# Patient Record
Sex: MALE | Race: WHITE | NOT HISPANIC OR LATINO | Employment: OTHER | URBAN - METROPOLITAN AREA
[De-identification: names, ages, dates, MRNs, and addresses within clinical notes are randomized per-mention and may not be internally consistent; named-entity substitution may affect disease eponyms.]

---

## 2018-01-03 PROBLEM — I63.312 THROMBOTIC STROKE INVOLVING LEFT MIDDLE CEREBRAL ARTERY: Status: ACTIVE | Noted: 2018-01-03

## 2019-05-02 ENCOUNTER — HOSPITAL ENCOUNTER (OUTPATIENT)
Dept: TELEMEDICINE | Facility: HOSPITAL | Age: 69
Discharge: HOME | End: 2019-05-02

## 2019-05-02 PROCEDURE — G0425 PR INPT TELEHEALTH CONSULT 30M: ICD-10-PCS | Mod: GT,G0,, | Performed by: PSYCHIATRY & NEUROLOGY

## 2019-05-02 PROCEDURE — G0425 INPT/ED TELECONSULT30: HCPCS | Mod: GT,G0,, | Performed by: PSYCHIATRY & NEUROLOGY

## 2019-05-02 NOTE — CONSULTS
[facility]  Vascular Neurology  Comprehensive Stroke Center  Tele-Consultation Note      Consults    Consulting Provider: WILLIE MURPHY  Current Providers  No providers found    Patient Location: [facility] - [facility] Emergency Department  Spoke hospital nurse at bedside with patient assisting consultant.     Patient information was obtained from patient and ED nurse.         Assessment/Plan:   68 y/o with HTN, DM, TIA, presents with acute onset R sided weakness that was noted by the patient upon awakening at 4 am today.  NIHSS 4, CTH without acute abnormality.  Suspect small acute L subcortical infarct. Out of the window for treatment with iv alteplase.  Recommend CTA head and neck now to determine potential eligibility for endovascular intervention. Otherwise, he will be admitted to the hospital for stroke work up with MRI brain, TTE.  PT/OT and neurology consults.  Already on DAPT. Statin.          STROKE DOCUMENTATION     Acute Stroke Times:   Acute Stroke Times   Last Known Normal Date: 05/01/19  Last Known Normal Time: 2200  Symptom Onset Date: (unable to determine, woke up with deficits)  Symptom Onset Time: (unknown, woke up stroke)  Stroke Team Called Date: 05/02/19  Stroke Team Called Time: 0714  Stroke Team Arrival Date: 05/02/19  Stroke Team Arrival Time: 0716  CT Interpretation Time: 0716  Decision to Treat Time for Alteplase: (No iv alteplase)  Decision to Treat Time for IR: (Unclear, awaiting CTA results)    NIH Scale:  Interval: baseline  1a. Level of Consciousness: 0-->Alert, keenly responsive  1b. LOC Questions: 0-->Answers both questions correctly  1c. LOC Commands: 0-->Performs both tasks correctly  2. Best Gaze: 0-->Normal  3. Visual: 0-->No visual loss  4. Facial Palsy: 1-->Minor paralysis (flattened nasolabial fold, asymmetry on smiling)  5a. Motor Arm, Left: 0-->No drift, limb holds 90 (or 45) degrees for  full 10 secs  5b. Motor Arm, Right: 1-->Drift, limb holds 90 (or 45) degrees, but drifts down before full 10 secs, does not hit bed or other support  6a. Motor Leg, Left: 0-->No drift, leg holds 30 degree position for full 5 secs  6b. Motor Leg, Right: 1-->Drift, leg falls by the end of the 5-sec period but does not hit bed  7. Limb Ataxia: 0-->Absent  8. Sensory: 1--  9. Best Language: 0-->No aphasia, normal  10. Dysarthria: 0-->Normal  11. Extinction and Inattention (formerly Neglect): 0-->No abnormality  Total (NIH Stroke Scale): 4     Modified Valencia Score: 0  Deny Coma Scale:    ABCD2 Score:    [id number] Score:   HAS -BLED Score:   ICH Score:   Hunt & Abbott Classification:       Diagnoses: R hemiparesis.      There were no vitals taken for this visit.  Alteplase Eligible?: No  Alteplase Recommendation: Alteplase not recommended due to Outside of treatment window   Possible Interventional Revascularization Candidate? awaiting CTA brain and neck    Disposition Recommendation: transfer to [facility] [place] if CTA shows LVO.    Subjective:     History of Present Illness: 68 y/o with HTN, DM, TIA, presents with acute onset R sided weakness that was noted by the patient upon awakening at 4 am today..  No other associated neurological complains.      No notes on file      Woke up with symptoms?: yes    Recent bleeding noted: no  Does the patient take any Blood Thinners? no  Medications: Antiplatelets:  Aspirin and Plavix      Past Medical History: hypertension, diabetes and tia    Past Surgical History: no major surgeries within the last 2 weeks    Family History: no relevant history    Social History: smoker (active)    Allergies: Allergies have not been reviewed No known drug allergies    Review of Systems   Constitutional: Negative for chills, diaphoresis and fever.   HENT: Negative for hearing loss, tinnitus and trouble swallowing.    Eyes: Negative for visual disturbance.   Respiratory: Negative for shortness  of breath.    Cardiovascular: Negative for chest pain.   Endocrine: Negative for cold intolerance.   Genitourinary: Negative for hematuria.   Musculoskeletal: Negative for neck pain and neck stiffness.   Allergic/Immunologic: Negative for immunocompromised state.   Neurological: Positive for facial asymmetry, weakness, light-headedness and numbness. Negative for dizziness, speech difficulty and headaches.   Psychiatric/Behavioral: Negative for agitation, behavioral problems and confusion.     Objective:   Vitals: There were no vitals taken for this visit. BP: 126/90, Respiratory Rate: 16 and Heart Rate: 88    CT READ: Yes  No hemmorhage. No mass effect. No early infarct signs.     Physical Exam   Constitutional: He is oriented to person, place, and time. He appears well-developed and well-nourished.   HENT:   Head: Normocephalic and atraumatic.   Eyes: Pupils are equal, round, and reactive to light. EOM are normal.   Neck: Normal range of motion. Neck supple.   Cardiovascular: Normal rate and regular rhythm.   Pulmonary/Chest: No respiratory distress.   Abdominal: He exhibits no mass.   Genitourinary:   Genitourinary Comments: No performed   Musculoskeletal: He exhibits no edema or deformity.   Neurological: He is alert and oriented to person, place, and time. A cranial nerve deficit and sensory deficit is present.   Skin: No rash noted. No erythema.   Psychiatric: He has a normal mood and affect. His behavior is normal.   Nursing note and vitals reviewed.            Recommended the emergency room physician to have a brief discussion with the patient and/or family if available regarding the risks and benefits of treatment, and to briefly document the occurrence of that discussion in his clinical encounter note.     The attending portion of this evaluation, treatment, and documentation was performed per Ronan Mcknight MD via audiovisual.    Billing code:  (non-intervention mild to moderate stroke, TIA, some  mimics)    · This patient has a critical neurological condition/illness, with some potential for high morbidity and mortality.  · There is a moderate probability for acute neurological change leading to clinical and possibly life-threatening deterioration requiring highest level of physician preparedness for urgent intervention.  · Care was coordinated with other physicians involved in the patient's care.  · Radiologic studies and laboratory data were reviewed and interpreted, and plan of care was re-assessed based on the results.  · Diagnosis, treatment options and prognosis may have been discussed with the patient and/or family members or caregiver.      In your opinion, this was a: Tier 2 Van Negative    Consult End Time: 7:30 AM    Ronan Mcknight MD  Comprehensive Stroke Center  Vascular Neurology   [facility]

## 2019-05-02 NOTE — SUBJECTIVE & OBJECTIVE
Woke up with symptoms?: yes    Recent bleeding noted: no  Does the patient take any Blood Thinners? no  Medications: Antiplatelets:  aspirin      Past Medical History: hypertension, diabetes and tia    Past Surgical History: no major surgeries within the last 2 weeks    Family History: no relevant history    Social History: smoker (active)    Allergies: Allergies have not been reviewed No known drug allergies    Review of Systems   Constitutional: Negative for chills, diaphoresis and fever.   HENT: Negative for hearing loss, tinnitus and trouble swallowing.    Eyes: Negative for visual disturbance.   Respiratory: Negative for shortness of breath.    Cardiovascular: Negative for chest pain.   Endocrine: Negative for cold intolerance.   Genitourinary: Negative for hematuria.   Musculoskeletal: Negative for neck pain and neck stiffness.   Allergic/Immunologic: Negative for immunocompromised state.   Neurological: Positive for facial asymmetry, weakness, light-headedness and numbness. Negative for dizziness, speech difficulty and headaches.   Psychiatric/Behavioral: Negative for agitation, behavioral problems and confusion.     Objective:   Vitals: There were no vitals taken for this visit. BP: 126/90, Respiratory Rate: 16 and Heart Rate: 88    CT READ: Yes  No hemmorhage. No mass effect. No early infarct signs.     Physical Exam   Constitutional: He is oriented to person, place, and time. He appears well-developed and well-nourished.   HENT:   Head: Normocephalic and atraumatic.   Eyes: Pupils are equal, round, and reactive to light. EOM are normal.   Neck: Normal range of motion. Neck supple.   Cardiovascular: Normal rate and regular rhythm.   Pulmonary/Chest: No respiratory distress.   Abdominal: He exhibits no mass.   Genitourinary:   Genitourinary Comments: No performed   Musculoskeletal: He exhibits no edema or deformity.   Neurological: He is alert and oriented to person, place, and time. A cranial nerve  deficit and sensory deficit is present.   Skin: No rash noted. No erythema.   Psychiatric: He has a normal mood and affect. His behavior is normal.   Nursing note and vitals reviewed.

## 2020-05-15 ENCOUNTER — LAB VISIT (OUTPATIENT)
Dept: LAB | Facility: HOSPITAL | Age: 70
End: 2020-05-15
Payer: MEDICARE

## 2020-05-15 ENCOUNTER — OFFICE VISIT (OUTPATIENT)
Dept: FAMILY MEDICINE | Facility: CLINIC | Age: 70
End: 2020-05-15
Payer: MEDICARE

## 2020-05-15 VITALS
DIASTOLIC BLOOD PRESSURE: 68 MMHG | HEART RATE: 77 BPM | OXYGEN SATURATION: 98 % | HEIGHT: 67 IN | WEIGHT: 142.88 LBS | BODY MASS INDEX: 22.43 KG/M2 | SYSTOLIC BLOOD PRESSURE: 110 MMHG | TEMPERATURE: 99 F

## 2020-05-15 DIAGNOSIS — Z87.891 HISTORY OF SMOKING 25-50 PACK YEARS: ICD-10-CM

## 2020-05-15 DIAGNOSIS — I69.359 HEMIPARESIS AFFECTING DOMINANT SIDE AS LATE EFFECT OF CEREBROVASCULAR ACCIDENT (CVA): ICD-10-CM

## 2020-05-15 DIAGNOSIS — G47.00 INSOMNIA, UNSPECIFIED TYPE: ICD-10-CM

## 2020-05-15 DIAGNOSIS — I69.30 HISTORY OF CVA WITH RESIDUAL DEFICIT: ICD-10-CM

## 2020-05-15 DIAGNOSIS — Z12.5 SCREENING FOR PROSTATE CANCER: ICD-10-CM

## 2020-05-15 DIAGNOSIS — F17.200 SMOKER: ICD-10-CM

## 2020-05-15 DIAGNOSIS — E11.9 CONTROLLED TYPE 2 DIABETES MELLITUS WITHOUT COMPLICATION, WITHOUT LONG-TERM CURRENT USE OF INSULIN: Primary | ICD-10-CM

## 2020-05-15 DIAGNOSIS — E11.9 CONTROLLED TYPE 2 DIABETES MELLITUS WITHOUT COMPLICATION, WITHOUT LONG-TERM CURRENT USE OF INSULIN: ICD-10-CM

## 2020-05-15 DIAGNOSIS — Z12.11 COLON CANCER SCREENING: ICD-10-CM

## 2020-05-15 LAB
ALBUMIN SERPL BCP-MCNC: 3.7 G/DL (ref 3.5–5.2)
ALP SERPL-CCNC: 124 U/L (ref 55–135)
ALT SERPL W/O P-5'-P-CCNC: 10 U/L (ref 10–44)
ANION GAP SERPL CALC-SCNC: 9 MMOL/L (ref 8–16)
AST SERPL-CCNC: 19 U/L (ref 10–40)
BASOPHILS # BLD AUTO: 0.1 K/UL (ref 0–0.2)
BASOPHILS NFR BLD: 1.3 % (ref 0–1.9)
BILIRUB SERPL-MCNC: 0.2 MG/DL (ref 0.1–1)
BUN SERPL-MCNC: 18 MG/DL (ref 8–23)
CALCIUM SERPL-MCNC: 9.2 MG/DL (ref 8.7–10.5)
CHLORIDE SERPL-SCNC: 107 MMOL/L (ref 95–110)
CHOLEST SERPL-MCNC: 206 MG/DL (ref 120–199)
CHOLEST/HDLC SERPL: 6.2 {RATIO} (ref 2–5)
CO2 SERPL-SCNC: 25 MMOL/L (ref 23–29)
COMPLEXED PSA SERPL-MCNC: 0.79 NG/ML (ref 0–4)
CREAT SERPL-MCNC: 1.2 MG/DL (ref 0.5–1.4)
DIFFERENTIAL METHOD: ABNORMAL
EOSINOPHIL # BLD AUTO: 0.5 K/UL (ref 0–0.5)
EOSINOPHIL NFR BLD: 6.7 % (ref 0–8)
ERYTHROCYTE [DISTWIDTH] IN BLOOD BY AUTOMATED COUNT: 13.2 % (ref 11.5–14.5)
EST. GFR  (AFRICAN AMERICAN): >60 ML/MIN/1.73 M^2
EST. GFR  (NON AFRICAN AMERICAN): >60 ML/MIN/1.73 M^2
ESTIMATED AVG GLUCOSE: 126 MG/DL (ref 68–131)
GLUCOSE SERPL-MCNC: 74 MG/DL (ref 70–110)
HBA1C MFR BLD HPLC: 6 % (ref 4–5.6)
HCT VFR BLD AUTO: 39.4 % (ref 40–54)
HDLC SERPL-MCNC: 33 MG/DL (ref 40–75)
HDLC SERPL: 16 % (ref 20–50)
HGB BLD-MCNC: 12.7 G/DL (ref 14–18)
IMM GRANULOCYTES # BLD AUTO: 0.02 K/UL (ref 0–0.04)
IMM GRANULOCYTES NFR BLD AUTO: 0.3 % (ref 0–0.5)
LDLC SERPL CALC-MCNC: 119.8 MG/DL (ref 63–159)
LYMPHOCYTES # BLD AUTO: 2.7 K/UL (ref 1–4.8)
LYMPHOCYTES NFR BLD: 35.4 % (ref 18–48)
MCH RBC QN AUTO: 32.4 PG (ref 27–31)
MCHC RBC AUTO-ENTMCNC: 32.2 G/DL (ref 32–36)
MCV RBC AUTO: 101 FL (ref 82–98)
MONOCYTES # BLD AUTO: 0.7 K/UL (ref 0.3–1)
MONOCYTES NFR BLD: 9.3 % (ref 4–15)
NEUTROPHILS # BLD AUTO: 3.6 K/UL (ref 1.8–7.7)
NEUTROPHILS NFR BLD: 47 % (ref 38–73)
NONHDLC SERPL-MCNC: 173 MG/DL
NRBC BLD-RTO: 0 /100 WBC
PLATELET # BLD AUTO: 292 K/UL (ref 150–350)
PMV BLD AUTO: 10.9 FL (ref 9.2–12.9)
POTASSIUM SERPL-SCNC: 4.4 MMOL/L (ref 3.5–5.1)
PROT SERPL-MCNC: 6.8 G/DL (ref 6–8.4)
RBC # BLD AUTO: 3.92 M/UL (ref 4.6–6.2)
SODIUM SERPL-SCNC: 141 MMOL/L (ref 136–145)
TRIGL SERPL-MCNC: 266 MG/DL (ref 30–150)
WBC # BLD AUTO: 7.56 K/UL (ref 3.9–12.7)

## 2020-05-15 PROCEDURE — 99204 OFFICE O/P NEW MOD 45 MIN: CPT | Mod: S$GLB,,, | Performed by: FAMILY MEDICINE

## 2020-05-15 PROCEDURE — 84153 ASSAY OF PSA TOTAL: CPT

## 2020-05-15 PROCEDURE — 36415 COLL VENOUS BLD VENIPUNCTURE: CPT | Mod: PO

## 2020-05-15 PROCEDURE — 99204 PR OFFICE/OUTPT VISIT, NEW, LEVL IV, 45-59 MIN: ICD-10-PCS | Mod: S$GLB,,, | Performed by: FAMILY MEDICINE

## 2020-05-15 PROCEDURE — 80061 LIPID PANEL: CPT

## 2020-05-15 PROCEDURE — 80053 COMPREHEN METABOLIC PANEL: CPT

## 2020-05-15 PROCEDURE — 83036 HEMOGLOBIN GLYCOSYLATED A1C: CPT

## 2020-05-15 PROCEDURE — 85025 COMPLETE CBC W/AUTO DIFF WBC: CPT

## 2020-05-15 PROCEDURE — 99213 OFFICE O/P EST LOW 20 MIN: CPT | Mod: PBBFAC,PO | Performed by: FAMILY MEDICINE

## 2020-05-15 PROCEDURE — 99999 PR PBB SHADOW E&M-EST. PATIENT-LVL III: ICD-10-PCS | Mod: PBBFAC,,, | Performed by: FAMILY MEDICINE

## 2020-05-15 PROCEDURE — 99999 PR PBB SHADOW E&M-EST. PATIENT-LVL III: CPT | Mod: PBBFAC,,, | Performed by: FAMILY MEDICINE

## 2020-05-15 RX ORDER — CLOPIDOGREL BISULFATE 75 MG/1
75 TABLET ORAL DAILY
Qty: 90 TABLET | Refills: 1 | Status: SHIPPED | OUTPATIENT
Start: 2020-05-15 | End: 2020-11-18 | Stop reason: SDUPTHER

## 2020-05-15 RX ORDER — TRAZODONE HYDROCHLORIDE 50 MG/1
50 TABLET ORAL NIGHTLY
Qty: 30 TABLET | Refills: 11 | Status: SHIPPED | OUTPATIENT
Start: 2020-05-15 | End: 2020-10-06

## 2020-05-15 RX ORDER — GABAPENTIN 300 MG/1
300 CAPSULE ORAL 3 TIMES DAILY
Qty: 270 CAPSULE | Refills: 1 | Status: SHIPPED | OUTPATIENT
Start: 2020-05-15 | End: 2020-06-22

## 2020-05-15 RX ORDER — LISINOPRIL 10 MG/1
TABLET ORAL
Qty: 90 TABLET | Refills: 1 | Status: SHIPPED | OUTPATIENT
Start: 2020-05-15 | End: 2020-06-22

## 2020-05-15 RX ORDER — LISINOPRIL 10 MG/1
TABLET ORAL
COMMUNITY
Start: 2019-04-25 | End: 2020-05-15 | Stop reason: SDUPTHER

## 2020-05-15 RX ORDER — TIZANIDINE 4 MG/1
4 TABLET ORAL EVERY 8 HOURS
COMMUNITY
End: 2020-05-15 | Stop reason: SDUPTHER

## 2020-05-15 RX ORDER — ZOLPIDEM TARTRATE 10 MG/1
5 TABLET ORAL NIGHTLY PRN
COMMUNITY
End: 2020-06-01 | Stop reason: SDUPTHER

## 2020-05-15 RX ORDER — GABAPENTIN 300 MG/1
1 CAPSULE ORAL 3 TIMES DAILY
COMMUNITY
Start: 2020-04-21 | End: 2020-05-15 | Stop reason: SDUPTHER

## 2020-05-15 RX ORDER — METFORMIN HYDROCHLORIDE 500 MG/1
1 TABLET ORAL 2 TIMES DAILY
COMMUNITY
Start: 2020-02-24 | End: 2020-05-15 | Stop reason: SDUPTHER

## 2020-05-15 RX ORDER — METFORMIN HYDROCHLORIDE 500 MG/1
500 TABLET ORAL 2 TIMES DAILY
Qty: 180 TABLET | Refills: 1 | Status: SHIPPED | OUTPATIENT
Start: 2020-05-15 | End: 2020-05-28 | Stop reason: SDUPTHER

## 2020-05-15 RX ORDER — VARENICLINE TARTRATE 0.5 (11)-1
KIT ORAL
Qty: 1 PACKAGE | Refills: 0 | Status: SHIPPED | OUTPATIENT
Start: 2020-05-15 | End: 2020-05-28 | Stop reason: SDUPTHER

## 2020-05-15 RX ORDER — FLUOXETINE 10 MG/1
10 CAPSULE ORAL DAILY
Qty: 90 CAPSULE | Refills: 1 | Status: SHIPPED | OUTPATIENT
Start: 2020-05-15 | End: 2020-07-24 | Stop reason: SDUPTHER

## 2020-05-15 RX ORDER — CLOPIDOGREL BISULFATE 75 MG/1
1 TABLET ORAL DAILY
COMMUNITY
Start: 2020-04-09 | End: 2020-05-15 | Stop reason: SDUPTHER

## 2020-05-15 RX ORDER — FLUOXETINE 10 MG/1
1 CAPSULE ORAL DAILY
COMMUNITY
Start: 2020-01-17 | End: 2020-05-15 | Stop reason: SDUPTHER

## 2020-05-15 RX ORDER — TIZANIDINE 4 MG/1
4 TABLET ORAL EVERY 8 HOURS
Qty: 90 TABLET | Refills: 1 | Status: SHIPPED | OUTPATIENT
Start: 2020-05-15 | End: 2020-05-29 | Stop reason: SDUPTHER

## 2020-05-15 NOTE — PROGRESS NOTES
Chief Complaint:    Chief Complaint   Patient presents with    Establish Care       History of Present Illness:    Patient is here to [facility],  Is a father of my LPN Jayshree    Patient has hypertension, diabetes type to for number of years and he has 50 pack-year history of smoking still continues to smoke but does want to quit.    He has had at least 2 CVA the 1 was minor from which he completely recovered the 2nd 1 affected his right leg and to a lesser degrees right arm.  This was thought to be due to uncontrolled hypertension and diabetes.  His last A1c was 6.2 does appear that he has chronic kidney disease last creatinine was 1.4.    It has been number of years since he had his colonoscopy      ROS:  Review of Systems   Constitutional: Negative for activity change, chills, fatigue, fever and unexpected weight change.   HENT: Negative for congestion, ear discharge, ear pain, hearing loss, postnasal drip and rhinorrhea.    Eyes: Negative for pain and visual disturbance.   Respiratory: Negative for cough, chest tightness and shortness of breath.    Cardiovascular: Negative for chest pain and palpitations.   Gastrointestinal: Negative for abdominal pain, diarrhea and vomiting.   Endocrine: Negative for heat intolerance.   Genitourinary: Negative for dysuria, flank pain, frequency and hematuria.   Musculoskeletal: Negative for back pain, gait problem and neck pain.   Skin: Negative for color change and rash.   Neurological: Negative for dizziness, tremors, seizures, numbness and headaches.   Psychiatric/Behavioral: Negative for agitation, hallucinations, self-injury, sleep disturbance and suicidal ideas. The patient is not nervous/anxious.        Past Medical History:   Diagnosis Date    Anxiety     Depression     Diabetes mellitus, type 2     Hypertension     Stroke        Social History:  Social History     Socioeconomic History    Marital status:      Spouse name: Not on file    Number of  "children: Not on file    Years of education: Not on file    Highest education level: Not on file   Occupational History    Not on file   Social Needs    Financial resource strain: Not on file    Food insecurity:     Worry: Not on file     Inability: Not on file    Transportation needs:     Medical: Not on file     Non-medical: Not on file   Tobacco Use    Smoking status: Current Every Day Smoker     Packs/day: 1.00     Types: Cigarettes    Smokeless tobacco: Current User     Types: Snuff   Substance and Sexual Activity    Alcohol use: Not Currently    Drug use: Never    Sexual activity: Not Currently   Lifestyle    Physical activity:     Days per week: Not on file     Minutes per session: Not on file    Stress: To some extent   Relationships    Social connections:     Talks on phone: Not on file     Gets together: Not on file     Attends [employer] service: Not on file     Active member of club or organization: Not on file     Attends meetings of clubs or organizations: Not on file     Relationship status: Not on file   Other Topics Concern    Not on file   Social History Narrative    Not on file       Family History:   family history includes COPD in his sister; Cancer in his father; Heart disease in his mother; Stroke in his mother.    Health Maintenance   Topic Date Due    Hepatitis C Screening  1950    Lipid Panel  1950    TETANUS VACCINE  01/25/1970    High Dose Statin  01/25/1973    Colonoscopy  01/25/2002    Pneumococcal Vaccine (65+ Low/Medium Risk) (1 of 2 - PCV13) 01/25/2017    Abdominal Aortic Aneurysm Screening  01/25/2017    Aspirin/Antiplatelet Therapy  05/15/2021       Physical Exam:    Vital Signs  Temp: 98.7 °F (37.1 °C)  Temp src: Temporal  Pulse: 77  SpO2: 98 %  BP: 110/68  BP Location: Left arm  Patient Position: Sitting  Pain Score: 0-No pain  Height and Weight  Height: 5' 6.5" (168.9 cm)  Weight: 64.8 kg (142 lb 13.7 oz)  BSA (Calculated - sq m): 1.74 sq " meters  BMI (Calculated): 22.7  Weight in (lb) to have BMI = 25: 156.9]    Body mass index is 22.71 kg/m².    Physical Exam   Constitutional: He is oriented to person, place, and time. He appears cachectic.   HENT:   Right Ear: External ear normal.   Left Ear: External ear normal.   Mouth/Throat: Oropharynx is clear and moist.   Eyes: Pupils are equal, round, and reactive to light. Conjunctivae are normal.   Neck: Normal range of motion. Neck supple.   Cardiovascular: Normal rate, regular rhythm and normal heart sounds.   No murmur heard.  Pulmonary/Chest: Effort normal and breath sounds normal. No respiratory distress. He has no wheezes. He has no rales. He exhibits no tenderness.   Abdominal: Soft. He exhibits no distension and no mass. There is no tenderness. There is no guarding.   Musculoskeletal: He exhibits no edema or tenderness.   Lymphadenopathy:     He has no cervical adenopathy.   Neurological: He is alert and oriented to person, place, and time. He has normal reflexes. Gait abnormal.   There is decreased strength the right leg and foot specially foot dorsiflexion   Skin: Skin is warm and dry.   Psychiatric: He has a normal mood and affect. His behavior is normal. Judgment and thought content normal.         Diabetes Management Status    Statin: Not taking  ACE/ARB: Taking    Screening or Prevention Patient's value Goal Complete/Controlled?   HgA1C Testing and Control   No results found for: HGBA1C   Annually/Less than 8% No   Lipid profile Most Recent Lipid Panel Health Maintenance Topic Completion: Not Found Annually No   LDL control No results found for: LDLCALC Annually/Less than 100 mg/dl  No   Nephropathy screening No results found for: LABMICR  No results found for: PROTEINUA Annually No   Blood pressure BP Readings from Last 1 Encounters:   05/15/20 110/68    Less than 140/90 Yes   Dilated retinal exam Most Recent Eye Exam Date: Not Found Annually Yes   Foot exam   Most Recent Foot Exam Date: Not  Found Annually Yes       Assessment:      ICD-10-CM ICD-9-CM   1. Controlled type 2 diabetes mellitus without complication, without long-term current use of insulin E11.9 250.00   2. History of smoking 25-50 pack years Z87.891 V15.82   3. Smoker F17.200 305.1   4. Insomnia, unspecified type G47.00 780.52   5. Colon cancer screening Z12.11 V76.51   6. Screening for prostate cancer Z12.5 V76.44   7. History of CVA with residual deficit I69.30 438.9   8. Hemiparesis affecting dominant side as late effect of cerebrovascular accident (CVA) I69.359 438.21         Plan:  Patient's gait is unsteady due to his CVA recommend that he use a cane or a walker to ambulate since his falling risk is high.  Also could benefit from physical therapy  For right now do not drive  Start on Chantix starter pack side effects discussed with the patient  He will do a fit kit for colon cancer screening  Other medical problems stable check labs today  Recommend trazodone for insomnia since Ambien is no longer working for  Follow-up every 3 months or sooner  Orders Placed This Encounter   Procedures    CBC auto differential    Comprehensive metabolic panel    Hemoglobin A1C    Lipid Panel    Microalbumin/creatinine urine ratio    Fecal Immunochemical Test (iFOBT)    PSA, Screening       Current Outpatient Medications   Medication Sig Dispense Refill    clopidogreL (PLAVIX) 75 mg tablet Take 1 tablet (75 mg total) by mouth once daily. 90 tablet 1    FLUoxetine (PROZAC) 10 MG capsule Take 1 capsule (10 mg total) by mouth once daily. 90 capsule 1    gabapentin (NEURONTIN) 300 MG capsule Take 1 capsule (300 mg total) by mouth 3 (three) times daily. 270 capsule 1    lisinopriL 10 MG tablet take 1 tablet po q am for blood pressure (may take another at night if BP over 130/80) 90 tablet 1    metFORMIN (GLUCOPHAGE) 500 MG tablet Take 1 tablet (500 mg total) by mouth 2 (two) times a day. 180 tablet 1    tiZANidine (ZANAFLEX) 4 MG tablet Take  1 tablet (4 mg total) by mouth every 8 (eight) hours. 90 tablet 1    zolpidem (AMBIEN) 10 mg Tab Take 5 mg by mouth nightly as needed.      traZODone (DESYREL) 50 MG tablet Take 1 tablet (50 mg total) by mouth every evening. 30 tablet 11    varenicline (CHANTIX STARTING MONTH BOX) 0.5 mg (11)- 1 mg (42) tablet Take one 0.5mg tab by mouth once daily X3 days,then increase to one 0.5mg tab twice daily X4 days,then increase to one 1mg tab twice daily 1 Package 0     No current facility-administered medications for this visit.        Medications Discontinued During This Encounter   Medication Reason    clopidogreL (PLAVIX) 75 mg tablet Reorder    FLUoxetine (PROZAC) 10 MG capsule Reorder    gabapentin (NEURONTIN) 300 MG capsule Reorder    lisinopriL 10 MG tablet Reorder    metFORMIN (GLUCOPHAGE) 500 MG tablet Reorder    tiZANidine (ZANAFLEX) 4 MG tablet Reorder       Follow up in about 3 months (around 8/15/2020).      Damien Barron MD

## 2020-05-18 ENCOUNTER — TELEPHONE (OUTPATIENT)
Dept: FAMILY MEDICINE | Facility: CLINIC | Age: 70
End: 2020-05-18

## 2020-05-18 NOTE — TELEPHONE ENCOUNTER
Patient is unable to take Trazodone. Made him walk the floors, Tried for 2 nights. Please review and advise.

## 2020-05-22 ENCOUNTER — TELEPHONE (OUTPATIENT)
Dept: FAMILY MEDICINE | Facility: CLINIC | Age: 70
End: 2020-05-22

## 2020-05-22 ENCOUNTER — LAB VISIT (OUTPATIENT)
Dept: LAB | Facility: HOSPITAL | Age: 70
End: 2020-05-22
Attending: FAMILY MEDICINE
Payer: MEDICARE

## 2020-05-22 DIAGNOSIS — D64.9 ANEMIA, UNSPECIFIED TYPE: Primary | ICD-10-CM

## 2020-05-22 DIAGNOSIS — D64.9 ANEMIA, UNSPECIFIED TYPE: ICD-10-CM

## 2020-05-22 PROCEDURE — 82607 VITAMIN B-12: CPT | Mod: DBM

## 2020-05-22 PROCEDURE — 84443 ASSAY THYROID STIM HORMONE: CPT

## 2020-05-22 PROCEDURE — 36415 COLL VENOUS BLD VENIPUNCTURE: CPT | Mod: PO

## 2020-05-23 LAB
TSH SERPL DL<=0.005 MIU/L-ACNC: 2.53 UIU/ML (ref 0.4–4)
VIT B12 SERPL-MCNC: 341 PG/ML (ref 210–950)

## 2020-05-25 DIAGNOSIS — E53.8 B12 DEFICIENCY: Primary | ICD-10-CM

## 2020-05-25 RX ORDER — CYANOCOBALAMIN 1000 UG/ML
1000 INJECTION, SOLUTION INTRAMUSCULAR; SUBCUTANEOUS WEEKLY
Qty: 4 ML | Refills: 0 | Status: SHIPPED | OUTPATIENT
Start: 2020-05-25 | End: 2020-06-16

## 2020-05-28 ENCOUNTER — PATIENT MESSAGE (OUTPATIENT)
Dept: FAMILY MEDICINE | Facility: CLINIC | Age: 70
End: 2020-05-28

## 2020-05-28 RX ORDER — VARENICLINE TARTRATE 0.5 (11)-1
KIT ORAL
Qty: 1 PACKAGE | Refills: 0 | Status: SHIPPED | OUTPATIENT
Start: 2020-05-28 | End: 2020-10-06

## 2020-05-28 RX ORDER — METFORMIN HYDROCHLORIDE 500 MG/1
500 TABLET ORAL 2 TIMES DAILY
Qty: 180 TABLET | Refills: 1 | Status: SHIPPED | OUTPATIENT
Start: 2020-05-28 | End: 2021-02-04 | Stop reason: SDUPTHER

## 2020-05-29 RX ORDER — TIZANIDINE 4 MG/1
4 TABLET ORAL EVERY 8 HOURS
Qty: 90 TABLET | Refills: 0 | Status: SHIPPED | OUTPATIENT
Start: 2020-05-29 | End: 2020-06-22

## 2020-06-01 RX ORDER — ZOLPIDEM TARTRATE 10 MG/1
10 TABLET ORAL NIGHTLY PRN
Qty: 30 TABLET | Refills: 2 | Status: SHIPPED | OUTPATIENT
Start: 2020-06-01 | End: 2020-08-31 | Stop reason: SDUPTHER

## 2020-06-08 ENCOUNTER — LAB VISIT (OUTPATIENT)
Dept: LAB | Facility: HOSPITAL | Age: 70
End: 2020-06-08
Attending: FAMILY MEDICINE
Payer: MEDICARE

## 2020-06-08 DIAGNOSIS — Z12.11 COLON CANCER SCREENING: ICD-10-CM

## 2020-06-08 PROCEDURE — 82274 ASSAY TEST FOR BLOOD FECAL: CPT

## 2020-06-15 ENCOUNTER — PATIENT OUTREACH (OUTPATIENT)
Dept: ADMINISTRATIVE | Facility: HOSPITAL | Age: 70
End: 2020-06-15

## 2020-06-15 NOTE — PROGRESS NOTES
Contacted patient about collection date for fit kit Spoke with patients daughter she states the patient collected the sample 06/08/2020

## 2020-06-16 LAB — HEMOCCULT STL QL IA: NEGATIVE

## 2020-06-23 ENCOUNTER — OFFICE VISIT (OUTPATIENT)
Dept: FAMILY MEDICINE | Facility: CLINIC | Age: 70
End: 2020-06-23
Payer: MEDICARE

## 2020-06-23 VITALS
SYSTOLIC BLOOD PRESSURE: 138 MMHG | RESPIRATION RATE: 18 BRPM | OXYGEN SATURATION: 97 % | HEART RATE: 86 BPM | BODY MASS INDEX: 23.43 KG/M2 | TEMPERATURE: 98 F | WEIGHT: 147.38 LBS | DIASTOLIC BLOOD PRESSURE: 66 MMHG

## 2020-06-23 DIAGNOSIS — E11.42 DM TYPE 2 WITH DIABETIC PERIPHERAL NEUROPATHY: ICD-10-CM

## 2020-06-23 DIAGNOSIS — M54.41 CHRONIC BILATERAL LOW BACK PAIN WITH RIGHT-SIDED SCIATICA: Primary | ICD-10-CM

## 2020-06-23 DIAGNOSIS — G89.29 CHRONIC BILATERAL LOW BACK PAIN WITH RIGHT-SIDED SCIATICA: Primary | ICD-10-CM

## 2020-06-23 PROCEDURE — 99214 OFFICE O/P EST MOD 30 MIN: CPT | Mod: S$GLB,,, | Performed by: FAMILY MEDICINE

## 2020-06-23 PROCEDURE — 1159F MED LIST DOCD IN RCRD: CPT | Mod: S$GLB,,, | Performed by: FAMILY MEDICINE

## 2020-06-23 PROCEDURE — 3044F HG A1C LEVEL LT 7.0%: CPT | Mod: CPTII,S$GLB,, | Performed by: FAMILY MEDICINE

## 2020-06-23 PROCEDURE — 1101F PT FALLS ASSESS-DOCD LE1/YR: CPT | Mod: CPTII,S$GLB,, | Performed by: FAMILY MEDICINE

## 2020-06-23 PROCEDURE — 3044F PR MOST RECENT HEMOGLOBIN A1C LEVEL <7.0%: ICD-10-PCS | Mod: CPTII,S$GLB,, | Performed by: FAMILY MEDICINE

## 2020-06-23 PROCEDURE — 3008F PR BODY MASS INDEX (BMI) DOCUMENTED: ICD-10-PCS | Mod: CPTII,S$GLB,, | Performed by: FAMILY MEDICINE

## 2020-06-23 PROCEDURE — 1159F PR MEDICATION LIST DOCUMENTED IN MEDICAL RECORD: ICD-10-PCS | Mod: S$GLB,,, | Performed by: FAMILY MEDICINE

## 2020-06-23 PROCEDURE — 99999 PR PBB SHADOW E&M-EST. PATIENT-LVL IV: CPT | Mod: PBBFAC,,, | Performed by: FAMILY MEDICINE

## 2020-06-23 PROCEDURE — 3008F BODY MASS INDEX DOCD: CPT | Mod: CPTII,S$GLB,, | Performed by: FAMILY MEDICINE

## 2020-06-23 PROCEDURE — 1125F PR PAIN SEVERITY QUANTIFIED, PAIN PRESENT: ICD-10-PCS | Mod: S$GLB,,, | Performed by: FAMILY MEDICINE

## 2020-06-23 PROCEDURE — 99999 PR PBB SHADOW E&M-EST. PATIENT-LVL IV: ICD-10-PCS | Mod: PBBFAC,,, | Performed by: FAMILY MEDICINE

## 2020-06-23 PROCEDURE — 1101F PR PT FALLS ASSESS DOC 0-1 FALLS W/OUT INJ PAST YR: ICD-10-PCS | Mod: CPTII,S$GLB,, | Performed by: FAMILY MEDICINE

## 2020-06-23 PROCEDURE — 1125F AMNT PAIN NOTED PAIN PRSNT: CPT | Mod: S$GLB,,, | Performed by: FAMILY MEDICINE

## 2020-06-23 PROCEDURE — 99214 PR OFFICE/OUTPT VISIT, EST, LEVL IV, 30-39 MIN: ICD-10-PCS | Mod: S$GLB,,, | Performed by: FAMILY MEDICINE

## 2020-06-23 RX ORDER — HYDROCODONE BITARTRATE AND ACETAMINOPHEN 7.5; 325 MG/1; MG/1
1 TABLET ORAL EVERY 12 HOURS PRN
Qty: 10 TABLET | Refills: 0 | Status: SHIPPED | OUTPATIENT
Start: 2020-06-23 | End: 2020-06-28

## 2020-06-23 RX ORDER — GABAPENTIN 300 MG/1
300 CAPSULE ORAL 3 TIMES DAILY
Qty: 90 CAPSULE | Refills: 2 | Status: SHIPPED | OUTPATIENT
Start: 2020-06-23 | End: 2020-06-27

## 2020-06-23 NOTE — PROGRESS NOTES
Subjective:       Patient ID: Samy Alejandre Jr. is a 70 y.o. male.    Chief Complaint: Back Pain      History of Present Illness:   Samy Alejandre Jr. 70 y.o. male presents today with   Acute on chronic low back pain : constant ache with pain to the right buttock and radiation to posterior right leg.  Pain is severe. Worse with activity.  Xray and MRI in the past few months and will get record. History of laminectomy.  Needs referral for pain management and considering seeing neurosurgery. No new sxs of cauda equina.  He is on tizanidine and Gabapentin which also helps with peripheral neuropathy due to DM II.    Past Medical History:   Diagnosis Date    Anxiety     Depression     Diabetes mellitus, type 2     Hypertension     Stroke      Family History   Problem Relation Age of Onset    Heart disease Mother     Stroke Mother     Cancer Father     COPD Sister      Social History     Socioeconomic History    Marital status:      Spouse name: Not on file    Number of children: Not on file    Years of education: Not on file    Highest education level: Not on file   Occupational History    Not on file   Social Needs    Financial resource strain: Not on file    Food insecurity     Worry: Not on file     Inability: Not on file    Transportation needs     Medical: Not on file     Non-medical: Not on file   Tobacco Use    Smoking status: Current Every Day Smoker     Packs/day: 1.00     Types: Cigarettes    Smokeless tobacco: Current User     Types: Snuff   Substance and Sexual Activity    Alcohol use: Not Currently    Drug use: Never    Sexual activity: Not Currently   Lifestyle    Physical activity     Days per week: Not on file     Minutes per session: Not on file    Stress: To some extent   Relationships    Social connections     Talks on phone: Not on file     Gets together: Not on file     Attends Holiness service: Not on file     Active member of club or organization: Not on file      Attends meetings of clubs or organizations: Not on file     Relationship status: Not on file   Other Topics Concern    Not on file   Social History Narrative    Not on file     Outpatient Encounter Medications as of 6/23/2020   Medication Sig Dispense Refill    clopidogreL (PLAVIX) 75 mg tablet Take 1 tablet (75 mg total) by mouth once daily. 90 tablet 1    FLUoxetine (PROZAC) 10 MG capsule Take 1 capsule (10 mg total) by mouth once daily. 90 capsule 1    gabapentin (NEURONTIN) 300 MG capsule Take 1 capsule (300 mg total) by mouth 3 (three) times daily. 90 capsule 2    lisinopriL 10 MG tablet PLEASE SEE ATTACHED FOR DETAILED DIRECTIONS 90 tablet 0    metFORMIN (GLUCOPHAGE) 500 MG tablet Take 1 tablet (500 mg total) by mouth 2 (two) times a day. 180 tablet 1    tiZANidine (ZANAFLEX) 4 MG tablet TAKE 1 TABLET BY MOUTH EVERY 8 (EIGHT) HOURS. 90 tablet 0    traZODone (DESYREL) 50 MG tablet Take 1 tablet (50 mg total) by mouth every evening. 30 tablet 11    zolpidem (AMBIEN) 10 mg Tab Take 1 tablet (10 mg total) by mouth nightly as needed. 30 tablet 2    [DISCONTINUED] gabapentin (NEURONTIN) 300 MG capsule TAKE ONE CAPSULE BY MOUTH THREE TIMES DAILY 90 capsule 0    HYDROcodone-acetaminophen (NORCO) 7.5-325 mg per tablet Take 1 tablet by mouth every 12 (twelve) hours as needed for Pain. 10 tablet 0    varenicline (CHANTIX STARTING MONTH BOX) 0.5 mg (11)- 1 mg (42) tablet Take one 0.5mg tab by mouth once daily X3 days,then increase to one 0.5mg tab twice daily X4 days,then increase to one 1mg tab twice daily (Patient not taking: Reported on 6/23/2020) 1 Package 0     No facility-administered encounter medications on file as of 6/23/2020.        Review of Systems   Constitutional: Negative for appetite change and fever.   HENT: Negative for congestion, facial swelling and voice change.    Eyes: Negative for discharge and itching.   Respiratory: Negative for cough, chest tightness and wheezing.     Cardiovascular: Negative.  Negative for chest pain and leg swelling.   Gastrointestinal: Negative for abdominal pain, nausea and vomiting.   Endocrine: Negative for cold intolerance and heat intolerance.   Genitourinary: Negative for dysuria and flank pain.   Musculoskeletal: Positive for back pain and gait problem. Negative for myalgias and neck stiffness.   Skin: Negative for pallor and rash.   Neurological: Negative for facial asymmetry and weakness.   Psychiatric/Behavioral: Negative for agitation and confusion.       Objective:      /66 (BP Location: Right arm, Patient Position: Sitting, BP Method: Large (Manual))   Pulse 86   Temp 98.2 °F (36.8 °C) (Oral)   Resp 18   Wt 66.8 kg (147 lb 6 oz)   SpO2 97%   BMI 23.43 kg/m²   Physical Exam  Vitals signs and nursing note reviewed.   Constitutional:       General: He is not in acute distress.     Appearance: Normal appearance. He is well-developed.   HENT:      Head: Normocephalic and atraumatic.      Right Ear: External ear normal.      Left Ear: External ear normal.   Eyes:      Conjunctiva/sclera: Conjunctivae normal.   Neck:      Musculoskeletal: Neck supple.   Cardiovascular:      Rate and Rhythm: Normal rate and regular rhythm.   Pulmonary:      Effort: Pulmonary effort is normal. No respiratory distress.   Abdominal:      General: Bowel sounds are normal.      Palpations: Abdomen is soft.   Genitourinary:     Comments: deferred  Musculoskeletal:      Lumbar back: He exhibits decreased range of motion, tenderness and spasm.        Back:    Skin:     General: Skin is warm and dry.   Neurological:      Mental Status: He is alert and oriented to person, place, and time.   Psychiatric:         Behavior: Behavior normal.         Assessment:       1. Chronic bilateral low back pain with right-sided sciatica    2. DM type 2 with diabetic peripheral neuropathy        Plan:   Chronic bilateral low back pain with right-sided sciatica; exacerbation of  chronic symptom: cont diclofenac.  -     HYDROcodone-acetaminophen (NORCO) 7.5-325 mg per tablet; Take 1 tablet by mouth every 12 (twelve) hours as needed for Pain.  Dispense: 10 tablet; Refill: 0  -     Ambulatory referral/consult to Pain Clinic; Future; Expected date: 06/30/2020  -     gabapentin (NEURONTIN) 300 MG capsule; Take 1 capsule (300 mg total) by mouth 3 (three) times daily.  Dispense: 90 capsule; Refill: 2    DM type 2 with diabetic peripheral neuropathy:  Chronic stable  -     gabapentin (NEURONTIN) 300 MG capsule; Take 1 capsule (300 mg total) by mouth 3 (three) times daily.  Dispense: 90 capsule; Refill: 2

## 2020-06-23 NOTE — PATIENT INSTRUCTIONS
Back Pain (Acute or Chronic)    Back pain is one of the most common problems. The good news is that most people feel better in 1 to 2 weeks, and most of the rest in 1 to 2 months. Most people can remain active.  People experience and describe pain differently; not everyone is the same.  · The pain can be sharp, stabbing, shooting, aching, cramping or burning.  · Movement, standing, bending, lifting, sitting, or walking may worsen pain.  · It can be localized to one spot or area, or it can be more generalized.  · It can spread or radiate upwards, to the front, or go down your arms or legs (sciatica).  · It can cause muscle spasm.  Most of the time, mechanical problems with the muscles or spine cause the pain. Mechanical problems are usually caused by an injury to the muscles or ligaments. While illness can cause back pain, it is usually not caused by a serious illness. Mechanical problems include:   · Physical activity such as sports, exercise, work, or normal activity  · Overexertion, lifting, pushing, pulling incorrectly or too aggressively  · Sudden twisting, bending, or stretching from an accident, or accidental movement  · Poor posture  · Stretching or moving wrong, without noticing pain at the time  · Poor coordination, lack of regular exercise (check with your doctor about this)  · Spinal disc disease or arthritis  · Stress  Pain can also be related to pregnancy, or illness like appendicitis, bladder or kidney infections, pelvic infections, and many other things.  Acute back pain usually gets better in 1 to 2 weeks. Back pain related to disk disease, arthritis in the spinal joints or spinal stenosis (narrowing of the spinal canal) can become chronic and last for months or years.  Unless you had a physical injury (for example, a car accident or fall) X-rays are usually not needed for the initial evaluation of back pain. If pain continues and does not respond to medical treatment, X-rays and other tests may be  needed.  Home care  Try these home care recommendations:  · When in bed, try to find a position of comfort. A firm mattress is best. Try lying flat on your back with pillows under your knees. You can also try lying on your side with your knees bent up towards your chest and a pillow between your knees.  · At first, do not try to stretch out the sore spots. If there is a strain, it is not like the good soreness you get after exercising without an injury. In this case, stretching may make it worse.  · Avoid prolong sitting, long car rides, or travel. This puts more stress on the lower back than standing or walking.  · During the first 24 to 72 hours after an acute injury or flare up of chronic back pain, apply an ice pack to the painful area for 20 minutes and then remove it for 20 minutes. Do this over a period of 60 to 90 minutes or several times a day. This will reduce swelling and pain. Wrap the ice pack in a thin towel or plastic to protect your skin.  · You can start with ice, then switch to heat. Heat (hot shower, hot bath, or heating pad) reduces pain and works well for muscle spasms. Heat can be applied to the painful area for 20 minutes then remove it for 20 minutes. Do this over a period of 60 to 90 minutes or several times a day. Do not sleep on a heating pad. It can lead to skin burns or tissue damage.  · You can alternate ice and heat therapy. Talk with your doctor about the best treatment for your back pain.  · Therapeutic massage can help relax the back muscles without stretching them.  · Be aware of safe lifting methods and do not lift anything without stretching first.  Medicines  Talk to your doctor before using medicine, especially if you have other medical problems or are taking other medicines.  · You may use over-the-counter medicine as directed on the bottle to control pain, unless another pain medicine was prescribed. If you have chronic conditions like diabetes, liver or kidney disease,  stomach ulcers, or gastrointestinal bleeding, or are taking blood thinners, talk to your doctor before taking any medicine.  · Be careful if you are given a prescription medicines, narcotics, or medicine for muscle spasms. They can cause drowsiness, affect your coordination, reflexes, and judgement. Do not drive or operate heavy machinery.  Follow-up care  Follow up with your healthcare provider, or as advised.   A radiologist will review any X-rays that were taken. Your provide will notify you of any new findings that may affect your care.  Call 911  Call emergency services if any of the following occur:  · Trouble breathing  · Confusion  · Very drowsy or trouble awakening  · Fainting or loss of consciousness  · Rapid or very slow heart rate  · Loss of bowel or bladder control  When to seek medical advice  Call your healthcare provider right away if any of these occur:   · Pain becomes worse or spreads to your legs  · Weakness or numbness in one or both legs  · Numbness in the groin or genital area  Date Last Reviewed: 7/1/2016  © 5201-5484 [employer]. [address] [place] [address], [place] [address]. All rights reserved. This information is not intended as a substitute for professional medical care. Always follow your healthcare professional's instructions.

## 2020-06-25 RX ORDER — LISINOPRIL 10 MG/1
TABLET ORAL
Qty: 90 TABLET | Refills: 0 | Status: SHIPPED | OUTPATIENT
Start: 2020-06-25 | End: 2021-01-04

## 2020-06-29 ENCOUNTER — TELEPHONE (OUTPATIENT)
Dept: FAMILY MEDICINE | Facility: CLINIC | Age: 70
End: 2020-06-29

## 2020-06-29 RX ORDER — INSULIN PUMP SYRINGE, 3 ML
1 EACH MISCELLANEOUS DAILY
Qty: 1 EACH | Refills: 4 | Status: SHIPPED | OUTPATIENT
Start: 2020-06-29

## 2020-06-29 RX ORDER — DEXTROSE 4 G
1 TABLET,CHEWABLE ORAL ONCE
Qty: 1 EACH | Refills: 0 | Status: SHIPPED | OUTPATIENT
Start: 2020-06-29 | End: 2022-12-20

## 2020-06-29 RX ORDER — BLOOD-GLUCOSE METER
1 EACH MISCELLANEOUS DAILY
Qty: 1 EACH | Refills: 4 | Status: SHIPPED | OUTPATIENT
Start: 2020-06-29

## 2020-07-23 ENCOUNTER — HOSPITAL ENCOUNTER (OUTPATIENT)
Dept: RADIOLOGY | Facility: HOSPITAL | Age: 70
Discharge: HOME | End: 2020-07-23
Attending: FAMILY MEDICINE
Payer: MEDICARE

## 2020-07-23 ENCOUNTER — OFFICE VISIT (OUTPATIENT)
Dept: FAMILY MEDICINE | Facility: CLINIC | Age: 70
End: 2020-07-23
Payer: MEDICARE

## 2020-07-23 VITALS — DIASTOLIC BLOOD PRESSURE: 80 MMHG | SYSTOLIC BLOOD PRESSURE: 132 MMHG | OXYGEN SATURATION: 96 % | HEART RATE: 82 BPM

## 2020-07-23 DIAGNOSIS — M48.07 SPINAL STENOSIS, LUMBOSACRAL REGION: ICD-10-CM

## 2020-07-23 DIAGNOSIS — M54.41 CHRONIC BILATERAL LOW BACK PAIN WITH RIGHT-SIDED SCIATICA: ICD-10-CM

## 2020-07-23 DIAGNOSIS — M54.41 CHRONIC RIGHT-SIDED LOW BACK PAIN WITH RIGHT-SIDED SCIATICA: Primary | ICD-10-CM

## 2020-07-23 DIAGNOSIS — G89.29 CHRONIC BILATERAL LOW BACK PAIN WITH RIGHT-SIDED SCIATICA: ICD-10-CM

## 2020-07-23 DIAGNOSIS — G89.29 CHRONIC RIGHT-SIDED LOW BACK PAIN WITH RIGHT-SIDED SCIATICA: Primary | ICD-10-CM

## 2020-07-23 PROCEDURE — 1159F MED LIST DOCD IN RCRD: CPT | Mod: S$GLB,,, | Performed by: FAMILY MEDICINE

## 2020-07-23 PROCEDURE — 1159F PR MEDICATION LIST DOCUMENTED IN MEDICAL RECORD: ICD-10-PCS | Mod: S$GLB,,, | Performed by: FAMILY MEDICINE

## 2020-07-23 PROCEDURE — 99999 PR PBB SHADOW E&M-EST. PATIENT-LVL III: ICD-10-PCS | Mod: PBBFAC,,, | Performed by: FAMILY MEDICINE

## 2020-07-23 PROCEDURE — 72100 X-RAY EXAM L-S SPINE 2/3 VWS: CPT | Mod: TC,FY,PO

## 2020-07-23 PROCEDURE — 1101F PR PT FALLS ASSESS DOC 0-1 FALLS W/OUT INJ PAST YR: ICD-10-PCS | Mod: CPTII,S$GLB,, | Performed by: FAMILY MEDICINE

## 2020-07-23 PROCEDURE — 72100 XR LUMBAR SPINE AP AND LATERAL: ICD-10-PCS | Mod: 26,,, | Performed by: RADIOLOGY

## 2020-07-23 PROCEDURE — 1101F PT FALLS ASSESS-DOCD LE1/YR: CPT | Mod: CPTII,S$GLB,, | Performed by: FAMILY MEDICINE

## 2020-07-23 PROCEDURE — 1125F PR PAIN SEVERITY QUANTIFIED, PAIN PRESENT: ICD-10-PCS | Mod: S$GLB,,, | Performed by: FAMILY MEDICINE

## 2020-07-23 PROCEDURE — 1125F AMNT PAIN NOTED PAIN PRSNT: CPT | Mod: S$GLB,,, | Performed by: FAMILY MEDICINE

## 2020-07-23 PROCEDURE — 99213 OFFICE O/P EST LOW 20 MIN: CPT | Mod: S$GLB,,, | Performed by: FAMILY MEDICINE

## 2020-07-23 PROCEDURE — 99999 PR PBB SHADOW E&M-EST. PATIENT-LVL III: CPT | Mod: PBBFAC,,, | Performed by: FAMILY MEDICINE

## 2020-07-23 PROCEDURE — 99213 PR OFFICE/OUTPT VISIT, EST, LEVL III, 20-29 MIN: ICD-10-PCS | Mod: S$GLB,,, | Performed by: FAMILY MEDICINE

## 2020-07-23 PROCEDURE — 72100 X-RAY EXAM L-S SPINE 2/3 VWS: CPT | Mod: 26,,, | Performed by: RADIOLOGY

## 2020-07-23 RX ORDER — PREDNISONE 50 MG/1
50 TABLET ORAL DAILY
Qty: 5 TABLET | Refills: 0 | Status: SHIPPED | OUTPATIENT
Start: 2020-07-23 | End: 2020-07-28

## 2020-07-23 RX ORDER — HYDROCODONE BITARTRATE AND ACETAMINOPHEN 7.5; 325 MG/1; MG/1
1 TABLET ORAL EVERY 6 HOURS PRN
Qty: 30 TABLET | Refills: 0 | Status: SHIPPED | OUTPATIENT
Start: 2020-07-23 | End: 2021-02-25

## 2020-07-23 NOTE — PROGRESS NOTES
Chief Complaint:    Chief Complaint   Patient presents with    Back Pain       History of Present Illness:    Patient says he has some chronic issues with back pain he was on narcotic until he finally weaned himself off this was back   Couple days back he was lifting on his wife and that made his back pain worse.  He has some radiation to the right leg.  He has been taking excessively large number of BC tablets and Tylenol the keep the pain away.  He is interested in seeing pain management.  History of back surgery in the past.    Patient has hypertension, diabetes type to for number of years and he has 50 pack-year history of smoking still continues to smoke but does want to quit.    He has had at least 2 CVA the 1 was minor from which he completely recovered the 2nd 1 affected his right leg and to a lesser degrees right arm.  This was thought to be due to uncontrolled hypertension and diabetes.  His last A1c was 6.2 does appear that he has chronic kidney disease last creatinine was 1.4.    It has been number of years since he had his colonoscopy      ROS:  Review of Systems   Constitutional: Negative for activity change, chills, fatigue, fever and unexpected weight change.   HENT: Negative for congestion, ear discharge, ear pain, hearing loss, postnasal drip and rhinorrhea.    Eyes: Negative for pain and visual disturbance.   Respiratory: Negative for cough, chest tightness and shortness of breath.    Cardiovascular: Negative for chest pain and palpitations.   Gastrointestinal: Negative for abdominal pain, diarrhea and vomiting.   Endocrine: Negative for heat intolerance.   Genitourinary: Negative for dysuria, flank pain, frequency and hematuria.   Musculoskeletal: Negative for back pain, gait problem and neck pain.   Skin: Negative for color change and rash.   Neurological: Negative for dizziness, tremors, seizures, numbness and headaches.   Psychiatric/Behavioral: Negative for agitation, hallucinations,  self-injury, sleep disturbance and suicidal ideas. The patient is not nervous/anxious.        Past Medical History:   Diagnosis Date    Anxiety     Depression     Diabetes mellitus, type 2     Hypertension     Stroke        Social History:  Social History     Socioeconomic History    Marital status:      Spouse name: Not on file    Number of children: Not on file    Years of education: Not on file    Highest education level: Not on file   Occupational History    Not on file   Social Needs    Financial resource strain: Not on file    Food insecurity     Worry: Not on file     Inability: Not on file    Transportation needs     Medical: Not on file     Non-medical: Not on file   Tobacco Use    Smoking status: Current Every Day Smoker     Packs/day: 1.00     Types: Cigarettes    Smokeless tobacco: Current User     Types: Snuff   Substance and Sexual Activity    Alcohol use: Not Currently    Drug use: Never    Sexual activity: Not Currently   Lifestyle    Physical activity     Days per week: Not on file     Minutes per session: Not on file    Stress: To some extent   Relationships    Social connections     Talks on phone: Not on file     Gets together: Not on file     Attends Yarsanism service: Not on file     Active member of club or organization: Not on file     Attends meetings of clubs or organizations: Not on file     Relationship status: Not on file   Other Topics Concern    Not on file   Social History Narrative    Not on file       Family History:   family history includes COPD in his sister; Cancer in his father; Heart disease in his mother; Stroke in his mother.    Health Maintenance   Topic Date Due    Hepatitis C Screening  1950    Foot Exam  01/25/1962    Eye Exam  01/25/1962    TETANUS VACCINE  01/25/1970    High Dose Statin  01/25/1973    Pneumococcal Vaccine (65+ Low/Medium Risk) (1 of 2 - PCV13) 01/25/2017    Abdominal Aortic Aneurysm Screening  01/25/2017     Hemoglobin A1c  11/15/2020    Lipid Panel  05/15/2021    Aspirin/Antiplatelet Therapy  07/23/2021       Physical Exam:    Vital Signs  Pulse: 82  SpO2: 96 %  BP: 132/80  Pain Score: 10-Worst pain ever]    There is no height or weight on file to calculate BMI.    Physical Exam  Constitutional:       Appearance: He is cachectic.   HENT:      Right Ear: External ear normal.      Left Ear: External ear normal.   Eyes:      Conjunctiva/sclera: Conjunctivae normal.      Pupils: Pupils are equal, round, and reactive to light.   Neck:      Musculoskeletal: Normal range of motion and neck supple.   Cardiovascular:      Rate and Rhythm: Normal rate and regular rhythm.      Heart sounds: Normal heart sounds. No murmur.   Pulmonary:      Effort: Pulmonary effort is normal. No respiratory distress.      Breath sounds: Normal breath sounds. No wheezing or rales.   Chest:      Chest wall: No tenderness.   Abdominal:      General: There is no distension.      Palpations: Abdomen is soft. There is no mass.      Tenderness: There is no abdominal tenderness. There is no guarding.   Musculoskeletal:      Lumbar back: He exhibits tenderness.      Comments: Straight leg raising test is slightly positive on the right   Lymphadenopathy:      Cervical: No cervical adenopathy.   Skin:     General: Skin is warm and dry.   Neurological:      Mental Status: He is alert and oriented to person, place, and time.      Gait: Gait abnormal.      Deep Tendon Reflexes: Reflexes are normal and symmetric.      Comments: There is decreased strength the right leg and foot specially foot dorsiflexion   Psychiatric:         Behavior: Behavior normal.         Thought Content: Thought content normal.         Judgment: Judgment normal.           Diabetes Management Status    Statin: Not taking  ACE/ARB: Taking    Screening or Prevention Patient's value Goal Complete/Controlled?   HgA1C Testing and Control   Lab Results   Component Value Date    HGBA1C 6.0 (H)  05/15/2020      Annually/Less than 8% No   Lipid profile : 05/15/2020 Annually No   LDL control Lab Results   Component Value Date    LDLCALC 119.8 05/15/2020    Annually/Less than 100 mg/dl  No   Nephropathy screening Lab Results   Component Value Date    LABMICR 7.0 05/15/2020     No results found for: PROTEINUA Annually No   Blood pressure BP Readings from Last 1 Encounters:   07/23/20 132/80    Less than 140/90 Yes   Dilated retinal exam Most Recent Eye Exam Date: Not Found Annually Yes   Foot exam   Most Recent Foot Exam Date: Not Found Annually Yes       Assessment:      ICD-10-CM ICD-9-CM   1. Chronic right-sided low back pain with right-sided sciatica  M54.41 724.2    G89.29 724.3     338.29         Plan:  Acute on chronic back pain, given prescription for prednisone monitor blood sugar carefully  Given hydrocodone Number 30  He will be referred to pain management  Orders Placed This Encounter   Procedures    Ambulatory referral/consult to Pain Clinic       Current Outpatient Medications   Medication Sig Dispense Refill    blood glucose control, low (TRUE METRIX LEVEL 1) Soln 1 drop by Misc.(Non-Drug; Combo Route) route once daily. 1 each 4    blood glucose control, normal (TRUE METRIX LEVEL 2) Soln 1 drop by Misc.(Non-Drug; Combo Route) route once daily. 1 each 4    blood sugar diagnostic (TRUE METRIX GLUCOSE TEST STRIP) Strp 1 strip by Misc.(Non-Drug; Combo Route) route once daily. 30 strip 4    clopidogreL (PLAVIX) 75 mg tablet Take 1 tablet (75 mg total) by mouth once daily. 90 tablet 1    FLUoxetine (PROZAC) 10 MG capsule Take 1 capsule (10 mg total) by mouth once daily. 90 capsule 1    gabapentin (NEURONTIN) 300 MG capsule TAKE 1 CAPSULE BY MOUTH THREE TIMES A DAY 90 capsule 1    lancets 32 gauge Misc 1 lancet by Misc.(Non-Drug; Combo Route) route once daily. 30 each 4    lisinopriL 10 MG tablet take one tablet by mouth daily 90 tablet 0    metFORMIN (GLUCOPHAGE) 500 MG tablet Take 1 tablet  (500 mg total) by mouth 2 (two) times a day. 180 tablet 1    tiZANidine (ZANAFLEX) 4 MG tablet TAKE 1 TABLET BY MOUTH EVERY 8 (EIGHT) HOURS. 90 tablet 0    varenicline (CHANTIX STARTING MONTH BOX) 0.5 mg (11)- 1 mg (42) tablet Take one 0.5mg tab by mouth once daily X3 days,then increase to one 0.5mg tab twice daily X4 days,then increase to one 1mg tab twice daily 1 Package 0    zolpidem (AMBIEN) 10 mg Tab Take 1 tablet (10 mg total) by mouth nightly as needed. 30 tablet 2    blood-glucose meter (TRUE METRIX GLUCOSE METER) Misc 1 Device by Misc.(Non-Drug; Combo Route) route once. for 1 dose 1 each 0    HYDROcodone-acetaminophen (NORCO) 7.5-325 mg per tablet Take 1 tablet by mouth every 6 (six) hours as needed for Pain. 30 tablet 0    predniSONE (DELTASONE) 50 MG Tab Take 1 tablet (50 mg total) by mouth once daily. for 5 days 5 tablet 0    traZODone (DESYREL) 50 MG tablet Take 1 tablet (50 mg total) by mouth every evening. (Patient not taking: Reported on 7/23/2020) 30 tablet 11     No current facility-administered medications for this visit.        There are no discontinued medications.    No follow-ups on file.      Damien Barron MD

## 2020-07-27 RX ORDER — FLUOXETINE 10 MG/1
20 CAPSULE ORAL DAILY
Qty: 180 CAPSULE | Refills: 1 | Status: SHIPPED | OUTPATIENT
Start: 2020-07-27 | End: 2021-07-05

## 2020-08-11 RX ORDER — TIZANIDINE 4 MG/1
TABLET ORAL
Qty: 90 TABLET | Refills: 1 | Status: SHIPPED | OUTPATIENT
Start: 2020-08-11 | End: 2020-08-12

## 2020-08-31 RX ORDER — ZOLPIDEM TARTRATE 10 MG/1
10 TABLET ORAL NIGHTLY PRN
Qty: 30 TABLET | Refills: 2 | Status: SHIPPED | OUTPATIENT
Start: 2020-08-31 | End: 2020-12-03 | Stop reason: SDUPTHER

## 2020-09-08 ENCOUNTER — PATIENT OUTREACH (OUTPATIENT)
Dept: ADMINISTRATIVE | Facility: HOSPITAL | Age: 70
End: 2020-09-08

## 2020-09-24 RX ORDER — CEPHALEXIN 500 MG/1
500 CAPSULE ORAL 3 TIMES DAILY
Qty: 30 CAPSULE | Refills: 0 | Status: SHIPPED | OUTPATIENT
Start: 2020-09-24 | End: 2020-10-04

## 2020-09-24 NOTE — TELEPHONE ENCOUNTER
Keflex 500 mg t.i.d. for 10 days  He has to stop Plavix , aspirin, anti-inflammatory and fish oil for at least 10 days before the procedure.      Need to assess peripheral circulation

## 2020-09-24 NOTE — TELEPHONE ENCOUNTER
Patient requesting antibiotic for infected ingrown toenail. Will schedule a visit to have it removed.

## 2020-10-06 ENCOUNTER — OFFICE VISIT (OUTPATIENT)
Dept: FAMILY MEDICINE | Facility: CLINIC | Age: 70
End: 2020-10-06
Payer: MEDICARE

## 2020-10-06 VITALS
SYSTOLIC BLOOD PRESSURE: 120 MMHG | OXYGEN SATURATION: 98 % | WEIGHT: 151.81 LBS | DIASTOLIC BLOOD PRESSURE: 78 MMHG | HEART RATE: 70 BPM | BODY MASS INDEX: 23.83 KG/M2 | HEIGHT: 67 IN | TEMPERATURE: 98 F

## 2020-10-06 DIAGNOSIS — R09.89 DECREASED PEDAL PULSES: ICD-10-CM

## 2020-10-06 DIAGNOSIS — L03.031 PARONYCHIA OF GREAT TOE OF RIGHT FOOT: Primary | ICD-10-CM

## 2020-10-06 PROCEDURE — 1126F PR PAIN SEVERITY QUANTIFIED, NO PAIN PRESENT: ICD-10-PCS | Mod: S$GLB,,, | Performed by: FAMILY MEDICINE

## 2020-10-06 PROCEDURE — 99213 OFFICE O/P EST LOW 20 MIN: CPT | Mod: S$GLB,,, | Performed by: FAMILY MEDICINE

## 2020-10-06 PROCEDURE — 1159F PR MEDICATION LIST DOCUMENTED IN MEDICAL RECORD: ICD-10-PCS | Mod: S$GLB,,, | Performed by: FAMILY MEDICINE

## 2020-10-06 PROCEDURE — 1101F PR PT FALLS ASSESS DOC 0-1 FALLS W/OUT INJ PAST YR: ICD-10-PCS | Mod: CPTII,S$GLB,, | Performed by: FAMILY MEDICINE

## 2020-10-06 PROCEDURE — 3008F BODY MASS INDEX DOCD: CPT | Mod: CPTII,S$GLB,, | Performed by: FAMILY MEDICINE

## 2020-10-06 PROCEDURE — 1101F PT FALLS ASSESS-DOCD LE1/YR: CPT | Mod: CPTII,S$GLB,, | Performed by: FAMILY MEDICINE

## 2020-10-06 PROCEDURE — 99999 PR PBB SHADOW E&M-EST. PATIENT-LVL IV: ICD-10-PCS | Mod: PBBFAC,,, | Performed by: FAMILY MEDICINE

## 2020-10-06 PROCEDURE — 1159F MED LIST DOCD IN RCRD: CPT | Mod: S$GLB,,, | Performed by: FAMILY MEDICINE

## 2020-10-06 PROCEDURE — 3008F PR BODY MASS INDEX (BMI) DOCUMENTED: ICD-10-PCS | Mod: CPTII,S$GLB,, | Performed by: FAMILY MEDICINE

## 2020-10-06 PROCEDURE — 99213 PR OFFICE/OUTPT VISIT, EST, LEVL III, 20-29 MIN: ICD-10-PCS | Mod: S$GLB,,, | Performed by: FAMILY MEDICINE

## 2020-10-06 PROCEDURE — 1126F AMNT PAIN NOTED NONE PRSNT: CPT | Mod: S$GLB,,, | Performed by: FAMILY MEDICINE

## 2020-10-06 PROCEDURE — 99999 PR PBB SHADOW E&M-EST. PATIENT-LVL IV: CPT | Mod: PBBFAC,,, | Performed by: FAMILY MEDICINE

## 2020-10-06 NOTE — PROGRESS NOTES
Chief Complaint:    Chief Complaint   Patient presents with    Infected toe       History of Present Illness:    10/06/2020:-  He has took Keflex for the cellulitis of toe is seems that it was not working 1st now he has been prescribed doxycycline and clindamycin which he has taken today.    07/23/2020:-  Patient says he has some chronic issues with back pain he was on narcotic until he finally weaned himself off this was back   Couple days back he was lifting on his wife and that made his back pain worse.  He has some radiation to the right leg.  He has been taking excessively large number of BC tablets and Tylenol the keep the pain away.  He is interested in seeing pain management.  History of back surgery in the past.    Patient has hypertension, diabetes type to for number of years and he has 50 pack-year history of smoking still continues to smoke but does want to quit.    He has had at least 2 CVA the 1 was minor from which he completely recovered the 2nd 1 affected his right leg and to a lesser degrees right arm.  This was thought to be due to uncontrolled hypertension and diabetes.  His last A1c was 6.2 does appear that he has chronic kidney disease last creatinine was 1.4.    It has been number of years since he had his colonoscopy      ROS:  Review of Systems   Constitutional: Negative for activity change, chills, fatigue, fever and unexpected weight change.   HENT: Negative for congestion, ear discharge, ear pain, hearing loss, postnasal drip and rhinorrhea.    Eyes: Negative for pain and visual disturbance.   Respiratory: Negative for cough, chest tightness and shortness of breath.    Cardiovascular: Negative for chest pain and palpitations.   Gastrointestinal: Negative for abdominal pain, diarrhea and vomiting.   Endocrine: Negative for heat intolerance.   Genitourinary: Negative for dysuria, flank pain, frequency and hematuria.   Musculoskeletal: Negative for back pain, gait problem and neck pain.    Skin: Negative for color change and rash.   Neurological: Negative for dizziness, tremors, seizures, numbness and headaches.   Psychiatric/Behavioral: Negative for agitation, hallucinations, self-injury, sleep disturbance and suicidal ideas. The patient is not nervous/anxious.        Past Medical History:   Diagnosis Date    Anxiety     Depression     Diabetes mellitus, type 2     Hypertension     Stroke        Social History:  Social History     Socioeconomic History    Marital status:      Spouse name: Not on file    Number of children: Not on file    Years of education: Not on file    Highest education level: Not on file   Occupational History    Not on file   Social Needs    Financial resource strain: Not on file    Food insecurity     Worry: Not on file     Inability: Not on file    Transportation needs     Medical: Not on file     Non-medical: Not on file   Tobacco Use    Smoking status: Current Every Day Smoker     Packs/day: 1.00     Types: Cigarettes    Smokeless tobacco: Current User     Types: Snuff   Substance and Sexual Activity    Alcohol use: Not Currently    Drug use: Never    Sexual activity: Not Currently   Lifestyle    Physical activity     Days per week: Not on file     Minutes per session: Not on file    Stress: To some extent   Relationships    Social connections     Talks on phone: Not on file     Gets together: Not on file     Attends Yazidism service: Not on file     Active member of club or organization: Not on file     Attends meetings of clubs or organizations: Not on file     Relationship status: Not on file   Other Topics Concern    Not on file   Social History Narrative    Not on file       Family History:   family history includes COPD in his sister; Cancer in his father; Heart disease in his mother; Stroke in his mother.    Health Maintenance   Topic Date Due    Hepatitis C Screening  1950    Foot Exam  01/25/1962    Eye Exam  01/25/1962     "TETANUS VACCINE  01/25/1970    High Dose Statin  01/25/1973    Pneumococcal Vaccine (65+ Low/Medium Risk) (1 of 2 - PCV13) 01/25/2017    Abdominal Aortic Aneurysm Screening  01/25/2017    Hemoglobin A1c  11/15/2020    Lipid Panel  05/15/2021    Aspirin/Antiplatelet Therapy  10/06/2021       Physical Exam:    Vital Signs  Temp: 97.9 °F (36.6 °C)  Temp src: Temporal  Pulse: 70  SpO2: 98 %  BP: 120/78  BP Location: Left arm  Patient Position: Sitting  Pain Score: 0-No pain  Height and Weight  Height: 5' 6.5" (168.9 cm)  Weight: 68.9 kg (151 lb 12.6 oz)  BSA (Calculated - sq m): 1.8 sq meters  BMI (Calculated): 24.1  Weight in (lb) to have BMI = 25: 156.9]    Body mass index is 24.13 kg/m².    Physical Exam  Constitutional:       Appearance: He is cachectic.   HENT:      Right Ear: External ear normal.      Left Ear: External ear normal.   Eyes:      Conjunctiva/sclera: Conjunctivae normal.      Pupils: Pupils are equal, round, and reactive to light.   Neck:      Musculoskeletal: Normal range of motion and neck supple.   Cardiovascular:      Rate and Rhythm: Normal rate and regular rhythm.      Pulses:           Dorsalis pedis pulses are 0 on the right side.        Posterior tibial pulses are 0 on the right side.      Heart sounds: Normal heart sounds. No murmur.   Pulmonary:      Effort: Pulmonary effort is normal. No respiratory distress.      Breath sounds: Normal breath sounds. No wheezing or rales.   Chest:      Chest wall: No tenderness.   Abdominal:      General: There is no distension.      Palpations: Abdomen is soft. There is no mass.      Tenderness: There is no abdominal tenderness. There is no guarding.   Musculoskeletal:      Lumbar back: He exhibits tenderness.        Feet:       Comments: Straight leg raising test is slightly positive on the right   Lymphadenopathy:      Cervical: No cervical adenopathy.   Skin:     General: Skin is warm and dry.   Neurological:      Mental Status: He is alert and " oriented to person, place, and time.      Gait: Gait abnormal.      Deep Tendon Reflexes: Reflexes are normal and symmetric.      Comments: There is decreased strength the right leg and foot specially foot dorsiflexion   Psychiatric:         Behavior: Behavior normal.         Thought Content: Thought content normal.         Judgment: Judgment normal.           Diabetes Management Status    Statin: Not taking  ACE/ARB: Taking    Screening or Prevention Patient's value Goal Complete/Controlled?   HgA1C Testing and Control   Lab Results   Component Value Date    HGBA1C 6.0 (H) 05/15/2020      Annually/Less than 8% No   Lipid profile : 05/15/2020 Annually No   LDL control Lab Results   Component Value Date    LDLCALC 119.8 05/15/2020    Annually/Less than 100 mg/dl  No   Nephropathy screening Lab Results   Component Value Date    LABMICR 7.0 05/15/2020     No results found for: PROTEINUA Annually No   Blood pressure BP Readings from Last 1 Encounters:   10/06/20 120/78    Less than 140/90 Yes   Dilated retinal exam Most Recent Eye Exam Date: Not Found Annually Yes   Foot exam   Most Recent Foot Exam Date: Not Found Annually Yes       Assessment:      ICD-10-CM ICD-9-CM   1. Paronychia of great toe of right foot  L03.031 681.11   2. Decreased pedal pulses  R09.89 785.9         Plan:    Infection seems to be improving  Will get a arterial Doppler to check for the circulatory status and then schedule a complete toenail excision.  Orders Placed This Encounter   Procedures    US Lower Extremity Arteries Bilateral       Current Outpatient Medications   Medication Sig Dispense Refill    blood glucose control, low (TRUE METRIX LEVEL 1) Soln 1 drop by Misc.(Non-Drug; Combo Route) route once daily. 1 each 4    blood glucose control, normal (TRUE METRIX LEVEL 2) Soln 1 drop by Misc.(Non-Drug; Combo Route) route once daily. 1 each 4    blood sugar diagnostic (TRUE METRIX GLUCOSE TEST STRIP) Strp 1 strip by Misc.(Non-Drug; Combo  Route) route once daily. 30 strip 4    clopidogreL (PLAVIX) 75 mg tablet Take 1 tablet (75 mg total) by mouth once daily. 90 tablet 1    FLUoxetine (PROZAC) 10 MG capsule Take 2 capsules (20 mg total) by mouth once daily. 180 capsule 1    gabapentin (NEURONTIN) 300 MG capsule TAKE 1 CAPSULE BY MOUTH THREE TIMES A DAY (Patient taking differently: Take 300 mg by mouth 3 (three) times daily as needed. ) 90 capsule 0    HYDROcodone-acetaminophen (NORCO) 7.5-325 mg per tablet Take 1 tablet by mouth every 6 (six) hours as needed for Pain. (Patient taking differently: Take 1 tablet by mouth 2 (two) times a day. ) 30 tablet 0    lancets 32 gauge Misc 1 lancet by Misc.(Non-Drug; Combo Route) route once daily. 30 each 4    lisinopriL 10 MG tablet take one tablet by mouth daily 90 tablet 0    metFORMIN (GLUCOPHAGE) 500 MG tablet Take 1 tablet (500 mg total) by mouth 2 (two) times a day. 180 tablet 1    tiZANidine (ZANAFLEX) 4 MG tablet TAKE 1 TABLET BY MOUTH EVERY 8 (EIGHT) HOURS. (Patient taking differently: TAKE 1 TABLET BY MOUTH EVERY 8 (EIGHT) HOURS PRN) 270 tablet 1    zolpidem (AMBIEN) 10 mg Tab Take 1 tablet (10 mg total) by mouth nightly as needed. 30 tablet 2    blood-glucose meter (TRUE METRIX GLUCOSE METER) Misc 1 Device by Misc.(Non-Drug; Combo Route) route once. for 1 dose 1 each 0     No current facility-administered medications for this visit.        Medications Discontinued During This Encounter   Medication Reason    traZODone (DESYREL) 50 MG tablet Patient no longer taking    varenicline (CHANTIX STARTING MONTH BOX) 0.5 mg (11)- 1 mg (42) tablet Patient no longer taking       No follow-ups on file.      Damien Barron MD

## 2020-10-12 DIAGNOSIS — I69.30 HISTORY OF CVA WITH RESIDUAL DEFICIT: Primary | ICD-10-CM

## 2020-10-12 DIAGNOSIS — R29.6 FALLS FREQUENTLY: ICD-10-CM

## 2020-10-12 RX ORDER — CALCIUM CARBONATE 160(400)MG
TABLET,CHEWABLE ORAL
Refills: 0 | COMMUNITY
Start: 2020-10-12 | End: 2020-10-14 | Stop reason: SDUPTHER

## 2020-10-13 PROCEDURE — G0180 PR HOME HEALTH MD CERTIFICATION: ICD-10-PCS | Mod: ,,, | Performed by: FAMILY MEDICINE

## 2020-10-13 PROCEDURE — G0180 MD CERTIFICATION HHA PATIENT: HCPCS | Mod: ,,, | Performed by: FAMILY MEDICINE

## 2020-10-14 ENCOUNTER — HOSPITAL ENCOUNTER (OUTPATIENT)
Dept: RADIOLOGY | Facility: HOSPITAL | Age: 70
Discharge: HOME | End: 2020-10-14
Attending: FAMILY MEDICINE
Payer: MEDICARE

## 2020-10-14 DIAGNOSIS — R09.89 DECREASED PEDAL PULSES: ICD-10-CM

## 2020-10-14 DIAGNOSIS — I69.30 HISTORY OF CVA WITH RESIDUAL DEFICIT: ICD-10-CM

## 2020-10-14 DIAGNOSIS — R29.6 FALLS FREQUENTLY: ICD-10-CM

## 2020-10-14 PROCEDURE — 93925 LOWER EXTREMITY STUDY: CPT | Mod: TC

## 2020-10-14 RX ORDER — CALCIUM CARBONATE 160(400)MG
TABLET,CHEWABLE ORAL
Qty: 1 EACH | Refills: 0 | Status: SHIPPED | OUTPATIENT
Start: 2020-10-14

## 2020-10-16 ENCOUNTER — TELEPHONE (OUTPATIENT)
Dept: FAMILY MEDICINE | Facility: CLINIC | Age: 70
End: 2020-10-16

## 2020-10-16 DIAGNOSIS — I73.9 PAD (PERIPHERAL ARTERY DISEASE): Primary | ICD-10-CM

## 2020-10-16 NOTE — TELEPHONE ENCOUNTER
----- Message from Damien Barron MD sent at 10/14/2020 10:35 PM CDT -----  Some blockages in R leg, see vascular.

## 2020-10-19 ENCOUNTER — CLINICAL SUPPORT (OUTPATIENT)
Dept: FAMILY MEDICINE | Facility: CLINIC | Age: 70
End: 2020-10-19
Payer: MEDICARE

## 2020-10-19 DIAGNOSIS — Z23 NEED FOR IMMUNIZATION AGAINST INFLUENZA: Primary | ICD-10-CM

## 2020-10-19 PROCEDURE — G0008 FLU VACCINE - QUADRIVALENT - ADJUVANTED: ICD-10-PCS | Mod: S$GLB,,, | Performed by: FAMILY MEDICINE

## 2020-10-19 PROCEDURE — 99999 PR PBB SHADOW E&M-EST. PATIENT-LVL II: CPT | Mod: PBBFAC,,,

## 2020-10-19 PROCEDURE — 90694 VACC AIIV4 NO PRSRV 0.5ML IM: CPT | Mod: S$GLB,,, | Performed by: FAMILY MEDICINE

## 2020-10-19 PROCEDURE — G0008 ADMIN INFLUENZA VIRUS VAC: HCPCS | Mod: S$GLB,,, | Performed by: FAMILY MEDICINE

## 2020-10-19 PROCEDURE — 90694 FLU VACCINE - QUADRIVALENT - ADJUVANTED: ICD-10-PCS | Mod: S$GLB,,, | Performed by: FAMILY MEDICINE

## 2020-10-19 PROCEDURE — 99999 PR PBB SHADOW E&M-EST. PATIENT-LVL II: ICD-10-PCS | Mod: PBBFAC,,,

## 2020-10-21 ENCOUNTER — EXTERNAL HOME HEALTH (OUTPATIENT)
Dept: HOME HEALTH SERVICES | Facility: HOSPITAL | Age: 70
End: 2020-10-21
Payer: MEDICARE

## 2020-10-21 DIAGNOSIS — Z76.89 ESTABLISHING CARE WITH NEW DOCTOR, ENCOUNTER FOR: Primary | ICD-10-CM

## 2020-10-27 DIAGNOSIS — G89.29 CHRONIC BILATERAL LOW BACK PAIN WITH RIGHT-SIDED SCIATICA: ICD-10-CM

## 2020-10-27 DIAGNOSIS — M54.41 CHRONIC BILATERAL LOW BACK PAIN WITH RIGHT-SIDED SCIATICA: ICD-10-CM

## 2020-10-27 DIAGNOSIS — E11.42 DM TYPE 2 WITH DIABETIC PERIPHERAL NEUROPATHY: ICD-10-CM

## 2020-10-27 RX ORDER — GABAPENTIN 300 MG/1
CAPSULE ORAL
Qty: 90 CAPSULE | Refills: 0 | OUTPATIENT
Start: 2020-10-27

## 2020-10-30 DIAGNOSIS — M54.41 CHRONIC BILATERAL LOW BACK PAIN WITH RIGHT-SIDED SCIATICA: ICD-10-CM

## 2020-10-30 DIAGNOSIS — G89.29 CHRONIC BILATERAL LOW BACK PAIN WITH RIGHT-SIDED SCIATICA: ICD-10-CM

## 2020-10-30 DIAGNOSIS — E11.42 DM TYPE 2 WITH DIABETIC PERIPHERAL NEUROPATHY: ICD-10-CM

## 2020-10-30 RX ORDER — LISINOPRIL 10 MG/1
TABLET ORAL
Qty: 90 TABLET | Refills: 0 | OUTPATIENT
Start: 2020-10-30

## 2020-10-30 RX ORDER — GABAPENTIN 300 MG/1
CAPSULE ORAL
Qty: 90 CAPSULE | Refills: 0 | OUTPATIENT
Start: 2020-10-30

## 2020-11-16 ENCOUNTER — DOCUMENT SCAN (OUTPATIENT)
Dept: HOME HEALTH SERVICES | Facility: HOSPITAL | Age: 70
End: 2020-11-16
Payer: MEDICARE

## 2020-11-18 RX ORDER — CLOPIDOGREL BISULFATE 75 MG/1
75 TABLET ORAL DAILY
Qty: 90 TABLET | Refills: 1 | Status: SHIPPED | OUTPATIENT
Start: 2020-11-18 | End: 2021-05-17

## 2020-11-19 ENCOUNTER — DOCUMENT SCAN (OUTPATIENT)
Dept: HOME HEALTH SERVICES | Facility: HOSPITAL | Age: 70
End: 2020-11-19
Payer: MEDICARE

## 2020-11-19 ENCOUNTER — LAB VISIT (OUTPATIENT)
Dept: LAB | Facility: HOSPITAL | Age: 70
End: 2020-11-19
Attending: FAMILY MEDICINE
Payer: MEDICARE

## 2020-11-19 DIAGNOSIS — E11.22 TYPE 2 DIABETES MELLITUS WITH END-STAGE RENAL DISEASE: ICD-10-CM

## 2020-11-19 DIAGNOSIS — N18.6 TYPE 2 DIABETES MELLITUS WITH END-STAGE RENAL DISEASE: ICD-10-CM

## 2020-11-19 DIAGNOSIS — I12.9 PARENCHYMAL RENAL HYPERTENSION: Primary | ICD-10-CM

## 2020-11-19 LAB
ALBUMIN SERPL BCP-MCNC: 3.8 G/DL (ref 3.5–5.2)
ALP SERPL-CCNC: 129 U/L (ref 55–135)
ALT SERPL W/O P-5'-P-CCNC: 14 U/L (ref 10–44)
ANION GAP SERPL CALC-SCNC: 15 MMOL/L (ref 8–16)
AST SERPL-CCNC: 22 U/L (ref 10–40)
BASOPHILS # BLD AUTO: 0.11 K/UL (ref 0–0.2)
BASOPHILS NFR BLD: 1.1 % (ref 0–1.9)
BILIRUB SERPL-MCNC: 0.1 MG/DL (ref 0.1–1)
BUN SERPL-MCNC: 18 MG/DL (ref 8–23)
CALCIUM SERPL-MCNC: 9.2 MG/DL (ref 8.7–10.5)
CHLORIDE SERPL-SCNC: 111 MMOL/L (ref 95–110)
CHOLEST SERPL-MCNC: 266 MG/DL (ref 120–199)
CHOLEST/HDLC SERPL: 8.9 {RATIO} (ref 2–5)
CO2 SERPL-SCNC: 17 MMOL/L (ref 23–29)
CREAT SERPL-MCNC: 1.5 MG/DL (ref 0.5–1.4)
DIFFERENTIAL METHOD: ABNORMAL
EOSINOPHIL # BLD AUTO: 0.6 K/UL (ref 0–0.5)
EOSINOPHIL NFR BLD: 6.3 % (ref 0–8)
ERYTHROCYTE [DISTWIDTH] IN BLOOD BY AUTOMATED COUNT: 14.6 % (ref 11.5–14.5)
EST. GFR  (AFRICAN AMERICAN): 54.5 ML/MIN/1.73 M^2
EST. GFR  (NON AFRICAN AMERICAN): 47.2 ML/MIN/1.73 M^2
GLUCOSE SERPL-MCNC: 86 MG/DL (ref 70–110)
HCT VFR BLD AUTO: 45.7 % (ref 40–54)
HDLC SERPL-MCNC: 30 MG/DL (ref 40–75)
HDLC SERPL: 11.3 % (ref 20–50)
HGB BLD-MCNC: 14.4 G/DL (ref 14–18)
IMM GRANULOCYTES # BLD AUTO: 0.02 K/UL (ref 0–0.04)
IMM GRANULOCYTES NFR BLD AUTO: 0.2 % (ref 0–0.5)
LDLC SERPL CALC-MCNC: 172 MG/DL (ref 63–159)
LYMPHOCYTES # BLD AUTO: 3.2 K/UL (ref 1–4.8)
LYMPHOCYTES NFR BLD: 32 % (ref 18–48)
MCH RBC QN AUTO: 32.1 PG (ref 27–31)
MCHC RBC AUTO-ENTMCNC: 31.5 G/DL (ref 32–36)
MCV RBC AUTO: 102 FL (ref 82–98)
MONOCYTES # BLD AUTO: 0.7 K/UL (ref 0.3–1)
MONOCYTES NFR BLD: 6.8 % (ref 4–15)
NEUTROPHILS # BLD AUTO: 5.3 K/UL (ref 1.8–7.7)
NEUTROPHILS NFR BLD: 53.6 % (ref 38–73)
NONHDLC SERPL-MCNC: 236 MG/DL
NRBC BLD-RTO: 0 /100 WBC
PLATELET # BLD AUTO: 305 K/UL (ref 150–350)
PMV BLD AUTO: 10.3 FL (ref 9.2–12.9)
POTASSIUM SERPL-SCNC: 5.1 MMOL/L (ref 3.5–5.1)
PROT SERPL-MCNC: 6.7 G/DL (ref 6–8.4)
RBC # BLD AUTO: 4.48 M/UL (ref 4.6–6.2)
SODIUM SERPL-SCNC: 143 MMOL/L (ref 136–145)
TRIGL SERPL-MCNC: 320 MG/DL (ref 30–150)
WBC # BLD AUTO: 9.83 K/UL (ref 3.9–12.7)

## 2020-11-19 PROCEDURE — 85025 COMPLETE CBC W/AUTO DIFF WBC: CPT

## 2020-11-19 PROCEDURE — 82607 VITAMIN B-12: CPT

## 2020-11-19 PROCEDURE — 80061 LIPID PANEL: CPT

## 2020-11-19 PROCEDURE — 83036 HEMOGLOBIN GLYCOSYLATED A1C: CPT

## 2020-11-19 PROCEDURE — 80053 COMPREHEN METABOLIC PANEL: CPT

## 2020-11-20 LAB
ESTIMATED AVG GLUCOSE: 114 MG/DL (ref 68–131)
HBA1C MFR BLD HPLC: 5.6 % (ref 4–5.6)
VIT B12 SERPL-MCNC: 648 PG/ML (ref 210–950)

## 2020-11-23 ENCOUNTER — TELEPHONE (OUTPATIENT)
Dept: FAMILY MEDICINE | Facility: CLINIC | Age: 70
End: 2020-11-23

## 2020-11-23 NOTE — TELEPHONE ENCOUNTER
Spoke with patients daughter and she will look at patients medication to see if he is taken a statin then notify our office.

## 2020-11-23 NOTE — TELEPHONE ENCOUNTER
----- Message from Rosario Sharp sent at 11/23/2020  2:39 PM CST -----  Regarding: clinical recommendation for statin medication  Type:  Needs Medical Advice    Who Called: [facility]  [employer] diabetes association   Symptoms (please be specific): n/a   How long has patient had these symptoms:  n/a  Pharmacy name and phone #:  n/a  Would the patient rather a call back or a response via [facility]? Call back  Best Call Back Number: [phone]  Additional Information:   Pt needs to be started on Stain medication. Please call back.Thanks

## 2020-11-25 ENCOUNTER — OFFICE VISIT (OUTPATIENT)
Dept: FAMILY MEDICINE | Facility: CLINIC | Age: 70
End: 2020-11-25
Payer: MEDICARE

## 2020-11-25 DIAGNOSIS — N17.9 ACUTE RENAL FAILURE, UNSPECIFIED ACUTE RENAL FAILURE TYPE: ICD-10-CM

## 2020-11-25 DIAGNOSIS — E11.9 CONTROLLED TYPE 2 DIABETES MELLITUS WITHOUT COMPLICATION, WITHOUT LONG-TERM CURRENT USE OF INSULIN: Primary | ICD-10-CM

## 2020-11-25 DIAGNOSIS — E78.2 MIXED HYPERLIPIDEMIA: ICD-10-CM

## 2020-11-25 DIAGNOSIS — Z01.00 DIABETIC EYE EXAM: ICD-10-CM

## 2020-11-25 DIAGNOSIS — E11.9 DIABETIC EYE EXAM: ICD-10-CM

## 2020-11-25 PROCEDURE — 99214 OFFICE O/P EST MOD 30 MIN: CPT | Mod: 95,,, | Performed by: FAMILY MEDICINE

## 2020-11-25 PROCEDURE — 1159F MED LIST DOCD IN RCRD: CPT | Mod: 95,,, | Performed by: FAMILY MEDICINE

## 2020-11-25 PROCEDURE — 99214 PR OFFICE/OUTPT VISIT, EST, LEVL IV, 30-39 MIN: ICD-10-PCS | Mod: 95,,, | Performed by: FAMILY MEDICINE

## 2020-11-25 PROCEDURE — 3044F HG A1C LEVEL LT 7.0%: CPT | Mod: CPTII,95,, | Performed by: FAMILY MEDICINE

## 2020-11-25 PROCEDURE — 3044F PR MOST RECENT HEMOGLOBIN A1C LEVEL <7.0%: ICD-10-PCS | Mod: CPTII,95,, | Performed by: FAMILY MEDICINE

## 2020-11-25 PROCEDURE — 1159F PR MEDICATION LIST DOCUMENTED IN MEDICAL RECORD: ICD-10-PCS | Mod: 95,,, | Performed by: FAMILY MEDICINE

## 2020-11-25 RX ORDER — ROSUVASTATIN CALCIUM 20 MG/1
20 TABLET, COATED ORAL DAILY
Qty: 90 TABLET | Refills: 3 | Status: SHIPPED | OUTPATIENT
Start: 2020-11-25 | End: 2021-10-08 | Stop reason: SDUPTHER

## 2020-11-25 NOTE — PROGRESS NOTES
Chief Complaint:  No chief complaint on file.      History of Present Illness:  This is a virtual visit for follow-up of chronic medical problems A1c is come down  Lipids are highs never been on statins  There is some renal failure going on he has had is been taking BC Powder at least 2 of those a day.      ROS:  Review of Systems   Constitutional: Negative for fatigue.   Cardiovascular: Negative for chest pain.   Endocrine: Negative for polydipsia, polyphagia and polyuria.   Skin: Negative for pallor.   Neurological: Negative for dizziness, tremors, seizures, speech difficulty, weakness and headaches.   Psychiatric/Behavioral: Negative for confusion. The patient is not nervous/anxious.        Past Medical History:   Diagnosis Date    Anxiety     Depression     Diabetes mellitus, type 2     Hypertension     Stroke        Social History:  Social History     Socioeconomic History    Marital status:      Spouse name: Not on file    Number of children: Not on file    Years of education: Not on file    Highest education level: Not on file   Occupational History    Not on file   Social Needs    Financial resource strain: Not on file    Food insecurity     Worry: Not on file     Inability: Not on file    Transportation needs     Medical: Not on file     Non-medical: Not on file   Tobacco Use    Smoking status: Current Every Day Smoker     Packs/day: 1.00     Types: Cigarettes    Smokeless tobacco: Current User     Types: Snuff   Substance and Sexual Activity    Alcohol use: Not Currently    Drug use: Never    Sexual activity: Not Currently   Lifestyle    Physical activity     Days per week: Not on file     Minutes per session: Not on file    Stress: To some extent   Relationships    Social connections     Talks on phone: Not on file     Gets together: Not on file     Attends Sabianism service: Not on file     Active member of club or organization: Not on file     Attends meetings of clubs or  organizations: Not on file     Relationship status: Not on file   Other Topics Concern    Not on file   Social History Narrative    Not on file       Family History:   family history includes COPD in his sister; Cancer in his father; Heart disease in his mother; Stroke in his mother.    Health Maintenance   Topic Date Due    Hepatitis C Screening  1950    Foot Exam  01/25/1962    Eye Exam  01/25/1962    TETANUS VACCINE  01/25/1970    Pneumococcal Vaccine (65+ Low/Medium Risk) (1 of 2 - PCV13) 01/25/2017    Abdominal Aortic Aneurysm Screening  01/25/2017    Hemoglobin A1c  05/19/2021    Aspirin/Antiplatelet Therapy  11/18/2021    Lipid Panel  11/19/2021       Physical Exam:     ]    There is no height or weight on file to calculate BMI.    Physical Exam      Diabetes Management Status    Statin: Taking  ACE/ARB: Taking    Screening or Prevention Patient's value Goal Complete/Controlled?   HgA1C Testing and Control   Lab Results   Component Value Date    HGBA1C 5.6 11/19/2020      Annually/Less than 8% Yes   Lipid profile : 11/19/2020 Annually Yes   LDL control Lab Results   Component Value Date    LDLCALC 172.0 (H) 11/19/2020    Annually/Less than 100 mg/dl  No   Nephropathy screening Lab Results   Component Value Date    LABMICR 7.0 05/15/2020     No results found for: PROTEINUA Annually Yes   Blood pressure BP Readings from Last 1 Encounters:   10/06/20 120/78    Less than 140/90 Yes   Dilated retinal exam Most Recent Eye Exam Date: Not Found Annually No   Foot exam   Most Recent Foot Exam Date: Not Found Annually No       Assessment:      ICD-10-CM ICD-9-CM   1. Controlled type 2 diabetes mellitus without complication, without long-term current use of insulin  E11.9 250.00   2. Mixed hyperlipidemia  E78.2 272.2   3. Acute renal failure, unspecified acute renal failure type  N17.9 584.9         Plan:    Stop BC Powder and recheck a BMP  Start on Crestor  Diabetes control is stable    The patient  location is: Home   The chief complaint leading to consultation is: as below  Visit type: Virtual visit with synchronous audio and video  Total time spent with patient: 20+ mins  Each patient to whom he or she provides medical services by telemedicine is:  (1) informed of the relationship between the physician and patient and the respective role of any other health care provider with respect to management of the patient; and (2) notified that he or she may decline to receive medical services by telemedicine and may withdraw from such care at any time.    Notes: see below      Orders Placed This Encounter   Procedures    Basic Metabolic Panel    Hemoglobin A1C    Comprehensive Metabolic Panel    CBC Auto Differential    Microalbumin/Creatinine Ratio, Urine    Lipid Panel       Current Outpatient Medications   Medication Sig Dispense Refill    blood glucose control, low (TRUE METRIX LEVEL 1) Soln 1 drop by Misc.(Non-Drug; Combo Route) route once daily. 1 each 4    blood glucose control, normal (TRUE METRIX LEVEL 2) Soln 1 drop by Misc.(Non-Drug; Combo Route) route once daily. 1 each 4    blood sugar diagnostic (TRUE METRIX GLUCOSE TEST STRIP) Strp 1 strip by Misc.(Non-Drug; Combo Route) route once daily. 30 strip 4    blood-glucose meter (TRUE METRIX GLUCOSE METER) Misc 1 Device by Misc.(Non-Drug; Combo Route) route once. for 1 dose 1 each 0    clopidogreL (PLAVIX) 75 mg tablet Take 1 tablet (75 mg total) by mouth once daily. 90 tablet 1    FLUoxetine (PROZAC) 10 MG capsule Take 2 capsules (20 mg total) by mouth once daily. 180 capsule 1    gabapentin (NEURONTIN) 300 MG capsule TAKE 1 CAPSULE BY MOUTH THREE TIMES A DAY (Patient taking differently: Take 300 mg by mouth 3 (three) times daily as needed. ) 90 capsule 0    HYDROcodone-acetaminophen (NORCO) 7.5-325 mg per tablet Take 1 tablet by mouth every 6 (six) hours as needed for Pain. (Patient taking differently: Take 1 tablet by mouth 2 (two) times a  day. ) 30 tablet 0    lancets 32 gauge Misc 1 lancet by Misc.(Non-Drug; Combo Route) route once daily. 30 each 4    lisinopriL 10 MG tablet take one tablet by mouth daily 90 tablet 0    metFORMIN (GLUCOPHAGE) 500 MG tablet Take 1 tablet (500 mg total) by mouth 2 (two) times a day. 180 tablet 1    rosuvastatin (CRESTOR) 20 MG tablet Take 1 tablet (20 mg total) by mouth once daily. 90 tablet 3    tiZANidine (ZANAFLEX) 4 MG tablet TAKE 1 TABLET BY MOUTH EVERY 8 (EIGHT) HOURS. (Patient taking differently: TAKE 1 TABLET BY MOUTH EVERY 8 (EIGHT) HOURS PRN) 270 tablet 1    walker (ULTRA-LIGHT ROLLATOR) Misc Use daily for ambulation 1 each 0    zolpidem (AMBIEN) 10 mg Tab Take 1 tablet (10 mg total) by mouth nightly as needed. 30 tablet 2     No current facility-administered medications for this visit.        There are no discontinued medications.    No follow-ups on file.      Damien Barron MD

## 2020-11-27 ENCOUNTER — HOSPITAL ENCOUNTER (EMERGENCY)
Facility: HOSPITAL | Age: 70
Discharge: HOME | End: 2020-11-27
Attending: EMERGENCY MEDICINE
Payer: MEDICARE

## 2020-11-27 ENCOUNTER — TELEPHONE (OUTPATIENT)
Dept: INTERNAL MEDICINE | Facility: CLINIC | Age: 70
End: 2020-11-27

## 2020-11-27 VITALS
WEIGHT: 149.25 LBS | HEART RATE: 75 BPM | BODY MASS INDEX: 23.43 KG/M2 | SYSTOLIC BLOOD PRESSURE: 124 MMHG | DIASTOLIC BLOOD PRESSURE: 75 MMHG | OXYGEN SATURATION: 97 % | TEMPERATURE: 98 F | RESPIRATION RATE: 20 BRPM | HEIGHT: 67 IN
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
## 2020-11-27 DIAGNOSIS — M47.816 SPONDYLOSIS OF LUMBAR REGION WITHOUT MYELOPATHY OR RADICULOPATHY: ICD-10-CM
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
## 2020-11-27 DIAGNOSIS — M25.551 RIGHT HIP PAIN: ICD-10-CM

## 2020-11-27 LAB
ANION GAP SERPL CALC-SCNC: 9 MMOL/L (ref 8–16)
BUN SERPL-MCNC: 18 MG/DL (ref 8–23)
CALCIUM SERPL-MCNC: 9.2 MG/DL (ref 8.7–10.5)
CHLORIDE SERPL-SCNC: 107 MMOL/L (ref 95–110)
CO2 SERPL-SCNC: 23 MMOL/L (ref 23–29)
CREAT SERPL-MCNC: 1.3 MG/DL (ref 0.5–1.4)
EST. GFR  (AFRICAN AMERICAN): >60 ML/MIN/1.73 M^2
EST. GFR  (NON AFRICAN AMERICAN): 56 ML/MIN/1.73 M^2
GLUCOSE SERPL-MCNC: 82 MG/DL (ref 70–110)
HCV AB SERPL QL IA: NEGATIVE
POTASSIUM SERPL-SCNC: 4.5 MMOL/L (ref 3.5–5.1)
SODIUM SERPL-SCNC: 139 MMOL/L (ref 136–145)

## 2020-11-27 PROCEDURE — 86803 HEPATITIS C AB TEST: CPT

## 2020-11-27 PROCEDURE — 80048 BASIC METABOLIC PNL TOTAL CA: CPT

## 2020-11-27 PROCEDURE — 99284 EMERGENCY DEPT VISIT MOD MDM: CPT | Mod: 25

## 2020-11-27 NOTE — ED NOTES
In/out catheter performed using sterile technique. Pt tolerated well and specimen unable to be collected due to patient demands to remove the catheter at this time. Primary Nurse notified.

## 2020-11-27 NOTE — ED NOTES
In/out catheter attempted under sterile technique. Unable to obtain urine sample as I was not able to advance cath. Pt tensing up. Encouraged to relax but patient wanted to cancel attempt.

## 2020-11-27 NOTE — ED PROVIDER NOTES
Encounter Date: 11/27/2020    SCRIBE #1 NOTE: I, Brittny Hall, am scribing for, and in the presence of,  Ida Allen MD. I have scribed the following portions of the note - Other sections scribed: ED course and disposition.       History     Chief Complaint   Patient presents with    Fall     Ground level fall and hit head yesterday. Family states that patient has been off balance. Pt reports back pain and right leg pain; leg pain is chronic.     HPI   11/27/2020, 6:44 AM  History is provided by: Patient and Daughter      The history is provided by the patient and daughter. Samy Alejandre Jr. is a 70 y.o. male presenting to the ED for fall that happened yesterday.  Patient normally walks with a walker at baseline, daughter states has balance issues.  Patient reports losing balance and falling hitting his head yesterday, denies LOC, N/V, dizziness.  Associated symptoms are right hip pain and headache.  Denies any trouble baring weight. Reports being on plavix.  Denies neck or back pain, no numbness or weakness in arms or legs.  No other symptoms reported.     PCP: Damien Barron MD  Specialist:        Review of patient's allergies indicates:   Allergen Reactions    Chantix [varenicline] Other (See Comments)     Felt bad     Past Medical History:   Diagnosis Date    Anxiety     Depression     Diabetes mellitus, type 2     Hypertension     Stroke      Past Surgical History:   Procedure Laterality Date    BACK SURGERY  2015    KNEE SURGERY  2012    LITHOTRIPSY  2000     Family History   Problem Relation Age of Onset    Heart disease Mother     Stroke Mother     Cancer Father     COPD Sister      Social History     Tobacco Use    Smoking status: Current Every Day Smoker     Packs/day: 1.00     Types: Cigarettes    Smokeless tobacco: Current User     Types: Snuff   Substance Use Topics    Alcohol use: Not Currently    Drug use: Never     Review of Systems   Constitutional: Negative for activity  change and appetite change.   Respiratory: Negative for shortness of breath.    Cardiovascular: Negative for chest pain.   Gastrointestinal: Negative for abdominal pain.   Genitourinary: Negative for difficulty urinating.   Musculoskeletal: Negative for back pain and neck pain.   Skin: Negative for wound.   Neurological: Positive for headaches. Negative for dizziness, syncope, weakness and light-headedness.   Psychiatric/Behavioral: Negative for confusion.       Physical Exam     Initial Vitals [11/27/20 0535]   BP Pulse Resp Temp SpO2   106/74 75 18 98.5 °F (36.9 °C) 97 %      MAP       --         Physical Exam  Nursing Notes and Vital Signs Reviewed.  Constitutional:  Well developed, well nourished.  Awake & alert.  He is in no apparent distress  Head:  Atraumatic.  Normocephalic.    Eyes:  PERRL.  EOMI.  Conjunctivae are not pale. No scleral icterus.  ENT:  Mucous membranes are moist and intact.  Oropharynx is clear and symmetric.    Neck:  Supple.  No JVD.  No lymphadenopathy.  Cardiovascular:  Regular rate.  Regular rhythm.  No murmurs, rubs, or gallops.  Distal pulses are 2+ and symmetric.  Pulmonary/Chest:  No evidence of respiratory distress.  Clear to auscultation bilaterally.  No wheezing, rales or rhonchi.  Abdominal:  Soft and non-distended.  There is no tenderness.  No rebound, guarding, or rigidity.  No organomegaly.  Good bowel sounds.    Genitourinary: No CVA tenderness.  Musculoskeletal:  No edema.   No cyanosis.  No clubbing.  Moves all four extremities.   No calf tenderness. Pelvis stable to compression bilaterally.   Skin:  Skin is warm and dry.    No lacerations or abrasions.   Neurological:  Alert, awake, and appropriate.  Normal speech.  No acute focal neurological deficits are appreciated.  Psychiatric:  Good eye contact.  Appropriate in content/context. Normal affect.    ED Course   Procedures  Labs Reviewed   BASIC METABOLIC PANEL - Abnormal; Notable for the following components:        Result Value    eGFR if non  56 (*)     All other components within normal limits   HEPATITIS C ANTIBODY          Imaging Results          X-Ray Hip 2 View Right (Final result)  Result time 11/27/20 08:02:11    Final result by Shay Holliday MD (11/27/20 08:02:11)                 Impression:      No acute fracture or dislocation.      Electronically signed by: Shay Holliday MD  Date:    11/27/2020  Time:    08:02             Narrative:    EXAMINATION:  XR HIP 2 VIEW RIGHT    CLINICAL HISTORY:  XR HIP 2 VIEW RIGHTPain in right hip    COMPARISON:  None    FINDINGS:  Three views of the right hip were obtained.    No evidence of acute fracture or dislocation.  Bony mineralization is normal.  Soft tissues are unremarkable.   Moderate to severe degenerative changes within the lower lumbar spine and bilateral hip joints superior acetabulum.  Post laminectomies at L4 and L5 suspected within the lumbar spine.    Moderate constipation.                               CT Head Without Contrast (Final result)  Result time 11/27/20 07:43:05    Final result by Shay Holliday MD (11/27/20 07:43:05)                 Impression:      Chronic microvascular ischemic changes. If there is additional clinical concern for acute infarct, MRI with diffusion weighted imaging recommended.    No acute intracranial hemorrhage      Electronically signed by: Shay Holliday MD  Date:    11/27/2020  Time:    07:43             Narrative:    EXAMINATION:  CT HEAD WITHOUT CONTRAST    CLINICAL HISTORY:  Headache, post traumatic;    TECHNIQUE:  Low dose axial CT images obtained throughout the head without intravenous contrast. Sagittal and coronal reconstructions were performed.  All CT scans at this facility use dose modulation, iterative reconstruction and/or weight based dosing when appropriate to reduce radiation dose to as low as reasonably achievable.    COMPARISON:  None.    FINDINGS:  Intracranial compartment: Remote bilateral  lacunar infarcts involving the caudate, bilateral basal ganglia and left thalamus..    The brain parenchyma demonstrates areas of decreased attenuation with moderate periventricular white matter consistent with chronic microvascular ischemic changes..  No parenchymal mass, hemorrhage, edema or major vascular distribution infarct.  Vascular calcifications are noted.    Mild prominence of the sulci and ventricles are consistent with age-related involutional changes.    No extra-axial blood or fluid collections.    Skull/extracranial contents (limited evaluation): No fracture. Mastoid air cells and paranasal sinuses are essentially clear.  Postoperative changes are seen within the orbital lens bilaterally.                                8:44 AM: Reassessed pt at this time.  Pt states his condition has improved at this time. Discussed with pt all pertinent ED information and results. Discussed pt dx and plan of tx. Gave pt all f/u and return to the ED instructions. All questions and concerns were addressed at this time. Pt expresses understanding of information and instructions, and is comfortable with plan to discharge. Pt is stable for discharge.    I discussed with patient and family that evaluation in the ED does not suggest any emergent or life threatening medical conditions requiring immediate intervention beyond what was provided in the ED, and I believe patient is safe for discharge.  Regardless, an unremarkable evaluation in the ED does not preclude the development or presence of a serious of life threatening condition. As such, patient was instructed to return immediately for any worsening or change in current symptoms.       Medical Decision Making:   Clinical Tests:   Lab Tests: Ordered and Reviewed  Radiological Study: Ordered and Reviewed    Additional MDM:   Smoking Cessation: The patient is a smoker. The patient was counseled on smoking cessation for: 3 minutes. The patient was counseled on tobacco  related  health complications.                           Clinical Impression:       ICD-10-CM ICD-9-CM   1. Fall at home, initial encounter  W19.XXXA E888.9    Y92.009 E849.0   2. Right hip pain  M25.551 719.45   3. Spondylosis of lumbar region without myelopathy or radiculopathy  M47.816 721.3                      Disposition:   Disposition: Discharged  Condition: Stable     ED Disposition Condition    Discharge Stable        ED Prescriptions     None        Follow-up Information     Follow up With Specialties Details Why Contact Info    Damien Barron MD Family Medicine Schedule an appointment as soon as possible for a visit in 2 days Return to the Emergency Room, If symptoms worsen [id number] [address] [address]  [phone]                                         Ida Allen MD  11/27/20 4108

## 2020-11-30 ENCOUNTER — TELEPHONE (OUTPATIENT)
Dept: FAMILY MEDICINE | Facility: CLINIC | Age: 70
End: 2020-11-30

## 2020-11-30 RX ORDER — CLINDAMYCIN HYDROCHLORIDE 300 MG/1
300 CAPSULE ORAL 3 TIMES DAILY
Qty: 30 CAPSULE | Refills: 0 | Status: SHIPPED | OUTPATIENT
Start: 2020-11-30 | End: 2021-02-25 | Stop reason: ALTCHOICE

## 2020-11-30 NOTE — TELEPHONE ENCOUNTER
Patient is having a toothache and the side of his face is swollen. Can you please call in an antibiotic until he can get in to see the dentist?

## 2020-12-01 NOTE — TELEPHONE ENCOUNTER
Clindamycin is being sent to the pharmacy, but please started only if absolutely sure that it is a dental abscess or other indication requiring antibiotics.  If in doubt please be seen  Also encouraged dental checkup soon

## 2020-12-03 RX ORDER — ZOLPIDEM TARTRATE 10 MG/1
10 TABLET ORAL NIGHTLY PRN
Qty: 30 TABLET | Refills: 5 | Status: SHIPPED | OUTPATIENT
Start: 2020-12-03 | End: 2021-06-07 | Stop reason: SDUPTHER

## 2020-12-12 PROCEDURE — G0179 PR HOME HEALTH MD RECERTIFICATION: ICD-10-PCS | Mod: ,,, | Performed by: FAMILY MEDICINE

## 2020-12-12 PROCEDURE — G0179 MD RECERTIFICATION HHA PT: HCPCS | Mod: ,,, | Performed by: FAMILY MEDICINE

## 2020-12-17 ENCOUNTER — TELEPHONE (OUTPATIENT)
Dept: ADMINISTRATIVE | Facility: HOSPITAL | Age: 70
End: 2020-12-17

## 2020-12-17 NOTE — TELEPHONE ENCOUNTER
Contacted patient to schedule overdue eye exam. Patient daughter (Ingrid Chahal) scheduled patient for 01/22/2020 @2:00pm.

## 2020-12-21 ENCOUNTER — EXTERNAL HOME HEALTH (OUTPATIENT)
Dept: HOME HEALTH SERVICES | Facility: HOSPITAL | Age: 70
End: 2020-12-21
Payer: MEDICARE

## 2020-12-21 ENCOUNTER — HOSPITAL ENCOUNTER (OUTPATIENT)
Facility: HOSPITAL | Age: 70
Discharge: HOME | End: 2020-12-21
Attending: SURGERY | Admitting: SURGERY
Payer: MEDICARE

## 2020-12-21 VITALS
HEIGHT: 66 IN | TEMPERATURE: 98 F | HEART RATE: 72 BPM | SYSTOLIC BLOOD PRESSURE: 107 MMHG | OXYGEN SATURATION: 99 % | RESPIRATION RATE: 16 BRPM | BODY MASS INDEX: 23.95 KG/M2 | WEIGHT: 149 LBS | DIASTOLIC BLOOD PRESSURE: 61 MMHG

## 2020-12-21 DIAGNOSIS — I70.25 ATHEROSCLEROSIS OF NATIVE ARTERIES OF THE EXTREMITIES WITH ULCERATION: ICD-10-CM

## 2020-12-21 LAB
ANION GAP SERPL CALC-SCNC: 10 MMOL/L (ref 8–16)
BASOPHILS # BLD AUTO: 0.14 K/UL (ref 0–0.2)
BASOPHILS NFR BLD: 1.7 % (ref 0–1.9)
BUN SERPL-MCNC: 24 MG/DL (ref 8–23)
CALCIUM SERPL-MCNC: 9.1 MG/DL (ref 8.7–10.5)
CHLORIDE SERPL-SCNC: 104 MMOL/L (ref 95–110)
CO2 SERPL-SCNC: 22 MMOL/L (ref 23–29)
CREAT SERPL-MCNC: 1.5 MG/DL (ref 0.5–1.4)
DIFFERENTIAL METHOD: ABNORMAL
EOSINOPHIL # BLD AUTO: 0.8 K/UL (ref 0–0.5)
EOSINOPHIL NFR BLD: 9 % (ref 0–8)
ERYTHROCYTE [DISTWIDTH] IN BLOOD BY AUTOMATED COUNT: 15.1 % (ref 11.5–14.5)
EST. GFR  (AFRICAN AMERICAN): 55 ML/MIN/1.73 M^2
EST. GFR  (NON AFRICAN AMERICAN): 47 ML/MIN/1.73 M^2
GLUCOSE SERPL-MCNC: 91 MG/DL (ref 70–110)
HCT VFR BLD AUTO: 43.2 % (ref 40–54)
HGB BLD-MCNC: 14.2 G/DL (ref 14–18)
IMM GRANULOCYTES # BLD AUTO: 0.03 K/UL (ref 0–0.04)
IMM GRANULOCYTES NFR BLD AUTO: 0.4 % (ref 0–0.5)
LYMPHOCYTES # BLD AUTO: 3.2 K/UL (ref 1–4.8)
LYMPHOCYTES NFR BLD: 38.8 % (ref 18–48)
MCH RBC QN AUTO: 31.7 PG (ref 27–31)
MCHC RBC AUTO-ENTMCNC: 32.9 G/DL (ref 32–36)
MCV RBC AUTO: 96 FL (ref 82–98)
MONOCYTES # BLD AUTO: 0.6 K/UL (ref 0.3–1)
MONOCYTES NFR BLD: 7.1 % (ref 4–15)
NEUTROPHILS # BLD AUTO: 3.6 K/UL (ref 1.8–7.7)
NEUTROPHILS NFR BLD: 43 % (ref 38–73)
NRBC BLD-RTO: 0 /100 WBC
PLATELET # BLD AUTO: 291 K/UL (ref 150–350)
PMV BLD AUTO: 9.9 FL (ref 9.2–12.9)
POTASSIUM SERPL-SCNC: 4.8 MMOL/L (ref 3.5–5.1)
RBC # BLD AUTO: 4.48 M/UL (ref 4.6–6.2)
SARS-COV-2 RDRP RESP QL NAA+PROBE: NEGATIVE
SODIUM SERPL-SCNC: 136 MMOL/L (ref 136–145)
WBC # BLD AUTO: 8.36 K/UL (ref 3.9–12.7)

## 2020-12-21 PROCEDURE — C1760 CLOSURE DEV, VASC: HCPCS | Performed by: SURGERY

## 2020-12-21 PROCEDURE — 75630 X-RAY AORTA LEG ARTERIES: CPT | Performed by: SURGERY

## 2020-12-21 PROCEDURE — 25500020 PHARM REV CODE 255: Performed by: SURGERY

## 2020-12-21 PROCEDURE — 36200 PLACE CATHETER IN AORTA: CPT | Performed by: SURGERY

## 2020-12-21 PROCEDURE — 99152 MOD SED SAME PHYS/QHP 5/>YRS: CPT | Performed by: SURGERY

## 2020-12-21 PROCEDURE — 25000003 PHARM REV CODE 250: Performed by: SURGERY

## 2020-12-21 PROCEDURE — C1769 GUIDE WIRE: HCPCS | Performed by: SURGERY

## 2020-12-21 PROCEDURE — 80048 BASIC METABOLIC PNL TOTAL CA: CPT

## 2020-12-21 PROCEDURE — 63600175 PHARM REV CODE 636 W HCPCS: Performed by: SURGERY

## 2020-12-21 PROCEDURE — 85025 COMPLETE CBC W/AUTO DIFF WBC: CPT

## 2020-12-21 PROCEDURE — U0002 COVID-19 LAB TEST NON-CDC: HCPCS

## 2020-12-21 PROCEDURE — C1894 INTRO/SHEATH, NON-LASER: HCPCS | Performed by: SURGERY

## 2020-12-21 PROCEDURE — C1887 CATHETER, GUIDING: HCPCS | Performed by: SURGERY

## 2020-12-21 RX ORDER — LIDOCAINE HYDROCHLORIDE 20 MG/ML
INJECTION, SOLUTION EPIDURAL; INFILTRATION; INTRACAUDAL; PERINEURAL
Status: DISCONTINUED | OUTPATIENT
Start: 2020-12-21 | End: 2020-12-21 | Stop reason: HOSPADM

## 2020-12-21 RX ORDER — PHENYLEPHRINE HYDROCHLORIDE 10 MG/ML
INJECTION INTRAVENOUS
Status: DISCONTINUED | OUTPATIENT
Start: 2020-12-21 | End: 2020-12-21 | Stop reason: HOSPADM

## 2020-12-21 RX ORDER — ONDANSETRON 2 MG/ML
4 INJECTION INTRAMUSCULAR; INTRAVENOUS EVERY 12 HOURS PRN
Status: DISCONTINUED | OUTPATIENT
Start: 2020-12-21 | End: 2020-12-21 | Stop reason: HOSPADM

## 2020-12-21 RX ORDER — HYDROMORPHONE HYDROCHLORIDE 1 MG/ML
0.5 INJECTION, SOLUTION INTRAMUSCULAR; INTRAVENOUS; SUBCUTANEOUS
Status: DISCONTINUED | OUTPATIENT
Start: 2020-12-21 | End: 2020-12-21 | Stop reason: HOSPADM

## 2020-12-21 RX ORDER — HYDROCODONE BITARTRATE AND ACETAMINOPHEN 5; 325 MG/1; MG/1
1 TABLET ORAL EVERY 4 HOURS PRN
Status: DISCONTINUED | OUTPATIENT
Start: 2020-12-21 | End: 2020-12-21 | Stop reason: HOSPADM

## 2020-12-21 RX ORDER — ACETAMINOPHEN 325 MG/1
650 TABLET ORAL EVERY 4 HOURS PRN
Status: DISCONTINUED | OUTPATIENT
Start: 2020-12-21 | End: 2020-12-21 | Stop reason: HOSPADM

## 2020-12-21 RX ORDER — MIDAZOLAM HYDROCHLORIDE 1 MG/ML
INJECTION, SOLUTION INTRAMUSCULAR; INTRAVENOUS
Status: DISCONTINUED | OUTPATIENT
Start: 2020-12-21 | End: 2020-12-21 | Stop reason: HOSPADM

## 2020-12-21 RX ORDER — FENTANYL CITRATE 50 UG/ML
INJECTION, SOLUTION INTRAMUSCULAR; INTRAVENOUS
Status: DISCONTINUED | OUTPATIENT
Start: 2020-12-21 | End: 2020-12-21 | Stop reason: HOSPADM

## 2020-12-21 RX ORDER — HYDROMORPHONE HYDROCHLORIDE 2 MG/ML
INJECTION, SOLUTION INTRAMUSCULAR; INTRAVENOUS; SUBCUTANEOUS
Status: DISCONTINUED | OUTPATIENT
Start: 2020-12-21 | End: 2020-12-21 | Stop reason: HOSPADM

## 2020-12-21 NOTE — NURSING
VSS. Ambulating in hallway without problems. DC instructions reviewed with pt and able to teach back. Out to car in wheelchair assisted by staff.

## 2020-12-21 NOTE — Clinical Note
Angiography of the infrarenal and abdominal aorta performed with the catheter   power injection 60 mL contrast at 6 mL/s.

## 2020-12-21 NOTE — Clinical Note
Angiography performed of the distal suprarenal . via power injection. Injection was performed with 30 mL contrast at 15 mL/s. Power injected PSI: 600. .

## 2020-12-21 NOTE — DISCHARGE INSTRUCTIONS
"Post-op   1. DIET: It is advisable for you to follow a diet that limits the intake of salt, sugar, saturated fats and cholesterol.     2. FOR THE NEXT 24 HOURS:   · For the next 8 hours, you should be watched by a responsible adult. This person should make sure your condition is not getting worse.   · Don't drink any alcohol for the next 24 hours.  · Don't drive, operate dangerous machinery, or make important business or personal decisions during the next 24 hours.  · Your healthcare provider may tell you not to take any medicine by mouth for pain or sleep in the next 4 hours. These medicines may react with the medicines you were given in the hospital. This could cause a much stronger response than usual.       3. ACTIVITY:                                Day of discharge:             NO vigorous activity, lifting or straining                                                   Day after discharge:         Avoid heavy lifting (up to 10 lbs)                                                                        The day after discharge you may shower, but avoid tub baths for 5 days                                                                         Wash site gently with soap and water        NO powder or lotion to your procedure site.                 Before you shower, remove dressing, apply bandaid if desired                                                                                                                                                                            2nd day after discharge:  Resume normal activities                                                                         Exercise program as instructed      4. WOUND CARE: It is not unusual to have a small amount of bruising appear in the groin area. It is also common to have a tender "knot" develop beneath the skin at the puncture site in the groin. This is scar tissue only and is not a cause for concern or alarm. This tender knot may take " "several weeks to fully resolve. The bruise will usually spread over several days. If the lump gets bigger, call you doctor immediately.    5. DISCOMFORT: For general discomfort at the puncture site, you may take 1 or 2 Acetaminophen (Tylenol) tablets every 4 hours as needed. (Do not take more than 4000 mg a day)                 6. CALL YOUR HEALTHCARE PROVIDER IF YOU START TO HAVE THE FOLLOWING SYMPTOMS:        1. Problems with the affected leg: Pain, discomfort, loss of warmth, numbness or tingling                                                                                                                            2. Problems at the groin site: Bleeding, pain that is sudden/sharp/persistent,                   swelling at site or a change in "lump" size, increased redness or drainage at                     puncture site                                                               3. High fever (101 degrees or higher)       4.  Drowsiness that doesn't get better       5. Weakness or dizziness that doesn't get better            6. Repeated vomiting        7. GO TO  THE EMERGENCY ROOM OR CALL 911 IF YOU HAVE: numbness or severe pain or if your foot or leg becomes cold or discolored or uncontrolled bleeding from site (apply direct pressure above site).  "

## 2020-12-21 NOTE — Clinical Note
The left DP pulse is 2+. The right DP pulse is detected w/ doppler.  The left PT pulse is 2+. The right PT pulse is detected w/ doppler. The radial pulses are +2 bilaterally.

## 2020-12-21 NOTE — DISCHARGE SUMMARY
[facility]  Discharge Note  Short Stay    Procedure(s) (LRB):  ANGIOGRAM, LOWER EXTREMITY/Rt leg poss pta/stent (N/A)    OUTCOME: Patient tolerated treatment/procedure well without complication and is now ready for discharge.    DISPOSITION: Home or Self Care    FINAL DIAGNOSIS:  <principal problem not specified>    FOLLOWUP: In clinic    DISCHARGE INSTRUCTIONS:  No discharge procedures on file.

## 2020-12-21 NOTE — Clinical Note
The right DP pulse is detected w/ doppler.  The left PT pulse is 2+. The right PT pulse is detected w/ doppler. The radial pulses are +2 bilaterally.

## 2020-12-21 NOTE — OP NOTE
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  Once anesthesia was administered bilateral groins were then prepped and draped in a sterile fashion.  With ultrasound guidance we successfully cannulate the left common femoral artery.  We then advanced a pigtail catheter at the L1 vertebral level and performed aortogram with bilateral runoff.  The left groin was then closed with a Mynx device.    Description of the Findings of the Procedure:  Aortogram with bilateral runoff from left transfemoral approach using ultrasound guidance:  Successful cannulation of left common femoral artery.  Widely patent aorta as well as bilateral renal arteries and widely patent left iliofemoral tibial system.  On the right there is a flush occlusion and slightly occluded distal aorta involving only the right iliac system with reconstitution of the distal external iliac artery with patency of the femoral popliteal segment and posterior tibial artery into the foot.    Significant Surgical Tasks Conducted by the Assistant(s), if Applicable:  None    Complications: No    Estimated Blood Loss (EBL): * No values recorded between 12/21/2020  7:12 AM and 12/21/2020  7:35 AM *           Implants: * No implants in log *    Specimens:   Specimen (12h ago,  onward)    None                  Condition: Good    Disposition: PACU - hemodynamically stable.    Attestation: I performed the procedure.     Recommendations:  Unfortunately, we do not believe he would be a good percutaneous candidate because of the severity of the iliac disease.  Most likely his best option will be aortobifemoral bypassing or left-to-right femoral-femoral bypass into increased blood supply to the right foot in the future.

## 2020-12-28 LAB — POCT GLUCOSE: 92 MG/DL (ref 70–110)

## 2020-12-29 NOTE — H&P
[facility]  History & Physical  Vascular Surgery    SUBJECTIVE:     Chief Complaint/Reason for Admission: right leg pain    History of Present Illness:  Patient is a 70 y.o. male presents with right leg pain to Dr. Clark dimas.  US reveals right leg PAD and therefore a right leg angiogram is scheduled for today.     No medications prior to admission.       Review of patient's allergies indicates:   Allergen Reactions    Chantix [varenicline] Other (See Comments)     Felt bad       Past Medical History:   Diagnosis Date    Anxiety     Depression     Diabetes mellitus, type 2     Hypertension     Stroke      Past Surgical History:   Procedure Laterality Date    ANGIOGRAPHY OF LOWER EXTREMITY N/A 12/21/2020    Procedure: ANGIOGRAM, LOWER EXTREMITY/Rt leg poss pta/stent;  Surgeon: Todd Michel MD;  Location: [facility] CATH LAB;  Service: Peripheral Vascular;  Laterality: N/A;  rescheduled 12/8    BACK SURGERY  2015    KNEE SURGERY  2012    LITHOTRIPSY  2000     Family History   Problem Relation Age of Onset    Heart disease Mother     Stroke Mother     Cancer Father     COPD Sister      Social History     Tobacco Use    Smoking status: Current Every Day Smoker     Packs/day: 1.00     Types: Cigarettes    Smokeless tobacco: Current User     Types: Snuff   Substance Use Topics    Alcohol use: Not Currently    Drug use: Never        Review of Systems:  Review of systems not obtained due to patient factors ok.    OBJECTIVE:     Vital Signs (Most Recent):  Temp: 98.2 °F (36.8 °C) (12/21/20 0609)  Pulse: 72 (12/21/20 1115)  Resp: 16 (12/21/20 1115)  BP: 107/61 (12/21/20 1115)  SpO2: 99 % (12/21/20 1115)    Physical Exam:  No exam performed today, al;ready in clinic [facility].    Laboratory:  as above    Diagnostic Results:  US: Reviewed    ASSESSMENT/PLAN:     Right leg angiogram today as outpatient procedure    above

## 2021-01-21 ENCOUNTER — PATIENT OUTREACH (OUTPATIENT)
Dept: ADMINISTRATIVE | Facility: OTHER | Age: 71
End: 2021-01-21

## 2021-01-21 ENCOUNTER — TELEPHONE (OUTPATIENT)
Dept: FAMILY MEDICINE | Facility: CLINIC | Age: 71
End: 2021-01-21

## 2021-01-21 DIAGNOSIS — E11.9 CONTROLLED TYPE 2 DIABETES MELLITUS WITHOUT COMPLICATION, WITHOUT LONG-TERM CURRENT USE OF INSULIN: Primary | ICD-10-CM

## 2021-01-22 ENCOUNTER — OFFICE VISIT (OUTPATIENT)
Dept: OPHTHALMOLOGY | Facility: CLINIC | Age: 71
End: 2021-01-22
Payer: COMMERCIAL

## 2021-01-22 DIAGNOSIS — E11.9 DIABETIC EYE EXAM: ICD-10-CM

## 2021-01-22 DIAGNOSIS — Z01.00 DIABETIC EYE EXAM: ICD-10-CM

## 2021-01-22 DIAGNOSIS — H52.7 REFRACTIVE ERRORS: ICD-10-CM

## 2021-01-22 DIAGNOSIS — E11.9 DIABETES MELLITUS TYPE 2 WITHOUT RETINOPATHY: Primary | ICD-10-CM

## 2021-01-22 DIAGNOSIS — Z96.1 PSEUDOPHAKIA OF BOTH EYES: ICD-10-CM

## 2021-01-22 PROCEDURE — 92004 COMPRE OPH EXAM NEW PT 1/>: CPT | Mod: S$GLB,,, | Performed by: OPTOMETRIST

## 2021-01-22 PROCEDURE — 92015 DETERMINE REFRACTIVE STATE: CPT | Mod: S$GLB,,, | Performed by: OPTOMETRIST

## 2021-01-22 PROCEDURE — 2023F DILAT RTA XM W/O RTNOPTHY: CPT | Mod: S$GLB,,, | Performed by: OPTOMETRIST

## 2021-01-22 PROCEDURE — 99999 PR PBB SHADOW E&M-EST. PATIENT-LVL I: ICD-10-PCS | Mod: PBBFAC,,, | Performed by: OPTOMETRIST

## 2021-01-22 PROCEDURE — 99999 PR PBB SHADOW E&M-EST. PATIENT-LVL I: CPT | Mod: PBBFAC,,, | Performed by: OPTOMETRIST

## 2021-01-22 PROCEDURE — 99499 UNLISTED E&M SERVICE: CPT | Mod: HCNC,S$GLB,, | Performed by: OPTOMETRIST

## 2021-01-22 PROCEDURE — 2023F PR DILATED RETINAL EXAM W/O EVID OF RETINOPATHY: ICD-10-PCS | Mod: S$GLB,,, | Performed by: OPTOMETRIST

## 2021-01-22 PROCEDURE — 92015 PR REFRACTION: ICD-10-PCS | Mod: S$GLB,,, | Performed by: OPTOMETRIST

## 2021-01-22 PROCEDURE — 99499 RISK ADDL DX/OHS AUDIT: ICD-10-PCS | Mod: HCNC,S$GLB,, | Performed by: OPTOMETRIST

## 2021-01-22 PROCEDURE — 92004 PR EYE EXAM, NEW PATIENT,COMPREHESV: ICD-10-PCS | Mod: S$GLB,,, | Performed by: OPTOMETRIST

## 2021-01-22 RX ORDER — ACETAMINOPHEN AND CODEINE PHOSPHATE 300; 30 MG/1; MG/1
1 TABLET ORAL 3 TIMES DAILY
COMMUNITY
End: 2021-09-30

## 2021-01-28 ENCOUNTER — OFFICE VISIT (OUTPATIENT)
Dept: FAMILY MEDICINE | Facility: CLINIC | Age: 71
End: 2021-01-28
Payer: MEDICARE

## 2021-01-28 DIAGNOSIS — B02.9 HERPES ZOSTER WITHOUT COMPLICATION: Primary | ICD-10-CM

## 2021-01-28 PROCEDURE — 99213 PR OFFICE/OUTPT VISIT, EST, LEVL III, 20-29 MIN: ICD-10-PCS | Mod: 95,,, | Performed by: FAMILY MEDICINE

## 2021-01-28 PROCEDURE — 1159F MED LIST DOCD IN RCRD: CPT | Mod: 95,,, | Performed by: FAMILY MEDICINE

## 2021-01-28 PROCEDURE — 99213 OFFICE O/P EST LOW 20 MIN: CPT | Mod: 95,,, | Performed by: FAMILY MEDICINE

## 2021-01-28 PROCEDURE — 1159F PR MEDICATION LIST DOCUMENTED IN MEDICAL RECORD: ICD-10-PCS | Mod: 95,,, | Performed by: FAMILY MEDICINE

## 2021-01-28 RX ORDER — ACYCLOVIR 800 MG/1
800 TABLET ORAL
Qty: 25 TABLET | Refills: 0 | Status: SHIPPED | OUTPATIENT
Start: 2021-01-28 | End: 2021-09-30

## 2021-01-28 RX ORDER — PREDNISONE 50 MG/1
50 TABLET ORAL DAILY
Qty: 5 TABLET | Refills: 0 | Status: SHIPPED | OUTPATIENT
Start: 2021-01-28 | End: 2021-02-02

## 2021-02-04 RX ORDER — METFORMIN HYDROCHLORIDE 500 MG/1
500 TABLET ORAL 2 TIMES DAILY
Qty: 180 TABLET | Refills: 1 | Status: SHIPPED | OUTPATIENT
Start: 2021-02-04 | End: 2021-08-05

## 2021-02-22 ENCOUNTER — TELEPHONE (OUTPATIENT)
Dept: FAMILY MEDICINE | Facility: CLINIC | Age: 71
End: 2021-02-22

## 2021-02-22 DIAGNOSIS — E11.9 CONTROLLED TYPE 2 DIABETES MELLITUS WITHOUT COMPLICATION, WITHOUT LONG-TERM CURRENT USE OF INSULIN: Primary | ICD-10-CM

## 2021-02-22 DIAGNOSIS — D64.9 ANEMIA, UNSPECIFIED TYPE: ICD-10-CM

## 2021-02-22 DIAGNOSIS — E78.2 MIXED HYPERLIPIDEMIA: ICD-10-CM

## 2021-02-23 ENCOUNTER — LAB VISIT (OUTPATIENT)
Dept: LAB | Facility: HOSPITAL | Age: 71
End: 2021-02-23
Attending: FAMILY MEDICINE
Payer: MEDICARE

## 2021-02-23 DIAGNOSIS — E11.9 CONTROLLED TYPE 2 DIABETES MELLITUS WITHOUT COMPLICATION, WITHOUT LONG-TERM CURRENT USE OF INSULIN: ICD-10-CM

## 2021-02-23 DIAGNOSIS — D64.9 ANEMIA, UNSPECIFIED TYPE: ICD-10-CM

## 2021-02-23 DIAGNOSIS — E78.2 MIXED HYPERLIPIDEMIA: ICD-10-CM

## 2021-02-23 LAB
BASOPHILS # BLD AUTO: 0.07 K/UL (ref 0–0.2)
BASOPHILS NFR BLD: 0.8 % (ref 0–1.9)
DIFFERENTIAL METHOD: ABNORMAL
EOSINOPHIL # BLD AUTO: 1.1 K/UL (ref 0–0.5)
EOSINOPHIL NFR BLD: 12.4 % (ref 0–8)
ERYTHROCYTE [DISTWIDTH] IN BLOOD BY AUTOMATED COUNT: 15.2 % (ref 11.5–14.5)
HCT VFR BLD AUTO: 37.8 % (ref 40–54)
HGB BLD-MCNC: 12.3 G/DL (ref 14–18)
IMM GRANULOCYTES # BLD AUTO: 0.05 K/UL (ref 0–0.04)
IMM GRANULOCYTES NFR BLD AUTO: 0.6 % (ref 0–0.5)
LYMPHOCYTES # BLD AUTO: 3.7 K/UL (ref 1–4.8)
LYMPHOCYTES NFR BLD: 41.7 % (ref 18–48)
MCH RBC QN AUTO: 33.4 PG (ref 27–31)
MCHC RBC AUTO-ENTMCNC: 32.5 G/DL (ref 32–36)
MCV RBC AUTO: 103 FL (ref 82–98)
MONOCYTES # BLD AUTO: 0.6 K/UL (ref 0.3–1)
MONOCYTES NFR BLD: 6.7 % (ref 4–15)
NEUTROPHILS # BLD AUTO: 3.3 K/UL (ref 1.8–7.7)
NEUTROPHILS NFR BLD: 37.8 % (ref 38–73)
NRBC BLD-RTO: 0 /100 WBC
PLATELET # BLD AUTO: 250 K/UL (ref 150–350)
PMV BLD AUTO: 10.9 FL (ref 9.2–12.9)
RBC # BLD AUTO: 3.68 M/UL (ref 4.6–6.2)
WBC # BLD AUTO: 8.77 K/UL (ref 3.9–12.7)

## 2021-02-23 PROCEDURE — 80061 LIPID PANEL: CPT

## 2021-02-23 PROCEDURE — 36415 COLL VENOUS BLD VENIPUNCTURE: CPT | Mod: PO

## 2021-02-23 PROCEDURE — 80053 COMPREHEN METABOLIC PANEL: CPT

## 2021-02-23 PROCEDURE — 83036 HEMOGLOBIN GLYCOSYLATED A1C: CPT

## 2021-02-23 PROCEDURE — 85025 COMPLETE CBC W/AUTO DIFF WBC: CPT

## 2021-02-24 LAB
ALBUMIN SERPL BCP-MCNC: 3.7 G/DL (ref 3.5–5.2)
ALP SERPL-CCNC: 111 U/L (ref 55–135)
ALT SERPL W/O P-5'-P-CCNC: 27 U/L (ref 10–44)
ANION GAP SERPL CALC-SCNC: 10 MMOL/L (ref 8–16)
AST SERPL-CCNC: 36 U/L (ref 10–40)
BILIRUB SERPL-MCNC: 0.2 MG/DL (ref 0.1–1)
BUN SERPL-MCNC: 35 MG/DL (ref 8–23)
CALCIUM SERPL-MCNC: 9.1 MG/DL (ref 8.7–10.5)
CHLORIDE SERPL-SCNC: 108 MMOL/L (ref 95–110)
CHOLEST SERPL-MCNC: 153 MG/DL (ref 120–199)
CHOLEST/HDLC SERPL: 5.1 {RATIO} (ref 2–5)
CO2 SERPL-SCNC: 21 MMOL/L (ref 23–29)
CREAT SERPL-MCNC: 1.5 MG/DL (ref 0.5–1.4)
EST. GFR  (AFRICAN AMERICAN): 54.1 ML/MIN/1.73 M^2
EST. GFR  (NON AFRICAN AMERICAN): 46.8 ML/MIN/1.73 M^2
ESTIMATED AVG GLUCOSE: 120 MG/DL (ref 68–131)
GLUCOSE SERPL-MCNC: 92 MG/DL (ref 70–110)
HBA1C MFR BLD: 5.8 % (ref 4–5.6)
HDLC SERPL-MCNC: 30 MG/DL (ref 40–75)
HDLC SERPL: 19.6 % (ref 20–50)
LDLC SERPL CALC-MCNC: 69.8 MG/DL (ref 63–159)
NONHDLC SERPL-MCNC: 123 MG/DL
POTASSIUM SERPL-SCNC: 4.4 MMOL/L (ref 3.5–5.1)
PROT SERPL-MCNC: 6.7 G/DL (ref 6–8.4)
SODIUM SERPL-SCNC: 139 MMOL/L (ref 136–145)
TRIGL SERPL-MCNC: 266 MG/DL (ref 30–150)

## 2021-02-25 ENCOUNTER — OFFICE VISIT (OUTPATIENT)
Dept: FAMILY MEDICINE | Facility: CLINIC | Age: 71
End: 2021-02-25
Payer: MEDICARE

## 2021-02-25 VITALS
SYSTOLIC BLOOD PRESSURE: 92 MMHG | DIASTOLIC BLOOD PRESSURE: 50 MMHG | HEIGHT: 66 IN | WEIGHT: 146.25 LBS | HEART RATE: 76 BPM | BODY MASS INDEX: 23.5 KG/M2 | OXYGEN SATURATION: 97 % | TEMPERATURE: 98 F

## 2021-02-25 DIAGNOSIS — B02.29 POSTHERPETIC NEURALGIA: Primary | ICD-10-CM

## 2021-02-25 DIAGNOSIS — Z13.6 SCREENING FOR AAA (ABDOMINAL AORTIC ANEURYSM): ICD-10-CM

## 2021-02-25 DIAGNOSIS — N18.31 STAGE 3A CHRONIC KIDNEY DISEASE: ICD-10-CM

## 2021-02-25 DIAGNOSIS — E11.22 CONTROLLED TYPE 2 DIABETES MELLITUS WITH STAGE 3 CHRONIC KIDNEY DISEASE, WITHOUT LONG-TERM CURRENT USE OF INSULIN: ICD-10-CM

## 2021-02-25 DIAGNOSIS — N18.30 CONTROLLED TYPE 2 DIABETES MELLITUS WITH STAGE 3 CHRONIC KIDNEY DISEASE, WITHOUT LONG-TERM CURRENT USE OF INSULIN: ICD-10-CM

## 2021-02-25 DIAGNOSIS — I69.30 HISTORY OF CVA WITH RESIDUAL DEFICIT: ICD-10-CM

## 2021-02-25 DIAGNOSIS — F17.200 SMOKING: ICD-10-CM

## 2021-02-25 DIAGNOSIS — D64.9 ANEMIA, UNSPECIFIED TYPE: ICD-10-CM

## 2021-02-25 PROCEDURE — 99499 UNLISTED E&M SERVICE: CPT | Mod: S$GLB,,, | Performed by: FAMILY MEDICINE

## 2021-02-25 PROCEDURE — 1159F PR MEDICATION LIST DOCUMENTED IN MEDICAL RECORD: ICD-10-PCS | Mod: S$GLB,,, | Performed by: FAMILY MEDICINE

## 2021-02-25 PROCEDURE — 99999 PR PBB SHADOW E&M-EST. PATIENT-LVL V: CPT | Mod: PBBFAC,,, | Performed by: FAMILY MEDICINE

## 2021-02-25 PROCEDURE — 99499 RISK ADDL DX/OHS AUDIT: ICD-10-PCS | Mod: S$GLB,,, | Performed by: FAMILY MEDICINE

## 2021-02-25 PROCEDURE — 3044F PR MOST RECENT HEMOGLOBIN A1C LEVEL <7.0%: ICD-10-PCS | Mod: CPTII,S$GLB,, | Performed by: FAMILY MEDICINE

## 2021-02-25 PROCEDURE — 3078F PR MOST RECENT DIASTOLIC BLOOD PRESSURE < 80 MM HG: ICD-10-PCS | Mod: CPTII,S$GLB,, | Performed by: FAMILY MEDICINE

## 2021-02-25 PROCEDURE — 1159F MED LIST DOCD IN RCRD: CPT | Mod: S$GLB,,, | Performed by: FAMILY MEDICINE

## 2021-02-25 PROCEDURE — 3288F PR FALLS RISK ASSESSMENT DOCUMENTED: ICD-10-PCS | Mod: CPTII,S$GLB,, | Performed by: FAMILY MEDICINE

## 2021-02-25 PROCEDURE — 99214 PR OFFICE/OUTPT VISIT, EST, LEVL IV, 30-39 MIN: ICD-10-PCS | Mod: S$GLB,,, | Performed by: FAMILY MEDICINE

## 2021-02-25 PROCEDURE — 3074F SYST BP LT 130 MM HG: CPT | Mod: CPTII,S$GLB,, | Performed by: FAMILY MEDICINE

## 2021-02-25 PROCEDURE — 1126F AMNT PAIN NOTED NONE PRSNT: CPT | Mod: S$GLB,,, | Performed by: FAMILY MEDICINE

## 2021-02-25 PROCEDURE — 1100F PR PT FALLS ASSESS DOC 2+ FALLS/FALL W/INJURY/YR: ICD-10-PCS | Mod: CPTII,S$GLB,, | Performed by: FAMILY MEDICINE

## 2021-02-25 PROCEDURE — 99214 OFFICE O/P EST MOD 30 MIN: CPT | Mod: S$GLB,,, | Performed by: FAMILY MEDICINE

## 2021-02-25 PROCEDURE — 3074F PR MOST RECENT SYSTOLIC BLOOD PRESSURE < 130 MM HG: ICD-10-PCS | Mod: CPTII,S$GLB,, | Performed by: FAMILY MEDICINE

## 2021-02-25 PROCEDURE — 3008F BODY MASS INDEX DOCD: CPT | Mod: CPTII,S$GLB,, | Performed by: FAMILY MEDICINE

## 2021-02-25 PROCEDURE — 1100F PTFALLS ASSESS-DOCD GE2>/YR: CPT | Mod: CPTII,S$GLB,, | Performed by: FAMILY MEDICINE

## 2021-02-25 PROCEDURE — 3008F PR BODY MASS INDEX (BMI) DOCUMENTED: ICD-10-PCS | Mod: CPTII,S$GLB,, | Performed by: FAMILY MEDICINE

## 2021-02-25 PROCEDURE — 1126F PR PAIN SEVERITY QUANTIFIED, NO PAIN PRESENT: ICD-10-PCS | Mod: S$GLB,,, | Performed by: FAMILY MEDICINE

## 2021-02-25 PROCEDURE — 3078F DIAST BP <80 MM HG: CPT | Mod: CPTII,S$GLB,, | Performed by: FAMILY MEDICINE

## 2021-02-25 PROCEDURE — 3044F HG A1C LEVEL LT 7.0%: CPT | Mod: CPTII,S$GLB,, | Performed by: FAMILY MEDICINE

## 2021-02-25 PROCEDURE — 99999 PR PBB SHADOW E&M-EST. PATIENT-LVL V: ICD-10-PCS | Mod: PBBFAC,,, | Performed by: FAMILY MEDICINE

## 2021-02-25 PROCEDURE — 3288F FALL RISK ASSESSMENT DOCD: CPT | Mod: CPTII,S$GLB,, | Performed by: FAMILY MEDICINE

## 2021-02-25 RX ORDER — DULOXETIN HYDROCHLORIDE 30 MG/1
30 CAPSULE, DELAYED RELEASE ORAL DAILY
Qty: 30 CAPSULE | Refills: 11 | Status: SHIPPED | OUTPATIENT
Start: 2021-02-25 | End: 2021-10-08 | Stop reason: SDUPTHER

## 2021-02-25 RX ORDER — CAPSAICIN 0 G/G
1 CREAM TOPICAL 3 TIMES DAILY
Qty: 1 TUBE | Refills: 1 | Status: SHIPPED | OUTPATIENT
Start: 2021-02-25 | End: 2021-09-30

## 2021-04-02 ENCOUNTER — IMMUNIZATION (OUTPATIENT)
Dept: INTERNAL MEDICINE | Facility: CLINIC | Age: 71
End: 2021-04-02
Payer: MEDICARE

## 2021-04-02 DIAGNOSIS — Z23 NEED FOR VACCINATION: Primary | ICD-10-CM

## 2021-04-02 PROCEDURE — 91300 COVID-19, MRNA, LNP-S, PF, 30 MCG/0.3 ML DOSE VACCINE: CPT | Mod: HCNC,PBBFAC | Performed by: FAMILY MEDICINE

## 2021-04-08 ENCOUNTER — TELEPHONE (OUTPATIENT)
Dept: FAMILY MEDICINE | Facility: CLINIC | Age: 71
End: 2021-04-08

## 2021-04-08 ENCOUNTER — OFFICE VISIT (OUTPATIENT)
Dept: OPHTHALMOLOGY | Facility: CLINIC | Age: 71
End: 2021-04-08
Payer: MEDICARE

## 2021-04-08 ENCOUNTER — TELEPHONE (OUTPATIENT)
Dept: OPTOMETRY | Facility: CLINIC | Age: 71
End: 2021-04-08

## 2021-04-08 DIAGNOSIS — H11.32 SUBCONJUNCTIVAL HEMORRHAGE, LEFT: Primary | ICD-10-CM

## 2021-04-08 DIAGNOSIS — S05.92XA LEFT EYE INJURY, INITIAL ENCOUNTER: Primary | ICD-10-CM

## 2021-04-08 PROCEDURE — 1126F AMNT PAIN NOTED NONE PRSNT: CPT | Mod: S$GLB,,, | Performed by: OPTOMETRIST

## 2021-04-08 PROCEDURE — 1126F PR PAIN SEVERITY QUANTIFIED, NO PAIN PRESENT: ICD-10-PCS | Mod: S$GLB,,, | Performed by: OPTOMETRIST

## 2021-04-08 PROCEDURE — 99999 PR PBB SHADOW E&M-EST. PATIENT-LVL III: ICD-10-PCS | Mod: PBBFAC,,, | Performed by: OPTOMETRIST

## 2021-04-08 PROCEDURE — 99999 PR PBB SHADOW E&M-EST. PATIENT-LVL III: CPT | Mod: PBBFAC,,, | Performed by: OPTOMETRIST

## 2021-04-08 PROCEDURE — 92012 PR EYE EXAM, EST PATIENT,INTERMED: ICD-10-PCS | Mod: S$GLB,,, | Performed by: OPTOMETRIST

## 2021-04-08 PROCEDURE — 92012 INTRM OPH EXAM EST PATIENT: CPT | Mod: S$GLB,,, | Performed by: OPTOMETRIST

## 2021-04-08 RX ORDER — HYDROCODONE BITARTRATE AND ACETAMINOPHEN 5; 325 MG/1; MG/1
1 TABLET ORAL 3 TIMES DAILY
COMMUNITY
End: 2023-10-02

## 2021-04-15 ENCOUNTER — TELEPHONE (OUTPATIENT)
Dept: RADIOLOGY | Facility: HOSPITAL | Age: 71
End: 2021-04-15

## 2021-04-16 ENCOUNTER — HOSPITAL ENCOUNTER (OUTPATIENT)
Dept: RADIOLOGY | Facility: HOSPITAL | Age: 71
Discharge: HOME | End: 2021-04-16
Attending: FAMILY MEDICINE
Payer: MEDICARE

## 2021-04-16 DIAGNOSIS — Z13.6 SCREENING FOR AAA (ABDOMINAL AORTIC ANEURYSM): ICD-10-CM

## 2021-04-16 PROCEDURE — 76775 US EXAM ABDO BACK WALL LIM: CPT | Mod: 26,,, | Performed by: RADIOLOGY

## 2021-04-16 PROCEDURE — 76775 US ABDOMINAL AORTA: ICD-10-PCS | Mod: 26,,, | Performed by: RADIOLOGY

## 2021-04-16 PROCEDURE — 76775 US EXAM ABDO BACK WALL LIM: CPT | Mod: TC

## 2021-04-19 RX ORDER — LISINOPRIL 10 MG/1
TABLET ORAL
Qty: 90 TABLET | Refills: 0 | OUTPATIENT
Start: 2021-04-19

## 2021-04-23 ENCOUNTER — IMMUNIZATION (OUTPATIENT)
Dept: INTERNAL MEDICINE | Facility: CLINIC | Age: 71
End: 2021-04-23
Payer: MEDICARE

## 2021-04-23 DIAGNOSIS — Z23 NEED FOR VACCINATION: Primary | ICD-10-CM

## 2021-04-23 PROCEDURE — 91300 COVID-19, MRNA, LNP-S, PF, 30 MCG/0.3 ML DOSE VACCINE: CPT | Mod: HCNC,PBBFAC | Performed by: FAMILY MEDICINE

## 2021-04-23 PROCEDURE — 0002A COVID-19, MRNA, LNP-S, PF, 30 MCG/0.3 ML DOSE VACCINE: CPT | Mod: HCNC,PBBFAC | Performed by: FAMILY MEDICINE

## 2021-05-27 ENCOUNTER — LAB VISIT (OUTPATIENT)
Dept: LAB | Facility: HOSPITAL | Age: 71
End: 2021-05-27
Attending: FAMILY MEDICINE
Payer: MEDICARE

## 2021-05-27 DIAGNOSIS — N18.30 CONTROLLED TYPE 2 DIABETES MELLITUS WITH STAGE 3 CHRONIC KIDNEY DISEASE, WITHOUT LONG-TERM CURRENT USE OF INSULIN: ICD-10-CM

## 2021-05-27 DIAGNOSIS — E11.22 CONTROLLED TYPE 2 DIABETES MELLITUS WITH STAGE 3 CHRONIC KIDNEY DISEASE, WITHOUT LONG-TERM CURRENT USE OF INSULIN: ICD-10-CM

## 2021-05-27 LAB
BASOPHILS # BLD AUTO: 0.08 K/UL (ref 0–0.2)
BASOPHILS NFR BLD: 1 % (ref 0–1.9)
DIFFERENTIAL METHOD: ABNORMAL
EOSINOPHIL # BLD AUTO: 0.9 K/UL (ref 0–0.5)
EOSINOPHIL NFR BLD: 10.8 % (ref 0–8)
ERYTHROCYTE [DISTWIDTH] IN BLOOD BY AUTOMATED COUNT: 13.4 % (ref 11.5–14.5)
HCT VFR BLD AUTO: 37 % (ref 40–54)
HGB BLD-MCNC: 12.2 G/DL (ref 14–18)
IMM GRANULOCYTES # BLD AUTO: 0.02 K/UL (ref 0–0.04)
IMM GRANULOCYTES NFR BLD AUTO: 0.3 % (ref 0–0.5)
LYMPHOCYTES # BLD AUTO: 2.6 K/UL (ref 1–4.8)
LYMPHOCYTES NFR BLD: 32.7 % (ref 18–48)
MCH RBC QN AUTO: 32.5 PG (ref 27–31)
MCHC RBC AUTO-ENTMCNC: 33 G/DL (ref 32–36)
MCV RBC AUTO: 99 FL (ref 82–98)
MONOCYTES # BLD AUTO: 0.7 K/UL (ref 0.3–1)
MONOCYTES NFR BLD: 8.2 % (ref 4–15)
NEUTROPHILS # BLD AUTO: 3.7 K/UL (ref 1.8–7.7)
NEUTROPHILS NFR BLD: 47 % (ref 38–73)
NRBC BLD-RTO: 0 /100 WBC
PLATELET # BLD AUTO: 261 K/UL (ref 150–450)
PMV BLD AUTO: 10.6 FL (ref 9.2–12.9)
RBC # BLD AUTO: 3.75 M/UL (ref 4.6–6.2)
WBC # BLD AUTO: 7.93 K/UL (ref 3.9–12.7)

## 2021-05-27 PROCEDURE — 85025 COMPLETE CBC W/AUTO DIFF WBC: CPT | Performed by: FAMILY MEDICINE

## 2021-05-27 PROCEDURE — 83036 HEMOGLOBIN GLYCOSYLATED A1C: CPT | Performed by: FAMILY MEDICINE

## 2021-05-27 PROCEDURE — 80053 COMPREHEN METABOLIC PANEL: CPT | Performed by: FAMILY MEDICINE

## 2021-05-27 PROCEDURE — 36415 COLL VENOUS BLD VENIPUNCTURE: CPT | Mod: PO | Performed by: FAMILY MEDICINE

## 2021-05-27 PROCEDURE — 80061 LIPID PANEL: CPT | Performed by: FAMILY MEDICINE

## 2021-05-28 LAB
ALBUMIN SERPL BCP-MCNC: 3.6 G/DL (ref 3.5–5.2)
ALP SERPL-CCNC: 103 U/L (ref 55–135)
ALT SERPL W/O P-5'-P-CCNC: 12 U/L (ref 10–44)
ANION GAP SERPL CALC-SCNC: 16 MMOL/L (ref 8–16)
AST SERPL-CCNC: 19 U/L (ref 10–40)
BILIRUB SERPL-MCNC: 0.2 MG/DL (ref 0.1–1)
BUN SERPL-MCNC: 18 MG/DL (ref 8–23)
CALCIUM SERPL-MCNC: 9.8 MG/DL (ref 8.7–10.5)
CHLORIDE SERPL-SCNC: 107 MMOL/L (ref 95–110)
CHOLEST SERPL-MCNC: 132 MG/DL (ref 120–199)
CHOLEST/HDLC SERPL: 3.7 {RATIO} (ref 2–5)
CO2 SERPL-SCNC: 18 MMOL/L (ref 23–29)
CREAT SERPL-MCNC: 1.5 MG/DL (ref 0.5–1.4)
EST. GFR  (AFRICAN AMERICAN): 54.1 ML/MIN/1.73 M^2
EST. GFR  (NON AFRICAN AMERICAN): 46.8 ML/MIN/1.73 M^2
ESTIMATED AVG GLUCOSE: 117 MG/DL (ref 68–131)
GLUCOSE SERPL-MCNC: 99 MG/DL (ref 70–110)
HBA1C MFR BLD: 5.7 % (ref 4–5.6)
HDLC SERPL-MCNC: 36 MG/DL (ref 40–75)
HDLC SERPL: 27.3 % (ref 20–50)
LDLC SERPL CALC-MCNC: 64.4 MG/DL (ref 63–159)
NONHDLC SERPL-MCNC: 96 MG/DL
POTASSIUM SERPL-SCNC: 4.3 MMOL/L (ref 3.5–5.1)
PROT SERPL-MCNC: 6.8 G/DL (ref 6–8.4)
SODIUM SERPL-SCNC: 141 MMOL/L (ref 136–145)
TRIGL SERPL-MCNC: 158 MG/DL (ref 30–150)

## 2021-06-03 ENCOUNTER — OFFICE VISIT (OUTPATIENT)
Dept: FAMILY MEDICINE | Facility: CLINIC | Age: 71
End: 2021-06-03
Payer: MEDICARE

## 2021-06-03 VITALS
WEIGHT: 146.06 LBS | DIASTOLIC BLOOD PRESSURE: 74 MMHG | TEMPERATURE: 98 F | HEART RATE: 70 BPM | OXYGEN SATURATION: 98 % | BODY MASS INDEX: 23.47 KG/M2 | SYSTOLIC BLOOD PRESSURE: 122 MMHG | HEIGHT: 66 IN

## 2021-06-03 DIAGNOSIS — E78.2 MIXED HYPERLIPIDEMIA: ICD-10-CM

## 2021-06-03 DIAGNOSIS — I69.30 HISTORY OF CVA WITH RESIDUAL DEFICIT: ICD-10-CM

## 2021-06-03 DIAGNOSIS — F17.200 SMOKING: ICD-10-CM

## 2021-06-03 DIAGNOSIS — I73.9 PAD (PERIPHERAL ARTERY DISEASE): ICD-10-CM

## 2021-06-03 DIAGNOSIS — F17.210 NICOTINE DEPENDENCE, CIGARETTES, UNCOMPLICATED: ICD-10-CM

## 2021-06-03 DIAGNOSIS — E11.22 CONTROLLED TYPE 2 DIABETES MELLITUS WITH STAGE 3 CHRONIC KIDNEY DISEASE, WITHOUT LONG-TERM CURRENT USE OF INSULIN: Primary | ICD-10-CM

## 2021-06-03 DIAGNOSIS — N18.30 CONTROLLED TYPE 2 DIABETES MELLITUS WITH STAGE 3 CHRONIC KIDNEY DISEASE, WITHOUT LONG-TERM CURRENT USE OF INSULIN: Primary | ICD-10-CM

## 2021-06-03 DIAGNOSIS — Z12.2 ENCOUNTER FOR SCREENING FOR LUNG CANCER: ICD-10-CM

## 2021-06-03 DIAGNOSIS — G47.00 INSOMNIA, UNSPECIFIED TYPE: ICD-10-CM

## 2021-06-03 PROCEDURE — 99999 PR PBB SHADOW E&M-EST. PATIENT-LVL V: CPT | Mod: PBBFAC,,, | Performed by: FAMILY MEDICINE

## 2021-06-03 PROCEDURE — 3008F PR BODY MASS INDEX (BMI) DOCUMENTED: ICD-10-PCS | Mod: CPTII,S$GLB,, | Performed by: FAMILY MEDICINE

## 2021-06-03 PROCEDURE — 99499 UNLISTED E&M SERVICE: CPT | Mod: HCNC,S$GLB,, | Performed by: FAMILY MEDICINE

## 2021-06-03 PROCEDURE — 3288F FALL RISK ASSESSMENT DOCD: CPT | Mod: CPTII,S$GLB,, | Performed by: FAMILY MEDICINE

## 2021-06-03 PROCEDURE — 1101F PR PT FALLS ASSESS DOC 0-1 FALLS W/OUT INJ PAST YR: ICD-10-PCS | Mod: CPTII,S$GLB,, | Performed by: FAMILY MEDICINE

## 2021-06-03 PROCEDURE — G0009 PNEUMOCOCCAL CONJUGATE VACCINE 13-VALENT LESS THAN 5YO & GREATER THAN: ICD-10-PCS | Mod: S$GLB,,, | Performed by: FAMILY MEDICINE

## 2021-06-03 PROCEDURE — 3288F PR FALLS RISK ASSESSMENT DOCUMENTED: ICD-10-PCS | Mod: CPTII,S$GLB,, | Performed by: FAMILY MEDICINE

## 2021-06-03 PROCEDURE — G0009 ADMIN PNEUMOCOCCAL VACCINE: HCPCS | Mod: S$GLB,,, | Performed by: FAMILY MEDICINE

## 2021-06-03 PROCEDURE — 99999 PR PBB SHADOW E&M-EST. PATIENT-LVL V: ICD-10-PCS | Mod: PBBFAC,,, | Performed by: FAMILY MEDICINE

## 2021-06-03 PROCEDURE — 1159F PR MEDICATION LIST DOCUMENTED IN MEDICAL RECORD: ICD-10-PCS | Mod: S$GLB,,, | Performed by: FAMILY MEDICINE

## 2021-06-03 PROCEDURE — 3044F PR MOST RECENT HEMOGLOBIN A1C LEVEL <7.0%: ICD-10-PCS | Mod: CPTII,S$GLB,, | Performed by: FAMILY MEDICINE

## 2021-06-03 PROCEDURE — 90670 PCV13 VACCINE IM: CPT | Mod: S$GLB,,, | Performed by: FAMILY MEDICINE

## 2021-06-03 PROCEDURE — 1126F AMNT PAIN NOTED NONE PRSNT: CPT | Mod: S$GLB,,, | Performed by: FAMILY MEDICINE

## 2021-06-03 PROCEDURE — 90670 PNEUMOCOCCAL CONJUGATE VACCINE 13-VALENT LESS THAN 5YO & GREATER THAN: ICD-10-PCS | Mod: S$GLB,,, | Performed by: FAMILY MEDICINE

## 2021-06-03 PROCEDURE — 1159F MED LIST DOCD IN RCRD: CPT | Mod: S$GLB,,, | Performed by: FAMILY MEDICINE

## 2021-06-03 PROCEDURE — 1126F PR PAIN SEVERITY QUANTIFIED, NO PAIN PRESENT: ICD-10-PCS | Mod: S$GLB,,, | Performed by: FAMILY MEDICINE

## 2021-06-03 PROCEDURE — 99214 OFFICE O/P EST MOD 30 MIN: CPT | Mod: 25,S$GLB,, | Performed by: FAMILY MEDICINE

## 2021-06-03 PROCEDURE — 1101F PT FALLS ASSESS-DOCD LE1/YR: CPT | Mod: CPTII,S$GLB,, | Performed by: FAMILY MEDICINE

## 2021-06-03 PROCEDURE — 99499 RISK ADDL DX/OHS AUDIT: ICD-10-PCS | Mod: HCNC,S$GLB,, | Performed by: FAMILY MEDICINE

## 2021-06-03 PROCEDURE — 3044F HG A1C LEVEL LT 7.0%: CPT | Mod: CPTII,S$GLB,, | Performed by: FAMILY MEDICINE

## 2021-06-03 PROCEDURE — 99214 PR OFFICE/OUTPT VISIT, EST, LEVL IV, 30-39 MIN: ICD-10-PCS | Mod: 25,S$GLB,, | Performed by: FAMILY MEDICINE

## 2021-06-03 PROCEDURE — 3008F BODY MASS INDEX DOCD: CPT | Mod: CPTII,S$GLB,, | Performed by: FAMILY MEDICINE

## 2021-06-07 ENCOUNTER — TELEPHONE (OUTPATIENT)
Dept: FAMILY MEDICINE | Facility: CLINIC | Age: 71
End: 2021-06-07

## 2021-06-07 RX ORDER — ZOLPIDEM TARTRATE 10 MG/1
10 TABLET ORAL NIGHTLY PRN
Qty: 30 TABLET | Refills: 5 | Status: SHIPPED | OUTPATIENT
Start: 2021-06-07 | End: 2021-10-08 | Stop reason: SDUPTHER

## 2021-07-21 ENCOUNTER — OFFICE VISIT (OUTPATIENT)
Dept: OPHTHALMOLOGY | Facility: CLINIC | Age: 71
End: 2021-07-21
Payer: MEDICARE

## 2021-07-21 ENCOUNTER — TELEPHONE (OUTPATIENT)
Dept: OPHTHALMOLOGY | Facility: CLINIC | Age: 71
End: 2021-07-21

## 2021-07-21 DIAGNOSIS — H43.811 POSTERIOR VITREOUS DETACHMENT OF RIGHT EYE: Primary | ICD-10-CM

## 2021-07-21 DIAGNOSIS — Z96.1 PSEUDOPHAKIA OF BOTH EYES: ICD-10-CM

## 2021-07-21 PROCEDURE — 2023F PR DILATED RETINAL EXAM W/O EVID OF RETINOPATHY: ICD-10-PCS | Mod: CPTII,S$GLB,, | Performed by: OPTOMETRIST

## 2021-07-21 PROCEDURE — 92014 COMPRE OPH EXAM EST PT 1/>: CPT | Mod: S$GLB,,, | Performed by: OPTOMETRIST

## 2021-07-21 PROCEDURE — 92134 CPTRZ OPH DX IMG PST SGM RTA: CPT | Mod: S$GLB,,, | Performed by: OPTOMETRIST

## 2021-07-21 PROCEDURE — 99999 PR PBB SHADOW E&M-EST. PATIENT-LVL III: ICD-10-PCS | Mod: PBBFAC,,, | Performed by: OPTOMETRIST

## 2021-07-21 PROCEDURE — 2023F DILAT RTA XM W/O RTNOPTHY: CPT | Mod: CPTII,S$GLB,, | Performed by: OPTOMETRIST

## 2021-07-21 PROCEDURE — 92134 OCT, RETINA - OU - BOTH EYES: ICD-10-PCS | Mod: S$GLB,,, | Performed by: OPTOMETRIST

## 2021-07-21 PROCEDURE — 3044F PR MOST RECENT HEMOGLOBIN A1C LEVEL <7.0%: ICD-10-PCS | Mod: CPTII,S$GLB,, | Performed by: OPTOMETRIST

## 2021-07-21 PROCEDURE — 99999 PR PBB SHADOW E&M-EST. PATIENT-LVL III: CPT | Mod: PBBFAC,,, | Performed by: OPTOMETRIST

## 2021-07-21 PROCEDURE — 3044F HG A1C LEVEL LT 7.0%: CPT | Mod: CPTII,S$GLB,, | Performed by: OPTOMETRIST

## 2021-07-21 PROCEDURE — 99499 RISK ADDL DX/OHS AUDIT: ICD-10-PCS | Mod: HCNC,S$GLB,, | Performed by: OPTOMETRIST

## 2021-07-21 PROCEDURE — 92014 PR EYE EXAM, EST PATIENT,COMPREHESV: ICD-10-PCS | Mod: S$GLB,,, | Performed by: OPTOMETRIST

## 2021-07-21 PROCEDURE — 99499 UNLISTED E&M SERVICE: CPT | Mod: HCNC,S$GLB,, | Performed by: OPTOMETRIST

## 2021-08-01 RX ORDER — TRIAMCINOLONE ACETONIDE 1 MG/G
OINTMENT TOPICAL 2 TIMES DAILY
Qty: 15 G | Refills: 0 | Status: SHIPPED | OUTPATIENT
Start: 2021-08-01 | End: 2021-09-30

## 2021-09-03 RX ORDER — FLUOXETINE 10 MG/1
20 CAPSULE ORAL DAILY
Qty: 180 CAPSULE | Refills: 1 | Status: SHIPPED | OUTPATIENT
Start: 2021-09-03 | End: 2021-10-08 | Stop reason: SDUPTHER

## 2021-09-30 ENCOUNTER — OFFICE VISIT (OUTPATIENT)
Dept: FAMILY MEDICINE | Facility: CLINIC | Age: 71
End: 2021-09-30
Payer: MEDICARE

## 2021-09-30 VITALS
SYSTOLIC BLOOD PRESSURE: 126 MMHG | DIASTOLIC BLOOD PRESSURE: 78 MMHG | WEIGHT: 142.63 LBS | HEIGHT: 66 IN | OXYGEN SATURATION: 98 % | BODY MASS INDEX: 22.92 KG/M2 | TEMPERATURE: 98 F | HEART RATE: 88 BPM

## 2021-09-30 DIAGNOSIS — Z91.09 ALLERGY TO MITES: Primary | ICD-10-CM

## 2021-09-30 PROCEDURE — 3061F PR NEG MICROALBUMINURIA RESULT DOCUMENTED/REVIEW: ICD-10-PCS | Mod: HCNC,CPTII,S$GLB, | Performed by: NURSE PRACTITIONER

## 2021-09-30 PROCEDURE — 3066F NEPHROPATHY DOC TX: CPT | Mod: HCNC,CPTII,S$GLB, | Performed by: NURSE PRACTITIONER

## 2021-09-30 PROCEDURE — 1101F PR PT FALLS ASSESS DOC 0-1 FALLS W/OUT INJ PAST YR: ICD-10-PCS | Mod: HCNC,CPTII,S$GLB, | Performed by: NURSE PRACTITIONER

## 2021-09-30 PROCEDURE — 96372 THER/PROPH/DIAG INJ SC/IM: CPT | Mod: HCNC,S$GLB,, | Performed by: NURSE PRACTITIONER

## 2021-09-30 PROCEDURE — 3074F SYST BP LT 130 MM HG: CPT | Mod: HCNC,CPTII,S$GLB, | Performed by: NURSE PRACTITIONER

## 2021-09-30 PROCEDURE — 1125F PR PAIN SEVERITY QUANTIFIED, PAIN PRESENT: ICD-10-PCS | Mod: HCNC,CPTII,S$GLB, | Performed by: NURSE PRACTITIONER

## 2021-09-30 PROCEDURE — 1159F PR MEDICATION LIST DOCUMENTED IN MEDICAL RECORD: ICD-10-PCS | Mod: HCNC,CPTII,S$GLB, | Performed by: NURSE PRACTITIONER

## 2021-09-30 PROCEDURE — 99213 OFFICE O/P EST LOW 20 MIN: CPT | Mod: 25,HCNC,S$GLB, | Performed by: NURSE PRACTITIONER

## 2021-09-30 PROCEDURE — 99213 PR OFFICE/OUTPT VISIT, EST, LEVL III, 20-29 MIN: ICD-10-PCS | Mod: 25,HCNC,S$GLB, | Performed by: NURSE PRACTITIONER

## 2021-09-30 PROCEDURE — 4010F PR ACE/ARB THEARPY RXD/TAKEN: ICD-10-PCS | Mod: HCNC,CPTII,S$GLB, | Performed by: NURSE PRACTITIONER

## 2021-09-30 PROCEDURE — 3044F HG A1C LEVEL LT 7.0%: CPT | Mod: HCNC,CPTII,S$GLB, | Performed by: NURSE PRACTITIONER

## 2021-09-30 PROCEDURE — 3008F BODY MASS INDEX DOCD: CPT | Mod: HCNC,CPTII,S$GLB, | Performed by: NURSE PRACTITIONER

## 2021-09-30 PROCEDURE — 1159F MED LIST DOCD IN RCRD: CPT | Mod: HCNC,CPTII,S$GLB, | Performed by: NURSE PRACTITIONER

## 2021-09-30 PROCEDURE — 1125F AMNT PAIN NOTED PAIN PRSNT: CPT | Mod: HCNC,CPTII,S$GLB, | Performed by: NURSE PRACTITIONER

## 2021-09-30 PROCEDURE — 99999 PR PBB SHADOW E&M-EST. PATIENT-LVL IV: CPT | Mod: PBBFAC,HCNC,, | Performed by: NURSE PRACTITIONER

## 2021-09-30 PROCEDURE — 3066F PR DOCUMENTATION OF TREATMENT FOR NEPHROPATHY: ICD-10-PCS | Mod: HCNC,CPTII,S$GLB, | Performed by: NURSE PRACTITIONER

## 2021-09-30 PROCEDURE — 3061F NEG MICROALBUMINURIA REV: CPT | Mod: HCNC,CPTII,S$GLB, | Performed by: NURSE PRACTITIONER

## 2021-09-30 PROCEDURE — 3078F PR MOST RECENT DIASTOLIC BLOOD PRESSURE < 80 MM HG: ICD-10-PCS | Mod: HCNC,CPTII,S$GLB, | Performed by: NURSE PRACTITIONER

## 2021-09-30 PROCEDURE — 3044F PR MOST RECENT HEMOGLOBIN A1C LEVEL <7.0%: ICD-10-PCS | Mod: HCNC,CPTII,S$GLB, | Performed by: NURSE PRACTITIONER

## 2021-09-30 PROCEDURE — 3078F DIAST BP <80 MM HG: CPT | Mod: HCNC,CPTII,S$GLB, | Performed by: NURSE PRACTITIONER

## 2021-09-30 PROCEDURE — 3288F FALL RISK ASSESSMENT DOCD: CPT | Mod: HCNC,CPTII,S$GLB, | Performed by: NURSE PRACTITIONER

## 2021-09-30 PROCEDURE — 99999 PR PBB SHADOW E&M-EST. PATIENT-LVL IV: ICD-10-PCS | Mod: PBBFAC,HCNC,, | Performed by: NURSE PRACTITIONER

## 2021-09-30 PROCEDURE — 3074F PR MOST RECENT SYSTOLIC BLOOD PRESSURE < 130 MM HG: ICD-10-PCS | Mod: HCNC,CPTII,S$GLB, | Performed by: NURSE PRACTITIONER

## 2021-09-30 PROCEDURE — 1101F PT FALLS ASSESS-DOCD LE1/YR: CPT | Mod: HCNC,CPTII,S$GLB, | Performed by: NURSE PRACTITIONER

## 2021-09-30 PROCEDURE — 3288F PR FALLS RISK ASSESSMENT DOCUMENTED: ICD-10-PCS | Mod: HCNC,CPTII,S$GLB, | Performed by: NURSE PRACTITIONER

## 2021-09-30 PROCEDURE — 3008F PR BODY MASS INDEX (BMI) DOCUMENTED: ICD-10-PCS | Mod: HCNC,CPTII,S$GLB, | Performed by: NURSE PRACTITIONER

## 2021-09-30 PROCEDURE — 96372 PR INJECTION,THERAP/PROPH/DIAG2ST, IM OR SUBCUT: ICD-10-PCS | Mod: HCNC,S$GLB,, | Performed by: NURSE PRACTITIONER

## 2021-09-30 PROCEDURE — 4010F ACE/ARB THERAPY RXD/TAKEN: CPT | Mod: HCNC,CPTII,S$GLB, | Performed by: NURSE PRACTITIONER

## 2021-09-30 RX ORDER — PERMETHRIN 50 MG/G
CREAM TOPICAL
Qty: 60 G | Refills: 0 | Status: SHIPPED | OUTPATIENT
Start: 2021-09-30 | End: 2021-10-29

## 2021-09-30 RX ORDER — METHYLPREDNISOLONE ACETATE 80 MG/ML
80 INJECTION, SUSPENSION INTRA-ARTICULAR; INTRALESIONAL; INTRAMUSCULAR; SOFT TISSUE
Status: COMPLETED | OUTPATIENT
Start: 2021-09-30 | End: 2021-09-30

## 2021-09-30 RX ORDER — HYDROXYZINE HYDROCHLORIDE 10 MG/1
10 TABLET, FILM COATED ORAL 3 TIMES DAILY PRN
Qty: 30 TABLET | Refills: 0 | Status: SHIPPED | OUTPATIENT
Start: 2021-09-30 | End: 2021-10-29 | Stop reason: SDUPTHER

## 2021-09-30 RX ADMIN — METHYLPREDNISOLONE ACETATE 80 MG: 80 INJECTION, SUSPENSION INTRA-ARTICULAR; INTRALESIONAL; INTRAMUSCULAR; SOFT TISSUE at 01:09

## 2021-10-08 DIAGNOSIS — M54.41 CHRONIC BILATERAL LOW BACK PAIN WITH RIGHT-SIDED SCIATICA: ICD-10-CM

## 2021-10-08 DIAGNOSIS — E11.42 DM TYPE 2 WITH DIABETIC PERIPHERAL NEUROPATHY: ICD-10-CM

## 2021-10-08 DIAGNOSIS — G89.29 CHRONIC BILATERAL LOW BACK PAIN WITH RIGHT-SIDED SCIATICA: ICD-10-CM

## 2021-10-08 RX ORDER — ZOLPIDEM TARTRATE 10 MG/1
10 TABLET ORAL NIGHTLY PRN
Qty: 30 TABLET | Refills: 5 | Status: SHIPPED | OUTPATIENT
Start: 2021-10-08 | End: 2022-01-05 | Stop reason: SDUPTHER

## 2021-10-08 RX ORDER — TAMSULOSIN HYDROCHLORIDE 0.4 MG/1
1 CAPSULE ORAL DAILY
Qty: 90 CAPSULE | Refills: 1 | Status: SHIPPED | OUTPATIENT
Start: 2021-10-08 | End: 2022-01-05 | Stop reason: SDUPTHER

## 2021-10-08 RX ORDER — TIZANIDINE 4 MG/1
TABLET ORAL
Qty: 270 TABLET | Refills: 1 | Status: SHIPPED | OUTPATIENT
Start: 2021-10-08 | End: 2022-01-05 | Stop reason: SDUPTHER

## 2021-10-08 RX ORDER — CLOPIDOGREL BISULFATE 75 MG/1
75 TABLET ORAL DAILY
Qty: 90 TABLET | Refills: 1 | Status: SHIPPED | OUTPATIENT
Start: 2021-10-08 | End: 2022-01-05 | Stop reason: SDUPTHER

## 2021-10-08 RX ORDER — METFORMIN HYDROCHLORIDE 500 MG/1
500 TABLET ORAL 2 TIMES DAILY
Qty: 180 TABLET | Refills: 1 | Status: SHIPPED | OUTPATIENT
Start: 2021-10-08 | End: 2022-01-05 | Stop reason: SDUPTHER

## 2021-10-08 RX ORDER — GABAPENTIN 300 MG/1
300 CAPSULE ORAL 3 TIMES DAILY PRN
Qty: 90 CAPSULE | Refills: 5 | Status: SHIPPED | OUTPATIENT
Start: 2021-10-08 | End: 2022-01-05 | Stop reason: SDUPTHER

## 2021-10-08 RX ORDER — ROSUVASTATIN CALCIUM 20 MG/1
20 TABLET, COATED ORAL DAILY
Qty: 90 TABLET | Refills: 3 | Status: SHIPPED | OUTPATIENT
Start: 2021-10-08 | End: 2022-01-05 | Stop reason: SDUPTHER

## 2021-10-08 RX ORDER — FLUOXETINE 10 MG/1
20 CAPSULE ORAL DAILY
Qty: 180 CAPSULE | Refills: 1 | Status: SHIPPED | OUTPATIENT
Start: 2021-10-08 | End: 2022-01-05 | Stop reason: SDUPTHER

## 2021-10-08 RX ORDER — DULOXETIN HYDROCHLORIDE 30 MG/1
30 CAPSULE, DELAYED RELEASE ORAL DAILY
Qty: 90 CAPSULE | Refills: 1 | Status: SHIPPED | OUTPATIENT
Start: 2021-10-08 | End: 2022-01-05 | Stop reason: SDUPTHER

## 2021-10-29 ENCOUNTER — OFFICE VISIT (OUTPATIENT)
Dept: FAMILY MEDICINE | Facility: CLINIC | Age: 71
End: 2021-10-29
Payer: MEDICARE

## 2021-10-29 VITALS
TEMPERATURE: 98 F | WEIGHT: 145.94 LBS | BODY MASS INDEX: 23.46 KG/M2 | SYSTOLIC BLOOD PRESSURE: 144 MMHG | HEART RATE: 53 BPM | DIASTOLIC BLOOD PRESSURE: 92 MMHG | HEIGHT: 66 IN | OXYGEN SATURATION: 98 %

## 2021-10-29 DIAGNOSIS — L30.9 DERMATITIS: Primary | ICD-10-CM

## 2021-10-29 PROCEDURE — 4010F PR ACE/ARB THEARPY RXD/TAKEN: ICD-10-PCS | Mod: HCNC,CPTII,S$GLB, | Performed by: FAMILY MEDICINE

## 2021-10-29 PROCEDURE — 1101F PT FALLS ASSESS-DOCD LE1/YR: CPT | Mod: HCNC,CPTII,S$GLB, | Performed by: FAMILY MEDICINE

## 2021-10-29 PROCEDURE — 3288F PR FALLS RISK ASSESSMENT DOCUMENTED: ICD-10-PCS | Mod: HCNC,CPTII,S$GLB, | Performed by: FAMILY MEDICINE

## 2021-10-29 PROCEDURE — 1126F AMNT PAIN NOTED NONE PRSNT: CPT | Mod: HCNC,CPTII,S$GLB, | Performed by: FAMILY MEDICINE

## 2021-10-29 PROCEDURE — 4010F ACE/ARB THERAPY RXD/TAKEN: CPT | Mod: HCNC,CPTII,S$GLB, | Performed by: FAMILY MEDICINE

## 2021-10-29 PROCEDURE — 3008F PR BODY MASS INDEX (BMI) DOCUMENTED: ICD-10-PCS | Mod: HCNC,CPTII,S$GLB, | Performed by: FAMILY MEDICINE

## 2021-10-29 PROCEDURE — 1159F MED LIST DOCD IN RCRD: CPT | Mod: HCNC,CPTII,S$GLB, | Performed by: FAMILY MEDICINE

## 2021-10-29 PROCEDURE — 1126F PR PAIN SEVERITY QUANTIFIED, NO PAIN PRESENT: ICD-10-PCS | Mod: HCNC,CPTII,S$GLB, | Performed by: FAMILY MEDICINE

## 2021-10-29 PROCEDURE — 99213 PR OFFICE/OUTPT VISIT, EST, LEVL III, 20-29 MIN: ICD-10-PCS | Mod: HCNC,S$GLB,, | Performed by: FAMILY MEDICINE

## 2021-10-29 PROCEDURE — 3077F PR MOST RECENT SYSTOLIC BLOOD PRESSURE >= 140 MM HG: ICD-10-PCS | Mod: HCNC,CPTII,S$GLB, | Performed by: FAMILY MEDICINE

## 2021-10-29 PROCEDURE — 3066F PR DOCUMENTATION OF TREATMENT FOR NEPHROPATHY: ICD-10-PCS | Mod: HCNC,CPTII,S$GLB, | Performed by: FAMILY MEDICINE

## 2021-10-29 PROCEDURE — 1101F PR PT FALLS ASSESS DOC 0-1 FALLS W/OUT INJ PAST YR: ICD-10-PCS | Mod: HCNC,CPTII,S$GLB, | Performed by: FAMILY MEDICINE

## 2021-10-29 PROCEDURE — 3061F PR NEG MICROALBUMINURIA RESULT DOCUMENTED/REVIEW: ICD-10-PCS | Mod: HCNC,CPTII,S$GLB, | Performed by: FAMILY MEDICINE

## 2021-10-29 PROCEDURE — 3288F FALL RISK ASSESSMENT DOCD: CPT | Mod: HCNC,CPTII,S$GLB, | Performed by: FAMILY MEDICINE

## 2021-10-29 PROCEDURE — 3080F DIAST BP >= 90 MM HG: CPT | Mod: HCNC,CPTII,S$GLB, | Performed by: FAMILY MEDICINE

## 2021-10-29 PROCEDURE — 3077F SYST BP >= 140 MM HG: CPT | Mod: HCNC,CPTII,S$GLB, | Performed by: FAMILY MEDICINE

## 2021-10-29 PROCEDURE — 3008F BODY MASS INDEX DOCD: CPT | Mod: HCNC,CPTII,S$GLB, | Performed by: FAMILY MEDICINE

## 2021-10-29 PROCEDURE — 3080F PR MOST RECENT DIASTOLIC BLOOD PRESSURE >= 90 MM HG: ICD-10-PCS | Mod: HCNC,CPTII,S$GLB, | Performed by: FAMILY MEDICINE

## 2021-10-29 PROCEDURE — 99999 PR PBB SHADOW E&M-EST. PATIENT-LVL IV: ICD-10-PCS | Mod: PBBFAC,HCNC,, | Performed by: FAMILY MEDICINE

## 2021-10-29 PROCEDURE — 3044F HG A1C LEVEL LT 7.0%: CPT | Mod: HCNC,CPTII,S$GLB, | Performed by: FAMILY MEDICINE

## 2021-10-29 PROCEDURE — 99213 OFFICE O/P EST LOW 20 MIN: CPT | Mod: HCNC,S$GLB,, | Performed by: FAMILY MEDICINE

## 2021-10-29 PROCEDURE — 3061F NEG MICROALBUMINURIA REV: CPT | Mod: HCNC,CPTII,S$GLB, | Performed by: FAMILY MEDICINE

## 2021-10-29 PROCEDURE — 3044F PR MOST RECENT HEMOGLOBIN A1C LEVEL <7.0%: ICD-10-PCS | Mod: HCNC,CPTII,S$GLB, | Performed by: FAMILY MEDICINE

## 2021-10-29 PROCEDURE — 99999 PR PBB SHADOW E&M-EST. PATIENT-LVL IV: CPT | Mod: PBBFAC,HCNC,, | Performed by: FAMILY MEDICINE

## 2021-10-29 PROCEDURE — 3066F NEPHROPATHY DOC TX: CPT | Mod: HCNC,CPTII,S$GLB, | Performed by: FAMILY MEDICINE

## 2021-10-29 PROCEDURE — 1159F PR MEDICATION LIST DOCUMENTED IN MEDICAL RECORD: ICD-10-PCS | Mod: HCNC,CPTII,S$GLB, | Performed by: FAMILY MEDICINE

## 2021-10-29 RX ORDER — PREDNISONE 50 MG/1
50 TABLET ORAL DAILY
Qty: 7 TABLET | Refills: 0 | Status: SHIPPED | OUTPATIENT
Start: 2021-10-29 | End: 2021-11-05

## 2021-10-29 RX ORDER — TRIAMCINOLONE ACETONIDE 1 MG/G
CREAM TOPICAL 2 TIMES DAILY
Qty: 80 G | Refills: 1 | Status: SHIPPED | OUTPATIENT
Start: 2021-10-29 | End: 2022-02-09

## 2021-10-29 RX ORDER — HYDROXYZINE HYDROCHLORIDE 10 MG/1
10 TABLET, FILM COATED ORAL 3 TIMES DAILY PRN
Qty: 30 TABLET | Refills: 0 | Status: SHIPPED | OUTPATIENT
Start: 2021-10-29 | End: 2022-02-09

## 2021-11-26 ENCOUNTER — TELEPHONE (OUTPATIENT)
Dept: INTERNAL MEDICINE | Facility: CLINIC | Age: 71
End: 2021-11-26
Payer: MEDICARE

## 2021-12-01 ENCOUNTER — TELEPHONE (OUTPATIENT)
Dept: FAMILY MEDICINE | Facility: CLINIC | Age: 71
End: 2021-12-01
Payer: MEDICARE

## 2021-12-01 DIAGNOSIS — E11.22 CONTROLLED TYPE 2 DIABETES MELLITUS WITH STAGE 3 CHRONIC KIDNEY DISEASE, WITHOUT LONG-TERM CURRENT USE OF INSULIN: ICD-10-CM

## 2021-12-01 DIAGNOSIS — I69.30 HISTORY OF CVA WITH RESIDUAL DEFICIT: ICD-10-CM

## 2021-12-01 DIAGNOSIS — E78.2 MIXED HYPERLIPIDEMIA: Primary | ICD-10-CM

## 2021-12-01 DIAGNOSIS — N18.30 CONTROLLED TYPE 2 DIABETES MELLITUS WITH STAGE 3 CHRONIC KIDNEY DISEASE, WITHOUT LONG-TERM CURRENT USE OF INSULIN: ICD-10-CM

## 2021-12-02 ENCOUNTER — PATIENT MESSAGE (OUTPATIENT)
Dept: ADMINISTRATIVE | Facility: HOSPITAL | Age: 71
End: 2021-12-02
Payer: MEDICARE

## 2021-12-15 ENCOUNTER — TELEPHONE (OUTPATIENT)
Dept: FAMILY MEDICINE | Facility: CLINIC | Age: 71
End: 2021-12-15
Payer: MEDICARE

## 2022-01-03 ENCOUNTER — LAB VISIT (OUTPATIENT)
Dept: LAB | Facility: HOSPITAL | Age: 72
End: 2022-01-03
Attending: FAMILY MEDICINE
Payer: MEDICARE

## 2022-01-03 DIAGNOSIS — E11.22 CONTROLLED TYPE 2 DIABETES MELLITUS WITH STAGE 3 CHRONIC KIDNEY DISEASE, WITHOUT LONG-TERM CURRENT USE OF INSULIN: ICD-10-CM

## 2022-01-03 DIAGNOSIS — E78.2 MIXED HYPERLIPIDEMIA: ICD-10-CM

## 2022-01-03 DIAGNOSIS — I69.30 HISTORY OF CVA WITH RESIDUAL DEFICIT: ICD-10-CM

## 2022-01-03 DIAGNOSIS — N18.30 CONTROLLED TYPE 2 DIABETES MELLITUS WITH STAGE 3 CHRONIC KIDNEY DISEASE, WITHOUT LONG-TERM CURRENT USE OF INSULIN: ICD-10-CM

## 2022-01-03 PROCEDURE — 36415 COLL VENOUS BLD VENIPUNCTURE: CPT | Mod: HCNC,PO | Performed by: FAMILY MEDICINE

## 2022-01-03 PROCEDURE — 84153 ASSAY OF PSA TOTAL: CPT | Mod: HCNC | Performed by: FAMILY MEDICINE

## 2022-01-03 PROCEDURE — 83036 HEMOGLOBIN GLYCOSYLATED A1C: CPT | Mod: HCNC | Performed by: FAMILY MEDICINE

## 2022-01-03 PROCEDURE — 85025 COMPLETE CBC W/AUTO DIFF WBC: CPT | Mod: HCNC | Performed by: FAMILY MEDICINE

## 2022-01-03 PROCEDURE — 80053 COMPREHEN METABOLIC PANEL: CPT | Mod: HCNC | Performed by: FAMILY MEDICINE

## 2022-01-03 PROCEDURE — 80061 LIPID PANEL: CPT | Mod: HCNC | Performed by: FAMILY MEDICINE

## 2022-01-04 LAB
ALBUMIN SERPL BCP-MCNC: 3.2 G/DL (ref 3.5–5.2)
ALP SERPL-CCNC: 109 U/L (ref 55–135)
ALT SERPL W/O P-5'-P-CCNC: 18 U/L (ref 10–44)
ANION GAP SERPL CALC-SCNC: 8 MMOL/L (ref 8–16)
AST SERPL-CCNC: 32 U/L (ref 10–40)
BASOPHILS # BLD AUTO: 0.12 K/UL (ref 0–0.2)
BASOPHILS NFR BLD: 1.4 % (ref 0–1.9)
BILIRUB SERPL-MCNC: 0.2 MG/DL (ref 0.1–1)
BUN SERPL-MCNC: 14 MG/DL (ref 8–23)
CALCIUM SERPL-MCNC: 9.3 MG/DL (ref 8.7–10.5)
CHLORIDE SERPL-SCNC: 107 MMOL/L (ref 95–110)
CHOLEST SERPL-MCNC: 141 MG/DL (ref 120–199)
CHOLEST/HDLC SERPL: 4.5 {RATIO} (ref 2–5)
CO2 SERPL-SCNC: 25 MMOL/L (ref 23–29)
COMPLEXED PSA SERPL-MCNC: 1 NG/ML (ref 0–4)
CREAT SERPL-MCNC: 1.2 MG/DL (ref 0.5–1.4)
DIFFERENTIAL METHOD: ABNORMAL
EOSINOPHIL # BLD AUTO: 0.8 K/UL (ref 0–0.5)
EOSINOPHIL NFR BLD: 9.2 % (ref 0–8)
ERYTHROCYTE [DISTWIDTH] IN BLOOD BY AUTOMATED COUNT: 14.5 % (ref 11.5–14.5)
EST. GFR  (AFRICAN AMERICAN): >60 ML/MIN/1.73 M^2
EST. GFR  (NON AFRICAN AMERICAN): >60 ML/MIN/1.73 M^2
ESTIMATED AVG GLUCOSE: 123 MG/DL (ref 68–131)
GLUCOSE SERPL-MCNC: 100 MG/DL (ref 70–110)
HBA1C MFR BLD: 5.9 % (ref 4–5.6)
HCT VFR BLD AUTO: 39.2 % (ref 40–54)
HDLC SERPL-MCNC: 31 MG/DL (ref 40–75)
HDLC SERPL: 22 % (ref 20–50)
HGB BLD-MCNC: 12.7 G/DL (ref 14–18)
IMM GRANULOCYTES # BLD AUTO: 0.01 K/UL (ref 0–0.04)
IMM GRANULOCYTES NFR BLD AUTO: 0.1 % (ref 0–0.5)
LDLC SERPL CALC-MCNC: 77.8 MG/DL (ref 63–159)
LYMPHOCYTES # BLD AUTO: 2.5 K/UL (ref 1–4.8)
LYMPHOCYTES NFR BLD: 29 % (ref 18–48)
MCH RBC QN AUTO: 33.2 PG (ref 27–31)
MCHC RBC AUTO-ENTMCNC: 32.4 G/DL (ref 32–36)
MCV RBC AUTO: 103 FL (ref 82–98)
MONOCYTES # BLD AUTO: 0.7 K/UL (ref 0.3–1)
MONOCYTES NFR BLD: 7.7 % (ref 4–15)
NEUTROPHILS # BLD AUTO: 4.5 K/UL (ref 1.8–7.7)
NEUTROPHILS NFR BLD: 52.6 % (ref 38–73)
NONHDLC SERPL-MCNC: 110 MG/DL
NRBC BLD-RTO: 0 /100 WBC
PLATELET # BLD AUTO: 260 K/UL (ref 150–450)
PMV BLD AUTO: 11 FL (ref 9.2–12.9)
POTASSIUM SERPL-SCNC: 5 MMOL/L (ref 3.5–5.1)
PROT SERPL-MCNC: 6.2 G/DL (ref 6–8.4)
RBC # BLD AUTO: 3.82 M/UL (ref 4.6–6.2)
SODIUM SERPL-SCNC: 140 MMOL/L (ref 136–145)
TRIGL SERPL-MCNC: 161 MG/DL (ref 30–150)
WBC # BLD AUTO: 8.56 K/UL (ref 3.9–12.7)

## 2022-01-05 ENCOUNTER — OFFICE VISIT (OUTPATIENT)
Dept: FAMILY MEDICINE | Facility: CLINIC | Age: 72
End: 2022-01-05
Payer: MEDICARE

## 2022-01-05 DIAGNOSIS — N40.0 BENIGN PROSTATIC HYPERPLASIA WITHOUT LOWER URINARY TRACT SYMPTOMS: ICD-10-CM

## 2022-01-05 DIAGNOSIS — G89.29 CHRONIC BILATERAL LOW BACK PAIN WITH RIGHT-SIDED SCIATICA: ICD-10-CM

## 2022-01-05 DIAGNOSIS — E11.42 DM TYPE 2 WITH DIABETIC PERIPHERAL NEUROPATHY: ICD-10-CM

## 2022-01-05 DIAGNOSIS — Z12.11 COLON CANCER SCREENING: ICD-10-CM

## 2022-01-05 DIAGNOSIS — M54.41 CHRONIC BILATERAL LOW BACK PAIN WITH RIGHT-SIDED SCIATICA: ICD-10-CM

## 2022-01-05 DIAGNOSIS — E78.2 MIXED HYPERLIPIDEMIA: ICD-10-CM

## 2022-01-05 DIAGNOSIS — I69.30 HISTORY OF CVA WITH RESIDUAL DEFICIT: ICD-10-CM

## 2022-01-05 DIAGNOSIS — N18.30 CONTROLLED TYPE 2 DIABETES MELLITUS WITH STAGE 3 CHRONIC KIDNEY DISEASE, WITHOUT LONG-TERM CURRENT USE OF INSULIN: Primary | ICD-10-CM

## 2022-01-05 DIAGNOSIS — E11.22 CONTROLLED TYPE 2 DIABETES MELLITUS WITH STAGE 3 CHRONIC KIDNEY DISEASE, WITHOUT LONG-TERM CURRENT USE OF INSULIN: Primary | ICD-10-CM

## 2022-01-05 PROCEDURE — 99499 RISK ADDL DX/OHS AUDIT: ICD-10-PCS | Mod: 95,,, | Performed by: FAMILY MEDICINE

## 2022-01-05 PROCEDURE — 99214 OFFICE O/P EST MOD 30 MIN: CPT | Mod: HCNC,95,, | Performed by: FAMILY MEDICINE

## 2022-01-05 PROCEDURE — 99214 PR OFFICE/OUTPT VISIT, EST, LEVL IV, 30-39 MIN: ICD-10-PCS | Mod: HCNC,95,, | Performed by: FAMILY MEDICINE

## 2022-01-05 PROCEDURE — 3072F PR LOW RISK FOR RETINOPATHY: ICD-10-PCS | Mod: HCNC,CPTII,95, | Performed by: FAMILY MEDICINE

## 2022-01-05 PROCEDURE — 3072F LOW RISK FOR RETINOPATHY: CPT | Mod: HCNC,CPTII,95, | Performed by: FAMILY MEDICINE

## 2022-01-05 PROCEDURE — 99499 UNLISTED E&M SERVICE: CPT | Mod: 95,,, | Performed by: FAMILY MEDICINE

## 2022-01-05 PROCEDURE — 3044F HG A1C LEVEL LT 7.0%: CPT | Mod: HCNC,CPTII,95, | Performed by: FAMILY MEDICINE

## 2022-01-05 PROCEDURE — 3044F PR MOST RECENT HEMOGLOBIN A1C LEVEL <7.0%: ICD-10-PCS | Mod: HCNC,CPTII,95, | Performed by: FAMILY MEDICINE

## 2022-01-05 RX ORDER — DULOXETIN HYDROCHLORIDE 30 MG/1
30 CAPSULE, DELAYED RELEASE ORAL DAILY
Qty: 90 CAPSULE | Refills: 1 | Status: SHIPPED | OUTPATIENT
Start: 2022-01-05 | End: 2022-07-24

## 2022-01-05 RX ORDER — METFORMIN HYDROCHLORIDE 500 MG/1
500 TABLET ORAL 2 TIMES DAILY
Qty: 180 TABLET | Refills: 1 | Status: SHIPPED | OUTPATIENT
Start: 2022-01-05 | End: 2022-07-06

## 2022-01-05 RX ORDER — FLUOXETINE 10 MG/1
20 CAPSULE ORAL DAILY
Qty: 180 CAPSULE | Refills: 1 | Status: SHIPPED | OUTPATIENT
Start: 2022-01-05 | End: 2022-03-13

## 2022-01-05 RX ORDER — GABAPENTIN 300 MG/1
300 CAPSULE ORAL 3 TIMES DAILY PRN
Qty: 90 CAPSULE | Refills: 5 | Status: SHIPPED | OUTPATIENT
Start: 2022-01-05 | End: 2022-05-23

## 2022-01-05 RX ORDER — CLOPIDOGREL BISULFATE 75 MG/1
75 TABLET ORAL DAILY
Qty: 90 TABLET | Refills: 1 | Status: SHIPPED | OUTPATIENT
Start: 2022-01-05 | End: 2022-10-13

## 2022-01-05 RX ORDER — TIZANIDINE 4 MG/1
TABLET ORAL
Qty: 270 TABLET | Refills: 1 | Status: SHIPPED | OUTPATIENT
Start: 2022-01-05 | End: 2022-07-06

## 2022-01-05 RX ORDER — TAMSULOSIN HYDROCHLORIDE 0.4 MG/1
1 CAPSULE ORAL DAILY
Qty: 90 CAPSULE | Refills: 1 | Status: SHIPPED | OUTPATIENT
Start: 2022-01-05 | End: 2023-01-03

## 2022-01-05 RX ORDER — ZOLPIDEM TARTRATE 10 MG/1
10 TABLET ORAL NIGHTLY PRN
Qty: 30 TABLET | Refills: 5 | Status: ON HOLD | OUTPATIENT
Start: 2022-01-05 | End: 2022-03-25 | Stop reason: HOSPADM

## 2022-01-05 RX ORDER — ROSUVASTATIN CALCIUM 20 MG/1
20 TABLET, COATED ORAL DAILY
Qty: 90 TABLET | Refills: 1 | Status: SHIPPED | OUTPATIENT
Start: 2022-01-05 | End: 2022-09-19

## 2022-01-05 NOTE — PROGRESS NOTES
Chief Complaint:    No chief complaint on file.      History of Present Illness:  01/05/2022:-  This is a virtual visit for follow-up of chronic medical problems  Home blood pressure readings have been slightly high  Chronic back pain stable  Kidney function has improved this time still takes BC Powder  Taking Ambien for insomnia  On chronic pain management and also takes marijuana prescription    06/03/2021:-  Doing okay he is planning to have a CLEMENCIA of his back wants to hold his Plavix legs requested by Dr. home med  Diabetes control is stable  Blood pressure normal stools continues to smoke at side effects to Chantix and has not tried the patch did go to the smoking cessation counseling  Suffers with peripheral arterial disease vascular surgeon is proposing a fem-fem graft  Kidney function is slightly worse in he does eat a few BC powders daily.      10/06/2020:-  He has took Keflex for the cellulitis of toe is seems that it was not working 1st now he has been prescribed doxycycline and clindamycin which he has taken today.    07/23/2020:-  Patient says he has some chronic issues with back pain he was on narcotic until he finally weaned himself off this was back   Couple days back he was lifting on his wife and that made his back pain worse.  He has some radiation to the right leg.  He has been taking excessively large number of BC tablets and Tylenol the keep the pain away.  He is interested in seeing pain management.  History of back surgery in the past.    Patient has hypertension, diabetes type to for number of years and he has 50 pack-year history of smoking still continues to smoke but does want to quit.    He has had at least 2 CVA the 1 was minor from which he completely recovered the 2nd 1 affected his right leg and to a lesser degrees right arm.  This was thought to be due to uncontrolled hypertension and diabetes.  His last A1c was 6.2 does appear that he has chronic kidney disease last creatinine was  1.4.    It has been number of years since he had his colonoscopy      ROS:  Review of Systems   Constitutional: Negative for activity change, chills, fatigue, fever and unexpected weight change.   HENT: Negative for congestion, ear discharge, ear pain, hearing loss, postnasal drip, rhinorrhea and trouble swallowing.    Eyes: Negative for pain, discharge and visual disturbance.   Respiratory: Negative for cough, chest tightness, shortness of breath and wheezing.    Cardiovascular: Negative for chest pain and palpitations.   Gastrointestinal: Negative for abdominal pain, blood in stool, constipation, diarrhea and vomiting.   Endocrine: Negative for heat intolerance, polydipsia and polyuria.   Genitourinary: Negative for difficulty urinating, dysuria, flank pain, frequency, hematuria and urgency.   Musculoskeletal: Negative for arthralgias, back pain, gait problem, joint swelling and neck pain.   Skin: Negative for color change and rash.   Neurological: Negative for dizziness, tremors, seizures, weakness, numbness and headaches.   Psychiatric/Behavioral: Negative for agitation, confusion, dysphoric mood, hallucinations, self-injury, sleep disturbance and suicidal ideas. The patient is not nervous/anxious.        Past Medical History:   Diagnosis Date    Anxiety     Depression     Diabetes mellitus, type 2     Hypertension     Stroke        Social History:  Social History     Socioeconomic History    Marital status:    Tobacco Use    Smoking status: Current Every Day Smoker     Packs/day: 1.00     Types: Cigarettes    Smokeless tobacco: Current User     Types: Snuff   Substance and Sexual Activity    Alcohol use: Not Currently    Drug use: Never    Sexual activity: Not Currently       Family History:   family history includes COPD in his sister; Cancer in his father; Heart disease in his mother; Stroke in his mother.    Health Maintenance   Topic Date Due    Foot Exam  Never done    TETANUS VACCINE   Never done    Hemoglobin A1c  07/03/2022    Eye Exam  07/21/2022    High Dose Statin  10/29/2022    Lipid Panel  01/03/2023    Hepatitis C Screening  Completed    Abdominal Aortic Aneurysm Screening  Completed       Physical Exam:     ]    There is no height or weight on file to calculate BMI.    Physical Exam      Diabetes Management Status    Statin: Not taking  ACE/ARB: Taking    Screening or Prevention Patient's value Goal Complete/Controlled?   HgA1C Testing and Control   Lab Results   Component Value Date    HGBA1C 5.9 (H) 01/03/2022      Annually/Less than 8% No   Lipid profile : 01/03/2022 Annually No   LDL control Lab Results   Component Value Date    LDLCALC 77.8 01/03/2022    Annually/Less than 100 mg/dl  No   Nephropathy screening Lab Results   Component Value Date    LABMICR 6.0 05/27/2021     No results found for: PROTEINUA Annually No   Blood pressure BP Readings from Last 1 Encounters:   10/29/21 (!) 144/92    Less than 140/90 Yes   Dilated retinal exam : 07/21/2021 Annually Yes   Foot exam   Most Recent Foot Exam Date: Not Found Annually Yes       Assessment:      ICD-10-CM ICD-9-CM   1. Controlled type 2 diabetes mellitus with stage 3 chronic kidney disease, without long-term current use of insulin  E11.22 250.40    N18.30 585.3   2. Chronic bilateral low back pain with right-sided sciatica  M54.41 724.2    G89.29 724.3     338.29   3. DM type 2 with diabetic peripheral neuropathy  E11.42 250.60     357.2   4. Mixed hyperlipidemia  E78.2 272.2   5. History of CVA with residual deficit  I69.30 438.9   6. Colon cancer screening  Z12.11 V76.51   7. Benign prostatic hyperplasia without lower urinary tract symptoms  N40.0 600.00         Plan:      Continue current meds  Smoking cessation strongly advised  Please absolutely stop using BC powders  Discuss age-appropriate immunization  BPH stable  Will send for Cologuard testing  Follow-up 3 months  Orders Placed This Encounter   Procedures     Cologuard Screening (Multitarget Stool DNA)    Hemoglobin A1C    Comprehensive Metabolic Panel    CBC Auto Differential    Microalbumin/Creatinine Ratio, Urine       Current Outpatient Medications   Medication Sig Dispense Refill    blood glucose control, low (TRUE METRIX LEVEL 1) Soln 1 drop by Misc.(Non-Drug; Combo Route) route once daily. 1 each 4    blood glucose control, normal (TRUE METRIX LEVEL 2) Soln 1 drop by Misc.(Non-Drug; Combo Route) route once daily. 1 each 4    blood sugar diagnostic (TRUE METRIX GLUCOSE TEST STRIP) Strp 1 strip by Misc.(Non-Drug; Combo Route) route once daily. 30 strip 4    blood-glucose meter (TRUE METRIX GLUCOSE METER) Misc 1 Device by Misc.(Non-Drug; Combo Route) route once. for 1 dose 1 each 0    clopidogreL (PLAVIX) 75 mg tablet Take 1 tablet (75 mg total) by mouth once daily. 90 tablet 1    DULoxetine (CYMBALTA) 30 MG capsule Take 1 capsule (30 mg total) by mouth once daily. 90 capsule 1    FLUoxetine 10 MG capsule Take 2 capsules (20 mg total) by mouth once daily. 180 capsule 1    gabapentin (NEURONTIN) 300 MG capsule Take 1 capsule (300 mg total) by mouth 3 (three) times daily as needed. 90 capsule 5    HYDROcodone-acetaminophen (NORCO) 5-325 mg per tablet Take 1 tablet by mouth 3 (three) times daily.      hydrOXYzine HCL (ATARAX) 10 MG Tab Take 1 tablet (10 mg total) by mouth 3 (three) times daily as needed (itching). 30 tablet 0    lancets 32 gauge Misc 1 lancet by Misc.(Non-Drug; Combo Route) route once daily. 30 each 4    metFORMIN (GLUCOPHAGE) 500 MG tablet Take 1 tablet (500 mg total) by mouth 2 (two) times daily. 180 tablet 1    rosuvastatin (CRESTOR) 20 MG tablet Take 1 tablet (20 mg total) by mouth once daily. 90 tablet 1    tamsulosin (FLOMAX) 0.4 mg Cap Take 1 capsule (0.4 mg total) by mouth once daily. 90 capsule 1    tiZANidine (ZANAFLEX) 4 MG tablet Take 1 tablet TID Prn 270 tablet 1    triamcinolone acetonide 0.1% (KENALOG) 0.1 % cream  Apply topically 2 (two) times daily. for 10 days 80 g 1    walker (ULTRA-LIGHT ROLLATOR) Misc Use daily for ambulation 1 each 0    zolpidem (AMBIEN) 10 mg Tab Take 1 tablet (10 mg total) by mouth nightly as needed (insomnia). 30 tablet 5     No current facility-administered medications for this visit.       Medications Discontinued During This Encounter   Medication Reason    clopidogreL (PLAVIX) 75 mg tablet Reorder    DULoxetine (CYMBALTA) 30 MG capsule Reorder    FLUoxetine 10 MG capsule Reorder    gabapentin (NEURONTIN) 300 MG capsule Reorder    metFORMIN (GLUCOPHAGE) 500 MG tablet Reorder    rosuvastatin (CRESTOR) 20 MG tablet Reorder    tamsulosin (FLOMAX) 0.4 mg Cap Reorder    tiZANidine (ZANAFLEX) 4 MG tablet Reorder    zolpidem (AMBIEN) 10 mg Tab Reorder       Follow up in about 3 months (around 4/5/2022).      Damien Barron MD

## 2022-02-09 ENCOUNTER — HOSPITAL ENCOUNTER (OUTPATIENT)
Dept: RADIOLOGY | Facility: HOSPITAL | Age: 72
Discharge: HOME | End: 2022-02-09
Attending: FAMILY MEDICINE
Payer: MEDICARE

## 2022-02-09 ENCOUNTER — OFFICE VISIT (OUTPATIENT)
Dept: FAMILY MEDICINE | Facility: CLINIC | Age: 72
End: 2022-02-09
Payer: MEDICARE

## 2022-02-09 VITALS
BODY MASS INDEX: 21.72 KG/M2 | SYSTOLIC BLOOD PRESSURE: 104 MMHG | WEIGHT: 135.13 LBS | HEART RATE: 108 BPM | OXYGEN SATURATION: 96 % | HEIGHT: 66 IN | DIASTOLIC BLOOD PRESSURE: 62 MMHG | TEMPERATURE: 98 F

## 2022-02-09 DIAGNOSIS — R13.19 ESOPHAGEAL DYSPHAGIA: ICD-10-CM

## 2022-02-09 DIAGNOSIS — M54.50 ACUTE BILATERAL LOW BACK PAIN WITHOUT SCIATICA: Primary | ICD-10-CM

## 2022-02-09 DIAGNOSIS — M54.50 ACUTE BILATERAL LOW BACK PAIN WITHOUT SCIATICA: ICD-10-CM

## 2022-02-09 PROCEDURE — 4010F PR ACE/ARB THEARPY RXD/TAKEN: ICD-10-PCS | Mod: HCNC,CPTII,S$GLB, | Performed by: FAMILY MEDICINE

## 2022-02-09 PROCEDURE — 3072F PR LOW RISK FOR RETINOPATHY: ICD-10-PCS | Mod: HCNC,CPTII,S$GLB, | Performed by: FAMILY MEDICINE

## 2022-02-09 PROCEDURE — 72100 XR LUMBAR SPINE AP AND LATERAL: ICD-10-PCS | Mod: 26,HCNC,, | Performed by: RADIOLOGY

## 2022-02-09 PROCEDURE — 1159F PR MEDICATION LIST DOCUMENTED IN MEDICAL RECORD: ICD-10-PCS | Mod: HCNC,CPTII,S$GLB, | Performed by: FAMILY MEDICINE

## 2022-02-09 PROCEDURE — 3044F PR MOST RECENT HEMOGLOBIN A1C LEVEL <7.0%: ICD-10-PCS | Mod: HCNC,CPTII,S$GLB, | Performed by: FAMILY MEDICINE

## 2022-02-09 PROCEDURE — 3008F PR BODY MASS INDEX (BMI) DOCUMENTED: ICD-10-PCS | Mod: HCNC,CPTII,S$GLB, | Performed by: FAMILY MEDICINE

## 2022-02-09 PROCEDURE — 3072F LOW RISK FOR RETINOPATHY: CPT | Mod: HCNC,CPTII,S$GLB, | Performed by: FAMILY MEDICINE

## 2022-02-09 PROCEDURE — 72100 X-RAY EXAM L-S SPINE 2/3 VWS: CPT | Mod: TC,HCNC,FY,PO

## 2022-02-09 PROCEDURE — 3288F FALL RISK ASSESSMENT DOCD: CPT | Mod: HCNC,CPTII,S$GLB, | Performed by: FAMILY MEDICINE

## 2022-02-09 PROCEDURE — 72100 X-RAY EXAM L-S SPINE 2/3 VWS: CPT | Mod: 26,HCNC,, | Performed by: RADIOLOGY

## 2022-02-09 PROCEDURE — 1125F AMNT PAIN NOTED PAIN PRSNT: CPT | Mod: HCNC,CPTII,S$GLB, | Performed by: FAMILY MEDICINE

## 2022-02-09 PROCEDURE — 1101F PT FALLS ASSESS-DOCD LE1/YR: CPT | Mod: HCNC,CPTII,S$GLB, | Performed by: FAMILY MEDICINE

## 2022-02-09 PROCEDURE — 3078F DIAST BP <80 MM HG: CPT | Mod: HCNC,CPTII,S$GLB, | Performed by: FAMILY MEDICINE

## 2022-02-09 PROCEDURE — 99214 OFFICE O/P EST MOD 30 MIN: CPT | Mod: HCNC,S$GLB,, | Performed by: FAMILY MEDICINE

## 2022-02-09 PROCEDURE — 3008F BODY MASS INDEX DOCD: CPT | Mod: HCNC,CPTII,S$GLB, | Performed by: FAMILY MEDICINE

## 2022-02-09 PROCEDURE — 1159F MED LIST DOCD IN RCRD: CPT | Mod: HCNC,CPTII,S$GLB, | Performed by: FAMILY MEDICINE

## 2022-02-09 PROCEDURE — 3288F PR FALLS RISK ASSESSMENT DOCUMENTED: ICD-10-PCS | Mod: HCNC,CPTII,S$GLB, | Performed by: FAMILY MEDICINE

## 2022-02-09 PROCEDURE — 3078F PR MOST RECENT DIASTOLIC BLOOD PRESSURE < 80 MM HG: ICD-10-PCS | Mod: HCNC,CPTII,S$GLB, | Performed by: FAMILY MEDICINE

## 2022-02-09 PROCEDURE — 99214 PR OFFICE/OUTPT VISIT, EST, LEVL IV, 30-39 MIN: ICD-10-PCS | Mod: HCNC,S$GLB,, | Performed by: FAMILY MEDICINE

## 2022-02-09 PROCEDURE — 99999 PR PBB SHADOW E&M-EST. PATIENT-LVL IV: ICD-10-PCS | Mod: PBBFAC,HCNC,, | Performed by: FAMILY MEDICINE

## 2022-02-09 PROCEDURE — 4010F ACE/ARB THERAPY RXD/TAKEN: CPT | Mod: HCNC,CPTII,S$GLB, | Performed by: FAMILY MEDICINE

## 2022-02-09 PROCEDURE — 1101F PR PT FALLS ASSESS DOC 0-1 FALLS W/OUT INJ PAST YR: ICD-10-PCS | Mod: HCNC,CPTII,S$GLB, | Performed by: FAMILY MEDICINE

## 2022-02-09 PROCEDURE — 3074F PR MOST RECENT SYSTOLIC BLOOD PRESSURE < 130 MM HG: ICD-10-PCS | Mod: HCNC,CPTII,S$GLB, | Performed by: FAMILY MEDICINE

## 2022-02-09 PROCEDURE — 3074F SYST BP LT 130 MM HG: CPT | Mod: HCNC,CPTII,S$GLB, | Performed by: FAMILY MEDICINE

## 2022-02-09 PROCEDURE — 3044F HG A1C LEVEL LT 7.0%: CPT | Mod: HCNC,CPTII,S$GLB, | Performed by: FAMILY MEDICINE

## 2022-02-09 PROCEDURE — 1125F PR PAIN SEVERITY QUANTIFIED, PAIN PRESENT: ICD-10-PCS | Mod: HCNC,CPTII,S$GLB, | Performed by: FAMILY MEDICINE

## 2022-02-09 PROCEDURE — 99999 PR PBB SHADOW E&M-EST. PATIENT-LVL IV: CPT | Mod: PBBFAC,HCNC,, | Performed by: FAMILY MEDICINE

## 2022-02-09 RX ORDER — LISINOPRIL 10 MG/1
10 TABLET ORAL DAILY
COMMUNITY
Start: 2022-01-21 | End: 2022-03-13

## 2022-02-09 NOTE — PROGRESS NOTES
Chief Complaint:    Chief Complaint   Patient presents with    Back Pain       History of Present Illness:  02/09/2022:  Patient with a hx of DM, hyperlipidemia pesents today for back pain.    Reports lower non-radiating back pain. Mild relief with movement, medications. Was horse playing with his grandson.  Reports food gets stuck in esophagus. Has had his esophagus stretched before.     01/05/2022:-  This is a virtual visit for follow-up of chronic medical problems  Home blood pressure readings have been slightly high  Chronic back pain stable  Kidney function has improved this time still takes BC Powder  Taking Ambien for insomnia  On chronic pain management and also takes marijuana prescription    06/03/2021:-  Doing okay he is planning to have a CLEMENCIA of his back wants to hold his Plavix legs requested by Dr. home med  Diabetes control is stable  Blood pressure normal stools continues to smoke at side effects to Chantix and has not tried the patch did go to the smoking cessation counseling  Suffers with peripheral arterial disease vascular surgeon is proposing a fem-fem graft  Kidney function is slightly worse in he does eat a few BC powders daily.      10/06/2020:-  He has took Keflex for the cellulitis of toe is seems that it was not working 1st now he has been prescribed doxycycline and clindamycin which he has taken today.    07/23/2020:-  Patient says he has some chronic issues with back pain he was on narcotic until he finally weaned himself off this was back   Couple days back he was lifting on his wife and that made his back pain worse.  He has some radiation to the right leg.  He has been taking excessively large number of BC tablets and Tylenol the keep the pain away.  He is interested in seeing pain management.  History of back surgery in the past.    Patient has hypertension, diabetes type to for number of years and he has 50 pack-year history of smoking still continues to smoke but does want to  quit.    He has had at least 2 CVA the 1 was minor from which he completely recovered the 2nd 1 affected his right leg and to a lesser degrees right arm.  This was thought to be due to uncontrolled hypertension and diabetes.  His last A1c was 6.2 does appear that he has chronic kidney disease last creatinine was 1.4.    It has been number of years since he had his colonoscopy      ROS:  Review of Systems   Constitutional: Negative for activity change, chills, fatigue, fever and unexpected weight change.   HENT: Positive for trouble swallowing. Negative for congestion, ear discharge, ear pain, hearing loss, postnasal drip and rhinorrhea.    Eyes: Negative for pain, discharge and visual disturbance.   Respiratory: Negative for cough, chest tightness, shortness of breath and wheezing.    Cardiovascular: Negative for chest pain and palpitations.   Gastrointestinal: Negative for abdominal pain, blood in stool, constipation, diarrhea and vomiting.   Endocrine: Negative for heat intolerance, polydipsia and polyuria.   Genitourinary: Negative for difficulty urinating, dysuria, flank pain, frequency, hematuria and urgency.   Musculoskeletal: Positive for back pain. Negative for arthralgias, gait problem, joint swelling and neck pain.   Skin: Negative for color change and rash.   Neurological: Negative for dizziness, tremors, seizures, weakness, numbness and headaches.   Psychiatric/Behavioral: Negative for agitation, confusion, dysphoric mood, hallucinations, self-injury, sleep disturbance and suicidal ideas. The patient is not nervous/anxious.        Past Medical History:   Diagnosis Date    Anxiety     Depression     Diabetes mellitus, type 2     Hypertension     Stroke        Social History:  Social History     Socioeconomic History    Marital status:    Tobacco Use    Smoking status: Current Every Day Smoker     Packs/day: 1.00     Types: Cigarettes    Smokeless tobacco: Current User     Types: Snuff  "  Substance and Sexual Activity    Alcohol use: Not Currently    Drug use: Never    Sexual activity: Not Currently       Family History:   family history includes COPD in his sister; Cancer in his father; Heart disease in his mother; Stroke in his mother.    Health Maintenance   Topic Date Due    Foot Exam  Never done    TETANUS VACCINE  Never done    Hemoglobin A1c  07/03/2022    Eye Exam  07/21/2022    Lipid Panel  01/03/2023    High Dose Statin  02/09/2023    Hepatitis C Screening  Completed    Abdominal Aortic Aneurysm Screening  Completed       Physical Exam:    Vital Signs  Temp: 97.5 °F (36.4 °C)  Temp src: Temporal  Pulse: 108  SpO2: 96 %  BP: 104/62  Pain Score:   7  Height and Weight  Height: 5' 6" (167.6 cm)  Weight: 61.3 kg (135 lb 2.3 oz)  BSA (Calculated - sq m): 1.69 sq meters  BMI (Calculated): 21.8  Weight in (lb) to have BMI = 25: 154.6]    Body mass index is 21.81 kg/m².    Physical Exam  Vitals and nursing note reviewed.   Constitutional:       Appearance: Normal appearance.   HENT:      Head: Normocephalic and atraumatic.      Right Ear: Tympanic membrane normal.      Left Ear: Tympanic membrane normal.   Eyes:      Extraocular Movements: Extraocular movements intact.      Pupils: Pupils are equal, round, and reactive to light.   Cardiovascular:      Rate and Rhythm: Normal rate and regular rhythm.   Pulmonary:      Effort: Pulmonary effort is normal.      Breath sounds: Normal breath sounds.   Abdominal:      General: There is no distension.      Palpations: Abdomen is soft.      Tenderness: There is no abdominal tenderness.   Musculoskeletal:         General: Normal range of motion.      Cervical back: Normal range of motion.      Lumbar back: Tenderness present.   Skin:     General: Skin is warm and dry.   Neurological:      General: No focal deficit present.      Mental Status: He is alert and oriented to person, place, and time.   Psychiatric:         Mood and Affect: Mood " normal.         Behavior: Behavior normal.       Straight leg raising test is positive on the right but that is a chronic finding.    Diabetes Management Status    Statin: Not taking  ACE/ARB: Taking    Screening or Prevention Patient's value Goal Complete/Controlled?   HgA1C Testing and Control   Lab Results   Component Value Date    HGBA1C 5.9 (H) 01/03/2022      Annually/Less than 8% No   Lipid profile : 01/03/2022 Annually No   LDL control Lab Results   Component Value Date    LDLCALC 77.8 01/03/2022    Annually/Less than 100 mg/dl  No   Nephropathy screening Lab Results   Component Value Date    LABMICR 6.0 05/27/2021     No results found for: PROTEINUA Annually No   Blood pressure BP Readings from Last 1 Encounters:   02/09/22 104/62    Less than 140/90 Yes   Dilated retinal exam : 07/21/2021 Annually Yes   Foot exam   Most Recent Foot Exam Date: Not Found Annually Yes       Assessment:      ICD-10-CM ICD-9-CM   1. Acute bilateral low back pain without sciatica  M54.50 724.2     338.19   2. Esophageal dysphagia  R13.19 787.29         Plan:    Complete lumbar spine x-ray. Will order MRI if pain doesn't improve.   Schedule upper GI endoscopy.  Follow-up 3 months  Orders Placed This Encounter   Procedures    X-Ray Lumbar Spine AP And Lateral       Current Outpatient Medications   Medication Sig Dispense Refill    blood glucose control, low (TRUE METRIX LEVEL 1) Soln 1 drop by Misc.(Non-Drug; Combo Route) route once daily. 1 each 4    blood glucose control, normal (TRUE METRIX LEVEL 2) Soln 1 drop by Misc.(Non-Drug; Combo Route) route once daily. 1 each 4    blood sugar diagnostic (TRUE METRIX GLUCOSE TEST STRIP) Strp 1 strip by Misc.(Non-Drug; Combo Route) route once daily. 30 strip 4    clopidogreL (PLAVIX) 75 mg tablet Take 1 tablet (75 mg total) by mouth once daily. 90 tablet 1    DULoxetine (CYMBALTA) 30 MG capsule Take 1 capsule (30 mg total) by mouth once daily. 90 capsule 1    FLUoxetine 10 MG  capsule Take 2 capsules (20 mg total) by mouth once daily. 180 capsule 1    gabapentin (NEURONTIN) 300 MG capsule Take 1 capsule (300 mg total) by mouth 3 (three) times daily as needed. 90 capsule 5    HYDROcodone-acetaminophen (NORCO) 5-325 mg per tablet Take 1 tablet by mouth 3 (three) times daily.      lancets 32 gauge Misc 1 lancet by Misc.(Non-Drug; Combo Route) route once daily. 30 each 4    lisinopriL 10 MG tablet Take 10 mg by mouth once daily.      metFORMIN (GLUCOPHAGE) 500 MG tablet Take 1 tablet (500 mg total) by mouth 2 (two) times daily. 180 tablet 1    NON FORMULARY MEDICATION THC+ 300 NATURAL SL TINCTURE - [place]      rosuvastatin (CRESTOR) 20 MG tablet Take 1 tablet (20 mg total) by mouth once daily. 90 tablet 1    tamsulosin (FLOMAX) 0.4 mg Cap Take 1 capsule (0.4 mg total) by mouth once daily. 90 capsule 1    tiZANidine (ZANAFLEX) 4 MG tablet Take 1 tablet TID Prn 270 tablet 1    walker (ULTRA-LIGHT ROLLATOR) Misc Use daily for ambulation 1 each 0    zolpidem (AMBIEN) 10 mg Tab Take 1 tablet (10 mg total) by mouth nightly as needed (insomnia). 30 tablet 5    blood-glucose meter (TRUE METRIX GLUCOSE METER) Misc 1 Device by Misc.(Non-Drug; Combo Route) route once. for 1 dose 1 each 0     No current facility-administered medications for this visit.       Medications Discontinued During This Encounter   Medication Reason    hydrOXYzine HCL (ATARAX) 10 MG Tab     triamcinolone acetonide 0.1% (KENALOG) 0.1 % cream        No follow-ups on file.      Damien Barron MD

## 2022-02-22 ENCOUNTER — TELEPHONE (OUTPATIENT)
Dept: FAMILY MEDICINE | Facility: CLINIC | Age: 72
End: 2022-02-22
Payer: MEDICARE

## 2022-02-22 ENCOUNTER — LAB VISIT (OUTPATIENT)
Dept: LAB | Facility: HOSPITAL | Age: 72
End: 2022-02-22
Attending: FAMILY MEDICINE
Payer: MEDICARE

## 2022-02-22 DIAGNOSIS — E78.2 MIXED HYPERLIPIDEMIA: ICD-10-CM

## 2022-02-22 DIAGNOSIS — E11.22 CONTROLLED TYPE 2 DIABETES MELLITUS WITH STAGE 3 CHRONIC KIDNEY DISEASE, WITHOUT LONG-TERM CURRENT USE OF INSULIN: ICD-10-CM

## 2022-02-22 DIAGNOSIS — N18.30 CONTROLLED TYPE 2 DIABETES MELLITUS WITH STAGE 3 CHRONIC KIDNEY DISEASE, WITHOUT LONG-TERM CURRENT USE OF INSULIN: ICD-10-CM

## 2022-02-22 DIAGNOSIS — F17.200 SMOKING: ICD-10-CM

## 2022-02-22 DIAGNOSIS — R11.0 NAUSEA: Primary | ICD-10-CM

## 2022-02-22 DIAGNOSIS — I73.9 PAD (PERIPHERAL ARTERY DISEASE): ICD-10-CM

## 2022-02-22 DIAGNOSIS — I69.30 HISTORY OF CVA WITH RESIDUAL DEFICIT: ICD-10-CM

## 2022-02-22 LAB
ALBUMIN SERPL BCP-MCNC: 3 G/DL (ref 3.5–5.2)
ALP SERPL-CCNC: 136 U/L (ref 55–135)
ALT SERPL W/O P-5'-P-CCNC: 29 U/L (ref 10–44)
ANION GAP SERPL CALC-SCNC: 12 MMOL/L (ref 8–16)
AST SERPL-CCNC: 36 U/L (ref 10–40)
BASOPHILS # BLD AUTO: 0.03 K/UL (ref 0–0.2)
BASOPHILS NFR BLD: 0.2 % (ref 0–1.9)
BILIRUB SERPL-MCNC: 0.6 MG/DL (ref 0.1–1)
BUN SERPL-MCNC: 17 MG/DL (ref 8–23)
CALCIUM SERPL-MCNC: 9.8 MG/DL (ref 8.7–10.5)
CHLORIDE SERPL-SCNC: 97 MMOL/L (ref 95–110)
CO2 SERPL-SCNC: 25 MMOL/L (ref 23–29)
CREAT SERPL-MCNC: 1.3 MG/DL (ref 0.5–1.4)
DIFFERENTIAL METHOD: ABNORMAL
EOSINOPHIL # BLD AUTO: 0.1 K/UL (ref 0–0.5)
EOSINOPHIL NFR BLD: 0.8 % (ref 0–8)
ERYTHROCYTE [DISTWIDTH] IN BLOOD BY AUTOMATED COUNT: 14.3 % (ref 11.5–14.5)
EST. GFR  (AFRICAN AMERICAN): >60 ML/MIN/1.73 M^2
EST. GFR  (NON AFRICAN AMERICAN): 55.3 ML/MIN/1.73 M^2
GLUCOSE SERPL-MCNC: 145 MG/DL (ref 70–110)
HCT VFR BLD AUTO: 38.1 % (ref 40–54)
HGB BLD-MCNC: 12.6 G/DL (ref 14–18)
IMM GRANULOCYTES # BLD AUTO: 0.04 K/UL (ref 0–0.04)
IMM GRANULOCYTES NFR BLD AUTO: 0.3 % (ref 0–0.5)
LYMPHOCYTES # BLD AUTO: 1.7 K/UL (ref 1–4.8)
LYMPHOCYTES NFR BLD: 11.6 % (ref 18–48)
MCH RBC QN AUTO: 34 PG (ref 27–31)
MCHC RBC AUTO-ENTMCNC: 33.1 G/DL (ref 32–36)
MCV RBC AUTO: 103 FL (ref 82–98)
MONOCYTES # BLD AUTO: 1.5 K/UL (ref 0.3–1)
MONOCYTES NFR BLD: 10.1 % (ref 4–15)
NEUTROPHILS # BLD AUTO: 11.3 K/UL (ref 1.8–7.7)
NEUTROPHILS NFR BLD: 77 % (ref 38–73)
NRBC BLD-RTO: 0 /100 WBC
PLATELET # BLD AUTO: 183 K/UL (ref 150–450)
PMV BLD AUTO: 11 FL (ref 9.2–12.9)
POTASSIUM SERPL-SCNC: 4 MMOL/L (ref 3.5–5.1)
PROT SERPL-MCNC: 6.7 G/DL (ref 6–8.4)
RBC # BLD AUTO: 3.71 M/UL (ref 4.6–6.2)
SODIUM SERPL-SCNC: 134 MMOL/L (ref 136–145)
WBC # BLD AUTO: 14.61 K/UL (ref 3.9–12.7)

## 2022-02-22 PROCEDURE — 85025 COMPLETE CBC W/AUTO DIFF WBC: CPT | Mod: HCNC | Performed by: FAMILY MEDICINE

## 2022-02-22 PROCEDURE — 36415 COLL VENOUS BLD VENIPUNCTURE: CPT | Mod: HCNC,PO | Performed by: FAMILY MEDICINE

## 2022-02-22 PROCEDURE — 80053 COMPREHEN METABOLIC PANEL: CPT | Mod: HCNC | Performed by: FAMILY MEDICINE

## 2022-02-22 NOTE — TELEPHONE ENCOUNTER
C/o nausea for 4 days now.  No vomiting . Drinking liquids but not eating very well   Please advise

## 2022-02-23 ENCOUNTER — HOSPITAL ENCOUNTER (OUTPATIENT)
Dept: RADIOLOGY | Facility: HOSPITAL | Age: 72
Discharge: HOME | End: 2022-02-23
Attending: FAMILY MEDICINE
Payer: MEDICARE

## 2022-02-23 ENCOUNTER — OFFICE VISIT (OUTPATIENT)
Dept: FAMILY MEDICINE | Facility: CLINIC | Age: 72
End: 2022-02-23
Payer: MEDICARE

## 2022-02-23 VITALS
TEMPERATURE: 98 F | SYSTOLIC BLOOD PRESSURE: 104 MMHG | BODY MASS INDEX: 21.64 KG/M2 | WEIGHT: 134.69 LBS | OXYGEN SATURATION: 98 % | DIASTOLIC BLOOD PRESSURE: 70 MMHG | HEIGHT: 66 IN | HEART RATE: 87 BPM

## 2022-02-23 DIAGNOSIS — R05.9 COUGH: ICD-10-CM

## 2022-02-23 DIAGNOSIS — R11.0 NAUSEA: Primary | ICD-10-CM

## 2022-02-23 DIAGNOSIS — R09.89 RUNNY NOSE: ICD-10-CM

## 2022-02-23 DIAGNOSIS — R10.11 RUQ PAIN: ICD-10-CM

## 2022-02-23 LAB
CTP QC/QA: YES
CTP QC/QA: YES
FLUAV AG NPH QL: NEGATIVE
FLUBV AG NPH QL: NEGATIVE
SARS-COV-2 RDRP RESP QL NAA+PROBE: NEGATIVE

## 2022-02-23 PROCEDURE — 1125F PR PAIN SEVERITY QUANTIFIED, PAIN PRESENT: ICD-10-PCS | Mod: HCNC,CPTII,S$GLB, | Performed by: FAMILY MEDICINE

## 2022-02-23 PROCEDURE — 87804 INFLUENZA ASSAY W/OPTIC: CPT | Mod: QW,HCNC,S$GLB, | Performed by: FAMILY MEDICINE

## 2022-02-23 PROCEDURE — 71046 X-RAY EXAM CHEST 2 VIEWS: CPT | Mod: 26,HCNC,, | Performed by: RADIOLOGY

## 2022-02-23 PROCEDURE — 3072F LOW RISK FOR RETINOPATHY: CPT | Mod: HCNC,CPTII,S$GLB, | Performed by: FAMILY MEDICINE

## 2022-02-23 PROCEDURE — 3008F PR BODY MASS INDEX (BMI) DOCUMENTED: ICD-10-PCS | Mod: HCNC,CPTII,S$GLB, | Performed by: FAMILY MEDICINE

## 2022-02-23 PROCEDURE — U0002: ICD-10-PCS | Mod: QW,HCNC,S$GLB, | Performed by: FAMILY MEDICINE

## 2022-02-23 PROCEDURE — 3078F DIAST BP <80 MM HG: CPT | Mod: HCNC,CPTII,S$GLB, | Performed by: FAMILY MEDICINE

## 2022-02-23 PROCEDURE — 3074F SYST BP LT 130 MM HG: CPT | Mod: HCNC,CPTII,S$GLB, | Performed by: FAMILY MEDICINE

## 2022-02-23 PROCEDURE — 1159F PR MEDICATION LIST DOCUMENTED IN MEDICAL RECORD: ICD-10-PCS | Mod: HCNC,CPTII,S$GLB, | Performed by: FAMILY MEDICINE

## 2022-02-23 PROCEDURE — 3072F PR LOW RISK FOR RETINOPATHY: ICD-10-PCS | Mod: HCNC,CPTII,S$GLB, | Performed by: FAMILY MEDICINE

## 2022-02-23 PROCEDURE — 71046 X-RAY EXAM CHEST 2 VIEWS: CPT | Mod: TC,HCNC,FY,PO

## 2022-02-23 PROCEDURE — 3044F PR MOST RECENT HEMOGLOBIN A1C LEVEL <7.0%: ICD-10-PCS | Mod: HCNC,CPTII,S$GLB, | Performed by: FAMILY MEDICINE

## 2022-02-23 PROCEDURE — 1101F PT FALLS ASSESS-DOCD LE1/YR: CPT | Mod: HCNC,CPTII,S$GLB, | Performed by: FAMILY MEDICINE

## 2022-02-23 PROCEDURE — 99214 OFFICE O/P EST MOD 30 MIN: CPT | Mod: HCNC,S$GLB,, | Performed by: FAMILY MEDICINE

## 2022-02-23 PROCEDURE — 3074F PR MOST RECENT SYSTOLIC BLOOD PRESSURE < 130 MM HG: ICD-10-PCS | Mod: HCNC,CPTII,S$GLB, | Performed by: FAMILY MEDICINE

## 2022-02-23 PROCEDURE — 1159F MED LIST DOCD IN RCRD: CPT | Mod: HCNC,CPTII,S$GLB, | Performed by: FAMILY MEDICINE

## 2022-02-23 PROCEDURE — 3008F BODY MASS INDEX DOCD: CPT | Mod: HCNC,CPTII,S$GLB, | Performed by: FAMILY MEDICINE

## 2022-02-23 PROCEDURE — 1101F PR PT FALLS ASSESS DOC 0-1 FALLS W/OUT INJ PAST YR: ICD-10-PCS | Mod: HCNC,CPTII,S$GLB, | Performed by: FAMILY MEDICINE

## 2022-02-23 PROCEDURE — 99999 PR PBB SHADOW E&M-EST. PATIENT-LVL V: CPT | Mod: PBBFAC,HCNC,, | Performed by: FAMILY MEDICINE

## 2022-02-23 PROCEDURE — 87804 POCT INFLUENZA A/B: ICD-10-PCS | Mod: QW,HCNC,S$GLB, | Performed by: FAMILY MEDICINE

## 2022-02-23 PROCEDURE — 3288F PR FALLS RISK ASSESSMENT DOCUMENTED: ICD-10-PCS | Mod: HCNC,CPTII,S$GLB, | Performed by: FAMILY MEDICINE

## 2022-02-23 PROCEDURE — U0002 COVID-19 LAB TEST NON-CDC: HCPCS | Mod: QW,HCNC,S$GLB, | Performed by: FAMILY MEDICINE

## 2022-02-23 PROCEDURE — 3078F PR MOST RECENT DIASTOLIC BLOOD PRESSURE < 80 MM HG: ICD-10-PCS | Mod: HCNC,CPTII,S$GLB, | Performed by: FAMILY MEDICINE

## 2022-02-23 PROCEDURE — 4010F PR ACE/ARB THEARPY RXD/TAKEN: ICD-10-PCS | Mod: HCNC,CPTII,S$GLB, | Performed by: FAMILY MEDICINE

## 2022-02-23 PROCEDURE — 4010F ACE/ARB THERAPY RXD/TAKEN: CPT | Mod: HCNC,CPTII,S$GLB, | Performed by: FAMILY MEDICINE

## 2022-02-23 PROCEDURE — 71046 XR CHEST PA AND LATERAL: ICD-10-PCS | Mod: 26,HCNC,, | Performed by: RADIOLOGY

## 2022-02-23 PROCEDURE — 99999 PR PBB SHADOW E&M-EST. PATIENT-LVL V: ICD-10-PCS | Mod: PBBFAC,HCNC,, | Performed by: FAMILY MEDICINE

## 2022-02-23 PROCEDURE — 1125F AMNT PAIN NOTED PAIN PRSNT: CPT | Mod: HCNC,CPTII,S$GLB, | Performed by: FAMILY MEDICINE

## 2022-02-23 PROCEDURE — 99214 PR OFFICE/OUTPT VISIT, EST, LEVL IV, 30-39 MIN: ICD-10-PCS | Mod: HCNC,S$GLB,, | Performed by: FAMILY MEDICINE

## 2022-02-23 PROCEDURE — 3044F HG A1C LEVEL LT 7.0%: CPT | Mod: HCNC,CPTII,S$GLB, | Performed by: FAMILY MEDICINE

## 2022-02-23 PROCEDURE — 3288F FALL RISK ASSESSMENT DOCD: CPT | Mod: HCNC,CPTII,S$GLB, | Performed by: FAMILY MEDICINE

## 2022-02-23 RX ORDER — ONDANSETRON HYDROCHLORIDE 8 MG/1
8 TABLET, FILM COATED ORAL EVERY 8 HOURS PRN
Qty: 30 TABLET | Refills: 1 | Status: SHIPPED | OUTPATIENT
Start: 2022-02-23 | End: 2023-10-02

## 2022-02-23 RX ORDER — AMOXICILLIN AND CLAVULANATE POTASSIUM 875; 125 MG/1; MG/1
1 TABLET, FILM COATED ORAL EVERY 12 HOURS
Qty: 14 TABLET | Refills: 0 | Status: SHIPPED | OUTPATIENT
Start: 2022-02-23 | End: 2022-03-02

## 2022-02-23 NOTE — PROGRESS NOTES
Chief Complaint:    Chief Complaint   Patient presents with    Cough    Diarrhea    Nausea    Sinus Problem    Nasal Congestion    Fatigue    Altered Mental Status       History of Present Illness:  02/23/2022:-  Patient with a hx of DM, hyperlipidemia presents today for a runny nose and intermittent lower abdominal pain x 4 days.    Reports 3-4 episodes of diarrhea, nausea, sinus pain, congestion, fatigue, and AMS. Lost 10 pounds in 4 days. Denies sore throat,  constipation, blood in stool, dysuria. Stopped taking BCs 4-5 days ago. Took one this morning. Reports no modifying factors for abdominal pain. His diarrhea has stopped because he isn't eating anything.           02/09/2022:  Patient with a hx of DM, hyperlipidemia pesents today for back pain.    Reports lower non-radiating back pain. Mild relief with movement, medications. Was horse playing with his grandson.  Reports food gets stuck in esophagus. Has had his esophagus stretched before.     01/05/2022:-  This is a virtual visit for follow-up of chronic medical problems  Home blood pressure readings have been slightly high  Chronic back pain stable  Kidney function has improved this time still takes BC Powder  Taking Ambien for insomnia  On chronic pain management and also takes marijuana prescription    06/03/2021:-  Doing okay he is planning to have a CLEMENCIA of his back wants to hold his Plavix legs requested by Dr. home med  Diabetes control is stable  Blood pressure normal stools continues to smoke at side effects to Chantix and has not tried the patch did go to the smoking cessation counseling  Suffers with peripheral arterial disease vascular surgeon is proposing a fem-fem graft  Kidney function is slightly worse in he does eat a few BC powders daily.      10/06/2020:-  He has took Keflex for the cellulitis of toe is seems that it was not working 1st now he has been prescribed doxycycline and clindamycin which he has taken  today.    07/23/2020:-  Patient says he has some chronic issues with back pain he was on narcotic until he finally weaned himself off this was back   Couple days back he was lifting on his wife and that made his back pain worse.  He has some radiation to the right leg.  He has been taking excessively large number of BC tablets and Tylenol the keep the pain away.  He is interested in seeing pain management.  History of back surgery in the past.    Patient has hypertension, diabetes type to for number of years and he has 50 pack-year history of smoking still continues to smoke but does want to quit.    He has had at least 2 CVA the 1 was minor from which he completely recovered the 2nd 1 affected his right leg and to a lesser degrees right arm.  This was thought to be due to uncontrolled hypertension and diabetes.  His last A1c was 6.2 does appear that he has chronic kidney disease last creatinine was 1.4.    It has been number of years since he had his colonoscopy      ROS:  Review of Systems   Constitutional: Positive for appetite change, fatigue and unexpected weight change. Negative for activity change, chills and fever.   HENT: Positive for congestion, sinus pain and voice change. Negative for ear discharge, ear pain, hearing loss, postnasal drip and rhinorrhea.    Eyes: Negative for pain, discharge and visual disturbance.   Respiratory: Negative for chest tightness, shortness of breath and wheezing.    Cardiovascular: Negative for chest pain and palpitations.   Gastrointestinal: Positive for diarrhea. Negative for abdominal pain, blood in stool, constipation and vomiting.   Endocrine: Negative for heat intolerance, polydipsia and polyuria.   Genitourinary: Negative for difficulty urinating, dysuria, flank pain, frequency, hematuria and urgency.   Musculoskeletal: Negative for arthralgias, gait problem, joint swelling and neck pain.   Skin: Negative for color change and rash.   Neurological: Negative for  "dizziness, tremors, seizures, weakness, numbness and headaches.   Psychiatric/Behavioral: Negative for agitation, confusion, hallucinations, self-injury, sleep disturbance and suicidal ideas. The patient is not nervous/anxious.    All other systems reviewed and are negative.      Past Medical History:   Diagnosis Date    Anxiety     Depression     Diabetes mellitus, type 2     Hypertension     Stroke        Social History:  Social History     Socioeconomic History    Marital status:    Tobacco Use    Smoking status: Current Every Day Smoker     Packs/day: 1.00     Types: Cigarettes    Smokeless tobacco: Current User     Types: Snuff   Substance and Sexual Activity    Alcohol use: Not Currently    Drug use: Never    Sexual activity: Not Currently       Family History:   family history includes COPD in his sister; Cancer in his father; Heart disease in his mother; Stroke in his mother.    Health Maintenance   Topic Date Due    Foot Exam  Never done    TETANUS VACCINE  Never done    Hemoglobin A1c  07/03/2022    Eye Exam  07/21/2022    Lipid Panel  01/03/2023    High Dose Statin  02/09/2023    Hepatitis C Screening  Completed    Abdominal Aortic Aneurysm Screening  Completed       Physical Exam:    Vital Signs  Temp: 98.2 °F (36.8 °C)  Temp src: Temporal  Pulse: 87  SpO2: 98 %  BP: 104/70  Pain Score:   6  Height and Weight  Height: 5' 6" (167.6 cm)  Weight: 61.1 kg (134 lb 11.2 oz)  BSA (Calculated - sq m): 1.69 sq meters  BMI (Calculated): 21.8  Weight in (lb) to have BMI = 25: 154.6]    Body mass index is 21.74 kg/m².    Physical Exam  Vitals and nursing note reviewed.   Constitutional:       Appearance: Normal appearance.   HENT:      Head: Normocephalic and atraumatic.      Jaw: No tenderness.      Right Ear: Tympanic membrane normal.      Left Ear: Tympanic membrane normal.      Nose:      Right Sinus: No maxillary sinus tenderness or frontal sinus tenderness.      Left Sinus: No " maxillary sinus tenderness or frontal sinus tenderness.   Eyes:      Extraocular Movements: Extraocular movements intact.      Pupils: Pupils are equal, round, and reactive to light.   Cardiovascular:      Rate and Rhythm: Normal rate and regular rhythm.   Pulmonary:      Effort: Pulmonary effort is normal.      Breath sounds: Normal breath sounds.   Abdominal:      General: There is no distension.      Palpations: Abdomen is soft.      Tenderness: There is abdominal tenderness in the right upper quadrant.   Musculoskeletal:         General: Normal range of motion.      Cervical back: Normal range of motion. No tenderness.      Thoracic back: No tenderness.      Lumbar back: No tenderness.   Skin:     General: Skin is warm and dry.   Neurological:      General: No focal deficit present.      Mental Status: He is alert and oriented to person, place, and time.   Psychiatric:         Mood and Affect: Mood normal.         Behavior: Behavior normal.       Straight leg raising test is positive on the right but that is a chronic finding.    Diabetes Management Status    Statin: Not taking  ACE/ARB: Taking    Screening or Prevention Patient's value Goal Complete/Controlled?   HgA1C Testing and Control   Lab Results   Component Value Date    HGBA1C 5.9 (H) 01/03/2022      Annually/Less than 8% No   Lipid profile : 01/03/2022 Annually No   LDL control Lab Results   Component Value Date    LDLCALC 77.8 01/03/2022    Annually/Less than 100 mg/dl  No   Nephropathy screening Lab Results   Component Value Date    LABMICR 6.0 05/27/2021     No results found for: PROTEINUA Annually No   Blood pressure BP Readings from Last 1 Encounters:   02/23/22 104/70    Less than 140/90 Yes   Dilated retinal exam : 07/21/2021 Annually Yes   Foot exam   Most Recent Foot Exam Date: Not Found Annually Yes       Assessment:      ICD-10-CM ICD-9-CM   1. Nausea  R11.0 787.02   2. Runny nose  R09.89 784.99   3. RUQ pain  R10.11 789.01   4. Cough  R05.9  786.2         Plan:  Recommend Augmentin   Order US abdomen to check gallbladder.   Order CXR to check for pneumonia  Go to ED if symptoms worsen     Orders Placed This Encounter   Procedures    US Abdomen Complete    X-Ray Chest PA And Lateral       Current Outpatient Medications   Medication Sig Dispense Refill    blood glucose control, low (TRUE METRIX LEVEL 1) Soln 1 drop by Misc.(Non-Drug; Combo Route) route once daily. 1 each 4    blood glucose control, normal (TRUE METRIX LEVEL 2) Soln 1 drop by Misc.(Non-Drug; Combo Route) route once daily. 1 each 4    blood sugar diagnostic (TRUE METRIX GLUCOSE TEST STRIP) Strp 1 strip by Misc.(Non-Drug; Combo Route) route once daily. 30 strip 4    clopidogreL (PLAVIX) 75 mg tablet Take 1 tablet (75 mg total) by mouth once daily. 90 tablet 1    DULoxetine (CYMBALTA) 30 MG capsule Take 1 capsule (30 mg total) by mouth once daily. 90 capsule 1    FLUoxetine 10 MG capsule Take 2 capsules (20 mg total) by mouth once daily. 180 capsule 1    gabapentin (NEURONTIN) 300 MG capsule Take 1 capsule (300 mg total) by mouth 3 (three) times daily as needed. 90 capsule 5    HYDROcodone-acetaminophen (NORCO) 5-325 mg per tablet Take 1 tablet by mouth 3 (three) times daily.      lancets 32 gauge Misc 1 lancet by Misc.(Non-Drug; Combo Route) route once daily. 30 each 4    lisinopriL 10 MG tablet Take 10 mg by mouth once daily.      metFORMIN (GLUCOPHAGE) 500 MG tablet Take 1 tablet (500 mg total) by mouth 2 (two) times daily. 180 tablet 1    NON FORMULARY MEDICATION THC+ 300 NATURAL SL TINCTURE - ILERA Eaches      rosuvastatin (CRESTOR) 20 MG tablet Take 1 tablet (20 mg total) by mouth once daily. 90 tablet 1    tamsulosin (FLOMAX) 0.4 mg Cap Take 1 capsule (0.4 mg total) by mouth once daily. 90 capsule 1    tiZANidine (ZANAFLEX) 4 MG tablet Take 1 tablet TID Prn 270 tablet 1    walker (ULTRA-LIGHT ROLLATOR) Misc Use daily for ambulation 1 each 0    zolpidem (AMBIEN) 10 mg  Tab Take 1 tablet (10 mg total) by mouth nightly as needed (insomnia). 30 tablet 5    amoxicillin-clavulanate 875-125mg (AUGMENTIN) 875-125 mg per tablet Take 1 tablet by mouth every 12 (twelve) hours. for 7 days 14 tablet 0    blood-glucose meter (TRUE METRIX GLUCOSE METER) Misc 1 Device by Misc.(Non-Drug; Combo Route) route once. for 1 dose 1 each 0     No current facility-administered medications for this visit.       There are no discontinued medications.    No follow-ups on file.      Damien Barron MD  Scribe Attestation:   I, Kat Nava, am scribing for, and in the presence of, Dr.Arif Barron I performed the above scribed service and the documentation accurately describes the services I performed. I attest to the accuracy of the note.    I, Dr. Damien Barron, reviewed documentation as scribed above. I performed the services described in this documentation.  I agree that the record reflects my personal performance and is accurate and complete. Damien Barron MD.  10/04/2021

## 2022-02-24 ENCOUNTER — APPOINTMENT (OUTPATIENT)
Dept: RADIOLOGY | Facility: HOSPITAL | Age: 72
End: 2022-02-24
Attending: FAMILY MEDICINE
Payer: MEDICARE

## 2022-02-24 DIAGNOSIS — R11.0 NAUSEA: ICD-10-CM

## 2022-02-24 DIAGNOSIS — R10.11 RUQ PAIN: ICD-10-CM

## 2022-02-24 PROCEDURE — 76700 US ABDOMEN COMPLETE: ICD-10-PCS | Mod: 26,HCNC,, | Performed by: RADIOLOGY

## 2022-02-24 PROCEDURE — 76700 US EXAM ABDOM COMPLETE: CPT | Mod: 26,HCNC,, | Performed by: RADIOLOGY

## 2022-02-24 PROCEDURE — 76700 US EXAM ABDOM COMPLETE: CPT | Mod: TC,HCNC,PO

## 2022-03-07 NOTE — TELEPHONE ENCOUNTER
No new care gaps identified.  Powered by [employer] by [employer]. Reference number: [id number].   3/07/2022 12:27:20 PM CST

## 2022-03-08 NOTE — TELEPHONE ENCOUNTER
No new care gaps identified.  Powered by [employer] by [employer]. Reference number: [id number].   3/08/2022 12:11:21 AM CST

## 2022-03-09 ENCOUNTER — OFFICE VISIT (OUTPATIENT)
Dept: FAMILY MEDICINE | Facility: CLINIC | Age: 72
End: 2022-03-09
Payer: MEDICARE

## 2022-03-09 ENCOUNTER — TELEPHONE (OUTPATIENT)
Dept: FAMILY MEDICINE | Facility: CLINIC | Age: 72
End: 2022-03-09
Payer: MEDICARE

## 2022-03-09 VITALS
SYSTOLIC BLOOD PRESSURE: 106 MMHG | OXYGEN SATURATION: 99 % | HEART RATE: 104 BPM | WEIGHT: 131.19 LBS | HEIGHT: 66 IN | DIASTOLIC BLOOD PRESSURE: 74 MMHG | TEMPERATURE: 98 F | BODY MASS INDEX: 21.08 KG/M2

## 2022-03-09 DIAGNOSIS — M25.561 POSTERIOR RIGHT KNEE PAIN: Primary | ICD-10-CM

## 2022-03-09 PROCEDURE — 3288F FALL RISK ASSESSMENT DOCD: CPT | Mod: HCNC,CPTII,S$GLB, | Performed by: FAMILY MEDICINE

## 2022-03-09 PROCEDURE — 1159F MED LIST DOCD IN RCRD: CPT | Mod: HCNC,CPTII,S$GLB, | Performed by: FAMILY MEDICINE

## 2022-03-09 PROCEDURE — 4010F ACE/ARB THERAPY RXD/TAKEN: CPT | Mod: HCNC,CPTII,S$GLB, | Performed by: FAMILY MEDICINE

## 2022-03-09 PROCEDURE — 3008F BODY MASS INDEX DOCD: CPT | Mod: HCNC,CPTII,S$GLB, | Performed by: FAMILY MEDICINE

## 2022-03-09 PROCEDURE — 1125F AMNT PAIN NOTED PAIN PRSNT: CPT | Mod: HCNC,CPTII,S$GLB, | Performed by: FAMILY MEDICINE

## 2022-03-09 PROCEDURE — 99999 PR PBB SHADOW E&M-EST. PATIENT-LVL IV: ICD-10-PCS | Mod: PBBFAC,HCNC,, | Performed by: FAMILY MEDICINE

## 2022-03-09 PROCEDURE — 3044F PR MOST RECENT HEMOGLOBIN A1C LEVEL <7.0%: ICD-10-PCS | Mod: HCNC,CPTII,S$GLB, | Performed by: FAMILY MEDICINE

## 2022-03-09 PROCEDURE — 99999 PR PBB SHADOW E&M-EST. PATIENT-LVL IV: CPT | Mod: PBBFAC,HCNC,, | Performed by: FAMILY MEDICINE

## 2022-03-09 PROCEDURE — 3074F PR MOST RECENT SYSTOLIC BLOOD PRESSURE < 130 MM HG: ICD-10-PCS | Mod: HCNC,CPTII,S$GLB, | Performed by: FAMILY MEDICINE

## 2022-03-09 PROCEDURE — 1125F PR PAIN SEVERITY QUANTIFIED, PAIN PRESENT: ICD-10-PCS | Mod: HCNC,CPTII,S$GLB, | Performed by: FAMILY MEDICINE

## 2022-03-09 PROCEDURE — 3008F PR BODY MASS INDEX (BMI) DOCUMENTED: ICD-10-PCS | Mod: HCNC,CPTII,S$GLB, | Performed by: FAMILY MEDICINE

## 2022-03-09 PROCEDURE — 3072F LOW RISK FOR RETINOPATHY: CPT | Mod: HCNC,CPTII,S$GLB, | Performed by: FAMILY MEDICINE

## 2022-03-09 PROCEDURE — 1159F PR MEDICATION LIST DOCUMENTED IN MEDICAL RECORD: ICD-10-PCS | Mod: HCNC,CPTII,S$GLB, | Performed by: FAMILY MEDICINE

## 2022-03-09 PROCEDURE — 3078F DIAST BP <80 MM HG: CPT | Mod: HCNC,CPTII,S$GLB, | Performed by: FAMILY MEDICINE

## 2022-03-09 PROCEDURE — 1101F PR PT FALLS ASSESS DOC 0-1 FALLS W/OUT INJ PAST YR: ICD-10-PCS | Mod: HCNC,CPTII,S$GLB, | Performed by: FAMILY MEDICINE

## 2022-03-09 PROCEDURE — 99213 PR OFFICE/OUTPT VISIT, EST, LEVL III, 20-29 MIN: ICD-10-PCS | Mod: HCNC,S$GLB,, | Performed by: FAMILY MEDICINE

## 2022-03-09 PROCEDURE — 4010F PR ACE/ARB THEARPY RXD/TAKEN: ICD-10-PCS | Mod: HCNC,CPTII,S$GLB, | Performed by: FAMILY MEDICINE

## 2022-03-09 PROCEDURE — 3044F HG A1C LEVEL LT 7.0%: CPT | Mod: HCNC,CPTII,S$GLB, | Performed by: FAMILY MEDICINE

## 2022-03-09 PROCEDURE — 3074F SYST BP LT 130 MM HG: CPT | Mod: HCNC,CPTII,S$GLB, | Performed by: FAMILY MEDICINE

## 2022-03-09 PROCEDURE — 3078F PR MOST RECENT DIASTOLIC BLOOD PRESSURE < 80 MM HG: ICD-10-PCS | Mod: HCNC,CPTII,S$GLB, | Performed by: FAMILY MEDICINE

## 2022-03-09 PROCEDURE — 3288F PR FALLS RISK ASSESSMENT DOCUMENTED: ICD-10-PCS | Mod: HCNC,CPTII,S$GLB, | Performed by: FAMILY MEDICINE

## 2022-03-09 PROCEDURE — 1101F PT FALLS ASSESS-DOCD LE1/YR: CPT | Mod: HCNC,CPTII,S$GLB, | Performed by: FAMILY MEDICINE

## 2022-03-09 PROCEDURE — 3072F PR LOW RISK FOR RETINOPATHY: ICD-10-PCS | Mod: HCNC,CPTII,S$GLB, | Performed by: FAMILY MEDICINE

## 2022-03-09 PROCEDURE — 99213 OFFICE O/P EST LOW 20 MIN: CPT | Mod: HCNC,S$GLB,, | Performed by: FAMILY MEDICINE

## 2022-03-09 NOTE — TELEPHONE ENCOUNTER
Patient has started with pain in calf and knee with positive homans foot.    No redness, swelling, he is on plavix currently.     What do you recommend?

## 2022-03-09 NOTE — PROGRESS NOTES
Chief Complaint:    Chief Complaint   Patient presents with    Knee Pain    Leg Pain       History of Present Illness:  03/09/2022:-  Patient with a hx of DM, hyperlipidemia presents today for right leg pain x 3 days.    Has trouble walking, his calf is sore. No known injury or trauma. Rates pain as 10/10. Left with right-sided deficits from his stroke. On Plavix.     02/23/2022:-  Patient with a hx of DM, hyperlipidemia presents today for a runny nose and intermittent lower abdominal pain x 4 days.    Reports 3-4 episodes of diarrhea, nausea, sinus pain, congestion, fatigue, and AMS. Lost 10 pounds in 4 days. Denies sore throat,  constipation, blood in stool, dysuria. Stopped taking BCs 4-5 days ago. Took one this morning. Reports no modifying factors for abdominal pain. His diarrhea has stopped because he isn't eating anything.           02/09/2022:  Patient with a hx of DM, hyperlipidemia pesents today for back pain.    Reports lower non-radiating back pain. Mild relief with movement, medications. Was horse playing with his grandson.  Reports food gets stuck in esophagus. Has had his esophagus stretched before.     01/05/2022:-  This is a virtual visit for follow-up of chronic medical problems  Home blood pressure readings have been slightly high  Chronic back pain stable  Kidney function has improved this time still takes BC Powder  Taking Ambien for insomnia  On chronic pain management and also takes marijuana prescription    06/03/2021:-  Doing okay he is planning to have a CLEMENCIA of his back wants to hold his Plavix legs requested by Dr. home med  Diabetes control is stable  Blood pressure normal stools continues to smoke at side effects to Chantix and has not tried the patch did go to the smoking cessation counseling  Suffers with peripheral arterial disease vascular surgeon is proposing a fem-fem graft  Kidney function is slightly worse in he does eat a few BC powders daily.      10/06/2020:-  He has took  Keflex for the cellulitis of toe is seems that it was not working 1st now he has been prescribed doxycycline and clindamycin which he has taken today.    07/23/2020:-  Patient says he has some chronic issues with back pain he was on narcotic until he finally weaned himself off this was back   Couple days back he was lifting on his wife and that made his back pain worse.  He has some radiation to the right leg.  He has been taking excessively large number of BC tablets and Tylenol the keep the pain away.  He is interested in seeing pain management.  History of back surgery in the past.    Patient has hypertension, diabetes type to for number of years and he has 50 pack-year history of smoking still continues to smoke but does want to quit.    He has had at least 2 CVA the 1 was minor from which he completely recovered the 2nd 1 affected his right leg and to a lesser degrees right arm.  This was thought to be due to uncontrolled hypertension and diabetes.  His last A1c was 6.2 does appear that he has chronic kidney disease last creatinine was 1.4.    It has been number of years since he had his colonoscopy      ROS:  Review of Systems   Constitutional: Negative for activity change, chills and fever.   HENT: Negative for ear discharge, ear pain, hearing loss, postnasal drip and rhinorrhea.    Eyes: Negative for pain, discharge and visual disturbance.   Respiratory: Negative for chest tightness, shortness of breath and wheezing.    Cardiovascular: Negative for chest pain and palpitations.   Gastrointestinal: Negative for abdominal pain, blood in stool, constipation and vomiting.   Endocrine: Negative for heat intolerance, polydipsia and polyuria.   Genitourinary: Negative for difficulty urinating, dysuria, flank pain, frequency, hematuria and urgency.   Musculoskeletal: Positive for arthralgias, gait problem and myalgias. Negative for joint swelling and neck pain.   Skin: Negative for color change and rash.  "  Neurological: Negative for dizziness, tremors, seizures, weakness, numbness and headaches.   Psychiatric/Behavioral: Negative for agitation, confusion, hallucinations, self-injury, sleep disturbance and suicidal ideas. The patient is not nervous/anxious.    All other systems reviewed and are negative.      Past Medical History:   Diagnosis Date    Anxiety     Depression     Diabetes mellitus, type 2     Hypertension     Stroke        Social History:  Social History     Socioeconomic History    Marital status:    Tobacco Use    Smoking status: Current Every Day Smoker     Packs/day: 1.00     Types: Cigarettes    Smokeless tobacco: Current User     Types: Snuff   Substance and Sexual Activity    Alcohol use: Not Currently    Drug use: Never    Sexual activity: Not Currently       Family History:   family history includes COPD in his sister; Cancer in his father; Heart disease in his mother; Stroke in his mother.    Health Maintenance   Topic Date Due    Foot Exam  Never done    TETANUS VACCINE  Never done    Hemoglobin A1c  07/03/2022    Eye Exam  07/21/2022    Lipid Panel  01/03/2023    High Dose Statin  03/09/2023    Hepatitis C Screening  Completed    Abdominal Aortic Aneurysm Screening  Completed       Physical Exam:    Vital Signs  Temp: 97.7 °F (36.5 °C)  Temp src: Temporal  Pulse: 104  SpO2: 99 %  BP: 106/74  Pain Score: 10-Worst pain ever  Height and Weight  Height: 5' 6" (167.6 cm)  Weight: 59.5 kg (131 lb 2.8 oz)  BSA (Calculated - sq m): 1.66 sq meters  BMI (Calculated): 21.2  Weight in (lb) to have BMI = 25: 154.6]    Body mass index is 21.17 kg/m².    Physical Exam  Vitals and nursing note reviewed.   Constitutional:       Appearance: Normal appearance.   HENT:      Head: Normocephalic and atraumatic.      Jaw: No tenderness.      Right Ear: Tympanic membrane normal.      Left Ear: Tympanic membrane normal.      Nose:      Right Sinus: No maxillary sinus tenderness or frontal " sinus tenderness.      Left Sinus: No maxillary sinus tenderness or frontal sinus tenderness.   Eyes:      Extraocular Movements: Extraocular movements intact.      Pupils: Pupils are equal, round, and reactive to light.   Cardiovascular:      Rate and Rhythm: Normal rate and regular rhythm.      Pulses:           Dorsalis pedis pulses are 1+ on the left side.        Posterior tibial pulses are 1+ on the left side.   Pulmonary:      Effort: Pulmonary effort is normal.      Breath sounds: Normal breath sounds.   Abdominal:      General: There is no distension.      Palpations: Abdomen is soft.      Tenderness: There is no abdominal tenderness.   Musculoskeletal:         General: Normal range of motion.      Cervical back: Normal range of motion. No tenderness.      Thoracic back: No tenderness.      Lumbar back: No tenderness.      Right knee: No swelling (mild) or bony tenderness. Tenderness present.      Right lower leg: Tenderness present.        Legs:       Comments: Strength is 3/5 R leg, which is his baseline.   Skin:     General: Skin is warm and dry.   Neurological:      General: No focal deficit present.      Mental Status: He is alert and oriented to person, place, and time.      Gait: Gait abnormal.   Psychiatric:         Mood and Affect: Mood normal.         Behavior: Behavior normal.       Straight leg raising test is positive on the right but that is a chronic finding.    Diabetes Management Status    Statin: Not taking  ACE/ARB: Taking    Screening or Prevention Patient's value Goal Complete/Controlled?   HgA1C Testing and Control   Lab Results   Component Value Date    HGBA1C 5.9 (H) 01/03/2022      Annually/Less than 8% No   Lipid profile : 01/03/2022 Annually No   LDL control Lab Results   Component Value Date    LDLCALC 77.8 01/03/2022    Annually/Less than 100 mg/dl  No   Nephropathy screening Lab Results   Component Value Date    LABMICR 6.0 05/27/2021     Lab Results   Component Value Date     PROTEINUA 1+ (A) 02/23/2022    Annually No   Blood pressure BP Readings from Last 1 Encounters:   03/09/22 106/74    Less than 140/90 Yes   Dilated retinal exam : 07/21/2021 Annually Yes   Foot exam   Most Recent Foot Exam Date: Not Found Annually Yes       Assessment:      ICD-10-CM ICD-9-CM   1. Posterior right knee pain  M25.561 719.46         Plan:  Order US right lower extremity for posterior right knee pain, rule out popliteals cyst, DVT   Refer to physical therapy     Orders Placed This Encounter   Procedures    US Lower Extremity Veins Right       Current Outpatient Medications   Medication Sig Dispense Refill    blood glucose control, low (TRUE METRIX LEVEL 1) Soln 1 drop by Misc.(Non-Drug; Combo Route) route once daily. 1 each 4    blood glucose control, normal (TRUE METRIX LEVEL 2) Soln 1 drop by Misc.(Non-Drug; Combo Route) route once daily. 1 each 4    blood sugar diagnostic (TRUE METRIX GLUCOSE TEST STRIP) Strp 1 strip by Misc.(Non-Drug; Combo Route) route once daily. 30 strip 4    clopidogreL (PLAVIX) 75 mg tablet Take 1 tablet (75 mg total) by mouth once daily. 90 tablet 1    DULoxetine (CYMBALTA) 30 MG capsule Take 1 capsule (30 mg total) by mouth once daily. 90 capsule 1    FLUoxetine 10 MG capsule Take 2 capsules (20 mg total) by mouth once daily. 180 capsule 1    gabapentin (NEURONTIN) 300 MG capsule Take 1 capsule (300 mg total) by mouth 3 (three) times daily as needed. 90 capsule 5    HYDROcodone-acetaminophen (NORCO) 5-325 mg per tablet Take 1 tablet by mouth 3 (three) times daily.      lancets 32 gauge Misc 1 lancet by Misc.(Non-Drug; Combo Route) route once daily. 30 each 4    lisinopriL 10 MG tablet Take 10 mg by mouth once daily.      metFORMIN (GLUCOPHAGE) 500 MG tablet Take 1 tablet (500 mg total) by mouth 2 (two) times daily. 180 tablet 1    NON FORMULARY MEDICATION THC+ 300 NATURAL SL TINCTURE - ILERA Eaches      ondansetron (ZOFRAN) 8 MG tablet Take 1 tablet (8 mg total)  by mouth every 8 (eight) hours as needed for Nausea. 30 tablet 1    rosuvastatin (CRESTOR) 20 MG tablet Take 1 tablet (20 mg total) by mouth once daily. 90 tablet 1    tamsulosin (FLOMAX) 0.4 mg Cap Take 1 capsule (0.4 mg total) by mouth once daily. 90 capsule 1    tiZANidine (ZANAFLEX) 4 MG tablet Take 1 tablet TID Prn 270 tablet 1    walker (ULTRA-LIGHT ROLLATOR) Misc Use daily for ambulation 1 each 0    zolpidem (AMBIEN) 10 mg Tab Take 1 tablet (10 mg total) by mouth nightly as needed (insomnia). 30 tablet 5    blood-glucose meter (TRUE METRIX GLUCOSE METER) Misc 1 Device by Misc.(Non-Drug; Combo Route) route once. for 1 dose 1 each 0     No current facility-administered medications for this visit.       There are no discontinued medications.    No follow-ups on file.      Damien Barron MD  Scribe Attestation:   I, Kat Nava, am scribing for, and in the presence of, Dr.Arif Barron I performed the above scribed service and the documentation accurately describes the services I performed. I attest to the accuracy of the note.    I, Dr. Damien Barron, reviewed documentation as scribed above. I performed the services described in this documentation.  I agree that the record reflects my personal performance and is accurate and complete. Damien Barron MD.  10/04/2021

## 2022-03-10 ENCOUNTER — HOSPITAL ENCOUNTER (EMERGENCY)
Facility: HOSPITAL | Age: 72
Discharge: HOME | End: 2022-03-10
Attending: FAMILY MEDICINE
Payer: MEDICARE

## 2022-03-10 ENCOUNTER — HOSPITAL ENCOUNTER (OUTPATIENT)
Dept: RADIOLOGY | Facility: HOSPITAL | Age: 72
Discharge: HOME | End: 2022-03-10
Attending: FAMILY MEDICINE
Payer: MEDICARE

## 2022-03-10 VITALS
RESPIRATION RATE: 17 BRPM | WEIGHT: 129.63 LBS | BODY MASS INDEX: 20.92 KG/M2 | OXYGEN SATURATION: 97 % | TEMPERATURE: 98 F | HEART RATE: 98 BPM | DIASTOLIC BLOOD PRESSURE: 64 MMHG | SYSTOLIC BLOOD PRESSURE: 112 MMHG

## 2022-03-10 DIAGNOSIS — M25.561 POSTERIOR RIGHT KNEE PAIN: ICD-10-CM

## 2022-03-10 DIAGNOSIS — I82.411 ACUTE DEEP VEIN THROMBOSIS (DVT) OF FEMORAL VEIN OF RIGHT LOWER EXTREMITY: Primary | ICD-10-CM

## 2022-03-10 PROCEDURE — 25000003 PHARM REV CODE 250: Mod: HCNC | Performed by: FAMILY MEDICINE

## 2022-03-10 PROCEDURE — 63600175 PHARM REV CODE 636 W HCPCS: Mod: HCNC | Performed by: FAMILY MEDICINE

## 2022-03-10 PROCEDURE — 96372 THER/PROPH/DIAG INJ SC/IM: CPT | Performed by: FAMILY MEDICINE

## 2022-03-10 PROCEDURE — 93971 EXTREMITY STUDY: CPT | Mod: DBM,TC,HCNC,RT

## 2022-03-10 PROCEDURE — 99284 EMERGENCY DEPT VISIT MOD MDM: CPT | Mod: 25,HCNC

## 2022-03-10 RX ORDER — MORPHINE SULFATE 4 MG/ML
4 INJECTION, SOLUTION INTRAMUSCULAR; INTRAVENOUS
Status: DISCONTINUED | OUTPATIENT
Start: 2022-03-10 | End: 2022-03-10

## 2022-03-10 RX ORDER — APIXABAN 5 MG (74)
KIT ORAL
Qty: 74 TABLET | Refills: 0 | Status: SHIPPED | OUTPATIENT
Start: 2022-03-10 | End: 2022-03-11 | Stop reason: SDUPTHER

## 2022-03-10 RX ORDER — MORPHINE SULFATE 4 MG/ML
2 INJECTION, SOLUTION INTRAMUSCULAR; INTRAVENOUS
Status: COMPLETED | OUTPATIENT
Start: 2022-03-10 | End: 2022-03-10

## 2022-03-10 RX ADMIN — APIXABAN 10 MG: 2.5 TABLET, FILM COATED ORAL at 09:03

## 2022-03-10 RX ADMIN — MORPHINE SULFATE 2 MG: 4 INJECTION INTRAVENOUS at 09:03

## 2022-03-10 NOTE — FIRST PROVIDER EVALUATION
Medical screening exam completed.  I have conducted a focused provider triage encounter, findings are as follows:    Brief history of present illness:  Diagnosed with DVT worsening pain in leg    Vitals:    03/10/22 1707   BP: 112/74   BP Location: Right arm   Patient Position: Sitting   Pulse: 92   Resp: 18   Temp: 98.3 °F (36.8 °C)   TempSrc: Oral   SpO2: 97%   Weight: 58.8 kg (129 lb 10.1 oz)       Pertinent physical exam:  VSS, NAD, no shortness of breath     Brief workup plan:  Bed for exam    Preliminary workup initiated; this workup will be continued and followed by the physician or advanced practice provider that is assigned to the patient when roomed.

## 2022-03-11 DIAGNOSIS — I82.491 ACUTE DEEP VEIN THROMBOSIS (DVT) OF OTHER SPECIFIED VEIN OF RIGHT LOWER EXTREMITY: Primary | ICD-10-CM

## 2022-03-11 RX ORDER — APIXABAN 5 MG (74)
KIT ORAL
Qty: 74 TABLET | Refills: 0 | Status: SHIPPED | OUTPATIENT
Start: 2022-03-11 | End: 2022-04-22 | Stop reason: ALTCHOICE

## 2022-03-11 NOTE — ED PROVIDER NOTES
SCRIBE #1 NOTE: I, Billy Joshi, am scribing for, and in the presence of, Fay Vaca MD. I have scribed the entire note.       History     Chief Complaint   Patient presents with    Leg Pain     US shows a DVT in R leg. Started with pain on Sat     Review of patient's allergies indicates:   Allergen Reactions    Chantix [varenicline] Other (See Comments)     Felt bad         History of Present Illness     HPI    3/10/2022, 8:44 PM  History obtained from the patient      History of Present Illness: Samy Alejandre Jr. is a 72 y.o. male patient with a PMHx of DM2 and HTN who presents to the Emergency Department for evaluation of R leg pain which onset gradually 5 days pta. Pt states he was dx with DVT today by US Symptoms are constant and moderate in severity. Pt notes he is taking Plavix. No mitigating or exacerbating factors reported. No associated sxs reported. Patient denies any CP, weakness, numbness, fever, n/v, and all other sxs at this time. No prior Tx reported. No further complaints or concerns at this time.       Arrival mode: Personal vehicle    PCP: Damien Barron MD        Past Medical History:  Past Medical History:   Diagnosis Date    Anxiety     Depression     Diabetes mellitus, type 2     Hypertension     Stroke        Past Surgical History:  Past Surgical History:   Procedure Laterality Date    ANGIOGRAPHY OF LOWER EXTREMITY N/A 12/21/2020    Procedure: ANGIOGRAM, LOWER EXTREMITY/Rt leg poss pta/stent;  Surgeon: Todd Michel MD;  Location: [facility] CATH LAB;  Service: Peripheral Vascular;  Laterality: N/A;  rescheduled 12/8    BACK SURGERY  2015    CATARACT EXTRACTION      KNEE SURGERY  2012    LITHOTRIPSY  2000         Family History:  Family History   Problem Relation Age of Onset    Heart disease Mother     Stroke Mother     Cancer Father     COPD Sister        Social History:  Social History     Tobacco Use    Smoking status: Current Every Day Smoker     Packs/day: 1.00      Types: Cigarettes    Smokeless tobacco: Current User     Types: Snuff   Substance and Sexual Activity    Alcohol use: Not Currently    Drug use: Never    Sexual activity: Not Currently        Review of Systems     Review of Systems   Constitutional: Negative for fever.   HENT: Negative for sore throat.    Respiratory: Negative for shortness of breath.    Cardiovascular: Positive for leg swelling (R leg). Negative for chest pain.   Gastrointestinal: Negative for diarrhea, nausea and vomiting.   Genitourinary: Negative for dysuria.   Musculoskeletal: Negative for back pain.        (+) R posterior calf pain   Skin: Negative for rash.   Neurological: Negative for dizziness, weakness and numbness.   Hematological: Does not bruise/bleed easily.   All other systems reviewed and are negative.     Physical Exam     Initial Vitals [03/10/22 1707]   BP Pulse Resp Temp SpO2   112/74 92 18 98.3 °F (36.8 °C) 97 %      MAP       --          Physical Exam  Nursing Notes and Vital Signs Reviewed.  Constitutional: Patient is in no acute distress. Well-developed and well-nourished.  Head: Atraumatic. Normocephalic.  Eyes: PERRL. EOM intact. Conjunctivae are not pale. No scleral icterus.  ENT: Mucous membranes are moist. Oropharynx is clear and symmetric.    Neck: Supple. Full ROM. No lymphadenopathy.  Cardiovascular: Regular rate. Regular rhythm. No murmurs, rubs, or gallops. Distal pulses are 2+ and symmetric.  Pulmonary/Chest: No respiratory distress. Clear to auscultation bilaterally. No wheezing or rales.  Abdominal: Soft and non-distended.  There is no tenderness.  No rebound, guarding, or rigidity. Good bowel sounds.  Genitourinary: No CVA tenderness  Musculoskeletal: Moves all extremities. No obvious deformities. No edema. There is R posterior calf tenderness with 1+ edema.  Skin: Warm and dry.  Neurological:  Alert, awake, and appropriate.  Normal speech.  No acute focal neurological deficits are appreciated.  Psychiatric:  Normal affect. Good eye contact. Appropriate in content.     ED Course   Procedures  ED Vital Signs:  Vitals:    03/10/22 1707 03/10/22 1933 03/10/22 2114 03/10/22 2120   BP: 112/74 111/63  112/64   Pulse: 92 98     Resp: 18 18 17    Temp: 98.3 °F (36.8 °C)      TempSrc: Oral      SpO2: 97%      Weight: 58.8 kg (129 lb 10.1 oz)             The Emergency Provider reviewed the vital signs and test results, which are outlined above.     ED Discussion       9:03 PM: Discussed pt's case with Dr. Mckeon (Radiology) who recommends medical management at this time and to put the pt on Eliquis    9:04 PM: Reassessed pt at this time. Discussed with pt all pertinent ED information and results. Discussed pt dx and plan of tx. Gave pt all f/u and return to the ED instructions. All questions and concerns were addressed at this time. Pt expresses understanding of information and instructions, and is comfortable with plan to discharge. Pt is stable for discharge.    I discussed with patient and/or family/caretaker that evaluation in the ED does not suggest any emergent or life threatening medical conditions requiring immediate intervention beyond what was provided in the ED, and I believe patient is safe for discharge.  Regardless, an unremarkable evaluation in the ED does not preclude the development or presence of a serious of life threatening condition. As such, patient was instructed to return immediately for any worsening or change in current symptoms.                ED Medication(s):  Medications   morphine injection 2 mg (2 mg Intramuscular Given 3/10/22 2114)       Discharge Medication List as of 3/10/2022  9:03 PM      START taking these medications    Details   apixaban (ELIQUIS DVT-PE TREAT 30D START) 5 mg (74 tabs) DsPk For the first 7 days take two 5 mg tablets twice daily.  After 7 days take one 5 mg tablet twice daily., Print              Follow-up Information     Schedule an appointment as soon as possible for a  visit  with Damien Barron MD.    Specialty: Family Medicine  Why: As needed  Contact information:  [id number] [address] [address]  [phone]                             Scribe Attestation:   Scribe #1: I performed the above scribed service and the documentation accurately describes the services I performed. I attest to the accuracy of the note.     Attending:   Physician Attestation Statement for Scribe #1: I, Fay Vaca MD, personally performed the services described in this documentation, as scribed by Billy Joshi, in my presence, and it is both accurate and complete.           Clinical Impression       ICD-10-CM ICD-9-CM   1. Acute deep vein thrombosis (DVT) of femoral vein of right lower extremity  I82.411 453.41       Disposition:   Disposition: Discharged  Condition: Stable         Fay Vaca MD  03/11/22 1665

## 2022-03-11 NOTE — TELEPHONE ENCOUNTER
Patient was discharged from ED yesterday afternoon for DVT.   They did a written prescription for Eliquis 5 mg. First 7 days take 2 two 5mg tablets twice a day, then one 5mg tablet twice a daily.    Once this is completed is he going to need to continue Eliquis or is this a one time thing?    Also needs it electronically sent in please.

## 2022-03-12 NOTE — TELEPHONE ENCOUNTER

## 2022-03-12 NOTE — TELEPHONE ENCOUNTER
This Rx Request does not qualify for assessment with the [facility]   Please review protocol details and the Care Due Message for extra clinical information    Reasons Rx Request may be deferred:  Labs/Vitals overdue  Labs/Vitals abnormal  Patient has been seen in the ED/Hospital since the last PCP visit  Medication is non-delegated  Provider is a non-participating provider  Pt due for OV with PCP  Indication is outside of protocol for ORC    Note composed:11:44 PM 03/11/2022

## 2022-03-13 RX ORDER — FLUOXETINE 10 MG/1
CAPSULE ORAL
Qty: 180 CAPSULE | Refills: 1 | Status: SHIPPED | OUTPATIENT
Start: 2022-03-13 | End: 2022-05-10 | Stop reason: SDUPTHER

## 2022-03-13 RX ORDER — LISINOPRIL 10 MG/1
TABLET ORAL
Qty: 30 TABLET | Refills: 0 | Status: ON HOLD | OUTPATIENT
Start: 2022-03-13 | End: 2022-03-23

## 2022-03-20 ENCOUNTER — HOSPITAL ENCOUNTER (INPATIENT)
Facility: HOSPITAL | Age: 72
LOS: 3 days | Discharge: HOME HEALTH | DRG: 194 | End: 2022-03-25
Attending: EMERGENCY MEDICINE | Admitting: INTERNAL MEDICINE
Payer: MEDICARE

## 2022-03-20 DIAGNOSIS — I69.359 HEMIPARESIS AFFECTING DOMINANT SIDE AS LATE EFFECT OF CEREBROVASCULAR ACCIDENT (CVA): ICD-10-CM

## 2022-03-20 DIAGNOSIS — J98.4 CAVITARY LESION OF LUNG: Primary | ICD-10-CM

## 2022-03-20 DIAGNOSIS — E11.22 CONTROLLED TYPE 2 DIABETES MELLITUS WITH STAGE 3 CHRONIC KIDNEY DISEASE, WITHOUT LONG-TERM CURRENT USE OF INSULIN: ICD-10-CM

## 2022-03-20 DIAGNOSIS — E86.0 DEHYDRATION: ICD-10-CM

## 2022-03-20 DIAGNOSIS — I69.30 HISTORY OF CVA WITH RESIDUAL DEFICIT: ICD-10-CM

## 2022-03-20 DIAGNOSIS — J98.4 PULMONARY CAVITARY LESION: ICD-10-CM

## 2022-03-20 DIAGNOSIS — R07.9 CHEST PAIN: ICD-10-CM

## 2022-03-20 DIAGNOSIS — N18.30 CONTROLLED TYPE 2 DIABETES MELLITUS WITH STAGE 3 CHRONIC KIDNEY DISEASE, WITHOUT LONG-TERM CURRENT USE OF INSULIN: ICD-10-CM

## 2022-03-20 DIAGNOSIS — R53.1 WEAKNESS: ICD-10-CM

## 2022-03-20 DIAGNOSIS — I82.4Y1 ACUTE DEEP VEIN THROMBOSIS (DVT) OF PROXIMAL VEIN OF RIGHT LOWER EXTREMITY: ICD-10-CM

## 2022-03-20 PROBLEM — R11.2 NAUSEA AND VOMITING: Status: ACTIVE | Noted: 2022-03-20

## 2022-03-20 PROBLEM — R63.4 WEIGHT LOSS: Status: ACTIVE | Noted: 2022-03-20
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
## 2022-03-20 PROBLEM — N17.9 AKI (ACUTE KIDNEY INJURY): Status: ACTIVE | Noted: 2022-03-20

## 2022-03-20 LAB
ALBUMIN SERPL BCP-MCNC: 2.5 G/DL (ref 3.5–5.2)
ALP SERPL-CCNC: 145 U/L (ref 55–135)
ALT SERPL W/O P-5'-P-CCNC: 5 U/L (ref 10–44)
ANION GAP SERPL CALC-SCNC: 12 MMOL/L (ref 8–16)
AST SERPL-CCNC: 12 U/L (ref 10–40)
BASOPHILS # BLD AUTO: 0.06 K/UL (ref 0–0.2)
BASOPHILS NFR BLD: 0.4 % (ref 0–1.9)
BILIRUB SERPL-MCNC: 0.3 MG/DL (ref 0.1–1)
BILIRUB UR QL STRIP: NEGATIVE
BNP SERPL-MCNC: 23 PG/ML (ref 0–99)
BUN SERPL-MCNC: 25 MG/DL (ref 8–23)
CALCIUM SERPL-MCNC: 9.1 MG/DL (ref 8.7–10.5)
CHLORIDE SERPL-SCNC: 104 MMOL/L (ref 95–110)
CLARITY UR: CLEAR
CO2 SERPL-SCNC: 19 MMOL/L (ref 23–29)
COLOR UR: YELLOW
CREAT SERPL-MCNC: 1.7 MG/DL (ref 0.5–1.4)
CTP QC/QA: YES
DIFFERENTIAL METHOD: ABNORMAL
EOSINOPHIL # BLD AUTO: 0.1 K/UL (ref 0–0.5)
EOSINOPHIL NFR BLD: 0.6 % (ref 0–8)
ERYTHROCYTE [DISTWIDTH] IN BLOOD BY AUTOMATED COUNT: 14.4 % (ref 11.5–14.5)
EST. GFR  (AFRICAN AMERICAN): 46 ML/MIN/1.73 M^2
EST. GFR  (NON AFRICAN AMERICAN): 40 ML/MIN/1.73 M^2
GLUCOSE SERPL-MCNC: 105 MG/DL (ref 70–110)
GLUCOSE UR QL STRIP: NEGATIVE
HCT VFR BLD AUTO: 32.5 % (ref 40–54)
HGB BLD-MCNC: 11.1 G/DL (ref 14–18)
HGB UR QL STRIP: NEGATIVE
IMM GRANULOCYTES # BLD AUTO: 0.1 K/UL (ref 0–0.04)
IMM GRANULOCYTES NFR BLD AUTO: 0.7 % (ref 0–0.5)
KETONES UR QL STRIP: ABNORMAL
LACTATE SERPL-SCNC: 0.9 MMOL/L (ref 0.5–2.2)
LEUKOCYTE ESTERASE UR QL STRIP: NEGATIVE
LYMPHOCYTES # BLD AUTO: 1.5 K/UL (ref 1–4.8)
LYMPHOCYTES NFR BLD: 10.4 % (ref 18–48)
MAGNESIUM SERPL-MCNC: 1.5 MG/DL (ref 1.6–2.6)
MCH RBC QN AUTO: 33.8 PG (ref 27–31)
MCHC RBC AUTO-ENTMCNC: 34.2 G/DL (ref 32–36)
MCV RBC AUTO: 99 FL (ref 82–98)
MONOCYTES # BLD AUTO: 1.7 K/UL (ref 0.3–1)
MONOCYTES NFR BLD: 12.1 % (ref 4–15)
NEUTROPHILS # BLD AUTO: 10.8 K/UL (ref 1.8–7.7)
NEUTROPHILS NFR BLD: 75.8 % (ref 38–73)
NITRITE UR QL STRIP: NEGATIVE
NRBC BLD-RTO: 0 /100 WBC
PH UR STRIP: 6 [PH] (ref 5–8)
PHOSPHATE SERPL-MCNC: 3.1 MG/DL (ref 2.7–4.5)
PLATELET # BLD AUTO: 312 K/UL (ref 150–450)
PMV BLD AUTO: 10.2 FL (ref 9.2–12.9)
POTASSIUM SERPL-SCNC: 4.4 MMOL/L (ref 3.5–5.1)
PROCALCITONIN SERPL IA-MCNC: 0.24 NG/ML
PROT SERPL-MCNC: 6.3 G/DL (ref 6–8.4)
PROT UR QL STRIP: NEGATIVE
RBC # BLD AUTO: 3.28 M/UL (ref 4.6–6.2)
SARS-COV-2 RDRP RESP QL NAA+PROBE: NEGATIVE
SODIUM SERPL-SCNC: 135 MMOL/L (ref 136–145)
SP GR UR STRIP: 1.02 (ref 1–1.03)
TROPONIN I SERPL DL<=0.01 NG/ML-MCNC: 0.01 NG/ML (ref 0–0.03)
TSH SERPL DL<=0.005 MIU/L-ACNC: 2.72 UIU/ML (ref 0.4–4)
URN SPEC COLLECT METH UR: ABNORMAL
UROBILINOGEN UR STRIP-ACNC: NEGATIVE EU/DL
WBC # BLD AUTO: 14.27 K/UL (ref 3.9–12.7)

## 2022-03-20 PROCEDURE — 25000003 PHARM REV CODE 250: Performed by: NURSE PRACTITIONER

## 2022-03-20 PROCEDURE — 83605 ASSAY OF LACTIC ACID: CPT | Performed by: NURSE PRACTITIONER

## 2022-03-20 PROCEDURE — 93005 ELECTROCARDIOGRAM TRACING: CPT

## 2022-03-20 PROCEDURE — 85025 COMPLETE CBC W/AUTO DIFF WBC: CPT | Performed by: EMERGENCY MEDICINE

## 2022-03-20 PROCEDURE — 83735 ASSAY OF MAGNESIUM: CPT | Performed by: EMERGENCY MEDICINE

## 2022-03-20 PROCEDURE — 81003 URINALYSIS AUTO W/O SCOPE: CPT | Performed by: EMERGENCY MEDICINE

## 2022-03-20 PROCEDURE — 84145 PROCALCITONIN (PCT): CPT | Performed by: NURSE PRACTITIONER

## 2022-03-20 PROCEDURE — 87040 BLOOD CULTURE FOR BACTERIA: CPT | Mod: 59 | Performed by: NURSE PRACTITIONER

## 2022-03-20 PROCEDURE — 94761 N-INVAS EAR/PLS OXIMETRY MLT: CPT

## 2022-03-20 PROCEDURE — G0378 HOSPITAL OBSERVATION PER HR: HCPCS

## 2022-03-20 PROCEDURE — U0002 COVID-19 LAB TEST NON-CDC: HCPCS | Performed by: EMERGENCY MEDICINE

## 2022-03-20 PROCEDURE — 93010 ELECTROCARDIOGRAM REPORT: CPT | Mod: ,,, | Performed by: INTERNAL MEDICINE

## 2022-03-20 PROCEDURE — 84484 ASSAY OF TROPONIN QUANT: CPT | Performed by: EMERGENCY MEDICINE

## 2022-03-20 PROCEDURE — 84100 ASSAY OF PHOSPHORUS: CPT | Performed by: EMERGENCY MEDICINE

## 2022-03-20 PROCEDURE — 83880 ASSAY OF NATRIURETIC PEPTIDE: CPT | Performed by: EMERGENCY MEDICINE

## 2022-03-20 PROCEDURE — 96361 HYDRATE IV INFUSION ADD-ON: CPT

## 2022-03-20 PROCEDURE — 96360 HYDRATION IV INFUSION INIT: CPT

## 2022-03-20 PROCEDURE — 25000003 PHARM REV CODE 250: Performed by: EMERGENCY MEDICINE

## 2022-03-20 PROCEDURE — 93010 EKG 12-LEAD: ICD-10-PCS | Mod: ,,, | Performed by: INTERNAL MEDICINE

## 2022-03-20 PROCEDURE — 80053 COMPREHEN METABOLIC PANEL: CPT | Performed by: EMERGENCY MEDICINE

## 2022-03-20 PROCEDURE — 99291 CRITICAL CARE FIRST HOUR: CPT | Mod: 25

## 2022-03-20 PROCEDURE — 84443 ASSAY THYROID STIM HORMONE: CPT | Performed by: EMERGENCY MEDICINE

## 2022-03-20 PROCEDURE — 36415 COLL VENOUS BLD VENIPUNCTURE: CPT | Performed by: NURSE PRACTITIONER

## 2022-03-20 RX ORDER — INSULIN ASPART 100 [IU]/ML
0-5 INJECTION, SOLUTION INTRAVENOUS; SUBCUTANEOUS
Status: DISCONTINUED | OUTPATIENT
Start: 2022-03-20 | End: 2022-03-25 | Stop reason: HOSPADM

## 2022-03-20 RX ORDER — IBUPROFEN 200 MG
16 TABLET ORAL
Status: DISCONTINUED | OUTPATIENT
Start: 2022-03-20 | End: 2022-03-25 | Stop reason: HOSPADM

## 2022-03-20 RX ORDER — FLUOXETINE HYDROCHLORIDE 20 MG/1
20 CAPSULE ORAL DAILY
Status: DISCONTINUED | OUTPATIENT
Start: 2022-03-21 | End: 2022-03-25 | Stop reason: HOSPADM

## 2022-03-20 RX ORDER — HEPARIN SODIUM,PORCINE/D5W 25000/250
0-40 INTRAVENOUS SOLUTION INTRAVENOUS CONTINUOUS
Status: DISCONTINUED | OUTPATIENT
Start: 2022-03-20 | End: 2022-03-24 | Stop reason: ALTCHOICE

## 2022-03-20 RX ORDER — NALOXONE HCL 0.4 MG/ML
0.02 VIAL (ML) INJECTION
Status: DISCONTINUED | OUTPATIENT
Start: 2022-03-20 | End: 2022-03-25 | Stop reason: HOSPADM

## 2022-03-20 RX ORDER — SIMETHICONE 80 MG
1 TABLET,CHEWABLE ORAL 4 TIMES DAILY PRN
Status: DISCONTINUED | OUTPATIENT
Start: 2022-03-20 | End: 2022-03-25 | Stop reason: HOSPADM

## 2022-03-20 RX ORDER — SODIUM CHLORIDE 9 MG/ML
INJECTION, SOLUTION INTRAVENOUS CONTINUOUS
Status: DISCONTINUED | OUTPATIENT
Start: 2022-03-20 | End: 2022-03-20

## 2022-03-20 RX ORDER — TAMSULOSIN HYDROCHLORIDE 0.4 MG/1
1 CAPSULE ORAL DAILY
Status: DISCONTINUED | OUTPATIENT
Start: 2022-03-21 | End: 2022-03-25 | Stop reason: HOSPADM

## 2022-03-20 RX ORDER — SODIUM CHLORIDE 0.9 % (FLUSH) 0.9 %
10 SYRINGE (ML) INJECTION EVERY 8 HOURS PRN
Status: DISCONTINUED | OUTPATIENT
Start: 2022-03-20 | End: 2022-03-25 | Stop reason: HOSPADM

## 2022-03-20 RX ORDER — ONDANSETRON 2 MG/ML
4 INJECTION INTRAMUSCULAR; INTRAVENOUS EVERY 8 HOURS PRN
Status: DISCONTINUED | OUTPATIENT
Start: 2022-03-20 | End: 2022-03-25 | Stop reason: HOSPADM

## 2022-03-20 RX ORDER — ATORVASTATIN CALCIUM 40 MG/1
40 TABLET, FILM COATED ORAL NIGHTLY
Refills: 1 | Status: DISCONTINUED | OUTPATIENT
Start: 2022-03-20 | End: 2022-03-25 | Stop reason: HOSPADM

## 2022-03-20 RX ORDER — TALC
6 POWDER (GRAM) TOPICAL NIGHTLY PRN
Status: DISCONTINUED | OUTPATIENT
Start: 2022-03-20 | End: 2022-03-25 | Stop reason: HOSPADM

## 2022-03-20 RX ORDER — AMOXICILLIN 250 MG
1 CAPSULE ORAL 2 TIMES DAILY
Status: DISCONTINUED | OUTPATIENT
Start: 2022-03-20 | End: 2022-03-22

## 2022-03-20 RX ORDER — SODIUM CHLORIDE 9 MG/ML
INJECTION, SOLUTION INTRAVENOUS CONTINUOUS
Status: DISCONTINUED | OUTPATIENT
Start: 2022-03-20 | End: 2022-03-21

## 2022-03-20 RX ORDER — SODIUM CHLORIDE 9 MG/ML
INJECTION, SOLUTION INTRAVENOUS ONCE
Status: DISCONTINUED | OUTPATIENT
Start: 2022-03-20 | End: 2022-03-20

## 2022-03-20 RX ORDER — GLUCAGON 1 MG
1 KIT INJECTION
Status: DISCONTINUED | OUTPATIENT
Start: 2022-03-20 | End: 2022-03-25 | Stop reason: HOSPADM

## 2022-03-20 RX ORDER — CLOPIDOGREL BISULFATE 75 MG/1
75 TABLET ORAL DAILY
Status: DISCONTINUED | OUTPATIENT
Start: 2022-03-21 | End: 2022-03-20

## 2022-03-20 RX ORDER — ACETAMINOPHEN 325 MG/1
650 TABLET ORAL EVERY 4 HOURS PRN
Status: DISCONTINUED | OUTPATIENT
Start: 2022-03-20 | End: 2022-03-25 | Stop reason: HOSPADM

## 2022-03-20 RX ORDER — DULOXETIN HYDROCHLORIDE 30 MG/1
30 CAPSULE, DELAYED RELEASE ORAL DAILY
Status: DISCONTINUED | OUTPATIENT
Start: 2022-03-21 | End: 2022-03-20

## 2022-03-20 RX ORDER — MAG HYDROX/ALUMINUM HYD/SIMETH 200-200-20
30 SUSPENSION, ORAL (FINAL DOSE FORM) ORAL 4 TIMES DAILY PRN
Status: DISCONTINUED | OUTPATIENT
Start: 2022-03-20 | End: 2022-03-25 | Stop reason: HOSPADM

## 2022-03-20 RX ORDER — IBUPROFEN 200 MG
24 TABLET ORAL
Status: DISCONTINUED | OUTPATIENT
Start: 2022-03-20 | End: 2022-03-25 | Stop reason: HOSPADM

## 2022-03-20 RX ADMIN — SODIUM CHLORIDE 500 ML: 0.9 INJECTION, SOLUTION INTRAVENOUS at 12:03

## 2022-03-20 RX ADMIN — SODIUM CHLORIDE 1000 ML: 0.9 INJECTION, SOLUTION INTRAVENOUS at 05:03

## 2022-03-20 RX ADMIN — ATORVASTATIN CALCIUM 40 MG: 40 TABLET, FILM COATED ORAL at 11:03

## 2022-03-20 RX ADMIN — DOCUSATE SODIUM AND SENNOSIDES 1 TABLET: 8.6; 5 TABLET, FILM COATED ORAL at 11:03

## 2022-03-20 RX ADMIN — Medication 6 MG: at 11:03

## 2022-03-20 RX ADMIN — SODIUM CHLORIDE 500 ML: 0.9 INJECTION, SOLUTION INTRAVENOUS at 02:03

## 2022-03-20 NOTE — PHARMACY MED REC
"Admission Medication History     The home medication history was taken by Dominick Valdes.    You may go to "Admission" then "Reconcile Home Medications" tabs to review and/or act upon these items.      The home medication list has been updated by the Pharmacy department.    Please read ALL comments highlighted in yellow.    Please address this information as you see fit.     Feel free to contact us if you have any questions or require assistance.      Medications listed below were obtained from: Patient/family: [facility]  (Not in a hospital admission)      Dominick Valdes  [id number]      Current Outpatient Medications on File Prior to Encounter   Medication Sig Dispense Refill Last Dose    apixaban (ELIQUIS DVT-PE TREAT 30D START) 5 mg (74 tabs) DsPk For the first 7 days take two 5 mg tablets twice daily.  After 7 days take one 5 mg tablet twice daily. 74 tablet 0 3/19/2022 at Unknown time    clopidogreL (PLAVIX) 75 mg tablet Take 1 tablet (75 mg total) by mouth once daily. 90 tablet 1 3/19/2022 at Unknown time    DULoxetine (CYMBALTA) 30 MG capsule Take 1 capsule (30 mg total) by mouth once daily. 90 capsule 1 3/19/2022 at Unknown time    FLUoxetine 10 MG capsule TAKE 2 CAPSULES BY MOUTH ONCE DAILY (Patient taking differently: 20 mg.) 180 capsule 1 3/19/2022 at Unknown time    gabapentin (NEURONTIN) 300 MG capsule Take 1 capsule (300 mg total) by mouth 3 (three) times daily as needed. 90 capsule 5 3/19/2022 at Unknown time    HYDROcodone-acetaminophen (NORCO) 5-325 mg per tablet Take 1 tablet by mouth 3 (three) times daily.   3/19/2022 at Unknown time    metFORMIN (GLUCOPHAGE) 500 MG tablet Take 1 tablet (500 mg total) by mouth 2 (two) times daily. 180 tablet 1 3/19/2022 at Unknown time    ondansetron (ZOFRAN) 8 MG tablet Take 1 tablet (8 mg total) by mouth every 8 (eight) hours as needed for Nausea. 30 tablet 1 3/19/2022 at Unknown time    rosuvastatin (CRESTOR) 20 MG tablet Take 1 tablet (20 mg " total) by mouth once daily. 90 tablet 1 3/19/2022 at Unknown time    tamsulosin (FLOMAX) 0.4 mg Cap Take 1 capsule (0.4 mg total) by mouth once daily. 90 capsule 1 3/19/2022 at Unknown time    tiZANidine (ZANAFLEX) 4 MG tablet Take 1 tablet TID Prn 270 tablet 1 Past Week at Unknown time    zolpidem (AMBIEN) 10 mg Tab Take 1 tablet (10 mg total) by mouth nightly as needed (insomnia). 30 tablet 5 3/19/2022 at Unknown time    blood glucose control, low (TRUE METRIX LEVEL 1) Soln 1 drop by Misc.(Non-Drug; Combo Route) route once daily. 1 each 4     blood glucose control, normal (TRUE METRIX LEVEL 2) Soln 1 drop by Misc.(Non-Drug; Combo Route) route once daily. 1 each 4     blood sugar diagnostic (TRUE METRIX GLUCOSE TEST STRIP) Strp 1 strip by Misc.(Non-Drug; Combo Route) route once daily. 30 strip 4     blood-glucose meter (TRUE METRIX GLUCOSE METER) Misc 1 Device by Misc.(Non-Drug; Combo Route) route once. for 1 dose 1 each 0     lancets 32 gauge Misc 1 lancet by Misc.(Non-Drug; Combo Route) route once daily. 30 each 4     lisinopriL 10 MG tablet Take 1 tablet by mouth once daily 30 tablet 0     NON FORMULARY MEDICATION THC+ 300 NATURAL SL TINCTURE - [place]       walker (ULTRA-LIGHT ROLLATOR) Misc Use daily for ambulation 1 each 0                              .

## 2022-03-20 NOTE — ED PROVIDER NOTES
SCRIBE #1 NOTE: I, Roxanna Hinkle, am scribing for, and in the presence of, Court Lopez MD. I have scribed the entire note.       History     Chief Complaint   Patient presents with    Fatigue     Generalized weakness.  Recent DVT to right leg     Review of patient's allergies indicates:   Allergen Reactions    Chantix [varenicline] Other (See Comments)     Felt bad         History of Present Illness     HPI    3/20/2022, 12:26 PM  History obtained from the patient and daughter.      History of Present Illness: Samy Alejandre Jr. is a 72 y.o. male patient with a PMHx of diabetes mellitus, HTN, stroke, who presents to the Emergency Department for evaluation of weakness (generalized) which onset gradually a few days PTA. Daughter states pt fell twice today around 5 AM and 10 AM. Daughter denies pt hitting his head. Symptoms are constant and moderate in severity. No mitigating or exacerbating factors reported. Associated sxs include confusion, slurred speech, decreased appetite, R leg pain, N/V/D. Patient denies any fever, dysuria, rhinorrhea, congestion, dizziness, rash, and all other sxs at this time. Pt is currently taking Eliquis. No further complaints or concerns at this time.       Arrival mode: Ambulance Service    PCP: Damien Barron MD        Past Medical History:  Past Medical History:   Diagnosis Date    Anxiety     Depression     Diabetes mellitus, type 2     Hypertension     Stroke        Past Surgical History:  Past Surgical History:   Procedure Laterality Date    ANGIOGRAPHY OF LOWER EXTREMITY N/A 12/21/2020    Procedure: ANGIOGRAM, LOWER EXTREMITY/Rt leg poss pta/stent;  Surgeon: Todd Michel MD;  Location: [facility] CATH LAB;  Service: Peripheral Vascular;  Laterality: N/A;  rescheduled 12/8    BACK SURGERY  2015    CATARACT EXTRACTION      KNEE SURGERY  2012    LITHOTRIPSY  2000         Family History:  Family History   Problem Relation Age of Onset    Heart disease Mother     Stroke Mother      Cancer Father     COPD Sister        Social History:  Social History     Tobacco Use    Smoking status: Current Every Day Smoker     Packs/day: 1.00     Types: Cigarettes    Smokeless tobacco: Current User     Types: Snuff   Substance and Sexual Activity    Alcohol use: Not Currently    Drug use: Never    Sexual activity: Not Currently        Review of Systems     Review of Systems   Constitutional: Positive for appetite change (decreased). Negative for fever.   HENT: Negative for congestion, rhinorrhea and sore throat.    Respiratory: Negative for shortness of breath.    Cardiovascular: Negative for chest pain.   Gastrointestinal: Positive for diarrhea, nausea and vomiting.   Genitourinary: Negative for dysuria.   Musculoskeletal: Negative for back pain.        (+) R leg pain   Skin: Negative for rash.   Neurological: Positive for speech difficulty (slurred) and weakness (generalized). Negative for dizziness.   Hematological: Does not bruise/bleed easily.   Psychiatric/Behavioral: Positive for confusion.   All other systems reviewed and are negative.       Physical Exam     Initial Vitals [03/20/22 1143]   BP Pulse Resp Temp SpO2   90/60 99 18 98 °F (36.7 °C) 98 %      MAP       --          Physical Exam  Nursing Notes and Vital Signs Reviewed.  Constitutional: Patient is in no acute distress. Generalized weakness. Decreased appetite.  Head: Atraumatic. Normocephalic.  Eyes: PERRL. EOM intact. Conjunctivae are not pale. No scleral icterus.  ENT: Mucous membranes are moist. Oropharynx is clear and symmetric.    Neck: Supple. Full ROM. No lymphadenopathy.  Cardiovascular: Regular rate. Regular rhythm. No murmurs, rubs, or gallops. Distal pulses are 2+ and symmetric.  Pulmonary/Chest: No respiratory distress. Clear to auscultation bilaterally. No wheezing or rales.  Abdominal: Soft and non-distended.  There is no tenderness.  No rebound, guarding, or rigidity. Good bowel sounds.  Genitourinary: No CVA  tenderness  Musculoskeletal: Moves all extremities. No obvious deformities. No edema. No calf tenderness.  Skin: Warm and dry.  Neurological:  Alert, awake, and appropriate.  Normal speech.  No acute focal neurological deficits are appreciated.  Psychiatric: Normal affect. Good eye contact. Appropriate in content.     ED Course   Critical Care    Date/Time: 3/20/2022 3:33 PM  Performed by: Court Lopez MD  Authorized by: Court Lopez MD   Direct patient critical care time: 8 minutes  Additional history critical care time: 9 minutes  Ordering / reviewing critical care time: 8 minutes  Documentation critical care time: 9 minutes  Consulting other physicians critical care time: 11 minutes  Total critical care time (exclusive of procedural time) : 45 minutes  Critical care time was exclusive of separately billable procedures and treating other patients and teaching time.  Critical care was necessary to treat or prevent imminent or life-threatening deterioration of the following conditions: dehydration; MARI; acute DVT.  Critical care was time spent personally by me on the following activities: blood draw for specimens, development of treatment plan with patient or surrogate, discussions with consultants, interpretation of cardiac output measurements, evaluation of patient's response to treatment, examination of patient, obtaining history from patient or surrogate, ordering and performing treatments and interventions, ordering and review of laboratory studies, ordering and review of radiographic studies, pulse oximetry, re-evaluation of patient's condition and review of old charts.        ED Vital Signs:  Vitals:    03/20/22 1143 03/20/22 1231 03/20/22 1232 03/20/22 1237   BP: 90/60 105/73     Pulse: 99  99 97   Resp: 18      Temp: 98 °F (36.7 °C)      TempSrc: Oral      SpO2: 98%      Weight:        03/20/22 1240 03/20/22 1309 03/20/22 1327 03/20/22 1408   BP:   (!) 95/59 (!) 84/53   Pulse:  91     Resp:  (!) 21      Temp:       TempSrc:       SpO2:  97%     Weight: 59.9 kg (132 lb)       03/20/22 1432 03/20/22 1507   BP: (!) 94/58    Pulse: 88    Resp: 19    Temp:  98.1 °F (36.7 °C)   TempSrc:  Oral   SpO2: 98%    Weight:         Abnormal Lab Results:  Labs Reviewed   CBC W/ AUTO DIFFERENTIAL - Abnormal; Notable for the following components:       Result Value    WBC 14.27 (*)     RBC 3.28 (*)     Hemoglobin 11.1 (*)     Hematocrit 32.5 (*)     MCV 99 (*)     MCH 33.8 (*)     Immature Granulocytes 0.7 (*)     Gran # (ANC) 10.8 (*)     Immature Grans (Abs) 0.10 (*)     Mono # 1.7 (*)     Gran % 75.8 (*)     Lymph % 10.4 (*)     All other components within normal limits   COMPREHENSIVE METABOLIC PANEL - Abnormal; Notable for the following components:    Sodium 135 (*)     CO2 19 (*)     BUN 25 (*)     Creatinine 1.7 (*)     Albumin 2.5 (*)     Alkaline Phosphatase 145 (*)     ALT 5 (*)     eGFR if  46 (*)     eGFR if non  40 (*)     All other components within normal limits   MAGNESIUM - Abnormal; Notable for the following components:    Magnesium 1.5 (*)     All other components within normal limits   CULTURE, BLOOD   CULTURE, BLOOD   B-TYPE NATRIURETIC PEPTIDE   TSH   PHOSPHORUS   TROPONIN I   URINALYSIS, REFLEX TO URINE CULTURE   LACTIC ACID, PLASMA   SARS-COV-2 RDRP GENE    Narrative:     This test utilizes isothermal nucleic acid amplification   technology to detect the SARS-CoV-2 RdRp nucleic acid segment.   The analytical sensitivity (limit of detection) is 125 genome   equivalents/mL.   A POSITIVE result implies infection with the SARS-CoV-2 virus;   the patient is presumed to be contagious.     A NEGATIVE result means that SARS-CoV-2 nucleic acids are not   present above the limit of detection. A NEGATIVE result should be   treated as presumptive. It does not rule out the possibility of   COVID-19 and should not be the sole basis for treatment decisions.   If COVID-19 is strongly  suspected based on clinical and exposure   history, re-testing using an alternate molecular assay should be   considered.   This test is only for use under the Food and Drug   Administration s Emergency Use Authorization (EUA).   Commercial kits are provided by [employer].   Performance characteristics of the EUA have been independently   verified by [facility] Department of   Pathology and Laboratory Medicine.   _________________________________________________________________   The authorized Fact Sheet for Healthcare Providers and the authorized Fact   Sheet for Patients of the ID NOW COVID-19 are available on the FDA   website:     https://www.fda.gov/media/397102/download  https://www.fda.gov/media/126662/download               All Lab Results:  Results for orders placed or performed during the hospital encounter of 03/20/22   CBC auto differential   Result Value Ref Range    WBC 14.27 (H) 3.90 - 12.70 K/uL    RBC 3.28 (L) 4.60 - 6.20 M/uL    Hemoglobin 11.1 (L) 14.0 - 18.0 g/dL    Hematocrit 32.5 (L) 40.0 - 54.0 %    MCV 99 (H) 82 - 98 fL    MCH 33.8 (H) 27.0 - 31.0 pg    MCHC 34.2 32.0 - 36.0 g/dL    RDW 14.4 11.5 - 14.5 %    Platelets 312 150 - 450 K/uL    MPV 10.2 9.2 - 12.9 fL    Immature Granulocytes 0.7 (H) 0.0 - 0.5 %    Gran # (ANC) 10.8 (H) 1.8 - 7.7 K/uL    Immature Grans (Abs) 0.10 (H) 0.00 - 0.04 K/uL    Lymph # 1.5 1.0 - 4.8 K/uL    Mono # 1.7 (H) 0.3 - 1.0 K/uL    Eos # 0.1 0.0 - 0.5 K/uL    Baso # 0.06 0.00 - 0.20 K/uL    nRBC 0 0 /100 WBC    Gran % 75.8 (H) 38.0 - 73.0 %    Lymph % 10.4 (L) 18.0 - 48.0 %    Mono % 12.1 4.0 - 15.0 %    Eosinophil % 0.6 0.0 - 8.0 %    Basophil % 0.4 0.0 - 1.9 %    Differential Method Automated    Comprehensive metabolic panel   Result Value Ref Range    Sodium 135 (L) 136 - 145 mmol/L    Potassium 4.4 3.5 - 5.1 mmol/L    Chloride 104 95 - 110 mmol/L    CO2 19 (L) 23 - 29 mmol/L    Glucose 105 70 - 110 mg/dL    BUN 25 (H) 8 - 23 mg/dL     Creatinine 1.7 (H) 0.5 - 1.4 mg/dL    Calcium 9.1 8.7 - 10.5 mg/dL    Total Protein 6.3 6.0 - 8.4 g/dL    Albumin 2.5 (L) 3.5 - 5.2 g/dL    Total Bilirubin 0.3 0.1 - 1.0 mg/dL    Alkaline Phosphatase 145 (H) 55 - 135 U/L    AST 12 10 - 40 U/L    ALT 5 (L) 10 - 44 U/L    Anion Gap 12 8 - 16 mmol/L    eGFR if African American 46 (A) >60 mL/min/1.73 m^2    eGFR if non African American 40 (A) >60 mL/min/1.73 m^2   Brain natriuretic peptide   Result Value Ref Range    BNP 23 0 - 99 pg/mL   TSH   Result Value Ref Range    TSH 2.724 0.400 - 4.000 uIU/mL   Phosphorus   Result Value Ref Range    Phosphorus 3.1 2.7 - 4.5 mg/dL   Magnesium   Result Value Ref Range    Magnesium 1.5 (L) 1.6 - 2.6 mg/dL   Troponin I   Result Value Ref Range    Troponin I 0.012 0.000 - 0.026 ng/mL   POCT COVID-19 Rapid Screening   Result Value Ref Range    POC Rapid COVID Negative Negative     Acceptable Yes        Imaging Results:  Imaging Results          CT Chest Abdomen Pelvis Without Contrast (XPD) (Final result)  Result time 03/20/22 14:43:56    Final result by Rafa Bonds MD (03/20/22 14:43:56)                 Impression:      5.4 cm cavitary consolidative opacity in the superior segment left lower lobe is most suspicious for a cavitary infectious process such as cavitary pneumonia or tuberculosis.  Cavitating malignancy or pulmonary infarct are additional considerations.    Small area bronchiectasis and scarring/fibrosis within the left upper lobe.    Punctate pulmonary nodules within the right upper lobe.  For a solid nodule <6 mm, Fleischner Society 2017 guidelines recommend no routine follow up for a low risk patient, or follow-up with non-contrast chest CT at 12 months in a high risk patient.    Bilateral nonobstructing nephrolithiasis.    Small hiatal hernia.    All CT scans at this facility use dose modulation, iterative reconstruction, and/or weight based dosing when appropriate to reduce radiation dose to as low  as reasonable achievable.      Electronically signed by: Rafa Vamshi  Date:    03/20/2022  Time:    14:43             Narrative:    EXAMINATION:  CT CHEST ABDOMEN PELVIS WITHOUT CONTRAST(XPD)    CLINICAL HISTORY:  Weight loss, unintended;    TECHNIQUE:  Low dose axial images, sagittal and coronal reformations were obtained from the thoracic inlet to the pubic symphysis without the use of intravenous or oral contrast.    COMPARISON:  Abdominal ultrasound 02/24/2022; chest radiograph 02/23/2022    FINDINGS:  Chest:    Base of the neck: Within normal limits.    Heart and great vessels: Heart is normal size.  There is coronary atherosclerotic disease.  Heavy mitral annular calcifications.  Pulmonary arteries distribute normally.  Left-sided aortic arch.  No aneurysm.  Mild-to-moderate aortic atherosclerotic calcification.    Adenopathy: No axillary, mediastinal or hilar lymphadenopathy.    Esophagus: Small hiatal hernia, otherwise within normal limits.    Airways: Patent.    Lungs: Small areas bronchiectasis and adjacent scarring or fibrosis noted of the left upper lobe.  Large opacity within the superior left lower lobe measures 5.4 x 3.0 cm with internal areas cavitation.  There are a few punctate pulmonary nodules noted within the right upper lobe.  There is a tiny left lower lobe calcified granuloma.  Minor dependent bibasilar atelectatic changes.  No pleural effusion.  No pneumothorax.    Abdomen:    Liver: No focal hepatic abnormality within the limitations of noncontrast technique.    Gallbladder: Nondistended.  No calcified gallstones.    Biliary system: Non-dilated.    Spleen: Within normal limits.    Pancreas: Within normal limits.    Adrenals: Within normal limits.    Kidneys: Normal in size and position. Bilateral nonobstructing nephrolithiasis measures up to 0.4 cm on the right and 0.8 cm on the left.  No hydronephrosis.  Ureters are non-dilated.    Bowel: Small hiatal hernia, the stomach is otherwise  within normal limits.  Small bowel loops appear within normal limits.  There are scattered colonic diverticula throughout the descending and sigmoid colon.  No evidence of bowel obstruction or acute inflammatory change.  The appendix is visualized and appears within normal limits.    Peritoneum: No ascites.  No organized intra-abdominal fluid collection.  No intraperitoneal free air.    Abdominal Adenopathy: None.    Vasculature: Heavy aortic atherosclerotic calcification.  The abdominal aorta is nonaneurysmal.    Pelvis:    Urinary bladder: No significant wall thickening.    Prostate: Mild central prostate enlargement within prior shin on the posteroinferior urinary bladder wall.    Pelvis adenopathy: None.    Bones: There are degenerative changes.  No acute or concerning osseous abnormalities.  Lumbar dextroscoliotic curvature.    Miscellaneous: None.                                 The EKG was ordered, reviewed, and independently interpreted by the ED provider.  Interpretation time: 12:25 PM  Rate: 98 BPM  Rhythm: normal sinus rhythm with sinus arrhythmia.  Interpretation: No acute ST or T wave abnormalities. No STEMI.             The Emergency Provider reviewed the vital signs and test results, which are outlined above.     ED Discussion       3:30 PM: Discussed case with Crissy Escobar NP ([facility]). Dr. Yanes agrees with current care and management of pt and accepts admission.   Admitting Service: [facility]  Admitting Physician: Dr. Yanes  Admit to: Obs, med/surg    3:30 PM: Re-evaluated pt. I have discussed test results, shared treatment plan, and the need for admission with patient and family at bedside. Pt and family express understanding at this time and agree with all information. All questions answered. Pt and family have no further questions or concerns at this time. Pt is ready for admit.       Medical Decision Making:   Clinical Tests:   Lab Tests: Ordered and  Reviewed  Radiological Study: Ordered and Reviewed  Medical Tests: Ordered and Reviewed           ED Medication(s):  Medications   sodium chloride 0.9% bolus 500 mL (0 mLs Intravenous Stopped 3/20/22 1403)   sodium chloride 0.9% bolus 500 mL (0 mLs Intravenous Stopped 3/20/22 1507)       New Prescriptions    No medications on file               Scribe Attestation:   Scribe #1: I performed the above scribed service and the documentation accurately describes the services I performed. I attest to the accuracy of the note.     Attending:   Physician Attestation Statement for Scribe #1: I, Court Lopez MD, personally performed the services described in this documentation, as scribed by Roxanna Hinkle, in my presence, and it is both accurate and complete.           Clinical Impression       ICD-10-CM ICD-9-CM   1. Cavitary lesion of lung  J98.4 518.89   2. Weakness  R53.1 780.79   3. Acute deep vein thrombosis (DVT) of proximal vein of right lower extremity  I82.4Y1 453.41   4. Dehydration  E86.0 276.51       Disposition:   Disposition: Placed in Observation  Condition: Fair       Court Lopez MD  03/20/22 1610

## 2022-03-21 PROBLEM — I82.401 ACUTE DEEP VEIN THROMBOSIS (DVT) OF RIGHT LOWER EXTREMITY: Status: ACTIVE | Noted: 2022-03-21

## 2022-03-21 LAB
ALBUMIN SERPL BCP-MCNC: 2.1 G/DL (ref 3.5–5.2)
ALP SERPL-CCNC: 116 U/L (ref 55–135)
ALT SERPL W/O P-5'-P-CCNC: 8 U/L (ref 10–44)
ANION GAP SERPL CALC-SCNC: 10 MMOL/L (ref 8–16)
APTT BLDCRRT: 34.2 SEC (ref 21–32)
APTT BLDCRRT: 49 SEC (ref 21–32)
APTT BLDCRRT: 57.2 SEC (ref 21–32)
APTT BLDCRRT: 65.2 SEC (ref 21–32)
AST SERPL-CCNC: 20 U/L (ref 10–40)
BASOPHILS # BLD AUTO: 0.04 K/UL (ref 0–0.2)
BASOPHILS NFR BLD: 0.3 % (ref 0–1.9)
BASOPHILS NFR BLD: 0.4 % (ref 0–1.9)
BASOPHILS NFR BLD: 0.4 % (ref 0–1.9)
BILIRUB SERPL-MCNC: 0.3 MG/DL (ref 0.1–1)
BUN SERPL-MCNC: 20 MG/DL (ref 8–23)
CALCIUM SERPL-MCNC: 8.3 MG/DL (ref 8.7–10.5)
CHLORIDE SERPL-SCNC: 107 MMOL/L (ref 95–110)
CK SERPL-CCNC: 42 U/L (ref 20–200)
CO2 SERPL-SCNC: 17 MMOL/L (ref 23–29)
CREAT SERPL-MCNC: 1.1 MG/DL (ref 0.5–1.4)
CRP SERPL-MCNC: 178.2 MG/L (ref 0–8.2)
DIFFERENTIAL METHOD: ABNORMAL
EOSINOPHIL # BLD AUTO: 0.1 K/UL (ref 0–0.5)
EOSINOPHIL # BLD AUTO: 0.2 K/UL (ref 0–0.5)
EOSINOPHIL # BLD AUTO: 0.2 K/UL (ref 0–0.5)
EOSINOPHIL NFR BLD: 0.9 % (ref 0–8)
EOSINOPHIL NFR BLD: 2.2 % (ref 0–8)
EOSINOPHIL NFR BLD: 2.2 % (ref 0–8)
ERYTHROCYTE [DISTWIDTH] IN BLOOD BY AUTOMATED COUNT: 14.4 % (ref 11.5–14.5)
ERYTHROCYTE [DISTWIDTH] IN BLOOD BY AUTOMATED COUNT: 14.4 % (ref 11.5–14.5)
ERYTHROCYTE [DISTWIDTH] IN BLOOD BY AUTOMATED COUNT: 14.6 % (ref 11.5–14.5)
ERYTHROCYTE [SEDIMENTATION RATE] IN BLOOD BY WESTERGREN METHOD: 70 MM/HR (ref 0–10)
EST. GFR  (AFRICAN AMERICAN): >60 ML/MIN/1.73 M^2
EST. GFR  (NON AFRICAN AMERICAN): >60 ML/MIN/1.73 M^2
GLUCOSE SERPL-MCNC: 116 MG/DL (ref 70–110)
HCT VFR BLD AUTO: 27.1 % (ref 40–54)
HCT VFR BLD AUTO: 27.1 % (ref 40–54)
HCT VFR BLD AUTO: 30.1 % (ref 40–54)
HGB BLD-MCNC: 10 G/DL (ref 14–18)
HGB BLD-MCNC: 9.3 G/DL (ref 14–18)
HGB BLD-MCNC: 9.3 G/DL (ref 14–18)
IMM GRANULOCYTES # BLD AUTO: 0.04 K/UL (ref 0–0.04)
IMM GRANULOCYTES # BLD AUTO: 0.04 K/UL (ref 0–0.04)
IMM GRANULOCYTES # BLD AUTO: 0.05 K/UL (ref 0–0.04)
IMM GRANULOCYTES NFR BLD AUTO: 0.4 % (ref 0–0.5)
INR PPP: 1 (ref 0.8–1.2)
LYMPHOCYTES # BLD AUTO: 1.4 K/UL (ref 1–4.8)
LYMPHOCYTES # BLD AUTO: 1.6 K/UL (ref 1–4.8)
LYMPHOCYTES # BLD AUTO: 1.6 K/UL (ref 1–4.8)
LYMPHOCYTES NFR BLD: 11.3 % (ref 18–48)
LYMPHOCYTES NFR BLD: 16 % (ref 18–48)
LYMPHOCYTES NFR BLD: 16 % (ref 18–48)
MCH RBC QN AUTO: 32.8 PG (ref 27–31)
MCH RBC QN AUTO: 33.5 PG (ref 27–31)
MCH RBC QN AUTO: 33.5 PG (ref 27–31)
MCHC RBC AUTO-ENTMCNC: 33.2 G/DL (ref 32–36)
MCHC RBC AUTO-ENTMCNC: 34.3 G/DL (ref 32–36)
MCHC RBC AUTO-ENTMCNC: 34.3 G/DL (ref 32–36)
MCV RBC AUTO: 98 FL (ref 82–98)
MCV RBC AUTO: 98 FL (ref 82–98)
MCV RBC AUTO: 99 FL (ref 82–98)
MONOCYTES # BLD AUTO: 1.3 K/UL (ref 0.3–1)
MONOCYTES # BLD AUTO: 1.3 K/UL (ref 0.3–1)
MONOCYTES # BLD AUTO: 1.4 K/UL (ref 0.3–1)
MONOCYTES NFR BLD: 11.7 % (ref 4–15)
MONOCYTES NFR BLD: 12.8 % (ref 4–15)
MONOCYTES NFR BLD: 12.8 % (ref 4–15)
NEUTROPHILS # BLD AUTO: 7 K/UL (ref 1.8–7.7)
NEUTROPHILS # BLD AUTO: 7 K/UL (ref 1.8–7.7)
NEUTROPHILS # BLD AUTO: 9.1 K/UL (ref 1.8–7.7)
NEUTROPHILS NFR BLD: 68.2 % (ref 38–73)
NEUTROPHILS NFR BLD: 68.2 % (ref 38–73)
NEUTROPHILS NFR BLD: 75.4 % (ref 38–73)
NRBC BLD-RTO: 0 /100 WBC
PLATELET # BLD AUTO: 266 K/UL (ref 150–450)
PLATELET # BLD AUTO: 266 K/UL (ref 150–450)
PLATELET # BLD AUTO: 287 K/UL (ref 150–450)
PMV BLD AUTO: 10 FL (ref 9.2–12.9)
PMV BLD AUTO: 9.7 FL (ref 9.2–12.9)
PMV BLD AUTO: 9.7 FL (ref 9.2–12.9)
POCT GLUCOSE: 112 MG/DL (ref 70–110)
POCT GLUCOSE: 127 MG/DL (ref 70–110)
POCT GLUCOSE: 93 MG/DL (ref 70–110)
POTASSIUM SERPL-SCNC: 4.1 MMOL/L (ref 3.5–5.1)
PROT SERPL-MCNC: 5.2 G/DL (ref 6–8.4)
PROTHROMBIN TIME: 11.1 SEC (ref 9–12.5)
RBC # BLD AUTO: 2.78 M/UL (ref 4.6–6.2)
RBC # BLD AUTO: 2.78 M/UL (ref 4.6–6.2)
RBC # BLD AUTO: 3.05 M/UL (ref 4.6–6.2)
SODIUM SERPL-SCNC: 134 MMOL/L (ref 136–145)
WBC # BLD AUTO: 10.18 K/UL (ref 3.9–12.7)
WBC # BLD AUTO: 10.18 K/UL (ref 3.9–12.7)
WBC # BLD AUTO: 12.06 K/UL (ref 3.9–12.7)

## 2022-03-21 PROCEDURE — 97530 THERAPEUTIC ACTIVITIES: CPT

## 2022-03-21 PROCEDURE — 99215 PR OFFICE/OUTPT VISIT, EST, LEVL V, 40-54 MIN: ICD-10-PCS | Mod: ,,, | Performed by: INTERNAL MEDICINE

## 2022-03-21 PROCEDURE — 82550 ASSAY OF CK (CPK): CPT | Performed by: INTERNAL MEDICINE

## 2022-03-21 PROCEDURE — 85610 PROTHROMBIN TIME: CPT | Performed by: INTERNAL MEDICINE

## 2022-03-21 PROCEDURE — 85730 THROMBOPLASTIN TIME PARTIAL: CPT | Performed by: INTERNAL MEDICINE

## 2022-03-21 PROCEDURE — 86480 TB TEST CELL IMMUN MEASURE: CPT | Performed by: INTERNAL MEDICINE

## 2022-03-21 PROCEDURE — 87205 SMEAR GRAM STAIN: CPT | Performed by: INTERNAL MEDICINE

## 2022-03-21 PROCEDURE — 96366 THER/PROPH/DIAG IV INF ADDON: CPT

## 2022-03-21 PROCEDURE — 92610 EVALUATE SWALLOWING FUNCTION: CPT

## 2022-03-21 PROCEDURE — 96367 TX/PROPH/DG ADDL SEQ IV INF: CPT

## 2022-03-21 PROCEDURE — 25000003 PHARM REV CODE 250: Performed by: INTERNAL MEDICINE

## 2022-03-21 PROCEDURE — 25000242 PHARM REV CODE 250 ALT 637 W/ HCPCS: Performed by: INTERNAL MEDICINE

## 2022-03-21 PROCEDURE — 85025 COMPLETE CBC W/AUTO DIFF WBC: CPT | Performed by: INTERNAL MEDICINE

## 2022-03-21 PROCEDURE — 87070 CULTURE OTHR SPECIMN AEROBIC: CPT | Performed by: INTERNAL MEDICINE

## 2022-03-21 PROCEDURE — 97166 OT EVAL MOD COMPLEX 45 MIN: CPT

## 2022-03-21 PROCEDURE — 85651 RBC SED RATE NONAUTOMATED: CPT | Performed by: INTERNAL MEDICINE

## 2022-03-21 PROCEDURE — 80053 COMPREHEN METABOLIC PANEL: CPT | Performed by: NURSE PRACTITIONER

## 2022-03-21 PROCEDURE — 96365 THER/PROPH/DIAG IV INF INIT: CPT

## 2022-03-21 PROCEDURE — 96361 HYDRATE IV INFUSION ADD-ON: CPT

## 2022-03-21 PROCEDURE — 86140 C-REACTIVE PROTEIN: CPT | Performed by: INTERNAL MEDICINE

## 2022-03-21 PROCEDURE — 25000003 PHARM REV CODE 250: Performed by: NURSE PRACTITIONER

## 2022-03-21 PROCEDURE — 99215 OFFICE O/P EST HI 40 MIN: CPT | Mod: ,,, | Performed by: INTERNAL MEDICINE

## 2022-03-21 PROCEDURE — 36415 COLL VENOUS BLD VENIPUNCTURE: CPT | Performed by: INTERNAL MEDICINE

## 2022-03-21 PROCEDURE — 94761 N-INVAS EAR/PLS OXIMETRY MLT: CPT

## 2022-03-21 PROCEDURE — 36415 COLL VENOUS BLD VENIPUNCTURE: CPT | Performed by: NURSE PRACTITIONER

## 2022-03-21 PROCEDURE — 97162 PT EVAL MOD COMPLEX 30 MIN: CPT

## 2022-03-21 PROCEDURE — 85025 COMPLETE CBC W/AUTO DIFF WBC: CPT | Mod: 91 | Performed by: NURSE PRACTITIONER

## 2022-03-21 PROCEDURE — 63600175 PHARM REV CODE 636 W HCPCS: Performed by: INTERNAL MEDICINE

## 2022-03-21 PROCEDURE — G0378 HOSPITAL OBSERVATION PER HR: HCPCS

## 2022-03-21 PROCEDURE — 94640 AIRWAY INHALATION TREATMENT: CPT

## 2022-03-21 PROCEDURE — 85730 THROMBOPLASTIN TIME PARTIAL: CPT | Mod: 91 | Performed by: INTERNAL MEDICINE

## 2022-03-21 RX ORDER — SODIUM CHLORIDE FOR INHALATION 3 %
4 VIAL, NEBULIZER (ML) INHALATION ONCE
Status: COMPLETED | OUTPATIENT
Start: 2022-03-21 | End: 2022-03-21

## 2022-03-21 RX ORDER — SODIUM CHLORIDE FOR INHALATION 7 %
7 VIAL, NEBULIZER (ML) INHALATION ONCE
Status: DISCONTINUED | OUTPATIENT
Start: 2022-03-21 | End: 2022-03-21

## 2022-03-21 RX ADMIN — ACETAMINOPHEN 650 MG: 325 TABLET ORAL at 11:03

## 2022-03-21 RX ADMIN — CEFTRIAXONE 1 G: 1 INJECTION, SOLUTION INTRAVENOUS at 11:03

## 2022-03-21 RX ADMIN — SODIUM CHLORIDE: 0.9 INJECTION, SOLUTION INTRAVENOUS at 03:03

## 2022-03-21 RX ADMIN — FLUOXETINE HYDROCHLORIDE 20 MG: 20 CAPSULE ORAL at 08:03

## 2022-03-21 RX ADMIN — CEFTRIAXONE 1 G: 1 INJECTION, SOLUTION INTRAVENOUS at 04:03

## 2022-03-21 RX ADMIN — HEPARIN SODIUM 18 UNITS/KG/HR: 10000 INJECTION, SOLUTION INTRAVENOUS at 09:03

## 2022-03-21 RX ADMIN — ACETAMINOPHEN 650 MG: 325 TABLET ORAL at 01:03

## 2022-03-21 RX ADMIN — DOCUSATE SODIUM AND SENNOSIDES 1 TABLET: 8.6; 5 TABLET, FILM COATED ORAL at 08:03

## 2022-03-21 RX ADMIN — AZITHROMYCIN MONOHYDRATE 500 MG: 500 INJECTION, POWDER, LYOPHILIZED, FOR SOLUTION INTRAVENOUS at 11:03

## 2022-03-21 RX ADMIN — SODIUM CHLORIDE 30 MG/ML INHALATION SOLUTION 4 ML: 30 SOLUTION INHALANT at 11:03

## 2022-03-21 RX ADMIN — HEPARIN SODIUM 18 UNITS/KG/HR: 10000 INJECTION, SOLUTION INTRAVENOUS at 03:03

## 2022-03-21 RX ADMIN — AZITHROMYCIN MONOHYDRATE 500 MG: 500 INJECTION, POWDER, LYOPHILIZED, FOR SOLUTION INTRAVENOUS at 03:03

## 2022-03-21 RX ADMIN — ATORVASTATIN CALCIUM 40 MG: 40 TABLET, FILM COATED ORAL at 08:03

## 2022-03-21 RX ADMIN — SODIUM CHLORIDE 30 MG/ML INHALATION SOLUTION 4 ML: 30 SOLUTION INHALANT at 09:03

## 2022-03-21 RX ADMIN — TAMSULOSIN HYDROCHLORIDE 0.4 MG: 0.4 CAPSULE ORAL at 08:03

## 2022-03-21 NOTE — H&P
[facility] Medicine  History & Physical    Patient Name: Samy Alejandre Jr.  MRN: [id number]  Patient Class: OP- Observation  Admission Date: 3/20/2022  Attending Physician: Doris Yanes MD   Primary Care Provider: Damien Barron MD         Patient information was obtained from patient, relative(s) and ER records.     Subjective:     Principal Problem:MARI (acute kidney injury)    Chief Complaint:   Chief Complaint   Patient presents with    Fatigue     Generalized weakness.  Recent DVT to right leg        HPI: 73 yo male with past medical history of HTN, CVA, T2DM, recent DVT, anxiety and depression presented to ED for evaluation of generalized weakness/deconditioning. History collected from daughter, Micelle, at bedside. Per family report patient with progressively worsening weakness over the past month associated with episodes of intermittent n/v, diarrhea, decreased po intake, altered mental status and weight loss. He was diagnosed with extensive acute right lower extremity deep venous thrombus on 3/9/22, apixaban initiated. Patient s/p 2 mechanical falls prior to ED arrival, no head trauma or LOC. Patient/family denies fever/chills, abdominal pain and chest pain.     ED evaluation: SBP 90s on arrival. Labs remarkable for wbc 14, Cr 1.7, magnesium 1.5. CT chest shows 5.4 cm cavitary consolidative opacity in the superior segment left lower lobe suspicious for a cavitary infectious process such as cavitary pneumonia or tuberculosis. Cavitating malignancy or pulmonary infarct are additional considerations. COVID 19 negative. CT head shows no acute findings. He received 2 liters IVF. Patient placed in observation for further evaluation and treatment.         Past Medical History:   Diagnosis Date    Anxiety     Depression     Diabetes mellitus, type 2     Hypertension     Stroke        Past Surgical History:   Procedure Laterality Date    ANGIOGRAPHY OF LOWER EXTREMITY N/A  12/21/2020    Procedure: ANGIOGRAM, LOWER EXTREMITY/Rt leg poss pta/stent;  Surgeon: Todd Michel MD;  Location: [facility];  Service: Peripheral Vascular;  Laterality: N/A;  rescheduled 12/8    BACK SURGERY  2015    CATARACT EXTRACTION      KNEE SURGERY  2012    LITHOTRIPSY  2000       Review of patient's allergies indicates:   Allergen Reactions    Chantix [varenicline] Other (See Comments)     Felt bad       No current facility-administered medications on file prior to encounter.     Current Outpatient Medications on File Prior to Encounter   Medication Sig    apixaban (ELIQUIS DVT-PE TREAT 30D START) 5 mg (74 tabs) DsPk For the first 7 days take two 5 mg tablets twice daily.  After 7 days take one 5 mg tablet twice daily.    clopidogreL (PLAVIX) 75 mg tablet Take 1 tablet (75 mg total) by mouth once daily.    DULoxetine (CYMBALTA) 30 MG capsule Take 1 capsule (30 mg total) by mouth once daily.    FLUoxetine 10 MG capsule TAKE 2 CAPSULES BY MOUTH ONCE DAILY (Patient taking differently: 20 mg.)    gabapentin (NEURONTIN) 300 MG capsule Take 1 capsule (300 mg total) by mouth 3 (three) times daily as needed.    HYDROcodone-acetaminophen (NORCO) 5-325 mg per tablet Take 1 tablet by mouth 3 (three) times daily.    metFORMIN (GLUCOPHAGE) 500 MG tablet Take 1 tablet (500 mg total) by mouth 2 (two) times daily.    ondansetron (ZOFRAN) 8 MG tablet Take 1 tablet (8 mg total) by mouth every 8 (eight) hours as needed for Nausea.    rosuvastatin (CRESTOR) 20 MG tablet Take 1 tablet (20 mg total) by mouth once daily.    tamsulosin (FLOMAX) 0.4 mg Cap Take 1 capsule (0.4 mg total) by mouth once daily.    tiZANidine (ZANAFLEX) 4 MG tablet Take 1 tablet TID Prn    zolpidem (AMBIEN) 10 mg Tab Take 1 tablet (10 mg total) by mouth nightly as needed (insomnia).    blood glucose control, low (TRUE METRIX LEVEL 1) Soln 1 drop by Misc.(Non-Drug; Combo Route) route once daily.    blood glucose control, normal  (TRUE METRIX LEVEL 2) Soln 1 drop by Misc.(Non-Drug; Combo Route) route once daily.    blood sugar diagnostic (TRUE METRIX GLUCOSE TEST STRIP) Strp 1 strip by Misc.(Non-Drug; Combo Route) route once daily.    blood-glucose meter (TRUE METRIX GLUCOSE METER) Misc 1 Device by Misc.(Non-Drug; Combo Route) route once. for 1 dose    lancets 32 gauge Misc 1 lancet by Misc.(Non-Drug; Combo Route) route once daily.    lisinopriL 10 MG tablet Take 1 tablet by mouth once daily    NON FORMULARY MEDICATION THC+ 300 NATURAL SL TINCTURE - ILERA [place]    walker (ULTRA-LIGHT ROLLATOR) Misc Use daily for ambulation     Family History       Problem Relation (Age of Onset)    COPD Sister    Cancer Father    Heart disease Mother    Stroke Mother          Tobacco Use    Smoking status: Current Every Day Smoker     Packs/day: 1.00     Types: Cigarettes    Smokeless tobacco: Current User     Types: Snuff   Substance and Sexual Activity    Alcohol use: Not Currently    Drug use: Never    Sexual activity: Not Currently     Review of Systems   Constitutional:  Positive for activity change, appetite change, fatigue and unexpected weight change. Negative for chills and fever.   Eyes:  Negative for photophobia.   Respiratory:  Negative for cough, chest tightness, shortness of breath and wheezing.    Cardiovascular:  Negative for chest pain, palpitations and leg swelling.   Gastrointestinal:  Positive for diarrhea, nausea and vomiting. Negative for abdominal pain.   Genitourinary:  Negative for dysuria, flank pain and hematuria.   Musculoskeletal:  Negative for back pain, myalgias and neck pain.   Skin:  Negative for rash and wound.   Neurological:  Positive for weakness. Negative for dizziness, syncope and headaches.   Psychiatric/Behavioral:  Negative for agitation.    Objective:     Vital Signs (Most Recent):  Temp: 96.3 °F (35.7 °C) (03/20/22 1835)  Pulse: 99 (03/20/22 1835)  Resp: 18 (03/20/22 1835)  BP: 117/66 (03/20/22  1835)  SpO2: 98 % (03/20/22 1835) Vital Signs (24h Range):  Temp:  [96.3 °F (35.7 °C)-98.1 °F (36.7 °C)] 96.3 °F (35.7 °C)  Pulse:  [81-99] 99  Resp:  [17-21] 18  SpO2:  [97 %-100 %] 98 %  BP: ()/(53-73) 117/66     Weight: 59.9 kg (132 lb)  Body mass index is 21.31 kg/m².    Physical Exam  Constitutional:       General: He is not in acute distress.     Appearance: He is well-developed. He is ill-appearing.   HENT:      Head: Normocephalic and atraumatic.   Eyes:      Conjunctiva/sclera: Conjunctivae normal.      Pupils: Pupils are equal, round, and reactive to light.   Neck:      Vascular: No JVD.   Cardiovascular:      Rate and Rhythm: Normal rate and regular rhythm.      Heart sounds: Normal heart sounds.   Pulmonary:      Effort: Pulmonary effort is normal. No respiratory distress.      Breath sounds: Normal breath sounds. No wheezing.   Abdominal:      General: Bowel sounds are normal. There is no distension.      Palpations: Abdomen is soft.      Tenderness: There is no abdominal tenderness. There is no guarding.   Musculoskeletal:         General: No tenderness. Normal range of motion.      Cervical back: Normal range of motion and neck supple.   Skin:     General: Skin is warm and dry.      Capillary Refill: Capillary refill takes less than 2 seconds.      Findings: No erythema.   Neurological:      Mental Status: He is alert and oriented to person, place, and time.   Psychiatric:         Behavior: Behavior normal.         CRANIAL NERVES     CN III, IV, VI   Pupils are equal, round, and reactive to light.     Significant Labs: All pertinent labs within the past 24 hours have been reviewed.  CBC:   Recent Labs   Lab 03/20/22  1240   WBC 14.27*   HGB 11.1*   HCT 32.5*        CMP:   Recent Labs   Lab 03/20/22  1240   *   K 4.4      CO2 19*      BUN 25*   CREATININE 1.7*   CALCIUM 9.1   PROT 6.3   ALBUMIN 2.5*   BILITOT 0.3   ALKPHOS 145*   AST 12   ALT 5*   ANIONGAP 12   EGFRNONAA  40*     Troponin:   Recent Labs   Lab 03/20/22  1240   TROPONINI 0.012     Urine Studies:   Recent Labs   Lab 03/20/22  1818   COLORU Yellow   APPEARANCEUA Clear   PHUR 6.0   SPECGRAV 1.020   PROTEINUA Negative   GLUCUA Negative   KETONESU Trace*   BILIRUBINUA Negative   OCCULTUA Negative   NITRITE Negative   UROBILINOGEN Negative   LEUKOCYTESUR Negative       Significant Imaging: I have reviewed all pertinent imaging results/findings within the past 24 hours.    Assessment/Plan:     * MARI (acute kidney injury)  2/2 volume depletion, dehydration   Continue fluid resuscitation  Monitor intake and output   Repeat am renal function panel       Fall  Consult PT       Nausea and vomiting  Patient denies abdominal pain   No evidence of SBO on CT abdomen  Continue supportive management (prn antiemetics)       Pulmonary cavitary lesion  -CT Chest shows 5.4 cm cavitary consolidative opacity in the superior segment left lower lobe is most suspicious for a cavitary infectious process such as cavitary pneumonia or tuberculosis.  Cavitating malignancy or pulmonary infarct are additional considerations.  -patient afebrile, lactic and procal wnl, defer antibiotics for now   -Consult pulmonology       Controlled type 2 diabetes mellitus with stage 3 chronic kidney disease, without long-term current use of insulin  SSI, accucheck AC&HS, Diabetic diet       History of CVA with residual deficit  No new neuro deficits   CT head negative  Continue plavix, statin       VTE Risk Mitigation (From admission, onward)         Ordered     apixaban tablet 5 mg  2 times daily         03/20/22 1922     IP VTE HIGH RISK PATIENT  Once         03/20/22 1645     Place sequential compression device  Until discontinued         03/20/22 1645                   Crissy Escobar NP  Department of Hospital Medicine   [facility] - Telemetry ([facility])

## 2022-03-21 NOTE — PT/OT/SLP EVAL
"Occupational Therapy   Evaluation    Name: Samy Alejandre Jr.  MRN: [id number]  Admitting Diagnosis:  Pulmonary cavitary lesion  Recent Surgery: * No surgery found *      Recommendations:     Discharge Recommendations: home health OT  Discharge Equipment Recommendations:  bedside commode  Barriers to discharge:  None    Assessment:     Samy Alejandre Jr. is a 72 y.o. male with a medical diagnosis of Pulmonary cavitary lesion.  He presents with the following performance deficits affecting function: weakness, impaired endurance, impaired self care skills, impaired functional mobilty, impaired balance, impaired cognition, decreased safety awareness, pain, impaired cardiopulmonary response to activity.      Rehab Prognosis: Good; patient would benefit from acute skilled OT services to address these deficits and reach maximum level of function.       Plan:     Patient to be seen 2 x/week to address the above listed problems via self-care/home management, therapeutic activities, therapeutic exercises  · Plan of Care Expires: 04/04/22  · Plan of Care Reviewed with: patient, spouse    Subjective     Chief Complaint: Reported "I live with my daughter."  Patient/Family Comments/goals: none reported    Occupational Profile:  Living Environment: Patient resides in a one story home with 3 steps to enter with his daughter.  Previous level of function: Patient ambulated with RW mod I. Used shower chair and completed ADLs mod I.  Roles and Routines: n/a  Equipment Used at Home:  walker, rolling, shower chair, cane, straight  Assistance upon Discharge: daughter and ex-wife    Pain/Comfort:  · Pain Rating 1: 5/10  · Location - Side 1: Left  · Location - Orientation 1: generalized  · Location 1: arm  · Pain Addressed 1:  (activity pacing)    Objective:     Communicated with: NursePushpa, prior to session.  Patient found supine with telemetry, peripheral IV (chito sys, 1:1 sitter) upon OT entry to room.    General Precautions: " Standard, airborne, contact, droplet, fall   Orthopedic Precautions:N/A   Braces: N/A  Respiratory Status: Room air    Bed Mobility:    · Patient completed Supine to Sit with stand by assistance    Functional Mobility/Transfers:  · Patient completed Sit <> Stand Transfer with contact guard assistance  with  rolling walker   · Patient completed Bed <> Chair Transfer using Step Transfer technique with contact guard assistance with rolling walker  · Functional Mobility: Patient completed functional mobility x20ft with RW and CGA to increase dynamic standing balance and activity tolerance needed for ADL completion.    Activities of Daily Living:  · Grooming: setup .  · Upper Body Dressing: minimum assistance .  · Lower Body Dressing: moderate assistance .    Cognitive/Visual Perceptual:  Cognitive/Psychosocial Skills:     -       Oriented to: Person, Place and Situation   -       Follows Commands/attention:Easily distracted and Follows one-step commands    Physical Exam:  Balance:    -       sitting: fair +, static standing: fair +, dynamic standing: fair  Dominant hand:    -       right  Upper Extremity Range of Motion:     -       Right Upper Extremity: WFL  -       Left Upper Extremity: WFL  Upper Extremity Strength:    -       Right Upper Extremity: Deficits: grossly 4-/5  -       Left Upper Extremity: Deficits: grossly 4-/5   Strength:    -       Right Upper Extremity: Deficits: fair  -       Left Upper Extremity: Deficits: fair    AMPAC 6 Click ADL:  AMPAC Total Score: 18    Treatment & Education:  Patient educated on role of OT in acute setting and benefits of participation. Educated on safe mobility techniques to use to increase independence and decrease fall risk. Educated on B UE AROM therex to be completed in bedside chair to increase activity tolerance and dynamic standing balance. Patient educated to call for A to transfer back to bed.  Education:    Patient left up in chair with all lines intact, call  button in reach, chair alarm on and ex-wife present    GOALS:   Multidisciplinary Problems     Occupational Therapy Goals        Problem: Occupational Therapy Goal    Goal Priority Disciplines Outcome Interventions   Occupational Therapy Goal     OT, PT/OT     Description: Goals to be met by: 4/4/22     Patient will increase functional independence with ADLs by performing:    UE Dressing with Set-up Assistance.  Toileting from toilet with Stand-by Assistance for hygiene and clothing management.   Toilet transfer to toilet with Stand-by Assistance.  Increased functional strength in B UE grossly by 1/2 MM grade to increase independence with ADLs.                     History:     Past Medical History:   Diagnosis Date    Anxiety     Depression     Diabetes mellitus, type 2     Hypertension     Stroke        Past Surgical History:   Procedure Laterality Date    ANGIOGRAPHY OF LOWER EXTREMITY N/A 12/21/2020    Procedure: ANGIOGRAM, LOWER EXTREMITY/Rt leg poss pta/stent;  Surgeon: Todd Michel MD;  Location: [facility] CATH LAB;  Service: Peripheral Vascular;  Laterality: N/A;  rescheduled 12/8    BACK SURGERY  2015    CATARACT EXTRACTION      KNEE SURGERY  2012    LITHOTRIPSY  2000       Time Tracking:     OT Date of Treatment: 03/21/22  OT Start Time: 0945  OT Stop Time: 1010  OT Total Time (min): 25 min    Billable Minutes:Evaluation 15  Therapeutic Activity 10    3/21/2022

## 2022-03-21 NOTE — HOSPITAL COURSE
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 Recently diagnosed with acute extensive DVT of Rt LE on 3/9/22 during an ED visit and started on Apixaban therapy. Rt leg swelling has improved since. CT head on admit - no hemorrhage or infarction . CT C/A/P showed large opacity within the superior left lower lobe measures 5.4 x 3.0 cm with internal areas of cavitation.     3/21- Afebrile. Pt is curled up in the bed, appears chronically ill , thin built , decreased motivation , and report total loss of appetite. Sitter at bedside report he has not eaten anything today. Near fall when he tried to get up from bed last night. Denies cough.  Pt started on antibiotic targeting CAP. Induced sputum for AFB smear and culture ordered. Pulmonology is consulted for further recs. Apixaban and Plavix held and pt started on Heparin infusion . WBC normalized. Hgb 9.3, Creatinine improved to 1.1>1.7, , ESR 70.     3/22 He is aao x 3  with intermittent confusion . OAC and antiplatelet on hold  for a possible bronchoscopy.  The H/H sligly trending down  with no sign of overt GIB . Pulmonology consult noted .      3/23 Quantiferon gold is negative . He is aao x 3 in nad .  H/H stable . Plan for bronchoscopy tomorrow . Will d/c isolation .     3/24 He is aao x 4 in nad . He report feeling better . The Quantiferon  gold test is negative. Airborne and droplet isolation discontinued.  Plan for Bronchoscopy today . PT/OT rec Home with  . Plan to  d/c heparin drip and reume DOAC after bronchoscopy .Most likely will be d/c home tomorrow with HH    3/25- S/P bronchoscopy , bronchial washing and broncho alveolar lavage . No active bleeding and no  endobronchial lesion noted on bronchoscopy . AFB stain with no acid fast bacilli seen, KOH prep with no yeast or fungal element seen. Respiratory culture grew normal trell. Blood cultures so far negative. AFB and fungal culture is in progress. Case discussed with Pulmonology again and suggested pt is cleared for discharged from pulmonary standpoint with oral Levaquin therapy x 10 days to complete total 2 weeks antibiotic treatment and repeat imaging study to evaluate progression or resolution of left lower lobe cavitary lesion and follow up with pulmonology and PCP as outpatient to follow up on final microbiology and pathology report from bronchial washing and lavage. Pt is examined and deemed stable and suitable for discharge with home health .

## 2022-03-21 NOTE — SIGNIFICANT EVENT
Notified by bedside nursing staff that pt fell in his room in the process of attempting to ambulate to bathroom - pt's IV was dislodged in the process. Pt's wife at bedside at the time - pt assisted up and back to bed by nursing staff. Seen and examined at bedside - pt reports no LOC or head impact and denies any injuries or pain. Moves all extremities and no visible injuries seen on pt. Instructed to call and wait for assistance in future - tele-sitter placed in room and bed alarm activated. Pt resting quietly in bed, NADN, vital signs stable.

## 2022-03-21 NOTE — ASSESSMENT & PLAN NOTE
2/2 volume depletion, dehydration   Continue fluid resuscitation  Monitor intake and output   Repeat am renal function panel

## 2022-03-21 NOTE — PROGRESS NOTES
[facility] Medicine  Progress Note    Patient Name: Samy Alejandre Jr.  MRN: [id number]  Patient Class: OP- Observation   Admission Date: 3/20/2022  Length of Stay: 0 days  Attending Physician: Doris Yanes MD  Primary Care Provider: Damien Barron MD        Subjective:     Principal Problem:Pulmonary cavitary lesion        HPI:  71 yo male with past medical history of HTN, CVA, T2DM, recent DVT, anxiety and depression presented to ED for evaluation of generalized weakness/deconditioning. History collected from daughter, Micelle, at bedside. Per family report patient with progressively worsening weakness over the past month associated with episodes of intermittent n/v, diarrhea, decreased po intake, altered mental status and weight loss. He was diagnosed with extensive acute right lower extremity deep venous thrombus on 3/9/22, apixaban initiated. Patient s/p 2 mechanical falls prior to ED arrival, no head trauma or LOC. Patient/family denies fever/chills, abdominal pain and chest pain.     ED evaluation: SBP 90s on arrival. Labs remarkable for wbc 14, Cr 1.7, magnesium 1.5. CT chest shows 5.4 cm cavitary consolidative opacity in the superior segment left lower lobe suspicious for a cavitary infectious process such as cavitary pneumonia or tuberculosis. Cavitating malignancy or pulmonary infarct are additional considerations. COVID 19 negative. CT head shows no acute findings. He received 2 liters IVF. Patient placed in observation for further evaluation and treatment.         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 Recently diagnosed with acute extensive DVT of Rt  LE on 3/9/22 during an ED visit and started on Apixaban therapy. Rt leg swelling has improved since. CT head on admit - no hemorrhage or infarction . CT C/A/P showed large opacity within the superior left lower lobe measures 5.4 x 3.0 cm with internal areas of cavitation.     3/21- Afebrile. Pt is curled up in the bed, appears chronically ill , thin built , decreased motivation , and report total loss of appetite. Sitter at bedside report he has not eaten anything today. Near fall when he tried to get up from bed last night. Denies cough.  Pt started on antibiotic targeting CAP. Induced sputum for AFB smear and culture ordered. Pulmonology is consulted for further recs. Apixaban and Plavix held and pt started on Heparin infusion . WBC normalized. Hgb 9.3, Creatinine improved to 1.1>1.7, , ESR 70.       Interval History:   Pt started on antibiotic targeting CAP. Induced sputum for AFB smear and culture ordered. Pulmonology is consulted for further recs. Apixaban and Plavix held and pt started on Heparin infusion . WBC normalized. Hgb 9.3, Creatinine improved to 1.1>1.7, , ESR 70.       Review of Systems   Constitutional:  Positive for activity change, appetite change, fatigue and unexpected weight change. Negative for chills and fever.   Eyes:  Negative for photophobia.   Respiratory:  Negative for cough, chest tightness, shortness of breath and wheezing.    Cardiovascular:  Negative for chest pain, palpitations and leg swelling.   Gastrointestinal:  Positive for diarrhea, nausea and vomiting. Negative for abdominal pain.   Genitourinary:  Negative for dysuria, flank pain and hematuria.   Musculoskeletal:  Negative for back pain, myalgias and neck pain.        Rt leg pain   Skin:  Negative for rash and wound.   Neurological:  Positive for weakness. Negative for dizziness, syncope and headaches.   Psychiatric/Behavioral:  Negative for agitation.    Objective:     Vital Signs (Most Recent):  Temp: 98.6 °F  (37 °C) (03/21/22 1531)  Pulse: 82 (03/21/22 1531)  Resp: 20 (03/21/22 1531)  BP: (!) 101/58 (03/21/22 1531)  SpO2: 98 % (03/21/22 1531)   Vital Signs (24h Range):  Temp:  [96.3 °F (35.7 °C)-99 °F (37.2 °C)] 98.6 °F (37 °C)  Pulse:  [76-99] 82  Resp:  [17-20] 20  SpO2:  [95 %-100 %] 98 %  BP: ()/(53-70) 101/58     Weight: 64.6 kg (142 lb 6.7 oz)  Body mass index is 22.99 kg/m².    Intake/Output Summary (Last 24 hours) at 3/21/2022 1744  Last data filed at 3/21/2022 1400  Gross per 24 hour   Intake 400 ml   Output 250 ml   Net 150 ml      Physical Exam  Constitutional:       General: He is not in acute distress.     Appearance: He is well-developed. He is ill-appearing.      Comments: Thin built, chronically ill appearing    HENT:      Head: Normocephalic and atraumatic.   Eyes:      Conjunctiva/sclera: Conjunctivae normal.      Pupils: Pupils are equal, round, and reactive to light.   Neck:      Vascular: No JVD.   Cardiovascular:      Rate and Rhythm: Normal rate and regular rhythm.      Heart sounds: Normal heart sounds.   Pulmonary:      Effort: Pulmonary effort is normal. No respiratory distress.      Breath sounds: Normal breath sounds. No wheezing.   Abdominal:      General: Bowel sounds are normal. There is no distension.      Palpations: Abdomen is soft.      Tenderness: There is no abdominal tenderness. There is no guarding.   Musculoskeletal:         General: No tenderness. Normal range of motion.      Cervical back: Normal range of motion and neck supple.   Skin:     General: Skin is warm and dry.      Capillary Refill: Capillary refill takes less than 2 seconds.      Findings: No erythema.   Neurological:      Mental Status: He is alert and oriented to person, place, and time.   Psychiatric:         Behavior: Behavior normal.       Significant Labs: All pertinent labs within the past 24 hours have been reviewed.  Blood Culture:   Recent Labs   Lab 03/20/22  1732 03/20/22  1909   LABBLOO No Growth  to date No Growth to date     BMP:   Recent Labs   Lab 03/20/22  1240 03/21/22  0507    116*   * 134*   K 4.4 4.1    107   CO2 19* 17*   BUN 25* 20   CREATININE 1.7* 1.1   CALCIUM 9.1 8.3*   MG 1.5*  --      CBC:   Recent Labs   Lab 03/20/22  1240 03/21/22  0005 03/21/22  0508   WBC 14.27* 12.06 10.18  10.18   HGB 11.1* 10.0* 9.3*  9.3*   HCT 32.5* 30.1* 27.1*  27.1*    287 266  266     CMP:   Recent Labs   Lab 03/20/22  1240 03/21/22  0507   * 134*   K 4.4 4.1    107   CO2 19* 17*    116*   BUN 25* 20   CREATININE 1.7* 1.1   CALCIUM 9.1 8.3*   PROT 6.3 5.2*   ALBUMIN 2.5* 2.1*   BILITOT 0.3 0.3   ALKPHOS 145* 116   AST 12 20   ALT 5* 8*   ANIONGAP 12 10   EGFRNONAA 40* >60     Cardiac Markers:   Recent Labs   Lab 03/20/22  1240   BNP 23       Significant Imaging:       Assessment/Plan:      * Pulmonary cavitary lesion  -CT Chest shows 5.4 cm cavitary consolidative opacity in the superior segment left lower lobe is most suspicious for a cavitary infectious process such as cavitary pneumonia or tuberculosis.  Cavitating malignancy or pulmonary infarct are additional considerations.  -patient afebrile, lactic and procal wnl, defer antibiotics for now   -Consult pulmonology   3/21-  -Pt started on antibiotic treatment targeting CAP  -Induced sputum for AFB smear and culture   -Await further recs from Pulmonology     Acute deep vein thrombosis (DVT) of right lower extremity  - Apixaban held and pt started on Heparin infusion until evaluated by Pulmonology       MARI (acute kidney injury)  3/20-  -2/2 volume depletion, dehydration   Continue fluid resuscitation  Monitor intake and output   Repeat am renal function panel   3/21-  Resolved         History of CVA with residual deficit  No new neuro deficits   CT head negative  Continue plavix, statin   3/21-  Plavix held until pt is evaluated by Pulmonology       Controlled type 2 diabetes mellitus with stage 3 chronic kidney  disease, without long-term current use of insulin  SSI, accucheck AC&HS, Diabetic diet   Most recent HgA1c 5.9     Dehydration  - Poor oral intake of food and liquid   -Additionally family given h/o N/N  -Volume resuscitation as indicated       Nausea and vomiting  Patient denies abdominal pain   No evidence of SBO on CT abdomen  Continue supportive management (prn antiemetics)       Fall  Consult PT /OT      Weight loss  - Family reports weight loss in the last 6 mos to 1 year but could not tell how much   -Reports total loss of appetite   -Work up for further evaluation of abnormal CT chest   -Supportive treatment         VTE Risk Mitigation (From admission, onward)         Ordered     heparin 25,000 units in dextrose 5% (100 units/ml) IV bolus from bag - ADDITIONAL PRN BOLUS - 60 units/kg  As needed (PRN)        Question:  Heparin Infusion Adjustment (DO NOT MODIFY ANSWER)  Answer:  \\[email or web address]\epic\Images\Pharmacy\HeparinInfusions\heparin HIGH INTENSITY nomogram for [facility] SF305C.pdf    03/20/22 2158     heparin 25,000 units in dextrose 5% (100 units/ml) IV bolus from bag - ADDITIONAL PRN BOLUS - 30 units/kg  As needed (PRN)        Question:  Heparin Infusion Adjustment (DO NOT MODIFY ANSWER)  Answer:  \\[email or web address]\epic\Images\Pharmacy\HeparinInfusions\heparin HIGH INTENSITY nomogram for [facility] [id number].pdf    03/20/22 2158     heparin 25,000 units in dextrose 5% 250 mL (100 units/mL) infusion HIGH INTENSITY nomogram - [facility]  Continuous        Question Answer Comment   Heparin Infusion Adjustment (DO NOT MODIFY ANSWER) \\[email or web address]\epic\Images\Pharmacy\HeparinInfusions\heparin HIGH INTENSITY nomogram for [facility] [id number].pdf    Begin at (in units/kg/hr) 18        03/20/22 2158     IP VTE HIGH RISK PATIENT  Once         03/20/22 1645     Place sequential compression device  Until discontinued         03/20/22 1645                Discharge Planning   VALERIE:      Code Status: Full Code   Is the patient medically ready for  discharge?:     Reason for patient still in hospital (select all that apply): Patient trending condition  Discharge Plan A: Home with family                  Doris Yanes MD  Department of Hospital Medicine   '[place] - Telemetry ([facility])

## 2022-03-21 NOTE — ASSESSMENT & PLAN NOTE
-CT Chest shows 5.4 cm cavitary consolidative opacity in the superior segment left lower lobe is most suspicious for a cavitary infectious process such as cavitary pneumonia or tuberculosis.  Cavitating malignancy or pulmonary infarct are additional considerations.  -patient afebrile, lactic and procal wnl, defer antibiotics for now   -Consult pulmonology

## 2022-03-21 NOTE — PT/OT/SLP EVAL
Speech Language Pathology Evaluation  Bedside Swallow    Patient Name:  Smay Alejandre Jr.   MRN:  [id number]  Admitting Diagnosis: Pulmonary cavitary lesion    Recommendations:                 General Recommendations:  Follow-up not indicated  Diet recommendations:  Regular, Thin   Aspiration Precautions: Standard aspiration precautions   General Precautions: Standard, aspiration  Communication strategies:  none    History:     Past Medical History:   Diagnosis Date    Anxiety     Depression     Diabetes mellitus, type 2     Hypertension     Stroke        Past Surgical History:   Procedure Laterality Date    ANGIOGRAPHY OF LOWER EXTREMITY N/A 12/21/2020    Procedure: ANGIOGRAM, LOWER EXTREMITY/Rt leg poss pta/stent;  Surgeon: Todd Michel MD;  Location: [facility] CATH LAB;  Service: Peripheral Vascular;  Laterality: N/A;  rescheduled 12/8    BACK SURGERY  2015    CATARACT EXTRACTION      KNEE SURGERY  2012    LITHOTRIPSY  2000       Social History: Patient lives at home and receives assist with ADL's by daughter.    Chest X-Rays: Pulmonary cavitary lesion identified in LLL with suspicion for TB, malignancy, or PNA.    Prior diet: Pt consumes regular consistency diet. Poor PO intake/appetite and weight loss reported.      Subjective     Pt seen bedside for ST swallowing evaluation.  Pt pleasant and cooperative.  Wife present at bedside.  Pt sitting upright in chair, requested tp get back into bed.  1:1 staff present d/t fall risk.  Patient goals: To go home.     Pain/Comfort:  · Pain Rating 1: 0/10  · Pain Rating Post-Intervention 1: 0/10  · Pain Rating 2: 0/10  · Pain Rating Post-Intervention 2: 0/10    Respiratory Status: Room air    Objective:     Oral Musculature Evaluation  · Oral Musculature: WFL  · Dentition: present and adequate  · Secretion Management: adequate  · Mucosal Quality: good  · Mandibular Strength and Mobility: WFL  · Oral Labial Strength and Mobility: WFL  · Lingual Strength and Mobility:  WFL  · Velar Elevation: WFL  · Buccal Strength and Mobility: WFL  · Volitional Cough: present-productive  · Volitional Swallow: present  · Voice Prior to PO Intake: WFL    Bedside Swallow Eval:   Consistencies Assessed:  · SLP assessed pt with thin liquids, pureed and soft solid consistencies.     Oral Phase:   · WFL    Pharyngeal Phase:   · no overt clinical signs/symptoms of aspiration    Compensatory Strategies  · None    Treatment: Pt recommended for regular consistency diet/thin liquids following basic swallowing precautions.  Dietition consult warranted d/t pt poor appetite/weight loss prior to admit.  No further skilled ST intervention indicated at this time.  MD to reconsult if acute changes observed in pt's swallowing function.    Assessment:     Samy Alejandre Jr. is a 72 y.o. male with an SLP diagnosis of potential dysphagia d/t reported poor PO intake/weight loss & generalized weakness.  He presents with functional OM skills to continue consumption of regular consistency diet/thin liquids at this time. No overt s/s of dysphagia present during bedside CSE.  MD to reconsult if needed.    Goals:   Multidisciplinary Problems     SLP Goals     Not on file                Plan:     · Patient to be seen:      · Plan of Care expires:     · Plan of Care reviewed with:  patient, spouse   · SLP Follow-Up:  No       Discharge recommendations:  home   Barriers to Discharge:  None    Time Tracking:     SLP Treatment Date:   03/21/22  Speech Start Time:  1030  Speech Stop Time:  1100     Speech Total Time (min):  30 min    Billable Minutes: Eval Swallow and Oral Function 30 minutes    03/21/2022

## 2022-03-21 NOTE — NURSING
Report received from Hilda in ER. Patient oriented to room and call light. Monitor box applied. Vitals taken. No acute distress noted.

## 2022-03-21 NOTE — SUBJECTIVE & OBJECTIVE
Interval History:   Pt started on antibiotic targeting CAP. Induced sputum for AFB smear and culture ordered. Pulmonology is consulted for further recs. Apixaban and Plavix held and pt started on Heparin infusion . WBC normalized. Hgb 9.3, Creatinine improved to 1.1>1.7, , ESR 70.       Review of Systems   Constitutional:  Positive for activity change, appetite change, fatigue and unexpected weight change. Negative for chills and fever.   Eyes:  Negative for photophobia.   Respiratory:  Negative for cough, chest tightness, shortness of breath and wheezing.    Cardiovascular:  Negative for chest pain, palpitations and leg swelling.   Gastrointestinal:  Positive for diarrhea, nausea and vomiting. Negative for abdominal pain.   Genitourinary:  Negative for dysuria, flank pain and hematuria.   Musculoskeletal:  Negative for back pain, myalgias and neck pain.        Rt leg pain   Skin:  Negative for rash and wound.   Neurological:  Positive for weakness. Negative for dizziness, syncope and headaches.   Psychiatric/Behavioral:  Negative for agitation.    Objective:     Vital Signs (Most Recent):  Temp: 98.6 °F (37 °C) (03/21/22 1531)  Pulse: 82 (03/21/22 1531)  Resp: 20 (03/21/22 1531)  BP: (!) 101/58 (03/21/22 1531)  SpO2: 98 % (03/21/22 1531)   Vital Signs (24h Range):  Temp:  [96.3 °F (35.7 °C)-99 °F (37.2 °C)] 98.6 °F (37 °C)  Pulse:  [76-99] 82  Resp:  [17-20] 20  SpO2:  [95 %-100 %] 98 %  BP: ()/(53-70) 101/58     Weight: 64.6 kg (142 lb 6.7 oz)  Body mass index is 22.99 kg/m².    Intake/Output Summary (Last 24 hours) at 3/21/2022 1744  Last data filed at 3/21/2022 1400  Gross per 24 hour   Intake 400 ml   Output 250 ml   Net 150 ml      Physical Exam  Constitutional:       General: He is not in acute distress.     Appearance: He is well-developed. He is ill-appearing.      Comments: Thin built, chronically ill appearing    HENT:      Head: Normocephalic and atraumatic.   Eyes:      Conjunctiva/sclera:  Conjunctivae normal.      Pupils: Pupils are equal, round, and reactive to light.   Neck:      Vascular: No JVD.   Cardiovascular:      Rate and Rhythm: Normal rate and regular rhythm.      Heart sounds: Normal heart sounds.   Pulmonary:      Effort: Pulmonary effort is normal. No respiratory distress.      Breath sounds: Normal breath sounds. No wheezing.   Abdominal:      General: Bowel sounds are normal. There is no distension.      Palpations: Abdomen is soft.      Tenderness: There is no abdominal tenderness. There is no guarding.   Musculoskeletal:         General: No tenderness. Normal range of motion.      Cervical back: Normal range of motion and neck supple.   Skin:     General: Skin is warm and dry.      Capillary Refill: Capillary refill takes less than 2 seconds.      Findings: No erythema.   Neurological:      Mental Status: He is alert and oriented to person, place, and time.   Psychiatric:         Behavior: Behavior normal.       Significant Labs: All pertinent labs within the past 24 hours have been reviewed.  Blood Culture:   Recent Labs   Lab 03/20/22  1732 03/20/22  1909   LABBLOO No Growth to date No Growth to date     BMP:   Recent Labs   Lab 03/20/22  1240 03/21/22  0507    116*   * 134*   K 4.4 4.1    107   CO2 19* 17*   BUN 25* 20   CREATININE 1.7* 1.1   CALCIUM 9.1 8.3*   MG 1.5*  --      CBC:   Recent Labs   Lab 03/20/22  1240 03/21/22  0005 03/21/22  0508   WBC 14.27* 12.06 10.18  10.18   HGB 11.1* 10.0* 9.3*  9.3*   HCT 32.5* 30.1* 27.1*  27.1*    287 266  266     CMP:   Recent Labs   Lab 03/20/22  1240 03/21/22  0507   * 134*   K 4.4 4.1    107   CO2 19* 17*    116*   BUN 25* 20   CREATININE 1.7* 1.1   CALCIUM 9.1 8.3*   PROT 6.3 5.2*   ALBUMIN 2.5* 2.1*   BILITOT 0.3 0.3   ALKPHOS 145* 116   AST 12 20   ALT 5* 8*   ANIONGAP 12 10   EGFRNONAA 40* >60     Cardiac Markers:   Recent Labs   Lab 03/20/22  1240   BNP 23       Significant  Imaging:

## 2022-03-21 NOTE — ASSESSMENT & PLAN NOTE
Patient denies abdominal pain   No evidence of SBO on CT abdomen  Continue supportive management (prn antiemetics)

## 2022-03-21 NOTE — PT/OT/SLP EVAL
Physical Therapy Evaluation    Patient Name:  Samy Alejandre Jr.   MRN:  [id number]    Recommendations:     Discharge Recommendations:  home health PT   Discharge Equipment Recommendations: bedside commode   Barriers to discharge: None    Assessment:     Samy Alejandre Jr. is a 72 y.o. male admitted with a medical diagnosis of Pulmonary cavitary lesion.  He presents with the following impairments/functional limitations:  weakness, impaired endurance, impaired functional mobilty, gait instability, impaired balance, decreased safety awareness, pain, decreased lower extremity function, decreased upper extremity function, decreased coordination.    Rehab Prognosis: Good; patient would benefit from acute skilled PT services to address these deficits and reach maximum level of function.    Recent Surgery: * No surgery found *     Plan:     During this hospitalization, patient to be seen 3 x/week to address the identified rehab impairments via gait training, therapeutic exercises, therapeutic activities and progress toward the following goals:    · Plan of Care Expires:  04/04/22    Subjective     Chief Complaint:   Patient/Family Comments/goals:   Pain/Comfort:  · Pain Rating 1: 5/10  · Location - Side 1: Left  · Location - Orientation 1: generalized  · Location 1: arm  · Pain Addressed 1: Reposition, Distraction    Patients cultural, spiritual, Temple conflicts given the current situation:      Living Environment:  PT AND WIFE LIVE WITH DAUGHTER 1 STORY HOUSE 3 STEPS TO ENTER NO RAIL, PT HAS 24 HOUR CARE AT HOME, AMB WITH RW HOUSEHOLD DISTANCES, DOES NOT DRIVE, REPORTS RECENT FALLS AT HOME, YONATAN WITH ADL'S  Prior to admission, patients level of function was.  Equipment used at home: walker, rolling, shower chair, cane, straight.  DME owned (not currently used): none.  Upon discharge, patient will have assistance from FAMILY.    Objective:     Communicated with NURSE BIANCHI prior to session.  Patient found supine  with telemetry, peripheral IV (SUZANNE SYS, 1:1 SITTER)  upon PT entry to room.    General Precautions: Standard, airborne, contact, droplet, fall   Orthopedic Precautions:N/A   Braces: N/A  Respiratory Status: Room air    Exams:  · Cognitive Exam:  Patient is oriented to Person, Place, Time, Situation and PT ABLE TO FOLLOW COMMANDS, SOFT SPOKEN, DELAYED  · Postural Exam:  Patient presented with the following abnormalities:    · -       Rounded shoulders  · -       Forward head  · Sensation:    · -       Intact  · RLE ROM: WFL except R FOOT DROP  · RLE Strength: GROSSLY 3/5, R FOOT DROP  · LLE ROM: WFL  · LLE Strength: GROSSLY 3+/5    Functional Mobility:  · Bed Mobility:     · Rolling Left:  stand by assistance  · Scooting: stand by assistance  · Supine to Sit: stand by assistance  · Transfers:     · Sit to Stand:  contact guard assistance with rolling walker  · Bed to Chair: contact guard assistance with  rolling walker  using  Step Transfer  · Gait: PT AMB 20' IN ROOM WITH RW AND CGA, SLOW PACE, CUES FOR UPRIGHT POSTURE AND RW SAFETY, PT WITH R FOOT DROP BUT NO GROSS LOB, SHUFFLING GAIT  · Balance: POOR+    Therapeutic Activities and Exercises:   PT AND WIFE EDUCATED IN ROLE OF P.T. AND POC, REVIEW RW USE AND SAFETY DURING TF'S AND GAIT, PT ENCOURAGED TO INCREASE TIME OOB IN CHAIR, PT EDUCATED IN BLE THEREX TO PERFORM WHILE SEATED IN CHAIR: HIP FLEX/EXT, LAQ, AP'S    AM-PAC 6 CLICK MOBILITY  Total Score:18     Patient left up in chair with all lines intact, call button in reach, chair alarm on, NURSE notified and WIFE present.    GOALS:   Multidisciplinary Problems     Physical Therapy Goals        Problem: Physical Therapy Goal    Goal Priority Disciplines Outcome Goal Variances Interventions   Physical Therapy Goal     PT, PT/OT      Description: LTG'S TO BE MET IN 14 DAYS (4-4-22)  1. PT WILL BE YONATAN WITH BED MOBILITY  2. PT WILL REQUIRE SPV FOR TF'S  3. PT WILL ' WITH RW AND SPV                   History:      Past Medical History:   Diagnosis Date    Anxiety     Depression     Diabetes mellitus, type 2     Hypertension     Stroke        Past Surgical History:   Procedure Laterality Date    ANGIOGRAPHY OF LOWER EXTREMITY N/A 12/21/2020    Procedure: ANGIOGRAM, LOWER EXTREMITY/Rt leg poss pta/stent;  Surgeon: Todd Michel MD;  Location: [facility] CATH LAB;  Service: Peripheral Vascular;  Laterality: N/A;  rescheduled 12/8    BACK SURGERY  2015    CATARACT EXTRACTION      KNEE SURGERY  2012    LITHOTRIPSY  2000       Time Tracking:     PT Received On: 03/21/22  PT Start Time: 0955     PT Stop Time: 1018  PT Total Time (min): 23 min     Billable Minutes: Evaluation 15 and Therapeutic Activity 8    03/21/2022

## 2022-03-21 NOTE — HPI
71 yo male with past medical history of HTN, CVA, T2DM, recent DVT, anxiety and depression presented to ED for evaluation of generalized weakness/deconditioning. History collected from daughter, Micelle, at bedside. Per family report patient with progressively worsening weakness over the past month associated with episodes of intermittent n/v, diarrhea, decreased po intake, altered mental status and weight loss. He was diagnosed with extensive acute right lower extremity deep venous thrombus on 3/9/22, apixaban initiated. Patient s/p 2 mechanical falls prior to ED arrival, no head trauma or LOC. Patient/family denies fever/chills, abdominal pain and chest pain.     ED evaluation: SBP 90s on arrival. Labs remarkable for wbc 14, Cr 1.7, magnesium 1.5. CT chest shows 5.4 cm cavitary consolidative opacity in the superior segment left lower lobe suspicious for a cavitary infectious process such as cavitary pneumonia or tuberculosis. Cavitating malignancy or pulmonary infarct are additional considerations. COVID 19 negative. CT head shows no acute findings. He received 2 liters IVF. Patient placed in observation for further evaluation and treatment.

## 2022-03-21 NOTE — ASSESSMENT & PLAN NOTE
- Family reports weight loss in the last 6 mos to 1 year but could not tell how much   -Reports total loss of appetite   -Work up for further evaluation of abnormal CT chest   -Supportive treatment

## 2022-03-21 NOTE — ASSESSMENT & PLAN NOTE
- Poor oral intake of food and liquid   -Additionally family given h/o N/N  -Volume resuscitation as indicated

## 2022-03-21 NOTE — ASSESSMENT & PLAN NOTE
-CT Chest shows 5.4 cm cavitary consolidative opacity in the superior segment left lower lobe is most suspicious for a cavitary infectious process such as cavitary pneumonia or tuberculosis.  Cavitating malignancy or pulmonary infarct are additional considerations.  -patient afebrile, lactic and procal wnl, defer antibiotics for now   -Consult pulmonology   3/21-  -Pt started on antibiotic treatment targeting CAP  -Induced sputum for AFB smear and culture   -Await further recs from Pulmonology

## 2022-03-21 NOTE — PLAN OF CARE
[facility] - Telemetry (Hospital)  Initial Discharge Assessment       Primary Care Provider: Damien Barron MD    Admission Diagnosis: Dehydration [E86.0]  Weakness [R53.1]  Cavitary lesion of lung [J98.4]  Chest pain [R07.9]  Acute deep vein thrombosis (DVT) of proximal vein of right lower extremity [I82.4Y1]    Admission Date: 3/20/2022  Expected Discharge Date:     Discharge Barriers Identified: None    Payor: [employer] MANAGED MEDICARE / Plan: [employer] MEDICARE HMO / Product Type: Capitation /     Extended Emergency Contact Information  Primary Emergency Contact: farooq brown  Mobile Phone: [phone]  Relation: Daughter    Discharge Plan A: Home with family         [facility] [address] [place] [address] [facility] [address]  [address] [facility] [address]  [facility] [address]  Phone: [phone] Fax: [phone]    [id number] - [place] - [address] AT [facility]  [address]  [facility] [address]  Phone: [phone] Fax: [phone]      Initial Assessment (most recent)     Adult Discharge Assessment - 03/21/22 1028        Discharge Assessment    Assessment Type Discharge Planning Assessment     Confirmed/corrected address, phone number and insurance Yes     Confirmed Demographics Correct on Facesheet     Source of Information family     Communicated VALERIE with patient/caregiver Date not available/Unable to determine     Reason For Admission pulmonary cavitary lesion     Lives With other (see comments)   ex-wife at bedside. Patient sleeping.    Facility Arrived From: home     Do you expect to return to your current living situation? Yes     Do you have help at home or someone to help you manage your care at home? Yes     Who are your caregiver(s) and their phone number(s)? daughter     Prior to hospitilization cognitive status: Alert/Oriented     Current cognitive status: Alert/Oriented     Walking or Climbing Stairs Difficulty ambulation  difficulty, requires equipment     Mobility Management walker     Dressing/Bathing Difficulty none     Equipment Currently Used at Home walker, rolling     Readmission within 30 days? No     Patient currently being followed by outpatient case management? No     Do you currently have service(s) that help you manage your care at home? No     Do you take prescription medications? Yes     Do you have prescription coverage? Yes     Do you have any problems affording any of your prescribed medications? No     Is the patient taking medications as prescribed? yes     Who is going to help you get home at discharge? daugher     How do you get to doctors appointments? family or friend will provide     Are you on dialysis? No     Discharge Plan A Home with family     DME Needed Upon Discharge  none     Discharge Plan discussed with: Spouse/sig other     Discharge Barriers Identified None                 Anticipated DC dispo: Home  PLOF: independent, lives alone. Patient's daughter assists with driving patient to f/u appts and errands.  PCP: Dr. Barron  Comments:  Patient sleeping at time of assessment. CM verified address and facesheet info with ex-wife at bedside. Patient lives alone, but has good family support. CM following for needs.    CM provided a transitional care folder, information on advanced directives, information on pharmacy bedside delivery, and discharge planning begins on admission with contact information for any needs/questions.

## 2022-03-21 NOTE — ASSESSMENT & PLAN NOTE
3/20-  -2/2 volume depletion, dehydration   Continue fluid resuscitation  Monitor intake and output   Repeat am renal function panel   3/21-  Resolved

## 2022-03-21 NOTE — SUBJECTIVE & OBJECTIVE
Past Medical History:   Diagnosis Date    Anxiety     Depression     Diabetes mellitus, type 2     Hypertension     Stroke        Past Surgical History:   Procedure Laterality Date    ANGIOGRAPHY OF LOWER EXTREMITY N/A 12/21/2020    Procedure: ANGIOGRAM, LOWER EXTREMITY/Rt leg poss pta/stent;  Surgeon: Todd Michel MD;  Location: [facility] CATH LAB;  Service: Peripheral Vascular;  Laterality: N/A;  rescheduled 12/8    BACK SURGERY  2015    CATARACT EXTRACTION      KNEE SURGERY  2012    LITHOTRIPSY  2000       Review of patient's allergies indicates:   Allergen Reactions    Chantix [varenicline] Other (See Comments)     Felt bad       No current facility-administered medications on file prior to encounter.     Current Outpatient Medications on File Prior to Encounter   Medication Sig    apixaban (ELIQUIS DVT-PE TREAT 30D START) 5 mg (74 tabs) DsPk For the first 7 days take two 5 mg tablets twice daily.  After 7 days take one 5 mg tablet twice daily.    clopidogreL (PLAVIX) 75 mg tablet Take 1 tablet (75 mg total) by mouth once daily.    DULoxetine (CYMBALTA) 30 MG capsule Take 1 capsule (30 mg total) by mouth once daily.    FLUoxetine 10 MG capsule TAKE 2 CAPSULES BY MOUTH ONCE DAILY (Patient taking differently: 20 mg.)    gabapentin (NEURONTIN) 300 MG capsule Take 1 capsule (300 mg total) by mouth 3 (three) times daily as needed.    HYDROcodone-acetaminophen (NORCO) 5-325 mg per tablet Take 1 tablet by mouth 3 (three) times daily.    metFORMIN (GLUCOPHAGE) 500 MG tablet Take 1 tablet (500 mg total) by mouth 2 (two) times daily.    ondansetron (ZOFRAN) 8 MG tablet Take 1 tablet (8 mg total) by mouth every 8 (eight) hours as needed for Nausea.    rosuvastatin (CRESTOR) 20 MG tablet Take 1 tablet (20 mg total) by mouth once daily.    tamsulosin (FLOMAX) 0.4 mg Cap Take 1 capsule (0.4 mg total) by mouth once daily.    tiZANidine (ZANAFLEX) 4 MG tablet Take 1 tablet TID Prn    zolpidem (AMBIEN) 10 mg Tab Take 1 tablet (10  mg total) by mouth nightly as needed (insomnia).    blood glucose control, low (TRUE METRIX LEVEL 1) Soln 1 drop by Misc.(Non-Drug; Combo Route) route once daily.    blood glucose control, normal (TRUE METRIX LEVEL 2) Soln 1 drop by Misc.(Non-Drug; Combo Route) route once daily.    blood sugar diagnostic (TRUE METRIX GLUCOSE TEST STRIP) Strp 1 strip by Misc.(Non-Drug; Combo Route) route once daily.    blood-glucose meter (TRUE METRIX GLUCOSE METER) Misc 1 Device by Misc.(Non-Drug; Combo Route) route once. for 1 dose    lancets 32 gauge Misc 1 lancet by Misc.(Non-Drug; Combo Route) route once daily.    lisinopriL 10 MG tablet Take 1 tablet by mouth once daily    NON FORMULARY MEDICATION THC+ 300 NATURAL SL TINCTURE - ILERA [place]    walker (ULTRA-LIGHT ROLLATOR) Misc Use daily for ambulation     Family History       Problem Relation (Age of Onset)    COPD Sister    Cancer Father    Heart disease Mother    Stroke Mother          Tobacco Use    Smoking status: Current Every Day Smoker     Packs/day: 1.00     Types: Cigarettes    Smokeless tobacco: Current User     Types: Snuff   Substance and Sexual Activity    Alcohol use: Not Currently    Drug use: Never    Sexual activity: Not Currently     Review of Systems   Constitutional:  Positive for activity change, appetite change, fatigue and unexpected weight change. Negative for chills and fever.   Eyes:  Negative for photophobia.   Respiratory:  Negative for cough, chest tightness, shortness of breath and wheezing.    Cardiovascular:  Negative for chest pain, palpitations and leg swelling.   Gastrointestinal:  Positive for diarrhea, nausea and vomiting. Negative for abdominal pain.   Genitourinary:  Negative for dysuria, flank pain and hematuria.   Musculoskeletal:  Negative for back pain, myalgias and neck pain.   Skin:  Negative for rash and wound.   Neurological:  Positive for weakness. Negative for dizziness, syncope and headaches.   Psychiatric/Behavioral:  Negative  for agitation.    Objective:     Vital Signs (Most Recent):  Temp: 96.3 °F (35.7 °C) (03/20/22 1835)  Pulse: 99 (03/20/22 1835)  Resp: 18 (03/20/22 1835)  BP: 117/66 (03/20/22 1835)  SpO2: 98 % (03/20/22 1835) Vital Signs (24h Range):  Temp:  [96.3 °F (35.7 °C)-98.1 °F (36.7 °C)] 96.3 °F (35.7 °C)  Pulse:  [81-99] 99  Resp:  [17-21] 18  SpO2:  [97 %-100 %] 98 %  BP: ()/(53-73) 117/66     Weight: 59.9 kg (132 lb)  Body mass index is 21.31 kg/m².    Physical Exam  Constitutional:       General: He is not in acute distress.     Appearance: He is well-developed. He is ill-appearing.   HENT:      Head: Normocephalic and atraumatic.   Eyes:      Conjunctiva/sclera: Conjunctivae normal.      Pupils: Pupils are equal, round, and reactive to light.   Neck:      Vascular: No JVD.   Cardiovascular:      Rate and Rhythm: Normal rate and regular rhythm.      Heart sounds: Normal heart sounds.   Pulmonary:      Effort: Pulmonary effort is normal. No respiratory distress.      Breath sounds: Normal breath sounds. No wheezing.   Abdominal:      General: Bowel sounds are normal. There is no distension.      Palpations: Abdomen is soft.      Tenderness: There is no abdominal tenderness. There is no guarding.   Musculoskeletal:         General: No tenderness. Normal range of motion.      Cervical back: Normal range of motion and neck supple.   Skin:     General: Skin is warm and dry.      Capillary Refill: Capillary refill takes less than 2 seconds.      Findings: No erythema.   Neurological:      Mental Status: He is alert and oriented to person, place, and time.   Psychiatric:         Behavior: Behavior normal.         CRANIAL NERVES     CN III, IV, VI   Pupils are equal, round, and reactive to light.     Significant Labs: All pertinent labs within the past 24 hours have been reviewed.  CBC:   Recent Labs   Lab 03/20/22  1240   WBC 14.27*   HGB 11.1*   HCT 32.5*        CMP:   Recent Labs   Lab 03/20/22  1240   *    K 4.4      CO2 19*      BUN 25*   CREATININE 1.7*   CALCIUM 9.1   PROT 6.3   ALBUMIN 2.5*   BILITOT 0.3   ALKPHOS 145*   AST 12   ALT 5*   ANIONGAP 12   EGFRNONAA 40*     Troponin:   Recent Labs   Lab 03/20/22  1240   TROPONINI 0.012     Urine Studies:   Recent Labs   Lab 03/20/22  1818   COLORU Yellow   APPEARANCEUA Clear   PHUR 6.0   SPECGRAV 1.020   PROTEINUA Negative   GLUCUA Negative   KETONESU Trace*   BILIRUBINUA Negative   OCCULTUA Negative   NITRITE Negative   UROBILINOGEN Negative   LEUKOCYTESUR Negative       Significant Imaging: I have reviewed all pertinent imaging results/findings within the past 24 hours.

## 2022-03-21 NOTE — PLAN OF CARE
OT josue completed. Sup>sit with SBA, sit>stand CGA, functional mobility x20ft with RW and CGA, step>pivot to bedside chair with RW and CGA. Recommending HHOT at d/c.

## 2022-03-21 NOTE — PROGRESS NOTES
Pt culture cup left at bedside and pt instructed on how to use the sterile cup for sputum. Pt verbalized understanding.

## 2022-03-22 LAB
ALBUMIN SERPL BCP-MCNC: 2 G/DL (ref 3.5–5.2)
ALP SERPL-CCNC: 117 U/L (ref 55–135)
ALT SERPL W/O P-5'-P-CCNC: 14 U/L (ref 10–44)
ANION GAP SERPL CALC-SCNC: 12 MMOL/L (ref 8–16)
APTT BLDCRRT: 38.6 SEC (ref 21–32)
APTT BLDCRRT: 52.1 SEC (ref 21–32)
APTT BLDCRRT: 83.2 SEC (ref 21–32)
AST SERPL-CCNC: 35 U/L (ref 10–40)
BASOPHILS # BLD AUTO: 0.04 K/UL (ref 0–0.2)
BASOPHILS NFR BLD: 0.5 % (ref 0–1.9)
BILIRUB SERPL-MCNC: 0.2 MG/DL (ref 0.1–1)
BUN SERPL-MCNC: 12 MG/DL (ref 8–23)
CALCIUM SERPL-MCNC: 8.3 MG/DL (ref 8.7–10.5)
CHLORIDE SERPL-SCNC: 106 MMOL/L (ref 95–110)
CO2 SERPL-SCNC: 17 MMOL/L (ref 23–29)
CREAT SERPL-MCNC: 0.8 MG/DL (ref 0.5–1.4)
DIFFERENTIAL METHOD: ABNORMAL
EOSINOPHIL # BLD AUTO: 0.2 K/UL (ref 0–0.5)
EOSINOPHIL NFR BLD: 2.4 % (ref 0–8)
ERYTHROCYTE [DISTWIDTH] IN BLOOD BY AUTOMATED COUNT: 14.1 % (ref 11.5–14.5)
EST. GFR  (AFRICAN AMERICAN): >60 ML/MIN/1.73 M^2
EST. GFR  (NON AFRICAN AMERICAN): >60 ML/MIN/1.73 M^2
GLUCOSE SERPL-MCNC: 87 MG/DL (ref 70–110)
HCT VFR BLD AUTO: 25.1 % (ref 40–54)
HGB BLD-MCNC: 8.6 G/DL (ref 14–18)
IMM GRANULOCYTES # BLD AUTO: 0.03 K/UL (ref 0–0.04)
IMM GRANULOCYTES NFR BLD AUTO: 0.4 % (ref 0–0.5)
LYMPHOCYTES # BLD AUTO: 1.7 K/UL (ref 1–4.8)
LYMPHOCYTES NFR BLD: 21.4 % (ref 18–48)
MAGNESIUM SERPL-MCNC: 1.1 MG/DL (ref 1.6–2.6)
MCH RBC QN AUTO: 33.3 PG (ref 27–31)
MCHC RBC AUTO-ENTMCNC: 34.3 G/DL (ref 32–36)
MCV RBC AUTO: 97 FL (ref 82–98)
MONOCYTES # BLD AUTO: 0.9 K/UL (ref 0.3–1)
MONOCYTES NFR BLD: 10.9 % (ref 4–15)
NEUTROPHILS # BLD AUTO: 5.1 K/UL (ref 1.8–7.7)
NEUTROPHILS NFR BLD: 64.4 % (ref 38–73)
NRBC BLD-RTO: 0 /100 WBC
PHOSPHATE SERPL-MCNC: 2.3 MG/DL (ref 2.7–4.5)
PLATELET # BLD AUTO: 274 K/UL (ref 150–450)
PMV BLD AUTO: 10 FL (ref 9.2–12.9)
POCT GLUCOSE: 117 MG/DL (ref 70–110)
POCT GLUCOSE: 89 MG/DL (ref 70–110)
POTASSIUM SERPL-SCNC: 3.3 MMOL/L (ref 3.5–5.1)
PROT SERPL-MCNC: 5 G/DL (ref 6–8.4)
RBC # BLD AUTO: 2.58 M/UL (ref 4.6–6.2)
SODIUM SERPL-SCNC: 135 MMOL/L (ref 136–145)
WBC # BLD AUTO: 7.89 K/UL (ref 3.9–12.7)

## 2022-03-22 PROCEDURE — 84100 ASSAY OF PHOSPHORUS: CPT | Performed by: INTERNAL MEDICINE

## 2022-03-22 PROCEDURE — 63600175 PHARM REV CODE 636 W HCPCS: Performed by: INTERNAL MEDICINE

## 2022-03-22 PROCEDURE — 63600175 PHARM REV CODE 636 W HCPCS: Performed by: NURSE PRACTITIONER

## 2022-03-22 PROCEDURE — 36415 COLL VENOUS BLD VENIPUNCTURE: CPT | Performed by: INTERNAL MEDICINE

## 2022-03-22 PROCEDURE — 83735 ASSAY OF MAGNESIUM: CPT | Performed by: INTERNAL MEDICINE

## 2022-03-22 PROCEDURE — 96375 TX/PRO/DX INJ NEW DRUG ADDON: CPT

## 2022-03-22 PROCEDURE — 21400001 HC TELEMETRY ROOM

## 2022-03-22 PROCEDURE — 85730 THROMBOPLASTIN TIME PARTIAL: CPT | Mod: 91 | Performed by: INTERNAL MEDICINE

## 2022-03-22 PROCEDURE — 94761 N-INVAS EAR/PLS OXIMETRY MLT: CPT

## 2022-03-22 PROCEDURE — 27000207 HC ISOLATION

## 2022-03-22 PROCEDURE — 25000003 PHARM REV CODE 250: Performed by: NURSE PRACTITIONER

## 2022-03-22 PROCEDURE — 80053 COMPREHEN METABOLIC PANEL: CPT | Performed by: NURSE PRACTITIONER

## 2022-03-22 PROCEDURE — 85025 COMPLETE CBC W/AUTO DIFF WBC: CPT | Performed by: INTERNAL MEDICINE

## 2022-03-22 PROCEDURE — 96366 THER/PROPH/DIAG IV INF ADDON: CPT

## 2022-03-22 PROCEDURE — 25000003 PHARM REV CODE 250: Performed by: INTERNAL MEDICINE

## 2022-03-22 PROCEDURE — 85730 THROMBOPLASTIN TIME PARTIAL: CPT | Performed by: INTERNAL MEDICINE

## 2022-03-22 RX ORDER — GABAPENTIN 300 MG/1
300 CAPSULE ORAL ONCE
Status: COMPLETED | OUTPATIENT
Start: 2022-03-22 | End: 2022-03-22

## 2022-03-22 RX ORDER — MAGNESIUM SULFATE HEPTAHYDRATE 40 MG/ML
2 INJECTION, SOLUTION INTRAVENOUS
Status: COMPLETED | OUTPATIENT
Start: 2022-03-22 | End: 2022-03-22

## 2022-03-22 RX ORDER — POTASSIUM CHLORIDE 20 MEQ/1
40 TABLET, EXTENDED RELEASE ORAL ONCE
Status: COMPLETED | OUTPATIENT
Start: 2022-03-22 | End: 2022-03-22

## 2022-03-22 RX ORDER — SODIUM,POTASSIUM PHOSPHATES 280-250MG
1 POWDER IN PACKET (EA) ORAL
Status: DISCONTINUED | OUTPATIENT
Start: 2022-03-22 | End: 2022-03-25 | Stop reason: HOSPADM

## 2022-03-22 RX ORDER — HYDROCODONE BITARTRATE AND ACETAMINOPHEN 5; 325 MG/1; MG/1
1 TABLET ORAL 3 TIMES DAILY PRN
Status: DISCONTINUED | OUTPATIENT
Start: 2022-03-22 | End: 2022-03-25 | Stop reason: HOSPADM

## 2022-03-22 RX ORDER — GABAPENTIN 300 MG/1
300 CAPSULE ORAL 2 TIMES DAILY
Status: DISCONTINUED | OUTPATIENT
Start: 2022-03-23 | End: 2022-03-25 | Stop reason: HOSPADM

## 2022-03-22 RX ORDER — MAGNESIUM SULFATE HEPTAHYDRATE 40 MG/ML
4 INJECTION, SOLUTION INTRAVENOUS ONCE
Status: DISCONTINUED | OUTPATIENT
Start: 2022-03-22 | End: 2022-03-22 | Stop reason: CLARIF

## 2022-03-22 RX ORDER — DEXTROSE MONOHYDRATE AND SODIUM CHLORIDE 5; .9 G/100ML; G/100ML
INJECTION, SOLUTION INTRAVENOUS CONTINUOUS
Status: DISCONTINUED | OUTPATIENT
Start: 2022-03-22 | End: 2022-03-23

## 2022-03-22 RX ADMIN — DOCUSATE SODIUM AND SENNOSIDES 1 TABLET: 8.6; 5 TABLET, FILM COATED ORAL at 09:03

## 2022-03-22 RX ADMIN — Medication 1 PACKET: at 03:03

## 2022-03-22 RX ADMIN — MAGNESIUM SULFATE HEPTAHYDRATE 2 G: 2 INJECTION, SOLUTION INTRAVENOUS at 09:03

## 2022-03-22 RX ADMIN — POTASSIUM CHLORIDE 40 MEQ: 1500 TABLET, EXTENDED RELEASE ORAL at 03:03

## 2022-03-22 RX ADMIN — HEPARIN SODIUM 15 UNITS/KG/HR: 10000 INJECTION, SOLUTION INTRAVENOUS at 06:03

## 2022-03-22 RX ADMIN — MAGNESIUM SULFATE HEPTAHYDRATE 2 G: 2 INJECTION, SOLUTION INTRAVENOUS at 03:03

## 2022-03-22 RX ADMIN — GABAPENTIN 300 MG: 300 CAPSULE ORAL at 04:03

## 2022-03-22 RX ADMIN — ONDANSETRON 4 MG: 2 INJECTION INTRAMUSCULAR; INTRAVENOUS at 12:03

## 2022-03-22 RX ADMIN — TAMSULOSIN HYDROCHLORIDE 0.4 MG: 0.4 CAPSULE ORAL at 09:03

## 2022-03-22 RX ADMIN — HYDROCODONE BITARTRATE AND ACETAMINOPHEN 1 TABLET: 5; 325 TABLET ORAL at 09:03

## 2022-03-22 RX ADMIN — DEXTROSE AND SODIUM CHLORIDE: 5; .9 INJECTION, SOLUTION INTRAVENOUS at 03:03

## 2022-03-22 RX ADMIN — Medication 1 PACKET: at 09:03

## 2022-03-22 RX ADMIN — ATORVASTATIN CALCIUM 40 MG: 40 TABLET, FILM COATED ORAL at 09:03

## 2022-03-22 RX ADMIN — FLUOXETINE HYDROCHLORIDE 20 MG: 20 CAPSULE ORAL at 09:03

## 2022-03-22 RX ADMIN — ACETAMINOPHEN 650 MG: 325 TABLET ORAL at 04:03

## 2022-03-22 NOTE — PLAN OF CARE
Plan of care reviewed with patient/Family.  All questions answered til this point. No new questions as of now. Patient Heparin stopped @636.Will continue care per unit policy and patient needs.  .

## 2022-03-22 NOTE — SUBJECTIVE & OBJECTIVE
Interval History:     Review of Systems   Constitutional:  Positive for activity change, appetite change, fatigue and unexpected weight change. Negative for chills and fever.   Eyes:  Negative for photophobia.   Respiratory:  Negative for cough, chest tightness, shortness of breath and wheezing.    Cardiovascular:  Negative for chest pain, palpitations and leg swelling.   Gastrointestinal:  Positive for diarrhea, nausea and vomiting. Negative for abdominal pain.   Genitourinary:  Negative for dysuria, flank pain and hematuria.   Musculoskeletal:  Negative for back pain, myalgias and neck pain.        Rt leg pain   Skin:  Negative for rash and wound.   Neurological:  Positive for weakness. Negative for dizziness, syncope and headaches.   Psychiatric/Behavioral:  Negative for agitation.    Objective:     Vital Signs (Most Recent):  Temp: 98.2 °F (36.8 °C) (03/22/22 0745)  Pulse: 70 (03/22/22 1300)  Resp: 18 (03/22/22 0745)  BP: (!) 93/57 (03/22/22 0745)  SpO2: 98 % (03/22/22 0745)   Vital Signs (24h Range):  Temp:  [97.9 °F (36.6 °C)-98.6 °F (37 °C)] 98.2 °F (36.8 °C)  Pulse:  [70-96] 70  Resp:  [18-20] 18  SpO2:  [97 %-99 %] 98 %  BP: ()/(57-71) 93/57     Weight: 63.1 kg (139 lb 1.8 oz)  Body mass index is 22.45 kg/m².    Intake/Output Summary (Last 24 hours) at 3/22/2022 1502  Last data filed at 3/21/2022 1933  Gross per 24 hour   Intake 1452.4 ml   Output --   Net 1452.4 ml      Physical Exam  Constitutional:       General: He is not in acute distress.     Appearance: He is well-developed. He is ill-appearing.      Comments: Thin built, chronically ill appearing    HENT:      Head: Normocephalic and atraumatic.   Eyes:      Conjunctiva/sclera: Conjunctivae normal.      Pupils: Pupils are equal, round, and reactive to light.   Neck:      Vascular: No JVD.   Cardiovascular:      Rate and Rhythm: Normal rate and regular rhythm.      Heart sounds: Normal heart sounds.   Pulmonary:      Effort: Pulmonary effort is  normal. No respiratory distress.      Breath sounds: Normal breath sounds. No wheezing.   Abdominal:      General: Bowel sounds are normal. There is no distension.      Palpations: Abdomen is soft.      Tenderness: There is no abdominal tenderness. There is no guarding.   Musculoskeletal:         General: No tenderness. Normal range of motion.      Cervical back: Normal range of motion and neck supple.   Skin:     General: Skin is warm and dry.      Capillary Refill: Capillary refill takes less than 2 seconds.      Findings: No erythema.   Neurological:      Mental Status: He is alert and oriented to person, place, and time.   Psychiatric:         Behavior: Behavior normal.       Significant Labs: All pertinent labs within the past 24 hours have been reviewed.      Significant Imaging: I have reviewed all pertinent imaging results/findings within the past 24 hours.

## 2022-03-22 NOTE — ASSESSMENT & PLAN NOTE
-CT Chest shows 5.4 cm cavitary consolidative opacity in the superior segment left lower lobe is most suspicious for a cavitary infectious process such as cavitary pneumonia or tuberculosis.  Cavitating malignancy or pulmonary infarct are additional considerations.  -patient afebrile, lactic and procal wnl, defer antibiotics for now   -Consult pulmonology   3/21-  -Pt started on antibiotic treatment targeting CAP  -Induced sputum for AFB smear and culture   - Pulmonology plan for bronc on Thursday

## 2022-03-22 NOTE — ASSESSMENT & PLAN NOTE
No new neuro deficits   CT head negative  Continue plavix, statin   3/21-  Plavix held until pt is evaluated by Pulmonology

## 2022-03-22 NOTE — PROGRESS NOTES
[facility] Medicine  Progress Note    Patient Name: Samy Alejandre Jr.  MRN: [id number]  Patient Class: IP- Inpatient   Admission Date: 3/20/2022  Length of Stay: 0 days  Attending Physician: Binh Boykin, *  Primary Care Provider: Damien Barron MD        Subjective:     Principal Problem:Pulmonary cavitary lesion        HPI:  73 yo male with past medical history of HTN, CVA, T2DM, recent DVT, anxiety and depression presented to ED for evaluation of generalized weakness/deconditioning. History collected from daughter, Micelle, at bedside. Per family report patient with progressively worsening weakness over the past month associated with episodes of intermittent n/v, diarrhea, decreased po intake, altered mental status and weight loss. He was diagnosed with extensive acute right lower extremity deep venous thrombus on 3/9/22, apixaban initiated. Patient s/p 2 mechanical falls prior to ED arrival, no head trauma or LOC. Patient/family denies fever/chills, abdominal pain and chest pain.     ED evaluation: SBP 90s on arrival. Labs remarkable for wbc 14, Cr 1.7, magnesium 1.5. CT chest shows 5.4 cm cavitary consolidative opacity in the superior segment left lower lobe suspicious for a cavitary infectious process such as cavitary pneumonia or tuberculosis. Cavitating malignancy or pulmonary infarct are additional considerations. COVID 19 negative. CT head shows no acute findings. He received 2 liters IVF. Patient placed in observation for further evaluation and treatment.         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 Recently diagnosed with acute extensive DVT of  Rt LE on 3/9/22 during an ED visit and started on Apixaban therapy. Rt leg swelling has improved since. CT head on admit - no hemorrhage or infarction . CT C/A/P showed large opacity within the superior left lower lobe measures 5.4 x 3.0 cm with internal areas of cavitation.     3/21- Afebrile. Pt is curled up in the bed, appears chronically ill , thin built , decreased motivation , and report total loss of appetite. Sitter at bedside report he has not eaten anything today. Near fall when he tried to get up from bed last night. Denies cough.  Pt started on antibiotic targeting CAP. Induced sputum for AFB smear and culture ordered. Pulmonology is consulted for further recs. Apixaban and Plavix held and pt started on Heparin infusion . WBC normalized. Hgb 9.3, Creatinine improved to 1.1>1.7, , ESR 70.     3/22 He is aao x 3  with intermittent confusion . OAC and antiplatelet on hold  for a possible bronchoscopy.  The H/H sligly trending down  with no sign of overt GIB . Pulmonology consult noted .        Interval History:     Review of Systems   Constitutional:  Positive for activity change, appetite change, fatigue and unexpected weight change. Negative for chills and fever.   Eyes:  Negative for photophobia.   Respiratory:  Negative for cough, chest tightness, shortness of breath and wheezing.    Cardiovascular:  Negative for chest pain, palpitations and leg swelling.   Gastrointestinal:  Positive for diarrhea, nausea and vomiting. Negative for abdominal pain.   Genitourinary:  Negative for dysuria, flank pain and hematuria.   Musculoskeletal:  Negative for back pain, myalgias and neck pain.        Rt leg pain   Skin:  Negative for rash and wound.   Neurological:  Positive for weakness. Negative for dizziness, syncope and headaches.   Psychiatric/Behavioral:  Negative for agitation.    Objective:     Vital Signs (Most Recent):  Temp: 98.2 °F (36.8 °C) (03/22/22 0745)  Pulse: 70 (03/22/22 1300)  Resp: 18  (03/22/22 0745)  BP: (!) 93/57 (03/22/22 0745)  SpO2: 98 % (03/22/22 0745)   Vital Signs (24h Range):  Temp:  [97.9 °F (36.6 °C)-98.6 °F (37 °C)] 98.2 °F (36.8 °C)  Pulse:  [70-96] 70  Resp:  [18-20] 18  SpO2:  [97 %-99 %] 98 %  BP: ()/(57-71) 93/57     Weight: 63.1 kg (139 lb 1.8 oz)  Body mass index is 22.45 kg/m².    Intake/Output Summary (Last 24 hours) at 3/22/2022 1502  Last data filed at 3/21/2022 1933  Gross per 24 hour   Intake 1452.4 ml   Output --   Net 1452.4 ml      Physical Exam  Constitutional:       General: He is not in acute distress.     Appearance: He is well-developed. He is ill-appearing.      Comments: Thin built, chronically ill appearing    HENT:      Head: Normocephalic and atraumatic.   Eyes:      Conjunctiva/sclera: Conjunctivae normal.      Pupils: Pupils are equal, round, and reactive to light.   Neck:      Vascular: No JVD.   Cardiovascular:      Rate and Rhythm: Normal rate and regular rhythm.      Heart sounds: Normal heart sounds.   Pulmonary:      Effort: Pulmonary effort is normal. No respiratory distress.      Breath sounds: Normal breath sounds. No wheezing.   Abdominal:      General: Bowel sounds are normal. There is no distension.      Palpations: Abdomen is soft.      Tenderness: There is no abdominal tenderness. There is no guarding.   Musculoskeletal:         General: No tenderness. Normal range of motion.      Cervical back: Normal range of motion and neck supple.   Skin:     General: Skin is warm and dry.      Capillary Refill: Capillary refill takes less than 2 seconds.      Findings: No erythema.   Neurological:      Mental Status: He is alert and oriented to person, place, and time.   Psychiatric:         Behavior: Behavior normal.       Significant Labs: All pertinent labs within the past 24 hours have been reviewed.      Significant Imaging: I have reviewed all pertinent imaging results/findings within the past 24 hours.        Assessment/Plan:      * Pulmonary  cavitary lesion  -CT Chest shows 5.4 cm cavitary consolidative opacity in the superior segment left lower lobe is most suspicious for a cavitary infectious process such as cavitary pneumonia or tuberculosis.  Cavitating malignancy or pulmonary infarct are additional considerations.  -patient afebrile, lactic and procal wnl, defer antibiotics for now   -Consult pulmonology   3/21-  -Pt started on antibiotic treatment targeting CAP  -Induced sputum for AFB smear and culture   - Pulmonology plan for bronc on Thursday     Acute deep vein thrombosis (DVT) of right lower extremity  - Apixaban held and pt started on Heparin infusion until evaluated by Pulmonology       Fall  Consult PT /OT      Weight loss  - Family reports weight loss in the last 6 mos to 1 year but could not tell how much   -Reports total loss of appetite   -Work up for further evaluation of abnormal CT chest   -Supportive treatment       Nausea and vomiting  Patient denies abdominal pain   No evidence of SBO on CT abdomen  Continue supportive management (prn antiemetics)       Dehydration  - Poor oral intake of food and liquid   -Additionally family given h/o N/N  -Volume resuscitation as indicated     Start d5 ns x 1 day       MARI (acute kidney injury)  3/20-  -2/2 volume depletion, dehydration   Continue fluid resuscitation  Monitor intake and output   Repeat am renal function panel   3/21-  Resolved         Controlled type 2 diabetes mellitus with stage 3 chronic kidney disease, without long-term current use of insulin  SSI, accucheck AC&HS, Diabetic diet   Most recent HgA1c 5.9     Poor appetite      History of CVA with residual deficit  No new neuro deficits   CT head negative  Continue plavix, statin   3/21-  Plavix held until pt is evaluated by Pulmonology         VTE Risk Mitigation (From admission, onward)         Ordered     heparin 25,000 units in dextrose 5% (100 units/ml) IV bolus from bag - ADDITIONAL PRN BOLUS - 60 units/kg  As needed  (PRN)        Question:  Heparin Infusion Adjustment (DO NOT MODIFY ANSWER)  Answer:  [email or web address]\epic\Images\Pharmacy\HeparinInfusions\heparin HIGH INTENSITY nomogram for [facility] [id number]    03/20/22 2158     heparin 25,000 units in dextrose 5% (100 units/ml) IV bolus from bag - ADDITIONAL PRN BOLUS - 30 units/kg  As needed (PRN)        Question:  Heparin Infusion Adjustment (DO NOT MODIFY ANSWER)  Answer:  [email or web address]\epic\Images\Pharmacy\HeparinInfusions\heparin HIGH INTENSITY nomogram for [facility] [id number]    03/20/22 2158     heparin 25,000 units in dextrose 5% 250 mL (100 units/mL) infusion HIGH INTENSITY nomogram - [facility]  Continuous        Question Answer Comment   Heparin Infusion Adjustment (DO NOT MODIFY ANSWER) [email or web address]\epic\Images\Pharmacy\HeparinInfusions\heparin HIGH INTENSITY nomogram for [facility] [id number]    Begin at (in units/kg/hr) 18        03/20/22 2158     IP VTE HIGH RISK PATIENT  Once         03/20/22 1645     Place sequential compression device  Until discontinued         03/20/22 1645                Discharge Planning   VALERIE:      Code Status: Full Code   Is the patient medically ready for discharge?:     Reason for patient still in hospital (select all that apply): Treatment  Discharge Plan A: Home with family                  Binh Boykin MD  Department of Hospital Medicine   [facility] - Telemetry ([facility])

## 2022-03-22 NOTE — PLAN OF CARE
Ongoing (interventions implemented as appropriate)  Pt is alert and oriented with occ. confusion.   VSS  Pt able to make needs known.  Pt remained afebrile throughout this shift.   Pt remained free of falls this shift.   Pt denies pain this shift.  Plan of care reviewed. Patient verbalizes understanding.   Pt moving/turing independent. Frequent weight shifting encouraged.  Patient normal sinus rhythm on monitor.   Bed low, side rails up x 2, wheels locked, call light in reach.   Hourly rounding completed.   Will continue to observe.

## 2022-03-22 NOTE — ASSESSMENT & PLAN NOTE
- Poor oral intake of food and liquid   -Additionally family given h/o N/N  -Volume resuscitation as indicated     Start d5 ns x 1 day

## 2022-03-22 NOTE — PROGRESS NOTES
Principal Problem:Pulmonary cavitary lesion           HPI:  73 yo male with past medical history of HTN, CVA, T2DM, recent DVT, anxiety and depression presented to ED for evaluation of generalized weakness/deconditioning. History collected from daughter, Micelle, at bedside. Per family report patient with progressively worsening weakness over the past month associated with episodes of intermittent n/v, diarrhea, decreased po intake, altered mental status and weight loss. He was diagnosed with extensive acute right lower extremity deep venous thrombus on 3/9/22, apixaban initiated. Patient s/p 2 mechanical falls prior to ED arrival, no head trauma or LOC. Patient/family denies fever/chills, abdominal pain and chest pain.      ED evaluation: SBP 90s on arrival. Labs remarkable for wbc 14, Cr 1.7, magnesium 1.5. CT chest shows 5.4 cm cavitary consolidative opacity in the superior segment left lower lobe suspicious for a cavitary infectious process such as cavitary pneumonia or tuberculosis. Cavitating malignancy or pulmonary infarct are additional considerations. COVID 19 negative. CT head shows no acute findings. He received 2 liters IVF. Patient placed in observation for further evaluation and treatment.          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 Recently diagnosed with acute extensive DVT of Rt LE on 3/9/22 during an ED visit and started on Apixaban therapy. Rt leg swelling has improved since. CT head on admit - no hemorrhage or infarction . CT C/A/P showed large opacity within the superior left lower lobe measures 5.4 x 3.0 cm with internal areas of cavitation.      3/21- Afebrile. Pt is  curled up in the bed, appears chronically ill , thin built , decreased motivation , and report total loss of appetite. Sitter at bedside report he has not eaten anything today. Near fall when he tried to get up from bed last night. Denies cough.  Pt started on antibiotic targeting CAP. Induced sputum for AFB smear and culture ordered. Pulmonology is consulted for further recs. Apixaban and Plavix held and pt started on Heparin infusion . WBC normalized. Hgb 9.3, Creatinine improved to 1.1>1.7, , ESR 70.       Interval History:   Pt started on antibiotic targeting CAP. Induced sputum for AFB smear and culture ordered. Pulmonology is consulted for further recs. Apixaban and Plavix held and pt started on Heparin infusion . WBC normalized. Hgb 9.3, Creatinine improved to 1.1>1.7, , ESR 70.      3/21/2022  Claimed that he was feeling weak and his personal nurse reported that he was getting more SOB. Denies fever or chills or hemoptysis or chest pain or abdominal pain or nausea or vomiting or diarrhea. Started oral antibiotics a few days ago. Chest CT scan 3/20/2022 showed pulmonary consolidation with cavitation left lower lobe superior segment. On eliquis for LE DVT 3/9/2022 and plavix. Eliquis and plavix held 3/20/2022.       PMHx:      Past Medical History:   Diagnosis Date    Anxiety     Depression     Diabetes mellitus, type 2     Hypertension     Stroke         PMSx:      Past Surgical History:   Procedure Laterality Date    ANGIOGRAPHY OF LOWER EXTREMITY N/A 12/21/2020    Procedure: ANGIOGRAM, LOWER EXTREMITY/Rt leg poss pta/stent;  Surgeon: Todd Michel MD;  Location: [facility] CATH LAB;  Service: Peripheral Vascular;  Laterality: N/A;  rescheduled 12/8    BACK SURGERY  2015    CATARACT EXTRACTION      KNEE SURGERY  2012    LITHOTRIPSY  2000        Social Hx:      Social History     Socioeconomic History    Marital status:    Tobacco Use    Smoking status: Current Every Day Smoker      Packs/day: 1.00     Types: Cigarettes    Smokeless tobacco: Current User     Types: Snuff   Substance and Sexual Activity    Alcohol use: Not Currently    Drug use: Never    Sexual activity: Not Currently        Family Hx:      Family History   Problem Relation Age of Onset    Heart disease Mother     Stroke Mother     Cancer Father     COPD Sister         Allergies:      Review of patient's allergies indicates:   Allergen Reactions    Chantix [varenicline] Other (See Comments)     Felt bad       Medications:      Current Facility-Administered Medications   Medication    0.9%  NaCl infusion    acetaminophen tablet 650 mg    aluminum-magnesium hydroxide-simethicone 200-200-20 mg/5 mL suspension 30 mL    atorvastatin tablet 40 mg    azithromycin 500 mg in dextrose 5 % 250 mL IVPB (ready to mix system)    cefTRIAXone (ROCEPHIN) 1 g/50 mL D5W IVPB    dextrose 10% bolus 125 mL    dextrose 10% bolus 250 mL    FLUoxetine capsule 20 mg    glucagon (human recombinant) injection 1 mg    glucose chewable tablet 16 g    glucose chewable tablet 24 g    heparin 25,000 units in dextrose 5% (100 units/ml) IV bolus from bag - ADDITIONAL PRN BOLUS - 30 units/kg    heparin 25,000 units in dextrose 5% (100 units/ml) IV bolus from bag - ADDITIONAL PRN BOLUS - 60 units/kg    heparin 25,000 units in dextrose 5% 250 mL (100 units/mL) infusion HIGH INTENSITY nomogram - [facility]    insulin aspart U-100 pen 0-5 Units    melatonin tablet 6 mg    naloxone 0.4 mg/mL injection 0.02 mg    ondansetron injection 4 mg    senna-docusate 8.6-50 mg per tablet 1 tablet    simethicone chewable tablet 80 mg    sodium chloride 0.9% flush 10 mL    tamsulosin 24 hr capsule 0.4 mg       Vital Signs (Most Recent)  Temp: 98.6 °F (37 °C) (03/21/22 1531)  Pulse: 82 (03/21/22 1531)  Resp: 20 (03/21/22 1531)  BP: (!) 101/58 (03/21/22 1531)  SpO2: 98 % (03/21/22 1531)    Vital Signs Range (Last 24H):  Temp:  [97.5 °F (36.4 °C)-99 °F  (37.2 °C)]   Pulse:  [76-96]   Resp:  [18-20]   BP: ()/(53-70)   SpO2:  [95 %-100 %]     I & O (Last 24H):    Intake/Output Summary (Last 24 hours) at 3/21/2022 2017  Last data filed at 3/21/2022 1933  Gross per 24 hour   Intake 1852.4 ml   Output --   Net 1852.4 ml     Ventilator Data (Last 24H):    ROS: As per the admitting HPI. Other systems are negative.     Physical Exam:  Awake, alert, coherent, not in apparent acute respiratory distress, Head/NECK:  normocephalic, atraumatic; neck supple; no neck tenderness; no jugular vein distension  EENT: anicteric sclerae; pupils symmetrical; no ear discharge; no nasal discharge or polyp; throat is clear. No oral lesions.   LUNGS: no wheezing or rhonci or coarse crackles or rales..   HEART: S1, S2 distinct; normal rate; regular rhythm   ABDOMEN: Soft, positive bowel sounds, nontender, nondistended   :  no flank tenderness; urinary bladder nondistended and nontender.  NEUROLOGICAL: no facial asymmetry; no apparent motor or sensory deficit.  sedated  SKIN: No rash.  Musculoskeletal: No deformities.  Dermatological: No rashes.  Psychosomatic: Normal mood and affect.     Laboratory (Last 24H):  CBC:  Recent Labs   Lab 03/21/22  0508   WBC 10.18  10.18   HGB 9.3*  9.3*   HCT 27.1*  27.1*     266     CMP:  Recent Labs   Lab 03/21/22  0507   CALCIUM 8.3*   ALBUMIN 2.1*   PROT 5.2*   *   K 4.1   CO2 17*      BUN 20   CREATININE 1.1   ALKPHOS 116   ALT 8*   AST 20   BILITOT 0.3       Labs within the past 24 hours have been reviewed.    Imaging:   No results found for this visit on 03/20/22.  No results found for this visit on 03/20/22.  @CTA@  Imaging Results          CT Head Without Contrast (Final result)  Result time 03/20/22 17:35:50    Final result by Javid Chaudhry MD (03/20/22 17:35:50)                 Impression:      Atrophy and chronic white matter changes.    No evidence for hemorrhage mass effect or midline shift.    All CT scans   are  performed using dose optimization techniques including the following: automated exposure control; adjustment of the mA and/or kV; use of iterative reconstruction technique.  Dose modulation was employed for ALARA by means of: Automated exposure control; adjustment of the mA and/or kV according to patient size (this includes techniques or standardized protocols for targeted exams where dose is matched to indication/reason for exam; i.e. extremities or head); and/or use of iterative reconstructive technique.      Electronically signed by: Isidoro Hua  Date:    03/20/2022  Time:    17:35             Narrative:    EXAMINATION:  CT HEAD WITHOUT CONTRAST    CLINICAL HISTORY:  AMS, weakness;    TECHNIQUE:  Low dose axial CT images obtained throughout the head without intravenous contrast. Sagittal and coronal reconstructions were performed.    COMPARISON:  None.    FINDINGS:  Atrophy and chronic white matter changes    Ventricles and sulci are normal in size for age without evidence of hydrocephalus. No extra-axial blood or fluid collections.    No parenchymal mass, hemorrhage, edema or major vascular distribution infarct.    Skull/extracranial contents (limited evaluation): No fracture. Mastoid air cells and paranasal sinuses are essentially clear.                               CT Chest Abdomen Pelvis Without Contrast (XPD) (Final result)  Result time 03/20/22 14:43:56    Final result by Rafa Bonds MD (03/20/22 14:43:56)                 Impression:      5.4 cm cavitary consolidative opacity in the superior segment left lower lobe is most suspicious for a cavitary infectious process such as cavitary pneumonia or tuberculosis.  Cavitating malignancy or pulmonary infarct are additional considerations.    Small area bronchiectasis and scarring/fibrosis within the left upper lobe.    Punctate pulmonary nodules within the right upper lobe.  For a solid nodule <6 mm, Fleischner Society 2017 guidelines recommend no  routine follow up for a low risk patient, or follow-up with non-contrast chest CT at 12 months in a high risk patient.    Bilateral nonobstructing nephrolithiasis.    Small hiatal hernia.    All CT scans at this facility use dose modulation, iterative reconstruction, and/or weight based dosing when appropriate to reduce radiation dose to as low as reasonable achievable.      Electronically signed by: Rafa Bonds  Date:    03/20/2022  Time:    14:43             Narrative:    EXAMINATION:  CT CHEST ABDOMEN PELVIS WITHOUT CONTRAST(XPD)    CLINICAL HISTORY:  Weight loss, unintended;    TECHNIQUE:  Low dose axial images, sagittal and coronal reformations were obtained from the thoracic inlet to the pubic symphysis without the use of intravenous or oral contrast.    COMPARISON:  Abdominal ultrasound 02/24/2022; chest radiograph 02/23/2022    FINDINGS:  Chest:    Base of the neck: Within normal limits.    Heart and great vessels: Heart is normal size.  There is coronary atherosclerotic disease.  Heavy mitral annular calcifications.  Pulmonary arteries distribute normally.  Left-sided aortic arch.  No aneurysm.  Mild-to-moderate aortic atherosclerotic calcification.    Adenopathy: No axillary, mediastinal or hilar lymphadenopathy.    Esophagus: Small hiatal hernia, otherwise within normal limits.    Airways: Patent.    Lungs: Small areas bronchiectasis and adjacent scarring or fibrosis noted of the left upper lobe.  Large opacity within the superior left lower lobe measures 5.4 x 3.0 cm with internal areas cavitation.  There are a few punctate pulmonary nodules noted within the right upper lobe.  There is a tiny left lower lobe calcified granuloma.  Minor dependent bibasilar atelectatic changes.  No pleural effusion.  No pneumothorax.    Abdomen:    Liver: No focal hepatic abnormality within the limitations of noncontrast technique.    Gallbladder: Nondistended.  No calcified gallstones.    Biliary system:  Non-dilated.    Spleen: Within normal limits.    Pancreas: Within normal limits.    Adrenals: Within normal limits.    Kidneys: Normal in size and position. Bilateral nonobstructing nephrolithiasis measures up to 0.4 cm on the right and 0.8 cm on the left.  No hydronephrosis.  Ureters are non-dilated.    Bowel: Small hiatal hernia, the stomach is otherwise within normal limits.  Small bowel loops appear within normal limits.  There are scattered colonic diverticula throughout the descending and sigmoid colon.  No evidence of bowel obstruction or acute inflammatory change.  The appendix is visualized and appears within normal limits.    Peritoneum: No ascites.  No organized intra-abdominal fluid collection.  No intraperitoneal free air.    Abdominal Adenopathy: None.    Vasculature: Heavy aortic atherosclerotic calcification.  The abdominal aorta is nonaneurysmal.    Pelvis:    Urinary bladder: No significant wall thickening.    Prostate: Mild central prostate enlargement within prior shin on the posteroinferior urinary bladder wall.    Pelvis adenopathy: None.    Bones: There are degenerative changes.  No acute or concerning osseous abnormalities.  Lumbar dextroscoliotic curvature.    Miscellaneous: None.                                      ASSESSMENT/PLAN:     1. Pneumonia with cavitation      - left lower lobe superior basal segment      - procalcitonin was normal and leukocytosis 14k improved after IV rocephin and IV azithromycin was started yesterday. Will continue same antibiotics and continue to watch.       - sputum culture      - sputum AFB x 3 .      - underlying fungal infection or atypical mycobacterial infection or cancer could not be completely excluded.      - hold eliquis and plavix- started yesterday.      - plan to do bronchoscopy with possible biopsy or brushing, on Thursday or Friday.      - continue heparin drip. Will hold heparin drip 6 hours before the bronchoscopy.    2. DVT right lower  extremity      - currently on heparin drip per protocol.    3.  Hx of MARI, CVA with residual deficit, Diabetes mellitus 2.         Defer to hospitalist for further management.      Counseling/Consultation:I have discussed the care of this patient in detail with the nursing staff.      Spent  40  minutes in evaluating patient , reviewing clinical history and progress in the hospital, medications, vital signs, images, labs and talking to patient, and formulating treatment plan and discussing treatment plan with patient.     Thank you for this consult and the pleasure of evaluating and caring for this patient      Edward Williamson MD  [facility]

## 2022-03-23 LAB
ALBUMIN SERPL BCP-MCNC: 2.2 G/DL (ref 3.5–5.2)
ALP SERPL-CCNC: 117 U/L (ref 55–135)
ALT SERPL W/O P-5'-P-CCNC: 17 U/L (ref 10–44)
ANION GAP SERPL CALC-SCNC: 12 MMOL/L (ref 8–16)
APTT BLDCRRT: 56.5 SEC (ref 21–32)
APTT BLDCRRT: 59.4 SEC (ref 21–32)
APTT BLDCRRT: 62.2 SEC (ref 21–32)
APTT BLDCRRT: 68 SEC (ref 21–32)
AST SERPL-CCNC: 25 U/L (ref 10–40)
BASOPHILS # BLD AUTO: 0.05 K/UL (ref 0–0.2)
BASOPHILS NFR BLD: 0.7 % (ref 0–1.9)
BILIRUB SERPL-MCNC: 0.2 MG/DL (ref 0.1–1)
BUN SERPL-MCNC: 8 MG/DL (ref 8–23)
CALCIUM SERPL-MCNC: 8.8 MG/DL (ref 8.7–10.5)
CHLORIDE SERPL-SCNC: 107 MMOL/L (ref 95–110)
CO2 SERPL-SCNC: 18 MMOL/L (ref 23–29)
CREAT SERPL-MCNC: 0.8 MG/DL (ref 0.5–1.4)
DIFFERENTIAL METHOD: ABNORMAL
EOSINOPHIL # BLD AUTO: 0.2 K/UL (ref 0–0.5)
EOSINOPHIL NFR BLD: 3.2 % (ref 0–8)
ERYTHROCYTE [DISTWIDTH] IN BLOOD BY AUTOMATED COUNT: 13.9 % (ref 11.5–14.5)
EST. GFR  (AFRICAN AMERICAN): >60 ML/MIN/1.73 M^2
EST. GFR  (NON AFRICAN AMERICAN): >60 ML/MIN/1.73 M^2
GAMMA INTERFERON BACKGROUND BLD IA-ACNC: 0.01 IU/ML
GLUCOSE SERPL-MCNC: 108 MG/DL (ref 70–110)
HCT VFR BLD AUTO: 26.1 % (ref 40–54)
HGB BLD-MCNC: 9.1 G/DL (ref 14–18)
IMM GRANULOCYTES # BLD AUTO: 0.02 K/UL (ref 0–0.04)
IMM GRANULOCYTES NFR BLD AUTO: 0.3 % (ref 0–0.5)
LYMPHOCYTES # BLD AUTO: 2.1 K/UL (ref 1–4.8)
LYMPHOCYTES NFR BLD: 29.4 % (ref 18–48)
M TB IFN-G CD4+ BCKGRND COR BLD-ACNC: -0 IU/ML
MCH RBC QN AUTO: 33.6 PG (ref 27–31)
MCHC RBC AUTO-ENTMCNC: 34.9 G/DL (ref 32–36)
MCV RBC AUTO: 96 FL (ref 82–98)
MITOGEN IGNF BCKGRD COR BLD-ACNC: 3.8 IU/ML
MONOCYTES # BLD AUTO: 0.8 K/UL (ref 0.3–1)
MONOCYTES NFR BLD: 10.6 % (ref 4–15)
NEUTROPHILS # BLD AUTO: 4 K/UL (ref 1.8–7.7)
NEUTROPHILS NFR BLD: 55.8 % (ref 38–73)
NRBC BLD-RTO: 0 /100 WBC
PLATELET # BLD AUTO: 303 K/UL (ref 150–450)
PMV BLD AUTO: 9.9 FL (ref 9.2–12.9)
POCT GLUCOSE: 120 MG/DL (ref 70–110)
POCT GLUCOSE: 133 MG/DL (ref 70–110)
POCT GLUCOSE: 138 MG/DL (ref 70–110)
POCT GLUCOSE: 138 MG/DL (ref 70–110)
POCT GLUCOSE: 93 MG/DL (ref 70–110)
POTASSIUM SERPL-SCNC: 3.7 MMOL/L (ref 3.5–5.1)
PROT SERPL-MCNC: 5.5 G/DL (ref 6–8.4)
RBC # BLD AUTO: 2.71 M/UL (ref 4.6–6.2)
SODIUM SERPL-SCNC: 137 MMOL/L (ref 136–145)
TB GOLD PLUS: NEGATIVE
TB2 - NIL: -0 IU/ML
WBC # BLD AUTO: 7.2 K/UL (ref 3.9–12.7)

## 2022-03-23 PROCEDURE — 85730 THROMBOPLASTIN TIME PARTIAL: CPT | Mod: 91 | Performed by: INTERNAL MEDICINE

## 2022-03-23 PROCEDURE — 97530 THERAPEUTIC ACTIVITIES: CPT

## 2022-03-23 PROCEDURE — 97116 GAIT TRAINING THERAPY: CPT

## 2022-03-23 PROCEDURE — 63600175 PHARM REV CODE 636 W HCPCS: Performed by: INTERNAL MEDICINE

## 2022-03-23 PROCEDURE — 36415 COLL VENOUS BLD VENIPUNCTURE: CPT | Performed by: INTERNAL MEDICINE

## 2022-03-23 PROCEDURE — 97535 SELF CARE MNGMENT TRAINING: CPT

## 2022-03-23 PROCEDURE — 99233 PR SUBSEQUENT HOSPITAL CARE,LEVL III: ICD-10-PCS | Mod: ,,, | Performed by: INTERNAL MEDICINE

## 2022-03-23 PROCEDURE — 25000003 PHARM REV CODE 250: Performed by: NURSE PRACTITIONER

## 2022-03-23 PROCEDURE — 80053 COMPREHEN METABOLIC PANEL: CPT | Performed by: NURSE PRACTITIONER

## 2022-03-23 PROCEDURE — 85730 THROMBOPLASTIN TIME PARTIAL: CPT | Mod: 91 | Performed by: NURSE PRACTITIONER

## 2022-03-23 PROCEDURE — 85025 COMPLETE CBC W/AUTO DIFF WBC: CPT | Performed by: INTERNAL MEDICINE

## 2022-03-23 PROCEDURE — 85730 THROMBOPLASTIN TIME PARTIAL: CPT | Performed by: INTERNAL MEDICINE

## 2022-03-23 PROCEDURE — 36415 COLL VENOUS BLD VENIPUNCTURE: CPT | Performed by: NURSE PRACTITIONER

## 2022-03-23 PROCEDURE — 21400001 HC TELEMETRY ROOM

## 2022-03-23 PROCEDURE — 94761 N-INVAS EAR/PLS OXIMETRY MLT: CPT

## 2022-03-23 PROCEDURE — 25000003 PHARM REV CODE 250: Performed by: INTERNAL MEDICINE

## 2022-03-23 PROCEDURE — 99233 SBSQ HOSP IP/OBS HIGH 50: CPT | Mod: ,,, | Performed by: INTERNAL MEDICINE

## 2022-03-23 RX ORDER — DEXTROSE, SODIUM CHLORIDE, SODIUM LACTATE, POTASSIUM CHLORIDE, AND CALCIUM CHLORIDE 5; .6; .31; .03; .02 G/100ML; G/100ML; G/100ML; G/100ML; G/100ML
INJECTION, SOLUTION INTRAVENOUS CONTINUOUS
Status: DISCONTINUED | OUTPATIENT
Start: 2022-03-23 | End: 2022-03-24

## 2022-03-23 RX ADMIN — Medication 1 PACKET: at 09:03

## 2022-03-23 RX ADMIN — CEFTRIAXONE 1 G: 1 INJECTION, SOLUTION INTRAVENOUS at 10:03

## 2022-03-23 RX ADMIN — TAMSULOSIN HYDROCHLORIDE 0.4 MG: 0.4 CAPSULE ORAL at 09:03

## 2022-03-23 RX ADMIN — GABAPENTIN 300 MG: 300 CAPSULE ORAL at 09:03

## 2022-03-23 RX ADMIN — Medication 1 PACKET: at 11:03

## 2022-03-23 RX ADMIN — CEFTRIAXONE 1 G: 1 INJECTION, SOLUTION INTRAVENOUS at 12:03

## 2022-03-23 RX ADMIN — HYDROCODONE BITARTRATE AND ACETAMINOPHEN 1 TABLET: 5; 325 TABLET ORAL at 10:03

## 2022-03-23 RX ADMIN — HEPARIN SODIUM 17 UNITS/KG/HR: 10000 INJECTION, SOLUTION INTRAVENOUS at 07:03

## 2022-03-23 RX ADMIN — FLUOXETINE HYDROCHLORIDE 20 MG: 20 CAPSULE ORAL at 09:03

## 2022-03-23 RX ADMIN — Medication 1 PACKET: at 06:03

## 2022-03-23 RX ADMIN — DEXTROSE, SODIUM CHLORIDE, SODIUM LACTATE, POTASSIUM CHLORIDE, AND CALCIUM CHLORIDE: 5; .6; .31; .03; .02 INJECTION, SOLUTION INTRAVENOUS at 06:03

## 2022-03-23 RX ADMIN — AZITHROMYCIN MONOHYDRATE 500 MG: 500 INJECTION, POWDER, LYOPHILIZED, FOR SOLUTION INTRAVENOUS at 12:03

## 2022-03-23 RX ADMIN — Medication 1 PACKET: at 05:03

## 2022-03-23 RX ADMIN — ATORVASTATIN CALCIUM 40 MG: 40 TABLET, FILM COATED ORAL at 09:03

## 2022-03-23 RX ADMIN — HYDROCODONE BITARTRATE AND ACETAMINOPHEN 1 TABLET: 5; 325 TABLET ORAL at 09:03

## 2022-03-23 NOTE — PT/OT/SLP PROGRESS
Occupational Therapy   Treatment    Name: Samy Alejandre Jr.  MRN: [id number]  Admitting Diagnosis:  Pulmonary cavitary lesion       Recommendations:     Discharge Recommendations: home health OT  Discharge Equipment Recommendations:  bedside commode  Barriers to discharge:  None    Assessment:     Samy Alejandre Jr. is a 72 y.o. male with a medical diagnosis of Pulmonary cavitary lesion.  He presents with the following performance deficits affecting function are weakness, impaired endurance, impaired self care skills, impaired balance, decreased safety awareness, impaired cardiopulmonary response to activity.     Rehab Prognosis:  Good; patient would benefit from acute skilled OT services to address these deficits and reach maximum level of function.       Plan:     Patient to be seen 2 x/week to address the above listed problems via self-care/home management, therapeutic activities, therapeutic exercises  · Plan of Care Expires: 04/04/22  · Plan of Care Reviewed with: patient, family    Subjective     Pain/Comfort:  · Pain Rating 1: 0/10    Objective:     Communicated with: NurseJayshree, prior to session.  Patient found supine with telemetry, peripheral IV (chito sys, 1:1 sitter) upon OT entry to room.    General Precautions: Standard, airborne, contact, droplet, fall   Orthopedic Precautions:N/A   Braces: N/A  Respiratory Status: Room air    Bed Mobility:    · Patient completed Supine to Sit with supervision and with side rail     Functional Mobility/Transfers:  · Patient completed Sit <> Stand Transfer with stand by assistance  with  rolling walker   · Patient completed Bed <> Chair Transfer using Step Transfer technique with stand by assistance with rolling walker  · Functional Mobility: Patient completed x20ft functional mobility with RW and SBA to increase dynamic standing balance and activity tolerance needed for ADL completion.    Activities of Daily Living:  · Feeding:  setup with breakfast at bedside  table in chair. A with container management  · Grooming: setup completed simple grooming while seated in bedside chair    [facility] 6 Click ADL: 20    Treatment & Education:  Patient grossly oriented and easily agreeable to intervention. Decreased SOB with exertion and with improvements in all mobility noted this date. Patient requires v/c for appropriate RW use.     Patient left up in chair with all lines intact, call button in reach, chair alarm on and 1:1 sitter and ex-wife presentEducation:      GOALS:   Multidisciplinary Problems     Occupational Therapy Goals        Problem: Occupational Therapy Goal    Goal Priority Disciplines Outcome Interventions   Occupational Therapy Goal     OT, PT/OT Ongoing, Progressing    Description: Goals to be met by: 4/4/22     Patient will increase functional independence with ADLs by performing:    UE Dressing with Set-up Assistance.  Toileting from toilet with Stand-by Assistance for hygiene and clothing management.   Toilet transfer to toilet with Stand-by Assistance.  Increased functional strength in B UE grossly by 1/2 MM grade to increase independence with ADLs.                     Time Tracking:     OT Date of Treatment: 03/23/22  OT Start Time: 0815  OT Stop Time: 0840  OT Total Time (min): 25 min    Billable Minutes:Self Care/Home Management 10  Therapeutic Activity 15    3/23/2022

## 2022-03-23 NOTE — PROGRESS NOTES
HPI:  73 yo male with past medical history of HTN, CVA, T2DM, recent DVT, anxiety and depression presented to ED for evaluation of generalized weakness/deconditioning. History collected from daughter, Micelle, at bedside. Per family report patient with progressively worsening weakness over the past month associated with episodes of intermittent n/v, diarrhea, decreased po intake, altered mental status and weight loss. He was diagnosed with extensive acute right lower extremity deep venous thrombus on 3/9/22, apixaban initiated. Patient s/p 2 mechanical falls prior to ED arrival, no head trauma or LOC. Patient/family denies fever/chills, abdominal pain and chest pain.      ED evaluation: SBP 90s on arrival. Labs remarkable for wbc 14, Cr 1.7, magnesium 1.5. CT chest shows 5.4 cm cavitary consolidative opacity in the superior segment left lower lobe suspicious for a cavitary infectious process such as cavitary pneumonia or tuberculosis. Cavitating malignancy or pulmonary infarct are additional considerations. COVID 19 negative. CT head shows no acute findings. He received 2 liters IVF. Patient placed in observation for further evaluation and treatment.          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 Recently diagnosed with acute extensive DVT of Rt LE on 3/9/22 during an ED visit and started on Apixaban therapy. Rt leg swelling has improved since. CT head on admit - no hemorrhage or infarction . CT C/A/P showed large opacity within the superior left lower lobe measures 5.4 x 3.0 cm with internal areas of cavitation.      3/21- Afebrile. Pt is curled up in the bed, appears chronically ill , thin  built , decreased motivation , and report total loss of appetite. Sitter at bedside report he has not eaten anything today. Near fall when he tried to get up from bed last night. Denies cough.  Pt started on antibiotic targeting CAP. Induced sputum for AFB smear and culture ordered. Pulmonology is consulted for further recs. Apixaban and Plavix held and pt started on Heparin infusion . WBC normalized. Hgb 9.3, Creatinine improved to 1.1>1.7, , ESR 70.       Interval History:   Pt started on antibiotic targeting CAP. Induced sputum for AFB smear and culture ordered. Pulmonology is consulted for further recs. Apixaban and Plavix held and pt started on Heparin infusion . WBC normalized. Hgb 9.3, Creatinine improved to 1.1>1.7, , ESR 70.      3/21/2022  Claimed that he was feeling weak and his personal nurse reported that he was getting more SOB. Denies fever or chills or hemoptysis or chest pain or abdominal pain or nausea or vomiting or diarrhea. Started oral antibiotics a few days ago. Chest CT scan 3/20/2022 showed pulmonary consolidation with cavitation left lower lobe superior segment. On eliquis for LE DVT 3/9/2022 and plavix. Eliquis and plavix held 3/20/2022.    3/23/2022  Feeling well. No fever or chills or increased cough or purulent sputum or hemoptysis or chest pain or nausea or vomiting or diarrhea. He is awake, alert and disoriented to month. Hb appeared stable on 9.1. On heparin drip for DVT. Scheduled for bronchoscopy tomorrow around 1 pm.        Continuous Infusions:   heparin (porcine) in D5W 17 Units/kg/hr (03/23/22 1707)     Review of patient's allergies indicates:   Allergen Reactions    Chantix [varenicline] Other (See Comments)     Felt bad       OBJECTIVE:     Vital Signs (Most Recent)  Temp: 98.8 °F (37.1 °C) (03/23/22 1526)  Pulse: 77 (03/23/22 1715)  Resp: 18 (03/23/22 1526)  BP: 104/67 (03/23/22 1526)  SpO2: 100 % (03/23/22 1526)    Vital Signs Range (Last 24H):  Temp:  [98  °F (36.7 °C)-99.4 °F (37.4 °C)]   Pulse:  [58-81]   Resp:  [14-18]   BP: ()/(55-70)   SpO2:  [94 %-100 %]     I & O (Last 24H):    Intake/Output Summary (Last 24 hours) at 3/23/2022 1719  Last data filed at 3/23/2022 1707  Gross per 24 hour   Intake 2140.78 ml   Output 450 ml   Net 1690.78 ml        Physical Exam:    Awake, alert, coherent, not in acute respiratory distress  Head/NECK:  normocephalic, atraumatic; neck supple; no neck tenderness; no jugular vein distension  EENT: anicteric sclerae; pupils symmetrical; no ear discharge; no nasal discharge or polyp   LUNGS: no wheezing or rhonci or coarse crackles or rales..   HEART: S1, S2 distinct; normal rate; regular rhythm   ABDOMEN: Soft, positive bowel sounds, nontender, nondistended   :  no flank tenderness; urinary bladder nondistended and nontender.  NEUROLOGICAL: no facial asymmetry; no apparent motor or sensory deficit.  sedated  SKIN: No rash.  Musculoskeletal: No deformities.    Laboratory (Last 24H):  CBC:  Recent Labs   Lab 03/23/22  0452   WBC 7.20   HGB 9.1*   HCT 26.1*        CMP:  Recent Labs   Lab 03/23/22  0452   CALCIUM 8.8   ALBUMIN 2.2*   PROT 5.5*      K 3.7   CO2 18*      BUN 8   CREATININE 0.8   ALKPHOS 117   ALT 17   AST 25   BILITOT 0.2           Imaging:  Imaging Results          CT Head Without Contrast (Final result)  Result time 03/20/22 17:35:50    Final result by Javid Chaudhry MD (03/20/22 17:35:50)                 Impression:      Atrophy and chronic white matter changes.    No evidence for hemorrhage mass effect or midline shift.    All CT scans   are performed using dose optimization techniques including the following: automated exposure control; adjustment of the mA and/or kV; use of iterative reconstruction technique.  Dose modulation was employed for ALARA by means of: Automated exposure control; adjustment of the mA and/or kV according to patient size (this includes techniques or standardized  protocols for targeted exams where dose is matched to indication/reason for exam; i.e. extremities or head); and/or use of iterative reconstructive technique.      Electronically signed by: Isidoro Hua  Date:    03/20/2022  Time:    17:35             Narrative:    EXAMINATION:  CT HEAD WITHOUT CONTRAST    CLINICAL HISTORY:  AMS, weakness;    TECHNIQUE:  Low dose axial CT images obtained throughout the head without intravenous contrast. Sagittal and coronal reconstructions were performed.    COMPARISON:  None.    FINDINGS:  Atrophy and chronic white matter changes    Ventricles and sulci are normal in size for age without evidence of hydrocephalus. No extra-axial blood or fluid collections.    No parenchymal mass, hemorrhage, edema or major vascular distribution infarct.    Skull/extracranial contents (limited evaluation): No fracture. Mastoid air cells and paranasal sinuses are essentially clear.                               CT Chest Abdomen Pelvis Without Contrast (XPD) (Final result)  Result time 03/20/22 14:43:56    Final result by Rafa Bonds MD (03/20/22 14:43:56)                 Impression:      5.4 cm cavitary consolidative opacity in the superior segment left lower lobe is most suspicious for a cavitary infectious process such as cavitary pneumonia or tuberculosis.  Cavitating malignancy or pulmonary infarct are additional considerations.    Small area bronchiectasis and scarring/fibrosis within the left upper lobe.    Punctate pulmonary nodules within the right upper lobe.  For a solid nodule <6 mm, Fleischner Society 2017 guidelines recommend no routine follow up for a low risk patient, or follow-up with non-contrast chest CT at 12 months in a high risk patient.    Bilateral nonobstructing nephrolithiasis.    Small hiatal hernia.    All CT scans at this facility use dose modulation, iterative reconstruction, and/or weight based dosing when appropriate to reduce radiation dose to as low as  reasonable achievable.      Electronically signed by: Rafa Bonds  Date:    03/20/2022  Time:    14:43             Narrative:    EXAMINATION:  CT CHEST ABDOMEN PELVIS WITHOUT CONTRAST(XPD)    CLINICAL HISTORY:  Weight loss, unintended;    TECHNIQUE:  Low dose axial images, sagittal and coronal reformations were obtained from the thoracic inlet to the pubic symphysis without the use of intravenous or oral contrast.    COMPARISON:  Abdominal ultrasound 02/24/2022; chest radiograph 02/23/2022    FINDINGS:  Chest:    Base of the neck: Within normal limits.    Heart and great vessels: Heart is normal size.  There is coronary atherosclerotic disease.  Heavy mitral annular calcifications.  Pulmonary arteries distribute normally.  Left-sided aortic arch.  No aneurysm.  Mild-to-moderate aortic atherosclerotic calcification.    Adenopathy: No axillary, mediastinal or hilar lymphadenopathy.    Esophagus: Small hiatal hernia, otherwise within normal limits.    Airways: Patent.    Lungs: Small areas bronchiectasis and adjacent scarring or fibrosis noted of the left upper lobe.  Large opacity within the superior left lower lobe measures 5.4 x 3.0 cm with internal areas cavitation.  There are a few punctate pulmonary nodules noted within the right upper lobe.  There is a tiny left lower lobe calcified granuloma.  Minor dependent bibasilar atelectatic changes.  No pleural effusion.  No pneumothorax.    Abdomen:    Liver: No focal hepatic abnormality within the limitations of noncontrast technique.    Gallbladder: Nondistended.  No calcified gallstones.    Biliary system: Non-dilated.    Spleen: Within normal limits.    Pancreas: Within normal limits.    Adrenals: Within normal limits.    Kidneys: Normal in size and position. Bilateral nonobstructing nephrolithiasis measures up to 0.4 cm on the right and 0.8 cm on the left.  No hydronephrosis.  Ureters are non-dilated.    Bowel: Small hiatal hernia, the stomach is otherwise  within normal limits.  Small bowel loops appear within normal limits.  There are scattered colonic diverticula throughout the descending and sigmoid colon.  No evidence of bowel obstruction or acute inflammatory change.  The appendix is visualized and appears within normal limits.    Peritoneum: No ascites.  No organized intra-abdominal fluid collection.  No intraperitoneal free air.    Abdominal Adenopathy: None.    Vasculature: Heavy aortic atherosclerotic calcification.  The abdominal aorta is nonaneurysmal.    Pelvis:    Urinary bladder: No significant wall thickening.    Prostate: Mild central prostate enlargement within prior shin on the posteroinferior urinary bladder wall.    Pelvis adenopathy: None.    Bones: There are degenerative changes.  No acute or concerning osseous abnormalities.  Lumbar dextroscoliotic curvature.    Miscellaneous: None.                                   ASSESSMENT/PLAN:     1. Pneumonia with cavitation      - left lower lobe superior basal segment      - procalcitonin was normal and leukocytosis improved  To 7.2 after IV rocephin and IV azithromycin was started 3/21/2022. Will continue same antibiotics and continue to watch.       - sputum culture unemarkable. Barely coughed any sputum.       - sputum AFB x 3 .      - underlying fungal infection or atypical mycobacterial infection or cancer could not be completely excluded.      - hold eliquis and plavix 3/20/2022      - Scheduled bronchoscopy tomorrow around 1 pm.       - npo post midnight      - D5LR at 60 ml/ hour. Patient was not eating earlier today. Poor appetite.      - Hold heparin drip at 7 am tomorrow.           -   2. DVT right lower extremity      - currently on heparin drip per protocol.     3.  Hx of MARI, CVA with residual deficit, Diabetes mellitus 2.         Defer to hospitalist for further management.       Preventive Measures:   Nutrition: Goal: Tolerate oral diet and meet caloric needs  Stress Ulcer: PPI  DVT:  LMWH/SCDs  Head of Bed/Reposition: Elevate HOB and turn Q1-2 hours    Physical Therapy: consult once stable.     Sedation Interruption: Q AM   Vent Day:  OG Day:  Central Line Day:  Ortez Day:  IVAB Day:  Code Status:    Counseling/Consultation: I have discussed the care of this patient in detail with nursing staff and       Spent  35 minutes pulmonary consult time  in evaluating patient , reviewing clinical history and progress in the hospital, medications, vital signs, images, labs and talking to patient, and formulating treatment plan and discussing treatment plan with patient.       Edward Williamson MD  Pulmonologist/ Intensivist  [facility]

## 2022-03-23 NOTE — NURSING
aPTT drawn at 08:05 is within therapeutic range. No change to heparin gtt rate. Current rate= 17 units/kg/hr

## 2022-03-23 NOTE — PLAN OF CARE
Patient tolerated treatment well overall. Sup>sit SPV, sit>stand with SBA, functional mobility x20ft with RW and SBA, step>pivot to bedside chair with RW and SBA. Recommending HHOT at d/c.

## 2022-03-23 NOTE — PROGRESS NOTES
[facility] Medicine  Progress Note    Patient Name: Samy Alejandre Jr.  MRN: [id number]  Patient Class: IP- Inpatient   Admission Date: 3/20/2022  Length of Stay: 1 days  Attending Physician: iBnh Boykin, *  Primary Care Provider: Damien Barron MD        Subjective:     Principal Problem:Pulmonary cavitary lesion        HPI:  71 yo male with past medical history of HTN, CVA, T2DM, recent DVT, anxiety and depression presented to ED for evaluation of generalized weakness/deconditioning. History collected from daughter, Micelle, at bedside. Per family report patient with progressively worsening weakness over the past month associated with episodes of intermittent n/v, diarrhea, decreased po intake, altered mental status and weight loss. He was diagnosed with extensive acute right lower extremity deep venous thrombus on 3/9/22, apixaban initiated. Patient s/p 2 mechanical falls prior to ED arrival, no head trauma or LOC. Patient/family denies fever/chills, abdominal pain and chest pain.     ED evaluation: SBP 90s on arrival. Labs remarkable for wbc 14, Cr 1.7, magnesium 1.5. CT chest shows 5.4 cm cavitary consolidative opacity in the superior segment left lower lobe suspicious for a cavitary infectious process such as cavitary pneumonia or tuberculosis. Cavitating malignancy or pulmonary infarct are additional considerations. COVID 19 negative. CT head shows no acute findings. He received 2 liters IVF. Patient placed in observation for further evaluation and treatment.         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 Recently diagnosed with acute extensive DVT of  Rt LE on 3/9/22 during an ED visit and started on Apixaban therapy. Rt leg swelling has improved since. CT head on admit - no hemorrhage or infarction . CT C/A/P showed large opacity within the superior left lower lobe measures 5.4 x 3.0 cm with internal areas of cavitation.     3/21- Afebrile. Pt is curled up in the bed, appears chronically ill , thin built , decreased motivation , and report total loss of appetite. Sitter at bedside report he has not eaten anything today. Near fall when he tried to get up from bed last night. Denies cough.  Pt started on antibiotic targeting CAP. Induced sputum for AFB smear and culture ordered. Pulmonology is consulted for further recs. Apixaban and Plavix held and pt started on Heparin infusion . WBC normalized. Hgb 9.3, Creatinine improved to 1.1>1.7, , ESR 70.     3/22 He is aao x 3  with intermittent confusion . OAC and antiplatelet on hold  for a possible bronchoscopy.  The H/H sligly trending down  with no sign of overt GIB . Pulmonology consult noted .      3/23 Quantiferon gold is negative . He is aao x 3 in nad .  H/H stable  . Plan for bronchoscopy tomorrow . Will d/c isolation .       Interval History:     Review of Systems   Constitutional:  Positive for activity change, appetite change, fatigue and unexpected weight change. Negative for chills and fever.   Eyes:  Negative for photophobia.   Respiratory:  Negative for cough, chest tightness, shortness of breath and wheezing.    Cardiovascular:  Negative for chest pain, palpitations and leg swelling.   Gastrointestinal:  Negative for abdominal pain, diarrhea, nausea and vomiting.   Genitourinary:  Negative for dysuria, flank pain and hematuria.   Musculoskeletal:  Negative for back pain, myalgias and neck pain.        Rt leg pain   Skin:  Negative for rash and wound.   Neurological:  Positive for weakness. Negative for dizziness, syncope and headaches.   Psychiatric/Behavioral:  Negative for agitation.     Objective:     Vital Signs (Most Recent):  Temp: 98.1 °F (36.7 °C) (03/23/22 1146)  Pulse: 66 (03/23/22 1308)  Resp: 14 (03/23/22 1146)  BP: 115/70 (03/23/22 1146)  SpO2: 99 % (03/23/22 1146) Vital Signs (24h Range):  Temp:  [98 °F (36.7 °C)-99.4 °F (37.4 °C)] 98.1 °F (36.7 °C)  Pulse:  [62-81] 66  Resp:  [14-18] 14  SpO2:  [94 %-100 %] 99 %  BP: ()/(55-70) 115/70     Weight: 61.3 kg (135 lb 2.3 oz)  Body mass index is 21.81 kg/m².    Intake/Output Summary (Last 24 hours) at 3/23/2022 1431  Last data filed at 3/23/2022 1200  Gross per 24 hour   Intake 897.84 ml   Output 150 ml   Net 747.84 ml      Physical Exam  Constitutional:       General: He is not in acute distress.     Appearance: He is well-developed. He is ill-appearing.      Comments: Thin built, chronically ill appearing    HENT:      Head: Normocephalic and atraumatic.   Eyes:      Conjunctiva/sclera: Conjunctivae normal.      Pupils: Pupils are equal, round, and reactive to light.   Neck:      Vascular: No JVD.   Cardiovascular:      Rate and Rhythm: Normal rate and regular rhythm.      Heart sounds: Normal heart sounds.   Pulmonary:      Effort: Pulmonary effort is normal. No respiratory distress.      Breath sounds: Normal breath sounds. No wheezing.   Abdominal:      General: Bowel sounds are normal. There is no distension.      Palpations: Abdomen is soft.      Tenderness: There is no abdominal tenderness. There is no guarding.   Musculoskeletal:         General: No tenderness. Normal range of motion.      Cervical back: Normal range of motion and neck supple.   Skin:     General: Skin is warm and dry.      Capillary Refill: Capillary refill takes less than 2 seconds.      Findings: No erythema.   Neurological:      Mental Status: He is alert and oriented to person, place, and time.   Psychiatric:         Behavior: Behavior normal.       Significant Labs: All pertinent labs within the past 24 hours have been reviewed.      Significant  Imaging: I have reviewed all pertinent imaging results/findings within the past 24 hours.        Assessment/Plan:      * Pulmonary cavitary lesion  -CT Chest shows 5.4 cm cavitary consolidative opacity in the superior segment left lower lobe is most suspicious for a cavitary infectious process such as cavitary pneumonia or tuberculosis.  Cavitating malignancy or pulmonary infarct are additional considerations.  -patient afebrile, lactic and procal wnl, defer antibiotics for now   -Consult pulmonology   3/21-  -Pt started on antibiotic treatment targeting CAP  -Induced sputum for AFB smear and culture   - Pulmonology plan for bronc on Thursday     Acute deep vein thrombosis (DVT) of right lower extremity  - Apixaban held and pt started on Heparin infusion until evaluated by Pulmonology       Fall  Consult PT /OT      Weight loss  - Family reports weight loss in the last 6 mos to 1 year but could not tell how much   -Reports total loss of appetite   -Work up for further evaluation of abnormal CT chest   -Supportive treatment       Nausea and vomiting  Patient denies abdominal pain   No evidence of SBO on CT abdomen  Continue supportive management (prn antiemetics)       Dehydration  - Poor oral intake of food and liquid   -Additionally family given h/o N/N  -Volume resuscitation as indicated     Start d5 ns x 1 day       MARI (acute kidney injury)  3/20-  -2/2 volume depletion, dehydration   Continue fluid resuscitation  Monitor intake and output   Repeat am renal function panel   3/21-  Resolved         Controlled type 2 diabetes mellitus with stage 3 chronic kidney disease, without long-term current use of insulin  SSI, accucheck AC&HS, Diabetic diet   Most recent HgA1c 5.9     Poor appetite      History of CVA with residual deficit  No new neuro deficits   CT head negative  Continue plavix, statin   3/21-  Plavix held until pt is evaluated by Pulmonology         VTE Risk Mitigation (From admission, onward)          Ordered     heparin 25,000 units in dextrose 5% (100 units/ml) IV bolus from bag - ADDITIONAL PRN BOLUS - 60 units/kg  As needed (PRN)        Question:  Heparin Infusion Adjustment (DO NOT MODIFY ANSWER)  Answer:  [email or web address]\epic\Images\Pharmacy\HeparinInfusions\heparin HIGH INTENSITY nomogram for [facility] [id number]    03/20/22 2158     heparin 25,000 units in dextrose 5% (100 units/ml) IV bolus from bag - ADDITIONAL PRN BOLUS - 30 units/kg  As needed (PRN)        Question:  Heparin Infusion Adjustment (DO NOT MODIFY ANSWER)  Answer:  [email or web address]\epic\Images\Pharmacy\HeparinInfusions\heparin HIGH INTENSITY nomogram for [facility] [id number]    03/20/22 2158     heparin 25,000 units in dextrose 5% 250 mL (100 units/mL) infusion HIGH INTENSITY nomogram - [facility]  Continuous        Question Answer Comment   Heparin Infusion Adjustment (DO NOT MODIFY ANSWER) \\[email or web address]\Images\Pharmacy\HeparinInfusions\heparin HIGH INTENSITY nomogram for [facility] [id number]    Begin at (in units/kg/hr) 18        03/20/22 2158     IP VTE HIGH RISK PATIENT  Once         03/20/22 1645     Place sequential compression device  Until discontinued         03/20/22 1645                Discharge Planning   VALERIE:      Code Status: Full Code   Is the patient medically ready for discharge?:     Reason for patient still in hospital (select all that apply): Treatment  Discharge Plan A: Home Health                  Binh Boykin MD  Department of Hospital Medicine   [facility] - Telemetry ([facility])

## 2022-03-23 NOTE — PLAN OF CARE
Pt AAOx4 .POC reviewed with pt. Pt verbalized understanding   Pt remains free of injuries and falls; fall precaution in place   SR 1AVB on tele monitor. HR 60's   IV intact; infusing D5/NS and  heparin at 17units  IV abx  No C/O of pain  2L O2   Blood glucose monitoring   Bed low, side rails up x2, non slip socks in use, call light in reach   Reminded to call for assistance  Hourly rounding complete will continue to monitor

## 2022-03-23 NOTE — SUBJECTIVE & OBJECTIVE
Interval History:     Review of Systems   Constitutional:  Positive for activity change, appetite change, fatigue and unexpected weight change. Negative for chills and fever.   Eyes:  Negative for photophobia.   Respiratory:  Negative for cough, chest tightness, shortness of breath and wheezing.    Cardiovascular:  Negative for chest pain, palpitations and leg swelling.   Gastrointestinal:  Negative for abdominal pain, diarrhea, nausea and vomiting.   Genitourinary:  Negative for dysuria, flank pain and hematuria.   Musculoskeletal:  Negative for back pain, myalgias and neck pain.        Rt leg pain   Skin:  Negative for rash and wound.   Neurological:  Positive for weakness. Negative for dizziness, syncope and headaches.   Psychiatric/Behavioral:  Negative for agitation.    Objective:     Vital Signs (Most Recent):  Temp: 98.1 °F (36.7 °C) (03/23/22 1146)  Pulse: 66 (03/23/22 1308)  Resp: 14 (03/23/22 1146)  BP: 115/70 (03/23/22 1146)  SpO2: 99 % (03/23/22 1146) Vital Signs (24h Range):  Temp:  [98 °F (36.7 °C)-99.4 °F (37.4 °C)] 98.1 °F (36.7 °C)  Pulse:  [62-81] 66  Resp:  [14-18] 14  SpO2:  [94 %-100 %] 99 %  BP: ()/(55-70) 115/70     Weight: 61.3 kg (135 lb 2.3 oz)  Body mass index is 21.81 kg/m².    Intake/Output Summary (Last 24 hours) at 3/23/2022 1431  Last data filed at 3/23/2022 1200  Gross per 24 hour   Intake 897.84 ml   Output 150 ml   Net 747.84 ml      Physical Exam  Constitutional:       General: He is not in acute distress.     Appearance: He is well-developed. He is ill-appearing.      Comments: Thin built, chronically ill appearing    HENT:      Head: Normocephalic and atraumatic.   Eyes:      Conjunctiva/sclera: Conjunctivae normal.      Pupils: Pupils are equal, round, and reactive to light.   Neck:      Vascular: No JVD.   Cardiovascular:      Rate and Rhythm: Normal rate and regular rhythm.      Heart sounds: Normal heart sounds.   Pulmonary:      Effort: Pulmonary effort is normal. No  respiratory distress.      Breath sounds: Normal breath sounds. No wheezing.   Abdominal:      General: Bowel sounds are normal. There is no distension.      Palpations: Abdomen is soft.      Tenderness: There is no abdominal tenderness. There is no guarding.   Musculoskeletal:         General: No tenderness. Normal range of motion.      Cervical back: Normal range of motion and neck supple.   Skin:     General: Skin is warm and dry.      Capillary Refill: Capillary refill takes less than 2 seconds.      Findings: No erythema.   Neurological:      Mental Status: He is alert and oriented to person, place, and time.   Psychiatric:         Behavior: Behavior normal.       Significant Labs: All pertinent labs within the past 24 hours have been reviewed.      Significant Imaging: I have reviewed all pertinent imaging results/findings within the past 24 hours.

## 2022-03-23 NOTE — ASSESSMENT & PLAN NOTE
- Poor oral intake of food and liquid   -Additionally family given h/o N/N  -Volume resuscitation as indicated     Start d5 ns x 1 day      Chief Complaints and History of Present Illnesses   Patient presents with     Eye Exam For Headaches     Chief Complaint(s) and History of Present Illness(es)     Eye Exam For Headaches     Laterality: both eyes    Associated symptoms: headaches, dizziness and imbalance.  Negative for droopy eyelid, tingling and abnormal speech              Comments     Pt here for headaches and eye pressure. Pt reports symptoms started yesterday. He woke up with the headache and eye pressure that lasted until 530pm. Pt says he feels like this intermittently. Pt has blurred near vision with PAL. Pt says he felt like he was straining to see. Pt says he has also been noticing left leg pain in thy and calf that is intermittent as well.  Pt took ibuprofen yesterday towards the end of his day that seemed to help. Pt has the headache and dizziness today but no eye pressure. He says he did not sleep well.  Pt using:  Latanoprost 1x nightly each eye   Dorzolamide BID each eye     SOLITARIO LANDEROS 9:09 AM September 14, 2021

## 2022-03-23 NOTE — PT/OT/SLP PROGRESS
Physical Therapy  Treatment    Samy Alejandre Jr.   MRN: [id number]   Admitting Diagnosis: Pulmonary cavitary lesion    PT Received On: 03/23/22  PT Start Time: 0800     PT Stop Time: 0823    PT Total Time (min): 23 min       Billable Minutes:  Gait Training 8 and Therapeutic Activity 15    Treatment Type: Treatment  PT/PTA: PT     PTA Visit Number: 0       General Precautions: Standard, airborne, contact, droplet, fall  Orthopedic Precautions: N/A   Braces: N/A  Respiratory Status: Room air    Subjective:  Communicated with NURSE FINNEGAN prior to session.  Pain/Comfort  Pain Rating 1: 0/10    Objective:   Patient found with: telemetry, peripheral IV (SUZANNE SYS, 1:1 SITTER)    Functional Mobility:  Therapeutic Activities and Exercises:  PT FOUND SUPINE IN BED UPON ARRIVAL, AGREEABLE TO TX., SUP TO SIT WITH CGA, SEATED SCOOT TO EOB WITH CGA, REVIEW RW USE AND SAFETY DURING TF'S AND GAIT, SIT>STAND WITH CGA, PT AMB 25' IN ROOM WITH RW AND CGA, SLOW PACE, CUES FOR UPRIGHT POSTURE, F DYNAMIC BALANCE, PT TF TO CHAIR WITH CGA, PT PERFORMED BLE THEREX X 15 REPS AROM WITH REST, PT ENCOURAGED TO PERFORM THEREX THROUGHOUT THE DAY, PT ENCOURAGED TO INCREASE TIME OOB IN CHAIR    AM-PAC 6 CLICK MOBILITY  How much help from another person does this patient currently need?   1 = Unable, Total/Dependent Assistance  2 = A lot, Maximum/Moderate Assistance  3 = A little, Minimum/Contact Guard/Supervision  4 = None, Modified [place]/Independent    Turning over in bed (including adjusting bedclothes, sheets and blankets)?: 3  Sitting down on and standing up from a chair with arms (e.g., wheelchair, bedside commode, etc.): 3  Moving from lying on back to sitting on the side of the bed?: 3  Moving to and from a bed to a chair (including a wheelchair)?: 3  Need to walk in hospital room?: 3  Climbing 3-5 steps with a railing?: 1  Basic Mobility Total Score: 16    AM-PAC Raw Score CMS G-Code Modifier Level of Impairment Assistance   6 CN  100% Total / Unable   7 - 9 CM 80 - 100% Maximal Assist   10 - 14 CL 60 - 80% Moderate Assist   15 - 19 CK 40 - 60% Moderate Assist   20 - 22 CJ 20 - 40% Minimal Assist   23 CI 1-20% SBA / CGA   24 CH 0% Independent/ Mod I     Patient left up in chair with all lines intact, call button in reach, chair alarm on, NURSE notified and SITTER, FAMILY present.    Assessment:  Samy Alejandre Jr. is a 72 y.o. male with a medical diagnosis of Pulmonary cavitary lesion and presents with IMPAIRED FUNCTIONAL MOBILITY. PT WILL BENEFIT FROM CONT. SKILLED P.T. TO ADDRESS IMPAIRMENTS    Rehab identified problem list/impairments: Rehab identified problem list/impairments: weakness, impaired endurance, impaired functional mobilty, gait instability, decreased safety awareness, decreased lower extremity function, decreased coordination    Rehab potential is good.    Activity tolerance: Good    Discharge recommendations: Discharge Facility/Level of Care Needs: home health PT     Barriers to discharge:      Equipment recommendations: Equipment Needed After Discharge: bedside commode     GOALS:   Multidisciplinary Problems     Physical Therapy Goals        Problem: Physical Therapy Goal    Goal Priority Disciplines Outcome Goal Variances Interventions   Physical Therapy Goal     PT, PT/OT Ongoing, Progressing     Description: LTG'S TO BE MET IN 14 DAYS (4-4-22)  1. PT WILL BE YONATAN WITH BED MOBILITY  2. PT WILL REQUIRE SPV FOR TF'S  3. PT WILL ' WITH RW AND SPV                   PLAN:    Patient to be seen 3 x/week  to address the above listed problems via gait training, therapeutic activities, therapeutic exercises  Plan of Care expires: 04/04/22  Plan of Care reviewed with: patient, family    03/23/2022

## 2022-03-23 NOTE — PLAN OF CARE
[facility] - Telemetry (Hospital)  Discharge Reassessment    Primary Care Provider: Damien Barron MD    Expected Discharge Date:     Reassessment (most recent)     Discharge Reassessment - 03/23/22 1311        Discharge Reassessment    Assessment Type Discharge Planning Reassessment     Did the patient's condition or plan change since previous assessment? No     Communicated VALERIE with patient/caregiver Date not available/Unable to determine     Discharge Plan A Home Health     DME Needed Upon Discharge  none     Discharge Barriers Identified None     Why the patient remains in the hospital Requires continued medical care               Anticipated DC dispo: home with home health  Progress towards plan: IV abx, Pulmonary following, anticipate Bronch Thurs/Friday.

## 2022-03-23 NOTE — PLAN OF CARE
Pt resting and in no distress. Family member and 1:1 sitter at bedside. Avasys in room as pt fell recently. PRN pain meds given TID as needed. Heparin gtt continued at 17 units/kg/hr and aPTT is therapeutic. Next lab draw in AM. Isolation discontinued. Will continue to monitor.

## 2022-03-24 ENCOUNTER — ANESTHESIA (OUTPATIENT)
Dept: ENDOSCOPY | Facility: HOSPITAL | Age: 72
DRG: 194 | End: 2022-03-24
Payer: MEDICARE

## 2022-03-24 ENCOUNTER — ANESTHESIA EVENT (OUTPATIENT)
Dept: ENDOSCOPY | Facility: HOSPITAL | Age: 72
DRG: 194 | End: 2022-03-24
Payer: MEDICARE

## 2022-03-24 PROBLEM — D64.9 ANEMIA: Status: ACTIVE | Noted: 2022-03-24

## 2022-03-24 LAB
APTT BLDCRRT: 52.7 SEC (ref 21–32)
BACTERIA SPEC AEROBE CULT: NORMAL
BASOPHILS # BLD AUTO: 0.05 K/UL (ref 0–0.2)
BASOPHILS NFR BLD: 0.8 % (ref 0–1.9)
DIFFERENTIAL METHOD: ABNORMAL
EOSINOPHIL # BLD AUTO: 0.3 K/UL (ref 0–0.5)
EOSINOPHIL NFR BLD: 4.1 % (ref 0–8)
ERYTHROCYTE [DISTWIDTH] IN BLOOD BY AUTOMATED COUNT: 14.2 % (ref 11.5–14.5)
GRAM STN SPEC: NORMAL
HCT VFR BLD AUTO: 24.7 % (ref 40–54)
HGB BLD-MCNC: 8.4 G/DL (ref 14–18)
IMM GRANULOCYTES # BLD AUTO: 0.02 K/UL (ref 0–0.04)
IMM GRANULOCYTES NFR BLD AUTO: 0.3 % (ref 0–0.5)
KOH PREP SPEC: NORMAL
LYMPHOCYTES # BLD AUTO: 2 K/UL (ref 1–4.8)
LYMPHOCYTES NFR BLD: 30.5 % (ref 18–48)
MCH RBC QN AUTO: 32.8 PG (ref 27–31)
MCHC RBC AUTO-ENTMCNC: 34 G/DL (ref 32–36)
MCV RBC AUTO: 97 FL (ref 82–98)
MONOCYTES # BLD AUTO: 0.7 K/UL (ref 0.3–1)
MONOCYTES NFR BLD: 10.6 % (ref 4–15)
NEUTROPHILS # BLD AUTO: 3.6 K/UL (ref 1.8–7.7)
NEUTROPHILS NFR BLD: 53.7 % (ref 38–73)
NRBC BLD-RTO: 0 /100 WBC
PLATELET # BLD AUTO: 300 K/UL (ref 150–450)
PMV BLD AUTO: 10.4 FL (ref 9.2–12.9)
POCT GLUCOSE: 109 MG/DL (ref 70–110)
POCT GLUCOSE: 127 MG/DL (ref 70–110)
POCT GLUCOSE: 170 MG/DL (ref 70–110)
POCT GLUCOSE: 195 MG/DL (ref 70–110)
RBC # BLD AUTO: 2.56 M/UL (ref 4.6–6.2)
WBC # BLD AUTO: 6.6 K/UL (ref 3.9–12.7)

## 2022-03-24 PROCEDURE — 99233 PR SUBSEQUENT HOSPITAL CARE,LEVL III: ICD-10-PCS | Mod: 25,,, | Performed by: INTERNAL MEDICINE

## 2022-03-24 PROCEDURE — 88112 CYTOPATH CELL ENHANCE TECH: CPT | Mod: 59 | Performed by: PATHOLOGY

## 2022-03-24 PROCEDURE — 87070 CULTURE OTHR SPECIMN AEROBIC: CPT | Performed by: INTERNAL MEDICINE

## 2022-03-24 PROCEDURE — 87149 DNA/RNA DIRECT PROBE: CPT | Performed by: INTERNAL MEDICINE

## 2022-03-24 PROCEDURE — 31624 DX BRONCHOSCOPE/LAVAGE: CPT | Mod: LT | Performed by: INTERNAL MEDICINE

## 2022-03-24 PROCEDURE — 85730 THROMBOPLASTIN TIME PARTIAL: CPT | Performed by: INTERNAL MEDICINE

## 2022-03-24 PROCEDURE — 25000003 PHARM REV CODE 250: Performed by: NURSE PRACTITIONER

## 2022-03-24 PROCEDURE — 97116 GAIT TRAINING THERAPY: CPT

## 2022-03-24 PROCEDURE — 63600175 PHARM REV CODE 636 W HCPCS: Performed by: NURSE ANESTHETIST, CERTIFIED REGISTERED

## 2022-03-24 PROCEDURE — 87116 MYCOBACTERIA CULTURE: CPT | Performed by: INTERNAL MEDICINE

## 2022-03-24 PROCEDURE — 88305 TISSUE EXAM BY PATHOLOGIST: CPT | Mod: 26,,, | Performed by: PATHOLOGY

## 2022-03-24 PROCEDURE — 94761 N-INVAS EAR/PLS OXIMETRY MLT: CPT

## 2022-03-24 PROCEDURE — 85025 COMPLETE CBC W/AUTO DIFF WBC: CPT | Performed by: INTERNAL MEDICINE

## 2022-03-24 PROCEDURE — 25000003 PHARM REV CODE 250: Performed by: NURSE ANESTHETIST, CERTIFIED REGISTERED

## 2022-03-24 PROCEDURE — 37000008 HC ANESTHESIA 1ST 15 MINUTES: Performed by: INTERNAL MEDICINE

## 2022-03-24 PROCEDURE — 97530 THERAPEUTIC ACTIVITIES: CPT

## 2022-03-24 PROCEDURE — 88112 PR  CYTOPATH, CELL ENHANCE TECH: ICD-10-PCS | Mod: 26,,, | Performed by: PATHOLOGY

## 2022-03-24 PROCEDURE — 99233 SBSQ HOSP IP/OBS HIGH 50: CPT | Mod: 25,,, | Performed by: INTERNAL MEDICINE

## 2022-03-24 PROCEDURE — 88112 CYTOPATH CELL ENHANCE TECH: CPT | Mod: 26,,, | Performed by: PATHOLOGY

## 2022-03-24 PROCEDURE — 37000009 HC ANESTHESIA EA ADD 15 MINS: Performed by: INTERNAL MEDICINE

## 2022-03-24 PROCEDURE — 21400001 HC TELEMETRY ROOM

## 2022-03-24 PROCEDURE — 87102 FUNGUS ISOLATION CULTURE: CPT | Performed by: INTERNAL MEDICINE

## 2022-03-24 PROCEDURE — 97110 THERAPEUTIC EXERCISES: CPT

## 2022-03-24 PROCEDURE — 36415 COLL VENOUS BLD VENIPUNCTURE: CPT | Performed by: INTERNAL MEDICINE

## 2022-03-24 PROCEDURE — 25000003 PHARM REV CODE 250: Performed by: INTERNAL MEDICINE

## 2022-03-24 PROCEDURE — 87205 SMEAR GRAM STAIN: CPT | Performed by: INTERNAL MEDICINE

## 2022-03-24 PROCEDURE — 88305 TISSUE EXAM BY PATHOLOGIST: ICD-10-PCS | Mod: 26,,, | Performed by: PATHOLOGY

## 2022-03-24 PROCEDURE — 87206 SMEAR FLUORESCENT/ACID STAI: CPT | Performed by: INTERNAL MEDICINE

## 2022-03-24 PROCEDURE — 87015 SPECIMEN INFECT AGNT CONCNTJ: CPT | Performed by: INTERNAL MEDICINE

## 2022-03-24 PROCEDURE — 31622 DX BRONCHOSCOPE/WASH: CPT | Mod: ,,, | Performed by: INTERNAL MEDICINE

## 2022-03-24 PROCEDURE — 87210 SMEAR WET MOUNT SALINE/INK: CPT | Performed by: INTERNAL MEDICINE

## 2022-03-24 PROCEDURE — 87107 FUNGI IDENTIFICATION MOLD: CPT | Performed by: INTERNAL MEDICINE

## 2022-03-24 PROCEDURE — 88305 TISSUE EXAM BY PATHOLOGIST: CPT | Performed by: PATHOLOGY

## 2022-03-24 PROCEDURE — 31622 PR BRONCHOSCOPY,DIAGNOSTIC: ICD-10-PCS | Mod: ,,, | Performed by: INTERNAL MEDICINE

## 2022-03-24 PROCEDURE — 63600175 PHARM REV CODE 636 W HCPCS: Performed by: INTERNAL MEDICINE

## 2022-03-24 PROCEDURE — 87118 MYCOBACTERIC IDENTIFICATION: CPT | Mod: 59 | Performed by: INTERNAL MEDICINE

## 2022-03-24 RX ORDER — SUCCINYLCHOLINE CHLORIDE 20 MG/ML
INJECTION INTRAMUSCULAR; INTRAVENOUS
Status: DISCONTINUED | OUTPATIENT
Start: 2022-03-24 | End: 2022-03-24

## 2022-03-24 RX ORDER — HEPARIN SODIUM,PORCINE/D5W 25000/250
0-40 INTRAVENOUS SOLUTION INTRAVENOUS CONTINUOUS
Status: DISCONTINUED | OUTPATIENT
Start: 2022-03-24 | End: 2022-03-24

## 2022-03-24 RX ORDER — ROCURONIUM BROMIDE 10 MG/ML
INJECTION, SOLUTION INTRAVENOUS
Status: DISCONTINUED | OUTPATIENT
Start: 2022-03-24 | End: 2022-03-24

## 2022-03-24 RX ORDER — PROPOFOL 10 MG/ML
VIAL (ML) INTRAVENOUS
Status: DISCONTINUED | OUTPATIENT
Start: 2022-03-24 | End: 2022-03-24

## 2022-03-24 RX ORDER — LIDOCAINE HYDROCHLORIDE 10 MG/ML
INJECTION, SOLUTION EPIDURAL; INFILTRATION; INTRACAUDAL; PERINEURAL
Status: DISCONTINUED | OUTPATIENT
Start: 2022-03-24 | End: 2022-03-24

## 2022-03-24 RX ORDER — ONDANSETRON 2 MG/ML
INJECTION INTRAMUSCULAR; INTRAVENOUS
Status: DISCONTINUED | OUTPATIENT
Start: 2022-03-24 | End: 2022-03-24

## 2022-03-24 RX ADMIN — SUCCINYLCHOLINE CHLORIDE 140 MG: 20 INJECTION, SOLUTION INTRAMUSCULAR; INTRAVENOUS at 01:03

## 2022-03-24 RX ADMIN — ATORVASTATIN CALCIUM 40 MG: 40 TABLET, FILM COATED ORAL at 08:03

## 2022-03-24 RX ADMIN — ACETAMINOPHEN 650 MG: 325 TABLET ORAL at 08:03

## 2022-03-24 RX ADMIN — ONDANSETRON 4 MG: 2 INJECTION, SOLUTION INTRAMUSCULAR; INTRAVENOUS at 01:03

## 2022-03-24 RX ADMIN — ACETAMINOPHEN 650 MG: 325 TABLET ORAL at 03:03

## 2022-03-24 RX ADMIN — ROCURONIUM BROMIDE 5 MG: 10 INJECTION, SOLUTION INTRAVENOUS at 01:03

## 2022-03-24 RX ADMIN — Medication 1 PACKET: at 05:03

## 2022-03-24 RX ADMIN — LIDOCAINE HYDROCHLORIDE 50 MG: 10 INJECTION, SOLUTION EPIDURAL; INFILTRATION; INTRACAUDAL; PERINEURAL at 01:03

## 2022-03-24 RX ADMIN — GABAPENTIN 300 MG: 300 CAPSULE ORAL at 08:03

## 2022-03-24 RX ADMIN — HYDROCODONE BITARTRATE AND ACETAMINOPHEN 1 TABLET: 5; 325 TABLET ORAL at 08:03

## 2022-03-24 RX ADMIN — Medication 1 PACKET: at 08:03

## 2022-03-24 RX ADMIN — SODIUM CHLORIDE, POTASSIUM CHLORIDE, SODIUM LACTATE AND CALCIUM CHLORIDE: 600; 310; 30; 20 INJECTION, SOLUTION INTRAVENOUS at 12:03

## 2022-03-24 RX ADMIN — PROPOFOL 130 MG: 10 INJECTION, EMULSION INTRAVENOUS at 01:03

## 2022-03-24 RX ADMIN — CEFTRIAXONE 1 G: 1 INJECTION, SOLUTION INTRAVENOUS at 10:03

## 2022-03-24 RX ADMIN — TAMSULOSIN HYDROCHLORIDE 0.4 MG: 0.4 CAPSULE ORAL at 08:03

## 2022-03-24 RX ADMIN — HYDROCODONE BITARTRATE AND ACETAMINOPHEN 1 TABLET: 5; 325 TABLET ORAL at 11:03

## 2022-03-24 RX ADMIN — FLUOXETINE HYDROCHLORIDE 20 MG: 20 CAPSULE ORAL at 08:03

## 2022-03-24 RX ADMIN — APIXABAN 5 MG: 2.5 TABLET, FILM COATED ORAL at 05:03

## 2022-03-24 NOTE — PROCEDURES
Bronchoscopy Procedure Note    Location: [facility]    Date of Operation : 3/24/2022    Pre-op Diagnosis   :  Cavitating mass, left lower lobe, superior basal segment    Post-op Diagnosis:  same    Surgeon:  Edward Williamson MD    Operation: Flexible fiberoptic bronchoscopy, with bronchial washing and bronchoalveolar lavage, left lower lobe superior basal segment.    Findings:   Trachea, fany, mainstem bronchi - normal.                     RUL, RML, RLL bronchi appeared normal                      LUCILA, LLL bronchi appeared normal.                      No endobronchial lesion noted both sides.    Specimen:  Bronchial washing, left lower lobe                       Broncho-alveolar lavage, left lower lobe, superior basal segment    Estimated Blood Loss:      none    Complications: none    Indications and History:    The patient is a 72 y.o., male  Who came in with cavitary mass left lower lobe and DVT right lower extremity.     Description of Procedure:    Patient was endotracheally intubated and sedated by CRNA.. With the assistance of Endoscopy nurse and Endoscopy tech, using sterile gloves, gowns, mask, eye protection and using aseptic technique, the flexible bronchoscope was inserted through the swivel adaptor connected to the endotracheal tube.  Trachea appeared  normal.  Fany at midline and sharp.      The right mainstem bronchus and segmental airways appeared normal.      The left mainstem bronchus and segmental airways appeared normal.  Bronchial washing was performed at  left lower lobe segmental airways  and inferior lingula. Bronchoalveolar lavage and bronchial washing were performed at superior basal segment left lower lobe. No active bleeding noted and no endobronchial lesion noted.           .  Patient tolerated procedure well. No adverse events or immediate complications noted.   Post operative chest x-ray showed no pneumothorax.              Edward Williamson  MD  Pulmonologist Intensivist

## 2022-03-24 NOTE — PLAN OF CARE
Pt resting in bed in no distress. Family at bedside. Pt had bronchoscopy with washing today and is stable post procedure. Will continue to monitor.

## 2022-03-24 NOTE — ASSESSMENT & PLAN NOTE
- Apixaban held and pt started on Heparin infusion until evaluated by Pulmonology   -Resume cynthia today

## 2022-03-24 NOTE — TRANSFER OF CARE
"Anesthesia Transfer of Care Note    Patient: Samy Alejandre Jr.    Procedure(s) Performed: Procedure(s) (LRB):  Bronchoscopy (Left)    Patient location: PACU    Anesthesia Type: general    Transport from OR: Transported from OR on room air with adequate spontaneous ventilation    Post pain: adequate analgesia    Post assessment: no apparent anesthetic complications and tolerated procedure well    Post vital signs: stable    Level of consciousness: awake    Nausea/Vomiting: no nausea/vomiting    Complications: none    Transfer of care protocol was followed      Last vitals:   Visit Vitals  /73 (BP Location: Right arm, Patient Position: Sitting)   Pulse 69   Temp 36.9 °C (98.5 °F) (Oral)   Resp 18   Ht 5' 6" (1.676 m)   Wt 61.7 kg (136 lb 0.4 oz)   SpO2 100%   BMI 21.95 kg/m²     "

## 2022-03-24 NOTE — SUBJECTIVE & OBJECTIVE
Interval History:     Review of Systems   Constitutional:  Positive for activity change, appetite change, fatigue and unexpected weight change. Negative for chills and fever.   Eyes:  Negative for photophobia.   Respiratory:  Negative for cough, chest tightness, shortness of breath and wheezing.    Cardiovascular:  Negative for chest pain, palpitations and leg swelling.   Gastrointestinal:  Negative for abdominal pain, diarrhea, nausea and vomiting.   Genitourinary:  Negative for dysuria, flank pain and hematuria.   Musculoskeletal:  Negative for back pain, myalgias and neck pain.        Rt leg pain   Skin:  Negative for rash and wound.   Neurological:  Positive for weakness. Negative for dizziness, syncope and headaches.   Psychiatric/Behavioral:  Negative for agitation.    Objective:     Vital Signs (Most Recent):  Temp: 98.5 °F (36.9 °C) (03/24/22 1116)  Pulse: 69 (03/24/22 1243)  Resp: 18 (03/24/22 1243)  BP: 115/73 (03/24/22 1243)  SpO2: 100 % (03/24/22 1243) Vital Signs (24h Range):  Temp:  [97.9 °F (36.6 °C)-99.1 °F (37.3 °C)] 98.5 °F (36.9 °C)  Pulse:  [58-86] 69  Resp:  [16-20] 18  SpO2:  [95 %-100 %] 100 %  BP: (101-118)/(55-73) 115/73     Weight: 61.7 kg (136 lb 0.4 oz)  Body mass index is 21.95 kg/m².    Intake/Output Summary (Last 24 hours) at 3/24/2022 1345  Last data filed at 3/24/2022 1337  Gross per 24 hour   Intake 1352.94 ml   Output 550 ml   Net 802.94 ml      Physical Exam  Constitutional:       General: He is not in acute distress.     Appearance: He is well-developed. He is ill-appearing.      Comments: Thin built, chronically ill appearing    HENT:      Head: Normocephalic and atraumatic.   Eyes:      Conjunctiva/sclera: Conjunctivae normal.      Pupils: Pupils are equal, round, and reactive to light.   Neck:      Vascular: No JVD.   Cardiovascular:      Rate and Rhythm: Normal rate and regular rhythm.      Heart sounds: Normal heart sounds.   Pulmonary:      Effort: Pulmonary effort is  normal. No respiratory distress.      Breath sounds: Normal breath sounds. No wheezing.   Abdominal:      General: Bowel sounds are normal. There is no distension.      Palpations: Abdomen is soft.      Tenderness: There is no abdominal tenderness. There is no guarding.   Musculoskeletal:         General: No tenderness. Normal range of motion.      Cervical back: Normal range of motion and neck supple.   Skin:     General: Skin is warm and dry.      Capillary Refill: Capillary refill takes less than 2 seconds.      Findings: No erythema.   Neurological:      Mental Status: He is alert and oriented to person, place, and time.   Psychiatric:         Behavior: Behavior normal.       Significant Labs: All pertinent labs within the past 24 hours have been reviewed.      Significant Imaging: I have reviewed all pertinent imaging results/findings within the past 24 hours.

## 2022-03-24 NOTE — PT/OT/SLP PROGRESS
Occupational Therapy  Treatment    Samy Alejandre Jr.   MRN: [id number]   Admitting Diagnosis: Pulmonary cavitary lesion    OT Date of Treatment: 03/24/22   OT Start Time: 1141  OT Stop Time: 1205  OT Total Time (min): 24 min    Billable Minutes:  Therapeutic Activity 15 MINUTES and Therapeutic Exercise  10 MINUTES    OT/AJIT: OT          General Precautions: Standard, fall  Orthopedic Precautions:    Braces: N/A  Respiratory Status: Room air         Subjective:  Communicated with NURSE AND Epic CHART REVIEW prior to session.    Pain/Comfort  Pain Rating 1: 0/10  Location - Side 1: Left    Objective:  Patient found with: telemetry, peripheral IV     Functional Mobility:  Bed Mobility:  SBA WITH ROLLING R<L  SBA WITH FORWARD SCOOTING    Transfers:   SBA WITH SIT<>STAND TRANSFERS  Functional Ambulation: PT AMBULATED 20 FEET WITH ROLLING WALKER    Balance:   Static Sit: FAIR+: Able to take MINIMAL challenges from all directions  Dynamic Sit: FAIR+: Maintains balance through MINIMAL excursions of active trunk motion  Static Stand: FAIR: Maintains without assist but unable to take challenges  Dynamic stand: FAIR: Needs CONTACT GUARD during gait    Therapeutic Activities and Exercises:  PT EDUCATED ON HEP. PT VERBALIZED UNDERSTANDING AND RETURNED DEMONSTRATION . PT PERFORMED 1 SET X 15 REPS B UE ROM EXERCISE IN AVAILABLE TO PLANES AND RANGES .AM-PAC 6 CLICK ADL   How much help from another person does this patient currently need?   1 = Unable, Total/Dependent Assistance  2 = A lot, Maximum/Moderate Assistance  3 = A little, Minimum/Contact Guard/Supervision  4 = None, Modified [place]/Independent    Putting on and taking off regular lower body clothing? : 3  Bathing (including washing, rinsing, drying)?: 3  Toileting, which includes using toilet, bedpan, or urinal? : 3  Putting on and taking off regular upper body clothing?: 3  Taking care of personal grooming such as brushing teeth?: 4  Eating meals?: 4  Daily  "Activity Total Score: 20     AM-PAC Raw Score CMS "G-Code Modifier Level of Impairment Assistance   6 % Total / Unable   7 - 8 CM 80 - 100% Maximal Assist   9-13 CL 60 - 80% Moderate Assist   14 - 19 CK 40 - 60% Moderate Assist   20 - 22 CJ 20 - 40% Minimal Assist   23 CI 1-20% SBA / CGA   24 CH 0% Independent/ Mod I       Patient left up in chair with all lines intact, call button in reach, chair alarm on, NURSE  notified and SUZANNE MONITOR; 1:1 PCT AND SPOUSE present    ASSESSMENT:  Samy Alejandre Jr. is a 72 y.o. male with a medical diagnosis of Pulmonary cavitary lesion and presents with DEBILITY AND GENERALIZED WEAKNESS.    Rehab identified problem list/impairments: Rehab identified problem list/impairments: weakness, impaired functional mobilty, decreased safety awareness, impaired endurance, gait instability, impaired balance, impaired self care skills    Rehab potential is good.    Activity tolerance: Good    Discharge recommendations: Discharge Facility/Level of Care Needs: home health OT     Barriers to discharge: Barriers to Discharge: None    Equipment recommendations: bedside commode     GOALS:   Multidisciplinary Problems     Occupational Therapy Goals        Problem: Occupational Therapy Goal    Goal Priority Disciplines Outcome Interventions   Occupational Therapy Goal     OT, PT/OT Ongoing, Progressing    Description: Goals to be met by: 4/4/22     Patient will increase functional independence with ADLs by performing:    UE Dressing with Set-up Assistance.  Toileting from toilet with Stand-by Assistance for hygiene and clothing management.   Toilet transfer to toilet with Stand-by Assistance.  Increased functional strength in B UE grossly by 1/2 MM grade to increase independence with ADLs.                     Plan:  Patient to be seen 2 x/week to address the above listed problems via self-care/home management, therapeutic activities, therapeutic exercises  Plan of Care expires: 04/07/22  Plan " of Care reviewed with: patient         03/24/2022

## 2022-03-24 NOTE — PT/OT/SLP PROGRESS
Physical Therapy  Treatment    Samy Alejandre Jr.   MRN: [id number]   Admitting Diagnosis: Pulmonary cavitary lesion    PT Received On: 03/24/22  PT Start Time: 1010     PT Stop Time: 1033    PT Total Time (min): 23 min       Billable Minutes:  Gait Training 10 and Therapeutic Activity 13    Treatment Type: Treatment  PT/PTA: PT     PTA Visit Number: 0       General Precautions: Standard, fall  Orthopedic Precautions: N/A   Braces: N/A  Respiratory Status: Room air    Subjective:  Communicated with NURSE GONZÁLES prior to session.  Pain/Comfort  Pain Rating 1: 0/10    Objective:   Patient found with: peripheral IV, telemetry (SUZANNE SYS, 1:1 SITTER)    Functional Mobility:  Therapeutic Activities and Exercises:  PT FOUND SUPINE IN BED UPON ARRIVAL, AGREEABLE TO TX., SUP TO SIT WITH CGA, SEATED SCOOT TO EOB WITH CGA, REVIEW RW USE AND SAFETY DURING TF'S AND GAIT, SIT>STAND WITH CGA, PT AMB 40' X 2 TRIALS WITH RW AND CGA, SLOW PACE, CUES FOR UPRIGHT POSTURE, F DYNAMIC BALANCE, MINIMAL BUCKLE AT R KNEE BUT NO LOB, PT TF TO CHAIR WITH CGA, PT PERFORMED BLE THEREX X 15 REPS AROM WITH REST, PT ENCOURAGED TO PERFORM THEREX THROUGHOUT THE DAY, PT ENCOURAGED TO INCREASE TIME OOB IN CHAIR    AM-PAC 6 CLICK MOBILITY  How much help from another person does this patient currently need?   1 = Unable, Total/Dependent Assistance  2 = A lot, Maximum/Moderate Assistance  3 = A little, Minimum/Contact Guard/Supervision  4 = None, Modified [place]/Independent    Turning over in bed (including adjusting bedclothes, sheets and blankets)?: 3  Sitting down on and standing up from a chair with arms (e.g., wheelchair, bedside commode, etc.): 3  Moving from lying on back to sitting on the side of the bed?: 3  Moving to and from a bed to a chair (including a wheelchair)?: 3  Need to walk in hospital room?: 3  Climbing 3-5 steps with a railing?: 1  Basic Mobility Total Score: 16    AM-PAC Raw Score CMS G-Code Modifier Level of Impairment  Assistance   6 % Total / Unable   7 - 9 CM 80 - 100% Maximal Assist   10 - 14 CL 60 - 80% Moderate Assist   15 - 19 CK 40 - 60% Moderate Assist   20 - 22 CJ 20 - 40% Minimal Assist   23 CI 1-20% SBA / CGA   24 CH 0% Independent/ Mod I     Patient left up in chair with all lines intact, call button in reach, chair alarm on, NURSE notified and SITTER AND FAMILY present.    Assessment:  Samy Alejandre Jr. is a 72 y.o. male with a medical diagnosis of Pulmonary cavitary lesion and presents with IMPAIRED FUNCTIONAL MOBILITY. PT WILL BENEFIT FROM CONT. SKILLED P.T. TO ADDRESS IMPAIRMENTS    Rehab identified problem list/impairments: Rehab identified problem list/impairments: weakness, impaired endurance, impaired functional mobilty, gait instability, impaired balance, decreased safety awareness, decreased lower extremity function, decreased coordination    Rehab potential is good.    Activity tolerance: Good    Discharge recommendations: Discharge Facility/Level of Care Needs: home health PT     Barriers to discharge:      Equipment recommendations: Equipment Needed After Discharge: bedside commode     GOALS:   Multidisciplinary Problems     Physical Therapy Goals        Problem: Physical Therapy Goal    Goal Priority Disciplines Outcome Goal Variances Interventions   Physical Therapy Goal     PT, PT/OT Ongoing, Progressing     Description: LTG'S TO BE MET IN 14 DAYS (4-4-22)  1. PT WILL BE YONATAN WITH BED MOBILITY  2. PT WILL REQUIRE SPV FOR TF'S  3. PT WILL ' WITH RW AND SPV                   PLAN:    Patient to be seen 3 x/week  to address the above listed problems via gait training, therapeutic activities, therapeutic exercises  Plan of Care expires: 04/04/22  Plan of Care reviewed with: patient, family    03/24/2022

## 2022-03-24 NOTE — ASSESSMENT & PLAN NOTE
No new neuro deficits   CT head negative  Continue plavix, statin   3/21-  Plavix held until pt is evaluated by Pulmonology   Will resume plavix after Bronchoscopy

## 2022-03-24 NOTE — PROGRESS NOTES
[facility] Medicine  Progress Note    Patient Name: Samy Alejandre Jr.  MRN: [id number]  Patient Class: IP- Inpatient   Admission Date: 3/20/2022  Length of Stay: 2 days  Attending Physician: Binh Boykin, *  Primary Care Provider: Damien Barron MD        Subjective:     Principal Problem:Pulmonary cavitary lesion        HPI:  73 yo male with past medical history of HTN, CVA, T2DM, recent DVT, anxiety and depression presented to ED for evaluation of generalized weakness/deconditioning. History collected from daughter, Micelle, at bedside. Per family report patient with progressively worsening weakness over the past month associated with episodes of intermittent n/v, diarrhea, decreased po intake, altered mental status and weight loss. He was diagnosed with extensive acute right lower extremity deep venous thrombus on 3/9/22, apixaban initiated. Patient s/p 2 mechanical falls prior to ED arrival, no head trauma or LOC. Patient/family denies fever/chills, abdominal pain and chest pain.     ED evaluation: SBP 90s on arrival. Labs remarkable for wbc 14, Cr 1.7, magnesium 1.5. CT chest shows 5.4 cm cavitary consolidative opacity in the superior segment left lower lobe suspicious for a cavitary infectious process such as cavitary pneumonia or tuberculosis. Cavitating malignancy or pulmonary infarct are additional considerations. COVID 19 negative. CT head shows no acute findings. He received 2 liters IVF. Patient placed in observation for further evaluation and treatment.         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 Recently diagnosed with acute extensive DVT of  Rt LE on 3/9/22 during an ED visit and started on Apixaban therapy. Rt leg swelling has improved since. CT head on admit - no hemorrhage or infarction . CT C/A/P showed large opacity within the superior left lower lobe measures 5.4 x 3.0 cm with internal areas of cavitation.     3/21- Afebrile. Pt is curled up in the bed, appears chronically ill , thin built , decreased motivation , and report total loss of appetite. Sitter at bedside report he has not eaten anything today. Near fall when he tried to get up from bed last night. Denies cough.  Pt started on antibiotic targeting CAP. Induced sputum for AFB smear and culture ordered. Pulmonology is consulted for further recs. Apixaban and Plavix held and pt started on Heparin infusion . WBC normalized. Hgb 9.3, Creatinine improved to 1.1>1.7, , ESR 70.     3/22 He is aao x 3  with intermittent confusion . OAC and antiplatelet on hold  for a possible bronchoscopy.  The H/H sligly trending down  with no sign of overt GIB . Pulmonology consult noted .      3/23 Quantiferon gold is negative . He is aao x 3 in nad .  H/H stable  . Plan for bronchoscopy tomorrow . Will d/c isolation .     3/24 He is aao x 4 in nad . He report feeling better . The Quantiferon  gold test is negative , isolation for TB d/c . Plan for Bronchoscopy today . PT/OT rec Home with HH . Plan to  d/c heparin drip and reume DOAC after bronchoscopy .Most likely will be d/c home tomorrow with       Interval History:     Review of Systems   Constitutional:  Positive for activity change, appetite change, fatigue and unexpected weight change. Negative for chills and fever.   Eyes:  Negative for photophobia.   Respiratory:  Negative for cough, chest tightness, shortness of breath and wheezing.    Cardiovascular:  Negative for chest pain, palpitations and leg swelling.   Gastrointestinal:  Negative for abdominal pain, diarrhea, nausea and vomiting.   Genitourinary:  Negative for dysuria, flank pain and  hematuria.   Musculoskeletal:  Negative for back pain, myalgias and neck pain.        Rt leg pain   Skin:  Negative for rash and wound.   Neurological:  Positive for weakness. Negative for dizziness, syncope and headaches.   Psychiatric/Behavioral:  Negative for agitation.    Objective:     Vital Signs (Most Recent):  Temp: 98.5 °F (36.9 °C) (03/24/22 1116)  Pulse: 69 (03/24/22 1243)  Resp: 18 (03/24/22 1243)  BP: 115/73 (03/24/22 1243)  SpO2: 100 % (03/24/22 1243) Vital Signs (24h Range):  Temp:  [97.9 °F (36.6 °C)-99.1 °F (37.3 °C)] 98.5 °F (36.9 °C)  Pulse:  [58-86] 69  Resp:  [16-20] 18  SpO2:  [95 %-100 %] 100 %  BP: (101-118)/(55-73) 115/73     Weight: 61.7 kg (136 lb 0.4 oz)  Body mass index is 21.95 kg/m².    Intake/Output Summary (Last 24 hours) at 3/24/2022 1345  Last data filed at 3/24/2022 1337  Gross per 24 hour   Intake 1352.94 ml   Output 550 ml   Net 802.94 ml      Physical Exam  Constitutional:       General: He is not in acute distress.     Appearance: He is well-developed. He is ill-appearing.      Comments: Thin built, chronically ill appearing    HENT:      Head: Normocephalic and atraumatic.   Eyes:      Conjunctiva/sclera: Conjunctivae normal.      Pupils: Pupils are equal, round, and reactive to light.   Neck:      Vascular: No JVD.   Cardiovascular:      Rate and Rhythm: Normal rate and regular rhythm.      Heart sounds: Normal heart sounds.   Pulmonary:      Effort: Pulmonary effort is normal. No respiratory distress.      Breath sounds: Normal breath sounds. No wheezing.   Abdominal:      General: Bowel sounds are normal. There is no distension.      Palpations: Abdomen is soft.      Tenderness: There is no abdominal tenderness. There is no guarding.   Musculoskeletal:         General: No tenderness. Normal range of motion.      Cervical back: Normal range of motion and neck supple.   Skin:     General: Skin is warm and dry.      Capillary Refill: Capillary refill takes less than 2  seconds.      Findings: No erythema.   Neurological:      Mental Status: He is alert and oriented to person, place, and time.   Psychiatric:         Behavior: Behavior normal.       Significant Labs: All pertinent labs within the past 24 hours have been reviewed.      Significant Imaging: I have reviewed all pertinent imaging results/findings within the past 24 hours.        Assessment/Plan:      * Pulmonary cavitary lesion  -CT Chest shows 5.4 cm cavitary consolidative opacity in the superior segment left lower lobe is most suspicious for a cavitary infectious process such as cavitary pneumonia or tuberculosis.  Cavitating malignancy or pulmonary infarct are additional considerations.  -patient afebrile, lactic and procal wnl, defer antibiotics for now   -Consult pulmonology   3/21-  -Pt started on antibiotic treatment targeting CAP  -Induced sputum for AFB smear and culture   - Pulmonology plan for bronc  Today   -Quantiferon gold test negative    Anemia  H/H slightly trending down   No sign of overt GIB   Most likely dilutional   Monitor  H/H       Acute deep vein thrombosis (DVT) of right lower extremity  - Apixaban held and pt started on Heparin infusion until evaluated by Pulmonology   -Chete cynthia today       Fall  Consult PT /OT      Weight loss  - Family reports weight loss in the last 6 mos to 1 year but could not tell how much   -Reports total loss of appetite   -Work up for further evaluation of abnormal CT chest   -Supportive treatment       Nausea and vomiting  Patient denies abdominal pain   No evidence of SBO on CT abdomen  Continue supportive management (prn antiemetics)       Dehydration  - Poor oral intake of food and liquid   -Additionally family given h/o N/N  -Volume resuscitation as indicated      Improving       MARI (acute kidney injury)  3/20-  -2/2 volume depletion, dehydration   Continue fluid resuscitation  Monitor intake and output   Repeat am renal function panel   3/21-  Resolved          Controlled type 2 diabetes mellitus with stage 3 chronic kidney disease, without long-term current use of insulin  SSI, accucheck AC&HS, Diabetic diet   Most recent HgA1c 5.9           History of CVA with residual deficit  No new neuro deficits   CT head negative  Continue plavix, statin   3/21-  Plavix held until pt is evaluated by Pulmonology   Will resume plavix after Bronchoscopy       VTE Risk Mitigation (From admission, onward)         Ordered     heparin 25,000 units in dextrose 5% (100 units/ml) IV bolus from bag - ADDITIONAL PRN BOLUS - 60 units/kg  As needed (PRN)        Question:  Heparin Infusion Adjustment (DO NOT MODIFY ANSWER)  Answer:  [email or web address]\epic\Images\Pharmacy\HeparinInfusions\heparin HIGH INTENSITY nomogram for [facility] [id number]    03/20/22 2158     heparin 25,000 units in dextrose 5% (100 units/ml) IV bolus from bag - ADDITIONAL PRN BOLUS - 30 units/kg  As needed (PRN)        Question:  Heparin Infusion Adjustment (DO NOT MODIFY ANSWER)  Answer:  [email or web address]\epic\Images\Pharmacy\HeparinInfusions\heparin HIGH INTENSITY nomogram for [facility] [id number].pdf    03/20/22 2158     heparin 25,000 units in dextrose 5% 250 mL (100 units/mL) infusion HIGH INTENSITY nomogram - [facility]  Continuous        Question Answer Comment   Heparin Infusion Adjustment (DO NOT MODIFY ANSWER) \\[email or web address]\epic\Images\Pharmacy\HeparinInfusions\heparin HIGH INTENSITY nomogram for [facility] [id number]    Begin at (in units/kg/hr) 18        03/20/22 2158     IP VTE HIGH RISK PATIENT  Once         03/20/22 1645     Place sequential compression device  Until discontinued         03/20/22 1645                Discharge Planning   VALERIE:      Code Status: Full Code   Is the patient medically ready for discharge?:     Reason for patient still in hospital (select all that apply): Treatment  Discharge Plan A: Home Health                  Binh Boykin MD  Department of Hospital Medicine   [facility] - Telemetry ([facility])

## 2022-03-24 NOTE — PLAN OF CARE
Pt AAOx4. POC reviewed with pt and wife. Pt and wife verbalized understanding. Pt remains free of injuries and falls; fall precautions in place; RA  Telesitter and sitter   SR on tele monitor. HR 60's 70's  IV saline; infusing  Diabetic Monitoring  C/O headache; pain 7/10  Bed low; side rails up x2, non-slip socks in use, call light in reach  Reminded to call for assistance  Hourly rounding complete will continue to monitor

## 2022-03-24 NOTE — ASSESSMENT & PLAN NOTE
-CT Chest shows 5.4 cm cavitary consolidative opacity in the superior segment left lower lobe is most suspicious for a cavitary infectious process such as cavitary pneumonia or tuberculosis.  Cavitating malignancy or pulmonary infarct are additional considerations.  -patient afebrile, lactic and procal wnl, defer antibiotics for now   -Consult pulmonology   3/21-  -Pt started on antibiotic treatment targeting CAP  -Induced sputum for AFB smear and culture   - Pulmonology plan for bronc  Today   -Quantiferon gold test negative

## 2022-03-24 NOTE — PROGRESS NOTES
HPI:  71 yo male with past medical history of HTN, CVA, T2DM, recent DVT, anxiety and depression presented to ED for evaluation of generalized weakness/deconditioning. History collected from daughter, Micelle, at bedside. Per family report patient with progressively worsening weakness over the past month associated with episodes of intermittent n/v, diarrhea, decreased po intake, altered mental status and weight loss. He was diagnosed with extensive acute right lower extremity deep venous thrombus on 3/9/22, apixaban initiated. Patient s/p 2 mechanical falls prior to ED arrival, no head trauma or LOC. Patient/family denies fever/chills, abdominal pain and chest pain.      ED evaluation: SBP 90s on arrival. Labs remarkable for wbc 14, Cr 1.7, magnesium 1.5. CT chest shows 5.4 cm cavitary consolidative opacity in the superior segment left lower lobe suspicious for a cavitary infectious process such as cavitary pneumonia or tuberculosis. Cavitating malignancy or pulmonary infarct are additional considerations. COVID 19 negative. CT head shows no acute findings. He received 2 liters IVF. Patient placed in observation for further evaluation and treatment.          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 Recently diagnosed with acute extensive DVT of Rt LE on 3/9/22 during an ED visit and started on Apixaban therapy. Rt leg swelling has improved since. CT head on admit - no hemorrhage or infarction . CT C/A/P showed large opacity within the superior left lower lobe measures 5.4 x 3.0 cm with internal areas of cavitation.      3/21- Afebrile. Pt is curled up in the bed, appears chronically ill , thin  built , decreased motivation , and report total loss of appetite. Sitter at bedside report he has not eaten anything today. Near fall when he tried to get up from bed last night. Denies cough.  Pt started on antibiotic targeting CAP. Induced sputum for AFB smear and culture ordered. Pulmonology is consulted for further recs. Apixaban and Plavix held and pt started on Heparin infusion . WBC normalized. Hgb 9.3, Creatinine improved to 1.1>1.7, , ESR 70.       Interval History:   Pt started on antibiotic targeting CAP. Induced sputum for AFB smear and culture ordered. Pulmonology is consulted for further recs. Apixaban and Plavix held and pt started on Heparin infusion . WBC normalized. Hgb 9.3, Creatinine improved to 1.1>1.7, , ESR 70.      3/21/2022  Claimed that he was feeling weak and his personal nurse reported that he was getting more SOB. Denies fever or chills or hemoptysis or chest pain or abdominal pain or nausea or vomiting or diarrhea. Started oral antibiotics a few days ago. Chest CT scan 3/20/2022 showed pulmonary consolidation with cavitation left lower lobe superior segment. On eliquis for LE DVT 3/9/2022 and plavix. Eliquis and plavix held 3/20/2022.     3/23/2022  Feeling well. No fever or chills or increased cough or purulent sputum or hemoptysis or chest pain or nausea or vomiting or diarrhea. He is awake, alert and disoriented to month. Hb appeared stable on 9.1. On heparin drip for DVT. Scheduled for bronchoscopy tomorrow around 1 pm.       3/24/2022  Feeling well this morning. NPO post midnight, and heparin drip discontinued at 7 am for the bronchoscopy. Denies chest pain or abdominal pain or nausea or vomiting or diarrhea or fever or chills or increasing cough or purulent sputum or hemoptysis.         Continuous Infusions:   dextrose 5% lactated ringers 50 mL/hr at 03/23/22 1817     Review of patient's allergies indicates:   Allergen Reactions    Chantix [varenicline] Other (See  Comments)     Felt bad       OBJECTIVE:     Vital Signs (Most Recent)  Temp: 98.5 °F (36.9 °C) (03/24/22 1116)  Pulse: 68 (03/24/22 1116)  Resp: 17 (03/24/22 1116)  BP: (!) 101/59 (03/24/22 1116)  SpO2: 99 % (03/24/22 1116)    Vital Signs Range (Last 24H):  Temp:  [97.9 °F (36.6 °C)-99.1 °F (37.3 °C)]   Pulse:  [58-86]   Resp:  [14-20]   BP: (101-118)/(55-70)   SpO2:  [95 %-100 %]     I & O (Last 24H):    Intake/Output Summary (Last 24 hours) at 3/24/2022 1143  Last data filed at 3/23/2022 1900  Gross per 24 hour   Intake 1292.94 ml   Output 700 ml   Net 592.94 ml       Ventilator Data (Last 24H):          Physical Exam:    Awake, alert, coherent, not in acute respiratory distress,     Head/NECK:  normocephalic, atraumatic; neck supple; no neck tenderness; no jugular vein distension  EENT: anicteric sclerae; pupils symmetrical; no ear discharge; no nasal discharge or polyp; throat is clear. No oral lesions.   LUNGS: no wheezing or rhonci or coarse crackles or rales..   HEART: S1, S2 distinct; normal rate; regular rhythm   ABDOMEN: Soft, positive bowel sounds, nontender, nondistended   :  no flank tenderness; urinary bladder nondistended and nontender.  NEUROLOGICAL: no facial asymmetry; no apparent motor or sensory deficit.  sedated  SKIN: No rash.  Musculoskeletal: No deformities.  Dermatological: No rashes.      Laboratory (Last 24H):  CBC:  Recent Labs   Lab 03/24/22  0443   WBC 6.60   HGB 8.4*   HCT 24.7*        CMP:  No results for input(s): GLUCOSE, CALCIUM, ALBUMIN, PROT, NA, K, CO2, CL, BUN, CREATININE, ALKPHOS, ALT, AST, BILITOT in the last 24 hours.        Imaging:  Imaging Results          CT Head Without Contrast (Final result)  Result time 03/20/22 17:35:50    Final result by Javid Chaudhry MD (03/20/22 17:35:50)                 Impression:      Atrophy and chronic white matter changes.    No evidence for hemorrhage mass effect or midline shift.    All CT scans   are performed using dose  optimization techniques including the following: automated exposure control; adjustment of the mA and/or kV; use of iterative reconstruction technique.  Dose modulation was employed for ALARA by means of: Automated exposure control; adjustment of the mA and/or kV according to patient size (this includes techniques or standardized protocols for targeted exams where dose is matched to indication/reason for exam; i.e. extremities or head); and/or use of iterative reconstructive technique.      Electronically signed by: Isidoro Hua  Date:    03/20/2022  Time:    17:35             Narrative:    EXAMINATION:  CT HEAD WITHOUT CONTRAST    CLINICAL HISTORY:  AMS, weakness;    TECHNIQUE:  Low dose axial CT images obtained throughout the head without intravenous contrast. Sagittal and coronal reconstructions were performed.    COMPARISON:  None.    FINDINGS:  Atrophy and chronic white matter changes    Ventricles and sulci are normal in size for age without evidence of hydrocephalus. No extra-axial blood or fluid collections.    No parenchymal mass, hemorrhage, edema or major vascular distribution infarct.    Skull/extracranial contents (limited evaluation): No fracture. Mastoid air cells and paranasal sinuses are essentially clear.                               CT Chest Abdomen Pelvis Without Contrast (XPD) (Final result)  Result time 03/20/22 14:43:56    Final result by Rafa Bonds MD (03/20/22 14:43:56)                 Impression:      5.4 cm cavitary consolidative opacity in the superior segment left lower lobe is most suspicious for a cavitary infectious process such as cavitary pneumonia or tuberculosis.  Cavitating malignancy or pulmonary infarct are additional considerations.    Small area bronchiectasis and scarring/fibrosis within the left upper lobe.    Punctate pulmonary nodules within the right upper lobe.  For a solid nodule <6 mm, Fleischner Society 2017 guidelines recommend no routine follow up for a low  risk patient, or follow-up with non-contrast chest CT at 12 months in a high risk patient.    Bilateral nonobstructing nephrolithiasis.    Small hiatal hernia.    All CT scans at this facility use dose modulation, iterative reconstruction, and/or weight based dosing when appropriate to reduce radiation dose to as low as reasonable achievable.      Electronically signed by: Rafa Bonds  Date:    03/20/2022  Time:    14:43             Narrative:    EXAMINATION:  CT CHEST ABDOMEN PELVIS WITHOUT CONTRAST(XPD)    CLINICAL HISTORY:  Weight loss, unintended;    TECHNIQUE:  Low dose axial images, sagittal and coronal reformations were obtained from the thoracic inlet to the pubic symphysis without the use of intravenous or oral contrast.    COMPARISON:  Abdominal ultrasound 02/24/2022; chest radiograph 02/23/2022    FINDINGS:  Chest:    Base of the neck: Within normal limits.    Heart and great vessels: Heart is normal size.  There is coronary atherosclerotic disease.  Heavy mitral annular calcifications.  Pulmonary arteries distribute normally.  Left-sided aortic arch.  No aneurysm.  Mild-to-moderate aortic atherosclerotic calcification.    Adenopathy: No axillary, mediastinal or hilar lymphadenopathy.    Esophagus: Small hiatal hernia, otherwise within normal limits.    Airways: Patent.    Lungs: Small areas bronchiectasis and adjacent scarring or fibrosis noted of the left upper lobe.  Large opacity within the superior left lower lobe measures 5.4 x 3.0 cm with internal areas cavitation.  There are a few punctate pulmonary nodules noted within the right upper lobe.  There is a tiny left lower lobe calcified granuloma.  Minor dependent bibasilar atelectatic changes.  No pleural effusion.  No pneumothorax.    Abdomen:    Liver: No focal hepatic abnormality within the limitations of noncontrast technique.    Gallbladder: Nondistended.  No calcified gallstones.    Biliary system: Non-dilated.    Spleen: Within normal  limits.    Pancreas: Within normal limits.    Adrenals: Within normal limits.    Kidneys: Normal in size and position. Bilateral nonobstructing nephrolithiasis measures up to 0.4 cm on the right and 0.8 cm on the left.  No hydronephrosis.  Ureters are non-dilated.    Bowel: Small hiatal hernia, the stomach is otherwise within normal limits.  Small bowel loops appear within normal limits.  There are scattered colonic diverticula throughout the descending and sigmoid colon.  No evidence of bowel obstruction or acute inflammatory change.  The appendix is visualized and appears within normal limits.    Peritoneum: No ascites.  No organized intra-abdominal fluid collection.  No intraperitoneal free air.    Abdominal Adenopathy: None.    Vasculature: Heavy aortic atherosclerotic calcification.  The abdominal aorta is nonaneurysmal.    Pelvis:    Urinary bladder: No significant wall thickening.    Prostate: Mild central prostate enlargement within prior shin on the posteroinferior urinary bladder wall.    Pelvis adenopathy: None.    Bones: There are degenerative changes.  No acute or concerning osseous abnormalities.  Lumbar dextroscoliotic curvature.    Miscellaneous: None.                                   ASSESSMENT/PLAN:     1. Pneumonia with cavitation      - left lower lobe superior basal segment      - procalcitonin was normal and leukocytosis improved  To 7.2 after IV rocephin and IV azithromycin was started 3/21/2022. Will continue same antibiotics and continue to watch.       - wbc remained normal in the last few days.       - sputum culture unemarkable. Barely coughed any sputum.       - sputum AFB x 3 .      - underlying fungal infection or atypical mycobacterial infection or cancer could not be completely excluded.      - hold eliquis and plavix 3/20/2022      - Scheduled bronchoscopy today 1  pm.       - npo post midnight      - D5LR at 60 ml/ hour. Patient was not eating earlier today. Poor appetite.      -  heparin drip held at 7 am tomorrow.           -   2. DVT right lower extremity      - currently on heparin drip per protocol.     3.  Hx of MARI, CVA with residual deficit, Diabetes mellitus 2.         Defer to hospitalist for further management.     Preventive Measures:   Nutrition: Goal: Tolerate oral diet and meet caloric needs  Stress Ulcer: PPI  DVT: LMWH/SCDs  Head of Bed/Reposition: Elevate HOB and turn Q1-2 hours    Physical Therapy: consult once stable.     Sedation Interruption: Q AM   Vent Day:  OG Day:  Central Line Day:  Ortez Day:  IVAB Day:  Code Status:    Counseling/Consultation: I have discussed the care of this patient in detail with nursing staff      Spent  35  minutes pulmonary consult time  in evaluating patient , reviewing clinical history and progress in the hospital, medications, vital signs, images, labs and talking to patient, and formulating treatment plan and discussing treatment plan with patient.           Edward Williamson MD  Pulmonologist/ Intensivist  [facility]

## 2022-03-24 NOTE — ANESTHESIA PREPROCEDURE EVALUATION
03/24/2022  Samy Alejandre Jr. is a 72 y.o., male.      Pre-op Assessment    I have reviewed the Patient Summary Reports.     I have reviewed the Nursing Notes. I have reviewed the NPO Status.   I have reviewed the Medications.     Review of Systems  Anesthesia Hx:  No problems with previous Anesthesia  Denies Family Hx of Anesthesia complications.   Denies Personal Hx of Anesthesia complications.   Social:  Smoker, No Alcohol Use    Hematology/Oncology:     Oncology Normal    -- Anemia: Hematology Comments: Acute deep vein thrombosis (DVT) of right lower extremity    Cardiovascular:   Hypertension Denies MI.   Denies CABG/stent.      hyperlipidemia ECG has been reviewed. ekg 3/2022:  Normal sinus rhythm with sinus arrhythmia 98  Normal ECG   No previous ECGs available   Pulmonary:   Denies COPD.  Denies Asthma.  Denies Sleep Apnea. Pulmonary cavitary lesion   Renal/:   Chronic Renal Disease, CRI    Hepatic/GI:   Denies GERD. Denies Liver Disease.  Denies Hepatitis.    Neurological:   CVA Denies Seizures. Hemiparesis   Endocrine:   Diabetes, type 2 Denies Hypothyroidism. Denies Hyperthyroidism.    Psych:   anxiety depression Insomnia         Physical Exam  General: Well nourished    Airway:  Mallampati: II   Mouth Opening: Normal    Dental:  Intact    Chest/Lungs:  Clear to auscultation, Normal Respiratory Rate    Heart:  Rate: Normal  Rhythm: Regular Rhythm        Anesthesia Plan  Type of Anesthesia, risks & benefits discussed:    Anesthesia Type: Gen ETT  Intra-op Monitoring Plan: Standard ASA Monitors  Induction:  IV  Airway Plan: Direct  Informed Consent: Informed consent signed with the Patient and all parties understand the risks and agree with anesthesia plan.  All questions answered.   ASA Score: 3  Day of Surgery Review of History & Physical: H&P Update referred to the surgeon/provider.    Ready  For Surgery From Anesthesia Perspective.     .

## 2022-03-24 NOTE — ASSESSMENT & PLAN NOTE
- Poor oral intake of food and liquid   -Additionally family given h/o N/N  -Volume resuscitation as indicated      Improving

## 2022-03-24 NOTE — ANESTHESIA PROCEDURE NOTES
Intubation    Date/Time: 3/24/2022 1:14 PM  Performed by: Tony Blank CRNA  Authorized by: Nino Collier MD     Intubation:     Induction:  Intravenous    Intubated:  Postinduction    Mask Ventilation:  Easy with oral airway    Attempts:  2    Attempted By:  CRNA    Method of Intubation:  Direct    Blade:  Alejandre 2    Laryngeal View Grade: Grade III - only epiglottis visible      Attempted By (2nd Attempt):  CRNA    Method of Intubation (2nd Attempt):  Video laryngoscopy    Blade (2nd Attempt):  Merlos 3    Laryngeal View Grade (2nd Attempt): Grade I - full view of cords      Difficult Airway Encountered?: No      Complications:  None    Airway Device:  Oral endotracheal tube    Airway Device Size:  8.0    Style/Cuff Inflation:  Cuffed (inflated to minimal occlusive pressure)    Tube secured:  22    Secured at:  The lips    Placement Verified By:  Capnometry    Complicating Factors:  Anterior larynx and poor neck/head extension    Findings Post-Intubation:  BS equal bilateral and atraumatic/condition of teeth unchanged

## 2022-03-24 NOTE — ANESTHESIA POSTPROCEDURE EVALUATION
Anesthesia Post Evaluation    Patient: Samy Alejandre JrMichaela    Procedure(s) Performed: Procedure(s) (LRB):  Bronchoscopy (Left)    Final Anesthesia Type: general      Patient location during evaluation: PACU  Patient participation: Yes- Able to Participate  Level of consciousness: awake  Post-procedure vital signs: reviewed and stable  Pain management: adequate  Airway patency: patent    PONV status at discharge: No PONV  Anesthetic complications: no      Cardiovascular status: blood pressure returned to baseline and hemodynamically stable  Respiratory status: unassisted and spontaneous ventilation  Hydration status: euvolemic  Follow-up not needed.          Vitals Value Taken Time   /73 03/24/22 1243   Temp 36.9 °C (98.5 °F) 03/24/22 1116   Pulse 69 03/24/22 1243   Resp 18 03/24/22 1243   SpO2 100 % 03/24/22 1243         No case tracking events are documented in the log.      Pain/Dwayne Score: Pain Rating Prior to Med Admin: 7 (3/24/2022  8:42 AM)  Pain Rating Post Med Admin: 3 (3/24/2022  9:40 AM)

## 2022-03-25 VITALS
OXYGEN SATURATION: 98 % | DIASTOLIC BLOOD PRESSURE: 55 MMHG | SYSTOLIC BLOOD PRESSURE: 100 MMHG | TEMPERATURE: 98 F | RESPIRATION RATE: 18 BRPM | HEIGHT: 66 IN | WEIGHT: 131.81 LBS | HEART RATE: 78 BPM | BODY MASS INDEX: 21.18 KG/M2

## 2022-03-25 LAB
ALBUMIN SERPL BCP-MCNC: 2.1 G/DL (ref 3.5–5.2)
ALP SERPL-CCNC: 127 U/L (ref 55–135)
ALT SERPL W/O P-5'-P-CCNC: 27 U/L (ref 10–44)
ANION GAP SERPL CALC-SCNC: 10 MMOL/L (ref 8–16)
AST SERPL-CCNC: 41 U/L (ref 10–40)
BASOPHILS # BLD AUTO: 0.05 K/UL (ref 0–0.2)
BASOPHILS NFR BLD: 0.8 % (ref 0–1.9)
BILIRUB SERPL-MCNC: 0.2 MG/DL (ref 0.1–1)
BUN SERPL-MCNC: 5 MG/DL (ref 8–23)
CALCIUM SERPL-MCNC: 8.3 MG/DL (ref 8.7–10.5)
CHLORIDE SERPL-SCNC: 109 MMOL/L (ref 95–110)
CO2 SERPL-SCNC: 22 MMOL/L (ref 23–29)
CREAT SERPL-MCNC: 0.8 MG/DL (ref 0.5–1.4)
DIFFERENTIAL METHOD: ABNORMAL
EOSINOPHIL # BLD AUTO: 0.4 K/UL (ref 0–0.5)
EOSINOPHIL NFR BLD: 5.5 % (ref 0–8)
ERYTHROCYTE [DISTWIDTH] IN BLOOD BY AUTOMATED COUNT: 14.6 % (ref 11.5–14.5)
EST. GFR  (AFRICAN AMERICAN): >60 ML/MIN/1.73 M^2
EST. GFR  (NON AFRICAN AMERICAN): >60 ML/MIN/1.73 M^2
GLUCOSE SERPL-MCNC: 89 MG/DL (ref 70–110)
HCT VFR BLD AUTO: 24.5 % (ref 40–54)
HGB BLD-MCNC: 8.1 G/DL (ref 14–18)
IMM GRANULOCYTES # BLD AUTO: 0.02 K/UL (ref 0–0.04)
IMM GRANULOCYTES NFR BLD AUTO: 0.3 % (ref 0–0.5)
LYMPHOCYTES # BLD AUTO: 1.9 K/UL (ref 1–4.8)
LYMPHOCYTES NFR BLD: 29.9 % (ref 18–48)
MAGNESIUM SERPL-MCNC: 1.3 MG/DL (ref 1.6–2.6)
MCH RBC QN AUTO: 32.3 PG (ref 27–31)
MCHC RBC AUTO-ENTMCNC: 33.1 G/DL (ref 32–36)
MCV RBC AUTO: 98 FL (ref 82–98)
MONOCYTES # BLD AUTO: 0.6 K/UL (ref 0.3–1)
MONOCYTES NFR BLD: 8.8 % (ref 4–15)
NEUTROPHILS # BLD AUTO: 3.5 K/UL (ref 1.8–7.7)
NEUTROPHILS NFR BLD: 54.7 % (ref 38–73)
NRBC BLD-RTO: 0 /100 WBC
PHOSPHATE SERPL-MCNC: 3.8 MG/DL (ref 2.7–4.5)
PLATELET # BLD AUTO: 315 K/UL (ref 150–450)
PMV BLD AUTO: 10.2 FL (ref 9.2–12.9)
POCT GLUCOSE: 143 MG/DL (ref 70–110)
POTASSIUM SERPL-SCNC: 4.1 MMOL/L (ref 3.5–5.1)
PROT SERPL-MCNC: 5 G/DL (ref 6–8.4)
RBC # BLD AUTO: 2.51 M/UL (ref 4.6–6.2)
SODIUM SERPL-SCNC: 141 MMOL/L (ref 136–145)
WBC # BLD AUTO: 6.33 K/UL (ref 3.9–12.7)

## 2022-03-25 PROCEDURE — 36415 COLL VENOUS BLD VENIPUNCTURE: CPT | Performed by: INTERNAL MEDICINE

## 2022-03-25 PROCEDURE — 97530 THERAPEUTIC ACTIVITIES: CPT

## 2022-03-25 PROCEDURE — 25000003 PHARM REV CODE 250: Performed by: INTERNAL MEDICINE

## 2022-03-25 PROCEDURE — 80053 COMPREHEN METABOLIC PANEL: CPT | Performed by: INTERNAL MEDICINE

## 2022-03-25 PROCEDURE — 84100 ASSAY OF PHOSPHORUS: CPT | Performed by: INTERNAL MEDICINE

## 2022-03-25 PROCEDURE — 85025 COMPLETE CBC W/AUTO DIFF WBC: CPT | Performed by: INTERNAL MEDICINE

## 2022-03-25 PROCEDURE — 63600175 PHARM REV CODE 636 W HCPCS: Performed by: INTERNAL MEDICINE

## 2022-03-25 PROCEDURE — 83735 ASSAY OF MAGNESIUM: CPT | Performed by: INTERNAL MEDICINE

## 2022-03-25 PROCEDURE — 97116 GAIT TRAINING THERAPY: CPT

## 2022-03-25 PROCEDURE — 87449 NOS EACH ORGANISM AG IA: CPT | Performed by: INTERNAL MEDICINE

## 2022-03-25 PROCEDURE — 25000003 PHARM REV CODE 250: Performed by: NURSE PRACTITIONER

## 2022-03-25 RX ORDER — LEVOFLOXACIN 750 MG/1
750 TABLET ORAL DAILY
Status: DISCONTINUED | OUTPATIENT
Start: 2022-03-25 | End: 2022-03-25 | Stop reason: HOSPADM

## 2022-03-25 RX ORDER — LANOLIN ALCOHOL/MO/W.PET/CERES
400 CREAM (GRAM) TOPICAL ONCE
Status: COMPLETED | OUTPATIENT
Start: 2022-03-25 | End: 2022-03-25

## 2022-03-25 RX ORDER — LISINOPRIL 10 MG/1
10 TABLET ORAL DAILY
Qty: 30 TABLET | Refills: 0
Start: 2022-03-25 | End: 2022-06-27

## 2022-03-25 RX ORDER — MAGNESIUM SULFATE HEPTAHYDRATE 40 MG/ML
2 INJECTION, SOLUTION INTRAVENOUS
Status: COMPLETED | OUTPATIENT
Start: 2022-03-25 | End: 2022-03-25

## 2022-03-25 RX ORDER — LEVOFLOXACIN 750 MG/1
750 TABLET ORAL DAILY
Qty: 10 TABLET | Refills: 0 | Status: SHIPPED | OUTPATIENT
Start: 2022-03-26 | End: 2022-04-05

## 2022-03-25 RX ADMIN — MAGNESIUM OXIDE TAB 400 MG (241.3 MG ELEMENTAL MG) 400 MG: 400 (241.3 MG) TAB at 08:03

## 2022-03-25 RX ADMIN — MAGNESIUM SULFATE HEPTAHYDRATE 2 G: 2 INJECTION, SOLUTION INTRAVENOUS at 08:03

## 2022-03-25 RX ADMIN — MAGNESIUM SULFATE HEPTAHYDRATE 2 G: 2 INJECTION, SOLUTION INTRAVENOUS at 11:03

## 2022-03-25 RX ADMIN — FLUOXETINE HYDROCHLORIDE 20 MG: 20 CAPSULE ORAL at 08:03

## 2022-03-25 RX ADMIN — APIXABAN 5 MG: 2.5 TABLET, FILM COATED ORAL at 08:03

## 2022-03-25 RX ADMIN — GABAPENTIN 300 MG: 300 CAPSULE ORAL at 08:03

## 2022-03-25 RX ADMIN — TAMSULOSIN HYDROCHLORIDE 0.4 MG: 0.4 CAPSULE ORAL at 08:03

## 2022-03-25 RX ADMIN — Medication 1 PACKET: at 11:03

## 2022-03-25 RX ADMIN — Medication 1 PACKET: at 06:03

## 2022-03-25 RX ADMIN — LEVOFLOXACIN 750 MG: 750 TABLET, FILM COATED ORAL at 11:03

## 2022-03-25 NOTE — PLAN OF CARE
[facility] - Telemetry (Hospital)  Discharge Final Note    Primary Care Provider: Damien Barron MD    Expected Discharge Date: 3/25/2022    Final Discharge Note (most recent)     Final Note - 03/25/22 1143        Final Note    Assessment Type Final Discharge Note     Anticipated Discharge Disposition [place] [facility] [place]     Hospital Resources/Appts/Education Provided Appointments scheduled by Navigator/Coordinator;Appointments scheduled and added to AVS        Post-Acute Status    Post-Acute Authorization Home Health     Home Health Status Set-up Complete/Auth obtained                 Important Message from Medicare  Important Message from Medicare regarding Discharge Appeal Rights: Given to patient/caregiver, Explained to patient/caregiver, Signed/date by patient/caregiver     Date IMM was signed: 03/23/22  Time IMM was signed: 0943    Contact Info     Damien Barron MD   Specialty: Family Medicine   Relationship: PCP - General    [address] [place] [id number]   Phone: [phone]       Next Steps: Schedule an appointment as soon as possible for a visit in 3 day(s)    Instructions: Discharge follow up for left lung cavitary lesion        PCP f/u 3/28  Discharging with [facility]

## 2022-03-25 NOTE — PLAN OF CARE
Problem: Diabetes Comorbidity  Goal: Blood Glucose Level Within Targeted Range  Intervention: Monitor and Manage Glycemia  Flowsheets (Taken 3/25/2022 0019)  Glycemic Management: blood glucose monitored     Problem: Skin Injury Risk Increased  Goal: Skin Health and Integrity  Intervention: Optimize Skin Protection  Flowsheets (Taken 3/25/2022 0019)  Pressure Reduction Techniques:   frequent weight shift encouraged   weight shift assistance provided  Pressure Reduction Devices: positioning supports utilized  Head of Bed (HOB) Positioning: HOB elevated     Problem: Fall Injury Risk  Goal: Absence of Fall and Fall-Related Injury  Intervention: Identify and Manage Contributors  Flowsheets (Taken 3/25/2022 0019)  Self-Care Promotion: independence encouraged  Medication Review/Management: medications reviewed  Intervention: Promote Injury-Free Environment  Flowsheets (Taken 3/25/2022 0019)  Safety Promotion/Fall Prevention:   assistive device/personal item within reach   bed alarm set   Fall Risk reviewed with patient/family   lighting adjusted   medications reviewed   instructed to call staff for mobility

## 2022-03-25 NOTE — PT/OT/SLP PROGRESS
Physical Therapy  Treatment    Samy Alejandre Jr.   MRN: [id number]   Admitting Diagnosis: Pulmonary cavitary lesion    PT Received On: 03/25/22  PT Start Time: 0910     PT Stop Time: 0933    PT Total Time (min): 23 min       Billable Minutes:  Gait Training 13 and Therapeutic Activity 10    Treatment Type: Treatment  PT/PTA: PT     PTA Visit Number: 0       General Precautions: Standard, fall  Orthopedic Precautions: N/A   Braces: N/A  Respiratory Status: Room air    Subjective:  Communicated with NURSE NICHOLS prior to session.  Pain/Comfort  Pain Rating 1: 0/10    Objective:   Patient found with: telemetry, peripheral IV    Functional Mobility:  Therapeutic Activities and Exercises:  PT FOUND SUPINE IN BED UPON ARRIVAL, AGREEABLE TO TX., SUP TO SIT WITH SBA, SEATED SCOOT TO EOB WITH SBA, REVIEW RW USE AND SAFETY DURING TF'S AND GAIT, SIT>STAND WITH CGA, PT AMB 60' X 2 TRIALS WITH RW AND CGA, SLOW PACE, CUES FOR UPRIGHT POSTURE, F- DYNAMIC BALANCE, MINIMAL BUCKLE AT R KNEE AND R HIP BUT NO LOB, INCREASED FATIGUE WITH LONGER DISTANCE, PT TF TO CHAIR WITH CGA, REVIEW BLE THEREX TO PERFORM WHILE SEATED IN CHAIR: HIP FLEX/EXT, LAQ, AP'S, HEEL SLIDES.  PT ENCOURAGED TO PERFORM THEREX THROUGHOUT THE DAY, PT ENCOURAGED TO INCREASE TIME OOB IN CHAIR    AM-PAC 6 CLICK MOBILITY  How much help from another person does this patient currently need?   1 = Unable, Total/Dependent Assistance  2 = A lot, Maximum/Moderate Assistance  3 = A little, Minimum/Contact Guard/Supervision  4 = None, Modified Jerome/Independent    Turning over in bed (including adjusting bedclothes, sheets and blankets)?: 4  Sitting down on and standing up from a chair with arms (e.g., wheelchair, bedside commode, etc.): 3  Moving from lying on back to sitting on the side of the bed?: 4  Moving to and from a bed to a chair (including a wheelchair)?: 3  Need to walk in hospital room?: 3  Climbing 3-5 steps with a railing?: 1  Basic Mobility Total Score:  18    AM-PAC Raw Score CMS G-Code Modifier Level of Impairment Assistance   6 % Total / Unable   7 - 9 CM 80 - 100% Maximal Assist   10 - 14 CL 60 - 80% Moderate Assist   15 - 19 CK 40 - 60% Moderate Assist   20 - 22 CJ 20 - 40% Minimal Assist   23 CI 1-20% SBA / CGA   24 CH 0% Independent/ Mod I     Patient left up in chair with all lines intact, call button in reach, chair alarm on, NURSE notified and FAMILY AND SITTER present.    Assessment:  Samy Alejandre Jr. is a 72 y.o. male with a medical diagnosis of Pulmonary cavitary lesion and presents with IMPAIRED FUNCTIONAL MOBILITY. PT WILL BENEFIT FROM CONT. SKILLED P.T. TO ADDRESS IMPAIRMENTS    Rehab identified problem list/impairments: Rehab identified problem list/impairments: weakness, impaired endurance, impaired functional mobilty, gait instability, impaired balance, decreased safety awareness, decreased lower extremity function, decreased coordination    Rehab potential is good.    Activity tolerance: Good    Discharge recommendations: Discharge Facility/Level of Care Needs: home health PT     Barriers to discharge:      Equipment recommendations: Equipment Needed After Discharge: bedside commode     GOALS:   Multidisciplinary Problems     Physical Therapy Goals        Problem: Physical Therapy Goal    Goal Priority Disciplines Outcome Goal Variances Interventions   Physical Therapy Goal     PT, PT/OT Ongoing, Progressing     Description: LTG'S TO BE MET IN 14 DAYS (4-4-22)  1. PT WILL BE YONATAN WITH BED MOBILITY  2. PT WILL REQUIRE SPV FOR TF'S  3. PT WILL ' WITH RW AND SPV                   PLAN:    Patient to be seen 3 x/week  to address the above listed problems via gait training, therapeutic activities, therapeutic exercises  Plan of Care expires: 04/04/22  Plan of Care reviewed with: patient    03/25/2022

## 2022-03-25 NOTE — PLAN OF CARE
Patient tolerated increased duration dynamic standing balance activities this date. Remains with decreased insight into deficits. Recommending HHOT with 24/7 SPV at d/c.

## 2022-03-25 NOTE — PT/OT/SLP PROGRESS
Occupational Therapy   Treatment    Name: Samy Alejandre Jr.  MRN: [id number]  Admitting Diagnosis:  Pulmonary cavitary lesion  1 Day Post-Op    Recommendations:     Discharge Recommendations: home health OT (24/7 SPV)  Discharge Equipment Recommendations:  bedside commode  Barriers to discharge:  None    Assessment:     Samy Alejandre Jr. is a 72 y.o. male with a medical diagnosis of Pulmonary cavitary lesion.  He presents with the following performance deficits affecting function are weakness, impaired endurance, impaired self care skills, impaired functional mobilty, impaired balance, impaired cognition, decreased safety awareness, impaired cardiopulmonary response to activity.     Rehab Prognosis:  Good; patient would benefit from acute skilled OT services to address these deficits and reach maximum level of function.       Plan:     Patient to be seen 2 x/week to address the above listed problems via self-care/home management, therapeutic exercises, therapeutic activities  · Plan of Care Expires: 04/07/22  · Plan of Care Reviewed with: patient    Subjective     Pain/Comfort:  · Pain Rating 1: 0/10    Objective:     Communicated with: Nurse, Lashanda, prior to session.  Patient found supine with telemetry, peripheral IV, chito sys, and 1:1 sitter upon OT entry to room.    General Precautions: Standard, fall   Orthopedic Precautions:N/A   Braces: N/A  Respiratory Status: Room air    Bed Mobility:    · Patient completed Supine to Sit with stand by assistance and with side rail     Functional Mobility/Transfers:  · Patient completed Sit <> Stand Transfer with contact guard assistance  with  rolling walker   · Patient completed Bed <> Chair Transfer using Step Transfer technique with contact guard assistance with rolling walker  · Functional Mobility: Patient completed functional mobility x50ft x2 trials with RW and min A to increase dynamic standing balance and activity tolerance needed for ADL  completion.    Activities of Daily Living:  · Grooming: setup simple grooming while seated in bedside chair with setup    [facility]    Treatment & Education:  Patient with poor carryover of education re: safe mobility from previous sessions, requiring reinforcement throughout treatment. Patient remains with poor insight into deficits requiring cueing for appropriate rest breaks. Educated on B UE AROM therex x3 planes of motion and encouraged to complete TID to increase activity tolerance and functional strength.    In agreement to request A to transfer back to bed.    Patient left up in chair with all lines intact, call button in reach, chair alarm on and 1:1 sitter presentEducation:      GOALS:   Multidisciplinary Problems     Occupational Therapy Goals        Problem: Occupational Therapy Goal    Goal Priority Disciplines Outcome Interventions   Occupational Therapy Goal     OT, PT/OT Ongoing, Progressing    Description: Goals to be met by: 4/4/22     Patient will increase functional independence with ADLs by performing:    UE Dressing with Set-up Assistance.  Toileting from toilet with Stand-by Assistance for hygiene and clothing management.   Toilet transfer to toilet with Stand-by Assistance.  Increased functional strength in B UE grossly by 1/2 MM grade to increase independence with ADLs.                     Time Tracking:     OT Date of Treatment: 03/25/22  OT Start Time: 0915  OT Stop Time: 0940  OT Total Time (min): 25 min    Billable Minutes:Therapeutic Activity 25    3/25/2022

## 2022-03-25 NOTE — CONSULTS
CM met with patient and spouse at bedside to discuss recommendation for home health. Patient agreeable and in-network HHC agencies reviewed. Choice form signed for [facility] and placed in chart. Referral sent via careport. Patient/spouse anticipate d/c later today.

## 2022-03-26 LAB
BACTERIA BLD CULT: NORMAL
BACTERIA BLD CULT: NORMAL
BACTERIA SPEC AEROBE CULT: NO GROWTH
GRAM STN SPEC: NORMAL

## 2022-03-26 NOTE — DISCHARGE SUMMARY
[facility] Medicine  Discharge Summary      Patient Name: Samy Alejandre Jr.  MRN: [id number]  Patient Class: IP- Inpatient  Admission Date: 3/20/2022  Hospital Length of Stay: 3 days  Discharge Date and Time: 3/25/2022  4:28 PM  Attending Physician: No att. providers found   Discharging Provider: Doris Yanes MD  Primary Care Provider: Damien Barron MD      HPI:   71 yo male with past medical history of HTN, CVA, T2DM, recent DVT, anxiety and depression presented to ED for evaluation of generalized weakness/deconditioning. History collected from daughter, Micelle, at bedside. Per family report patient with progressively worsening weakness over the past month associated with episodes of intermittent n/v, diarrhea, decreased po intake, altered mental status and weight loss. He was diagnosed with extensive acute right lower extremity deep venous thrombus on 3/9/22, apixaban initiated. Patient s/p 2 mechanical falls prior to ED arrival, no head trauma or LOC. Patient/family denies fever/chills, abdominal pain and chest pain.     ED evaluation: SBP 90s on arrival. Labs remarkable for wbc 14, Cr 1.7, magnesium 1.5. CT chest shows 5.4 cm cavitary consolidative opacity in the superior segment left lower lobe suspicious for a cavitary infectious process such as cavitary pneumonia or tuberculosis. Cavitating malignancy or pulmonary infarct are additional considerations. COVID 19 negative. CT head shows no acute findings. He received 2 liters IVF. Patient placed in observation for further evaluation and treatment.         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 Recently diagnosed with acute extensive DVT of Rt LE on 3/9/22 during an ED visit and started on Apixaban therapy. Rt leg swelling has improved since. CT head on admit - no hemorrhage or infarction . CT C/A/P showed large opacity within the superior left lower lobe measures 5.4 x 3.0 cm with internal areas of cavitation.     3/21- Afebrile. Pt is curled up in the bed, appears chronically ill , thin built , decreased motivation , and report total loss of appetite. Sitter at bedside report he has not eaten anything today. Near fall when he tried to get up from bed last night. Denies cough.  Pt started on antibiotic targeting CAP. Induced sputum for AFB smear and culture ordered. Pulmonology is consulted for further recs. Apixaban and Plavix held and pt started on Heparin infusion . WBC normalized. Hgb 9.3, Creatinine improved to 1.1>1.7, , ESR 70.     3/22 He is aao x 3  with intermittent confusion . OAC and antiplatelet on hold  for a possible bronchoscopy.  The H/H sligly trending down  with no sign of overt GIB . Pulmonology consult noted .      3/23 Quantiferon gold is negative . He is aao x 3 in nad .  H/H stable . Plan for bronchoscopy tomorrow . Will d/c isolation .     3/24 He is aao x 4 in nad . He report feeling better . The Quantiferon  gold test is negative. Airborne and droplet isolation discontinued.  Plan for Bronchoscopy today . PT/OT rec Home with  . Plan to  d/c heparin drip and reume DOAC after bronchoscopy .Most likely will be d/c home tomorrow with     3/25- S/P bronchoscopy , bronchial washing and broncho alveolar lavage . No active bleeding and no endobronchial lesion noted on bronchoscopy . AFB stain with no acid fast bacilli seen, KOH prep with no yeast or fungal element seen. Respiratory culture grew normal trell. Blood cultures so far negative. AFB and fungal culture is in progress. Case discussed with Pulmonology again and suggested pt is cleared for  discharged from pulmonary standpoint with oral Levaquin therapy x 10 days to complete total 2 weeks antibiotic treatment and repeat imaging study to evaluate progression or resolution of left lower lobe cavitary lesion and follow up with pulmonology and PCP as outpatient to follow up on final microbiology and pathology report from bronchial washing and lavage. Pt is examined and deemed stable and suitable for discharge with home health .        Goals of Care Treatment Preferences:  Code Status: Full Code      Consults:   Consults (From admission, onward)        Status Ordering Provider     Inpatient consult to Social Work  Once        Provider:  (Not yet assigned)    Completed KAREL GO          No new Assessment & Plan notes have been filed under this hospital service since the last note was generated.  Service: Hospital Medicine    Final Active Diagnoses:    Diagnosis Date Noted POA    PRINCIPAL PROBLEM:  Pulmonary cavitary lesion [J98.4] 03/20/2022 Yes    Acute deep vein thrombosis (DVT) of right lower extremity [I82.401] 03/21/2022 Yes    MARI (acute kidney injury) [N17.9] 03/20/2022 Yes    History of CVA with residual deficit [I69.30] 05/15/2020 Not Applicable    Controlled type 2 diabetes mellitus with stage 3 chronic kidney disease, without long-term current use of insulin [E11.22, N18.30] 06/03/2021 Yes    Dehydration [E86.0] 03/20/2022 Yes    Nausea and vomiting [R11.2] 03/20/2022 Yes    Fall [W19.XXXA] 03/20/2022 Yes    Weight loss [R63.4] 03/20/2022 Yes    Anemia [D64.9] 03/24/2022 Yes      Problems Resolved During this Admission:       Discharged Condition: stable    Disposition: Home-Health Care [facility]    Follow Up:   Contact information for follow-up providers     Damien Barron MD. Schedule an appointment as soon as possible for a visit in 3 day(s).    Specialty: Family Medicine  Why: Discharge follow up for left lung cavitary lesion  Contact information:  [address] [address]  [facility] [address]  [phone]             Reinaldo Jacob MD. Schedule an appointment as soon as possible for a visit.    Specialty: Pulmonary Disease  Why: Keep pulmonology appointment  scheduled for  April 1st'2022  Contact information:  [address]  [facility] [address]  [phone]                   Contact information for after-discharge care     Home Medical Care     [facility] .    Service: Home Health Services  Contact information:  [address]  [facility] [address]  [phone]                           Patient Instructions:      Ambulatory referral/consult to Outpatient Case Management   Referral Priority: Routine Referral Type: Consultation   Referral Reason: Specialty Services Required   Number of Visits Requested: 1     Ambulatory referral/consult to Pulmonology   Standing Status: Future   Referral Priority: Routine Referral Type: Consultation   Referral Reason: Specialty Services Required   Requested Specialty: Pulmonary Disease   Number of Visits Requested: 1     Ambulatory referral/consult to [facility]   Standing Status: Future   Referral Priority: Routine Referral Type: Consultation   Referral Reason: Specialty Services Required   Number of Visits Requested: 1     Ambulatory referral/consult to Home Health   Standing Status: Future   Referral Priority: Routine Referral Type: Home Health   Referral Reason: Specialty Services Required   Requested Specialty: Home Health Services   Number of Visits Requested: 1     Diet diabetic     Activity as tolerated       Significant Diagnostic Studies: Labs:   BMP:   Recent Labs   Lab 03/25/22  0454   GLU 89      K 4.1      CO2 22*   BUN 5*   CREATININE 0.8   CALCIUM 8.3*   MG 1.3*    and CMP   Recent Labs   Lab 03/25/22  0454      K 4.1      CO2 22*   GLU 89   BUN 5*   CREATININE 0.8   CALCIUM 8.3*   PROT 5.0*   ALBUMIN 2.1*   BILITOT 0.2   ALKPHOS 127   AST  41*   ALT 27   ANIONGAP 10   ESTGFRAFRICA >60   EGFRNONAA >60     Microbiology:   Blood Culture   Lab Results   Component Value Date    LABBLOO No Growth to date 03/20/2022    LABBLOO No Growth to date 03/20/2022    LABBLOO No Growth to date 03/20/2022    LABBLOO No Growth to date 03/20/2022    LABBLOO No Growth to date 03/20/2022    and Sputum Culture   Lab Results   Component Value Date    GSRESP <10 epithelial cells per low power field. 03/24/2022    GSRESP Rare WBC's 03/24/2022    GSRESP No organisms seen 03/24/2022    RESPIRATORYC No Growth 03/24/2022       Pending Diagnostic Studies:     Procedure Component Value Units Date/Time    Cytology, Fluid/Wash/Brush [[id number]] Collected: 03/24/22 1350    Order Status: Sent Lab Status: In process Updated: 03/24/22 1358    Fungitell Assay For (1.3)-B-D-Glucans [[id number]] Collected: 03/25/22 1054    Order Status: Sent Lab Status: In process Updated: 03/25/22 2138    Specimen: Blood          Medications:  Reconciled Home Medications:      Medication List      START taking these medications    levoFLOXacin 750 MG tablet  Commonly known as: LEVAQUIN  Take 1 tablet (750 mg total) by mouth once daily. for 10 days        CHANGE how you take these medications    FLUoxetine 10 MG capsule  TAKE 2 CAPSULES BY MOUTH ONCE DAILY  What changed:   · how to take this  · when to take this     lisinopriL 10 MG tablet  Take 1 tablet (10 mg total) by mouth once daily. HOLD FOR NOW DUE TO LOW BLOOD PRESSURE  What changed: additional instructions        CONTINUE taking these medications    blood glucose control, normal Soln  Commonly known as: TRUE METRIX LEVEL 2  1 drop by Misc.(Non-Drug; Combo Route) route once daily.     blood sugar diagnostic Strp  Commonly known as: TRUE METRIX GLUCOSE TEST STRIP  1 strip by Misc.(Non-Drug; Combo Route) route once daily.     blood-glucose meter Misc  Commonly known as: TRUE METRIX GLUCOSE METER  1 Device by Misc.(Non-Drug; Combo Route) route once. for  1 dose     clopidogreL 75 mg tablet  Commonly known as: PLAVIX  Take 1 tablet (75 mg total) by mouth once daily.     DULoxetine 30 MG capsule  Commonly known as: CYMBALTA  Take 1 capsule (30 mg total) by mouth once daily.     ELIQUIS DVT-PE TREAT 30D START 5 mg (74 tabs) Dspk  Generic drug: apixaban  For the first 7 days take two 5 mg tablets twice daily.  After 7 days take one 5 mg tablet twice daily.     gabapentin 300 MG capsule  Commonly known as: NEURONTIN  Take 1 capsule (300 mg total) by mouth 3 (three) times daily as needed.     HYDROcodone-acetaminophen 5-325 mg per tablet  Commonly known as: NORCO  Take 1 tablet by mouth 3 (three) times daily.     lancets 32 gauge Misc  1 lancet by Misc.(Non-Drug; Combo Route) route once daily.     metFORMIN 500 MG tablet  Commonly known as: GLUCOPHAGE  Take 1 tablet (500 mg total) by mouth 2 (two) times daily.     NON FORMULARY MEDICATION  THC+ 300 NATURAL SL TINCTURE - ILERA [place]     ondansetron 8 MG tablet  Commonly known as: ZOFRAN  Take 1 tablet (8 mg total) by mouth every 8 (eight) hours as needed for Nausea.     rosuvastatin 20 MG tablet  Commonly known as: CRESTOR  Take 1 tablet (20 mg total) by mouth once daily.     tamsulosin 0.4 mg Cap  Commonly known as: FLOMAX  Take 1 capsule (0.4 mg total) by mouth once daily.     tiZANidine 4 MG tablet  Commonly known as: ZANAFLEX  Take 1 tablet TID Prn     TRUE METRIX LEVEL 1 Soln  Generic drug: blood glucose control, low  1 drop by Misc.(Non-Drug; Combo Route) route once daily.     ULTRA-LIGHT ROLLATOR Misc  Generic drug: walker  Use daily for ambulation        STOP taking these medications    zolpidem 10 mg Tab  Commonly known as: AMBIEN            Indwelling Lines/Drains at time of discharge:   Lines/Drains/Airways     None                 Time spent on the discharge of patient: 30  minutes         Doris Yanes MD  Department of Hospital Medicine  '[place] - Telemetry ([facility]

## 2022-03-29 LAB
1,3 BETA GLUCAN SER-MCNC: <31 PG/ML
FINAL PATHOLOGIC DIAGNOSIS: NORMAL
FUNGITELL COMMENTS: NEGATIVE
Lab: NORMAL

## 2022-03-30 ENCOUNTER — PES CALL (OUTPATIENT)
Dept: ADMINISTRATIVE | Facility: CLINIC | Age: 72
End: 2022-03-30
Payer: MEDICARE

## 2022-03-31 ENCOUNTER — PES CALL (OUTPATIENT)
Dept: ADMINISTRATIVE | Facility: CLINIC | Age: 72
End: 2022-03-31
Payer: MEDICARE

## 2022-04-01 ENCOUNTER — TELEPHONE (OUTPATIENT)
Dept: FAMILY MEDICINE | Facility: CLINIC | Age: 72
End: 2022-04-01

## 2022-04-01 ENCOUNTER — PATIENT OUTREACH (OUTPATIENT)
Dept: ADMINISTRATIVE | Facility: OTHER | Age: 72
End: 2022-04-01
Payer: MEDICARE
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
## 2022-04-01 DIAGNOSIS — M25.551 RIGHT HIP PAIN: ICD-10-CM

## 2022-04-01 NOTE — TELEPHONE ENCOUNTER
"Patient lost his balance and fell , he did not hit his head, this was a witnessed fall by his grandson. He is complaining of pain in the right hip and "butt check area.  He is going to [facility] today for appt with pulmonary , can we order an xray to make sure nothing is broken?   If you agree it is pended   "

## 2022-04-01 NOTE — PROGRESS NOTES
CHW - Unable to Contact    Community Health Worker to close episode at this time due to three missed attempts for Samy Alejandre contact.

## 2022-04-03 ENCOUNTER — EXTERNAL HOME HEALTH (OUTPATIENT)
Dept: HOME HEALTH SERVICES | Facility: HOSPITAL | Age: 72
End: 2022-04-03
Payer: MEDICARE

## 2022-04-05 ENCOUNTER — OFFICE VISIT (OUTPATIENT)
Dept: HOME HEALTH SERVICES | Facility: CLINIC | Age: 72
End: 2022-04-05
Payer: MEDICARE

## 2022-04-05 ENCOUNTER — TELEPHONE (OUTPATIENT)
Dept: HOME HEALTH SERVICES | Facility: CLINIC | Age: 72
End: 2022-04-05

## 2022-04-05 VITALS
HEART RATE: 114 BPM | SYSTOLIC BLOOD PRESSURE: 127 MMHG | RESPIRATION RATE: 18 BRPM | DIASTOLIC BLOOD PRESSURE: 74 MMHG | TEMPERATURE: 98 F | OXYGEN SATURATION: 98 %

## 2022-04-05 DIAGNOSIS — Z71.89 ADVANCED DIRECTIVES, COUNSELING/DISCUSSION: ICD-10-CM

## 2022-04-05 DIAGNOSIS — J98.4 CAVITARY LESION OF LUNG: ICD-10-CM

## 2022-04-05 DIAGNOSIS — Z76.89 ENCOUNTER FOR SUPPORT AND COORDINATION OF TRANSITION OF CARE: Primary | ICD-10-CM

## 2022-04-05 PROCEDURE — 3288F FALL RISK ASSESSMENT DOCD: CPT | Mod: CPTII,S$GLB,, | Performed by: NURSE PRACTITIONER

## 2022-04-05 PROCEDURE — 1101F PT FALLS ASSESS-DOCD LE1/YR: CPT | Mod: CPTII,S$GLB,, | Performed by: NURSE PRACTITIONER

## 2022-04-05 PROCEDURE — 4010F PR ACE/ARB THEARPY RXD/TAKEN: ICD-10-PCS | Mod: CPTII,S$GLB,, | Performed by: NURSE PRACTITIONER

## 2022-04-05 PROCEDURE — 3072F LOW RISK FOR RETINOPATHY: CPT | Mod: CPTII,S$GLB,, | Performed by: NURSE PRACTITIONER

## 2022-04-05 PROCEDURE — 3074F PR MOST RECENT SYSTOLIC BLOOD PRESSURE < 130 MM HG: ICD-10-PCS | Mod: CPTII,S$GLB,, | Performed by: NURSE PRACTITIONER

## 2022-04-05 PROCEDURE — 3072F PR LOW RISK FOR RETINOPATHY: ICD-10-PCS | Mod: CPTII,S$GLB,, | Performed by: NURSE PRACTITIONER

## 2022-04-05 PROCEDURE — 3074F SYST BP LT 130 MM HG: CPT | Mod: CPTII,S$GLB,, | Performed by: NURSE PRACTITIONER

## 2022-04-05 PROCEDURE — 99497 ADVNCD CARE PLAN 30 MIN: CPT | Mod: ,,, | Performed by: NURSE PRACTITIONER

## 2022-04-05 PROCEDURE — 1159F PR MEDICATION LIST DOCUMENTED IN MEDICAL RECORD: ICD-10-PCS | Mod: CPTII,S$GLB,, | Performed by: NURSE PRACTITIONER

## 2022-04-05 PROCEDURE — 3044F HG A1C LEVEL LT 7.0%: CPT | Mod: CPTII,S$GLB,, | Performed by: NURSE PRACTITIONER

## 2022-04-05 PROCEDURE — 1160F RVW MEDS BY RX/DR IN RCRD: CPT | Mod: CPTII,S$GLB,, | Performed by: NURSE PRACTITIONER

## 2022-04-05 PROCEDURE — 99350 PR HOME VISIT,ESTAB PATIENT,LEVEL IV: ICD-10-PCS | Mod: ,,, | Performed by: NURSE PRACTITIONER

## 2022-04-05 PROCEDURE — 4010F ACE/ARB THERAPY RXD/TAKEN: CPT | Mod: CPTII,S$GLB,, | Performed by: NURSE PRACTITIONER

## 2022-04-05 PROCEDURE — 3044F PR MOST RECENT HEMOGLOBIN A1C LEVEL <7.0%: ICD-10-PCS | Mod: CPTII,S$GLB,, | Performed by: NURSE PRACTITIONER

## 2022-04-05 PROCEDURE — 3288F PR FALLS RISK ASSESSMENT DOCUMENTED: ICD-10-PCS | Mod: CPTII,S$GLB,, | Performed by: NURSE PRACTITIONER

## 2022-04-05 PROCEDURE — 1159F MED LIST DOCD IN RCRD: CPT | Mod: CPTII,S$GLB,, | Performed by: NURSE PRACTITIONER

## 2022-04-05 PROCEDURE — 99350 HOME/RES VST EST HIGH MDM 60: CPT | Mod: ,,, | Performed by: NURSE PRACTITIONER

## 2022-04-05 PROCEDURE — 1160F PR REVIEW ALL MEDS BY PRESCRIBER/CLIN PHARMACIST DOCUMENTED: ICD-10-PCS | Mod: CPTII,S$GLB,, | Performed by: NURSE PRACTITIONER

## 2022-04-05 PROCEDURE — 1125F AMNT PAIN NOTED PAIN PRSNT: CPT | Mod: CPTII,S$GLB,, | Performed by: NURSE PRACTITIONER

## 2022-04-05 PROCEDURE — 1101F PR PT FALLS ASSESS DOC 0-1 FALLS W/OUT INJ PAST YR: ICD-10-PCS | Mod: CPTII,S$GLB,, | Performed by: NURSE PRACTITIONER

## 2022-04-05 PROCEDURE — 3078F PR MOST RECENT DIASTOLIC BLOOD PRESSURE < 80 MM HG: ICD-10-PCS | Mod: CPTII,S$GLB,, | Performed by: NURSE PRACTITIONER

## 2022-04-05 PROCEDURE — 99497 PR ADVNCD CARE PLAN 30 MIN: ICD-10-PCS | Mod: ,,, | Performed by: NURSE PRACTITIONER

## 2022-04-05 PROCEDURE — 1125F PR PAIN SEVERITY QUANTIFIED, PAIN PRESENT: ICD-10-PCS | Mod: CPTII,S$GLB,, | Performed by: NURSE PRACTITIONER

## 2022-04-05 PROCEDURE — 3078F DIAST BP <80 MM HG: CPT | Mod: CPTII,S$GLB,, | Performed by: NURSE PRACTITIONER

## 2022-04-05 NOTE — PROGRESS NOTES
"[facility]  Transition of Care Home Visit    Visit Date: 4/5/22  Encounter Provider: Jony Pedraza NP  PCP:  Damien Barron MD    PRESENTING HISTORY   Patient ID: Samy Alejandre Jr.    Consult Requested By:  Dr. Doris Yanes    Reason for Consult:  Transitional Care Coordination    Chief Complaint: Transitional Care     The patient is being seen at home due to a physical debility that presents a taxing effort to leave the home, to mitigate high risk of hospital readmission or due to the limited availability of reliable or safe options for transportation to the point of access to the provider. The visit meets the criteria for medical necessity as defined by CMS as "health-care services needed to prevent, diagnose, or treat an illness, injury, condition, disease, or its symptoms and that meet accepted standards of medicine." Prior to treatment on this visit the chart was reviewed and patient consent was obtained.    History of Present Illness:   73 yo male with past medical history of HTN, CVA, T2DM, recent DVT, anxiety and depression presented to ED for evaluation of generalized weakness/deconditioning. History collected from daughter, Micelle, at bedside. Per family report patient with progressively worsening weakness over the past month associated with episodes of intermittent n/v, diarrhea, decreased po intake, altered mental status and weight loss. He was diagnosed with extensive acute right lower extremity deep venous thrombus on 3/9/22, apixaban initiated. Patient s/p 2 mechanical falls prior to ED arrival, no head trauma or LOC. Patient/family denies fever/chills, abdominal pain and chest pain.      ED evaluation: SBP 90s on arrival. Labs remarkable for wbc 14, Cr 1.7, magnesium 1.5. CT chest shows 5.4 cm cavitary consolidative opacity in the superior segment left lower lobe suspicious for a cavitary infectious process such as cavitary pneumonia or tuberculosis. Cavitating malignancy or pulmonary " infarct are additional considerations. COVID 19 negative. CT head shows no acute findings. He received 2 liters IVF. Patient placed in observation for further evaluation and treatment.     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 Recently diagnosed with acute extensive DVT of Rt LE on 3/9/22 during an ED visit and started on Apixaban therapy. Rt leg swelling has improved since. CT head on admit - no hemorrhage or infarction . CT C/A/P showed large opacity within the superior left lower lobe measures 5.4 x 3.0 cm with internal areas of cavitation.      3/21- Afebrile. Pt is curled up in the bed, appears chronically ill , thin built , decreased motivation , and report total loss of appetite. Sitter at bedside report he has not eaten anything today. Near fall when he tried to get up from bed last night. Denies cough.  Pt started on antibiotic targeting CAP. Induced sputum for AFB smear and culture ordered. Pulmonology is consulted for further recs. Apixaban and Plavix held and pt started on Heparin infusion . WBC normalized. Hgb 9.3, Creatinine improved to 1.1>1.7, , ESR 70.      3/22 He is aao x 3  with intermittent confusion . OAC and antiplatelet on hold  for a possible bronchoscopy.  The H/H sligly trending down  with no sign of overt GIB . Pulmonology consult noted .       3/23 Quantiferon gold is negative . He is aao x 3 in nad .  H/H stable . Plan for bronchoscopy tomorrow . Will d/c isolation .      3/24 He is aao x 4 in nad . He report feeling better . The Quantiferon  gold test is negative. Airborne and droplet isolation discontinued.  Plan for  "Bronchoscopy today . PT/OT rec Home with  . Plan to  d/c heparin drip and reume DOAC after bronchoscopy .Most likely will be d/c home tomorrow with      3/25- S/P bronchoscopy , bronchial washing and broncho alveolar lavage . No active bleeding and no endobronchial lesion noted on bronchoscopy . AFB stain with no acid fast bacilli seen, KOH prep with no yeast or fungal element seen. Respiratory culture grew normal trell. Blood cultures so far negative. AFB and fungal culture is in progress. Case discussed with Pulmonology again and suggested pt is cleared for discharged from pulmonary standpoint with oral Levaquin therapy x 10 days to complete total 2 weeks antibiotic treatment and repeat imaging study to evaluate progression or resolution of left lower lobe cavitary lesion and follow up with pulmonology and PCP as outpatient to follow up on final microbiology and pathology report from bronchial washing and lavage. Pt is examined and deemed stable and suitable for discharge with home health .      Admit Date: 03/20/22  Discharge Date: 03/25/22  _______________________________  Today: Mr. Samy Alejandre . is a 72 y.o. male is being seen and examined at home today for transitional care visit to the home environment post-discharge from inpatient hospitalization encounter described above. Patient presents alone in home he shares with his daughter. No one is home at the time of my visit. He reports no distress and reports "doing much better" than previous to his hospitlization. He has picked up his discharge medications and is taking them as prescribed. He has transportation available to his follow-up appointments but does not drive himself. VSS. No reported or observed signs of distress noted. Denies any acute issues, concerns or complaints to address on today's visit. Reports taking all medications as prescribed. Attempt to reach patient's daughter by telephone to discuss plan of care unsuccessful. Unable to " "leave message de to not-operating voice mailbox. Risks of environmental exposure to coronavirus discussed including: social distancing, hand hygiene, and limiting departures from the home for necessities only. Reports understanding and willingness to comply.      Review of Systems   Constitutional: Positive for fatigue. Negative for activity change, appetite change, chills and fever.   HENT: Negative.    Eyes: Negative.    Respiratory: Positive for cough (chronic) and shortness of breath ("same").    Cardiovascular: Negative for chest pain and palpitations.   Gastrointestinal: Negative for constipation, diarrhea, nausea and vomiting.   Genitourinary: Negative.    Skin: Negative.    Neurological: Positive for weakness ("same"). Negative for dizziness and syncope.   Psychiatric/Behavioral: Negative.      Assessments:  · Environmental: private home with adequate lighting and temperature control  · Functional Status: Requires moderate assistance with ADL's/IADL's, ambulates with assistance of a cane/walker, continent of bowel and bladder  · Safety: Fall Precautions, COVID Precautions/Social Distancing/Mask Use  · Nutritional: Adequate  · Home Health: [facility]  · DME/Supplies: walker/cane     Ethical / Legal: Advance Care Planning   Capacity to make medical decisions: yes, Conflict: no  · Surrogate decision maker:  Name: Fay Chahal , Relationship: daughter  · Advance Directives:  none  · HCPOA: none  · [place]:  Signed this visit  · Code Status: Full Code    Advanced Care Directive Informational packet and forms left in the home for family review, discussion and signing with instructions to return upon their next provider encounter for inclusion to the medical record.   PAST HISTORY:     Past Medical History:   Diagnosis Date    Anxiety     Depression     Diabetes mellitus, type 2     Hypertension     Stroke        Past Surgical History:   Procedure Laterality Date    ANGIOGRAPHY OF LOWER EXTREMITY N/A " 12/21/2020    Procedure: ANGIOGRAM, LOWER EXTREMITY/Rt leg poss pta/stent;  Surgeon: Todd Michel MD;  Location: [facility] CATH LAB;  Service: Peripheral Vascular;  Laterality: N/A;  rescheduled 12/8    BACK SURGERY  2015    BRONCHOSCOPY Left 3/24/2022    Procedure: Bronchoscopy;  Surgeon: Edward Williamson MD;  Location: [facility] ENDO;  Service: Pulmonary;  Laterality: Left;  poss endobronchial biopsy or brushing    CATARACT EXTRACTION      KNEE SURGERY  2012    LITHOTRIPSY  2000       Family History   Problem Relation Age of Onset    Heart disease Mother     Stroke Mother     Cancer Father     COPD Sister        Social History     Socioeconomic History    Marital status:    Tobacco Use    Smoking status: Current Every Day Smoker     Packs/day: 1.00     Types: Cigarettes    Smokeless tobacco: Current User     Types: Snuff   Substance and Sexual Activity    Alcohol use: Not Currently    Drug use: Never    Sexual activity: Not Currently       MEDICATIONS & ALLERGIES:     Current Outpatient Medications on File Prior to Visit   Medication Sig Dispense Refill    apixaban (ELIQUIS DVT-PE TREAT 30D START) 5 mg (74 tabs) DsPk For the first 7 days take two 5 mg tablets twice daily.  After 7 days take one 5 mg tablet twice daily. 74 tablet 0    blood glucose control, low (TRUE METRIX LEVEL 1) Soln 1 drop by Misc.(Non-Drug; Combo Route) route once daily. 1 each 4    blood glucose control, normal (TRUE METRIX LEVEL 2) Soln 1 drop by Misc.(Non-Drug; Combo Route) route once daily. 1 each 4    blood sugar diagnostic (TRUE METRIX GLUCOSE TEST STRIP) Strp 1 strip by Misc.(Non-Drug; Combo Route) route once daily. 30 strip 4    blood-glucose meter (TRUE METRIX GLUCOSE METER) Misc 1 Device by Misc.(Non-Drug; Combo Route) route once. for 1 dose 1 each 0    clopidogreL (PLAVIX) 75 mg tablet Take 1 tablet (75 mg total) by mouth once daily. 90 tablet 1    DULoxetine (CYMBALTA) 30 MG capsule Take 1 capsule (30 mg  total) by mouth once daily. 90 capsule 1    FLUoxetine 10 MG capsule TAKE 2 CAPSULES BY MOUTH ONCE DAILY (Patient taking differently: 20 mg.) 180 capsule 1    gabapentin (NEURONTIN) 300 MG capsule Take 1 capsule (300 mg total) by mouth 3 (three) times daily as needed. 90 capsule 5    HYDROcodone-acetaminophen (NORCO) 5-325 mg per tablet Take 1 tablet by mouth 3 (three) times daily.      lancets 32 gauge Misc 1 lancet by Misc.(Non-Drug; Combo Route) route once daily. 30 each 4    levoFLOXacin (LEVAQUIN) 750 MG tablet Take 1 tablet (750 mg total) by mouth once daily. for 10 days 10 tablet 0    lisinopriL 10 MG tablet Take 1 tablet (10 mg total) by mouth once daily. HOLD FOR NOW DUE TO LOW BLOOD PRESSURE 30 tablet 0    metFORMIN (GLUCOPHAGE) 500 MG tablet Take 1 tablet (500 mg total) by mouth 2 (two) times daily. 180 tablet 1    NON FORMULARY MEDICATION THC+ 300 NATURAL SL TINCTURE - ILERA [place]      ondansetron (ZOFRAN) 8 MG tablet Take 1 tablet (8 mg total) by mouth every 8 (eight) hours as needed for Nausea. 30 tablet 1    rosuvastatin (CRESTOR) 20 MG tablet Take 1 tablet (20 mg total) by mouth once daily. 90 tablet 1    tamsulosin (FLOMAX) 0.4 mg Cap Take 1 capsule (0.4 mg total) by mouth once daily. 90 capsule 1    tiZANidine (ZANAFLEX) 4 MG tablet Take 1 tablet TID Prn 270 tablet 1    walker (ULTRA-LIGHT ROLLATOR) Misc Use daily for ambulation 1 each 0    [DISCONTINUED] zolpidem (AMBIEN) 10 mg Tab Take 1 tablet (10 mg total) by mouth nightly as needed (insomnia). 30 tablet 5     No current facility-administered medications on file prior to visit.      Review of patient's allergies indicates:   Allergen Reactions    Chantix [varenicline] Other (See Comments)     Felt bad     OBJECTIVE:   Vital Signs:  Vitals:    04/05/22 0943   BP: 127/74   Pulse: (!) 114   Resp: 18   Temp: 97.7 °F (36.5 °C)     There is no height or weight on file to calculate BMI.     Physical Exam:  Physical Exam  Vitals and  nursing note reviewed.   Constitutional:       General: He is not in acute distress.     Appearance: Normal appearance. He is not toxic-appearing.   HENT:      Head: Normocephalic.      Nose: Nose normal.      Mouth/Throat:      Mouth: Mucous membranes are moist.      Pharynx: Oropharynx is clear.   Eyes:      Extraocular Movements: Extraocular movements intact.      Conjunctiva/sclera: Conjunctivae normal.      Pupils: Pupils are equal, round, and reactive to light.   Cardiovascular:      Rate and Rhythm: Normal rate and regular rhythm.      Pulses: Normal pulses.   Pulmonary:      Effort: Pulmonary effort is normal. No accessory muscle usage, respiratory distress or retractions.      Breath sounds: Examination of the right-lower field reveals decreased breath sounds. Examination of the left-lower field reveals decreased breath sounds. Decreased breath sounds present.   Abdominal:      General: Abdomen is flat. Bowel sounds are normal.      Palpations: Abdomen is soft.   Musculoskeletal:         General: Normal range of motion.      Cervical back: Normal range of motion and neck supple.   Skin:     General: Skin is warm and dry.      Capillary Refill: Capillary refill takes less than 2 seconds.      Coloration: Skin is pale.   Neurological:      Mental Status: He is alert and oriented to person, place, and time.      Motor: Weakness present.      Gait: Gait abnormal (unsteady).   Psychiatric:         Mood and Affect: Mood normal.         Behavior: Behavior normal.         Laboratory  Lab Results   Component Value Date    WBC 6.33 03/25/2022    HGB 8.1 (L) 03/25/2022    HCT 24.5 (L) 03/25/2022    MCV 98 03/25/2022     03/25/2022     Lab Results   Component Value Date    INR 1.0 03/21/2022     Lab Results   Component Value Date    HGBA1C 5.9 (H) 01/03/2022     No results for input(s): POCTGLUCOSE in the last 72 hours.    TRANSITION OF CARE:   Family and/or Caretaker present at visit?  No.  Diagnostic tests  reviewed/disposition: No diagnosic tests pending after this hospitalization.  Home health/community services discussion/referrals: Patient has home health established at [facility].   Establishment or re-establishment of referral orders for community resources: No other necessary community resources.   Discussion with other health care providers: No discussion with other health care providers necessary.     Transition of Care Visit:  I have reviewed and updated the history and problem list. I have reconciled the medication list. I have discussed the hospitalization and current medical issues, prognosis and plans with the patient/family. I spent more than 50% of time discussing the care with the patient/family. Total Face-to-Face Encounter: 60 minutes.    Medications Reconciliation:   I have reconciled the patient's home medications and discharge medications with the patient/family. I have updated all changes. Refer to After-Visit Medication List.    Disease/illness education: Dx-specific illness, importance of compliance with discharge instructions including all prescribed medications changes, treatments, follow-up appointments and all other provider recommendations to maximally affect post-discharge recovery and return to baseline, directives to ensure patient safety including COVID precautions: social distancing, mask use in public, vigilant hand hygiene and timely compliance with recommended immunization/booster regimen as recommended by the CDC at the time of the visit.  Instructions for the patient:    Scheduled Follow-up :  Future Appointments   Date Time Provider Department Center   4/8/2022 12:20 PM LABORATORY, [facility] LAB [facility]   4/8/2022  1:00 PM SPECIMEN, [facility] SPECLAB [facility]   4/13/2022  4:40 PM Damien Barron MD [facility]     After Visit Medication List:     Medication List          Accurate as of April 5, 2022  1:05 PM. If you have any questions, ask your nurse or  doctor.            CHANGE how you take these medications    FLUoxetine 10 MG capsule  TAKE 2 CAPSULES BY MOUTH ONCE DAILY  What changed:   · how to take this  · when to take this        CONTINUE taking these medications    blood glucose control, normal Soln  Commonly known as: TRUE METRIX LEVEL 2  1 drop by Misc.(Non-Drug; Combo Route) route once daily.     blood sugar diagnostic Strp  Commonly known as: TRUE METRIX GLUCOSE TEST STRIP  1 strip by Misc.(Non-Drug; Combo Route) route once daily.     blood-glucose meter Misc  Commonly known as: TRUE METRIX GLUCOSE METER  1 Device by Misc.(Non-Drug; Combo Route) route once. for 1 dose     clopidogreL 75 mg tablet  Commonly known as: PLAVIX  Take 1 tablet (75 mg total) by mouth once daily.     DULoxetine 30 MG capsule  Commonly known as: CYMBALTA  Take 1 capsule (30 mg total) by mouth once daily.     ELIQUIS DVT-PE TREAT 30D START 5 mg (74 tabs) Dspk  Generic drug: apixaban  For the first 7 days take two 5 mg tablets twice daily.  After 7 days take one 5 mg tablet twice daily.     gabapentin 300 MG capsule  Commonly known as: NEURONTIN  Take 1 capsule (300 mg total) by mouth 3 (three) times daily as needed.     HYDROcodone-acetaminophen 5-325 mg per tablet  Commonly known as: NORCO     lancets 32 gauge Misc  1 lancet by Misc.(Non-Drug; Combo Route) route once daily.     levoFLOXacin 750 MG tablet  Commonly known as: LEVAQUIN  Take 1 tablet (750 mg total) by mouth once daily. for 10 days     lisinopriL 10 MG tablet  Take 1 tablet (10 mg total) by mouth once daily. HOLD FOR NOW DUE TO LOW BLOOD PRESSURE     metFORMIN 500 MG tablet  Commonly known as: GLUCOPHAGE  Take 1 tablet (500 mg total) by mouth 2 (two) times daily.     NON FORMULARY MEDICATION     ondansetron 8 MG tablet  Commonly known as: ZOFRAN  Take 1 tablet (8 mg total) by mouth every 8 (eight) hours as needed for Nausea.     rosuvastatin 20 MG tablet  Commonly known as: CRESTOR  Take 1 tablet (20 mg total) by  mouth once daily.     tamsulosin 0.4 mg Cap  Commonly known as: FLOMAX  Take 1 capsule (0.4 mg total) by mouth once daily.     tiZANidine 4 MG tablet  Commonly known as: ZANAFLEX  Take 1 tablet TID Prn     TRUE METRIX LEVEL 1 Soln  Generic drug: blood glucose control, low  1 drop by Misc.(Non-Drug; Combo Route) route once daily.     ULTRA-LIGHT ROLLATOR Misc  Generic drug: walker  Use daily for ambulation          ASSESSMENT & PLAN:   Samy was seen today for transitional care.  Diagnoses and all orders for this visit:    Encounter for support and coordination of transition of care    Cavitary lesion of lung  - follow-up with pulmonary as scheduled    Advanced directives, counseling/discussion  Goals of Care, counseling/discussion  Advanced Care Directives Informational Packet with forms left in the home for family review, discussion and signing with instructions to return upon their next provider encounter for inclusion to the medical record.     Encounter for Support and Coordination of Transition of Care   - [facility] Nurse Practitioner to schedule home visit with patient PRN.    Patient Instructions Given:  - Continue all medications, treatments and therapies as ordered.   - Follow all instructions, recommendations as discussed.  - Maintain Safety Precautions at all times.  - Attend all medical appointments as scheduled.  - For worsening symptoms: call Primary Care Physician or Nurse Practitioner.  - For emergencies, call 911 or immediately report to the nearest emergency room.    Were controlled substances prescribed?  NO    Patient consent was obtained prior to treatment on this visit.    Attestation: Screening criteria to assess the level of the patient's risk for infection with COVID-19 as recommended by the CDC at the time of the above documented home visit concluded appropriateness to proceed. Universal precautions were maintained at all times, including provider use of >60% alcohol gel hand   immediately prior to entry and upon departing the patient's home as well as cleaning of equipment used in home visit with antibacterial/germicidal disposable wipes.     Signature:      Jony Pedraza, MSN, APRN, FNP-C  [facility]    Total face-to-face time was 60 min, >50% of this was spent on counseling and coordination of care. The following issues were discussed: primary and secondary diagnoses, co-morbidities, prescribed medications, treatment modalities, importance of compliance with medical advice and directives for follow-up care.    With the consent of the patient and/or caregiver(s), 20 of the total face to face minutes included a comprehensive discussion regarding Advanced Care Planning. Sources of emotional and spiritual support were identified and encouraged for involvement to assist in finalization of decisions regarding the patient's goals of care (Living Will). Topics discussed include statutory guidelines of the [facility] to determine/delineate the legal surrogate medical decision maker(s) should the patient be deemed incompetent to self-direct medical care (next of kin vs. HCPOA) and the required documentation to ensure legal validity thereof (Advanced Directives/[place] Post).

## 2022-04-05 NOTE — PATIENT INSTRUCTIONS
- [facility] NP to schedule follow-up visit with patient in 4-6 weeks or as needed.  - Continue all medications, treatments and therapies as ordered.   - Follow all instructions, recommendations as discussed.  - Maintain Safety Precautions at all times.  - Attend all medical appointments as scheduled.  - For worsening symptoms: call Primary Care Physician or Nurse Practitioner.  - For emergencies, call 911 or immediately report to the nearest emergency room.  - Limit Risks of environmental exposure to coronavirus as discussed including: social distancing, hand hygiene, and limiting departures from the home for necessities only.     Your care is important to us. If your provider recommended a follow-up appointment or test, we are happy to help you coordinate your recommended care. It is important that you complete your recommended follow-up. If you need help scheduling, please call [phone]. Appointments can also be made online through the patient portal.  While scheduling and attending your appointments is your responsibility, our goal is to support and empower you throughout that process.    [facility] Nurse Care Line - 24/7 Assistance  Unless otherwise directed by your provider, please contact [facility], our nurse care line that is available for 24/7 assistance.  Registered nurses in the [facility] provide: appointment scheduling, clinical advisement, health education, and other advisory services.  Call: [phone] (toll free)    COVID-19 Prevention   Avoid close contact with people and stay home if youre sick, except to get medical care.   Cover coughs and sneezes with a tissue, or use the inside of your elbow. Immediately wash your hands or use hand .  For more information, see CDC link below:  https://www.cdc.gov/coronavirus/2019-ncov/hcp/guidance-prevent-spread.html#precautions     The following information is provided to all patients.  This information is to  help you find resources for any of the problems found today that may be affecting your health:            Living healthy guide: www.[facility].[facility]    Understanding Diabetes: www.diabetes.org   Eating healthy: www.cdc.gov/healthyweight   CDC home safety checklist: www.cdc.gov/steadi/patient.html  Agency on Aging: www.goea.[place].[facility]    Alcoholics anonymous (AA): www.aa.org   Physical Activity: www.sammie.nih.gov/sx9fbuj    Tobacco use: www.quitwithusla.org

## 2022-04-20 DIAGNOSIS — R13.19 ESOPHAGEAL DYSPHAGIA: Primary | ICD-10-CM

## 2022-04-22 NOTE — TELEPHONE ENCOUNTER
Care Due:                  Date            Visit Type   Department     Provider  --------------------------------------------------------------------------------                                EP -                              PRIMARY      [facility] [place] [facility]  Last Visit: 03-      CARE ([facility])   MEDICINE       Damien Barron  Next Visit: None Scheduled  None         None Found                                                            Last  Test          Frequency    Reason                     Performed    Due Date  --------------------------------------------------------------------------------    HBA1C.......  6 months...  metFORMIN................  01- 07-    Powered by [employer]. Reference number: [id number].   4/22/2022 5:43:26 PM CDT

## 2022-04-26 LAB — FUNGUS SPEC CULT: NORMAL

## 2022-04-27 ENCOUNTER — LAB VISIT (OUTPATIENT)
Dept: LAB | Facility: HOSPITAL | Age: 72
End: 2022-04-27
Attending: FAMILY MEDICINE
Payer: MEDICARE

## 2022-04-27 DIAGNOSIS — N18.6 TYPE 2 DIABETES MELLITUS WITH END-STAGE RENAL DISEASE: Primary | ICD-10-CM

## 2022-04-27 DIAGNOSIS — N18.30 CHRONIC KIDNEY DISEASE, STAGE III (MODERATE): ICD-10-CM

## 2022-04-27 DIAGNOSIS — J98.4 DISEASE OF LUNG: ICD-10-CM

## 2022-04-27 DIAGNOSIS — E11.22 TYPE 2 DIABETES MELLITUS WITH END-STAGE RENAL DISEASE: Primary | ICD-10-CM

## 2022-04-27 DIAGNOSIS — Z79.02 ENCOUNTER FOR LONG-TERM (CURRENT) USE OF ANTIPLATELETS/ANTITHROMBOTICS: ICD-10-CM

## 2022-04-27 DIAGNOSIS — J18.9 UNRESOLVED PNEUMONIA: ICD-10-CM

## 2022-04-27 DIAGNOSIS — Z79.01 LONG TERM (CURRENT) USE OF ANTICOAGULANTS: ICD-10-CM

## 2022-04-27 LAB
ALBUMIN SERPL BCP-MCNC: 2.7 G/DL (ref 3.5–5.2)
ALP SERPL-CCNC: 171 U/L (ref 55–135)
ALT SERPL W/O P-5'-P-CCNC: 21 U/L (ref 10–44)
ANION GAP SERPL CALC-SCNC: 11 MMOL/L (ref 8–16)
AST SERPL-CCNC: 36 U/L (ref 10–40)
BASOPHILS # BLD AUTO: 0.1 K/UL (ref 0–0.2)
BASOPHILS NFR BLD: 1.2 % (ref 0–1.9)
BILIRUB SERPL-MCNC: 0.2 MG/DL (ref 0.1–1)
BUN SERPL-MCNC: 13 MG/DL (ref 8–23)
CALCIUM SERPL-MCNC: 8.7 MG/DL (ref 8.7–10.5)
CHLORIDE SERPL-SCNC: 106 MMOL/L (ref 95–110)
CHOLEST SERPL-MCNC: 109 MG/DL (ref 120–199)
CHOLEST/HDLC SERPL: 3.3 {RATIO} (ref 2–5)
CO2 SERPL-SCNC: 21 MMOL/L (ref 23–29)
CREAT SERPL-MCNC: 1 MG/DL (ref 0.5–1.4)
DIFFERENTIAL METHOD: ABNORMAL
EOSINOPHIL # BLD AUTO: 0.6 K/UL (ref 0–0.5)
EOSINOPHIL NFR BLD: 7.3 % (ref 0–8)
ERYTHROCYTE [DISTWIDTH] IN BLOOD BY AUTOMATED COUNT: 16.8 % (ref 11.5–14.5)
EST. GFR  (AFRICAN AMERICAN): >60 ML/MIN/1.73 M^2
EST. GFR  (NON AFRICAN AMERICAN): >60 ML/MIN/1.73 M^2
GLUCOSE SERPL-MCNC: 102 MG/DL (ref 70–110)
HCT VFR BLD AUTO: 32.2 % (ref 40–54)
HDLC SERPL-MCNC: 33 MG/DL (ref 40–75)
HDLC SERPL: 30.3 % (ref 20–50)
HGB BLD-MCNC: 10.1 G/DL (ref 14–18)
IMM GRANULOCYTES # BLD AUTO: 0.01 K/UL (ref 0–0.04)
IMM GRANULOCYTES NFR BLD AUTO: 0.1 % (ref 0–0.5)
LDLC SERPL CALC-MCNC: 57.2 MG/DL (ref 63–159)
LYMPHOCYTES # BLD AUTO: 2.3 K/UL (ref 1–4.8)
LYMPHOCYTES NFR BLD: 28.5 % (ref 18–48)
MCH RBC QN AUTO: 33.3 PG (ref 27–31)
MCHC RBC AUTO-ENTMCNC: 31.4 G/DL (ref 32–36)
MCV RBC AUTO: 106 FL (ref 82–98)
MONOCYTES # BLD AUTO: 0.6 K/UL (ref 0.3–1)
MONOCYTES NFR BLD: 7.2 % (ref 4–15)
NEUTROPHILS # BLD AUTO: 4.5 K/UL (ref 1.8–7.7)
NEUTROPHILS NFR BLD: 55.7 % (ref 38–73)
NONHDLC SERPL-MCNC: 76 MG/DL
NRBC BLD-RTO: 0 /100 WBC
PLATELET # BLD AUTO: 331 K/UL (ref 150–450)
PMV BLD AUTO: 10.6 FL (ref 9.2–12.9)
POTASSIUM SERPL-SCNC: 4.5 MMOL/L (ref 3.5–5.1)
PROT SERPL-MCNC: 5.9 G/DL (ref 6–8.4)
RBC # BLD AUTO: 3.03 M/UL (ref 4.6–6.2)
SODIUM SERPL-SCNC: 138 MMOL/L (ref 136–145)
TRIGL SERPL-MCNC: 94 MG/DL (ref 30–150)
WBC # BLD AUTO: 8.03 K/UL (ref 3.9–12.7)

## 2022-04-27 PROCEDURE — 80053 COMPREHEN METABOLIC PANEL: CPT | Performed by: FAMILY MEDICINE

## 2022-04-27 PROCEDURE — 80061 LIPID PANEL: CPT | Performed by: FAMILY MEDICINE

## 2022-04-27 PROCEDURE — 85025 COMPLETE CBC W/AUTO DIFF WBC: CPT | Performed by: FAMILY MEDICINE

## 2022-04-27 PROCEDURE — 83036 HEMOGLOBIN GLYCOSYLATED A1C: CPT | Performed by: FAMILY MEDICINE

## 2022-04-28 LAB
ESTIMATED AVG GLUCOSE: 100 MG/DL (ref 68–131)
HBA1C MFR BLD: 5.1 % (ref 4–5.6)

## 2022-05-09 ENCOUNTER — DOCUMENT SCAN (OUTPATIENT)
Dept: HOME HEALTH SERVICES | Facility: HOSPITAL | Age: 72
End: 2022-05-09
Payer: MEDICARE

## 2022-05-09 DIAGNOSIS — A31.9 MYCOBACTERIUM GORDONAE INFECTION: Primary | ICD-10-CM

## 2022-05-09 DIAGNOSIS — R30.0 DYSURIA: ICD-10-CM

## 2022-05-10 RX ORDER — TRIAMCINOLONE ACETONIDE 1 MG/G
CREAM TOPICAL 2 TIMES DAILY
Qty: 45 G | Refills: 0 | Status: SHIPPED | OUTPATIENT
Start: 2022-05-10 | End: 2022-07-31

## 2022-05-10 RX ORDER — FLUOXETINE HYDROCHLORIDE 20 MG/1
20 CAPSULE ORAL DAILY
Qty: 90 CAPSULE | Refills: 1 | Status: SHIPPED | OUTPATIENT
Start: 2022-05-10 | End: 2022-11-02

## 2022-05-10 NOTE — TELEPHONE ENCOUNTER
No new care gaps identified.  [facility] Embedded Care Gaps. Reference number: [id number]. 5/10/2022   1:36:19 PM CDT

## 2022-05-11 ENCOUNTER — LAB VISIT (OUTPATIENT)
Dept: LAB | Facility: HOSPITAL | Age: 72
End: 2022-05-11
Attending: FAMILY MEDICINE
Payer: MEDICARE

## 2022-05-11 ENCOUNTER — PATIENT MESSAGE (OUTPATIENT)
Dept: FAMILY MEDICINE | Facility: CLINIC | Age: 72
End: 2022-05-11
Payer: MEDICARE

## 2022-05-11 ENCOUNTER — PATIENT MESSAGE (OUTPATIENT)
Dept: HEMATOLOGY/ONCOLOGY | Facility: CLINIC | Age: 72
End: 2022-05-11
Payer: MEDICARE

## 2022-05-11 DIAGNOSIS — R30.0 DYSURIA: ICD-10-CM

## 2022-05-11 DIAGNOSIS — A31.9 MYCOBACTERIUM GORDONAE INFECTION: ICD-10-CM

## 2022-05-11 LAB
BILIRUB UR QL STRIP: NEGATIVE
CLARITY UR REFRACT.AUTO: CLEAR
COLOR UR AUTO: NORMAL
GLUCOSE UR QL STRIP: NEGATIVE
HGB UR QL STRIP: NEGATIVE
KETONES UR QL STRIP: NEGATIVE
LEUKOCYTE ESTERASE UR QL STRIP: NEGATIVE
NITRITE UR QL STRIP: NEGATIVE
PH UR STRIP: 5 [PH] (ref 5–8)
PROT UR QL STRIP: NEGATIVE
SP GR UR STRIP: 1.01 (ref 1–1.03)
URN SPEC COLLECT METH UR: NORMAL

## 2022-05-11 PROCEDURE — 81003 URINALYSIS AUTO W/O SCOPE: CPT | Performed by: FAMILY MEDICINE

## 2022-05-12 RX ORDER — OXYBUTYNIN CHLORIDE 10 MG/1
10 TABLET, EXTENDED RELEASE ORAL DAILY
Qty: 30 TABLET | Refills: 5 | Status: SHIPPED | OUTPATIENT
Start: 2022-05-12 | End: 2022-10-31

## 2022-05-20 ENCOUNTER — OUTPATIENT CASE MANAGEMENT (OUTPATIENT)
Dept: ADMINISTRATIVE | Facility: OTHER | Age: 72
End: 2022-05-20
Payer: MEDICARE

## 2022-05-20 ENCOUNTER — PATIENT MESSAGE (OUTPATIENT)
Dept: ADMINISTRATIVE | Facility: OTHER | Age: 72
End: 2022-05-20
Payer: MEDICARE

## 2022-05-20 NOTE — PROGRESS NOTES
05/20/22 Attempt assessment with patient/caregiver. No answer. Unable to Lm. Also sent message through pt portal. RN OPCM First assessment attempt.   05/23/22 Attempt assessment with patient/caregiver. No answer. Unable to LM. Sent portal message and letter. RN OPCM case closure

## 2022-05-20 NOTE — LETTER
May 23, 2022    Samy Alejandre   [address] [facility] [place] [address]             [facility] [address] [place] [address] Dear Mr. Alejandre:        I work with [facility] Outpatient Case Management Department. We received a referral to call you to discuss your medical history.These services are free of charge and are offered to [facility] patients who have recently been discharged from any of our facilities or who have complex medical conditions that may require the skill of a nurse to assist with management.         .      I attempted to reach you by telephone, but I was unsuccessful. Please call our department so that we can go over some questions with you regarding your health.    The Outpatient Case Management Department can be reached at [phone] from 8:00AM to 4:30 PM on Monday thru Friday. [facility] also has a program where a nurse is available 24/7 to answer questions or provide medical advice, their number is [phone].    Thanks,  Yaz Byrne RN   Outpatient Care Management

## 2022-05-21 DIAGNOSIS — G89.29 CHRONIC BILATERAL LOW BACK PAIN WITH RIGHT-SIDED SCIATICA: ICD-10-CM

## 2022-05-21 DIAGNOSIS — M54.41 CHRONIC BILATERAL LOW BACK PAIN WITH RIGHT-SIDED SCIATICA: ICD-10-CM

## 2022-05-21 DIAGNOSIS — E11.42 DM TYPE 2 WITH DIABETIC PERIPHERAL NEUROPATHY: ICD-10-CM

## 2022-05-21 NOTE — TELEPHONE ENCOUNTER
No new care gaps identified.  [facility] Embedded Care Gaps. Reference number: [id number]. 5/21/2022   6:04:16 PM CDT

## 2022-05-23 RX ORDER — GABAPENTIN 300 MG/1
CAPSULE ORAL
Qty: 90 CAPSULE | Refills: 0 | Status: SHIPPED | OUTPATIENT
Start: 2022-05-23 | End: 2022-10-13 | Stop reason: SINTOL

## 2022-05-24 RX ORDER — ZOLPIDEM TARTRATE 10 MG/1
10 TABLET ORAL NIGHTLY PRN
Qty: 30 TABLET | Refills: 5 | Status: SHIPPED | OUTPATIENT
Start: 2022-05-24 | End: 2022-12-20

## 2022-05-25 PROCEDURE — G0179 MD RECERTIFICATION HHA PT: HCPCS | Mod: ,,, | Performed by: FAMILY MEDICINE

## 2022-05-25 PROCEDURE — G0179 PR HOME HEALTH MD RECERTIFICATION: ICD-10-PCS | Mod: ,,, | Performed by: FAMILY MEDICINE

## 2022-06-07 ENCOUNTER — EXTERNAL HOME HEALTH (OUTPATIENT)
Dept: HOME HEALTH SERVICES | Facility: HOSPITAL | Age: 72
End: 2022-06-07
Payer: MEDICARE

## 2022-06-15 ENCOUNTER — TELEPHONE (OUTPATIENT)
Dept: FAMILY MEDICINE | Facility: CLINIC | Age: 72
End: 2022-06-15
Payer: MEDICARE

## 2022-06-15 DIAGNOSIS — E11.22 CONTROLLED TYPE 2 DIABETES MELLITUS WITH STAGE 3 CHRONIC KIDNEY DISEASE, WITHOUT LONG-TERM CURRENT USE OF INSULIN: ICD-10-CM

## 2022-06-15 DIAGNOSIS — N18.30 CONTROLLED TYPE 2 DIABETES MELLITUS WITH STAGE 3 CHRONIC KIDNEY DISEASE, WITHOUT LONG-TERM CURRENT USE OF INSULIN: ICD-10-CM

## 2022-06-15 DIAGNOSIS — I69.359 HEMIPARESIS AFFECTING DOMINANT SIDE AS LATE EFFECT OF CEREBROVASCULAR ACCIDENT (CVA): Primary | ICD-10-CM

## 2022-06-15 DIAGNOSIS — E78.2 MIXED HYPERLIPIDEMIA: ICD-10-CM

## 2022-06-15 LAB
ACID FAST MOD KINY STN SPEC: ABNORMAL
MYCOBACTERIUM SPEC QL CULT: ABNORMAL
MYCOBACTERIUM SPEC QL CULT: ABNORMAL

## 2022-06-23 ENCOUNTER — LAB VISIT (OUTPATIENT)
Dept: LAB | Facility: HOSPITAL | Age: 72
End: 2022-06-23
Attending: FAMILY MEDICINE
Payer: MEDICARE

## 2022-06-23 DIAGNOSIS — N18.30 CHRONIC KIDNEY DISEASE, STAGE III (MODERATE): ICD-10-CM

## 2022-06-23 DIAGNOSIS — E11.22 TYPE 2 DIABETES MELLITUS WITH END-STAGE RENAL DISEASE: ICD-10-CM

## 2022-06-23 DIAGNOSIS — N18.6 TYPE 2 DIABETES MELLITUS WITH END-STAGE RENAL DISEASE: ICD-10-CM

## 2022-06-23 DIAGNOSIS — E78.2 MIXED HYPERLIPIDEMIA: Primary | ICD-10-CM

## 2022-06-23 DIAGNOSIS — R39.15 URGENCY OF URINATION: ICD-10-CM

## 2022-06-23 DIAGNOSIS — R35.0 URINARY FREQUENCY: ICD-10-CM

## 2022-06-23 LAB
ALBUMIN SERPL BCP-MCNC: 2.8 G/DL (ref 3.5–5.2)
ALBUMIN/CREAT UR: 11 UG/MG (ref 0–30)
ALP SERPL-CCNC: 165 U/L (ref 55–135)
ALT SERPL W/O P-5'-P-CCNC: 20 U/L (ref 10–44)
ANION GAP SERPL CALC-SCNC: 9 MMOL/L (ref 8–16)
AST SERPL-CCNC: 39 U/L (ref 10–40)
BASOPHILS # BLD AUTO: 0.09 K/UL (ref 0–0.2)
BASOPHILS NFR BLD: 1.4 % (ref 0–1.9)
BILIRUB SERPL-MCNC: 0.1 MG/DL (ref 0.1–1)
BUN SERPL-MCNC: 14 MG/DL (ref 8–23)
CALCIUM SERPL-MCNC: 8.7 MG/DL (ref 8.7–10.5)
CHLORIDE SERPL-SCNC: 106 MMOL/L (ref 95–110)
CO2 SERPL-SCNC: 23 MMOL/L (ref 23–29)
CREAT SERPL-MCNC: 1.2 MG/DL (ref 0.5–1.4)
CREAT UR-MCNC: 73 MG/DL (ref 23–375)
DIFFERENTIAL METHOD: ABNORMAL
EOSINOPHIL # BLD AUTO: 0.5 K/UL (ref 0–0.5)
EOSINOPHIL NFR BLD: 7.3 % (ref 0–8)
ERYTHROCYTE [DISTWIDTH] IN BLOOD BY AUTOMATED COUNT: 15 % (ref 11.5–14.5)
EST. GFR  (AFRICAN AMERICAN): >60 ML/MIN/1.73 M^2
EST. GFR  (NON AFRICAN AMERICAN): >60 ML/MIN/1.73 M^2
ESTIMATED AVG GLUCOSE: 105 MG/DL (ref 68–131)
GLUCOSE SERPL-MCNC: 135 MG/DL (ref 70–110)
HBA1C MFR BLD: 5.3 % (ref 4–5.6)
HCT VFR BLD AUTO: 35.3 % (ref 40–54)
HGB BLD-MCNC: 11 G/DL (ref 14–18)
IMM GRANULOCYTES # BLD AUTO: 0.02 K/UL (ref 0–0.04)
IMM GRANULOCYTES NFR BLD AUTO: 0.3 % (ref 0–0.5)
LYMPHOCYTES # BLD AUTO: 2.3 K/UL (ref 1–4.8)
LYMPHOCYTES NFR BLD: 36.8 % (ref 18–48)
MCH RBC QN AUTO: 32 PG (ref 27–31)
MCHC RBC AUTO-ENTMCNC: 31.2 G/DL (ref 32–36)
MCV RBC AUTO: 103 FL (ref 82–98)
MICROALBUMIN UR DL<=1MG/L-MCNC: 8 UG/ML
MONOCYTES # BLD AUTO: 0.5 K/UL (ref 0.3–1)
MONOCYTES NFR BLD: 8 % (ref 4–15)
NEUTROPHILS # BLD AUTO: 2.9 K/UL (ref 1.8–7.7)
NEUTROPHILS NFR BLD: 46.2 % (ref 38–73)
NRBC BLD-RTO: 0 /100 WBC
PLATELET # BLD AUTO: 291 K/UL (ref 150–450)
PMV BLD AUTO: 10.6 FL (ref 9.2–12.9)
POTASSIUM SERPL-SCNC: 3.9 MMOL/L (ref 3.5–5.1)
PROT SERPL-MCNC: 6.1 G/DL (ref 6–8.4)
RBC # BLD AUTO: 3.44 M/UL (ref 4.6–6.2)
SODIUM SERPL-SCNC: 138 MMOL/L (ref 136–145)
WBC # BLD AUTO: 6.34 K/UL (ref 3.9–12.7)

## 2022-06-23 PROCEDURE — 82043 UR ALBUMIN QUANTITATIVE: CPT | Performed by: FAMILY MEDICINE

## 2022-06-23 PROCEDURE — 85025 COMPLETE CBC W/AUTO DIFF WBC: CPT | Performed by: FAMILY MEDICINE

## 2022-06-23 PROCEDURE — 80053 COMPREHEN METABOLIC PANEL: CPT | Performed by: FAMILY MEDICINE

## 2022-06-23 PROCEDURE — 82570 ASSAY OF URINE CREATININE: CPT | Performed by: FAMILY MEDICINE

## 2022-06-23 PROCEDURE — 83036 HEMOGLOBIN GLYCOSYLATED A1C: CPT | Performed by: FAMILY MEDICINE

## 2022-07-06 ENCOUNTER — OFFICE VISIT (OUTPATIENT)
Dept: FAMILY MEDICINE | Facility: CLINIC | Age: 72
End: 2022-07-06
Payer: MEDICARE

## 2022-07-06 VITALS
TEMPERATURE: 98 F | BODY MASS INDEX: 20.73 KG/M2 | HEART RATE: 61 BPM | HEIGHT: 66 IN | WEIGHT: 129 LBS | DIASTOLIC BLOOD PRESSURE: 60 MMHG | OXYGEN SATURATION: 96 % | SYSTOLIC BLOOD PRESSURE: 114 MMHG

## 2022-07-06 DIAGNOSIS — R13.19 ESOPHAGEAL DYSPHAGIA: Primary | ICD-10-CM

## 2022-07-06 DIAGNOSIS — J98.4 CAVITARY LESION OF LUNG: ICD-10-CM

## 2022-07-06 DIAGNOSIS — N18.30 CONTROLLED TYPE 2 DIABETES MELLITUS WITH STAGE 3 CHRONIC KIDNEY DISEASE, WITHOUT LONG-TERM CURRENT USE OF INSULIN: ICD-10-CM

## 2022-07-06 DIAGNOSIS — E11.22 CONTROLLED TYPE 2 DIABETES MELLITUS WITH STAGE 3 CHRONIC KIDNEY DISEASE, WITHOUT LONG-TERM CURRENT USE OF INSULIN: ICD-10-CM

## 2022-07-06 DIAGNOSIS — R05.9 COUGH: ICD-10-CM

## 2022-07-06 DIAGNOSIS — F17.200 SMOKER: ICD-10-CM

## 2022-07-06 DIAGNOSIS — E11.9 DIABETIC EYE EXAM: ICD-10-CM

## 2022-07-06 DIAGNOSIS — Z01.00 DIABETIC EYE EXAM: ICD-10-CM

## 2022-07-06 PROCEDURE — 3061F PR NEG MICROALBUMINURIA RESULT DOCUMENTED/REVIEW: ICD-10-PCS | Mod: CPTII,S$GLB,, | Performed by: FAMILY MEDICINE

## 2022-07-06 PROCEDURE — 4010F PR ACE/ARB THEARPY RXD/TAKEN: ICD-10-PCS | Mod: CPTII,S$GLB,, | Performed by: FAMILY MEDICINE

## 2022-07-06 PROCEDURE — 3061F NEG MICROALBUMINURIA REV: CPT | Mod: CPTII,S$GLB,, | Performed by: FAMILY MEDICINE

## 2022-07-06 PROCEDURE — 99999 PR PBB SHADOW E&M-EST. PATIENT-LVL V: ICD-10-PCS | Mod: PBBFAC,,, | Performed by: FAMILY MEDICINE

## 2022-07-06 PROCEDURE — 1159F MED LIST DOCD IN RCRD: CPT | Mod: CPTII,S$GLB,, | Performed by: FAMILY MEDICINE

## 2022-07-06 PROCEDURE — 3072F LOW RISK FOR RETINOPATHY: CPT | Mod: CPTII,S$GLB,, | Performed by: FAMILY MEDICINE

## 2022-07-06 PROCEDURE — 3066F PR DOCUMENTATION OF TREATMENT FOR NEPHROPATHY: ICD-10-PCS | Mod: CPTII,S$GLB,, | Performed by: FAMILY MEDICINE

## 2022-07-06 PROCEDURE — 3288F PR FALLS RISK ASSESSMENT DOCUMENTED: ICD-10-PCS | Mod: CPTII,S$GLB,, | Performed by: FAMILY MEDICINE

## 2022-07-06 PROCEDURE — 1101F PT FALLS ASSESS-DOCD LE1/YR: CPT | Mod: CPTII,S$GLB,, | Performed by: FAMILY MEDICINE

## 2022-07-06 PROCEDURE — 99214 PR OFFICE/OUTPT VISIT, EST, LEVL IV, 30-39 MIN: ICD-10-PCS | Mod: S$GLB,,, | Performed by: FAMILY MEDICINE

## 2022-07-06 PROCEDURE — 99999 PR PBB SHADOW E&M-EST. PATIENT-LVL V: CPT | Mod: PBBFAC,,, | Performed by: FAMILY MEDICINE

## 2022-07-06 PROCEDURE — 3072F PR LOW RISK FOR RETINOPATHY: ICD-10-PCS | Mod: CPTII,S$GLB,, | Performed by: FAMILY MEDICINE

## 2022-07-06 PROCEDURE — 1159F PR MEDICATION LIST DOCUMENTED IN MEDICAL RECORD: ICD-10-PCS | Mod: CPTII,S$GLB,, | Performed by: FAMILY MEDICINE

## 2022-07-06 PROCEDURE — 3078F PR MOST RECENT DIASTOLIC BLOOD PRESSURE < 80 MM HG: ICD-10-PCS | Mod: CPTII,S$GLB,, | Performed by: FAMILY MEDICINE

## 2022-07-06 PROCEDURE — 3074F PR MOST RECENT SYSTOLIC BLOOD PRESSURE < 130 MM HG: ICD-10-PCS | Mod: CPTII,S$GLB,, | Performed by: FAMILY MEDICINE

## 2022-07-06 PROCEDURE — 3044F PR MOST RECENT HEMOGLOBIN A1C LEVEL <7.0%: ICD-10-PCS | Mod: CPTII,S$GLB,, | Performed by: FAMILY MEDICINE

## 2022-07-06 PROCEDURE — 1126F AMNT PAIN NOTED NONE PRSNT: CPT | Mod: CPTII,S$GLB,, | Performed by: FAMILY MEDICINE

## 2022-07-06 PROCEDURE — 99214 OFFICE O/P EST MOD 30 MIN: CPT | Mod: S$GLB,,, | Performed by: FAMILY MEDICINE

## 2022-07-06 PROCEDURE — 1126F PR PAIN SEVERITY QUANTIFIED, NO PAIN PRESENT: ICD-10-PCS | Mod: CPTII,S$GLB,, | Performed by: FAMILY MEDICINE

## 2022-07-06 PROCEDURE — 3078F DIAST BP <80 MM HG: CPT | Mod: CPTII,S$GLB,, | Performed by: FAMILY MEDICINE

## 2022-07-06 PROCEDURE — 4010F ACE/ARB THERAPY RXD/TAKEN: CPT | Mod: CPTII,S$GLB,, | Performed by: FAMILY MEDICINE

## 2022-07-06 PROCEDURE — 3008F PR BODY MASS INDEX (BMI) DOCUMENTED: ICD-10-PCS | Mod: CPTII,S$GLB,, | Performed by: FAMILY MEDICINE

## 2022-07-06 PROCEDURE — 3044F HG A1C LEVEL LT 7.0%: CPT | Mod: CPTII,S$GLB,, | Performed by: FAMILY MEDICINE

## 2022-07-06 PROCEDURE — 3288F FALL RISK ASSESSMENT DOCD: CPT | Mod: CPTII,S$GLB,, | Performed by: FAMILY MEDICINE

## 2022-07-06 PROCEDURE — 1101F PR PT FALLS ASSESS DOC 0-1 FALLS W/OUT INJ PAST YR: ICD-10-PCS | Mod: CPTII,S$GLB,, | Performed by: FAMILY MEDICINE

## 2022-07-06 PROCEDURE — 3008F BODY MASS INDEX DOCD: CPT | Mod: CPTII,S$GLB,, | Performed by: FAMILY MEDICINE

## 2022-07-06 PROCEDURE — 3066F NEPHROPATHY DOC TX: CPT | Mod: CPTII,S$GLB,, | Performed by: FAMILY MEDICINE

## 2022-07-06 PROCEDURE — 3074F SYST BP LT 130 MM HG: CPT | Mod: CPTII,S$GLB,, | Performed by: FAMILY MEDICINE

## 2022-07-06 NOTE — PROGRESS NOTES
Chief Complaint:    Chief Complaint   Patient presents with    Follow-up       History of Present Illness:  07/06/2022:-  Patient with controlled type 2 DM with stage 3 CKD, HLD presents today for a three month follow up.     He had a cough for 3-4 months but it mostly cleared up yesterday.  Denies fever. Won't see pulmonary until September. CXR shows a cavity. An ID consult was put in but he was never called. He needs a follow up CT.   His daughter reports trouble swallowing for one year. Food gets stuck in his esophagus. Eating waffles because he can tolerate it. He's had his esophagus stretched before.  Hasn't done his cologuard test yet but he plans to.    Due for shingles, tetanus, and eye exam.   Still smoking          03/09/2022:-  Patient with a hx of DM, hyperlipidemia presents today for right leg pain x 3 days.    Has trouble walking, his calf is sore. No known injury or trauma. Rates pain as 10/10. Left with right-sided deficits from his stroke. On Plavix.     02/23/2022:-  Patient with a hx of DM, hyperlipidemia presents today for a runny nose and intermittent lower abdominal pain x 4 days.    Reports 3-4 episodes of diarrhea, nausea, sinus pain, congestion, fatigue, and AMS. Lost 10 pounds in 4 days. Denies sore throat,  constipation, blood in stool, dysuria. Stopped taking BCs 4-5 days ago. Took one this morning. Reports no modifying factors for abdominal pain. His diarrhea has stopped because he isn't eating anything.           02/09/2022:  Patient with a hx of DM, hyperlipidemia pesents today for back pain.    Reports lower non-radiating back pain. Mild relief with movement, medications. Was horse playing with his grandson.  Reports food gets stuck in esophagus. Has had his esophagus stretched before.     01/05/2022:-  This is a virtual visit for follow-up of chronic medical problems  Home blood pressure readings have been slightly high  Chronic back pain stable  Kidney function has improved this  time still takes BC Powder  Taking Ambien for insomnia  On chronic pain management and also takes marijuana prescription    06/03/2021:-  Doing okay he is planning to have a CLEMENCIA of his back wants to hold his Plavix legs requested by Dr. home med  Diabetes control is stable  Blood pressure normal stools continues to smoke at side effects to Chantix and has not tried the patch did go to the smoking cessation counseling  Suffers with peripheral arterial disease vascular surgeon is proposing a fem-fem graft  Kidney function is slightly worse in he does eat a few BC powders daily.      10/06/2020:-  He has took Keflex for the cellulitis of toe is seems that it was not working 1st now he has been prescribed doxycycline and clindamycin which he has taken today.    07/23/2020:-  Patient says he has some chronic issues with back pain he was on narcotic until he finally weaned himself off this was back   Couple days back he was lifting on his wife and that made his back pain worse.  He has some radiation to the right leg.  He has been taking excessively large number of BC tablets and Tylenol the keep the pain away.  He is interested in seeing pain management.  History of back surgery in the past.    Patient has hypertension, diabetes type to for number of years and he has 50 pack-year history of smoking still continues to smoke but does want to quit.    He has had at least 2 CVA the 1 was minor from which he completely recovered the 2nd 1 affected his right leg and to a lesser degrees right arm.  This was thought to be due to uncontrolled hypertension and diabetes.  His last A1c was 6.2 does appear that he has chronic kidney disease last creatinine was 1.4.    It has been number of years since he had his colonoscopy      ROS:  Review of Systems   Constitutional: Negative for appetite change, chills and fever.   HENT: Positive for trouble swallowing. Negative for congestion, ear pain, postnasal drip, rhinorrhea, sinus pressure  "and sinus pain.    Eyes: Negative for pain.   Respiratory: Positive for cough. Negative for chest tightness and shortness of breath.    Cardiovascular: Negative for chest pain and palpitations.   Gastrointestinal: Negative for abdominal pain, blood in stool, constipation, diarrhea and nausea.   Genitourinary: Negative for difficulty urinating, dysuria, flank pain and hematuria.   Musculoskeletal: Negative for arthralgias and myalgias.   Skin: Negative for pallor and wound.   Neurological: Negative for dizziness, tremors, speech difficulty, light-headedness and headaches.   Psychiatric/Behavioral: Negative for behavioral problems, dysphoric mood and sleep disturbance. The patient is not nervous/anxious.    All other systems reviewed and are negative.      Past Medical History:   Diagnosis Date    Anxiety     Depression     Diabetes mellitus, type 2     Hypertension     Stroke        Social History:  Social History     Socioeconomic History    Marital status:    Tobacco Use    Smoking status: Current Every Day Smoker     Packs/day: 1.00     Types: Cigarettes    Smokeless tobacco: Current User     Types: Snuff   Substance and Sexual Activity    Alcohol use: Not Currently    Drug use: Never    Sexual activity: Not Currently       Family History:   family history includes COPD in his sister; Cancer in his father; Heart disease in his mother; Stroke in his mother.    Health Maintenance   Topic Date Due    Foot Exam  Never done    TETANUS VACCINE  Never done    Eye Exam  07/21/2022    Hemoglobin A1c  12/23/2022    Lipid Panel  04/27/2023    Hepatitis C Screening  Completed    Abdominal Aortic Aneurysm Screening  Completed       Physical Exam:    Vital Signs  Temp: 97.5 °F (36.4 °C)  Temp src: Temporal  Pulse: 61  SpO2: 96 %  BP: 114/60  BP Location: Left arm  Patient Position: Sitting  Pain Score: 0-No pain  Height and Weight  Height: 5' 6" (167.6 cm)  Weight: 58.5 kg (128 lb 15.5 oz)  BSA " (Calculated - sq m): 1.65 sq meters  BMI (Calculated): 20.8  Weight in (lb) to have BMI = 25: 154.6]    Body mass index is 20.82 kg/m².    Physical Exam  Vitals and nursing note reviewed.   Constitutional:       Appearance: Normal appearance.   HENT:      Head: Normocephalic and atraumatic.      Right Ear: Tympanic membrane normal.      Left Ear: Tympanic membrane normal.   Eyes:      Extraocular Movements: Extraocular movements intact.      Pupils: Pupils are equal, round, and reactive to light.   Cardiovascular:      Rate and Rhythm: Normal rate and regular rhythm.      Pulses: Normal pulses.      Heart sounds: Normal heart sounds. No murmur heard.    No gallop.   Pulmonary:      Effort: Pulmonary effort is normal. No respiratory distress.      Breath sounds: Normal breath sounds. No wheezing, rhonchi or rales.   Abdominal:      General: There is no distension.      Palpations: Abdomen is soft.      Tenderness: There is no abdominal tenderness.   Musculoskeletal:         General: No swelling, deformity or signs of injury. Normal range of motion.      Cervical back: Normal range of motion.   Skin:     General: Skin is warm and dry.      Capillary Refill: Capillary refill takes less than 2 seconds.      Coloration: Skin is not jaundiced or pale.   Neurological:      General: No focal deficit present.      Mental Status: He is alert and oriented to person, place, and time.      Comments: Ambulates with rollator   Psychiatric:         Mood and Affect: Mood normal.         Behavior: Behavior normal.       Straight leg raising test is positive on the right but that is a chronic finding.    Diabetes Management Status    Statin: Not taking  ACE/ARB: Taking    Screening or Prevention Patient's value Goal Complete/Controlled?   HgA1C Testing and Control   Lab Results   Component Value Date    HGBA1C 5.3 06/23/2022      Annually/Less than 8% No   Lipid profile : 04/27/2022 Annually No   LDL control Lab Results   Component  Value Date    LDLCALC 57.2 (L) 04/27/2022    Annually/Less than 100 mg/dl  No   Nephropathy screening Lab Results   Component Value Date    LABMICR 8.0 06/23/2022     Lab Results   Component Value Date    PROTEINUA Negative 05/11/2022    Annually No   Blood pressure BP Readings from Last 1 Encounters:   07/06/22 114/60    Less than 140/90 Yes   Dilated retinal exam : 07/21/2021 Annually Yes   Foot exam   Most Recent Foot Exam Date: Not Found Annually Yes       Assessment:      ICD-10-CM ICD-9-CM   1. Esophageal dysphagia  R13.19 787.29   2. Cavitary lesion of lung  J98.4 518.89   3. Cough  R05.9 786.2   4. Controlled type 2 diabetes mellitus with stage 3 chronic kidney disease, without long-term current use of insulin  E11.22 250.40    N18.30 585.3   5. Diabetic eye exam  Z01.00 V72.0    E11.9 250.00   6. Smoker  F17.200 305.1     Plan:  Order EDG and refer to Endo Procedure  for esophageal dysphagia.   Refer to infectious diseases for cavitary lesion of lung. Order CT Chest With Contrast.   Complete Cologuard kit for cancer screening.  Get shingles, tetanus vaccination at your pharmacy.  Schedule diabetic eye exam.  Add protein shakes to diet.   Not interested in smoking cessation.  See labs below.      Orders Placed This Encounter   Procedures    CT Chest With Contrast    Hemoglobin A1C    Comprehensive Metabolic Panel    CBC Auto Differential    Microalbumin/Creatinine Ratio, Urine    Lipid Panel    Ambulatory referral/consult to Endo Procedure     Ambulatory referral/consult to Infectious Disease    Ambulatory referral/consult to Optometry       Current Outpatient Medications   Medication Sig Dispense Refill    apixaban (ELIQUIS) 5 mg Tab Take 1 tablet (5 mg total) by mouth 2 (two) times daily. (Patient taking differently: Take 5 mg by mouth once daily.) 74 tablet 2    clopidogreL (PLAVIX) 75 mg tablet Take 1 tablet (75 mg total) by mouth once daily. (Patient taking differently: Take  75 mg by mouth every other day.) 90 tablet 1    blood glucose control, low (TRUE METRIX LEVEL 1) Soln 1 drop by Misc.(Non-Drug; Combo Route) route once daily. 1 each 4    blood glucose control, normal (TRUE METRIX LEVEL 2) Soln 1 drop by Misc.(Non-Drug; Combo Route) route once daily. 1 each 4    blood sugar diagnostic Strp Use to test blood sugar once daily 100 strip 3    blood-glucose meter (TRUE METRIX GLUCOSE METER) Misc 1 Device by Misc.(Non-Drug; Combo Route) route once. for 1 dose 1 each 0    DULoxetine (CYMBALTA) 30 MG capsule Take 1 capsule (30 mg total) by mouth once daily. 90 capsule 1    FLUoxetine 20 MG capsule Take 1 capsule (20 mg total) by mouth once daily. 90 capsule 1    gabapentin (NEURONTIN) 300 MG capsule Take 1 capsule by mouth three times daily as needed 90 capsule 0    HYDROcodone-acetaminophen (NORCO) 5-325 mg per tablet Take 1 tablet by mouth 3 (three) times daily.      lancets 32 gauge Misc 1 lancet by Misc.(Non-Drug; Combo Route) route once daily. 30 each 4    NON FORMULARY MEDICATION THC+ 300 NATURAL SL TINCTURE - IL[place]A [place]      ondansetron (ZOFRAN) 8 MG tablet Take 1 tablet (8 mg total) by mouth every 8 (eight) hours as needed for Nausea. 30 tablet 1    oxybutynin (DITROPAN-XL) 10 MG 24 hr tablet Take 1 tablet (10 mg total) by mouth once daily. 30 tablet 5    rosuvastatin (CRESTOR) 20 MG tablet Take 1 tablet (20 mg total) by mouth once daily. 90 tablet 1    tamsulosin (FLOMAX) 0.4 mg Cap Take 1 capsule (0.4 mg total) by mouth once daily. 90 capsule 1    triamcinolone acetonide 0.1% (KENALOG) 0.1 % cream Apply topically 2 (two) times daily. 45 g 0    walker (ULTRA-LIGHT ROLLATOR) Misc Use daily for ambulation 1 each 0    zolpidem (AMBIEN) 10 mg Tab Take 1 tablet (10 mg total) by mouth nightly as needed (insomnia). 30 tablet 5     No current facility-administered medications for this visit.       Medications Discontinued During This Encounter   Medication Reason     metFORMIN (GLUCOPHAGE) 500 MG tablet Patient no longer taking    lisinopriL 10 MG tablet     tiZANidine (ZANAFLEX) 4 MG tablet        Follow up in about 3 months (around 10/6/2022).      Damien Barron MD  Scribe Attestation:   I, Kat Nava, am scribing for, and in the presence of, Dr.Arif Barron I performed the above scribed service and the documentation accurately describes the services I performed. I attest to the accuracy of the note.    I, Dr. Damien Barron, reviewed documentation as scribed above. I performed the services described in this documentation.  I agree that the record reflects my personal performance and is accurate and complete. Damien Barron MD.  07/06/2022

## 2022-07-20 ENCOUNTER — HOSPITAL ENCOUNTER (OUTPATIENT)
Dept: PREADMISSION TESTING | Facility: HOSPITAL | Age: 72
Discharge: HOME | End: 2022-07-20
Attending: FAMILY MEDICINE
Payer: MEDICARE

## 2022-07-20 ENCOUNTER — TELEPHONE (OUTPATIENT)
Dept: PREADMISSION TESTING | Facility: HOSPITAL | Age: 72
End: 2022-07-20
Payer: MEDICARE

## 2022-07-20 DIAGNOSIS — R13.19 ESOPHAGEAL DYSPHAGIA: Primary | ICD-10-CM

## 2022-07-20 NOTE — TELEPHONE ENCOUNTER
Patient is scheduled for egd on 8/24/22. Is patient cleared to stop eliquis 2 days prior to procedure?  Deidre SO

## 2022-07-22 ENCOUNTER — HOSPITAL ENCOUNTER (OUTPATIENT)
Dept: RADIOLOGY | Facility: HOSPITAL | Age: 72
Discharge: HOME | End: 2022-07-22
Attending: FAMILY MEDICINE
Payer: MEDICARE

## 2022-07-22 DIAGNOSIS — J98.4 PULMONARY CAVITARY LESION: Primary | ICD-10-CM

## 2022-07-22 DIAGNOSIS — R05.9 COUGH: ICD-10-CM

## 2022-07-22 PROCEDURE — 25500020 PHARM REV CODE 255: Performed by: FAMILY MEDICINE

## 2022-07-22 PROCEDURE — 71260 CT THORAX DX C+: CPT | Mod: TC

## 2022-07-22 RX ADMIN — IOHEXOL 100 ML: 350 INJECTION, SOLUTION INTRAVENOUS at 02:07

## 2022-07-24 RX ORDER — DULOXETIN HYDROCHLORIDE 30 MG/1
CAPSULE, DELAYED RELEASE ORAL
Qty: 90 CAPSULE | Refills: 3 | Status: SHIPPED | OUTPATIENT
Start: 2022-07-24 | End: 2022-07-28 | Stop reason: SDUPTHER

## 2022-07-27 ENCOUNTER — HOSPITAL ENCOUNTER (OUTPATIENT)
Dept: RADIOLOGY | Facility: HOSPITAL | Age: 72
Discharge: HOME | End: 2022-07-27
Attending: FAMILY MEDICINE
Payer: MEDICARE

## 2022-07-27 DIAGNOSIS — J98.4 PULMONARY CAVITARY LESION: ICD-10-CM

## 2022-07-27 PROCEDURE — 78815 NM PET CT ROUTINE: ICD-10-PCS | Mod: 26,PS,, | Performed by: RADIOLOGY

## 2022-07-27 PROCEDURE — 78815 PET IMAGE W/CT SKULL-THIGH: CPT | Mod: 26,PS,, | Performed by: RADIOLOGY

## 2022-07-27 PROCEDURE — 78815 PET IMAGE W/CT SKULL-THIGH: CPT | Mod: TC

## 2022-07-28 RX ORDER — DULOXETIN HYDROCHLORIDE 30 MG/1
30 CAPSULE, DELAYED RELEASE ORAL DAILY
Qty: 90 CAPSULE | Refills: 3 | Status: SHIPPED | OUTPATIENT
Start: 2022-07-28 | End: 2022-08-02 | Stop reason: SDUPTHER

## 2022-07-28 NOTE — TELEPHONE ENCOUNTER
No new care gaps identified.  [facility] Embedded Care Gaps. Reference number: [id number]. 7/28/2022   11:48:29 AM ROVERTOT

## 2022-07-29 DIAGNOSIS — R05.9 COUGH: Primary | ICD-10-CM

## 2022-07-31 ENCOUNTER — HOSPITAL ENCOUNTER (OUTPATIENT)
Facility: HOSPITAL | Age: 72
Discharge: HOME | End: 2022-08-01
Attending: EMERGENCY MEDICINE | Admitting: INTERNAL MEDICINE
Payer: MEDICARE

## 2022-07-31 DIAGNOSIS — R25.1 SHAKING: ICD-10-CM

## 2022-07-31 DIAGNOSIS — R55 SYNCOPE, UNSPECIFIED SYNCOPE TYPE: Primary | ICD-10-CM

## 2022-07-31 DIAGNOSIS — D64.9 ANEMIA, UNSPECIFIED TYPE: ICD-10-CM

## 2022-07-31 DIAGNOSIS — R07.9 CHEST PAIN: ICD-10-CM

## 2022-07-31 LAB
ALBUMIN SERPL BCP-MCNC: 2.7 G/DL (ref 3.5–5.2)
ALP SERPL-CCNC: 144 U/L (ref 55–135)
ALT SERPL W/O P-5'-P-CCNC: 24 U/L (ref 10–44)
ANION GAP SERPL CALC-SCNC: 10 MMOL/L (ref 8–16)
AST SERPL-CCNC: 46 U/L (ref 10–40)
BASOPHILS # BLD AUTO: 0.01 K/UL (ref 0–0.2)
BASOPHILS NFR BLD: 0.2 % (ref 0–1.9)
BILIRUB SERPL-MCNC: 0.2 MG/DL (ref 0.1–1)
BNP SERPL-MCNC: 20 PG/ML (ref 0–99)
BUN SERPL-MCNC: 13 MG/DL (ref 8–23)
CALCIUM SERPL-MCNC: 8.8 MG/DL (ref 8.7–10.5)
CHLORIDE SERPL-SCNC: 108 MMOL/L (ref 95–110)
CO2 SERPL-SCNC: 22 MMOL/L (ref 23–29)
CREAT SERPL-MCNC: 1.2 MG/DL (ref 0.5–1.4)
CTP QC/QA: YES
DIFFERENTIAL METHOD: ABNORMAL
EOSINOPHIL # BLD AUTO: 0.4 K/UL (ref 0–0.5)
EOSINOPHIL NFR BLD: 8 % (ref 0–8)
ERYTHROCYTE [DISTWIDTH] IN BLOOD BY AUTOMATED COUNT: 15.2 % (ref 11.5–14.5)
EST. GFR  (AFRICAN AMERICAN): >60 ML/MIN/1.73 M^2
EST. GFR  (NON AFRICAN AMERICAN): >60 ML/MIN/1.73 M^2
GLUCOSE SERPL-MCNC: 96 MG/DL (ref 70–110)
HCT VFR BLD AUTO: 31.8 % (ref 40–54)
HGB BLD-MCNC: 10.8 G/DL (ref 14–18)
IMM GRANULOCYTES # BLD AUTO: 0.01 K/UL (ref 0–0.04)
IMM GRANULOCYTES NFR BLD AUTO: 0.2 % (ref 0–0.5)
LYMPHOCYTES # BLD AUTO: 1.5 K/UL (ref 1–4.8)
LYMPHOCYTES NFR BLD: 31.9 % (ref 18–48)
MAGNESIUM SERPL-MCNC: 1.7 MG/DL (ref 1.6–2.6)
MCH RBC QN AUTO: 31.4 PG (ref 27–31)
MCHC RBC AUTO-ENTMCNC: 34 G/DL (ref 32–36)
MCV RBC AUTO: 92 FL (ref 82–98)
MONOCYTES # BLD AUTO: 0.3 K/UL (ref 0.3–1)
MONOCYTES NFR BLD: 7.1 % (ref 4–15)
NEUTROPHILS # BLD AUTO: 2.5 K/UL (ref 1.8–7.7)
NEUTROPHILS NFR BLD: 52.6 % (ref 38–73)
NRBC BLD-RTO: 0 /100 WBC
PLATELET # BLD AUTO: 232 K/UL (ref 150–450)
PMV BLD AUTO: 9.8 FL (ref 9.2–12.9)
POTASSIUM SERPL-SCNC: 3.5 MMOL/L (ref 3.5–5.1)
PROT SERPL-MCNC: 5.7 G/DL (ref 6–8.4)
RBC # BLD AUTO: 3.44 M/UL (ref 4.6–6.2)
SARS-COV-2 RDRP RESP QL NAA+PROBE: NEGATIVE
SODIUM SERPL-SCNC: 140 MMOL/L (ref 136–145)
TROPONIN I SERPL DL<=0.01 NG/ML-MCNC: <0.006 NG/ML (ref 0–0.03)
WBC # BLD AUTO: 4.77 K/UL (ref 3.9–12.7)

## 2022-07-31 PROCEDURE — 83880 ASSAY OF NATRIURETIC PEPTIDE: CPT | Performed by: EMERGENCY MEDICINE

## 2022-07-31 PROCEDURE — U0002 COVID-19 LAB TEST NON-CDC: HCPCS | Performed by: EMERGENCY MEDICINE

## 2022-07-31 PROCEDURE — 84484 ASSAY OF TROPONIN QUANT: CPT | Performed by: EMERGENCY MEDICINE

## 2022-07-31 PROCEDURE — 83735 ASSAY OF MAGNESIUM: CPT | Performed by: EMERGENCY MEDICINE

## 2022-07-31 PROCEDURE — 93010 ELECTROCARDIOGRAM REPORT: CPT | Mod: ,,, | Performed by: INTERNAL MEDICINE

## 2022-07-31 PROCEDURE — 99291 CRITICAL CARE FIRST HOUR: CPT | Mod: 25

## 2022-07-31 PROCEDURE — 85025 COMPLETE CBC W/AUTO DIFF WBC: CPT | Performed by: EMERGENCY MEDICINE

## 2022-07-31 PROCEDURE — 93005 ELECTROCARDIOGRAM TRACING: CPT

## 2022-07-31 PROCEDURE — 80053 COMPREHEN METABOLIC PANEL: CPT | Performed by: EMERGENCY MEDICINE

## 2022-07-31 PROCEDURE — 93010 EKG 12-LEAD: ICD-10-PCS | Mod: ,,, | Performed by: INTERNAL MEDICINE

## 2022-07-31 PROCEDURE — 82962 GLUCOSE BLOOD TEST: CPT

## 2022-08-01 ENCOUNTER — TELEPHONE (OUTPATIENT)
Dept: FAMILY MEDICINE | Facility: CLINIC | Age: 72
End: 2022-08-01
Payer: MEDICARE

## 2022-08-01 VITALS
RESPIRATION RATE: 13 BRPM | HEART RATE: 65 BPM | HEIGHT: 66 IN | DIASTOLIC BLOOD PRESSURE: 78 MMHG | BODY MASS INDEX: 20.73 KG/M2 | SYSTOLIC BLOOD PRESSURE: 130 MMHG | OXYGEN SATURATION: 96 % | TEMPERATURE: 99 F | WEIGHT: 129 LBS

## 2022-08-01 PROBLEM — R25.1 SHAKING: Status: ACTIVE | Noted: 2022-08-01

## 2022-08-01 LAB
POCT GLUCOSE: 89 MG/DL (ref 70–110)
TROPONIN I SERPL DL<=0.01 NG/ML-MCNC: <0.006 NG/ML (ref 0–0.03)

## 2022-08-01 PROCEDURE — G0378 HOSPITAL OBSERVATION PER HR: HCPCS

## 2022-08-01 PROCEDURE — 84484 ASSAY OF TROPONIN QUANT: CPT | Performed by: EMERGENCY MEDICINE

## 2022-08-01 RX ORDER — SODIUM,POTASSIUM PHOSPHATES 280-250MG
2 POWDER IN PACKET (EA) ORAL
Status: DISCONTINUED | OUTPATIENT
Start: 2022-08-01 | End: 2022-08-01 | Stop reason: HOSPADM

## 2022-08-01 RX ORDER — POLYETHYLENE GLYCOL 3350 17 G/17G
17 POWDER, FOR SOLUTION ORAL DAILY PRN
Status: DISCONTINUED | OUTPATIENT
Start: 2022-08-01 | End: 2022-08-01 | Stop reason: HOSPADM

## 2022-08-01 RX ORDER — ATORVASTATIN CALCIUM 10 MG/1
10 TABLET, FILM COATED ORAL DAILY
Refills: 1 | Status: DISCONTINUED | OUTPATIENT
Start: 2022-08-01 | End: 2022-08-01 | Stop reason: HOSPADM

## 2022-08-01 RX ORDER — ACETAMINOPHEN 325 MG/1
650 TABLET ORAL EVERY 4 HOURS PRN
Status: DISCONTINUED | OUTPATIENT
Start: 2022-08-01 | End: 2022-08-01 | Stop reason: HOSPADM

## 2022-08-01 RX ORDER — SODIUM CHLORIDE 0.9 % (FLUSH) 0.9 %
10 SYRINGE (ML) INJECTION EVERY 8 HOURS
Status: DISCONTINUED | OUTPATIENT
Start: 2022-08-01 | End: 2022-08-01 | Stop reason: HOSPADM

## 2022-08-01 RX ORDER — DULOXETIN HYDROCHLORIDE 30 MG/1
30 CAPSULE, DELAYED RELEASE ORAL DAILY
Status: DISCONTINUED | OUTPATIENT
Start: 2022-08-01 | End: 2022-08-01 | Stop reason: HOSPADM

## 2022-08-01 RX ORDER — HYDROCODONE BITARTRATE AND ACETAMINOPHEN 5; 325 MG/1; MG/1
1 TABLET ORAL EVERY 6 HOURS PRN
Status: DISCONTINUED | OUTPATIENT
Start: 2022-08-01 | End: 2022-08-01 | Stop reason: HOSPADM

## 2022-08-01 RX ORDER — LANOLIN ALCOHOL/MO/W.PET/CERES
800 CREAM (GRAM) TOPICAL
Status: DISCONTINUED | OUTPATIENT
Start: 2022-08-01 | End: 2022-08-01 | Stop reason: HOSPADM

## 2022-08-01 RX ORDER — CLOPIDOGREL BISULFATE 75 MG/1
75 TABLET ORAL EVERY OTHER DAY
Status: DISCONTINUED | OUTPATIENT
Start: 2022-08-01 | End: 2022-08-01 | Stop reason: HOSPADM

## 2022-08-01 RX ORDER — GLUCAGON 1 MG
1 KIT INJECTION
Status: DISCONTINUED | OUTPATIENT
Start: 2022-08-01 | End: 2022-08-01 | Stop reason: HOSPADM

## 2022-08-01 RX ORDER — IBUPROFEN 200 MG
16 TABLET ORAL
Status: DISCONTINUED | OUTPATIENT
Start: 2022-08-01 | End: 2022-08-01 | Stop reason: HOSPADM

## 2022-08-01 RX ORDER — IBUPROFEN 200 MG
24 TABLET ORAL
Status: DISCONTINUED | OUTPATIENT
Start: 2022-08-01 | End: 2022-08-01 | Stop reason: HOSPADM

## 2022-08-01 RX ORDER — TAMSULOSIN HYDROCHLORIDE 0.4 MG/1
1 CAPSULE ORAL DAILY
Status: DISCONTINUED | OUTPATIENT
Start: 2022-08-01 | End: 2022-08-01 | Stop reason: HOSPADM

## 2022-08-01 RX ORDER — TALC
6 POWDER (GRAM) TOPICAL NIGHTLY PRN
Status: DISCONTINUED | OUTPATIENT
Start: 2022-08-01 | End: 2022-08-01 | Stop reason: HOSPADM

## 2022-08-01 RX ORDER — NALOXONE HCL 0.4 MG/ML
0.02 VIAL (ML) INJECTION
Status: DISCONTINUED | OUTPATIENT
Start: 2022-08-01 | End: 2022-08-01 | Stop reason: HOSPADM

## 2022-08-01 RX ORDER — FLUOXETINE HYDROCHLORIDE 20 MG/1
20 CAPSULE ORAL DAILY
Status: DISCONTINUED | OUTPATIENT
Start: 2022-08-01 | End: 2022-08-01 | Stop reason: HOSPADM

## 2022-08-01 RX ORDER — INSULIN ASPART 100 [IU]/ML
0-5 INJECTION, SOLUTION INTRAVENOUS; SUBCUTANEOUS
Status: DISCONTINUED | OUTPATIENT
Start: 2022-08-01 | End: 2022-08-01 | Stop reason: HOSPADM

## 2022-08-01 NOTE — ASSESSMENT & PLAN NOTE
Patient's FSGs are controlled on current medication regimen.  Last A1c reviewed-   Lab Results   Component Value Date    HGBA1C 5.3 06/23/2022     Most recent fingerstick glucose reviewed-   Recent Labs   Lab 08/01/22  0208   POCTGLUCOSE 89     Current correctional scale  Low  Maintain anti-hyperglycemic dose as follows-   Antihyperglycemics (From admission, onward)            Start     Stop Route Frequency Ordered    08/01/22 0158  insulin aspart U-100 pen 0-5 Units         -- SubQ Before meals & nightly PRN 08/01/22 0059        Hold Oral hypoglycemics while patient is in the hospital.

## 2022-08-01 NOTE — DISCHARGE SUMMARY
"[facility] - Emergency Dept.  Hospital Medicine  Discharge Summary      Patient Name: Samy Alejandre Jr.  MRN: [id number]  Patient Class: OP- Observation  Admission Date: 7/31/2022  Hospital Length of Stay: 0 days  Discharge Date and Time:  08/01/2022 5:24 AM  Attending Physician: Ralph Panda Do, MD   Discharging Provider: Jamie Salvador MD  Primary Care Provider: Damien Barron MD      HPI:     History obtained from the daughter- Bonny at bedside             72 y.o. male patient with a PMHx of anxiety, depression, DM, HTN, stroke and seizure who presents to the Emergency Department for evaluation of seizure like activity which onset suddenly PTA Daughter reports that patient was noted to have "shaking movements " of the arms today that lasted for a brief period and he was back to his base line afterwards . The daughter reports that he had epilepsy as a child and has not been on seizure medication for a long time. There is no  history of trauma ,CP, fever, chills, nausea, vomiting. At this time, he is back to his baseline.He did not bite his tongue and had no urinary continence his daughter reports that he was not postictal.  Labs and imaging test -    CT head-   No definite acute intracranial CT abnormality.  Clinical correlation   PET scan -07/22-Chest: There is Redemonstration of irregular parenchymal opacities with associated cavitary change in the anterior segment left upper lobe and superior segment left lower lobe.  SUV max is 3.2 in the upper lobe and 4.7 in the lower lobe.  A few small weakly FDG avid satellite nodules identified in the lower lobe.  No FDG avid mediastinal, hilar or axillary lymph nodes.   Labs - BMP- normal  He will be placed on observation       * No surgery found *      Hospital Course:   The daughter requested that patient be discharged as he has not had any symptoms or athymias since admission.  Patient is a baseline.  She was advised to follow up with PCP on discharge .         Goals of " Care Treatment Preferences:  Code Status: Full Code      Consults:     * Shaking    This needs to be observed for some time. Less likely to be a true seizure- follow neurology on discharge      Pulmonary cavitary lesion  PET scan -07/22  Chest: There is Redemonstration of irregular parenchymal opacities with associated cavitary change in the anterior segment left upper lobe and superior segment left lower lobe.  SUV max is 3.2 in the upper lobe and 4.7 in the lower lobe.  A few small weakly FDG avid satellite nodules identified in the lower lobe.  No FDG avid mediastinal, hilar or axillary lymph nodes.  Follow ID /pulmonology        Controlled type 2 diabetes mellitus with stage 3 chronic kidney disease, without long-term current use of insulin  Patient's FSGs are controlled on current medication regimen.  Last A1c reviewed-   Lab Results   Component Value Date    HGBA1C 5.3 06/23/2022     Most recent fingerstick glucose reviewed-   Recent Labs   Lab 08/01/22  0208   POCTGLUCOSE 89     Current correctional scale  Low  Maintain anti-hyperglycemic dose as follows-   Antihyperglycemics (From admission, onward)            Start     Stop Route Frequency Ordered    08/01/22 0158  insulin aspart U-100 pen 0-5 Units         -- SubQ Before meals & nightly PRN 08/01/22 0059        Hold Oral hypoglycemics while patient is in the hospital.      Final Active Diagnoses:    Diagnosis Date Noted POA    PRINCIPAL PROBLEM:  Shaking [R25.1] 08/01/2022 Yes    Anemia [D64.9] 03/24/2022 Yes    Pulmonary cavitary lesion [J98.4] 03/20/2022 Yes    Controlled type 2 diabetes mellitus with stage 3 chronic kidney disease, without long-term current use of insulin [E11.22, N18.30] 06/03/2021 Yes      Problems Resolved During this Admission:       Discharged Condition: stable    Disposition: Home or Self Care    Follow Up:   Follow-up Information     Damien Barron MD Follow up in 2 day(s).    Specialty: Family Medicine  Contact  information:  [address] [address] [address]  [phone]                       Patient Instructions:      Diet diabetic     Activity as tolerated       Significant Diagnostic Studies: Labs:   BMP:   Recent Labs   Lab 07/31/22 2037   GLU 96      K 3.5      CO2 22*   BUN 13   CREATININE 1.2   CALCIUM 8.8   MG 1.7       Pending Diagnostic Studies:     None         Medications:  Reconciled Home Medications:      Medication List      CHANGE how you take these medications    apixaban 5 mg Tab  Commonly known as: ELIQUIS  Take 1 tablet (5 mg total) by mouth 2 (two) times daily.  What changed: when to take this     clopidogreL 75 mg tablet  Commonly known as: PLAVIX  Take 1 tablet (75 mg total) by mouth once daily.  What changed: when to take this        CONTINUE taking these medications    blood glucose control, normal Soln  Commonly known as: TRUE METRIX LEVEL 2  1 drop by Misc.(Non-Drug; Combo Route) route once daily.     blood sugar diagnostic Strp  Use to test blood sugar once daily     blood-glucose meter Misc  Commonly known as: TRUE METRIX GLUCOSE METER  1 Device by Misc.(Non-Drug; Combo Route) route once. for 1 dose     DULoxetine 30 MG capsule  Commonly known as: CYMBALTA  Take 1 capsule (30 mg total) by mouth once daily.     FLUoxetine 20 MG capsule  Take 1 capsule (20 mg total) by mouth once daily.     gabapentin 300 MG capsule  Commonly known as: NEURONTIN  Take 1 capsule by mouth three times daily as needed     HYDROcodone-acetaminophen 5-325 mg per tablet  Commonly known as: NORCO  Take 1 tablet by mouth 3 (three) times daily.     lancets 32 gauge Misc  1 lancet by Misc.(Non-Drug; Combo Route) route once daily.     ondansetron 8 MG tablet  Commonly known as: ZOFRAN  Take 1 tablet (8 mg total) by mouth every 8 (eight) hours as needed for Nausea.     oxybutynin 10 MG 24 hr tablet  Commonly known as: DITROPAN-XL  Take 1 tablet (10 mg total) by mouth once daily.     rosuvastatin 20 MG  tablet  Commonly known as: CRESTOR  Take 1 tablet (20 mg total) by mouth once daily.     tamsulosin 0.4 mg Cap  Commonly known as: FLOMAX  Take 1 capsule (0.4 mg total) by mouth once daily.     TRUE METRIX LEVEL 1 Soln  Generic drug: blood glucose control, low  1 drop by Misc.(Non-Drug; Combo Route) route once daily.     ULTRA-LIGHT ROLLATOR Misc  Generic drug: walker  Use daily for ambulation     zolpidem 10 mg Tab  Commonly known as: AMBIEN  Take 1 tablet (10 mg total) by mouth nightly as needed (insomnia).            Indwelling Lines/Drains at time of discharge:   Lines/Drains/Airways     None                 Time spent on the discharge of patient:  45 minutes         Jamie Salvador MD  Department of Hospital Medicine  [facility] - Emergency Dept.

## 2022-08-01 NOTE — HPI
"  History obtained from the daughter- Bonny at bedside             72 y.o. male patient with a PMHx of anxiety, depression, DM, HTN, stroke and seizure who presents to the Emergency Department for evaluation of seizure like activity which onset suddenly PTA Daughter reports that patient was noted to have "shaking movements " of the arms today that lasted for a brief period and he was back to his base line afterwards . The daughter reports that he had epilepsy as a child and has not been on seizure medication for a long time. There is no  history of trauma ,CP, fever, chills, nausea, vomiting. At this time, he is back to his baseline.He did not bite his tongue and had no urinary continence his daughter reports that he was not postictal.  Labs and imaging test -    CT head-   No definite acute intracranial CT abnormality.  Clinical correlation   PET scan -07/22-Chest: There is Redemonstration of irregular parenchymal opacities with associated cavitary change in the anterior segment left upper lobe and superior segment left lower lobe.  SUV max is 3.2 in the upper lobe and 4.7 in the lower lobe.  A few small weakly FDG avid satellite nodules identified in the lower lobe.  No FDG avid mediastinal, hilar or axillary lymph nodes.   Labs - BMP- normal  He will be placed on observation   "

## 2022-08-01 NOTE — SUBJECTIVE & OBJECTIVE
Past Medical History:   Diagnosis Date    Anxiety     Depression     Diabetes mellitus, type 2     Hypertension     Stroke        Past Surgical History:   Procedure Laterality Date    ANGIOGRAPHY OF LOWER EXTREMITY N/A 12/21/2020    Procedure: ANGIOGRAM, LOWER EXTREMITY/Rt leg poss pta/stent;  Surgeon: Todd Michel MD;  Location: [facility] CATH LAB;  Service: Peripheral Vascular;  Laterality: N/A;  rescheduled 12/8    BACK SURGERY  2015    BRONCHOSCOPY Left 3/24/2022    Procedure: Bronchoscopy;  Surgeon: Edward Williamson MD;  Location: [facility] ENDO;  Service: Pulmonary;  Laterality: Left;  poss endobronchial biopsy or brushing    CATARACT EXTRACTION      KNEE SURGERY  2012    LITHOTRIPSY  2000       Review of patient's allergies indicates:   Allergen Reactions    Chantix [varenicline] Other (See Comments)     Felt bad       No current facility-administered medications on file prior to encounter.     Current Outpatient Medications on File Prior to Encounter   Medication Sig    apixaban (ELIQUIS) 5 mg Tab Take 1 tablet (5 mg total) by mouth 2 (two) times daily. (Patient taking differently: Take 5 mg by mouth once daily.)    DULoxetine (CYMBALTA) 30 MG capsule Take 1 capsule (30 mg total) by mouth once daily.    FLUoxetine 20 MG capsule Take 1 capsule (20 mg total) by mouth once daily.    gabapentin (NEURONTIN) 300 MG capsule Take 1 capsule by mouth three times daily as needed    HYDROcodone-acetaminophen (NORCO) 5-325 mg per tablet Take 1 tablet by mouth 3 (three) times daily.    oxybutynin (DITROPAN-XL) 10 MG 24 hr tablet Take 1 tablet (10 mg total) by mouth once daily.    rosuvastatin (CRESTOR) 20 MG tablet Take 1 tablet (20 mg total) by mouth once daily.    tamsulosin (FLOMAX) 0.4 mg Cap Take 1 capsule (0.4 mg total) by mouth once daily.    zolpidem (AMBIEN) 10 mg Tab Take 1 tablet (10 mg total) by mouth nightly as needed (insomnia).    blood glucose control, low (TRUE METRIX LEVEL 1) Soln 1 drop by Misc.(Non-Drug; Combo  Route) route once daily.    blood glucose control, normal (TRUE METRIX LEVEL 2) Soln 1 drop by Misc.(Non-Drug; Combo Route) route once daily.    blood sugar diagnostic Strp Use to test blood sugar once daily    blood-glucose meter (TRUE METRIX GLUCOSE METER) Misc 1 Device by Misc.(Non-Drug; Combo Route) route once. for 1 dose    clopidogreL (PLAVIX) 75 mg tablet Take 1 tablet (75 mg total) by mouth once daily. (Patient taking differently: Take 75 mg by mouth every other day.)    lancets 32 gauge Misc 1 lancet by Misc.(Non-Drug; Combo Route) route once daily.    ondansetron (ZOFRAN) 8 MG tablet Take 1 tablet (8 mg total) by mouth every 8 (eight) hours as needed for Nausea.    walker (ULTRA-LIGHT ROLLATOR) Misc Use daily for ambulation     Family History       Problem Relation (Age of Onset)    COPD Sister    Cancer Father    Heart disease Mother    Stroke Mother          Tobacco Use    Smoking status: Current Every Day Smoker     Packs/day: 1.00     Types: Cigarettes    Smokeless tobacco: Current User     Types: Snuff   Substance and Sexual Activity    Alcohol use: Not Currently    Drug use: Never    Sexual activity: Not Currently     Review of Systems   Constitutional:  Negative for activity change, appetite change, chills, diaphoresis and fatigue.   HENT:  Negative for congestion and dental problem.    Respiratory:  Negative for chest tightness.    Cardiovascular:  Negative for chest pain and leg swelling.   Gastrointestinal:  Negative for abdominal distention and abdominal pain.   Musculoskeletal:  Negative for arthralgias and back pain.   Neurological:         Seizure like activity    Psychiatric/Behavioral:  Negative for agitation and behavioral problems.    Objective:     Vital Signs (Most Recent):  Temp: 98.5 °F (36.9 °C) (07/31/22 2014)  Pulse: 65 (07/31/22 2130)  Resp: 14 (07/31/22 2130)  BP: 133/74 (07/31/22 2130)  SpO2: 97 % (07/31/22 2130) Vital Signs (24h Range):  Temp:  [98.5 °F (36.9 °C)] 98.5 °F (36.9  °C)  Pulse:  [65-78] 65  Resp:  [14-16] 14  SpO2:  [95 %-97 %] 97 %  BP: (114-133)/(74-79) 133/74     Weight: 58.5 kg (129 lb)  Body mass index is 20.82 kg/m².    Physical Exam  Vitals and nursing note reviewed.   Constitutional:       Appearance: He is well-developed.   HENT:      Head: Normocephalic and atraumatic.      Nose: Nose normal.   Eyes:      Pupils: Pupils are equal, round, and reactive to light.   Cardiovascular:      Rate and Rhythm: Normal rate and regular rhythm.      Heart sounds: Normal heart sounds.   Pulmonary:      Effort: Pulmonary effort is normal. No respiratory distress.      Breath sounds: Normal breath sounds. No wheezing or rales.   Abdominal:      General: Abdomen is flat. There is no distension.      Tenderness: There is no abdominal tenderness.   Musculoskeletal:         General: Normal range of motion.      Cervical back: Normal range of motion and neck supple.   Skin:     General: Skin is dry.   Neurological:      Mental Status: He is alert. Mental status is at baseline.   Psychiatric:         Mood and Affect: Mood normal.         CRANIAL NERVES     CN III, IV, VI   Pupils are equal, round, and reactive to light.     Significant Labs: All pertinent labs within the past 24 hours have been reviewed.  BMP:   Recent Labs   Lab 07/31/22 2037   GLU 96      K 3.5      CO2 22*   BUN 13   CREATININE 1.2   CALCIUM 8.8   MG 1.7     CBC:   Recent Labs   Lab 07/31/22 2037   WBC 4.77   HGB 10.8*   HCT 31.8*        CMP:   Recent Labs   Lab 07/31/22 2037      K 3.5      CO2 22*   GLU 96   BUN 13   CREATININE 1.2   CALCIUM 8.8   PROT 5.7*   ALBUMIN 2.7*   BILITOT 0.2   ALKPHOS 144*   AST 46*   ALT 24   ANIONGAP 10   EGFRNONAA >60       Significant Imaging: I have reviewed all pertinent imaging results/findings within the past 24 hours.

## 2022-08-01 NOTE — ASSESSMENT & PLAN NOTE
PET scan -07/22  Chest: There is Redemonstration of irregular parenchymal opacities with associated cavitary change in the anterior segment left upper lobe and superior segment left lower lobe.  SUV max is 3.2 in the upper lobe and 4.7 in the lower lobe.  A few small weakly FDG avid satellite nodules identified in the lower lobe.  No FDG avid mediastinal, hilar or axillary lymph nodes.  Follow ID /pulmonology

## 2022-08-01 NOTE — H&P
"[facility] - Emergency Dept.  [facility] Medicine  History & Physical    Patient Name: Samy Alejandre Jr.  MRN: [id number]  Patient Class: OP- Observation  Admission Date: 7/31/2022  Attending Physician: Ralph Panda Do, MD   Primary Care Provider: Damien Barron MD         Patient information was obtained from relative(s) and ER records.     Subjective:     Principal Problem:Shaking    Chief Complaint:   Chief Complaint   Patient presents with    Loss of Consciousness     Family report " he was shaking like a seizure while laying bed" upon arrival of [facility] pt was alert and mentation was baseline,          HPI:   History obtained from the daughter- Bonny at bedside             72 y.o. male patient with a PMHx of anxiety, depression, DM, HTN, stroke and seizure who presents to the Emergency Department for evaluation of seizure like activity which onset suddenly PTA Daughter reports that patient was noted to have "shaking movements " of the arms today that lasted for a brief period and he was back to his base line afterwards . The daughter reports that he had epilepsy as a child and has not been on seizure medication for a long time. There is no  history of trauma ,CP, fever, chills, nausea, vomiting. At this time, he is back to his baseline.He did not bite his tongue and had no urinary continence his daughter reports that he was not postictal.  Labs and imaging test -    CT head-   No definite acute intracranial CT abnormality.  Clinical correlation   PET scan -07/22-Chest: There is Redemonstration of irregular parenchymal opacities with associated cavitary change in the anterior segment left upper lobe and superior segment left lower lobe.  SUV max is 3.2 in the upper lobe and 4.7 in the lower lobe.  A few small weakly FDG avid satellite nodules identified in the lower lobe.  No FDG avid mediastinal, hilar or axillary lymph nodes.   Labs - BMP- normal  He will be placed on observation       Past Medical History: "   Diagnosis Date    Anxiety     Depression     Diabetes mellitus, type 2     Hypertension     Stroke        Past Surgical History:   Procedure Laterality Date    ANGIOGRAPHY OF LOWER EXTREMITY N/A 12/21/2020    Procedure: ANGIOGRAM, LOWER EXTREMITY/Rt leg poss pta/stent;  Surgeon: Todd Michel MD;  Location: [facility] CATH LAB;  Service: Peripheral Vascular;  Laterality: N/A;  rescheduled 12/8    BACK SURGERY  2015    BRONCHOSCOPY Left 3/24/2022    Procedure: Bronchoscopy;  Surgeon: Edward Williamson MD;  Location: [facility] ENDO;  Service: Pulmonary;  Laterality: Left;  poss endobronchial biopsy or brushing    CATARACT EXTRACTION      KNEE SURGERY  2012    LITHOTRIPSY  2000       Review of patient's allergies indicates:   Allergen Reactions    Chantix [varenicline] Other (See Comments)     Felt bad       No current facility-administered medications on file prior to encounter.     Current Outpatient Medications on File Prior to Encounter   Medication Sig    apixaban (ELIQUIS) 5 mg Tab Take 1 tablet (5 mg total) by mouth 2 (two) times daily. (Patient taking differently: Take 5 mg by mouth once daily.)    DULoxetine (CYMBALTA) 30 MG capsule Take 1 capsule (30 mg total) by mouth once daily.    FLUoxetine 20 MG capsule Take 1 capsule (20 mg total) by mouth once daily.    gabapentin (NEURONTIN) 300 MG capsule Take 1 capsule by mouth three times daily as needed    HYDROcodone-acetaminophen (NORCO) 5-325 mg per tablet Take 1 tablet by mouth 3 (three) times daily.    oxybutynin (DITROPAN-XL) 10 MG 24 hr tablet Take 1 tablet (10 mg total) by mouth once daily.    rosuvastatin (CRESTOR) 20 MG tablet Take 1 tablet (20 mg total) by mouth once daily.    tamsulosin (FLOMAX) 0.4 mg Cap Take 1 capsule (0.4 mg total) by mouth once daily.    zolpidem (AMBIEN) 10 mg Tab Take 1 tablet (10 mg total) by mouth nightly as needed (insomnia).    blood glucose control, low (TRUE METRIX LEVEL 1) Soln 1 drop by Misc.(Non-Drug; Combo  Route) route once daily.    blood glucose control, normal (TRUE METRIX LEVEL 2) Soln 1 drop by Misc.(Non-Drug; Combo Route) route once daily.    blood sugar diagnostic Strp Use to test blood sugar once daily    blood-glucose meter (TRUE METRIX GLUCOSE METER) Misc 1 Device by Misc.(Non-Drug; Combo Route) route once. for 1 dose    clopidogreL (PLAVIX) 75 mg tablet Take 1 tablet (75 mg total) by mouth once daily. (Patient taking differently: Take 75 mg by mouth every other day.)    lancets 32 gauge Misc 1 lancet by Misc.(Non-Drug; Combo Route) route once daily.    ondansetron (ZOFRAN) 8 MG tablet Take 1 tablet (8 mg total) by mouth every 8 (eight) hours as needed for Nausea.    walker (ULTRA-LIGHT ROLLATOR) Misc Use daily for ambulation     Family History       Problem Relation (Age of Onset)    COPD Sister    Cancer Father    Heart disease Mother    Stroke Mother          Tobacco Use    Smoking status: Current Every Day Smoker     Packs/day: 1.00     Types: Cigarettes    Smokeless tobacco: Current User     Types: Snuff   Substance and Sexual Activity    Alcohol use: Not Currently    Drug use: Never    Sexual activity: Not Currently     Review of Systems   Constitutional:  Negative for activity change, appetite change, chills, diaphoresis and fatigue.   HENT:  Negative for congestion and dental problem.    Respiratory:  Negative for chest tightness.    Cardiovascular:  Negative for chest pain and leg swelling.   Gastrointestinal:  Negative for abdominal distention and abdominal pain.   Musculoskeletal:  Negative for arthralgias and back pain.   Neurological:         Seizure like activity    Psychiatric/Behavioral:  Negative for agitation and behavioral problems.    Objective:     Vital Signs (Most Recent):  Temp: 98.5 °F (36.9 °C) (07/31/22 2014)  Pulse: 65 (07/31/22 2130)  Resp: 14 (07/31/22 2130)  BP: 133/74 (07/31/22 2130)  SpO2: 97 % (07/31/22 2130) Vital Signs (24h Range):  Temp:  [98.5 °F (36.9 °C)]  98.5 °F (36.9 °C)  Pulse:  [65-78] 65  Resp:  [14-16] 14  SpO2:  [95 %-97 %] 97 %  BP: (114-133)/(74-79) 133/74     Weight: 58.5 kg (129 lb)  Body mass index is 20.82 kg/m².    Physical Exam  Vitals and nursing note reviewed.   Constitutional:       Appearance: He is well-developed.   HENT:      Head: Normocephalic and atraumatic.      Nose: Nose normal.   Eyes:      Pupils: Pupils are equal, round, and reactive to light.   Cardiovascular:      Rate and Rhythm: Normal rate and regular rhythm.      Heart sounds: Normal heart sounds.   Pulmonary:      Effort: Pulmonary effort is normal. No respiratory distress.      Breath sounds: Normal breath sounds. No wheezing or rales.   Abdominal:      General: Abdomen is flat. There is no distension.      Tenderness: There is no abdominal tenderness.   Musculoskeletal:         General: Normal range of motion.      Cervical back: Normal range of motion and neck supple.   Skin:     General: Skin is dry.   Neurological:      Mental Status: He is alert. Mental status is at baseline.   Psychiatric:         Mood and Affect: Mood normal.         CRANIAL NERVES     CN III, IV, VI   Pupils are equal, round, and reactive to light.     Significant Labs: All pertinent labs within the past 24 hours have been reviewed.  BMP:   Recent Labs   Lab 07/31/22 2037   GLU 96      K 3.5      CO2 22*   BUN 13   CREATININE 1.2   CALCIUM 8.8   MG 1.7     CBC:   Recent Labs   Lab 07/31/22 2037   WBC 4.77   HGB 10.8*   HCT 31.8*        CMP:   Recent Labs   Lab 07/31/22 2037      K 3.5      CO2 22*   GLU 96   BUN 13   CREATININE 1.2   CALCIUM 8.8   PROT 5.7*   ALBUMIN 2.7*   BILITOT 0.2   ALKPHOS 144*   AST 46*   ALT 24   ANIONGAP 10   EGFRNONAA >60       Significant Imaging: I have reviewed all pertinent imaging results/findings within the past 24 hours.    Assessment/Plan:     * Shaking    This needs to be observed for some time. Less likely to be a true seizure- follow  neurology on discharge      Anemia  Will monitor closely,transfuse as needed       Pulmonary cavitary lesion  PET scan -07/22  Chest: There is Redemonstration of irregular parenchymal opacities with associated cavitary change in the anterior segment left upper lobe and superior segment left lower lobe.  SUV max is 3.2 in the upper lobe and 4.7 in the lower lobe.  A few small weakly FDG avid satellite nodules identified in the lower lobe.  No FDG avid mediastinal, hilar or axillary lymph nodes.  Follow ID /pulmonology        Controlled type 2 diabetes mellitus with stage 3 chronic kidney disease, without long-term current use of insulin  Patient's FSGs are controlled on current medication regimen.  Last A1c reviewed-   Lab Results   Component Value Date    HGBA1C 5.3 06/23/2022     Most recent fingerstick glucose reviewed-   Recent Labs   Lab 08/01/22  0208   POCTGLUCOSE 89     Current correctional scale  Low  Maintain anti-hyperglycemic dose as follows-   Antihyperglycemics (From admission, onward)            Start     Stop Route Frequency Ordered    08/01/22 0158  insulin aspart U-100 pen 0-5 Units         -- SubQ Before meals & nightly PRN 08/01/22 0059        Hold Oral hypoglycemics while patient is in the hospital.      VTE Risk Mitigation (From admission, onward)         Ordered     apixaban tablet 5 mg  Daily         08/01/22 0057                   Jamie Salvador MD  Department of Hospital Medicine   '[facility] - Emergency Dept.

## 2022-08-01 NOTE — TELEPHONE ENCOUNTER
----- Message from Damien Barron MD sent at 7/31/2022  3:20 PM CDT -----  An appointment with Pulmonary and Infectious Disease have been made for the abnormal PET scan.  Pulmonary has also been contacted and they will try to get a sooner appointment.  Orders for sputum for AFB have been put in.  Please have the patient perform the test.

## 2022-08-01 NOTE — ED PROVIDER NOTES
"SCRIBE #1 NOTE: I, Domojohnathon Garza, am scribing for, and in the presence of, Ralph Panda Do, MD. I have scribed the entire note.       History     Chief Complaint   Patient presents with    Loss of Consciousness     Family report " he was shaking like a seizure while laying bed" upon arrival of [facility] pt was alert and mentation was baseline,       Review of patient's allergies indicates:   Allergen Reactions    Chantix [varenicline] Other (See Comments)     Felt bad         History of Present Illness     HPI    7/31/2022, 9:51 PM  History obtained from the daughter and patient      History of Present Illness: Samy Alejandre Jr. is a 72 y.o. male patient with a PMHx of anxiety, depression, DM, HTN, 3 stroke and seizure who presents to the Emergency Department for evaluation of seizure like activity which onset suddenly PTA. Per the daughter, pt was laying in bed when his arms started to suddenly shake and was not responsive. After the episode he was back to his baseline.  She reports no postictal like state.  No urinary continence and patient did not bite his tongue.    The daughter reports that he had epilepsy as a child buthas not been on seizure medication for a long timedd ue to no seizures as an adul    tSymptoms are moderate in severity. No mitigating or exacerbating factors reported. Associated sxs include SOB. Patient denies any CP, fever, chills, nausea, vomiting, confusion, and all other sxs at this time. No further complaints or concerns at this time.       Arrival mode: [facility]    PCP: Damien Barron MD        Past Medical History:  Past Medical History:   Diagnosis Date    Anxiety     Depression     Diabetes mellitus, type 2     Hypertension     Stroke        Past Surgical History:  Past Surgical History:   Procedure Laterality Date    ANGIOGRAPHY OF LOWER EXTREMITY N/A 12/21/2020    Procedure: ANGIOGRAM, LOWER EXTREMITY/Rt leg poss pta/stent;  Surgeon: Todd Michel MD;  Location: [facility] CATH LAB;  " Service: Peripheral Vascular;  Laterality: N/A;  rescheduled 12/8    BACK SURGERY  2015    BRONCHOSCOPY Left 3/24/2022    Procedure: Bronchoscopy;  Surgeon: Edward Williamson MD;  Location: [facility];  Service: Pulmonary;  Laterality: Left;  poss endobronchial biopsy or brushing    CATARACT EXTRACTION      KNEE SURGERY  2012    LITHOTRIPSY  2000         Family History:  Family History   Problem Relation Age of Onset    Heart disease Mother     Stroke Mother     Cancer Father     COPD Sister        Social History:  Social History     Tobacco Use    Smoking status: Current Every Day Smoker     Packs/day: 1.00     Types: Cigarettes    Smokeless tobacco: Current User     Types: Snuff   Substance and Sexual Activity    Alcohol use: Not Currently    Drug use: Never    Sexual activity: Not Currently        Review of Systems     Review of Systems   Constitutional: Negative for chills and fever.   HENT: Negative for sore throat.    Respiratory: Positive for shortness of breath.    Cardiovascular: Negative for chest pain.   Gastrointestinal: Negative for nausea and vomiting.   Genitourinary: Negative for dysuria.   Musculoskeletal: Negative for back pain.   Skin: Negative for rash.   Neurological: Positive for seizures. Negative for weakness.   Hematological: Does not bruise/bleed easily.   Psychiatric/Behavioral: Negative for confusion.   All other systems reviewed and are negative.       Physical Exam     Initial Vitals [07/31/22 2014]   BP Pulse Resp Temp SpO2   114/79 78 16 98.5 °F (36.9 °C) 95 %      MAP       --          Physical Exam  Nursing Notes and Vital Signs Reviewed.  Constitutional: Patient is in no acute distress. Well-developed and well-nourished. Quiet.  Appears stated age  Head: Atraumatic. Normocephalic.  Eyes: PERRL. EOM intact. Conjunctivae are not pale. No scleral icterus.  ENT: Mucous membranes are moist. Oropharynx is clear and symmetric.    Neck: Supple. Full ROM. No  lymphadenopathy.  Cardiovascular: Regular rate. Regular rhythm. No murmurs, rubs, or gallops. Distal pulses are 1+ and symmetric.  Pulmonary/Chest: No respiratory distress. Clear to auscultation bilaterally. No wheezing or rales.  Abdominal: Soft and non-distended.  There is no tenderness.  No rebound, guarding, or rigidity. Good bowel sounds.  Genitourinary: No CVA tenderness  Musculoskeletal: Moves all extremities. No obvious deformities. No edema. No calf tenderness.  Skin: Warm and dry.  Neurological:  Alert, awake, and appropriate.  GCS 15. Follows all commands. Normal speech.  No acute focal neurological deficits are appreciated.  Psychiatric: Normal affect. Good eye contact. Appropriate in content.     ED Course   Critical Care    Date/Time: 7/31/2022 11:41 PM  Performed by: Ralph Panda Do, MD  Authorized by: Ralph Panda Do, MD   Direct patient critical care time: 10 minutes  Additional history critical care time: 5 minutes  Ordering / reviewing critical care time: 10 minutes  Documentation critical care time: 5 minutes  Consulting other physicians critical care time: 5 minutes  Total critical care time (exclusive of procedural time) : 35 minutes  Critical care time was exclusive of separately billable procedures and treating other patients.  Critical care was necessary to treat or prevent imminent or life-threatening deterioration of the following conditions: Hyponatremia; Hypokalemia.  Critical care was time spent personally by me on the following activities: blood draw for specimens, discussions with consultants, development of treatment plan with patient or surrogate, examination of patient, interpretation of cardiac output measurements, evaluation of patient's response to treatment, ordering and performing treatments and interventions, obtaining history from patient or surrogate, ordering and review of radiographic studies, ordering and review of laboratory studies, re-evaluation of patient's  "condition, review of old charts and pulse oximetry.        ED Vital Signs:  Vitals:    07/31/22 2014 07/31/22 2130   BP: 114/79 133/74   Pulse: 78 65   Resp: 16 14   Temp: 98.5 °F (36.9 °C)    TempSrc: Oral    SpO2: 95% 97%   Weight: 58.5 kg (129 lb)    Height: 5' 6" (1.676 m)        Abnormal Lab Results:  Labs Reviewed   CBC W/ AUTO DIFFERENTIAL - Abnormal; Notable for the following components:       Result Value    RBC 3.44 (*)     Hemoglobin 10.8 (*)     Hematocrit 31.8 (*)     MCH 31.4 (*)     RDW 15.2 (*)     All other components within normal limits   COMPREHENSIVE METABOLIC PANEL - Abnormal; Notable for the following components:    CO2 22 (*)     Total Protein 5.7 (*)     Albumin 2.7 (*)     Alkaline Phosphatase 144 (*)     AST 46 (*)     All other components within normal limits   TROPONIN I   B-TYPE NATRIURETIC PEPTIDE   MAGNESIUM   TROPONIN I   URINALYSIS, REFLEX TO URINE CULTURE   DRUG SCREEN PANEL, URINE EMERGENCY   SARS-COV-2 RDRP GENE    Narrative:     This test utilizes isothermal nucleic acid amplification   technology to detect the SARS-CoV-2 RdRp nucleic acid segment.   The analytical sensitivity (limit of detection) is 125 genome   equivalents/mL.   A POSITIVE result implies infection with the SARS-CoV-2 virus;   the patient is presumed to be contagious.     A NEGATIVE result means that SARS-CoV-2 nucleic acids are not   present above the limit of detection. A NEGATIVE result should be   treated as presumptive. It does not rule out the possibility of   COVID-19 and should not be the sole basis for treatment decisions.   If COVID-19 is strongly suspected based on clinical and exposure   history, re-testing using an alternate molecular assay should be   considered.   This test is only for use under the Food and Drug   Administration s Emergency Use Authorization (EUA).   Commercial kits are provided by [employer].   Performance characteristics of the EUA have been independently   verified by " [facility] Department of   Pathology and Laboratory Medicine.   _________________________________________________________________   The authorized Fact Sheet for Healthcare Providers and the authorized Fact   Sheet for Patients of the ID NOW COVID-19 are available on the FDA   website:     https://www.fda.gov/media/841077/download  https://www.fda.gov/media/489394/download          POCT GLUCOSE, HAND-HELD DEVICE   POCT GLUCOSE        All Lab Results:  Results for orders placed or performed during the hospital encounter of 07/31/22   CBC auto differential   Result Value Ref Range    WBC 4.77 3.90 - 12.70 K/uL    RBC 3.44 (L) 4.60 - 6.20 M/uL    Hemoglobin 10.8 (L) 14.0 - 18.0 g/dL    Hematocrit 31.8 (L) 40.0 - 54.0 %    MCV 92 82 - 98 fL    MCH 31.4 (H) 27.0 - 31.0 pg    MCHC 34.0 32.0 - 36.0 g/dL    RDW 15.2 (H) 11.5 - 14.5 %    Platelets 232 150 - 450 K/uL    MPV 9.8 9.2 - 12.9 fL    Immature Granulocytes 0.2 0.0 - 0.5 %    Gran # (ANC) 2.5 1.8 - 7.7 K/uL    Immature Grans (Abs) 0.01 0.00 - 0.04 K/uL    Lymph # 1.5 1.0 - 4.8 K/uL    Mono # 0.3 0.3 - 1.0 K/uL    Eos # 0.4 0.0 - 0.5 K/uL    Baso # 0.01 0.00 - 0.20 K/uL    nRBC 0 0 /100 WBC    Gran % 52.6 38.0 - 73.0 %    Lymph % 31.9 18.0 - 48.0 %    Mono % 7.1 4.0 - 15.0 %    Eosinophil % 8.0 0.0 - 8.0 %    Basophil % 0.2 0.0 - 1.9 %    Differential Method Automated    Comprehensive metabolic panel   Result Value Ref Range    Sodium 140 136 - 145 mmol/L    Potassium 3.5 3.5 - 5.1 mmol/L    Chloride 108 95 - 110 mmol/L    CO2 22 (L) 23 - 29 mmol/L    Glucose 96 70 - 110 mg/dL    BUN 13 8 - 23 mg/dL    Creatinine 1.2 0.5 - 1.4 mg/dL    Calcium 8.8 8.7 - 10.5 mg/dL    Total Protein 5.7 (L) 6.0 - 8.4 g/dL    Albumin 2.7 (L) 3.5 - 5.2 g/dL    Total Bilirubin 0.2 0.1 - 1.0 mg/dL    Alkaline Phosphatase 144 (H) 55 - 135 U/L    AST 46 (H) 10 - 40 U/L    ALT 24 10 - 44 U/L    Anion Gap 10 8 - 16 mmol/L    eGFR if African American >60 >60 mL/min/1.73 m^2    eGFR  if non African American >60 >60 mL/min/1.73 m^2   Troponin I #1   Result Value Ref Range    Troponin I <0.006 0.000 - 0.026 ng/mL   BNP   Result Value Ref Range    BNP 20 0 - 99 pg/mL   Magnesium   Result Value Ref Range    Magnesium 1.7 1.6 - 2.6 mg/dL   Troponin I #2   Result Value Ref Range    Troponin I <0.006 0.000 - 0.026 ng/mL   POCT COVID-19 Rapid Screening   Result Value Ref Range    POC Rapid COVID Negative Negative     Acceptable Yes    POCT glucose   Result Value Ref Range    POCT Glucose 89 70 - 110 mg/dL         Imaging Results:  Imaging Results          CT Head Without Contrast (Final result)  Result time 07/31/22 22:24:28    Final result by Alexander Lucia MD (07/31/22 22:24:28)                 Impression:      No definite acute intracranial CT abnormality.  Clinical correlation and further evaluation as warranted.    Senescent changes as above.    All CT scans at this facility are performed  using dose modulation techniques as appropriate to performed exam including the following:  automated exposure control; adjustment of mA and/or kV according to the patients size (this includes techniques or standardized protocols for targeted exams where dose is matched to indication/reason for exam: i.e. extremities or head);  iterative reconstruction technique.      Electronically signed by: Alexander Lucia  Date:    07/31/2022  Time:    22:24             Narrative:    EXAMINATION:  CT HEAD WITHOUT CONTRAST    CLINICAL HISTORY:  Seizure, new-onset, no history of trauma;Mental status change, unknown cause;    TECHNIQUE:  Low dose axial CT images obtained throughout the head without intravenous contrast. Sagittal and coronal reconstructions were performed.    COMPARISON:  None.    FINDINGS:  Intracranial compartment:    Ventricles and sulci are normal in size for age without evidence of hydrocephalus. No extra-axial blood or fluid collections.    Moderate microvascular ischemic change.  Remote  left thalamic and bilateral basal ganglia lacunar infarcts.  No parenchymal mass, hemorrhage, edema or major vascular distribution infarct.    Skull/extracranial contents (limited evaluation): No fracture. Mastoid air cells and paranasal sinuses are essentially clear.                               X-Ray Chest AP Portable (Final result)  Result time 07/31/22 20:53:53    Final result by Alexander Lucia MD (07/31/22 20:53:53)                 Impression:      No acute abnormality.      Electronically signed by: Alexander Lucia  Date:    07/31/2022  Time:    20:53             Narrative:    EXAMINATION:  XR CHEST AP PORTABLE    CLINICAL HISTORY:  Chest Pain;    TECHNIQUE:  Single frontal view of the chest was performed.    COMPARISON:  Multiple priors.    FINDINGS:  The lungs are clear, with normal appearance of pulmonary vasculature and no pleural effusion or pneumothorax.    The cardiac silhouette is normal in size. The hilar and mediastinal contours are unremarkable.    Degenerative change of the shoulders.                                 The EKG was ordered, reviewed, and independently interpreted by the ED provider.  Interpretation time: 20:37  Rate: 62 BPM  Rhythm: normal sinus rhythm  Interpretation: Rightward axis. No STEMI.             The Emergency Provider reviewed the vital signs and test results, which are outlined above.     ED Discussion       11:32 PM: Discussed case with ELIZABETH Graham  ([facility]). Dr. Salvador agrees with current care and management of pt and accepts admission.  Plan to admit the patient.  Patient had seizure-like activity not sure if it was a true seizure versus some type of arrhythmia.  Hospital medicine will watch him overnight trend his troponin keep him on a cardiac manner to rule out arrhythmia.    Admitting Service: Hospital Medicine  Admitting Physician: Dr. Salvador  Admit to: Obs med tele    11:32 PM: Re-evaluated pt. I have discussed test results, shared  treatment plan, and the need for admission with patient and family at bedside. Pt and family express understanding at this time and agree with all information. All questions answered. Pt and family have no further questions or concerns at this time. Pt is ready for admit.         Medical Decision Making:   Clinical Tests:   Lab Tests: Ordered and Reviewed  Radiological Study: Ordered and Reviewed  Medical Tests: Ordered and Reviewed            ED Medication(s):  Medications   apixaban tablet 5 mg (has no administration in time range)   clopidogreL tablet 75 mg (has no administration in time range)   DULoxetine DR capsule 30 mg (has no administration in time range)   FLUoxetine capsule 20 mg (has no administration in time range)   atorvastatin tablet 10 mg (has no administration in time range)   tamsulosin 24 hr capsule 0.4 mg (has no administration in time range)   sodium chloride 0.9% flush 10 mL (has no administration in time range)   melatonin tablet 6 mg (has no administration in time range)   polyethylene glycol packet 17 g (has no administration in time range)   acetaminophen tablet 650 mg (has no administration in time range)   HYDROcodone-acetaminophen 5-325 mg per tablet 1 tablet (has no administration in time range)   naloxone 0.4 mg/mL injection 0.02 mg (has no administration in time range)   potassium bicarbonate disintegrating tablet 50 mEq (has no administration in time range)   potassium bicarbonate disintegrating tablet 35 mEq (has no administration in time range)   potassium bicarbonate disintegrating tablet 60 mEq (has no administration in time range)   magnesium oxide tablet 800 mg (has no administration in time range)   magnesium oxide tablet 800 mg (has no administration in time range)   potassium, sodium phosphates 280-160-250 mg packet 2 packet (has no administration in time range)   potassium, sodium phosphates 280-160-250 mg packet 2 packet (has no administration in time range)   potassium,  sodium phosphates 280-160-250 mg packet 2 packet (has no administration in time range)   insulin aspart U-100 pen 0-5 Units (has no administration in time range)   glucose chewable tablet 16 g (has no administration in time range)   glucose chewable tablet 24 g (has no administration in time range)   glucagon (human recombinant) injection 1 mg (has no administration in time range)   dextrose 10% bolus 125 mL (has no administration in time range)   dextrose 10% bolus 250 mL (has no administration in time range)       New Prescriptions    No medications on file               Scribe Attestation:   Scribe #1: I performed the above scribed service and the documentation accurately describes the services I performed. I attest to the accuracy of the note.     Attending:   Physician Attestation Statement for Scribe #1: I, Ralph Panda Do, MD, personally performed the services described in this documentation, as scribed by Domo Garza, in my presence, and it is both accurate and complete.           Clinical Impression       ICD-10-CM ICD-9-CM   1. Syncope, unspecified syncope type  R55 780.2   2. Chest pain  R07.9 786.50       Disposition:   Disposition: Placed in Observation  Condition: Fair         Ralph Panda Do, MD  08/01/22 0238       Ralph Panda Do, MD  08/01/22 0682

## 2022-08-01 NOTE — ASSESSMENT & PLAN NOTE
This needs to be observed for some time. Less likely to be a true seizure- follow neurology on discharge

## 2022-08-01 NOTE — HOSPITAL COURSE
The daughter requested that patient be discharged as he has not had any symptoms or athymias since admission.  Patient is a baseline.  She was advised to follow up with PCP on discharge .

## 2022-08-02 RX ORDER — DULOXETIN HYDROCHLORIDE 30 MG/1
30 CAPSULE, DELAYED RELEASE ORAL DAILY
Qty: 90 CAPSULE | Refills: 3 | Status: SHIPPED | OUTPATIENT
Start: 2022-08-02 | End: 2023-08-03

## 2022-08-02 NOTE — TELEPHONE ENCOUNTER
No new care gaps identified.  [facility] Embedded Care Gaps. Reference number: [id number]. 8/02/2022   2:43:59 PM CDT

## 2022-08-04 ENCOUNTER — LAB VISIT (OUTPATIENT)
Dept: LAB | Facility: HOSPITAL | Age: 72
End: 2022-08-04
Attending: FAMILY MEDICINE
Payer: MEDICARE

## 2022-08-04 DIAGNOSIS — R05.9 COUGH: ICD-10-CM

## 2022-08-04 PROCEDURE — 87206 SMEAR FLUORESCENT/ACID STAI: CPT | Performed by: FAMILY MEDICINE

## 2022-08-04 PROCEDURE — 87556 M.TUBERCULO DNA AMP PROBE: CPT | Performed by: FAMILY MEDICINE

## 2022-08-04 PROCEDURE — 87116 MYCOBACTERIA CULTURE: CPT | Performed by: FAMILY MEDICINE

## 2022-08-04 PROCEDURE — 87015 SPECIMEN INFECT AGNT CONCNTJ: CPT | Performed by: FAMILY MEDICINE

## 2022-08-06 LAB
M TB CMPLX DNA SPEC QL NAA+PROBE: NORMAL
SPECIMEN SOURCE: NORMAL

## 2022-08-09 ENCOUNTER — LAB VISIT (OUTPATIENT)
Dept: LAB | Facility: HOSPITAL | Age: 72
End: 2022-08-09
Attending: INTERNAL MEDICINE
Payer: MEDICARE

## 2022-08-09 ENCOUNTER — OFFICE VISIT (OUTPATIENT)
Dept: INFECTIOUS DISEASES | Facility: CLINIC | Age: 72
End: 2022-08-09
Payer: MEDICARE

## 2022-08-09 VITALS
BODY MASS INDEX: 20.73 KG/M2 | WEIGHT: 129 LBS | SYSTOLIC BLOOD PRESSURE: 131 MMHG | HEART RATE: 72 BPM | HEIGHT: 66 IN | DIASTOLIC BLOOD PRESSURE: 76 MMHG

## 2022-08-09 DIAGNOSIS — J98.4 CAVITARY LESION OF LUNG: ICD-10-CM

## 2022-08-09 DIAGNOSIS — R05.9 COUGH: ICD-10-CM

## 2022-08-09 DIAGNOSIS — E11.22 CONTROLLED TYPE 2 DIABETES MELLITUS WITH STAGE 3 CHRONIC KIDNEY DISEASE, WITHOUT LONG-TERM CURRENT USE OF INSULIN: ICD-10-CM

## 2022-08-09 DIAGNOSIS — N18.30 CONTROLLED TYPE 2 DIABETES MELLITUS WITH STAGE 3 CHRONIC KIDNEY DISEASE, WITHOUT LONG-TERM CURRENT USE OF INSULIN: ICD-10-CM

## 2022-08-09 DIAGNOSIS — J98.4 PULMONARY CAVITARY LESION: ICD-10-CM

## 2022-08-09 PROCEDURE — 1159F PR MEDICATION LIST DOCUMENTED IN MEDICAL RECORD: ICD-10-PCS | Mod: CPTII,S$GLB,, | Performed by: INTERNAL MEDICINE

## 2022-08-09 PROCEDURE — 87116 MYCOBACTERIA CULTURE: CPT | Performed by: INTERNAL MEDICINE

## 2022-08-09 PROCEDURE — 3288F FALL RISK ASSESSMENT DOCD: CPT | Mod: CPTII,S$GLB,, | Performed by: INTERNAL MEDICINE

## 2022-08-09 PROCEDURE — 3072F PR LOW RISK FOR RETINOPATHY: ICD-10-PCS | Mod: CPTII,S$GLB,, | Performed by: INTERNAL MEDICINE

## 2022-08-09 PROCEDURE — 3075F SYST BP GE 130 - 139MM HG: CPT | Mod: CPTII,S$GLB,, | Performed by: INTERNAL MEDICINE

## 2022-08-09 PROCEDURE — 87015 SPECIMEN INFECT AGNT CONCNTJ: CPT | Performed by: INTERNAL MEDICINE

## 2022-08-09 PROCEDURE — 4010F PR ACE/ARB THEARPY RXD/TAKEN: ICD-10-PCS | Mod: CPTII,S$GLB,, | Performed by: INTERNAL MEDICINE

## 2022-08-09 PROCEDURE — 3078F DIAST BP <80 MM HG: CPT | Mod: CPTII,S$GLB,, | Performed by: INTERNAL MEDICINE

## 2022-08-09 PROCEDURE — 87206 SMEAR FLUORESCENT/ACID STAI: CPT | Performed by: INTERNAL MEDICINE

## 2022-08-09 PROCEDURE — 99999 PR PBB SHADOW E&M-EST. PATIENT-LVL III: ICD-10-PCS | Mod: PBBFAC,,, | Performed by: INTERNAL MEDICINE

## 2022-08-09 PROCEDURE — 4010F ACE/ARB THERAPY RXD/TAKEN: CPT | Mod: CPTII,S$GLB,, | Performed by: INTERNAL MEDICINE

## 2022-08-09 PROCEDURE — 3008F BODY MASS INDEX DOCD: CPT | Mod: CPTII,S$GLB,, | Performed by: INTERNAL MEDICINE

## 2022-08-09 PROCEDURE — 3044F HG A1C LEVEL LT 7.0%: CPT | Mod: CPTII,S$GLB,, | Performed by: INTERNAL MEDICINE

## 2022-08-09 PROCEDURE — 1101F PR PT FALLS ASSESS DOC 0-1 FALLS W/OUT INJ PAST YR: ICD-10-PCS | Mod: CPTII,S$GLB,, | Performed by: INTERNAL MEDICINE

## 2022-08-09 PROCEDURE — 3075F PR MOST RECENT SYSTOLIC BLOOD PRESS GE 130-139MM HG: ICD-10-PCS | Mod: CPTII,S$GLB,, | Performed by: INTERNAL MEDICINE

## 2022-08-09 PROCEDURE — 3288F PR FALLS RISK ASSESSMENT DOCUMENTED: ICD-10-PCS | Mod: CPTII,S$GLB,, | Performed by: INTERNAL MEDICINE

## 2022-08-09 PROCEDURE — 3066F NEPHROPATHY DOC TX: CPT | Mod: CPTII,S$GLB,, | Performed by: INTERNAL MEDICINE

## 2022-08-09 PROCEDURE — 99204 PR OFFICE/OUTPT VISIT, NEW, LEVL IV, 45-59 MIN: ICD-10-PCS | Mod: S$GLB,,, | Performed by: INTERNAL MEDICINE

## 2022-08-09 PROCEDURE — 1159F MED LIST DOCD IN RCRD: CPT | Mod: CPTII,S$GLB,, | Performed by: INTERNAL MEDICINE

## 2022-08-09 PROCEDURE — 99999 PR PBB SHADOW E&M-EST. PATIENT-LVL III: CPT | Mod: PBBFAC,,, | Performed by: INTERNAL MEDICINE

## 2022-08-09 PROCEDURE — 3066F PR DOCUMENTATION OF TREATMENT FOR NEPHROPATHY: ICD-10-PCS | Mod: CPTII,S$GLB,, | Performed by: INTERNAL MEDICINE

## 2022-08-09 PROCEDURE — 3044F PR MOST RECENT HEMOGLOBIN A1C LEVEL <7.0%: ICD-10-PCS | Mod: CPTII,S$GLB,, | Performed by: INTERNAL MEDICINE

## 2022-08-09 PROCEDURE — 99204 OFFICE O/P NEW MOD 45 MIN: CPT | Mod: S$GLB,,, | Performed by: INTERNAL MEDICINE

## 2022-08-09 PROCEDURE — 3061F PR NEG MICROALBUMINURIA RESULT DOCUMENTED/REVIEW: ICD-10-PCS | Mod: CPTII,S$GLB,, | Performed by: INTERNAL MEDICINE

## 2022-08-09 PROCEDURE — 3072F LOW RISK FOR RETINOPATHY: CPT | Mod: CPTII,S$GLB,, | Performed by: INTERNAL MEDICINE

## 2022-08-09 PROCEDURE — 3078F PR MOST RECENT DIASTOLIC BLOOD PRESSURE < 80 MM HG: ICD-10-PCS | Mod: CPTII,S$GLB,, | Performed by: INTERNAL MEDICINE

## 2022-08-09 PROCEDURE — 1101F PT FALLS ASSESS-DOCD LE1/YR: CPT | Mod: CPTII,S$GLB,, | Performed by: INTERNAL MEDICINE

## 2022-08-09 PROCEDURE — 3061F NEG MICROALBUMINURIA REV: CPT | Mod: CPTII,S$GLB,, | Performed by: INTERNAL MEDICINE

## 2022-08-09 PROCEDURE — 3008F PR BODY MASS INDEX (BMI) DOCUMENTED: ICD-10-PCS | Mod: CPTII,S$GLB,, | Performed by: INTERNAL MEDICINE

## 2022-08-09 NOTE — PROGRESS NOTES
Subjective:       Patient ID: Samy Alejandre Jr. is a 72 y.o. male.    Chief Complaint: cavitary lung disease    HPI   72 year old man with PMI as listed below.  He was referred for cavitary lung disease.   PET scan -07/22-Chest: There is Redemonstration of irregular parenchymal opacities with associated cavitary change in the anterior segment left upper lobe and superior segment left lower lobe.  SUV max is 3.2 in the upper lobe and 4.7 in the lower lobe.  A few small weakly FDG avid satellite nodules identified in the lower lobe.  No FDG avid mediastinal, hilar or axillary lymph nodes.  He had bronchoscopy done -03/22/22  Cultures -  MYCOBACTERIUM GORDONAE   Component 5 d ago    AFB Culture & Smear Culture in progress P    AFB CULTURE STAIN No acid fast bacilli seen   Pathology -  Bronchial washing for cytology (thin prep and cell block)   - Pulmonary macrophages and leukocyte laden mucin with rare scattered   benign-appearing respiratory epithelial cells   -  Negative  for malignancy     He is here with his daughter    Past Medical History:   Diagnosis Date    Anxiety     Depression     Diabetes mellitus, type 2     Hypertension     Stroke      Past Surgical History:   Procedure Laterality Date    ANGIOGRAPHY OF LOWER EXTREMITY N/A 12/21/2020    Procedure: ANGIOGRAM, LOWER EXTREMITY/Rt leg poss pta/stent;  Surgeon: Todd Michel MD;  Location: [facility] CATH LAB;  Service: Peripheral Vascular;  Laterality: N/A;  rescheduled 12/8    BACK SURGERY  2015    BRONCHOSCOPY Left 3/24/2022    Procedure: Bronchoscopy;  Surgeon: Edward Williamson MD;  Location: [facility] ENDO;  Service: Pulmonary;  Laterality: Left;  poss endobronchial biopsy or brushing    CATARACT EXTRACTION      KNEE SURGERY  2012    LITHOTRIPSY  2000     Family History   Problem Relation Age of Onset    Heart disease Mother     Stroke Mother     Cancer Father     COPD Sister      Social History     Socioeconomic History    Marital status:     Tobacco Use    Smoking status: Current Every Day Smoker     Packs/day: 1.00     Types: Cigarettes    Smokeless tobacco: Current User     Types: Snuff   Substance and Sexual Activity    Alcohol use: Not Currently    Drug use: Never    Sexual activity: Not Currently     Review of Systems   Constitutional: Negative for activity change, appetite change and chills.   HENT: Negative for congestion and dental problem.    Respiratory: Positive for cough. Negative for shortness of breath.    Gastrointestinal: Negative for abdominal distention and abdominal pain.   Musculoskeletal: Negative for arthralgias and back pain.       Objective:      Physical Exam  Vitals and nursing note reviewed.   HENT:      Head: Normocephalic and atraumatic.   Eyes:      Pupils: Pupils are equal, round, and reactive to light.   Neck:      Thyroid: No thyromegaly.   Cardiovascular:      Rate and Rhythm: Normal rate and regular rhythm.   Pulmonary:      Effort: Pulmonary effort is normal. No respiratory distress.      Breath sounds: No wheezing.   Abdominal:      General: Bowel sounds are normal.      Palpations: Abdomen is soft.      Tenderness: There is no abdominal tenderness.   Musculoskeletal:      Cervical back: Normal range of motion and neck supple.      Comments: On a wheelchair ,right sided weakness   Skin:     General: Skin is warm.   Neurological:      Mental Status: He is oriented to person, place, and time.   Psychiatric:         Mood and Affect: Mood normal.         Assessment:       1. Cavitary lesion of lung  Ambulatory referral/consult to Infectious Disease    AFB CULTURE & SMEAR   2. Cough  Ambulatory referral/consult to Infectious Disease    AFB CULTURE & SMEAR   3. Pulmonary cavitary lesion     4. Controlled type 2 diabetes mellitus with stage 3 chronic kidney disease, without long-term current use of insulin          Plan:       Problem List Items Addressed This Visit     Controlled type 2 diabetes mellitus  with stage 3 chronic kidney disease, without long-term current use of insulin     On insulin regime, follow PCP           Pulmonary cavitary lesion     He had bronchoscopy done -03/22 and prelim cultures- M teresae.  This is usually a contaminant-rarely responsible for invasive disease.  It is among the least pathogenic of all NTMS.  Will not treat for now  Follow repeat sputum cultures   Diagnosis is also based on  recovery of organism on multiple occasions, e.g., ? 3 respiratory specimens on separate days    One group has proposed that pulmonary M. gordonae be only diagnosed with ? 3 respiratory specimens AND 1 specimen with positive AFB smear[  2. Danette K, Ana Y, Kaylah Y, et al. Clinical and microbiological features of definite Mycobacterium gordonae pulmonary disease: the establishment of diagnostic criteria for low-virulence mycobacteria. Trans R Soc Trop Med Hyg. 2015;109(9):589-93. [PMID:[id number]]  Comment: Authors looked at reported M. gordonae in respiratory specimens. A total of 287 respiratory samples (209 cases) were positive for M. gordonae. Twenty-seven cases (12.9%) had a positive culture more than twice and 11 of these cases (5.3%) had more than three positive cultures. Authors then state only 3 had authentic infection by their diagnostic criteria: 3 repeated positive specimens and 1 positive smear. Definitive disease tended to have culture growth earlier (9d) compared to contaminants (24d). They also conclude that subtype C is more virulent (by rpoB analysis) than other subtypes.       .             Other Visit Diagnoses     Cavitary lesion of lung        Relevant Orders    AFB CULTURE & SMEAR    Cough        Relevant Orders    AFB CULTURE & SMEAR

## 2022-08-09 NOTE — ASSESSMENT & PLAN NOTE
He had bronchoscopy done -03/22 and prelim cultures- RANJAAN hannah.  This is usually a contaminant-rarely responsible for invasive disease.  It is among the least pathogenic of all NTMS.  Will not treat for now  Follow repeat sputum cultures .

## 2022-08-17 ENCOUNTER — PES CALL (OUTPATIENT)
Dept: ADMINISTRATIVE | Facility: CLINIC | Age: 72
End: 2022-08-17
Payer: MEDICARE

## 2022-08-18 ENCOUNTER — LAB VISIT (OUTPATIENT)
Dept: LAB | Facility: HOSPITAL | Age: 72
End: 2022-08-18
Attending: INTERNAL MEDICINE
Payer: MEDICARE

## 2022-08-18 DIAGNOSIS — R05.9 COUGH: ICD-10-CM

## 2022-08-18 PROCEDURE — 87077 CULTURE AEROBIC IDENTIFY: CPT | Performed by: INTERNAL MEDICINE

## 2022-08-18 PROCEDURE — 87186 SC STD MICRODIL/AGAR DIL: CPT | Performed by: INTERNAL MEDICINE

## 2022-08-18 PROCEDURE — 87070 CULTURE OTHR SPECIMN AEROBIC: CPT | Performed by: INTERNAL MEDICINE

## 2022-08-18 PROCEDURE — 87205 SMEAR GRAM STAIN: CPT | Performed by: INTERNAL MEDICINE

## 2022-08-19 RX ORDER — LINEZOLID 600 MG/1
600 TABLET, FILM COATED ORAL 2 TIMES DAILY
Qty: 20 TABLET | Refills: 0 | Status: SHIPPED | OUTPATIENT
Start: 2022-08-19 | End: 2022-08-29

## 2022-08-21 LAB
BACTERIA SPEC AEROBE CULT: ABNORMAL
BACTERIA SPEC AEROBE CULT: ABNORMAL
GRAM STN SPEC: ABNORMAL

## 2022-08-24 ENCOUNTER — ANESTHESIA (OUTPATIENT)
Dept: ENDOSCOPY | Facility: HOSPITAL | Age: 72
End: 2022-08-24
Payer: MEDICARE

## 2022-08-24 ENCOUNTER — HOSPITAL ENCOUNTER (OUTPATIENT)
Facility: HOSPITAL | Age: 72
Discharge: HOME | End: 2022-08-24
Attending: INTERNAL MEDICINE | Admitting: INTERNAL MEDICINE
Payer: MEDICARE

## 2022-08-24 ENCOUNTER — ANESTHESIA EVENT (OUTPATIENT)
Dept: ENDOSCOPY | Facility: HOSPITAL | Age: 72
End: 2022-08-24
Payer: MEDICARE

## 2022-08-24 DIAGNOSIS — K22.2 ESOPHAGEAL STRICTURE: Primary | ICD-10-CM

## 2022-08-24 DIAGNOSIS — R13.10 DYSPHAGIA: ICD-10-CM

## 2022-08-24 DIAGNOSIS — R13.19 OTHER DYSPHAGIA: ICD-10-CM

## 2022-08-24 PROCEDURE — 88342 CHG IMMUNOCYTOCHEMISTRY: ICD-10-PCS | Mod: 26,,, | Performed by: STUDENT IN AN ORGANIZED HEALTH CARE EDUCATION/TRAINING PROGRAM

## 2022-08-24 PROCEDURE — 43249 ESOPH EGD DILATION <30 MM: CPT | Performed by: INTERNAL MEDICINE

## 2022-08-24 PROCEDURE — 88342 IMHCHEM/IMCYTCHM 1ST ANTB: CPT | Mod: 26,,, | Performed by: STUDENT IN AN ORGANIZED HEALTH CARE EDUCATION/TRAINING PROGRAM

## 2022-08-24 PROCEDURE — 88305 TISSUE EXAM BY PATHOLOGIST: ICD-10-PCS | Mod: 26,,, | Performed by: STUDENT IN AN ORGANIZED HEALTH CARE EDUCATION/TRAINING PROGRAM

## 2022-08-24 PROCEDURE — 37000009 HC ANESTHESIA EA ADD 15 MINS: Performed by: INTERNAL MEDICINE

## 2022-08-24 PROCEDURE — 43239 PR EGD, FLEX, W/BIOPSY, SGL/MULTI: ICD-10-PCS | Mod: 59,,, | Performed by: INTERNAL MEDICINE

## 2022-08-24 PROCEDURE — 88305 TISSUE EXAM BY PATHOLOGIST: CPT | Performed by: STUDENT IN AN ORGANIZED HEALTH CARE EDUCATION/TRAINING PROGRAM

## 2022-08-24 PROCEDURE — 43249 PR EGD, FLEX, W/BALL DILATION, < 30MM: ICD-10-PCS | Mod: ,,, | Performed by: INTERNAL MEDICINE

## 2022-08-24 PROCEDURE — 63600175 PHARM REV CODE 636 W HCPCS: Performed by: NURSE ANESTHETIST, CERTIFIED REGISTERED

## 2022-08-24 PROCEDURE — 88305 TISSUE EXAM BY PATHOLOGIST: CPT | Mod: 26,,, | Performed by: STUDENT IN AN ORGANIZED HEALTH CARE EDUCATION/TRAINING PROGRAM

## 2022-08-24 PROCEDURE — 25000003 PHARM REV CODE 250: Performed by: NURSE ANESTHETIST, CERTIFIED REGISTERED

## 2022-08-24 PROCEDURE — 88342 IMHCHEM/IMCYTCHM 1ST ANTB: CPT | Performed by: STUDENT IN AN ORGANIZED HEALTH CARE EDUCATION/TRAINING PROGRAM

## 2022-08-24 PROCEDURE — 43249 ESOPH EGD DILATION <30 MM: CPT | Mod: ,,, | Performed by: INTERNAL MEDICINE

## 2022-08-24 PROCEDURE — 37000008 HC ANESTHESIA 1ST 15 MINUTES: Performed by: INTERNAL MEDICINE

## 2022-08-24 PROCEDURE — 43239 EGD BIOPSY SINGLE/MULTIPLE: CPT | Mod: 59,,, | Performed by: INTERNAL MEDICINE

## 2022-08-24 PROCEDURE — 43239 EGD BIOPSY SINGLE/MULTIPLE: CPT | Performed by: INTERNAL MEDICINE

## 2022-08-24 PROCEDURE — C1726 CATH, BAL DIL, NON-VASCULAR: HCPCS | Performed by: INTERNAL MEDICINE

## 2022-08-24 PROCEDURE — 27201012 HC FORCEPS, HOT/COLD, DISP: Performed by: INTERNAL MEDICINE

## 2022-08-24 RX ORDER — PANTOPRAZOLE SODIUM 40 MG/1
40 TABLET, DELAYED RELEASE ORAL DAILY
Qty: 30 TABLET | Refills: 3 | Status: SHIPPED | OUTPATIENT
Start: 2022-08-24 | End: 2022-12-01

## 2022-08-24 RX ORDER — SODIUM CHLORIDE, SODIUM LACTATE, POTASSIUM CHLORIDE, CALCIUM CHLORIDE 600; 310; 30; 20 MG/100ML; MG/100ML; MG/100ML; MG/100ML
INJECTION, SOLUTION INTRAVENOUS CONTINUOUS PRN
Status: DISCONTINUED | OUTPATIENT
Start: 2022-08-24 | End: 2022-08-24

## 2022-08-24 RX ORDER — LIDOCAINE HYDROCHLORIDE 10 MG/ML
INJECTION, SOLUTION EPIDURAL; INFILTRATION; INTRACAUDAL; PERINEURAL
Status: DISCONTINUED | OUTPATIENT
Start: 2022-08-24 | End: 2022-08-24

## 2022-08-24 RX ORDER — PROPOFOL 10 MG/ML
VIAL (ML) INTRAVENOUS
Status: DISCONTINUED | OUTPATIENT
Start: 2022-08-24 | End: 2022-08-24

## 2022-08-24 RX ADMIN — SODIUM CHLORIDE, SODIUM LACTATE, POTASSIUM CHLORIDE, AND CALCIUM CHLORIDE: 600; 310; 30; 20 INJECTION, SOLUTION INTRAVENOUS at 11:08

## 2022-08-24 RX ADMIN — PROPOFOL 30 MG: 10 INJECTION, EMULSION INTRAVENOUS at 11:08

## 2022-08-24 RX ADMIN — PROPOFOL 50 MG: 10 INJECTION, EMULSION INTRAVENOUS at 11:08

## 2022-08-24 RX ADMIN — PROPOFOL 20 MG: 10 INJECTION, EMULSION INTRAVENOUS at 11:08

## 2022-08-24 RX ADMIN — LIDOCAINE HYDROCHLORIDE 50 MG: 10 INJECTION, SOLUTION EPIDURAL; INFILTRATION; INTRACAUDAL; PERINEURAL at 11:08

## 2022-08-24 NOTE — ANESTHESIA PREPROCEDURE EVALUATION
08/24/2022  Samy Alejandre Jr. is a 72 y.o., male.    Esophageal Dysphagia        Pre-op Assessment    I have reviewed the Patient Summary Reports.    I have reviewed the NPO Status.   I have reviewed the Medications.     Review of Systems  Anesthesia Hx:  No problems with previous Anesthesia  Denies Family Hx of Anesthesia complications.   Denies Personal Hx of Anesthesia complications.   Social:  Smoker, Alcohol Use 1/2 -1 pack per day   Hematology/Oncology:     Oncology Normal    -- Anemia: Hematology Comments: Acute deep vein thrombosis (DVT) of right lower extremity    EENT/Dental:EENT/Dental Normal   Cardiovascular:   Hypertension, well controlled Denies MI.   Denies CABG/stent.   Denies CHF. hyperlipidemia ECG has been reviewed. ekg 3/2022:  Normal sinus rhythm with sinus arrhythmia 98  Normal ECG   No previous ECGs available   Pulmonary:   Denies COPD.  Denies Asthma.  Denies Sleep Apnea. Pulmonary cavitary lesion   Renal/:   Chronic Renal Disease, CRI    Hepatic/GI:   Denies GERD. Denies Liver Disease.  Denies Hepatitis.    Neurological:   CVA, residual symptoms Denies Seizures. Hemiparesis right    Endocrine:   Diabetes, well controlled, type 2 Denies Hypothyroidism. Denies Hyperthyroidism.    Psych:   anxiety depression Insomnia         Physical Exam  General: Well nourished, Cooperative, Alert and Oriented    Airway:  Mallampati: II   Mouth Opening: Normal  TM Distance: Normal  Tongue: Normal  Neck ROM: Normal ROM    Dental:  Intact    Chest/Lungs:  Clear to auscultation, Normal Respiratory Rate    Heart:  Rate: Normal  Rhythm: Regular Rhythm        Anesthesia Plan  Type of Anesthesia, risks & benefits discussed:    Anesthesia Type: MAC  Intra-op Monitoring Plan: Standard ASA Monitors  Post Op Pain Control Plan: multimodal analgesia  Informed Consent: Informed consent signed with the Patient  and all parties understand the risks and agree with anesthesia plan.  All questions answered.   ASA Score: 3  Day of Surgery Review of History & Physical: H&P Update referred to the surgeon/provider.    Ready For Surgery From Anesthesia Perspective.     .

## 2022-08-24 NOTE — [employer] PATIENT INSTRUCTIONS
Discharge Summary/Instructions after an Endoscopic Procedure  Patient Name: Samy Alejandre  Patient MRN: [id number]  Patient YOB: 1950 Wednesday, August 24, 2022 Ana Gomez MD  Dear patient,  As a result of recent federal legislation (The Federal Cures Act), you may   receive lab or pathology results from your procedure in your [facility]   account before your physician is able to contact you. Your physician or   their representative will relay the results to you with their   recommendations at their soonest availability.  Thank you,  RESTRICTIONS:  During your procedure today, you received medications for sedation.  These   medications may affect your judgment, balance and coordination.  Therefore,   for 24 hours, you have the following restrictions:   - DO NOT drive a car, operate machinery, make legal/financial decisions,   sign important papers or drink alcohol.    ACTIVITY:  Today: no heavy lifting, straining or running due to procedural   sedation/anesthesia.  The following day: return to full activity including work.  DIET:  Eat and drink normally unless instructed otherwise.     TREATMENT FOR COMMON SIDE EFFECTS:  - Mild abdominal pain, nausea, belching, bloating or excessive gas:  rest,   eat lightly and use a heating pad.  - Sore Throat: treat with throat lozenges and/or gargle with warm salt   water.  - Because air was used during the procedure, expelling large amounts of air   from your rectum or belching is normal.  - If a bowel prep was taken, you may not have a bowel movement for 1-3 days.    This is normal.  SYMPTOMS TO WATCH FOR AND REPORT TO YOUR PHYSICIAN:  1. Abdominal pain or bloating, other than gas cramps.  2. Chest pain.  3. Back pain.  4. Signs of infection such as: chills or fever occurring within 24 hours   after the procedure.  5. Rectal bleeding, which would show as bright red, maroon, or black stools.   (A tablespoon of blood from the rectum is not serious, especially  if   hemorrhoids are present.)  6. Vomiting.  7. Weakness or dizziness.  GO DIRECTLY TO THE NEAREST EMERGENCY ROOM IF YOU HAVE ANY OF THE FOLLOWING:      Difficulty breathing              Chills and/or fever over 101 F   Persistent vomiting and/or vomiting blood   Severe abdominal pain   Severe chest pain   Black, tarry stools   Bleeding- more than one tablespoon   Any other symptom or condition that you feel may need urgent attention  Your doctor recommends these additional instructions:  If any biopsies were taken, your doctors clinic will contact you in 1 to 2   weeks with any results.  - Discharge patient to home (via wheelchair).   - Resume previous diet.   - Continue present medications.   - Await pathology results.   - Use Protonix (pantoprazole) 40 mg PO daily.   - Repeat upper endoscopy in 2 weeks for retreatment.   - Patient has a contact number available for emergencies.  The signs and   symptoms of potential delayed complications were discussed with the   patient.  Return to normal activities tomorrow.  Written discharge   instructions were provided to the patient.  For questions, problems or results please call your physician Ana Gomez MD at Work:  [phone]  If you have any questions about the above instructions, call the GI   department at [phone] or call the endoscopy unit at [phone]   from 7am until 3 pm.  [facility] [place], EMERGENCY ROOM PHONE NUMBER:   [phone]  IF A COMPLICATION OR EMERGENCY SITUATION ARISES AND YOU ARE UNABLE TO REACH   YOUR PHYSICIAN - GO DIRECTLY TO THE EMERGENCY ROOM.  I have read or have had read to me these discharge instructions for my   procedure and have received a written copy.  I understand these   instructions and will follow-up with my physician if I have any questions.     __________________________________       _____________________________________  Nurse Signature                                           Patient/Designated   Responsible Party Signature  MD Ana Soliz MD  8/24/2022 11:46:43 AM  [employer]

## 2022-08-24 NOTE — H&P
Short Stay Endoscopy History and Physical    PCP - Damien Barron MD    Procedure - EGD  ASA - 2  Mallampati - per anesthesia  History of Anesthesia problems - no  Family history Anesthesia problems -  no     HPI:  This is a 72 y.o. male here for evaluation of :   Active Hospital Problems    Diagnosis  POA    *Dysphagia [R13.10]  Yes      Resolved Hospital Problems   No resolved problems to display.         ROS:  CONSTITUTIONAL: Denies weight change,  fatigue, fevers, chills, night sweats.  CARDIOVASCULAR: Denies chest pain, shortness of breath, orthopnea and edema.  RESPIRATORY: Denies cough, hemoptysis, dyspnea, and wheezing.  GI: See HPI.    Medical History:   Past Medical History:   Diagnosis Date    Anxiety     Depression     Diabetes mellitus, type 2     Hypertension     Stroke        Surgical History:   Past Surgical History:   Procedure Laterality Date    ANGIOGRAPHY OF LOWER EXTREMITY N/A 12/21/2020    Procedure: ANGIOGRAM, LOWER EXTREMITY/Rt leg poss pta/stent;  Surgeon: Todd Michel MD;  Location: [facility] CATH LAB;  Service: Peripheral Vascular;  Laterality: N/A;  rescheduled 12/8    BACK SURGERY  2015    BRONCHOSCOPY Left 3/24/2022    Procedure: Bronchoscopy;  Surgeon: Edward Williamson MD;  Location: [facility] ENDO;  Service: Pulmonary;  Laterality: Left;  poss endobronchial biopsy or brushing    CATARACT EXTRACTION      KNEE SURGERY  2012    LITHOTRIPSY  2000       Family History:  Family History   Problem Relation Age of Onset    Heart disease Mother     Stroke Mother     Cancer Father     COPD Sister        Social History:   Social History     Tobacco Use    Smoking status: Current Every Day Smoker     Packs/day: 1.00     Types: Cigarettes    Smokeless tobacco: Current User     Types: Snuff   Substance Use Topics    Alcohol use: Yes     Alcohol/week: 1.0 standard drink     Types: 1 Shots of liquor per week     Comment: Daily    Drug use: Never       Allergy  Review of patient's allergies  indicates:   Allergen Reactions    Chantix [varenicline] Other (See Comments)     Felt bad       Medications:   No current facility-administered medications on file prior to encounter.     Current Outpatient Medications on File Prior to Encounter   Medication Sig Dispense Refill    gabapentin (NEURONTIN) 300 MG capsule Take 1 capsule by mouth three times daily as needed 90 capsule 0    HYDROcodone-acetaminophen (NORCO) 5-325 mg per tablet Take 1 tablet by mouth 3 (three) times daily.      oxybutynin (DITROPAN-XL) 10 MG 24 hr tablet Take 1 tablet (10 mg total) by mouth once daily. 30 tablet 5    rosuvastatin (CRESTOR) 20 MG tablet Take 1 tablet (20 mg total) by mouth once daily. 90 tablet 1    tamsulosin (FLOMAX) 0.4 mg Cap Take 1 capsule (0.4 mg total) by mouth once daily. 90 capsule 1    zolpidem (AMBIEN) 10 mg Tab Take 1 tablet (10 mg total) by mouth nightly as needed (insomnia). 30 tablet 5    apixaban (ELIQUIS) 5 mg Tab Take 1 tablet (5 mg total) by mouth 2 (two) times daily. (Patient taking differently: Take 5 mg by mouth once daily.) 74 tablet 2    blood glucose control, low (TRUE METRIX LEVEL 1) Soln 1 drop by Misc.(Non-Drug; Combo Route) route once daily. 1 each 4    blood glucose control, normal (TRUE METRIX LEVEL 2) Soln 1 drop by Misc.(Non-Drug; Combo Route) route once daily. 1 each 4    blood sugar diagnostic Strp Use to test blood sugar once daily 100 strip 3    blood-glucose meter (TRUE METRIX GLUCOSE METER) Misc 1 Device by Misc.(Non-Drug; Combo Route) route once. for 1 dose 1 each 0    clopidogreL (PLAVIX) 75 mg tablet Take 1 tablet (75 mg total) by mouth once daily. (Patient taking differently: Take 75 mg by mouth every other day.) 90 tablet 1    FLUoxetine 20 MG capsule Take 1 capsule (20 mg total) by mouth once daily. 90 capsule 1    lancets 32 gauge Misc 1 lancet by Misc.(Non-Drug; Combo Route) route once daily. 30 each 4    ondansetron (ZOFRAN) 8 MG tablet Take 1 tablet (8 mg  total) by mouth every 8 (eight) hours as needed for Nausea. 30 tablet 1    walker (ULTRA-LIGHT ROLLATOR) Misc Use daily for ambulation 1 each 0       Physical Exam:  Vital Signs:   Vitals:    08/24/22 1104   BP: 109/72   Pulse: 63   Resp: 19   Temp: 98.1 °F (36.7 °C)     General Appearance: Well appearing in no acute distress  ENT: OP clear  Chest: CTA B  CV: RRR, no m/r/g  Abd: s/nt/nd/nabs  Ext: no edema    Labs:  Reviewed    IMP:  Active Hospital Problems    Diagnosis  POA    *Dysphagia [R13.10]  Yes      Resolved Hospital Problems   No resolved problems to display.         Plan:  I have explained the risks and benefits of endoscopy procedures to the patient including but not limited to bleeding, perforation, infection, and death. The patient wishes to proceed.

## 2022-08-24 NOTE — TRANSFER OF CARE
"Anesthesia Transfer of Care Note    Patient: Samy Alejandre JrMichaela    Procedure(s) Performed: Procedure(s) (LRB):  EGD (ESOPHAGOGASTRODUODENOSCOPY) (N/A)    Patient location: GI    Anesthesia Type: MAC    Transport from OR: Transported from OR on room air with adequate spontaneous ventilation    Post pain: adequate analgesia    Post assessment: no apparent anesthetic complications and tolerated procedure well    Post vital signs: stable    Level of consciousness: awake, alert and oriented    Nausea/Vomiting: no nausea/vomiting    Complications: none    Transfer of care protocol was followed      Last vitals:   Visit Vitals  /72 (BP Location: Left arm, Patient Position: Sitting)   Pulse 63   Temp 36.7 °C (98.1 °F)   Resp 19   Ht 5' 9" (1.753 m)   Wt 56.7 kg (125 lb)   SpO2 100%   BMI 18.46 kg/m²     "

## 2022-08-24 NOTE — DISCHARGE SUMMARY
[facility] ([facility])  Discharge Note  Short Stay    Procedure(s) (LRB):  EGD (ESOPHAGOGASTRODUODENOSCOPY) (N/A)    OUTCOME: Patient tolerated treatment/procedure well without complication and is now ready for discharge.    DISPOSITION: Home or Self Care    FINAL DIAGNOSIS:  Dysphagia    FOLLOWUP: With primary care provider    DISCHARGE INSTRUCTIONS:    Discharge Procedure Orders   Case Request Endoscopy: EGD (ESOPHAGOGASTRODUODENOSCOPY)     Order Specific Question Answer Comments   Medical Necessity: Medically Non-Urgent [100]    CPT Code: MD EGD, FLEX, DIAGNOSTIC [16426]    Is an on-site pathologist required for this procedure? N/A

## 2022-08-25 VITALS
RESPIRATION RATE: 16 BRPM | OXYGEN SATURATION: 99 % | SYSTOLIC BLOOD PRESSURE: 99 MMHG | BODY MASS INDEX: 18.51 KG/M2 | TEMPERATURE: 98 F | DIASTOLIC BLOOD PRESSURE: 60 MMHG | WEIGHT: 125 LBS | HEIGHT: 69 IN | HEART RATE: 55 BPM

## 2022-08-25 NOTE — ANESTHESIA POSTPROCEDURE EVALUATION
Anesthesia Post Evaluation    Patient: Samy Alejandre JrMichaela    Procedure(s) Performed: Procedure(s) (LRB):  EGD (ESOPHAGOGASTRODUODENOSCOPY) (N/A)    Final Anesthesia Type: general      Patient location during evaluation: PACU  Patient participation: Yes- Able to Participate  Level of consciousness: awake and alert  Post-procedure vital signs: reviewed and stable  Pain management: adequate  Airway patency: patent    PONV status at discharge: No PONV  Anesthetic complications: no      Cardiovascular status: blood pressure returned to baseline  Respiratory status: unassisted  Hydration status: euvolemic  Follow-up not needed.          Vitals Value Taken Time   BP 99/60 08/24/22 1210   Temp 36.7 °C (98.1 °F) 08/24/22 1145   Pulse 55 08/24/22 1210   Resp 16 08/24/22 1210   SpO2 99 % 08/24/22 1210         Event Time   Out of Recovery 12:15:21         Pain/Dwayne Score: Dwayne Score: 10 (8/24/2022 12:11 PM)

## 2022-08-30 ENCOUNTER — PATIENT MESSAGE (OUTPATIENT)
Dept: INFECTIOUS DISEASES | Facility: CLINIC | Age: 72
End: 2022-08-30
Payer: MEDICARE

## 2022-08-30 LAB
FINAL PATHOLOGIC DIAGNOSIS: NORMAL
GROSS: NORMAL
Lab: NORMAL
SUPPLEMENTAL DIAGNOSIS: NORMAL

## 2022-09-08 ENCOUNTER — TELEPHONE (OUTPATIENT)
Dept: PREADMISSION TESTING | Facility: HOSPITAL | Age: 72
End: 2022-09-08
Payer: MEDICARE

## 2022-09-08 NOTE — TELEPHONE ENCOUNTER
Your patient is being scheduled for one of the following procedures: Endoscopy    Our records indicate that they are currently taking: ls anticoag list: Eliquis (APIXABAN)  * and Plavix (CLOPIDOGREL)    Please indicate if and when the patient can safely stop their medication prior to the procedure.    Please take into consideration that therapeutic maneuvers may be performed during the procedure that requires delay in restarting anticoagulation therapy.       Patient gave verbal consent Yes    If you have questions or concerns, please call us at [phone].

## 2022-09-09 ENCOUNTER — E-CONSULT (OUTPATIENT)
Dept: CARDIOLOGY | Facility: CLINIC | Age: 72
End: 2022-09-09
Payer: MEDICARE

## 2022-09-09 DIAGNOSIS — E78.2 MIXED HYPERLIPIDEMIA: ICD-10-CM

## 2022-09-09 DIAGNOSIS — I69.30 HISTORY OF CVA WITH RESIDUAL DEFICIT: Primary | ICD-10-CM

## 2022-09-09 DIAGNOSIS — Z01.810 PREOP CARDIOVASCULAR EXAM: ICD-10-CM

## 2022-09-09 NOTE — PROGRESS NOTES
[facility]iology  Response for E-Consult     Patient Name: Samy Alejandre Jr.  MRN: [id number]  Primary Care Provider: Damien Barron MD   Requesting Provider: Mee Wilson RN      Findings:    The chart reviewed  NO h/o cardiology evaluation at [facility] and [facility] everywhere  Advise to have the office visit for cardiac preop clearance of colonoscopy.  Will arrange to see him next week.         I did not speak to the requesting provider verbally about this.     Total time of Consultation: 15 minute    Percentage of time spent on verbal/written discussion: 50%     Thank you for your consult.     Wero Lewis MD  [facility]

## 2022-09-12 ENCOUNTER — PATIENT MESSAGE (OUTPATIENT)
Dept: INFECTIOUS DISEASES | Facility: CLINIC | Age: 72
End: 2022-09-12
Payer: MEDICARE

## 2022-09-12 ENCOUNTER — OFFICE VISIT (OUTPATIENT)
Dept: CARDIOLOGY | Facility: CLINIC | Age: 72
End: 2022-09-12
Payer: MEDICARE

## 2022-09-12 ENCOUNTER — OFFICE VISIT (OUTPATIENT)
Dept: PULMONOLOGY | Facility: CLINIC | Age: 72
End: 2022-09-12
Payer: MEDICARE

## 2022-09-12 ENCOUNTER — PATIENT MESSAGE (OUTPATIENT)
Dept: ENDOSCOPY | Facility: HOSPITAL | Age: 72
End: 2022-09-12
Payer: MEDICARE

## 2022-09-12 VITALS
RESPIRATION RATE: 16 BRPM | BODY MASS INDEX: 17.96 KG/M2 | HEART RATE: 75 BPM | HEIGHT: 69 IN | OXYGEN SATURATION: 97 % | SYSTOLIC BLOOD PRESSURE: 118 MMHG | WEIGHT: 121.25 LBS | DIASTOLIC BLOOD PRESSURE: 70 MMHG

## 2022-09-12 VITALS
SYSTOLIC BLOOD PRESSURE: 126 MMHG | BODY MASS INDEX: 17.87 KG/M2 | HEART RATE: 75 BPM | DIASTOLIC BLOOD PRESSURE: 72 MMHG | WEIGHT: 121 LBS | OXYGEN SATURATION: 97 %

## 2022-09-12 DIAGNOSIS — I69.30 HISTORY OF CVA WITH RESIDUAL DEFICIT: ICD-10-CM

## 2022-09-12 DIAGNOSIS — N18.30 CONTROLLED TYPE 2 DIABETES MELLITUS WITH STAGE 3 CHRONIC KIDNEY DISEASE, WITHOUT LONG-TERM CURRENT USE OF INSULIN: ICD-10-CM

## 2022-09-12 DIAGNOSIS — E78.2 MIXED HYPERLIPIDEMIA: ICD-10-CM

## 2022-09-12 DIAGNOSIS — J98.4 CAVITARY LESION OF LUNG: ICD-10-CM

## 2022-09-12 DIAGNOSIS — I82.4Y1 ACUTE DEEP VEIN THROMBOSIS (DVT) OF PROXIMAL VEIN OF RIGHT LOWER EXTREMITY: ICD-10-CM

## 2022-09-12 DIAGNOSIS — J98.4 PULMONARY CAVITARY LESION: Primary | ICD-10-CM

## 2022-09-12 DIAGNOSIS — Z01.810 PREOP CARDIOVASCULAR EXAM: Primary | ICD-10-CM

## 2022-09-12 DIAGNOSIS — E11.22 CONTROLLED TYPE 2 DIABETES MELLITUS WITH STAGE 3 CHRONIC KIDNEY DISEASE, WITHOUT LONG-TERM CURRENT USE OF INSULIN: ICD-10-CM

## 2022-09-12 DIAGNOSIS — R06.09 OTHER FORMS OF DYSPNEA: ICD-10-CM

## 2022-09-12 DIAGNOSIS — R91.8 OPACITY OF LUNG ON IMAGING STUDY: ICD-10-CM

## 2022-09-12 DIAGNOSIS — G47.00 INSOMNIA, UNSPECIFIED TYPE: ICD-10-CM

## 2022-09-12 PROCEDURE — 1160F RVW MEDS BY RX/DR IN RCRD: CPT | Mod: CPTII,S$GLB,, | Performed by: INTERNAL MEDICINE

## 2022-09-12 PROCEDURE — 3061F PR NEG MICROALBUMINURIA RESULT DOCUMENTED/REVIEW: ICD-10-PCS | Mod: CPTII,S$GLB,, | Performed by: INTERNAL MEDICINE

## 2022-09-12 PROCEDURE — 1159F PR MEDICATION LIST DOCUMENTED IN MEDICAL RECORD: ICD-10-PCS | Mod: CPTII,S$GLB,, | Performed by: INTERNAL MEDICINE

## 2022-09-12 PROCEDURE — 3078F DIAST BP <80 MM HG: CPT | Mod: CPTII,S$GLB,, | Performed by: INTERNAL MEDICINE

## 2022-09-12 PROCEDURE — 3066F PR DOCUMENTATION OF TREATMENT FOR NEPHROPATHY: ICD-10-PCS | Mod: CPTII,S$GLB,, | Performed by: INTERNAL MEDICINE

## 2022-09-12 PROCEDURE — 4010F ACE/ARB THERAPY RXD/TAKEN: CPT | Mod: CPTII,S$GLB,, | Performed by: INTERNAL MEDICINE

## 2022-09-12 PROCEDURE — 4010F PR ACE/ARB THEARPY RXD/TAKEN: ICD-10-PCS | Mod: CPTII,S$GLB,, | Performed by: INTERNAL MEDICINE

## 2022-09-12 PROCEDURE — 3074F PR MOST RECENT SYSTOLIC BLOOD PRESSURE < 130 MM HG: ICD-10-PCS | Mod: CPTII,S$GLB,, | Performed by: INTERNAL MEDICINE

## 2022-09-12 PROCEDURE — 3008F PR BODY MASS INDEX (BMI) DOCUMENTED: ICD-10-PCS | Mod: CPTII,S$GLB,, | Performed by: INTERNAL MEDICINE

## 2022-09-12 PROCEDURE — 99999 PR PBB SHADOW E&M-EST. PATIENT-LVL IV: CPT | Mod: PBBFAC,,, | Performed by: INTERNAL MEDICINE

## 2022-09-12 PROCEDURE — 3288F PR FALLS RISK ASSESSMENT DOCUMENTED: ICD-10-PCS | Mod: CPTII,S$GLB,, | Performed by: INTERNAL MEDICINE

## 2022-09-12 PROCEDURE — 3044F HG A1C LEVEL LT 7.0%: CPT | Mod: CPTII,S$GLB,, | Performed by: INTERNAL MEDICINE

## 2022-09-12 PROCEDURE — 99999 PR PBB SHADOW E&M-EST. PATIENT-LVL V: CPT | Mod: PBBFAC,,, | Performed by: INTERNAL MEDICINE

## 2022-09-12 PROCEDURE — 1160F PR REVIEW ALL MEDS BY PRESCRIBER/CLIN PHARMACIST DOCUMENTED: ICD-10-PCS | Mod: CPTII,S$GLB,, | Performed by: INTERNAL MEDICINE

## 2022-09-12 PROCEDURE — 3044F PR MOST RECENT HEMOGLOBIN A1C LEVEL <7.0%: ICD-10-PCS | Mod: CPTII,S$GLB,, | Performed by: INTERNAL MEDICINE

## 2022-09-12 PROCEDURE — 3078F PR MOST RECENT DIASTOLIC BLOOD PRESSURE < 80 MM HG: ICD-10-PCS | Mod: CPTII,S$GLB,, | Performed by: INTERNAL MEDICINE

## 2022-09-12 PROCEDURE — 3061F NEG MICROALBUMINURIA REV: CPT | Mod: CPTII,S$GLB,, | Performed by: INTERNAL MEDICINE

## 2022-09-12 PROCEDURE — 99214 OFFICE O/P EST MOD 30 MIN: CPT | Mod: S$GLB,,, | Performed by: INTERNAL MEDICINE

## 2022-09-12 PROCEDURE — 99204 OFFICE O/P NEW MOD 45 MIN: CPT | Mod: S$GLB,,, | Performed by: INTERNAL MEDICINE

## 2022-09-12 PROCEDURE — 3072F PR LOW RISK FOR RETINOPATHY: ICD-10-PCS | Mod: CPTII,S$GLB,, | Performed by: INTERNAL MEDICINE

## 2022-09-12 PROCEDURE — 1159F MED LIST DOCD IN RCRD: CPT | Mod: CPTII,S$GLB,, | Performed by: INTERNAL MEDICINE

## 2022-09-12 PROCEDURE — 99204 PR OFFICE/OUTPT VISIT, NEW, LEVL IV, 45-59 MIN: ICD-10-PCS | Mod: S$GLB,,, | Performed by: INTERNAL MEDICINE

## 2022-09-12 PROCEDURE — 3074F SYST BP LT 130 MM HG: CPT | Mod: CPTII,S$GLB,, | Performed by: INTERNAL MEDICINE

## 2022-09-12 PROCEDURE — 99999 PR PBB SHADOW E&M-EST. PATIENT-LVL IV: ICD-10-PCS | Mod: PBBFAC,,, | Performed by: INTERNAL MEDICINE

## 2022-09-12 PROCEDURE — 3066F NEPHROPATHY DOC TX: CPT | Mod: CPTII,S$GLB,, | Performed by: INTERNAL MEDICINE

## 2022-09-12 PROCEDURE — 3072F LOW RISK FOR RETINOPATHY: CPT | Mod: CPTII,S$GLB,, | Performed by: INTERNAL MEDICINE

## 2022-09-12 PROCEDURE — 3008F BODY MASS INDEX DOCD: CPT | Mod: CPTII,S$GLB,, | Performed by: INTERNAL MEDICINE

## 2022-09-12 PROCEDURE — 3288F FALL RISK ASSESSMENT DOCD: CPT | Mod: CPTII,S$GLB,, | Performed by: INTERNAL MEDICINE

## 2022-09-12 PROCEDURE — 1100F PR PT FALLS ASSESS DOC 2+ FALLS/FALL W/INJURY/YR: ICD-10-PCS | Mod: CPTII,S$GLB,, | Performed by: INTERNAL MEDICINE

## 2022-09-12 PROCEDURE — 99999 PR PBB SHADOW E&M-EST. PATIENT-LVL V: ICD-10-PCS | Mod: PBBFAC,,, | Performed by: INTERNAL MEDICINE

## 2022-09-12 PROCEDURE — 1100F PTFALLS ASSESS-DOCD GE2>/YR: CPT | Mod: CPTII,S$GLB,, | Performed by: INTERNAL MEDICINE

## 2022-09-12 PROCEDURE — 99214 PR OFFICE/OUTPT VISIT, EST, LEVL IV, 30-39 MIN: ICD-10-PCS | Mod: S$GLB,,, | Performed by: INTERNAL MEDICINE

## 2022-09-12 RX ORDER — DOXYCYCLINE 100 MG/1
100 CAPSULE ORAL EVERY 12 HOURS
Qty: 42 CAPSULE | Refills: 0 | Status: SHIPPED | OUTPATIENT
Start: 2022-09-12 | End: 2022-10-03

## 2022-09-12 NOTE — PROGRESS NOTES
Pulmonary Outpatient  Visit     Subjective:       Patient ID: Samy Alejandre Jr. is a 72 y.o. male.    Chief Complaint: Abnormal Ct Scan      Samy Alejandre Jr. Is 72 y.o.  Referred by Doris Yanes MD  [id number] [place] [address] [place] [id number]   Last seen March 2022: DVT and Abn chest CT  Grew RANJANA Shah and recently Staph  Treated with Zyvox, 10 days, was unable to tolerate full therapy  Daughter: here  Cough better, Sputum better. Weigh loss: had esophageal dilation:   Needs dilatation  On Elliquis for DVT  Adherent on DOAC      The following portions of the patient's history were reviewed and updated as appropriate: He  has a past medical history of Anxiety, Depression, Diabetes mellitus, type 2, Hypertension, and Stroke.  He does not have any pertinent problems on file.  He  has a past surgical history that includes Knee surgery (2012); Back surgery (2015); Lithotripsy (2000); Angiography of lower extremity (N/A, 12/21/2020); Cataract extraction; Bronchoscopy (Left, 3/24/2022); and Esophagogastroduodenoscopy (N/A, 8/24/2022).  His family history includes COPD in his sister; Cancer in his father; Heart disease in his mother; Stroke in his mother.  He  reports that he has been smoking cigarettes. He has been smoking an average of 1 pack per day. His smokeless tobacco use includes snuff. He reports current alcohol use of about 1.0 standard drink per week. He reports that he does not use drugs.  He has a current medication list which includes the following prescription(s): apixaban, true metrix level 1, blood glucose control, normal, clopidogrel, duloxetine, fluoxetine, gabapentin, hydrocodone-acetaminophen, lancets, ondansetron, oxybutynin, pantoprazole, rosuvastatin, tamsulosin, ultra-light rollator, zolpidem, blood sugar diagnostic, blood-glucose meter, and doxycycline.  Current Outpatient Medications on File Prior to Visit   Medication Sig Dispense Refill     apixaban (ELIQUIS) 5 mg Tab Take 1 tablet (5 mg total) by mouth 2 (two) times daily. (Patient taking differently: Take 5 mg by mouth once daily.) 74 tablet 2    blood glucose control, low (TRUE METRIX LEVEL 1) Soln 1 drop by Misc.(Non-Drug; Combo Route) route once daily. 1 each 4    blood glucose control, normal (TRUE METRIX LEVEL 2) Soln 1 drop by Misc.(Non-Drug; Combo Route) route once daily. 1 each 4    clopidogreL (PLAVIX) 75 mg tablet Take 1 tablet (75 mg total) by mouth once daily. (Patient taking differently: Take 75 mg by mouth every other day.) 90 tablet 1    DULoxetine (CYMBALTA) 30 MG capsule Take 1 capsule (30 mg total) by mouth once daily. 90 capsule 3    FLUoxetine 20 MG capsule Take 1 capsule (20 mg total) by mouth once daily. 90 capsule 1    gabapentin (NEURONTIN) 300 MG capsule Take 1 capsule by mouth three times daily as needed 90 capsule 0    HYDROcodone-acetaminophen (NORCO) 5-325 mg per tablet Take 1 tablet by mouth 3 (three) times daily.      lancets 32 gauge Misc 1 lancet by Misc.(Non-Drug; Combo Route) route once daily. 30 each 4    ondansetron (ZOFRAN) 8 MG tablet Take 1 tablet (8 mg total) by mouth every 8 (eight) hours as needed for Nausea. 30 tablet 1    oxybutynin (DITROPAN-XL) 10 MG 24 hr tablet Take 1 tablet (10 mg total) by mouth once daily. 30 tablet 5    pantoprazole (PROTONIX) 40 MG tablet Take 1 tablet (40 mg total) by mouth once daily. 30 tablet 3    rosuvastatin (CRESTOR) 20 MG tablet Take 1 tablet (20 mg total) by mouth once daily. 90 tablet 1    tamsulosin (FLOMAX) 0.4 mg Cap Take 1 capsule (0.4 mg total) by mouth once daily. 90 capsule 1    walker (ULTRA-LIGHT ROLLATOR) Misc Use daily for ambulation 1 each 0    zolpidem (AMBIEN) 10 mg Tab Take 1 tablet (10 mg total) by mouth nightly as needed (insomnia). 30 tablet 5    blood sugar diagnostic Strp Use to test blood sugar once daily 100 strip 3    blood-glucose meter (TRUE METRIX GLUCOSE METER) Misc 1 Device by Misc.(Non-Drug;  Combo Route) route once. for 1 dose 1 each 0     No current facility-administered medications on file prior to visit.     He is allergic to chantix [varenicline]..      Review of Systems   Constitutional:  Positive for weight loss, appetite change and fatigue.   HENT: Negative.     Eyes: Negative.    Respiratory:  Positive for cough.    Genitourinary: Negative.    Endocrine: endocrine negative    Musculoskeletal: Negative.    Skin: Negative.    Gastrointestinal: Negative.    Neurological: Negative.    Psychiatric/Behavioral: Negative.     All other systems reviewed and are negative.    Outpatient Encounter Medications as of 9/12/2022   Medication Sig Dispense Refill    apixaban (ELIQUIS) 5 mg Tab Take 1 tablet (5 mg total) by mouth 2 (two) times daily. (Patient taking differently: Take 5 mg by mouth once daily.) 74 tablet 2    blood glucose control, low (TRUE METRIX LEVEL 1) Soln 1 drop by Misc.(Non-Drug; Combo Route) route once daily. 1 each 4    blood glucose control, normal (TRUE METRIX LEVEL 2) Soln 1 drop by Misc.(Non-Drug; Combo Route) route once daily. 1 each 4    clopidogreL (PLAVIX) 75 mg tablet Take 1 tablet (75 mg total) by mouth once daily. (Patient taking differently: Take 75 mg by mouth every other day.) 90 tablet 1    DULoxetine (CYMBALTA) 30 MG capsule Take 1 capsule (30 mg total) by mouth once daily. 90 capsule 3    FLUoxetine 20 MG capsule Take 1 capsule (20 mg total) by mouth once daily. 90 capsule 1    gabapentin (NEURONTIN) 300 MG capsule Take 1 capsule by mouth three times daily as needed 90 capsule 0    HYDROcodone-acetaminophen (NORCO) 5-325 mg per tablet Take 1 tablet by mouth 3 (three) times daily.      lancets 32 gauge Misc 1 lancet by Misc.(Non-Drug; Combo Route) route once daily. 30 each 4    ondansetron (ZOFRAN) 8 MG tablet Take 1 tablet (8 mg total) by mouth every 8 (eight) hours as needed for Nausea. 30 tablet 1    oxybutynin (DITROPAN-XL) 10 MG 24 hr tablet Take 1 tablet (10 mg  "total) by mouth once daily. 30 tablet 5    pantoprazole (PROTONIX) 40 MG tablet Take 1 tablet (40 mg total) by mouth once daily. 30 tablet 3    rosuvastatin (CRESTOR) 20 MG tablet Take 1 tablet (20 mg total) by mouth once daily. 90 tablet 1    tamsulosin (FLOMAX) 0.4 mg Cap Take 1 capsule (0.4 mg total) by mouth once daily. 90 capsule 1    walker (ULTRA-LIGHT ROLLATOR) Misc Use daily for ambulation 1 each 0    zolpidem (AMBIEN) 10 mg Tab Take 1 tablet (10 mg total) by mouth nightly as needed (insomnia). 30 tablet 5    blood sugar diagnostic Strp Use to test blood sugar once daily 100 strip 3    blood-glucose meter (TRUE METRIX GLUCOSE METER) Misc 1 Device by Misc.(Non-Drug; Combo Route) route once. for 1 dose 1 each 0    doxycycline (VIBRAMYCIN) 100 MG Cap Take 1 capsule (100 mg total) by mouth every 12 (twelve) hours. for 21 days 42 capsule 0     No facility-administered encounter medications on file as of 9/12/2022.       Objective:     Vital Signs (Most Recent)  Vital Signs  Pulse: 75  Resp: 16  SpO2: 97 %  BP: 118/70  Height and Weight  Height: 5' 9" (175.3 cm)  Weight: 55 kg (121 lb 4.1 oz)  BSA (Calculated - sq m): 1.64 sq meters  BMI (Calculated): 17.9  Weight in (lb) to have BMI = 25: 168.9]  Wt Readings from Last 2 Encounters:   09/12/22 55 kg (121 lb 4.1 oz)   08/24/22 56.7 kg (125 lb)       Physical Exam   Constitutional: He is oriented to person, place, and time. He appears cachectic.   HENT:   Head: Normocephalic.   Cardiovascular: Normal rate and regular rhythm.   No murmur heard.  Pulmonary/Chest: Normal expansion.   Abdominal: Bowel sounds are normal.   Musculoskeletal:         General: Normal range of motion.      Cervical back: Normal range of motion.      Comments: wheelchair   Lymphadenopathy:     He has no axillary adenopathy.   Neurological: He is alert and oriented to person, place, and time.   Skin: Skin is warm.   Psychiatric: He has a normal mood and affect.   Nursing note and vitals " reviewed.    Laboratory  Lab Results   Component Value Date    WBC 4.77 07/31/2022    RBC 3.44 (L) 07/31/2022    HGB 10.8 (L) 07/31/2022    HCT 31.8 (L) 07/31/2022    MCV 92 07/31/2022    MCH 31.4 (H) 07/31/2022    MCHC 34.0 07/31/2022    RDW 15.2 (H) 07/31/2022     07/31/2022    MPV 9.8 07/31/2022    GRAN 2.5 07/31/2022    GRAN 52.6 07/31/2022    LYMPH 1.5 07/31/2022    LYMPH 31.9 07/31/2022    MONO 0.3 07/31/2022    MONO 7.1 07/31/2022    EOS 0.4 07/31/2022    BASO 0.01 07/31/2022    EOSINOPHIL 8.0 07/31/2022    BASOPHIL 0.2 07/31/2022       BMP  Lab Results   Component Value Date     07/31/2022    K 3.5 07/31/2022     07/31/2022    CO2 22 (L) 07/31/2022    BUN 13 07/31/2022    CREATININE 1.2 07/31/2022    CALCIUM 8.8 07/31/2022    ANIONGAP 10 07/31/2022    ESTGFRAFRICA >60 07/31/2022    EGFRNONAA >60 07/31/2022    AST 46 (H) 07/31/2022    ALT 24 07/31/2022    PROT 5.7 (L) 07/31/2022       Lab Results   Component Value Date    BNP 20 07/31/2022    BNP 23 03/20/2022       Lab Results   Component Value Date    TSH 2.724 03/20/2022       Lab Results   Component Value Date    SEDRATE 70 (H) 03/21/2022       Lab Results   Component Value Date    .2 (H) 03/21/2022     No results found for: IGE     No results found for: ASPERGILLUS  No results found for: AFUMIGATUSCL     No results found for: ACE     Diagnostic Results:  I have personally reviewed today the following studies:          Susceptibility     Staphylococcus aureus     CULTURE, RESPIRATORY     Clindamycin <=0.5 mcg/mL Sensitive     Erythromycin <=0.5 mcg/mL Sensitive     Oxacillin <=0.25 mcg/mL Sensitive     Penicillin <=0.03 mcg/mL Sensitive     Tetracycline <=4 mcg/mL Sensitive     Trimeth/Sulfa <=0.5/9.5 m... Sensitive            CT Head Without Contrast  Narrative: EXAMINATION:  CT HEAD WITHOUT CONTRAST    CLINICAL HISTORY:  Seizure, new-onset, no history of trauma;Mental status change, unknown cause;    TECHNIQUE:  Low dose axial  CT images obtained throughout the head without intravenous contrast. Sagittal and coronal reconstructions were performed.    COMPARISON:  None.    FINDINGS:  Intracranial compartment:    Ventricles and sulci are normal in size for age without evidence of hydrocephalus. No extra-axial blood or fluid collections.    Moderate microvascular ischemic change.  Remote left thalamic and bilateral basal ganglia lacunar infarcts.  No parenchymal mass, hemorrhage, edema or major vascular distribution infarct.    Skull/extracranial contents (limited evaluation): No fracture. Mastoid air cells and paranasal sinuses are essentially clear.  Impression: No definite acute intracranial CT abnormality.  Clinical correlation and further evaluation as warranted.    Senescent changes as above.    All CT scans at this facility are performed  using dose modulation techniques as appropriate to performed exam including the following:  automated exposure control; adjustment of mA and/or kV according to the patients size (this includes techniques or standardized protocols for targeted exams where dose is matched to indication/reason for exam: i.e. extremities or head);  iterative reconstruction technique.    Electronically signed by: Alexander Lucia  Date:    07/31/2022  Time:    22:24  X-Ray Chest AP Portable  Narrative: EXAMINATION:  XR CHEST AP PORTABLE    CLINICAL HISTORY:  Chest Pain;    TECHNIQUE:  Single frontal view of the chest was performed.    COMPARISON:  Multiple priors.    FINDINGS:  The lungs are clear, with normal appearance of pulmonary vasculature and no pleural effusion or pneumothorax.    The cardiac silhouette is normal in size. The hilar and mediastinal contours are unremarkable.    Degenerative change of the shoulders.  Impression: No acute abnormality.    Electronically signed by: Alexander Lucia  Date:    07/31/2022  Time:    20:53       EXAMINATION:  NM PET CT ROUTINE     CLINICAL HISTORY:  cavitary lesion;  Other  disorders of lung     TECHNIQUE:  Segmented attenuation corrected 3-D PET imaging was obtained from the skull base through the mid thighs utilizing 11.89 mCi F-18-FDG.  Noncontrast CT imaging was performed for attenuation correction, diagnosis, and anatomical fusion with PET.     COMPARISON:  07/22/2022 CT chest.     FINDINGS:  Head/neck: There is normal physiologic FDG uptake noted within the visualized brain parenchyma. No FDG avid lymphadenopathy within the neck.     Chest: There is Redemonstration of irregular parenchymal opacities with associated cavitary change in the anterior segment left upper lobe and superior segment left lower lobe.  SUV max is 3.2 in the upper lobe and 4.7 in the lower lobe.  A few small weakly FDG avid satellite nodules identified in the lower lobe.  No FDG avid mediastinal, hilar or axillary lymph nodes.     Abdomen/Pelvis: There is normal physiologic FDG uptake noted within the liver, spleen, urinary tract, and bowel.No FDG avid retroperitoneal lymphadenopathy.     Skeletal: No FDG avid osseus lesions identified.     Impression:     Indeterminate FDG avid parenchymal opacities with associated cavitation in the left upper and lower lobes.  Infection/inflammation and neoplasm among the differential considerations.  Tissue sampling recommended.       EXAMINATION:  CT CHEST WITH CONTRAST     CLINICAL HISTORY:  Cough, persistent; Cough, unspecified     TECHNIQUE:  The chest was surveyed from the apices through the posterior costophrenic angles after 100 cc of contrast.  Data was reconstructed for multiplanar images in axial, sagittal and coronal planes in for maximal intensity projection images in the axial plane.     COMPARISON:  03/24/2022     FINDINGS:  Base of Neck: No significant abnormality.     Airways: Patent.     Lungs: Apical pleural thickening.  Nodular infiltrate left upper lobe and segment of the left lower lobe.  Some cavitation noted within both regions.  Scattered small  satellite ground-glass nodules are also seen adjacent to both sites.  Findings could reflect post infectious or inflammatory change versus malignancy.  Recommend consideration for PET scan and pulmonary referral.     Pleura: No pleural fluid.No pleural calcification.     Racquel/Mediastinum: Shotty catrachita enlargement.     Esophagus: Diffuse esophageal thickening which can be seen with reflux or esophagitis.  Small hiatal hernia.  Rugal fold thickening within the stomach concerning for gastritis.     Heart/pericardium: Normal size.  Trace pericardial effusion.  No calcification.  Severe mitral annular calcification.     Pulmonary vasculature: Pulmonary arteries distribute normally.  There are four pulmonary veins.     Aorta: Left-sided aortic arch with 3 arterial branches.  The aorta maintains normal caliber, contour and course. There is moderate calcification of the thoracic and abdominal aorta.  There is  moderate coronary artery calcification.     Thoracic soft tissues: Normal.     Bones: No acute fracture. No suspicious lytic or sclerotic lesion.     Upper Abdomen: Punctate nonobstructing stones within the kidneys.  Mild constipation scattered diverticulosis within the large bowel.  Gastritis suspected.     Impression:     Nodular infiltrate left upper lobe and segment of the left lower lobe. Some cavitation noted within both regions. Scattered small satellite ground-glass nodules are also seen adjacent to both sites. Findings could reflect post infectious or inflammatory change versus malignancy. Recommend consideration for PET scan and pulmonary referral.     Diffuse esophageal thickening which can be seen with reflux or esophagitis. Small hiatal hernia. Rugal fold thickening within the stomach concerning for gastritis.     All CT scans at this facility use dose modulation, iterative reconstruction and/or weight based dosing when appropriate to reduce radiation dose to as low as reasonably achievable.        Assessment/Plan:     Problem List Items Addressed This Visit       Controlled type 2 diabetes mellitus with stage 3 chronic kidney disease, without long-term current use of insulin    Insomnia     Stable  Ambien         Mixed hyperlipidemia     Stable on CRESTOR         Pulmonary cavitary lesion - Primary     Grew Beth Shah  Treated with Zyvox only tolerated 5 days  Radiologically impproving  Added Doxy  Interval imaging  PET CT reveiwed         Relevant Medications    doxycycline (VIBRAMYCIN) 100 MG Cap    Other Relevant Orders    X-Ray Chest PA And Lateral    CT Chest Without Contrast    Acute deep vein thrombosis (DVT) of right lower extremity     Continue Elliquis  Repeat US legs  Dimer  ? Unprovoked DVT  Hematology w/u for hypercoag         Relevant Orders    US Lower Extremity Veins Bilateral    D-Dimer, Quantitative     Other Visit Diagnoses       Cavitary lesion of lung        Opacity of lung on imaging study        Relevant Medications    doxycycline (VIBRAMYCIN) 100 MG Cap    Other Relevant Orders    X-Ray Chest PA And Lateral    CT Chest Without Contrast    Other forms of dyspnea        Relevant Orders    US Lower Extremity Veins Bilateral           No contraindication for EGD procedure  Resume elliquis after procedure  Doxy sent  Eliquis since March 2022  Chest CT findings improve  PET CT uptake low likely infection related  Repeat bronch may be considered if decline      Follow up in about 6 weeks (around 10/24/2022), or CXR today, Doxy, PFT, ABG, chest CT, Dimer, US legs.    This note was prepared using voice recognition system and is likely to have sound alike errors that may have been overlooked even after proof reading.  Please call me with any questions    Discussed diagnosis, its evaluation, treatment and usual course. All questions answered.      Simon Mckeon MD

## 2022-09-12 NOTE — PROGRESS NOTES
Subjective:   Patient ID:  Samy Alejandre Jr. is a 72 y.o. male who presents for evaluation of No chief complaint on file.      73 yo male, preop clearance of EGD  PMH h/o CVA 3 times last one in , residual right side weakness. DM > 10 yrs, HTN anxiety h/O DVT in  on eliquis    LE venous US Right lower extremity veins: Extensive deep venous thrombus which is acute appearing in the femoral vein extending through the popliteal vein and into the peroneal and posterior tibial veins.  Occasional nose bleeding  No chest pain dyspnea faint   Occasional dizziness   No leg swelling. Sleeps on 1 pillow  Smoking 1 ppd and drinking  Egd done in  esophageal stenosis            Past Medical History:   Diagnosis Date    Anxiety     Depression     Diabetes mellitus, type 2     Hypertension     Stroke        Past Surgical History:   Procedure Laterality Date    ANGIOGRAPHY OF LOWER EXTREMITY N/A 12/21/2020    Procedure: ANGIOGRAM, LOWER EXTREMITY/Rt leg poss pta/stent;  Surgeon: Todd Michel MD;  Location: [facility] CATH LAB;  Service: Peripheral Vascular;  Laterality: N/A;  rescheduled 12/8    BACK SURGERY  2015    BRONCHOSCOPY Left 3/24/2022    Procedure: Bronchoscopy;  Surgeon: Edward Williamson MD;  Location: [facility] ENDO;  Service: Pulmonary;  Laterality: Left;  poss endobronchial biopsy or brushing    CATARACT EXTRACTION      ESOPHAGOGASTRODUODENOSCOPY N/A 8/24/2022    Procedure: EGD (ESOPHAGOGASTRODUODENOSCOPY);  Surgeon: Ana Gomez MD;  Location: [facility];  Service: Endoscopy;  Laterality: N/A;    KNEE SURGERY  2012    LITHOTRIPSY  2000       Social History     Tobacco Use    Smoking status: Every Day     Packs/day: 1.00     Types: Cigarettes    Smokeless tobacco: Current     Types: Snuff   Substance Use Topics    Alcohol use: Yes     Alcohol/week: 1.0 standard drink     Types: 1 Shots of liquor per week     Comment: Daily    Drug use: Never       Family History   Problem Relation Age of Onset     Heart disease Mother     Stroke Mother     Cancer Father     COPD Sister        Review of Systems   Constitutional: Negative for decreased appetite, diaphoresis, fever, malaise/fatigue and night sweats.   HENT:  Negative for nosebleeds.    Eyes:  Negative for blurred vision and double vision.   Cardiovascular:  Negative for chest pain, claudication, dyspnea on exertion, irregular heartbeat, leg swelling, near-syncope, orthopnea, palpitations, paroxysmal nocturnal dyspnea and syncope.   Respiratory:  Negative for cough, shortness of breath, sleep disturbances due to breathing, snoring, sputum production and wheezing.    Endocrine: Negative for cold intolerance and polyuria.   Hematologic/Lymphatic: Does not bruise/bleed easily.   Skin:  Negative for rash.   Musculoskeletal:  Positive for muscle weakness. Negative for back pain, falls, joint pain, joint swelling and neck pain.   Gastrointestinal:  Negative for abdominal pain, heartburn, nausea and vomiting.   Genitourinary:  Negative for dysuria, frequency and hematuria.   Neurological:  Positive for dizziness and focal weakness. Negative for difficulty with concentration, headaches, light-headedness, numbness, seizures and weakness.   Psychiatric/Behavioral:  Negative for depression, memory loss and substance abuse. The patient does not have insomnia.    Allergic/Immunologic: Negative for HIV exposure and hives.     Objective:   Physical Exam  HENT:      Head: Normocephalic.   Eyes:      Pupils: Pupils are equal, round, and reactive to light.   Neck:      Thyroid: No thyromegaly.      Vascular: Normal carotid pulses. No carotid bruit or JVD.   Cardiovascular:      Rate and Rhythm: Normal rate and regular rhythm. No extrasystoles are present.     Chest Wall: PMI is not displaced.      Pulses: Normal pulses.      Heart sounds: Normal heart sounds. No murmur heard.    No gallop. No S3 sounds.   Pulmonary:      Effort: No respiratory distress.      Breath sounds:  Normal breath sounds. No stridor.   Abdominal:      General: Bowel sounds are normal.      Palpations: Abdomen is soft.      Tenderness: There is no abdominal tenderness. There is no rebound.   Musculoskeletal:         General: Normal range of motion.   Skin:     Findings: No rash.   Neurological:      Mental Status: He is alert and oriented to person, place, and time.   Psychiatric:         Behavior: Behavior normal.       Lab Results   Component Value Date    CHOL 109 (L) 04/27/2022    CHOL 141 01/03/2022    CHOL 132 05/27/2021     Lab Results   Component Value Date    HDL 33 (L) 04/27/2022    HDL 31 (L) 01/03/2022    HDL 36 (L) 05/27/2021     Lab Results   Component Value Date    LDLCALC 57.2 (L) 04/27/2022    LDLCALC 77.8 01/03/2022    LDLCALC 64.4 05/27/2021     Lab Results   Component Value Date    TRIG 94 04/27/2022    TRIG 161 (H) 01/03/2022    TRIG 158 (H) 05/27/2021     Lab Results   Component Value Date    CHOLHDL 30.3 04/27/2022    CHOLHDL 22.0 01/03/2022    CHOLHDL 27.3 05/27/2021       Chemistry        Component Value Date/Time     07/31/2022 2037    K 3.5 07/31/2022 2037     07/31/2022 2037    CO2 22 (L) 07/31/2022 2037    BUN 13 07/31/2022 2037    CREATININE 1.2 07/31/2022 2037    GLU 96 07/31/2022 2037        Component Value Date/Time    CALCIUM 8.8 07/31/2022 2037    ALKPHOS 144 (H) 07/31/2022 2037    AST 46 (H) 07/31/2022 2037    ALT 24 07/31/2022 2037    BILITOT 0.2 07/31/2022 2037    ESTGFRAFRICA >60 07/31/2022 2037    EGFRNONAA >60 07/31/2022 2037          Lab Results   Component Value Date    HGBA1C 5.3 06/23/2022     Lab Results   Component Value Date    TSH 2.724 03/20/2022     Lab Results   Component Value Date    INR 1.0 03/21/2022     Lab Results   Component Value Date    WBC 4.77 07/31/2022    HGB 10.8 (L) 07/31/2022    HCT 31.8 (L) 07/31/2022    MCV 92 07/31/2022     07/31/2022     BNP  @LABRCNTIP(BNP,BNPTRIAGEBLO)@  CrCl cannot be calculated (Patient's most recent  lab result is older than the maximum 7 days allowed.).  No results found in the last 24 hours.  No results found in the last 24 hours.  No results found in the last 24 hours.    Assessment:      1. Preop cardiovascular exam    2. Mixed hyperlipidemia    3. Acute deep vein thrombosis (DVT) of proximal vein of right lower extremity    4. History of CVA with residual deficit    5. Controlled type 2 diabetes mellitus with stage 3 chronic kidney disease, without long-term current use of insulin        Plan:   Elevated periop risk of CV events for non-high risk procedure.  Good functional and exercise capacity.  No chest pain, active arrhythmia and CHF symptoms.  Ok to proceed the scheduled surgery without further cardiac study.  OK to hold plavix 7 days and Eliquis 2 to 3 days before the procedure and resume postop.      Continue statin  RTC in 3 months

## 2022-09-12 NOTE — ASSESSMENT & PLAN NOTE
Magda Rodriguez and RANJANA Shah  Treated with Zyvox only tolerated 5 days  Radiologically impproving  Added Doxy  Interval imaging  PET CT reveiwed

## 2022-09-13 ENCOUNTER — TELEPHONE (OUTPATIENT)
Dept: PREADMISSION TESTING | Facility: HOSPITAL | Age: 72
End: 2022-09-13
Payer: MEDICARE

## 2022-09-14 ENCOUNTER — ANESTHESIA EVENT (OUTPATIENT)
Dept: ENDOSCOPY | Facility: HOSPITAL | Age: 72
End: 2022-09-14
Payer: MEDICARE

## 2022-09-14 ENCOUNTER — HOSPITAL ENCOUNTER (OUTPATIENT)
Facility: HOSPITAL | Age: 72
Discharge: HOME | End: 2022-09-14
Attending: INTERNAL MEDICINE | Admitting: INTERNAL MEDICINE
Payer: MEDICARE

## 2022-09-14 ENCOUNTER — ANESTHESIA (OUTPATIENT)
Dept: ENDOSCOPY | Facility: HOSPITAL | Age: 72
End: 2022-09-14
Payer: MEDICARE

## 2022-09-14 ENCOUNTER — DOCUMENTATION ONLY (OUTPATIENT)
Dept: GASTROENTEROLOGY | Facility: CLINIC | Age: 72
End: 2022-09-14
Payer: MEDICARE

## 2022-09-14 ENCOUNTER — PATIENT MESSAGE (OUTPATIENT)
Dept: GASTROENTEROLOGY | Facility: CLINIC | Age: 72
End: 2022-09-14

## 2022-09-14 VITALS
RESPIRATION RATE: 17 BRPM | DIASTOLIC BLOOD PRESSURE: 68 MMHG | OXYGEN SATURATION: 100 % | HEIGHT: 69 IN | WEIGHT: 121 LBS | BODY MASS INDEX: 17.92 KG/M2 | SYSTOLIC BLOOD PRESSURE: 132 MMHG | TEMPERATURE: 97 F | HEART RATE: 68 BPM

## 2022-09-14 DIAGNOSIS — K22.2 ESOPHAGEAL STRICTURE: Primary | ICD-10-CM

## 2022-09-14 LAB
GLUCOSE SERPL-MCNC: 88 MG/DL (ref 70–110)
POCT GLUCOSE: 88 MG/DL (ref 70–110)

## 2022-09-14 PROCEDURE — 27201012 HC FORCEPS, HOT/COLD, DISP: Performed by: INTERNAL MEDICINE

## 2022-09-14 PROCEDURE — 43249 PR EGD, FLEX, W/BALL DILATION, < 30MM: ICD-10-PCS | Mod: ,,, | Performed by: INTERNAL MEDICINE

## 2022-09-14 PROCEDURE — 88305 TISSUE EXAM BY PATHOLOGIST: CPT | Mod: 26,,, | Performed by: PATHOLOGY

## 2022-09-14 PROCEDURE — 43239 PR EGD, FLEX, W/BIOPSY, SGL/MULTI: ICD-10-PCS | Mod: 59,,, | Performed by: INTERNAL MEDICINE

## 2022-09-14 PROCEDURE — 37000009 HC ANESTHESIA EA ADD 15 MINS: Performed by: INTERNAL MEDICINE

## 2022-09-14 PROCEDURE — C1726 CATH, BAL DIL, NON-VASCULAR: HCPCS | Performed by: INTERNAL MEDICINE

## 2022-09-14 PROCEDURE — 43249 ESOPH EGD DILATION <30 MM: CPT | Performed by: INTERNAL MEDICINE

## 2022-09-14 PROCEDURE — 88312 PR  SPECIAL STAINS,GROUP I: ICD-10-PCS | Mod: 26,,, | Performed by: PATHOLOGY

## 2022-09-14 PROCEDURE — 82962 GLUCOSE BLOOD TEST: CPT | Performed by: INTERNAL MEDICINE

## 2022-09-14 PROCEDURE — 43239 EGD BIOPSY SINGLE/MULTIPLE: CPT | Mod: 59,,, | Performed by: INTERNAL MEDICINE

## 2022-09-14 PROCEDURE — 88312 SPECIAL STAINS GROUP 1: CPT | Performed by: PATHOLOGY

## 2022-09-14 PROCEDURE — 37000008 HC ANESTHESIA 1ST 15 MINUTES: Performed by: INTERNAL MEDICINE

## 2022-09-14 PROCEDURE — 43249 ESOPH EGD DILATION <30 MM: CPT | Mod: ,,, | Performed by: INTERNAL MEDICINE

## 2022-09-14 PROCEDURE — 43239 EGD BIOPSY SINGLE/MULTIPLE: CPT | Mod: 59 | Performed by: INTERNAL MEDICINE

## 2022-09-14 PROCEDURE — 88305 TISSUE EXAM BY PATHOLOGIST: ICD-10-PCS | Mod: 26,,, | Performed by: PATHOLOGY

## 2022-09-14 PROCEDURE — 88305 TISSUE EXAM BY PATHOLOGIST: CPT | Performed by: PATHOLOGY

## 2022-09-14 PROCEDURE — 25000003 PHARM REV CODE 250: Performed by: FAMILY MEDICINE

## 2022-09-14 PROCEDURE — 88312 SPECIAL STAINS GROUP 1: CPT | Mod: 26,,, | Performed by: PATHOLOGY

## 2022-09-14 PROCEDURE — 63600175 PHARM REV CODE 636 W HCPCS: Performed by: FAMILY MEDICINE

## 2022-09-14 RX ORDER — PROPOFOL 10 MG/ML
VIAL (ML) INTRAVENOUS
Status: DISCONTINUED | OUTPATIENT
Start: 2022-09-14 | End: 2022-09-14

## 2022-09-14 RX ORDER — LIDOCAINE HYDROCHLORIDE 20 MG/ML
INJECTION, SOLUTION EPIDURAL; INFILTRATION; INTRACAUDAL; PERINEURAL
Status: DISCONTINUED | OUTPATIENT
Start: 2022-09-14 | End: 2022-09-14

## 2022-09-14 RX ADMIN — PROPOFOL 50 MG: 10 INJECTION, EMULSION INTRAVENOUS at 12:09

## 2022-09-14 RX ADMIN — PROPOFOL 100 MG: 10 INJECTION, EMULSION INTRAVENOUS at 12:09

## 2022-09-14 RX ADMIN — LIDOCAINE HYDROCHLORIDE 50 MG: 20 INJECTION, SOLUTION EPIDURAL; INFILTRATION; INTRACAUDAL; PERINEURAL at 12:09

## 2022-09-14 RX ADMIN — SODIUM CHLORIDE, SODIUM LACTATE, POTASSIUM CHLORIDE, AND CALCIUM CHLORIDE: .6; .31; .03; .02 INJECTION, SOLUTION INTRAVENOUS at 12:09

## 2022-09-14 NOTE — ANESTHESIA PREPROCEDURE EVALUATION
09/14/2022  Samy Alejandre Jr. is a 72 y.o., male.      Pre-op Assessment    I have reviewed the Patient Summary Reports.     I have reviewed the Nursing Notes. I have reviewed the NPO Status.   I have reviewed the Medications.     Review of Systems  Anesthesia Hx:  No problems with previous Anesthesia    Hematology/Oncology:  Hematology Normal   Oncology Normal     EENT/Dental:EENT/Dental Normal   Cardiovascular:   Hypertension    Pulmonary:  Pulmonary Normal    Renal/:   Chronic Renal Disease    Hepatic/GI:  Hepatic/GI Normal    Musculoskeletal:  Musculoskeletal Normal    Neurological:   CVA    Endocrine:   Diabetes    Dermatological:  Skin Normal    Psych:   Psychiatric History          Physical Exam  General: Well nourished, Alert and Oriented    Airway:  Mallampati: II   Mouth Opening: Normal  TM Distance: Normal  Tongue: Normal  Neck ROM: Normal ROM    Dental:  Intact    Chest/Lungs:  Clear to auscultation, Normal Respiratory Rate    Heart:  Rate: Normal    Abdomen:  Normal        Anesthesia Plan  Type of Anesthesia, risks & benefits discussed:    Anesthesia Type: MAC  Induction:  IV  Informed Consent: Informed consent signed with the Patient and all parties understand the risks and agree with anesthesia plan.  All questions answered.   ASA Score: 3  Day of Surgery Review of History & Physical: I have interviewed and examined the patient. I have reviewed the patient's H&P dated:     Ready For Surgery From Anesthesia Perspective.     .

## 2022-09-14 NOTE — H&P
Short Stay Endoscopy History and Physical    PCP - Damien Barron MD    Procedure - EGD  ASA - 2  Mallampati - per anesthesia  History of Anesthesia problems - no  Family history Anesthesia problems -  no     HPI:  This is a 72 y.o. male here for evaluation of :   Active Hospital Problems    Diagnosis  POA    *Esophageal stricture [K22.2]  Yes      Resolved Hospital Problems   No resolved problems to display.       ROS:  CONSTITUTIONAL: Denies weight change,  fatigue, fevers, chills, night sweats.  CARDIOVASCULAR: Denies chest pain, shortness of breath, orthopnea and edema.  RESPIRATORY: Denies cough, hemoptysis, dyspnea, and wheezing.  GI: See HPI.    Medical History:   Past Medical History:   Diagnosis Date    Anxiety     Depression     Diabetes mellitus, type 2     Hypertension     Stroke        Surgical History:   Past Surgical History:   Procedure Laterality Date    ANGIOGRAPHY OF LOWER EXTREMITY N/A 12/21/2020    Procedure: ANGIOGRAM, LOWER EXTREMITY/Rt leg poss pta/stent;  Surgeon: Todd Michel MD;  Location: [facility] CATH LAB;  Service: Peripheral Vascular;  Laterality: N/A;  rescheduled 12/8    BACK SURGERY  2015    BRONCHOSCOPY Left 3/24/2022    Procedure: Bronchoscopy;  Surgeon: Edward Williamson MD;  Location: [facility] ENDO;  Service: Pulmonary;  Laterality: Left;  poss endobronchial biopsy or brushing    CATARACT EXTRACTION      ESOPHAGOGASTRODUODENOSCOPY N/A 8/24/2022    Procedure: EGD (ESOPHAGOGASTRODUODENOSCOPY);  Surgeon: Ana Gomez MD;  Location: [facility];  Service: Endoscopy;  Laterality: N/A;    KNEE SURGERY  2012    LITHOTRIPSY  2000       Family History:  Family History   Problem Relation Age of Onset    Heart disease Mother     Stroke Mother     Cancer Father     COPD Sister        Social History:   Social History     Tobacco Use    Smoking status: Every Day     Packs/day: 1.00     Types: Cigarettes    Smokeless tobacco: Current     Types: Snuff   Substance Use Topics    Alcohol use: Yes      Alcohol/week: 1.0 standard drink     Types: 1 Shots of liquor per week     Comment: Daily    Drug use: Never       Allergy  Review of patient's allergies indicates:   Allergen Reactions    Chantix [varenicline] Other (See Comments)     Felt bad       Medications:   No current facility-administered medications on file prior to encounter.     Current Outpatient Medications on File Prior to Encounter   Medication Sig Dispense Refill    apixaban (ELIQUIS) 5 mg Tab Take 1 tablet (5 mg total) by mouth 2 (two) times daily. (Patient taking differently: Take 5 mg by mouth once daily.) 74 tablet 2    blood glucose control, low (TRUE METRIX LEVEL 1) Soln 1 drop by Misc.(Non-Drug; Combo Route) route once daily. 1 each 4    blood glucose control, normal (TRUE METRIX LEVEL 2) Soln 1 drop by Misc.(Non-Drug; Combo Route) route once daily. 1 each 4    blood sugar diagnostic Strp Use to test blood sugar once daily 100 strip 3    blood-glucose meter (TRUE METRIX GLUCOSE METER) Misc 1 Device by Misc.(Non-Drug; Combo Route) route once. for 1 dose 1 each 0    clopidogreL (PLAVIX) 75 mg tablet Take 1 tablet (75 mg total) by mouth once daily. (Patient taking differently: Take 75 mg by mouth every other day.) 90 tablet 1    DULoxetine (CYMBALTA) 30 MG capsule Take 1 capsule (30 mg total) by mouth once daily. 90 capsule 3    FLUoxetine 20 MG capsule Take 1 capsule (20 mg total) by mouth once daily. 90 capsule 1    gabapentin (NEURONTIN) 300 MG capsule Take 1 capsule by mouth three times daily as needed 90 capsule 0    HYDROcodone-acetaminophen (NORCO) 5-325 mg per tablet Take 1 tablet by mouth 3 (three) times daily.      lancets 32 gauge Misc 1 lancet by Misc.(Non-Drug; Combo Route) route once daily. 30 each 4    ondansetron (ZOFRAN) 8 MG tablet Take 1 tablet (8 mg total) by mouth every 8 (eight) hours as needed for Nausea. 30 tablet 1    oxybutynin (DITROPAN-XL) 10 MG 24 hr tablet Take 1 tablet (10 mg total) by mouth once daily. 30 tablet 5     pantoprazole (PROTONIX) 40 MG tablet Take 1 tablet (40 mg total) by mouth once daily. 30 tablet 3    rosuvastatin (CRESTOR) 20 MG tablet Take 1 tablet (20 mg total) by mouth once daily. 90 tablet 1    tamsulosin (FLOMAX) 0.4 mg Cap Take 1 capsule (0.4 mg total) by mouth once daily. 90 capsule 1    walker (ULTRA-LIGHT ROLLATOR) Misc Use daily for ambulation 1 each 0    zolpidem (AMBIEN) 10 mg Tab Take 1 tablet (10 mg total) by mouth nightly as needed (insomnia). 30 tablet 5       Physical Exam:  Vital Signs: There were no vitals filed for this visit.  General Appearance: Well appearing in no acute distress  ENT: OP clear  Chest: CTA B  CV: RRR, no m/r/g  Abd: s/nt/nd/nabs  Ext: no edema    Labs:  Reviewed    IMP:  Active Hospital Problems    Diagnosis  POA    *Esophageal stricture [K22.2]  Yes      Resolved Hospital Problems   No resolved problems to display.         Plan:  I have explained the risks and benefits of endoscopy procedures to the patient including but not limited to bleeding, perforation, infection, and death. The patient wishes to proceed.

## 2022-09-14 NOTE — DISCHARGE SUMMARY
[facility] - Endoscopy (Hospital)  Discharge Note  Short Stay    Procedure(s) (LRB):  EGD (ESOPHAGOGASTRODUODENOSCOPY) (N/A)    OUTCOME: Patient tolerated treatment/procedure well without complication and is now ready for discharge.    DISPOSITION: Home or Self Care    FINAL DIAGNOSIS:  Esophageal stricture    FOLLOWUP: With primary care provider    DISCHARGE INSTRUCTIONS:  No discharge procedures on file.      Clinical Reference Documents Added to Patient Instructions         Document    ESOPHAGEAL DILATION (ENGLISH)    HIATAL HERNIA (ENGLISH)

## 2022-09-14 NOTE — PROGRESS NOTES
9/14/22  Ok to proceed the scheduled surgery without further cardiac study.  OK to hold plavix 7 days and Eliquis 2 to 3 days before the procedure and resume postop.

## 2022-09-14 NOTE — TRANSFER OF CARE
"Anesthesia Transfer of Care Note    Patient: Samy Alejandre JrMichaela    Procedure(s) Performed: Procedure(s) (LRB):  EGD (ESOPHAGOGASTRODUODENOSCOPY) (N/A)    Patient location: GI    Anesthesia Type: MAC    Transport from OR: Transported from OR on room air with adequate spontaneous ventilation    Post pain: adequate analgesia    Post assessment: no apparent anesthetic complications    Post vital signs: stable    Level of consciousness: awake and responds to stimulation    Nausea/Vomiting: no nausea/vomiting    Complications: none    Transfer of care protocol was followed      Last vitals:   Visit Vitals  BP (!) 146/70 (BP Location: Left arm, Patient Position: Lying)   Pulse 64   Temp 36.4 °C (97.5 °F) (Temporal)   Resp 19   Ht 5' 9" (1.753 m)   Wt 54.9 kg (121 lb)   SpO2 100%   BMI 17.87 kg/m²     "

## 2022-09-14 NOTE — ANESTHESIA POSTPROCEDURE EVALUATION
Anesthesia Post Evaluation    Patient: Samy Alejandre JrMichaela    Procedure(s) Performed: Procedure(s) (LRB):  EGD (ESOPHAGOGASTRODUODENOSCOPY) (N/A)    Final Anesthesia Type: MAC      Patient location during evaluation: GI PACU  Patient participation: Yes- Able to Participate  Level of consciousness: awake and alert  Post-procedure vital signs: reviewed and stable  Pain management: adequate  Airway patency: patent    PONV status at discharge: No PONV  Anesthetic complications: no      Cardiovascular status: stable  Respiratory status: unassisted and spontaneous ventilation  Hydration status: euvolemic  Follow-up not needed.          Vitals Value Taken Time   BP  09/14/22 1234   Temp  09/14/22 1234   Pulse  09/14/22 1234   Resp  09/14/22 1234   SpO2  09/14/22 1234         No case tracking events are documented in the log.      Pain/Dwayne Score: No data recorded

## 2022-09-20 ENCOUNTER — TELEPHONE (OUTPATIENT)
Dept: ADMINISTRATIVE | Facility: HOSPITAL | Age: 72
End: 2022-09-20
Payer: MEDICARE

## 2022-09-21 LAB
FINAL PATHOLOGIC DIAGNOSIS: NORMAL
GROSS: NORMAL
Lab: NORMAL

## 2022-09-22 DIAGNOSIS — B37.81 CANDIDA ESOPHAGITIS: Primary | ICD-10-CM

## 2022-09-22 LAB
ACID FAST MOD KINY STN SPEC: NORMAL
MYCOBACTERIUM SPEC QL CULT: NORMAL

## 2022-09-22 RX ORDER — FLUCONAZOLE 200 MG/1
TABLET ORAL
Qty: 22 TABLET | Refills: 0 | Status: SHIPPED | OUTPATIENT
Start: 2022-09-22 | End: 2022-12-20

## 2022-09-27 LAB
ACID FAST MOD KINY STN SPEC: NORMAL
MYCOBACTERIUM SPEC QL CULT: NORMAL

## 2022-09-28 ENCOUNTER — HOSPITAL ENCOUNTER (EMERGENCY)
Facility: HOSPITAL | Age: 72
Discharge: HOME | End: 2022-09-28
Attending: EMERGENCY MEDICINE
Payer: MEDICARE

## 2022-09-28 VITALS
BODY MASS INDEX: 17.87 KG/M2 | OXYGEN SATURATION: 99 % | SYSTOLIC BLOOD PRESSURE: 126 MMHG | RESPIRATION RATE: 19 BRPM | HEIGHT: 69 IN | TEMPERATURE: 98 F | HEART RATE: 57 BPM | DIASTOLIC BLOOD PRESSURE: 68 MMHG

## 2022-09-28 DIAGNOSIS — T50.905A MEDICATION SIDE EFFECT, INITIAL ENCOUNTER: Primary | ICD-10-CM

## 2022-09-28 DIAGNOSIS — R41.82 ALTERED MENTAL STATUS: ICD-10-CM

## 2022-09-28 LAB
ALBUMIN SERPL BCP-MCNC: 2.9 G/DL (ref 3.5–5.2)
ALP SERPL-CCNC: 121 U/L (ref 55–135)
ALT SERPL W/O P-5'-P-CCNC: 25 U/L (ref 10–44)
AMPHET+METHAMPHET UR QL: NEGATIVE
ANION GAP SERPL CALC-SCNC: 11 MMOL/L (ref 8–16)
AST SERPL-CCNC: 31 U/L (ref 10–40)
BARBITURATES UR QL SCN>200 NG/ML: NEGATIVE
BASOPHILS # BLD AUTO: 0.05 K/UL (ref 0–0.2)
BASOPHILS NFR BLD: 0.8 % (ref 0–1.9)
BENZODIAZ UR QL SCN>200 NG/ML: NEGATIVE
BILIRUB SERPL-MCNC: 0.2 MG/DL (ref 0.1–1)
BILIRUB UR QL STRIP: NEGATIVE
BUN SERPL-MCNC: 15 MG/DL (ref 8–23)
BZE UR QL SCN: NEGATIVE
CALCIUM SERPL-MCNC: 8.7 MG/DL (ref 8.7–10.5)
CANNABINOIDS UR QL SCN: NEGATIVE
CHLORIDE SERPL-SCNC: 112 MMOL/L (ref 95–110)
CLARITY UR: CLEAR
CO2 SERPL-SCNC: 19 MMOL/L (ref 23–29)
COLOR UR: COLORLESS
CREAT SERPL-MCNC: 1.2 MG/DL (ref 0.5–1.4)
CREAT UR-MCNC: 33.7 MG/DL (ref 23–375)
DIFFERENTIAL METHOD: ABNORMAL
EOSINOPHIL # BLD AUTO: 0.6 K/UL (ref 0–0.5)
EOSINOPHIL NFR BLD: 10.4 % (ref 0–8)
ERYTHROCYTE [DISTWIDTH] IN BLOOD BY AUTOMATED COUNT: 17.6 % (ref 11.5–14.5)
EST. GFR  (NO RACE VARIABLE): >60 ML/MIN/1.73 M^2
GLUCOSE SERPL-MCNC: 78 MG/DL (ref 70–110)
GLUCOSE UR QL STRIP: NEGATIVE
HCT VFR BLD AUTO: 28.4 % (ref 40–54)
HGB BLD-MCNC: 9.6 G/DL (ref 14–18)
HGB UR QL STRIP: NEGATIVE
IMM GRANULOCYTES # BLD AUTO: 0.02 K/UL (ref 0–0.04)
IMM GRANULOCYTES NFR BLD AUTO: 0.3 % (ref 0–0.5)
KETONES UR QL STRIP: NEGATIVE
LEUKOCYTE ESTERASE UR QL STRIP: NEGATIVE
LYMPHOCYTES # BLD AUTO: 2.1 K/UL (ref 1–4.8)
LYMPHOCYTES NFR BLD: 33.5 % (ref 18–48)
MCH RBC QN AUTO: 33 PG (ref 27–31)
MCHC RBC AUTO-ENTMCNC: 33.8 G/DL (ref 32–36)
MCV RBC AUTO: 98 FL (ref 82–98)
METHADONE UR QL SCN>300 NG/ML: NEGATIVE
MONOCYTES # BLD AUTO: 0.6 K/UL (ref 0.3–1)
MONOCYTES NFR BLD: 9.2 % (ref 4–15)
NEUTROPHILS # BLD AUTO: 2.8 K/UL (ref 1.8–7.7)
NEUTROPHILS NFR BLD: 45.8 % (ref 38–73)
NITRITE UR QL STRIP: NEGATIVE
NRBC BLD-RTO: 0 /100 WBC
OPIATES UR QL SCN: ABNORMAL
PCP UR QL SCN>25 NG/ML: NEGATIVE
PH UR STRIP: 6 [PH] (ref 5–8)
PLATELET # BLD AUTO: 192 K/UL (ref 150–450)
PMV BLD AUTO: 10.2 FL (ref 9.2–12.9)
POCT GLUCOSE: 120 MG/DL (ref 70–110)
POTASSIUM SERPL-SCNC: 4.8 MMOL/L (ref 3.5–5.1)
PROT SERPL-MCNC: 5.5 G/DL (ref 6–8.4)
PROT UR QL STRIP: NEGATIVE
RBC # BLD AUTO: 2.91 M/UL (ref 4.6–6.2)
SODIUM SERPL-SCNC: 142 MMOL/L (ref 136–145)
SP GR UR STRIP: 1 (ref 1–1.03)
TOXICOLOGY INFORMATION: ABNORMAL
TROPONIN I SERPL DL<=0.01 NG/ML-MCNC: <0.006 NG/ML (ref 0–0.03)
URN SPEC COLLECT METH UR: ABNORMAL
UROBILINOGEN UR STRIP-ACNC: NEGATIVE EU/DL
WBC # BLD AUTO: 6.18 K/UL (ref 3.9–12.7)

## 2022-09-28 PROCEDURE — 93010 EKG 12-LEAD: ICD-10-PCS | Mod: ,,, | Performed by: INTERNAL MEDICINE

## 2022-09-28 PROCEDURE — 84484 ASSAY OF TROPONIN QUANT: CPT | Performed by: EMERGENCY MEDICINE

## 2022-09-28 PROCEDURE — 85025 COMPLETE CBC W/AUTO DIFF WBC: CPT | Performed by: EMERGENCY MEDICINE

## 2022-09-28 PROCEDURE — 80053 COMPREHEN METABOLIC PANEL: CPT | Performed by: EMERGENCY MEDICINE

## 2022-09-28 PROCEDURE — 25000003 PHARM REV CODE 250: Performed by: EMERGENCY MEDICINE

## 2022-09-28 PROCEDURE — 93010 ELECTROCARDIOGRAM REPORT: CPT | Mod: ,,, | Performed by: INTERNAL MEDICINE

## 2022-09-28 PROCEDURE — 80307 DRUG TEST PRSMV CHEM ANLYZR: CPT | Performed by: EMERGENCY MEDICINE

## 2022-09-28 PROCEDURE — 96360 HYDRATION IV INFUSION INIT: CPT

## 2022-09-28 PROCEDURE — 93005 ELECTROCARDIOGRAM TRACING: CPT

## 2022-09-28 PROCEDURE — 82962 GLUCOSE BLOOD TEST: CPT

## 2022-09-28 PROCEDURE — 99285 EMERGENCY DEPT VISIT HI MDM: CPT | Mod: 25

## 2022-09-28 PROCEDURE — 81003 URINALYSIS AUTO W/O SCOPE: CPT | Performed by: EMERGENCY MEDICINE

## 2022-09-28 RX ADMIN — SODIUM CHLORIDE 500 ML: 0.9 INJECTION, SOLUTION INTRAVENOUS at 05:09

## 2022-09-28 NOTE — ED PROVIDER NOTES
SCRIBE #1 NOTE: I, Domo Garza, am scribing for, and in the presence of, Court Lopez MD. I have scribed the HPI, ROS, and PEx    SCRIBE #2 NOTE: I, Rylan Mendoza, am scribing for, and in the presence of,  Robert Jaime MD. I have scribed the remaining portions of the note not scribed by Scribe #1.    History     Chief Complaint   Patient presents with    Altered Mental Status     Family reports pt more confused over last few days becoming worse till now. Pt was on abx for resp infection that was stopped 2 days ago. Hx CVA last 5/19     Review of patient's allergies indicates:   Allergen Reactions    Chantix [varenicline] Other (See Comments)     Felt bad         History of Present Illness     HPI    9/28/2022, 4:59 AM  History obtained from the patient and daughter      History of Present Illness: Samy Alejandre Jr. is a 72 y.o. male patient with a PMHx of DM, HTN, and Stroke who presents to the Emergency Department for evaluation of AMS which onset gradually the past couple of days. Pt has been on abx for a lesion in his lung for over a week and the daughter reports that pt will get weak after a couple days of abx. She reports that he has had multiple falls due to this weakness. The daughter also says that pt has been getting more and more confused since yesterday. Symptoms are constant and moderate in severity. No mitigating or exacerbating factors reported. Associated sxs include intermittent dizziness. Patient denies any abdominal pain, CP, nausea, vomiting, SOB, fever, and all other sxs at this time. Pt took an Ambien 10mg tonight. No further complaints or concerns at this time.       Arrival mode: Personal vehicle    PCP: Damien Barron MD        Past Medical History:  Past Medical History:   Diagnosis Date    Anxiety     Depression     Diabetes mellitus, type 2     Hypertension     Stroke        Past Surgical History:  Past Surgical History:   Procedure Laterality Date    ANGIOGRAPHY OF LOWER EXTREMITY N/A  12/21/2020    Procedure: ANGIOGRAM, LOWER EXTREMITY/Rt leg poss pta/stent;  Surgeon: Todd Michel MD;  Location: [facility] CATH LAB;  Service: Peripheral Vascular;  Laterality: N/A;  rescheduled 12/8    BACK SURGERY  2015    BRONCHOSCOPY Left 3/24/2022    Procedure: Bronchoscopy;  Surgeon: Edward Williamson MD;  Location: [facility] ENDO;  Service: Pulmonary;  Laterality: Left;  poss endobronchial biopsy or brushing    CATARACT EXTRACTION      ESOPHAGOGASTRODUODENOSCOPY N/A 8/24/2022    Procedure: EGD (ESOPHAGOGASTRODUODENOSCOPY);  Surgeon: Ana Gomez MD;  Location: [facility] ENDO;  Service: Endoscopy;  Laterality: N/A;    ESOPHAGOGASTRODUODENOSCOPY N/A 9/14/2022    Procedure: EGD (ESOPHAGOGASTRODUODENOSCOPY);  Surgeon: Ana Gomez MD;  Location: [facility];  Service: Endoscopy;  Laterality: N/A;    KNEE SURGERY  2012    LITHOTRIPSY  2000         Family History:  Family History   Problem Relation Age of Onset    Heart disease Mother     Stroke Mother     Cancer Father     COPD Sister        Social History:  Social History     Tobacco Use    Smoking status: Every Day     Packs/day: 1.00     Types: Cigarettes    Smokeless tobacco: Current     Types: Snuff   Substance and Sexual Activity    Alcohol use: Yes     Alcohol/week: 1.0 standard drink     Types: 1 Shots of liquor per week     Comment: Daily    Drug use: Never    Sexual activity: Not Currently        Review of Systems     Review of Systems   Constitutional:  Negative for fever.   HENT:  Negative for sore throat.    Respiratory:  Negative for shortness of breath.    Cardiovascular:  Negative for chest pain.   Gastrointestinal:  Negative for abdominal pain, nausea and vomiting.   Genitourinary:  Negative for dysuria.   Musculoskeletal:  Negative for back pain.   Skin:  Negative for rash.   Neurological:  Positive for dizziness and weakness.   Hematological:  Does not bruise/bleed easily.   Psychiatric/Behavioral:  Positive for confusion.    All other systems  "reviewed and are negative.     Physical Exam     Initial Vitals [09/28/22 0336]   BP Pulse Resp Temp SpO2   112/70 76 14 98.2 °F (36.8 °C) 100 %      MAP       --          Physical Exam  Nursing Notes and Vital Signs Reviewed.  Constitutional: Patient is in no acute distress. Well-developed and well-nourished.  Head: Atraumatic. Normocephalic.  Eyes: PERRL. EOM intact. Conjunctivae are not pale. No scleral icterus.  ENT: Mucous membranes are dry. Oropharynx is clear and symmetric.    Neck: Supple. Full ROM. No lymphadenopathy.  Cardiovascular: Regular rate. Regular rhythm. No murmurs, rubs, or gallops. Distal pulses are 2+ and symmetric.  Pulmonary/Chest: No respiratory distress. Clear to auscultation bilaterally. No wheezing or rales.  Abdominal: Soft and non-distended.  There is no tenderness.  No rebound, guarding, or rigidity.   Musculoskeletal: Moves all extremities. No obvious deformities. No edema.   Skin: Warm and dry.  Neurological:  Alert, awake, and appropriate. Not oriented to place  Normal speech.  No acute focal neurological deficits are appreciated.  Psychiatric: Normal affect. Good eye contact. Appropriate in content.     ED Course   Procedures  ED Vital Signs:  Vitals:    09/28/22 0336 09/28/22 0500 09/28/22 0732   BP: 112/70 133/76 116/65   Pulse: 76 (!) 56 (!) 55   Resp: 14  13   Temp: 98.2 °F (36.8 °C)     TempSrc: Oral     SpO2: 100% 100% 99%   Height: 5' 9" (1.753 m)         Abnormal Lab Results:  Labs Reviewed   CBC W/ AUTO DIFFERENTIAL - Abnormal; Notable for the following components:       Result Value    RBC 2.91 (*)     Hemoglobin 9.6 (*)     Hematocrit 28.4 (*)     MCH 33.0 (*)     RDW 17.6 (*)     Eos # 0.6 (*)     Eosinophil % 10.4 (*)     All other components within normal limits   COMPREHENSIVE METABOLIC PANEL - Abnormal; Notable for the following components:    Chloride 112 (*)     CO2 19 (*)     Total Protein 5.5 (*)     Albumin 2.9 (*)     All other components within normal limits "   URINALYSIS, REFLEX TO URINE CULTURE - Abnormal; Notable for the following components:    Color, UA Colorless (*)     All other components within normal limits    Narrative:     Specimen Source->Urine   POCT GLUCOSE - Abnormal; Notable for the following components:    POCT Glucose 120 (*)     All other components within normal limits   TROPONIN I   DRUG SCREEN PANEL, URINE EMERGENCY   POCT GLUCOSE MONITORING CONTINUOUS        All Lab Results:  Results for orders placed or performed during the hospital encounter of 09/28/22   CBC auto differential   Result Value Ref Range    WBC 6.18 3.90 - 12.70 K/uL    RBC 2.91 (L) 4.60 - 6.20 M/uL    Hemoglobin 9.6 (L) 14.0 - 18.0 g/dL    Hematocrit 28.4 (L) 40.0 - 54.0 %    MCV 98 82 - 98 fL    MCH 33.0 (H) 27.0 - 31.0 pg    MCHC 33.8 32.0 - 36.0 g/dL    RDW 17.6 (H) 11.5 - 14.5 %    Platelets 192 150 - 450 K/uL    MPV 10.2 9.2 - 12.9 fL    Immature Granulocytes 0.3 0.0 - 0.5 %    Gran # (ANC) 2.8 1.8 - 7.7 K/uL    Immature Grans (Abs) 0.02 0.00 - 0.04 K/uL    Lymph # 2.1 1.0 - 4.8 K/uL    Mono # 0.6 0.3 - 1.0 K/uL    Eos # 0.6 (H) 0.0 - 0.5 K/uL    Baso # 0.05 0.00 - 0.20 K/uL    nRBC 0 0 /100 WBC    Gran % 45.8 38.0 - 73.0 %    Lymph % 33.5 18.0 - 48.0 %    Mono % 9.2 4.0 - 15.0 %    Eosinophil % 10.4 (H) 0.0 - 8.0 %    Basophil % 0.8 0.0 - 1.9 %    Differential Method Automated    Comprehensive metabolic panel   Result Value Ref Range    Sodium 142 136 - 145 mmol/L    Potassium 4.8 3.5 - 5.1 mmol/L    Chloride 112 (H) 95 - 110 mmol/L    CO2 19 (L) 23 - 29 mmol/L    Glucose 78 70 - 110 mg/dL    BUN 15 8 - 23 mg/dL    Creatinine 1.2 0.5 - 1.4 mg/dL    Calcium 8.7 8.7 - 10.5 mg/dL    Total Protein 5.5 (L) 6.0 - 8.4 g/dL    Albumin 2.9 (L) 3.5 - 5.2 g/dL    Total Bilirubin 0.2 0.1 - 1.0 mg/dL    Alkaline Phosphatase 121 55 - 135 U/L    AST 31 10 - 40 U/L    ALT 25 10 - 44 U/L    Anion Gap 11 8 - 16 mmol/L    eGFR >60 >60 mL/min/1.73 m^2   Urinalysis, Reflex to Urine Culture  Urine, Catheterized    Specimen: Urine   Result Value Ref Range    Specimen UA Urine, Clean Catch     Color, UA Colorless (A) Yellow, Straw, Diana    Appearance, UA Clear Clear    pH, UA 6.0 5.0 - 8.0    Specific Gravity, UA 1.005 1.005 - 1.030    Protein, UA Negative Negative    Glucose, UA Negative Negative    Ketones, UA Negative Negative    Bilirubin (UA) Negative Negative    Occult Blood UA Negative Negative    Nitrite, UA Negative Negative    Urobilinogen, UA Negative <2.0 EU/dL    Leukocytes, UA Negative Negative   Troponin I   Result Value Ref Range    Troponin I <0.006 0.000 - 0.026 ng/mL   POCT glucose   Result Value Ref Range    POCT Glucose 120 (H) 70 - 110 mg/dL         Imaging Results:  Imaging Results              CT head (if fall & altered, LOC>2, on Warfarin or other anticoagulants) (Final result)  Result time 09/28/22 06:50:44      Final result by Shay Holliday MD (09/28/22 06:50:44)                   Impression:      Chronic microvascular ischemic changes.    All CT scans at this facility use dose modulation, iterative reconstruction, and/or weight based dosing when appropriate to reduce radiation dose to as low as reasonable achievable.      Electronically signed by: Shay Holliday MD  Date:    09/28/2022  Time:    06:50               Narrative:    EXAMINATION:  CT HEAD WITHOUT CONTRAST    CLINICAL HISTORY:  Memory loss;Altered mental status, nontraumatic (Ped 0-18y);    TECHNIQUE:  Low dose axial CT images obtained throughout the head without intravenous contrast. Sagittal and coronal reconstructions were performed.    COMPARISON:  07/31/2022    FINDINGS:  Intracranial compartment:    The brain parenchyma demonstrates areas of decreased attenuation with mild to moderate periventricular white matter consistent with chronic microvascular ischemic changes.  Bilateral remote lacunar infarcts..  No parenchymal mass, hemorrhage, edema or major vascular distribution infarct.  Vascular calcifications  are noted.    Mild prominence of the sulci and ventricles are consistent with age-related involutional changes.    No extra-axial blood or fluid collections.    Skull/extracranial contents (limited evaluation): No fracture. Mastoid air cells and paranasal sinuses are essentially clear.                                       X-Ray Chest 1 View (Final result)  Result time 09/28/22 07:41:35      Final result by Shay Holliday MD (09/28/22 07:41:35)                   Impression:      No acute process seen.      Electronically signed by: Shay Holliday MD  Date:    09/28/2022  Time:    07:41               Narrative:    EXAMINATION:  XR CHEST 1 VIEW    CLINICAL HISTORY:  altered;    FINDINGS:  Single view of the chest.  Comparison 7/31/22    Cardiac silhouette is normal.  Aorta demonstrates atherosclerotic disease.  The lungs demonstrate no evidence of active disease.  Moderate emphysema.  No evidence of pleural effusion or pneumothorax.  Bones appear intact.                                     5:39 AM: Per STAT radiology, pt's CT head without intravenous contrast results: No acute intracranial finding      The EKG was ordered, reviewed, and independently interpreted by the ED provider.  Interpretation time: 0337  Rate: 64 BPM  Rhythm: normal sinus rhythm  Interpretation: No ST or T wave abnormality. No STEMI.             The Emergency Provider reviewed the vital signs and test results, which are outlined above.     ED Discussion     6:00 AM: Dr. Lopez transfers care of patient to Dr. Jaime pending lab results.    9:56 AM: Reassessed pt at this time. Discussed with pt all pertinent ED information and results. Discussed pt dx and plan of tx. Gave pt all f/u and return to the ED instructions. All questions and concerns were addressed at this time. Pt expresses understanding of information and instructions, and is comfortable with plan to discharge. Pt is stable for discharge.    I discussed with patient and/or  family/caretaker that evaluation in the ED does not suggest any emergent or life threatening medical conditions requiring immediate intervention beyond what was provided in the ED, and I believe patient is safe for discharge.  Regardless, an unremarkable evaluation in the ED does not preclude the development or presence of a serious of life threatening condition. As such, patient was instructed to return immediately for any worsening or change in current symptoms.    10:03 AM    Patient is stable nontoxic and back to baseline.  His daughter is a nurse.  Patient is without complaint.  She notes that his confusion was around the time he was taking his Ambien but was awake.  This is since resolved.  He has already arranged follow-up with primary care provider stable safe for discharge.  This is likely a medication side effect his negative workup.  Had a similar presentation in the past for the same.  Patient is stable nontoxic and safe for discharge my opinion.  The family agrees return immediately for any worsening symptoms, problems, questions or concerns.       Medical Decision Making:   Clinical Tests:   Lab Tests: Ordered and Reviewed  Radiological Study: Ordered and Reviewed  Medical Tests: Ordered and Reviewed         ED Medication(s):  Medications   sodium chloride 0.9% bolus 500 mL (0 mLs Intravenous Stopped 9/28/22 0615)       New Prescriptions    No medications on file        Follow-up Information       Damien Barron MD.    Specialty: Family Medicine  Contact information:  [address] [address] [address] [phone]                                 Scribe Attestation:   Scribe #1: I performed the above scribed service and the documentation accurately describes the services I performed. I attest to the accuracy of the note.     Attending:   Physician Attestation Statement for Scribe #1: I, Court Lopez MD, personally performed the services described in this documentation, as scribed by Domo  Greg, in my presence, and it is both accurate and complete.       Scribe Attestation:   Scribe #2: I performed the above scribed service and the documentation accurately describes the services I performed. I attest to the accuracy of the note.    Attending Attestation:           Physician Attestation for Scribe:    Physician Attestation Statement for Scribe #2: I, Robert Jaime MD, reviewed documentation, as scribed by Rylan Mendoza in my presence, and it is both accurate and complete. I also acknowledge and confirm the content of the note done by Scribe #1.         Clinical Impression       ICD-10-CM ICD-9-CM   1. Medication side effect, initial encounter  T50.905A E947.9   2. Altered mental status  R41.82 780.97       Disposition:   Disposition: Discharged  Condition: Stable       Robert Jaime Jr., MD  09/28/22 100

## 2022-09-29 RX ORDER — QUETIAPINE FUMARATE 25 MG/1
25 TABLET, FILM COATED ORAL 2 TIMES DAILY
Qty: 60 TABLET | Refills: 11 | Status: SHIPPED | OUTPATIENT
Start: 2022-09-29 | End: 2023-09-29

## 2022-09-29 NOTE — TELEPHONE ENCOUNTER
No new care gaps identified.  [facility] Embedded Care Gaps. Reference number: [id number]. 9/29/2022   1:25:27 PM CDT  
Patient daughter called spoke with you about Seroquel pended order    Please advise    
.

## 2022-10-13 DIAGNOSIS — M54.41 CHRONIC BILATERAL LOW BACK PAIN WITH RIGHT-SIDED SCIATICA: ICD-10-CM

## 2022-10-13 DIAGNOSIS — G89.29 CHRONIC BILATERAL LOW BACK PAIN WITH RIGHT-SIDED SCIATICA: ICD-10-CM

## 2022-10-13 DIAGNOSIS — E11.42 DM TYPE 2 WITH DIABETIC PERIPHERAL NEUROPATHY: ICD-10-CM

## 2022-10-13 RX ORDER — GABAPENTIN 100 MG/1
100 CAPSULE ORAL 2 TIMES DAILY
Qty: 60 CAPSULE | Refills: 11 | Status: SHIPPED | OUTPATIENT
Start: 2022-10-13 | End: 2022-12-20

## 2022-10-18 ENCOUNTER — PATIENT MESSAGE (OUTPATIENT)
Dept: ADMINISTRATIVE | Facility: HOSPITAL | Age: 72
End: 2022-10-18
Payer: MEDICARE

## 2022-10-20 ENCOUNTER — PES CALL (OUTPATIENT)
Dept: ADMINISTRATIVE | Facility: CLINIC | Age: 72
End: 2022-10-20
Payer: MEDICARE

## 2022-11-17 ENCOUNTER — TELEPHONE (OUTPATIENT)
Dept: FAMILY MEDICINE | Facility: CLINIC | Age: 72
End: 2022-11-17
Payer: MEDICARE

## 2022-11-17 ENCOUNTER — LAB VISIT (OUTPATIENT)
Dept: LAB | Facility: HOSPITAL | Age: 72
End: 2022-11-17
Attending: INTERNAL MEDICINE
Payer: MEDICARE

## 2022-11-17 DIAGNOSIS — I82.4Y1 ACUTE DEEP VEIN THROMBOSIS (DVT) OF PROXIMAL VEIN OF RIGHT LOWER EXTREMITY: ICD-10-CM

## 2022-11-17 DIAGNOSIS — G89.29 CHRONIC BILATERAL LOW BACK PAIN WITH RIGHT-SIDED SCIATICA: ICD-10-CM

## 2022-11-17 DIAGNOSIS — E78.2 MIXED HYPERLIPIDEMIA: ICD-10-CM

## 2022-11-17 DIAGNOSIS — R41.0 CONFUSION: Primary | ICD-10-CM

## 2022-11-17 DIAGNOSIS — E11.42 DM TYPE 2 WITH DIABETIC PERIPHERAL NEUROPATHY: ICD-10-CM

## 2022-11-17 DIAGNOSIS — N18.30 CONTROLLED TYPE 2 DIABETES MELLITUS WITH STAGE 3 CHRONIC KIDNEY DISEASE, WITHOUT LONG-TERM CURRENT USE OF INSULIN: ICD-10-CM

## 2022-11-17 DIAGNOSIS — N40.0 BENIGN PROSTATIC HYPERPLASIA WITHOUT LOWER URINARY TRACT SYMPTOMS: ICD-10-CM

## 2022-11-17 DIAGNOSIS — I69.30 HISTORY OF CVA WITH RESIDUAL DEFICIT: ICD-10-CM

## 2022-11-17 DIAGNOSIS — M54.41 CHRONIC BILATERAL LOW BACK PAIN WITH RIGHT-SIDED SCIATICA: ICD-10-CM

## 2022-11-17 DIAGNOSIS — Z12.11 COLON CANCER SCREENING: ICD-10-CM

## 2022-11-17 DIAGNOSIS — E11.22 CONTROLLED TYPE 2 DIABETES MELLITUS WITH STAGE 3 CHRONIC KIDNEY DISEASE, WITHOUT LONG-TERM CURRENT USE OF INSULIN: ICD-10-CM

## 2022-11-17 LAB
ALBUMIN SERPL BCP-MCNC: 2.6 G/DL (ref 3.5–5.2)
ALP SERPL-CCNC: 199 U/L (ref 55–135)
ALT SERPL W/O P-5'-P-CCNC: 59 U/L (ref 10–44)
ANION GAP SERPL CALC-SCNC: 8 MMOL/L (ref 8–16)
AST SERPL-CCNC: 71 U/L (ref 10–40)
BASOPHILS # BLD AUTO: 0.09 K/UL (ref 0–0.2)
BASOPHILS NFR BLD: 1 % (ref 0–1.9)
BILIRUB SERPL-MCNC: 0.3 MG/DL (ref 0.1–1)
BUN SERPL-MCNC: 14 MG/DL (ref 8–23)
CALCIUM SERPL-MCNC: 8.1 MG/DL (ref 8.7–10.5)
CHLORIDE SERPL-SCNC: 109 MMOL/L (ref 95–110)
CO2 SERPL-SCNC: 21 MMOL/L (ref 23–29)
CREAT SERPL-MCNC: 1.4 MG/DL (ref 0.5–1.4)
D DIMER PPP IA.FEU-MCNC: 0.53 MG/L FEU
DIFFERENTIAL METHOD: ABNORMAL
EOSINOPHIL # BLD AUTO: 0.6 K/UL (ref 0–0.5)
EOSINOPHIL NFR BLD: 7.1 % (ref 0–8)
ERYTHROCYTE [DISTWIDTH] IN BLOOD BY AUTOMATED COUNT: 15 % (ref 11.5–14.5)
EST. GFR  (NO RACE VARIABLE): 54 ML/MIN/1.73 M^2
GLUCOSE SERPL-MCNC: 96 MG/DL (ref 70–110)
HCT VFR BLD AUTO: 32.5 % (ref 40–54)
HGB BLD-MCNC: 11 G/DL (ref 14–18)
IMM GRANULOCYTES # BLD AUTO: 0.02 K/UL (ref 0–0.04)
IMM GRANULOCYTES NFR BLD AUTO: 0.2 % (ref 0–0.5)
LYMPHOCYTES # BLD AUTO: 1.9 K/UL (ref 1–4.8)
LYMPHOCYTES NFR BLD: 21.2 % (ref 18–48)
MCH RBC QN AUTO: 34 PG (ref 27–31)
MCHC RBC AUTO-ENTMCNC: 33.8 G/DL (ref 32–36)
MCV RBC AUTO: 100 FL (ref 82–98)
MONOCYTES # BLD AUTO: 0.8 K/UL (ref 0.3–1)
MONOCYTES NFR BLD: 8.7 % (ref 4–15)
NEUTROPHILS # BLD AUTO: 5.5 K/UL (ref 1.8–7.7)
NEUTROPHILS NFR BLD: 61.8 % (ref 38–73)
NRBC BLD-RTO: 0 /100 WBC
PLATELET # BLD AUTO: 214 K/UL (ref 150–450)
PMV BLD AUTO: 11 FL (ref 9.2–12.9)
POTASSIUM SERPL-SCNC: 3.9 MMOL/L (ref 3.5–5.1)
PROT SERPL-MCNC: 5.8 G/DL (ref 6–8.4)
RBC # BLD AUTO: 3.24 M/UL (ref 4.6–6.2)
SODIUM SERPL-SCNC: 138 MMOL/L (ref 136–145)
WBC # BLD AUTO: 8.96 K/UL (ref 3.9–12.7)

## 2022-11-17 PROCEDURE — 83036 HEMOGLOBIN GLYCOSYLATED A1C: CPT | Performed by: FAMILY MEDICINE

## 2022-11-17 PROCEDURE — 85025 COMPLETE CBC W/AUTO DIFF WBC: CPT | Performed by: FAMILY MEDICINE

## 2022-11-17 PROCEDURE — 85379 FIBRIN DEGRADATION QUANT: CPT | Performed by: INTERNAL MEDICINE

## 2022-11-17 PROCEDURE — 36415 COLL VENOUS BLD VENIPUNCTURE: CPT | Mod: PO | Performed by: INTERNAL MEDICINE

## 2022-11-17 PROCEDURE — 80053 COMPREHEN METABOLIC PANEL: CPT | Performed by: FAMILY MEDICINE

## 2022-11-17 NOTE — TELEPHONE ENCOUNTER
----- Message from Irasema Lopez sent at 11/17/2022  1:33 PM CST -----  Needs urine , he is confused more than he was , please place order , he is going to do  labs now

## 2022-11-18 LAB
ESTIMATED AVG GLUCOSE: 117 MG/DL (ref 68–131)
HBA1C MFR BLD: 5.7 % (ref 4–5.6)

## 2022-11-22 ENCOUNTER — TELEPHONE (OUTPATIENT)
Dept: INTERNAL MEDICINE | Facility: CLINIC | Age: 72
End: 2022-11-22
Payer: MEDICARE

## 2022-11-22 NOTE — TELEPHONE ENCOUNTER
----- Message from Damien Barron MD sent at 11/20/2022  7:07 PM CST -----  Liver enzymes are rising including alkaline phosphatase and there is falling levels of albumin suspected liver etiology.    Recommend consultation with Gastroenterology.

## 2022-12-06 ENCOUNTER — PATIENT MESSAGE (OUTPATIENT)
Dept: RESEARCH | Facility: HOSPITAL | Age: 72
End: 2022-12-06
Payer: MEDICARE

## 2022-12-07 ENCOUNTER — PATIENT MESSAGE (OUTPATIENT)
Dept: HEPATOLOGY | Facility: CLINIC | Age: 72
End: 2022-12-07
Payer: MEDICARE

## 2022-12-20 ENCOUNTER — OFFICE VISIT (OUTPATIENT)
Dept: NEUROLOGY | Facility: CLINIC | Age: 72
End: 2022-12-20
Payer: MEDICARE

## 2022-12-20 VITALS
HEIGHT: 69 IN | DIASTOLIC BLOOD PRESSURE: 74 MMHG | RESPIRATION RATE: 16 BRPM | BODY MASS INDEX: 19.2 KG/M2 | WEIGHT: 129.63 LBS | HEART RATE: 69 BPM | SYSTOLIC BLOOD PRESSURE: 116 MMHG

## 2022-12-20 DIAGNOSIS — R63.4 WEIGHT LOSS: ICD-10-CM

## 2022-12-20 DIAGNOSIS — R41.3 MEMORY DIFFICULTIES: ICD-10-CM

## 2022-12-20 DIAGNOSIS — R13.10 DYSPHAGIA, UNSPECIFIED TYPE: ICD-10-CM

## 2022-12-20 DIAGNOSIS — G30.9 MIXED ALZHEIMER'S AND VASCULAR DEMENTIA: Primary | ICD-10-CM

## 2022-12-20 DIAGNOSIS — F02.80 MIXED ALZHEIMER'S AND VASCULAR DEMENTIA: Primary | ICD-10-CM

## 2022-12-20 DIAGNOSIS — Z86.718 HISTORY OF DVT OF LOWER EXTREMITY: ICD-10-CM

## 2022-12-20 DIAGNOSIS — N18.30 CONTROLLED TYPE 2 DIABETES MELLITUS WITH STAGE 3 CHRONIC KIDNEY DISEASE, WITHOUT LONG-TERM CURRENT USE OF INSULIN: ICD-10-CM

## 2022-12-20 DIAGNOSIS — D64.9 ANEMIA, UNSPECIFIED TYPE: ICD-10-CM

## 2022-12-20 DIAGNOSIS — F01.50 MIXED ALZHEIMER'S AND VASCULAR DEMENTIA: Primary | ICD-10-CM

## 2022-12-20 DIAGNOSIS — E78.2 MIXED HYPERLIPIDEMIA: ICD-10-CM

## 2022-12-20 DIAGNOSIS — K22.2 ESOPHAGEAL STRICTURE: ICD-10-CM

## 2022-12-20 DIAGNOSIS — I69.30 HISTORY OF CVA WITH RESIDUAL DEFICIT: ICD-10-CM

## 2022-12-20 DIAGNOSIS — J98.4 PULMONARY CAVITARY LESION: ICD-10-CM

## 2022-12-20 DIAGNOSIS — E11.22 CONTROLLED TYPE 2 DIABETES MELLITUS WITH STAGE 3 CHRONIC KIDNEY DISEASE, WITHOUT LONG-TERM CURRENT USE OF INSULIN: ICD-10-CM

## 2022-12-20 DIAGNOSIS — I69.359 HEMIPARESIS AFFECTING DOMINANT SIDE AS LATE EFFECT OF CEREBROVASCULAR ACCIDENT (CVA): ICD-10-CM

## 2022-12-20 DIAGNOSIS — G47.00 INSOMNIA, UNSPECIFIED TYPE: ICD-10-CM

## 2022-12-20 PROBLEM — R11.2 NAUSEA AND VOMITING: Status: RESOLVED | Noted: 2022-03-20 | Resolved: 2022-12-20
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
## 2022-12-20 PROBLEM — Z01.810 PREOP CARDIOVASCULAR EXAM: Status: RESOLVED | Noted: 2022-09-09 | Resolved: 2022-12-20

## 2022-12-20 PROBLEM — Z86.73 HISTORY OF ISCHEMIC LEFT MCA STROKE: Status: ACTIVE | Noted: 2018-01-03

## 2022-12-20 PROBLEM — N17.9 AKI (ACUTE KIDNEY INJURY): Status: RESOLVED | Noted: 2022-03-20 | Resolved: 2022-12-20

## 2022-12-20 PROBLEM — E86.0 DEHYDRATION: Status: RESOLVED | Noted: 2022-03-20 | Resolved: 2022-12-20

## 2022-12-20 PROBLEM — R25.1 SHAKING: Status: RESOLVED | Noted: 2022-08-01 | Resolved: 2022-12-20

## 2022-12-20 PROBLEM — Z86.73 HISTORY OF ISCHEMIC LEFT MCA STROKE: Status: RESOLVED | Noted: 2018-01-03 | Resolved: 2022-12-20

## 2022-12-20 PROCEDURE — 99499 RISK ADDL DX/OHS AUDIT: ICD-10-PCS | Mod: HCNC,S$GLB,, | Performed by: PSYCHIATRY & NEUROLOGY

## 2022-12-20 PROCEDURE — 3078F PR MOST RECENT DIASTOLIC BLOOD PRESSURE < 80 MM HG: ICD-10-PCS | Mod: HCNC,CPTII,S$GLB, | Performed by: PSYCHIATRY & NEUROLOGY

## 2022-12-20 PROCEDURE — 99499 UNLISTED E&M SERVICE: CPT | Mod: HCNC,S$GLB,, | Performed by: PSYCHIATRY & NEUROLOGY

## 2022-12-20 PROCEDURE — 3008F BODY MASS INDEX DOCD: CPT | Mod: HCNC,CPTII,S$GLB, | Performed by: PSYCHIATRY & NEUROLOGY

## 2022-12-20 PROCEDURE — 3078F DIAST BP <80 MM HG: CPT | Mod: HCNC,CPTII,S$GLB, | Performed by: PSYCHIATRY & NEUROLOGY

## 2022-12-20 PROCEDURE — 99417 PR PROLONGED SVC, OUTPT, W/WO DIRECT PT CONTACT,  EA ADDTL 15 MIN: ICD-10-PCS | Mod: HCNC,S$GLB,, | Performed by: PSYCHIATRY & NEUROLOGY

## 2022-12-20 PROCEDURE — 1159F MED LIST DOCD IN RCRD: CPT | Mod: HCNC,CPTII,S$GLB, | Performed by: PSYCHIATRY & NEUROLOGY

## 2022-12-20 PROCEDURE — 3008F PR BODY MASS INDEX (BMI) DOCUMENTED: ICD-10-PCS | Mod: HCNC,CPTII,S$GLB, | Performed by: PSYCHIATRY & NEUROLOGY

## 2022-12-20 PROCEDURE — 1159F PR MEDICATION LIST DOCUMENTED IN MEDICAL RECORD: ICD-10-PCS | Mod: HCNC,CPTII,S$GLB, | Performed by: PSYCHIATRY & NEUROLOGY

## 2022-12-20 PROCEDURE — 3072F PR LOW RISK FOR RETINOPATHY: ICD-10-PCS | Mod: HCNC,CPTII,S$GLB, | Performed by: PSYCHIATRY & NEUROLOGY

## 2022-12-20 PROCEDURE — 4010F ACE/ARB THERAPY RXD/TAKEN: CPT | Mod: HCNC,CPTII,S$GLB, | Performed by: PSYCHIATRY & NEUROLOGY

## 2022-12-20 PROCEDURE — 99205 OFFICE O/P NEW HI 60 MIN: CPT | Mod: HCNC,S$GLB,, | Performed by: PSYCHIATRY & NEUROLOGY

## 2022-12-20 PROCEDURE — 3074F PR MOST RECENT SYSTOLIC BLOOD PRESSURE < 130 MM HG: ICD-10-PCS | Mod: HCNC,CPTII,S$GLB, | Performed by: PSYCHIATRY & NEUROLOGY

## 2022-12-20 PROCEDURE — 3288F PR FALLS RISK ASSESSMENT DOCUMENTED: ICD-10-PCS | Mod: HCNC,CPTII,S$GLB, | Performed by: PSYCHIATRY & NEUROLOGY

## 2022-12-20 PROCEDURE — 3044F PR MOST RECENT HEMOGLOBIN A1C LEVEL <7.0%: ICD-10-PCS | Mod: HCNC,CPTII,S$GLB, | Performed by: PSYCHIATRY & NEUROLOGY

## 2022-12-20 PROCEDURE — 99999 PR PBB SHADOW E&M-EST. PATIENT-LVL V: ICD-10-PCS | Mod: PBBFAC,HCNC,, | Performed by: PSYCHIATRY & NEUROLOGY

## 2022-12-20 PROCEDURE — 3288F FALL RISK ASSESSMENT DOCD: CPT | Mod: HCNC,CPTII,S$GLB, | Performed by: PSYCHIATRY & NEUROLOGY

## 2022-12-20 PROCEDURE — 3066F NEPHROPATHY DOC TX: CPT | Mod: HCNC,CPTII,S$GLB, | Performed by: PSYCHIATRY & NEUROLOGY

## 2022-12-20 PROCEDURE — 99205 PR OFFICE/OUTPT VISIT, NEW, LEVL V, 60-74 MIN: ICD-10-PCS | Mod: HCNC,S$GLB,, | Performed by: PSYCHIATRY & NEUROLOGY

## 2022-12-20 PROCEDURE — 3074F SYST BP LT 130 MM HG: CPT | Mod: HCNC,CPTII,S$GLB, | Performed by: PSYCHIATRY & NEUROLOGY

## 2022-12-20 PROCEDURE — 1100F PR PT FALLS ASSESS DOC 2+ FALLS/FALL W/INJURY/YR: ICD-10-PCS | Mod: HCNC,CPTII,S$GLB, | Performed by: PSYCHIATRY & NEUROLOGY

## 2022-12-20 PROCEDURE — 99999 PR PBB SHADOW E&M-EST. PATIENT-LVL V: CPT | Mod: PBBFAC,HCNC,, | Performed by: PSYCHIATRY & NEUROLOGY

## 2022-12-20 PROCEDURE — 4010F PR ACE/ARB THEARPY RXD/TAKEN: ICD-10-PCS | Mod: HCNC,CPTII,S$GLB, | Performed by: PSYCHIATRY & NEUROLOGY

## 2022-12-20 PROCEDURE — 3061F PR NEG MICROALBUMINURIA RESULT DOCUMENTED/REVIEW: ICD-10-PCS | Mod: HCNC,CPTII,S$GLB, | Performed by: PSYCHIATRY & NEUROLOGY

## 2022-12-20 PROCEDURE — 99417 PROLNG OP E/M EACH 15 MIN: CPT | Mod: HCNC,S$GLB,, | Performed by: PSYCHIATRY & NEUROLOGY

## 2022-12-20 PROCEDURE — 3044F HG A1C LEVEL LT 7.0%: CPT | Mod: HCNC,CPTII,S$GLB, | Performed by: PSYCHIATRY & NEUROLOGY

## 2022-12-20 PROCEDURE — 1100F PTFALLS ASSESS-DOCD GE2>/YR: CPT | Mod: HCNC,CPTII,S$GLB, | Performed by: PSYCHIATRY & NEUROLOGY

## 2022-12-20 PROCEDURE — 3066F PR DOCUMENTATION OF TREATMENT FOR NEPHROPATHY: ICD-10-PCS | Mod: HCNC,CPTII,S$GLB, | Performed by: PSYCHIATRY & NEUROLOGY

## 2022-12-20 PROCEDURE — 3061F NEG MICROALBUMINURIA REV: CPT | Mod: HCNC,CPTII,S$GLB, | Performed by: PSYCHIATRY & NEUROLOGY

## 2022-12-20 PROCEDURE — 3072F LOW RISK FOR RETINOPATHY: CPT | Mod: HCNC,CPTII,S$GLB, | Performed by: PSYCHIATRY & NEUROLOGY

## 2022-12-20 PROCEDURE — 1125F PR PAIN SEVERITY QUANTIFIED, PAIN PRESENT: ICD-10-PCS | Mod: HCNC,CPTII,S$GLB, | Performed by: PSYCHIATRY & NEUROLOGY

## 2022-12-20 PROCEDURE — 1125F AMNT PAIN NOTED PAIN PRSNT: CPT | Mod: HCNC,CPTII,S$GLB, | Performed by: PSYCHIATRY & NEUROLOGY

## 2022-12-20 RX ORDER — MEMANTINE HYDROCHLORIDE 10 MG/1
10 TABLET ORAL 2 TIMES DAILY
Qty: 62 TABLET | Refills: 5 | Status: SHIPPED | OUTPATIENT
Start: 2022-12-20 | End: 2023-06-22

## 2022-12-20 NOTE — PATIENT INSTRUCTIONS
Recommend reading the following books:     The 36-Hour Day and there are many online and printed resources to help caregivers as well.     Alzheimer's Through the Stages.     Dementia with MENDEL    For More Information About Hallucinations, Delusions, and Paranoia in Alzheimer's   [facility] Alzheimer's and related Dementias Education and Referral (ADEAR) Center   1-957.461.3995 (toll-free)   adear@[place].nih.gov   www.[place].nih.gov/alzheimers   The [employer] [place] [employer]'s ADEAR Center offers information and free print publications about Alzheimer's disease and related dementias for families, caregivers, and health professionals. ADEAR Center staff answer telephone, email, and written requests and make referrals to local and national resources          PREVENTION OF DELIRIUM       1. Good hydration and avoid electrolyte imbalance  2. Recognize and treat infections immediately especially UTI.  3. Bladder emptying and prevent constipation.   4. Provide stimulating activities and familiar objects  5. Use eyeglasses and hearing aids if needed.   6. Use simple and regular communication about people, current place, and time  7. Mobility and range-of-motion exercises  8. Reduce noise, lighting and avoid sleep interruptions  9. Non-narcotic pain management.  10.Nondrug treatment for sleep problems or anxiety  11. Avoid antihistamines.  12. Avoid narcotics.  13. Avoid benzodiazepines.

## 2022-12-20 NOTE — PROGRESS NOTES
Subjective:       Patient ID: Samy Alejandre Jr. is a 72 y.o. male.    Chief Complaint: Memory Loss          HPI    The patient is here for memory loss.       The patient is presenting with 3-year history of noticeable memory loss (2019). The patient is accompanied by his daughter (Jayshree). The main problems the patient has are related to progressive short term memory loss. He is unable to operate a cell phone and a remote anymore. The patient is not driving. The patient lives with his daughter right now. No confusion around and inside the house. Does have significant trouble remembering the date and time, keeping up with medications and appointments and keeping up with major holidays and political changes. The patient is dependent in handling finances. The patient is becoming more dependent with ADLs. In  the patient developed sudden change in behavior that was very bizarre and lasted for few weeks. Now, he is back to baseline with no agitation or aggression,  hallucinations or delusions.. No language problems. The patient suffered 3 strokes (last one in ). He had 3 seizures so far (childhood, adulthood and middle age).  No history of  severe headaches. No history of hypothyroidism. No history of alcoholism (young onset or old onset). No history of B12 deficiency.Positive history of depression and insomnia. Did not tolerate Trazodone and is developing rapid tolerance to Seroquel. He has lost significant weight provoked by esophageal stricture which has improved . No history of bipolar disorder. No history of Untreated Syphilis.  No history of HIV infection. No toxic exposures.  No history of traumatic brain injury. No tremors or abnormal movements. His gait has declined with high risk of falls. No urinary incontinence. No change in sleep and appetite. Mother had dementia. Today (12-) MOCA 13/30.         Review of Systems   Constitutional:  Positive for activity change, fatigue and unexpected  weight change. Negative for appetite change.   HENT:  Negative for hearing loss and tinnitus.    Eyes:  Negative for photophobia and visual disturbance.   Respiratory:  Negative for apnea and shortness of breath.    Cardiovascular:  Negative for chest pain and palpitations.   Gastrointestinal:  Negative for nausea and vomiting.   Endocrine: Negative for cold intolerance and heat intolerance.   Genitourinary:  Negative for difficulty urinating and urgency.   Musculoskeletal:  Positive for back pain and gait problem. Negative for arthralgias, joint swelling, myalgias, neck pain and neck stiffness.   Skin:  Negative for color change and rash.   Allergic/Immunologic: Negative for environmental allergies and immunocompromised state.   Neurological:  Positive for numbness. Negative for dizziness, tremors, seizures, syncope, facial asymmetry, speech difficulty, weakness, light-headedness and headaches.   Hematological:  Negative for adenopathy. Does not bruise/bleed easily.   Psychiatric/Behavioral:  Positive for behavioral problems, confusion, decreased concentration and dysphoric mood. Negative for agitation, hallucinations, self-injury, sleep disturbance and suicidal ideas. The patient is nervous/anxious. The patient is not hyperactive.                Current Outpatient Medications:     blood glucose control, low (TRUE METRIX LEVEL 1) Soln, 1 drop by Misc.(Non-Drug; Combo Route) route once daily., Disp: 1 each, Rfl: 4    blood glucose control, normal (TRUE METRIX LEVEL 2) Soln, 1 drop by Misc.(Non-Drug; Combo Route) route once daily., Disp: 1 each, Rfl: 4    DULoxetine (CYMBALTA) 30 MG capsule, Take 1 capsule (30 mg total) by mouth once daily., Disp: 90 capsule, Rfl: 3    ELIQUIS 5 mg Tab, Take 1 tablet by mouth twice daily, Disp: 148 tablet, Rfl: 0    fluconazole (DIFLUCAN) 200 MG Tab, Take 400 mg x 1 on day 1, followed by 200 mg by mouth daily thereafter, Disp: 22 tablet, Rfl: 0    FLUoxetine 20 MG capsule, Take 1  capsule by mouth once daily, Disp: 90 capsule, Rfl: 0    gabapentin (NEURONTIN) 100 MG capsule, Take 1 capsule (100 mg total) by mouth 2 (two) times daily., Disp: 60 capsule, Rfl: 11    HYDROcodone-acetaminophen (NORCO) 5-325 mg per tablet, Take 1 tablet by mouth 3 (three) times daily., Disp: , Rfl:     lancets 32 gauge Misc, 1 lancet by Misc.(Non-Drug; Combo Route) route once daily., Disp: 30 each, Rfl: 4    ondansetron (ZOFRAN) 8 MG tablet, Take 1 tablet (8 mg total) by mouth every 8 (eight) hours as needed for Nausea., Disp: 30 tablet, Rfl: 1    oxybutynin (DITROPAN-XL) 10 MG 24 hr tablet, TAKE 1 TABLET BY MOUTH EVERY DAY, Disp: 90 tablet, Rfl: 1    pantoprazole (PROTONIX) 40 MG tablet, Take 1 tablet by mouth once daily, Disp: 30 tablet, Rfl: 0    QUEtiapine (SEROQUEL) 25 MG Tab, Take 1 tablet (25 mg total) by mouth 2 (two) times daily., Disp: 60 tablet, Rfl: 11    rosuvastatin (CRESTOR) 20 MG tablet, Take 1 tablet by mouth once daily, Disp: 90 tablet, Rfl: 0    tamsulosin (FLOMAX) 0.4 mg Cap, Take 1 capsule (0.4 mg total) by mouth once daily., Disp: 90 capsule, Rfl: 1    walker (ULTRA-LIGHT ROLLATOR) Misc, Use daily for ambulation, Disp: 1 each, Rfl: 0    zolpidem (AMBIEN) 10 mg Tab, Take 1 tablet (10 mg total) by mouth nightly as needed (insomnia)., Disp: 30 tablet, Rfl: 5  Past Medical History:   Diagnosis Date    Anxiety     Depression     Diabetes mellitus, type 2     Hypertension     Stroke      Past Surgical History:   Procedure Laterality Date    ANGIOGRAPHY OF LOWER EXTREMITY N/A 12/21/2020    Procedure: ANGIOGRAM, LOWER EXTREMITY/Rt leg poss pta/stent;  Surgeon: Todd Michel MD;  Location: [facility] CATH LAB;  Service: Peripheral Vascular;  Laterality: N/A;  rescheduled 12/8    BACK SURGERY  2015    BRONCHOSCOPY Left 3/24/2022    Procedure: Bronchoscopy;  Surgeon: Edward Williamson MD;  Location: [facility] ENDO;  Service: Pulmonary;  Laterality: Left;  poss endobronchial biopsy or brushing    CATARACT EXTRACTION       ESOPHAGOGASTRODUODENOSCOPY N/A 8/24/2022    Procedure: EGD (ESOPHAGOGASTRODUODENOSCOPY);  Surgeon: Ana Gomez MD;  Location: [facility];  Service: Endoscopy;  Laterality: N/A;    ESOPHAGOGASTRODUODENOSCOPY N/A 9/14/2022    Procedure: EGD (ESOPHAGOGASTRODUODENOSCOPY);  Surgeon: Ana Gomez MD;  Location: [facility];  Service: Endoscopy;  Laterality: N/A;    KNEE SURGERY  2012    LITHOTRIPSY  2000     Social History     Socioeconomic History    Marital status:    Tobacco Use    Smoking status: Every Day     Packs/day: 1.00     Types: Cigarettes    Smokeless tobacco: Current     Types: Snuff   Substance and Sexual Activity    Alcohol use: Yes     Alcohol/week: 1.0 standard drink     Types: 1 Shots of liquor per week     Comment: Daily    Drug use: Never    Sexual activity: Not Currently             Past/Current Medical/Surgical History, Past/Current Social History, Past/Current Family History and Past/Current Medications were reviewed in detail.        Objective:           VITAL SIGNS WERE REVIEWED      GENERAL APPEARANCE:     The patient looks cachectic     BMI 19.14    No signs of respiratory distress.    Normal breathing pattern.    No dysmorphic features    Normal eye contact.     GENERAL MEDICAL EXAM:    HEENT:  Head is atraumatic normocephalic. Fundoscopic (Ophthalmoscopic) exam showed no disc edema.      Neck and Axillae: No JVD. No visible lesions.    Cardiopulmonary: No cyanosis. No tachypnea. Normal respiratory effort.    Gastrointestinal/Urogenital:  No jaundice. No stomas or lesions. No visible hernias. No catheters.     Skin, Hair and Nails: No pathognonomic skin rash. No neurofibromatosis. No visible lesions.No stigmata of autoimmune disease. No clubbing.    Limbs: No varicose veins. No visible swelling. Diffuse muscle atrophy.     Muskoskeletal: OA visible deformities.No visible lesions.           Neurologic Exam     Mental Status   Oriented to person.   Oriented to place.    Disoriented to time.   Follows 2 step commands.   Attention: decreased. Concentration: decreased.   Speech: speech is normal   Level of consciousness: alert  Able to name object. Able to repeat. Normal comprehension.       Prominent Psychomotor Slowing      Cranial Nerves   Cranial nerves II through XII intact.     CN II   Visual fields full to confrontation.   Visual acuity: normal  Right visual field deficit: none  Left visual field deficit: none     CN III, IV, VI   Pupils are equal, round, and reactive to light.  Extraocular motions are normal.   Right pupil: Size: 2 mm. Shape: regular. Reactivity: brisk. Consensual response: intact. Accommodation: intact.   Left pupil: Size: 2 mm. Shape: regular. Reactivity: brisk. Consensual response: intact. Accommodation: intact.   CN III: no CN III palsy  CN VI: no CN VI palsy  Nystagmus: none   Diplopia: none  Ophthalmoparesis: none  Upgaze: normal  Downgaze: normal  Conjugate gaze: present  Vestibulo-ocular reflex: present    CN V   Facial sensation intact.   Right facial sensation deficit: none  Left facial sensation deficit: none  Jaw jerk: normal    CN VII   Facial expression full, symmetric.   Right facial weakness: none  Left facial weakness: none    CN VIII   CN VIII normal.   Hearing: intact    CN IX, X   CN IX normal.   CN X normal.   Palate: symmetric    CN XI   CN XI normal.   Right sternocleidomastoid strength: normal  Left sternocleidomastoid strength: normal  Right trapezius strength: normal  Left trapezius strength: normal    CN XII   CN XII normal.   Tongue: not atrophic  Fasciculations: absent  Tongue deviation: none    Motor Exam   Muscle bulk: decreased  Right arm tone: spastic  Left arm tone: spastic  Right leg tone: spastic  Left leg tone: spastic    Strength   Right neck flexion: 5/5  Left neck flexion: 5/5  Right neck extension: 5/5  Left neck extension: 5/5  Right deltoid: 4/5  Left deltoid: 4/5  Right biceps: 4/5  Left biceps: 4/5  Right triceps:  4/5  Left triceps: 4/5  Right wrist flexion: 4/5  Left wrist flexion: 4/5  Right wrist extension: 4/5  Left wrist extension: 4/5  Right interossei: 4/5  Left interossei: 4/5  Right iliopsoas: 4/5  Left iliopsoas: 4/5  Right quadriceps: 4/5  Left quadriceps: 4/5  Right hamstrin/5  Left hamstrin/5  Right glutei: 4/5  Left glutei: 4/5  Right anterior tibial: 4/5  Left anterior tibial: 4/5  Right posterior tibial: 4/5  Left posterior tibial: 4/5  Right peroneal: 4/5  Left peroneal: 4/5  Right gastroc: 4/5  Left gastroc: 4/    RUE RLE 4/    LUE LLE 4+/5      Sensory Exam   Right arm light touch: decreased from fingers  Left arm light touch: decreased from fingers  Right leg light touch: decreased from ankle  Left leg light touch: decreased from ankle  Right arm vibration: normal  Left arm vibration: normal  Right leg vibration: decreased from toes  Left leg vibration: decreased from toes  Right arm proprioception: normal  Left arm proprioception: normal  Right leg proprioception: decreased from toes  Left leg proprioception: decreased from toes  Right arm pinprick: decreased from fingers  Left arm pinprick: decreased from fingers  Right leg pinprick: decreased from ankle  Left leg pinprick: decreased from ankle  Graphesthesia: normal  Stereognosis: normal    Gait, Coordination, and Reflexes     Gait  Gait: spastic and wide-based    Coordination   Finger to nose coordination: normal  Heel to shin coordination: normal    Tremor   Resting tremor: absent  Intention tremor: absent  Action tremor: absent    Reflexes   Right brachioradialis: 1+  Left brachioradialis: 1+  Right biceps: 1+  Left biceps: 1+  Right triceps: 1+  Left triceps: 1+  Right patellar: 3+  Left patellar: 3+  Right achilles: 2+  Left achilles: 2+  Right plantar: equivocal  Left plantar: equivocal  Right Suárez: absent  Left Suárez: absent  Right ankle clonus: absent  Left ankle clonus: absent  Right pendular knee jerk: absent  Left pendular knee  julesdamaso: absent    Lab Results   Component Value Date    WBC 8.96 11/17/2022    HGB 11.0 (L) 11/17/2022    HCT 32.5 (L) 11/17/2022     (H) 11/17/2022     11/17/2022     Sodium   Date Value Ref Range Status   11/17/2022 138 136 - 145 mmol/L Final     Potassium   Date Value Ref Range Status   11/17/2022 3.9 3.5 - 5.1 mmol/L Final     Chloride   Date Value Ref Range Status   11/17/2022 109 95 - 110 mmol/L Final     CO2   Date Value Ref Range Status   11/17/2022 21 (L) 23 - 29 mmol/L Final     Glucose   Date Value Ref Range Status   11/17/2022 96 70 - 110 mg/dL Final     BUN   Date Value Ref Range Status   11/17/2022 14 8 - 23 mg/dL Final     Creatinine   Date Value Ref Range Status   11/17/2022 1.4 0.5 - 1.4 mg/dL Final     Calcium   Date Value Ref Range Status   11/17/2022 8.1 (L) 8.7 - 10.5 mg/dL Final     Total Protein   Date Value Ref Range Status   11/17/2022 5.8 (L) 6.0 - 8.4 g/dL Final     Albumin   Date Value Ref Range Status   11/17/2022 2.6 (L) 3.5 - 5.2 g/dL Final     Total Bilirubin   Date Value Ref Range Status   11/17/2022 0.3 0.1 - 1.0 mg/dL Final     Comment:     For infants and newborns, interpretation of results should be based  on gestational age, weight and in agreement with clinical  observations.    Premature Infant recommended reference ranges:  Up to 24 hours.............<8.0 mg/dL  Up to 48 hours............<12.0 mg/dL  3-5 days..................<15.0 mg/dL  6-29 days.................<15.0 mg/dL       Alkaline Phosphatase   Date Value Ref Range Status   11/17/2022 199 (H) 55 - 135 U/L Final     AST   Date Value Ref Range Status   11/17/2022 71 (H) 10 - 40 U/L Final     ALT   Date Value Ref Range Status   11/17/2022 59 (H) 10 - 44 U/L Final     Anion Gap   Date Value Ref Range Status   11/17/2022 8 8 - 16 mmol/L Final     eGFR if    Date Value Ref Range Status   07/31/2022 >60 >60 mL/min/1.73 m^2 Final     eGFR if non    Date Value Ref Range Status    2022 >60 >60 mL/min/1.73 m^2 Final     Comment:     Calculation used to obtain the estimated glomerular filtration  rate (eGFR) is the CKD-EPI equation.        Lab Results   Component Value Date    [id number] 648 2020     Lab Results   Component Value Date    TSH 2.724 2022    B12 NL      2022    TSH NL      2022    CTH Atrophy        2023    Brain MRI Unremarkable.Atrophy        2023    EEG NL        SYLVESTER COGNITIVE ASSESSMENT (MOCA) TOTAL SCORE 30         NORMAL-MILD NCD 26-30    MILD DEMENTIA 20-25    MODERATE DEMENTIA 10-19    SEVERE DEMENTIA <10        DATE 2022       TOTAL SCORE 13       VISUOSPATIAL EXECUTIVE (5) 1       NAMING (3) 3       ATTENTION (6) 4       LANGUAGE (3) 2       ABSTRACTION(2) 0       RECALL (5) 0       ORIENTATION (6) 3               Reviewed the neuroimaging independently       Assessment:       1. Mixed Alzheimer's and vascular dementia    2. Memory difficulties    3. History of CVA with residual deficit    4. Insomnia, unspecified type    5. Hemiparesis affecting dominant side as late effect of cerebrovascular accident (CVA)    6. Controlled type 2 diabetes mellitus with stage 3 chronic kidney disease, without long-term current use of insulin    7. Mixed hyperlipidemia    8. Pulmonary cavitary lesion    9. Weight loss    10. History of DVT of lower extremity    11. Anemia, unspecified type    12. Dysphagia, unspecified type    13. Esophageal stricture          Plan:           MIXED DEMENTIA, VASCULAR DEMENTIA AND ALZHEIMER'S TYPE DEMENTIA, MODERATE, NO BASELINE BEHAVIORAL DISTURBANCES     HISTORY OF EPILEPSY, HISTORY OF RECENT DELIRIUM ()      EVALUATION     Brain MRI.    EEG and AEEG.    DISEASE-MODIFYING AGENTS     Explained to the patient and family that medications are intended to slow down the progression and not stop it or reverse the disease. The disease is relentless, progressive and so far,  cannot be controlled. Progression includes Cognitive decline, Behavioral disturbances, and Motor decline as well.     I counseled the patient and family that the rate of progression is extremely variable, and the average (10 years) is an inaccurate measure.     Will start memantine/Namenda and titrate slowly to 10 mg BID. The side effects include dizziness, seizures and nightmares and discussed with patient and family.    Once stable on Namenda will start donepezil/Aricept and titrate slowly to 10 mg QHS. The side effects include dizziness, diarrhea and nightmares and discussed with patient and family.  It can rarely cause bradycardia, syncope, and prolonged QT.     If GI symptoms become an issue will try rivastigmine/Exelon patches. Will obtain EKG before and after Aricept to verify effects on QT interval!  (Other formulations Adlarity TTS 5/10 mg weekly). Patches are convenient, bypass the GI system but have unintended consequences of being taken off prematurely or being duplicated accidentally.       Recommended against the use of OTC non-FDA evaluated supplements and claims.       SYMPTOMATIC MANAGEMENT-BEHAVIORAL SYMPTOMS       Continue Seroquel titration.     Did not tolerate Trazodone.       HOME CARE       Falling Down Precautions.     Avoid driving and access to firearms     Incremental 24/Care     Help with finances and decision making.    Join support group.    Proofing the house and use labeling.    Avoid antihistamines and anticholinergics.    Avoid changing routine.    Use written reminders.    Avoid multitasking.    Healthy diet, exercise (physical and cognitive).    Good sleep hygiene.        CAREGIVERS COUNSELING     For behavioral problems I recommended that family to try some of the following communication tips: Try not to react and stay calm, don't argue, do not use logic, Let the patient feel safe, Keep reminding the patient and use photos if necessary, Distract the patient, Search for things to  distract the person, then talk about what you found. For example, talk about a photograph or keepsake or even food, use gentle touching or hugging to show you care, Body language matters, use a cooling off period if needed, when possible. If safe to do so, give the patient some space or breathing room. Will consider antipsychotic medications as a last resort because of the risk of CAD and CVD.            Recommend reading the following books:     The 36-Hour Day and there are many online and printed resources to help caregivers as well.     Alzheimer's Through the Stages.     Dementia with MENDEL    For More Information About Hallucinations, Delusions, and Paranoia in Alzheimer's   [facility] Alzheimer's and related Dementias Education and Referral (ADEAR) Center   1-168.594.9836 (toll-free)   adear@[place]ammie.nih.gov   www.[place].nih.gov/alzheimers   The [employer] [place] [employer] [facility]'s ADEAR Center offers information and free print publications about Alzheimer's disease and related dementias for families, caregivers, and health professionals. ADEAR Center staff answer telephone, email, and written requests and make referrals to local and national resources          PREVENTION OF DELIRIUM       1. Good hydration and avoid electrolyte imbalance  2. Recognize and treat infections immediately especially UTI.  3. Bladder emptying and prevent constipation.   4. Provide stimulating activities and familiar objects  5. Use eyeglasses and hearing aids if needed.   6. Use simple and regular communication about people, current place, and time  7. Mobility and range-of-motion exercises  8. Reduce noise, lighting and avoid sleep interruptions  9. Non-narcotic pain management.  10.Nondrug treatment for sleep problems or anxiety  11. Avoid antihistamines.  12. Avoid narcotics.  13. Avoid benzodiazepines.        MEDICAL/SURGICAL COMORBIDITIES     All relevant medical comorbidities noted and managed by primary care physician and medical care team.           HEALTHY LIFESTYLE AND PREVENTATIVE CARE    The patient to adhere to the age-appropriate health maintenance guidelines including screening tests and vaccinations. The patient to adhere to  healthy lifestyle, optimal weight, exercise, healthy diet, good sleep hygiene and avoiding drugs including smoking, alcohol and recreational drugs.        RTC in 4 weeks       Sudhir Ken MD, FAAN    Attending Neurologist/Epileptologist         Diplomate, American Board of Psychiatry and Neurology    Diplomate, American Board of Clinical Neurophysiology     Fellow, American Academy of Neurology       I spent a total of 99 minutes on the day of the visit.  This includes face to face time and non-face to face time preparing to see the patient (eg, review of tests), obtaining and/or reviewing separately obtained history, documenting clinical information in the electronic or other health record, independently interpreting results and communicating results to the patient/family/caregiver, or care coordinator.

## 2022-12-29 ENCOUNTER — PES CALL (OUTPATIENT)
Dept: ADMINISTRATIVE | Facility: CLINIC | Age: 72
End: 2022-12-29
Payer: MEDICARE

## 2023-01-11 ENCOUNTER — LAB VISIT (OUTPATIENT)
Dept: LAB | Facility: HOSPITAL | Age: 73
End: 2023-01-11
Attending: INTERNAL MEDICINE
Payer: MEDICARE

## 2023-01-11 ENCOUNTER — OFFICE VISIT (OUTPATIENT)
Dept: HEPATOLOGY | Facility: CLINIC | Age: 73
End: 2023-01-11
Payer: MEDICARE

## 2023-01-11 VITALS
BODY MASS INDEX: 20.7 KG/M2 | WEIGHT: 139.75 LBS | DIASTOLIC BLOOD PRESSURE: 74 MMHG | SYSTOLIC BLOOD PRESSURE: 120 MMHG | HEART RATE: 79 BPM | HEIGHT: 69 IN

## 2023-01-11 DIAGNOSIS — R74.8 ELEVATED LIVER ENZYMES: ICD-10-CM

## 2023-01-11 DIAGNOSIS — R41.3 MEMORY DIFFICULTIES: ICD-10-CM

## 2023-01-11 LAB
ALBUMIN SERPL BCP-MCNC: 3.2 G/DL (ref 3.5–5.2)
ALP SERPL-CCNC: 123 U/L (ref 55–135)
ALT SERPL W/O P-5'-P-CCNC: 9 U/L (ref 10–44)
ANION GAP SERPL CALC-SCNC: 8 MMOL/L (ref 8–16)
AST SERPL-CCNC: 21 U/L (ref 10–40)
BILIRUB SERPL-MCNC: 0.2 MG/DL (ref 0.1–1)
BUN SERPL-MCNC: 25 MG/DL (ref 8–23)
CALCIUM SERPL-MCNC: 9.2 MG/DL (ref 8.7–10.5)
CHLORIDE SERPL-SCNC: 110 MMOL/L (ref 95–110)
CO2 SERPL-SCNC: 21 MMOL/L (ref 23–29)
CREAT SERPL-MCNC: 1.4 MG/DL (ref 0.5–1.4)
EST. GFR  (NO RACE VARIABLE): 54.1 ML/MIN/1.73 M^2
GLUCOSE SERPL-MCNC: 97 MG/DL (ref 70–110)
IGA SERPL-MCNC: 216 MG/DL (ref 40–350)
IGG SERPL-MCNC: 744 MG/DL (ref 650–1600)
IGM SERPL-MCNC: 173 MG/DL (ref 50–300)
POTASSIUM SERPL-SCNC: 4.6 MMOL/L (ref 3.5–5.1)
PROT SERPL-MCNC: 6.4 G/DL (ref 6–8.4)
SODIUM SERPL-SCNC: 139 MMOL/L (ref 136–145)

## 2023-01-11 PROCEDURE — 1159F PR MEDICATION LIST DOCUMENTED IN MEDICAL RECORD: ICD-10-PCS | Mod: HCNC,CPTII,S$GLB, | Performed by: INTERNAL MEDICINE

## 2023-01-11 PROCEDURE — 87340 HEPATITIS B SURFACE AG IA: CPT | Mod: HCNC | Performed by: INTERNAL MEDICINE

## 2023-01-11 PROCEDURE — 3008F PR BODY MASS INDEX (BMI) DOCUMENTED: ICD-10-PCS | Mod: HCNC,CPTII,S$GLB, | Performed by: INTERNAL MEDICINE

## 2023-01-11 PROCEDURE — 1160F PR REVIEW ALL MEDS BY PRESCRIBER/CLIN PHARMACIST DOCUMENTED: ICD-10-PCS | Mod: HCNC,CPTII,S$GLB, | Performed by: INTERNAL MEDICINE

## 2023-01-11 PROCEDURE — 86704 HEP B CORE ANTIBODY TOTAL: CPT | Mod: HCNC | Performed by: INTERNAL MEDICINE

## 2023-01-11 PROCEDURE — 99999 PR PBB SHADOW E&M-EST. PATIENT-LVL IV: ICD-10-PCS | Mod: PBBFAC,HCNC,, | Performed by: INTERNAL MEDICINE

## 2023-01-11 PROCEDURE — 1126F AMNT PAIN NOTED NONE PRSNT: CPT | Mod: HCNC,CPTII,S$GLB, | Performed by: INTERNAL MEDICINE

## 2023-01-11 PROCEDURE — 1160F RVW MEDS BY RX/DR IN RCRD: CPT | Mod: HCNC,CPTII,S$GLB, | Performed by: INTERNAL MEDICINE

## 2023-01-11 PROCEDURE — 36415 COLL VENOUS BLD VENIPUNCTURE: CPT | Mod: HCNC | Performed by: INTERNAL MEDICINE

## 2023-01-11 PROCEDURE — 80053 COMPREHEN METABOLIC PANEL: CPT | Mod: HCNC | Performed by: INTERNAL MEDICINE

## 2023-01-11 PROCEDURE — 99999 PR PBB SHADOW E&M-EST. PATIENT-LVL IV: CPT | Mod: PBBFAC,HCNC,, | Performed by: INTERNAL MEDICINE

## 2023-01-11 PROCEDURE — 99204 PR OFFICE/OUTPT VISIT, NEW, LEVL IV, 45-59 MIN: ICD-10-PCS | Mod: HCNC,S$GLB,, | Performed by: INTERNAL MEDICINE

## 2023-01-11 PROCEDURE — 3074F PR MOST RECENT SYSTOLIC BLOOD PRESSURE < 130 MM HG: ICD-10-PCS | Mod: HCNC,CPTII,S$GLB, | Performed by: INTERNAL MEDICINE

## 2023-01-11 PROCEDURE — 86381 MITOCHONDRIAL ANTIBODY EACH: CPT | Mod: HCNC | Performed by: INTERNAL MEDICINE

## 2023-01-11 PROCEDURE — 3078F DIAST BP <80 MM HG: CPT | Mod: HCNC,CPTII,S$GLB, | Performed by: INTERNAL MEDICINE

## 2023-01-11 PROCEDURE — 99204 OFFICE O/P NEW MOD 45 MIN: CPT | Mod: HCNC,S$GLB,, | Performed by: INTERNAL MEDICINE

## 2023-01-11 PROCEDURE — 82784 ASSAY IGA/IGD/IGG/IGM EACH: CPT | Mod: HCNC | Performed by: INTERNAL MEDICINE

## 2023-01-11 PROCEDURE — 3074F SYST BP LT 130 MM HG: CPT | Mod: HCNC,CPTII,S$GLB, | Performed by: INTERNAL MEDICINE

## 2023-01-11 PROCEDURE — 86038 ANTINUCLEAR ANTIBODIES: CPT | Mod: HCNC | Performed by: INTERNAL MEDICINE

## 2023-01-11 PROCEDURE — 3008F BODY MASS INDEX DOCD: CPT | Mod: HCNC,CPTII,S$GLB, | Performed by: INTERNAL MEDICINE

## 2023-01-11 PROCEDURE — 1159F MED LIST DOCD IN RCRD: CPT | Mod: HCNC,CPTII,S$GLB, | Performed by: INTERNAL MEDICINE

## 2023-01-11 PROCEDURE — 86015 ACTIN ANTIBODY EACH: CPT | Mod: HCNC | Performed by: INTERNAL MEDICINE

## 2023-01-11 PROCEDURE — 1126F PR PAIN SEVERITY QUANTIFIED, NO PAIN PRESENT: ICD-10-PCS | Mod: HCNC,CPTII,S$GLB, | Performed by: INTERNAL MEDICINE

## 2023-01-11 PROCEDURE — 3078F PR MOST RECENT DIASTOLIC BLOOD PRESSURE < 80 MM HG: ICD-10-PCS | Mod: HCNC,CPTII,S$GLB, | Performed by: INTERNAL MEDICINE

## 2023-01-12 LAB
ANA SER QL IF: NORMAL
HBV CORE AB SERPL QL IA: NORMAL
HBV SURFACE AG SERPL QL IA: NORMAL
MITOCHONDRIA AB TITR SER IF: NORMAL {TITER}
SMOOTH MUSCLE AB TITR SER IF: NORMAL {TITER}

## 2023-01-16 NOTE — PROGRESS NOTES
Subjective:     Samy Alejandre Jr. is here for evaluation of abnormal LFTs    History of Present Illness:  Samy Alejandre Jr. is a 72-year-old male with longstanding history of hypertension, diabetes mellitus, hyperlipidemia and remote history of seizure disorder.  Presented for abnormal LFTs.  Denies liver related complaints.    Duration of abnormality- 2022  Medications/OTC/Herbal- denies  ETOH-1-2 drinks per week  Metabolic issues-yes  BMI-20  Family Hx-no      Review of Systems   Constitutional:  Negative for fatigue, fever and unexpected weight change.   Gastrointestinal:  Negative for abdominal distention, abdominal pain, blood in stool, nausea and vomiting.   Musculoskeletal:  Negative for arthralgias and gait problem.   Skin:  Negative for pallor and rash.   Neurological:  Negative for dizziness.   Hematological:  Does not bruise/bleed easily.   Psychiatric/Behavioral:  Negative for confusion, hallucinations and sleep disturbance.      Objective:     Physical Exam  Vitals reviewed.   Constitutional:       Appearance: Normal appearance.   Eyes:      General: No scleral icterus.  Pulmonary:      Effort: Pulmonary effort is normal.   Abdominal:      General: Bowel sounds are normal.   Musculoskeletal:      Right lower leg: No edema.      Left lower leg: No edema.   Skin:     Coloration: Skin is not jaundiced.       MELD-Na score: 6 at 3/23/2022  4:52 AM  MELD score: 6 at 3/23/2022  4:52 AM  Calculated from:  Serum Creatinine: 0.8 mg/dL (Using min of 1 mg/dL) at 3/23/2022  4:52 AM  Serum Sodium: 137 mmol/L at 3/23/2022  4:52 AM  Total Bilirubin: 0.2 mg/dL (Using min of 1 mg/dL) at 3/23/2022  4:52 AM  INR(ratio): 1.0 at 3/21/2022 12:05 AM  Age: 72 years    WBC   Date Value Ref Range Status   11/17/2022 8.96 3.90 - 12.70 K/uL Final     Hemoglobin   Date Value Ref Range Status   11/17/2022 11.0 (L) 14.0 - 18.0 g/dL Final     Hematocrit   Date Value Ref Range Status   11/17/2022 32.5 (L) 40.0 - 54.0 % Final      Platelets   Date Value Ref Range Status   11/17/2022 214 150 - 450 K/uL Final     BUN   Date Value Ref Range Status   01/11/2023 25 (H) 8 - 23 mg/dL Final     Creatinine   Date Value Ref Range Status   01/11/2023 1.4 0.5 - 1.4 mg/dL Final     Glucose   Date Value Ref Range Status   01/11/2023 97 70 - 110 mg/dL Final     Calcium   Date Value Ref Range Status   01/11/2023 9.2 8.7 - 10.5 mg/dL Final     Sodium   Date Value Ref Range Status   01/11/2023 139 136 - 145 mmol/L Final     Potassium   Date Value Ref Range Status   01/11/2023 4.6 3.5 - 5.1 mmol/L Final     Chloride   Date Value Ref Range Status   01/11/2023 110 95 - 110 mmol/L Final     Magnesium   Date Value Ref Range Status   07/31/2022 1.7 1.6 - 2.6 mg/dL Final     AST   Date Value Ref Range Status   01/11/2023 21 10 - 40 U/L Final     ALT   Date Value Ref Range Status   01/11/2023 9 (L) 10 - 44 U/L Final     Alkaline Phosphatase   Date Value Ref Range Status   01/11/2023 123 55 - 135 U/L Final     Total Bilirubin   Date Value Ref Range Status   01/11/2023 0.2 0.1 - 1.0 mg/dL Final     Comment:     For infants and newborns, interpretation of results should be based  on gestational age, weight and in agreement with clinical  observations.    Premature Infant recommended reference ranges:  Up to 24 hours.............<8.0 mg/dL  Up to 48 hours............<12.0 mg/dL  3-5 days..................<15.0 mg/dL  6-29 days.................<15.0 mg/dL       Albumin   Date Value Ref Range Status   01/11/2023 3.2 (L) 3.5 - 5.2 g/dL Final     INR   Date Value Ref Range Status   03/21/2022 1.0 0.8 - 1.2 Final     Comment:     Coumadin Therapy:  2.0 - 3.0 for INR for all indicators except mechanical heart valves  and antiphospholipid syndromes which should use 2.5 - 3.5.           Assessment/Plan:     1.Abnormal LFTs:  He has had elevated alkaline phosphatase, AST and ALT early 2022.  Also has risk factors for fatty liver disease including hypertension, diabetes  mellitus and hyperlipidemia.  Could be secondary to drug related liver injury.    Obtain a fibroscan   Will initiate work up to rule out infectious/autoimmune/genetic disorders of the liver    RTC: 4 weeks     I have reviewed existing labs, imaging, procedures. Educated patient about disease process, prognosis. Ordered required labs, images and discussed treatment plan.     Jada English MD  Transplant Hepatologist  Dept of Hepatology, [place] [facility] [place]

## 2023-01-17 ENCOUNTER — HOSPITAL ENCOUNTER (OUTPATIENT)
Dept: RADIOLOGY | Facility: HOSPITAL | Age: 73
Discharge: HOME | End: 2023-01-17
Attending: PSYCHIATRY & NEUROLOGY
Payer: MEDICARE

## 2023-01-17 ENCOUNTER — HOSPITAL ENCOUNTER (OUTPATIENT)
Dept: PULMONOLOGY | Facility: HOSPITAL | Age: 73
Discharge: HOME | End: 2023-01-17
Attending: PSYCHIATRY & NEUROLOGY
Payer: MEDICARE

## 2023-01-17 DIAGNOSIS — N18.30 CONTROLLED TYPE 2 DIABETES MELLITUS WITH STAGE 3 CHRONIC KIDNEY DISEASE, WITHOUT LONG-TERM CURRENT USE OF INSULIN: ICD-10-CM

## 2023-01-17 DIAGNOSIS — F02.80 MIXED ALZHEIMER'S AND VASCULAR DEMENTIA: ICD-10-CM

## 2023-01-17 DIAGNOSIS — J98.4 PULMONARY CAVITARY LESION: ICD-10-CM

## 2023-01-17 DIAGNOSIS — F01.50 MIXED ALZHEIMER'S AND VASCULAR DEMENTIA: ICD-10-CM

## 2023-01-17 DIAGNOSIS — R13.10 DYSPHAGIA, UNSPECIFIED TYPE: ICD-10-CM

## 2023-01-17 DIAGNOSIS — R63.4 WEIGHT LOSS: ICD-10-CM

## 2023-01-17 DIAGNOSIS — D64.9 ANEMIA, UNSPECIFIED TYPE: ICD-10-CM

## 2023-01-17 DIAGNOSIS — I69.359 HEMIPARESIS AFFECTING DOMINANT SIDE AS LATE EFFECT OF CEREBROVASCULAR ACCIDENT (CVA): ICD-10-CM

## 2023-01-17 DIAGNOSIS — Z86.718 HISTORY OF DVT OF LOWER EXTREMITY: ICD-10-CM

## 2023-01-17 DIAGNOSIS — R41.3 MEMORY DIFFICULTIES: ICD-10-CM

## 2023-01-17 DIAGNOSIS — G30.9 MIXED ALZHEIMER'S AND VASCULAR DEMENTIA: ICD-10-CM

## 2023-01-17 DIAGNOSIS — G47.00 INSOMNIA, UNSPECIFIED TYPE: ICD-10-CM

## 2023-01-17 DIAGNOSIS — E11.22 CONTROLLED TYPE 2 DIABETES MELLITUS WITH STAGE 3 CHRONIC KIDNEY DISEASE, WITHOUT LONG-TERM CURRENT USE OF INSULIN: ICD-10-CM

## 2023-01-17 DIAGNOSIS — K22.2 ESOPHAGEAL STRICTURE: ICD-10-CM

## 2023-01-17 DIAGNOSIS — E78.2 MIXED HYPERLIPIDEMIA: ICD-10-CM

## 2023-01-17 DIAGNOSIS — I69.30 HISTORY OF CVA WITH RESIDUAL DEFICIT: ICD-10-CM

## 2023-01-17 PROCEDURE — 95819 EEG AWAKE AND ASLEEP: CPT | Mod: HCNC

## 2023-01-17 PROCEDURE — 70551 MRI BRAIN STEM W/O DYE: CPT | Mod: 26,HCNC,, | Performed by: RADIOLOGY

## 2023-01-17 PROCEDURE — 95819 PR EEG,W/AWAKE & ASLEEP RECORD: ICD-10-PCS | Mod: 26,HCNC,, | Performed by: PSYCHIATRY & NEUROLOGY

## 2023-01-17 PROCEDURE — 70551 MRI BRAIN WITHOUT CONTRAST: ICD-10-PCS | Mod: 26,HCNC,, | Performed by: RADIOLOGY

## 2023-01-17 PROCEDURE — 70551 MRI BRAIN STEM W/O DYE: CPT | Mod: TC,HCNC

## 2023-01-17 PROCEDURE — 95819 EEG AWAKE AND ASLEEP: CPT | Mod: 26,HCNC,, | Performed by: PSYCHIATRY & NEUROLOGY

## 2023-01-17 NOTE — PROCEDURES
DATE EEG PERFORMED:  2023.      DATE EEG INTERPRETED: 2023.                    DURATION OF EE MINUTES.         LEVEL OF CONSCIOUSENESS    Awake and Sleep.         EEG BACKGROUND    The posterior dominant basic rhythm reaches 8-9 Hz, symmetric, reactive, well-modulated and well-sustained.         EEG CLASSIFICATION    Normal        IMPRESSION      The EEG is normal in the awake and sleep states.       There are no epileptiform discharges or lateralizing signs. No typical events were recorded. There is no electrographic evidence of seizure.There is no electrographic evidence of status epilepticus.         PLEASE NOTE THAT A NON-EPILEPTIFORM EEG DOES NOT RULE OUT EPILEPSY.        MAGDALENA ALEXANDER MD, FAAN    Diplomate, American Board of Psychiatry and Neurology    Diplomate, American Board of Clinical Neurophysiology

## 2023-01-18 ENCOUNTER — TELEPHONE (OUTPATIENT)
Dept: NEUROLOGY | Facility: CLINIC | Age: 73
End: 2023-01-18
Payer: MEDICARE

## 2023-01-18 NOTE — TELEPHONE ENCOUNTER
----- Message from Sudhir Ken MD sent at 1/17/2023  5:43 PM CST -----  01-    EEG NL    Ordered AEEG

## 2023-01-24 ENCOUNTER — OFFICE VISIT (OUTPATIENT)
Dept: NEUROLOGY | Facility: CLINIC | Age: 73
End: 2023-01-24
Payer: MEDICARE

## 2023-01-24 DIAGNOSIS — I69.30 HISTORY OF CVA WITH RESIDUAL DEFICIT: ICD-10-CM

## 2023-01-24 DIAGNOSIS — G30.9 MIXED ALZHEIMER'S AND VASCULAR DEMENTIA: Primary | ICD-10-CM

## 2023-01-24 DIAGNOSIS — F01.50 MIXED ALZHEIMER'S AND VASCULAR DEMENTIA: Primary | ICD-10-CM

## 2023-01-24 DIAGNOSIS — G47.00 INSOMNIA, UNSPECIFIED TYPE: ICD-10-CM

## 2023-01-24 DIAGNOSIS — R41.3 MEMORY DIFFICULTIES: ICD-10-CM

## 2023-01-24 DIAGNOSIS — F02.80 MIXED ALZHEIMER'S AND VASCULAR DEMENTIA: Primary | ICD-10-CM

## 2023-01-24 DIAGNOSIS — Z91.89 AT RISK FOR PROLONGED QT INTERVAL SYNDROME: ICD-10-CM

## 2023-01-24 DIAGNOSIS — I69.359 HEMIPARESIS AFFECTING DOMINANT SIDE AS LATE EFFECT OF CEREBROVASCULAR ACCIDENT (CVA): ICD-10-CM

## 2023-01-24 PROCEDURE — 99214 OFFICE O/P EST MOD 30 MIN: CPT | Mod: HCNC,95,, | Performed by: NURSE PRACTITIONER

## 2023-01-24 PROCEDURE — 99214 PR OFFICE/OUTPT VISIT, EST, LEVL IV, 30-39 MIN: ICD-10-PCS | Mod: HCNC,95,, | Performed by: NURSE PRACTITIONER

## 2023-01-24 PROCEDURE — 1159F PR MEDICATION LIST DOCUMENTED IN MEDICAL RECORD: ICD-10-PCS | Mod: HCNC,CPTII,95, | Performed by: NURSE PRACTITIONER

## 2023-01-24 PROCEDURE — 1160F PR REVIEW ALL MEDS BY PRESCRIBER/CLIN PHARMACIST DOCUMENTED: ICD-10-PCS | Mod: HCNC,CPTII,95, | Performed by: NURSE PRACTITIONER

## 2023-01-24 PROCEDURE — 1159F MED LIST DOCD IN RCRD: CPT | Mod: HCNC,CPTII,95, | Performed by: NURSE PRACTITIONER

## 2023-01-24 PROCEDURE — 1160F RVW MEDS BY RX/DR IN RCRD: CPT | Mod: HCNC,CPTII,95, | Performed by: NURSE PRACTITIONER

## 2023-01-24 RX ORDER — DONEPEZIL HYDROCHLORIDE 10 MG/1
10 TABLET, FILM COATED ORAL NIGHTLY
Qty: 30 TABLET | Refills: 11 | Status: SHIPPED | OUTPATIENT
Start: 2023-01-24 | End: 2024-01-16

## 2023-01-24 NOTE — PROGRESS NOTES
Subjective:       Patient ID: Samy Alejandre Jr. is a 72 y.o. male.    Chief Complaint: Mixed Alzheimer's and vascular dementia , Results, and Update Plan Of Care          HPI    The patient is here for memory loss.       The patient is presenting with 3-year history of noticeable memory loss (2019). The patient is accompanied by his daughter (Jayshree). The main problems the patient has are related to progressive short term memory loss. He is unable to operate a cell phone and a remote anymore. The patient is not driving. The patient lives with his daughter right now. No confusion around and inside the house. Does have significant trouble remembering the date and time, keeping up with medications and appointments and keeping up with major holidays and political changes. The patient is dependent in handling finances. The patient is becoming more dependent with ADLs. In  the patient developed sudden change in behavior that was very bizarre and lasted for few weeks. Now, he is back to baseline with no agitation or aggression,  hallucinations or delusions.. No language problems. The patient suffered 3 strokes (last one in ). He had 3 seizures so far (childhood, adulthood and middle age).  No history of  severe headaches. No history of hypothyroidism. No history of alcoholism (young onset or old onset). No history of B12 deficiency.Positive history of depression and insomnia. Did not tolerate Trazodone and is developing rapid tolerance to Seroquel. He has lost significant weight provoked by esophageal stricture which has improved . No history of bipolar disorder. No history of Untreated Syphilis.  No history of HIV infection. No toxic exposures.  No history of traumatic brain injury. No tremors or abnormal movements. His gait has declined with high risk of falls. No urinary incontinence. No change in sleep and appetite. Mother had dementia. Today (12-) MOCA 13/30.       INTERVAL HISTORY  01-:  Patient new to me but known to Dr. Ken. patient present for Memory management and results.     Patient tolerating Namenda 10 mg BID. No adverse side effects. Daughter reports medication is helping she states patient is less repetitive. Discussed plan of care Will start Aricept 10 mg po HS slow titration.  Assess EKG 4 week after starting treatment to assess for QT interval. Patient daughter verbalized understanding.     Patient had 2 weeks of interrupted sleep disturbance but now has improved with Seroquel 100 mg po HS work well no adverse side effects.     Work up discussed: 01- Brain MRI Unremarkable. 01- EEG NL. AEEG pending for 02-. Patient daughter denies recent seizure.           Review of Systems   Constitutional:  Positive for activity change, fatigue and unexpected weight change. Negative for appetite change.   HENT:  Negative for hearing loss and tinnitus.    Eyes:  Negative for photophobia and visual disturbance.   Respiratory:  Negative for apnea and shortness of breath.    Cardiovascular:  Negative for chest pain and palpitations.   Gastrointestinal:  Negative for nausea and vomiting.   Endocrine: Negative for cold intolerance and heat intolerance.   Genitourinary:  Negative for difficulty urinating and urgency.   Musculoskeletal:  Positive for back pain and gait problem. Negative for arthralgias, joint swelling, myalgias, neck pain and neck stiffness.   Skin:  Negative for color change and rash.   Allergic/Immunologic: Negative for environmental allergies and immunocompromised state.   Neurological:  Positive for numbness. Negative for dizziness, tremors, seizures, syncope, facial asymmetry, speech difficulty, weakness, light-headedness and headaches.   Hematological:  Negative for adenopathy. Does not bruise/bleed easily.   Psychiatric/Behavioral:  Positive for behavioral problems, confusion, decreased concentration and dysphoric mood. Negative for agitation, hallucinations,  self-injury, sleep disturbance and suicidal ideas. The patient is nervous/anxious. The patient is not hyperactive.                Current Outpatient Medications:     blood glucose control, low (TRUE METRIX LEVEL 1) Soln, 1 drop by Misc.(Non-Drug; Combo Route) route once daily., Disp: 1 each, Rfl: 4    blood glucose control, normal (TRUE METRIX LEVEL 2) Soln, 1 drop by Misc.(Non-Drug; Combo Route) route once daily., Disp: 1 each, Rfl: 4    donepeziL (ARICEPT) 10 MG tablet, Take 1 tablet (10 mg total) by mouth every evening., Disp: 30 tablet, Rfl: 11    DULoxetine (CYMBALTA) 30 MG capsule, Take 1 capsule (30 mg total) by mouth once daily., Disp: 90 capsule, Rfl: 3    ELIQUIS 5 mg Tab, Take 1 tablet by mouth twice daily (Patient taking differently: 5 mg daily as needed.), Disp: 148 tablet, Rfl: 0    FLUoxetine 20 MG capsule, Take 1 capsule by mouth once daily, Disp: 90 capsule, Rfl: 0    HYDROcodone-acetaminophen (NORCO) 5-325 mg per tablet, Take 1 tablet by mouth 3 (three) times daily., Disp: , Rfl:     lancets 32 gauge Misc, 1 lancet by Misc.(Non-Drug; Combo Route) route once daily., Disp: 30 each, Rfl: 4    memantine (NAMENDA) 10 MG Tab, Take 1 tablet (10 mg total) by mouth 2 (two) times daily., Disp: 62 tablet, Rfl: 5    ondansetron (ZOFRAN) 8 MG tablet, Take 1 tablet (8 mg total) by mouth every 8 (eight) hours as needed for Nausea., Disp: 30 tablet, Rfl: 1    oxybutynin (DITROPAN-XL) 10 MG 24 hr tablet, TAKE 1 TABLET BY MOUTH EVERY DAY, Disp: 90 tablet, Rfl: 1    pantoprazole (PROTONIX) 40 MG tablet, Take 1 tablet by mouth once daily, Disp: 30 tablet, Rfl: 0    QUEtiapine (SEROQUEL) 25 MG Tab, Take 1 tablet (25 mg total) by mouth 2 (two) times daily. (Patient taking differently: Take 50 mg by mouth once daily.), Disp: 60 tablet, Rfl: 11    rosuvastatin (CRESTOR) 20 MG tablet, Take 1 tablet by mouth once daily, Disp: 90 tablet, Rfl: 0    tamsulosin (FLOMAX) 0.4 mg Cap, Take 1 capsule by mouth once daily, Disp: 90  capsule, Rfl: 0    walker (ULTRA-LIGHT ROLLATOR) Misc, Use daily for ambulation, Disp: 1 each, Rfl: 0  Past Medical History:   Diagnosis Date    Anxiety     Depression     Diabetes mellitus, type 2     Hypertension     Stroke      Past Surgical History:   Procedure Laterality Date    ANGIOGRAPHY OF LOWER EXTREMITY N/A 12/21/2020    Procedure: ANGIOGRAM, LOWER EXTREMITY/Rt leg poss pta/stent;  Surgeon: Todd Michel MD;  Location: [facility] CATH LAB;  Service: Peripheral Vascular;  Laterality: N/A;  rescheduled 12/8    BACK SURGERY  2015    BRONCHOSCOPY Left 3/24/2022    Procedure: Bronchoscopy;  Surgeon: Edward Williamson MD;  Location: [facility] ENDO;  Service: Pulmonary;  Laterality: Left;  poss endobronchial biopsy or brushing    CATARACT EXTRACTION      ESOPHAGOGASTRODUODENOSCOPY N/A 8/24/2022    Procedure: EGD (ESOPHAGOGASTRODUODENOSCOPY);  Surgeon: Ana Gomez MD;  Location: [facility];  Service: Endoscopy;  Laterality: N/A;    ESOPHAGOGASTRODUODENOSCOPY N/A 9/14/2022    Procedure: EGD (ESOPHAGOGASTRODUODENOSCOPY);  Surgeon: Ana Gomez MD;  Location: [facility];  Service: Endoscopy;  Laterality: N/A;    KNEE SURGERY  2012    LITHOTRIPSY  2000     Social History     Socioeconomic History    Marital status:    Tobacco Use    Smoking status: Every Day     Packs/day: 1.00     Types: Cigarettes    Smokeless tobacco: Current     Types: Snuff   Substance and Sexual Activity    Alcohol use: Yes     Alcohol/week: 1.0 standard drink     Types: 1 Shots of liquor per week     Comment: Daily    Drug use: Never    Sexual activity: Not Currently             Past/Current Medical/Surgical History, Past/Current Social History, Past/Current Family History and Past/Current Medications were reviewed in detail.        Objective:           VITAL SIGNS WERE REVIEWED      GENERAL APPEARANCE:     The patient looks cachectic     BMI 19.14    No signs of respiratory distress.    Normal breathing pattern.    No dysmorphic  features    Normal eye contact.     GENERAL MEDICAL EXAM:    HEENT:  Head is atraumatic normocephalic. Fundoscopic (Ophthalmoscopic) exam showed no disc edema.      Neck and Axillae: No JVD. No visible lesions.    Cardiopulmonary: No cyanosis. No tachypnea. Normal respiratory effort.    Gastrointestinal/Urogenital:  No jaundice. No stomas or lesions. No visible hernias. No catheters.     Skin, Hair and Nails: No pathognonomic skin rash. No neurofibromatosis. No visible lesions.No stigmata of autoimmune disease. No clubbing.    Limbs: No varicose veins. No visible swelling. Diffuse muscle atrophy.     Muskoskeletal: OA visible deformities.No visible lesions.           Neurologic Exam     Mental Status   Oriented to person.   Oriented to place.   Disoriented to time.   Follows 2 step commands.   Attention: decreased. Concentration: decreased.   Speech: speech is normal   Level of consciousness: alert  Able to name object. Able to repeat. Normal comprehension.       Prominent Psychomotor Slowing      Cranial Nerves   Cranial nerves II through XII intact.     CN II   Visual fields full to confrontation.   Visual acuity: normal  Right visual field deficit: none  Left visual field deficit: none     CN III, IV, VI   Pupils are equal, round, and reactive to light.  Extraocular motions are normal.   Right pupil: Size: 2 mm. Shape: regular. Reactivity: brisk. Consensual response: intact. Accommodation: intact.   Left pupil: Size: 2 mm. Shape: regular. Reactivity: brisk. Consensual response: intact. Accommodation: intact.   CN III: no CN III palsy  CN VI: no CN VI palsy  Nystagmus: none   Diplopia: none  Ophthalmoparesis: none  Upgaze: normal  Downgaze: normal  Conjugate gaze: present  Vestibulo-ocular reflex: present    CN V   Facial sensation intact.   Right facial sensation deficit: none  Left facial sensation deficit: none  Jaw jerk: normal    CN VII   Facial expression full, symmetric.   Right facial weakness: none  Left  facial weakness: none    CN VIII   CN VIII normal.   Hearing: intact    CN IX, X   CN IX normal.   CN X normal.   Palate: symmetric    CN XI   CN XI normal.   Right sternocleidomastoid strength: normal  Left sternocleidomastoid strength: normal  Right trapezius strength: normal  Left trapezius strength: normal    CN XII   CN XII normal.   Tongue: not atrophic  Fasciculations: absent  Tongue deviation: none    Motor Exam   Muscle bulk: decreased  Right arm tone: spastic  Left arm tone: spastic  Right leg tone: spastic  Left leg tone: spastic    Strength   Right neck flexion: 5/5  Left neck flexion: 5/5  Right neck extension: 5/5  Left neck extension: 5/5  Right deltoid: 4/5  Left deltoid: 4/5  Right biceps: 4/5  Left biceps: 4/5  Right triceps: 4/5  Left triceps: 4/5  Right wrist flexion: 4/5  Left wrist flexion: 4/5  Right wrist extension: 4/5  Left wrist extension: 4/5  Right interossei: 4/5  Left interossei: 4/5  Right iliopsoas: 4/5  Left iliopsoas: 4/5  Right quadriceps: 4/5  Left quadriceps: 4/5  Right hamstrin/5  Left hamstrin/5  Right glutei: 4/5  Left glutei: 4/5  Right anterior tibial: 4/5  Left anterior tibial: 4/5  Right posterior tibial: 4/5  Left posterior tibial: 4/5  Right peroneal: 4/5  Left peroneal: 4/5  Right gastroc: 4/5  Left gastroc: 4/5    RUE RLE 4/5    LUE LLE 4+/5      Sensory Exam   Right arm light touch: decreased from fingers  Left arm light touch: decreased from fingers  Right leg light touch: decreased from ankle  Left leg light touch: decreased from ankle  Right arm vibration: normal  Left arm vibration: normal  Right leg vibration: decreased from toes  Left leg vibration: decreased from toes  Right arm proprioception: normal  Left arm proprioception: normal  Right leg proprioception: decreased from toes  Left leg proprioception: decreased from toes  Right arm pinprick: decreased from fingers  Left arm pinprick: decreased from fingers  Right leg pinprick: decreased from  ankle  Left leg pinprick: decreased from ankle  Graphesthesia: normal  Stereognosis: normal    Gait, Coordination, and Reflexes     Gait  Gait: spastic and wide-based    Coordination   Finger to nose coordination: normal  Heel to shin coordination: normal    Tremor   Resting tremor: absent  Intention tremor: absent  Action tremor: absent    Reflexes   Right brachioradialis: 1+  Left brachioradialis: 1+  Right biceps: 1+  Left biceps: 1+  Right triceps: 1+  Left triceps: 1+  Right patellar: 3+  Left patellar: 3+  Right achilles: 2+  Left achilles: 2+  Right plantar: equivocal  Left plantar: equivocal  Right Suárez: absent  Left Suárez: absent  Right ankle clonus: absent  Left ankle clonus: absent  Right pendular knee jerk: absent  Left pendular knee jerk: absent    Lab Results   Component Value Date    WBC 8.96 11/17/2022    HGB 11.0 (L) 11/17/2022    HCT 32.5 (L) 11/17/2022     (H) 11/17/2022     11/17/2022     Sodium   Date Value Ref Range Status   01/11/2023 139 136 - 145 mmol/L Final     Potassium   Date Value Ref Range Status   01/11/2023 4.6 3.5 - 5.1 mmol/L Final     Chloride   Date Value Ref Range Status   01/11/2023 110 95 - 110 mmol/L Final     CO2   Date Value Ref Range Status   01/11/2023 21 (L) 23 - 29 mmol/L Final     Glucose   Date Value Ref Range Status   01/11/2023 97 70 - 110 mg/dL Final     BUN   Date Value Ref Range Status   01/11/2023 25 (H) 8 - 23 mg/dL Final     Creatinine   Date Value Ref Range Status   01/11/2023 1.4 0.5 - 1.4 mg/dL Final     Calcium   Date Value Ref Range Status   01/11/2023 9.2 8.7 - 10.5 mg/dL Final     Total Protein   Date Value Ref Range Status   01/11/2023 6.4 6.0 - 8.4 g/dL Final     Albumin   Date Value Ref Range Status   01/11/2023 3.2 (L) 3.5 - 5.2 g/dL Final     Total Bilirubin   Date Value Ref Range Status   01/11/2023 0.2 0.1 - 1.0 mg/dL Final     Comment:     For infants and newborns, interpretation of results should be based  on gestational age,  weight and in agreement with clinical  observations.    Premature Infant recommended reference ranges:  Up to 24 hours.............<8.0 mg/dL  Up to 48 hours............<12.0 mg/dL  3-5 days..................<15.0 mg/dL  6-29 days.................<15.0 mg/dL       Alkaline Phosphatase   Date Value Ref Range Status   2023 123 55 - 135 U/L Final     AST   Date Value Ref Range Status   2023 21 10 - 40 U/L Final     ALT   Date Value Ref Range Status   2023 9 (L) 10 - 44 U/L Final     Anion Gap   Date Value Ref Range Status   2023 8 8 - 16 mmol/L Final     eGFR if    Date Value Ref Range Status   2022 >60 >60 mL/min/1.73 m^2 Final     eGFR if non    Date Value Ref Range Status   2022 >60 >60 mL/min/1.73 m^2 Final     Comment:     Calculation used to obtain the estimated glomerular filtration  rate (eGFR) is the CKD-EPI equation.        Lab Results   Component Value Date    [id number] 648 2020     Lab Results   Component Value Date    TSH 2.724 2022    B12 NL      2022    TSH NL      2022    CTH Atrophy        2023    Brain MRI Unremarkable.Atrophy        2023    EEG NL        SYLVESTER COGNITIVE ASSESSMENT (MOCA) TOTAL SCORE 30         NORMAL-MILD NCD 26-30    MILD DEMENTIA 20-25    MODERATE DEMENTIA 10-19    SEVERE DEMENTIA <10        DATE 2022       TOTAL SCORE 13       VISUOSPATIAL EXECUTIVE (5) 1       NAMING (3) 3       ATTENTION (6) 4       LANGUAGE (3) 2       ABSTRACTION(2) 0       RECALL (5) 0       ORIENTATION (6) 3               Reviewed the neuroimaging independently       Assessment:       1. Mixed Alzheimer's and vascular dementia    2. At risk for prolonged QT interval syndrome    3. Memory difficulties    4. History of CVA with residual deficit    5. Insomnia, unspecified type    6. Hemiparesis affecting dominant side as late effect of cerebrovascular accident (CVA)             Plan:           MIXED DEMENTIA, VASCULAR DEMENTIA AND ALZHEIMER'S TYPE DEMENTIA, MODERATE, NO BASELINE BEHAVIORAL DISTURBANCES     HISTORY OF EPILEPSY, HISTORY OF RECENT DELIRIUM ()      EVALUATION     AEEG pending 02-      DISEASE-MODIFYING AGENTS     Explained to the patient and family that medications are intended to slow down the progression and not stop it or reverse the disease. The disease is relentless, progressive and so far, cannot be controlled. Progression includes Cognitive decline, Behavioral disturbances, and Motor decline as well.     I counseled the patient and family that the rate of progression is extremely variable, and the average (10 years) is an inaccurate measure.     Continue Memantine/Namenda 10 mg BID.    Will start donepezil/Aricept 10 mg QHS. The side effects include dizziness, diarrhea and nightmares and discussed with patient and family.  It can rarely cause bradycardia, syncope, and prolonged QT.     Check EKG in 4 weeks     If GI symptoms become an issue will try rivastigmine/Exelon patches. Will obtain EKG before and after Aricept to verify effects on QT interval!  (Other formulations Adlarity TTS 5/10 mg weekly). Patches are convenient, bypass the GI system but have unintended consequences of being taken off prematurely or being duplicated accidentally.       Recommended against the use of OTC non-FDA evaluated supplements and claims.       SYMPTOMATIC MANAGEMENT-BEHAVIORAL SYMPTOMS       Continue Seroquel 100 MG po HS titrate prn.     Did not tolerate Trazodone.       HOME CARE       Falling Down Precautions.     Avoid driving and access to firearms     Incremental 24/Care     Help with finances and decision making.    Join support group.    Proofing the house and use labeling.    Avoid antihistamines and anticholinergics.    Avoid changing routine.    Use written reminders.    Avoid multitasking.    Healthy diet, exercise (physical and cognitive).    Good sleep  hygiene.        CAREGIVERS COUNSELING     For behavioral problems I recommended that family to try some of the following communication tips: Try not to react and stay calm, don't argue, do not use logic, Let the patient feel safe, Keep reminding the patient and use photos if necessary, Distract the patient, Search for things to distract the person, then talk about what you found. For example, talk about a photograph or keepsake or even food, use gentle touching or hugging to show you care, Body language matters, use a cooling off period if needed, when possible. If safe to do so, give the patient some space or breathing room. Will consider antipsychotic medications as a last resort because of the risk of CAD and CVD.            Recommend reading the following books:     The 36-Hour Day and there are many online and printed resources to help caregivers as well.     Alzheimer's Through the Stages.     Dementia with MENDEL    For More Information About Hallucinations, Delusions, and Paranoia in Alzheimer's   [facility] Alzheimer's and related Dementias Education and Referral (ADEAR) Center   1-153.255.9021 (toll-free)   adear@[place].nih.gov   www.[place].nih.gov/alzheimers   The [employer] [place] [employer]'s ADEAR Center offers information and free print publications about Alzheimer's disease and related dementias for families, caregivers, and health professionals. ADEAR Center staff answer telephone, email, and written requests and make referrals to local and national resources          PREVENTION OF DELIRIUM       1. Good hydration and avoid electrolyte imbalance  2. Recognize and treat infections immediately especially UTI.  3. Bladder emptying and prevent constipation.   4. Provide stimulating activities and familiar objects  5. Use eyeglasses and hearing aids if needed.   6. Use simple and regular communication about people, current place, and time  7. Mobility and range-of-motion exercises  8. Reduce noise, lighting and  avoid sleep interruptions  9. Non-narcotic pain management.  10.Nondrug treatment for sleep problems or anxiety  11. Avoid antihistamines.  12. Avoid narcotics.  13. Avoid benzodiazepines.        MEDICAL/SURGICAL COMORBIDITIES     All relevant medical comorbidities noted and managed by primary care physician and medical care team.          HEALTHY LIFESTYLE AND PREVENTATIVE CARE    The patient to adhere to the age-appropriate health maintenance guidelines including screening tests and vaccinations. The patient to adhere to  healthy lifestyle, optimal weight, exercise, healthy diet, good sleep hygiene and avoiding drugs including smoking, alcohol and recreational drugs.        RTC in 6 weeks        Lizbeth Brady MSN NP      Collaborating Provider: Sudhir Ken MD, FAAN Neurologist/Epileptologist    I spent a total of 30 minutes on the day of the visit.  This includes face to face time and non-face to face time preparing to see the patient (eg, review of tests), obtaining and/or reviewing separately obtained history, documenting clinical information in the electronic or other health record, independently interpreting results and communicating results to the patient/family/caregiver, or care coordinator.

## 2023-02-03 ENCOUNTER — PROCEDURE VISIT (OUTPATIENT)
Dept: HEPATOLOGY | Facility: CLINIC | Age: 73
End: 2023-02-03
Attending: INTERNAL MEDICINE
Payer: MEDICARE

## 2023-02-03 VITALS
DIASTOLIC BLOOD PRESSURE: 72 MMHG | SYSTOLIC BLOOD PRESSURE: 120 MMHG | WEIGHT: 142.63 LBS | BODY MASS INDEX: 21.13 KG/M2 | HEART RATE: 79 BPM | HEIGHT: 69 IN

## 2023-02-03 DIAGNOSIS — R74.8 ELEVATED LIVER ENZYMES: ICD-10-CM

## 2023-02-03 PROCEDURE — 76981 PR US, ELASTOGRAPHY, PARENCHYMA: ICD-10-PCS | Mod: HCNC,S$GLB,, | Performed by: NURSE PRACTITIONER

## 2023-02-03 PROCEDURE — 76981 USE PARENCHYMA: CPT | Mod: HCNC,S$GLB,, | Performed by: NURSE PRACTITIONER

## 2023-02-03 NOTE — PROGRESS NOTES
Patient presented in clinic today for fibroscan examination of their liver. Patient verbalized that they have fasted 4 hours prior to their examination. Patient denies being pregnant or having any active implantable metal devices; such as a pacemaker, defibrillator, or pump.     Sonam Castro, YASMANYN, RN   Registered Nurse Lead- Hepatology   [facility] [place]

## 2023-02-06 NOTE — PROCEDURES
Fibroscan Procedure     Name: Samy Alejandre   Date of Procedure : 2023   :: DEREK Mora  Diagnosis: other    Probe: M    Fibroscan reading: 10.9 KPa    Fibrosis:F3     CAP readin dB/m    Steatosis: :<S1       Interpretation:   Advanced fibrosis without steatosis

## 2023-02-07 ENCOUNTER — TELEPHONE (OUTPATIENT)
Dept: HEPATOLOGY | Facility: CLINIC | Age: 73
End: 2023-02-07
Payer: MEDICARE

## 2023-02-07 ENCOUNTER — PATIENT MESSAGE (OUTPATIENT)
Dept: HEPATOLOGY | Facility: CLINIC | Age: 73
End: 2023-02-07
Payer: MEDICARE

## 2023-02-07 DIAGNOSIS — Z00.00 ENCOUNTER FOR MEDICARE ANNUAL WELLNESS EXAM: ICD-10-CM

## 2023-02-07 NOTE — TELEPHONE ENCOUNTER
----- Message from DEREK Jeffrey sent at 2/6/2023  7:33 AM CST -----  Advanced fibrosis without steatosis

## 2023-02-09 DIAGNOSIS — Z00.00 ENCOUNTER FOR MEDICARE ANNUAL WELLNESS EXAM: ICD-10-CM

## 2023-02-28 ENCOUNTER — PATIENT MESSAGE (OUTPATIENT)
Dept: HEPATOLOGY | Facility: CLINIC | Age: 73
End: 2023-02-28

## 2023-02-28 ENCOUNTER — OFFICE VISIT (OUTPATIENT)
Dept: HEPATOLOGY | Facility: CLINIC | Age: 73
End: 2023-02-28
Payer: MEDICARE

## 2023-02-28 VITALS — WEIGHT: 142 LBS | HEIGHT: 69 IN | BODY MASS INDEX: 21.03 KG/M2

## 2023-02-28 DIAGNOSIS — R74.8 ELEVATED LIVER ENZYMES: Primary | ICD-10-CM

## 2023-02-28 PROCEDURE — 3008F BODY MASS INDEX DOCD: CPT | Mod: HCNC,CPTII,95, | Performed by: INTERNAL MEDICINE

## 2023-02-28 PROCEDURE — 3008F PR BODY MASS INDEX (BMI) DOCUMENTED: ICD-10-PCS | Mod: HCNC,CPTII,95, | Performed by: INTERNAL MEDICINE

## 2023-02-28 PROCEDURE — 1159F PR MEDICATION LIST DOCUMENTED IN MEDICAL RECORD: ICD-10-PCS | Mod: HCNC,CPTII,95, | Performed by: INTERNAL MEDICINE

## 2023-02-28 PROCEDURE — 1160F PR REVIEW ALL MEDS BY PRESCRIBER/CLIN PHARMACIST DOCUMENTED: ICD-10-PCS | Mod: HCNC,CPTII,95, | Performed by: INTERNAL MEDICINE

## 2023-02-28 PROCEDURE — 1101F PT FALLS ASSESS-DOCD LE1/YR: CPT | Mod: HCNC,CPTII,95, | Performed by: INTERNAL MEDICINE

## 2023-02-28 PROCEDURE — 1125F PR PAIN SEVERITY QUANTIFIED, PAIN PRESENT: ICD-10-PCS | Mod: HCNC,CPTII,95, | Performed by: INTERNAL MEDICINE

## 2023-02-28 PROCEDURE — 1159F MED LIST DOCD IN RCRD: CPT | Mod: HCNC,CPTII,95, | Performed by: INTERNAL MEDICINE

## 2023-02-28 PROCEDURE — 3288F FALL RISK ASSESSMENT DOCD: CPT | Mod: HCNC,CPTII,95, | Performed by: INTERNAL MEDICINE

## 2023-02-28 PROCEDURE — 3288F PR FALLS RISK ASSESSMENT DOCUMENTED: ICD-10-PCS | Mod: HCNC,CPTII,95, | Performed by: INTERNAL MEDICINE

## 2023-02-28 PROCEDURE — 99212 PR OFFICE/OUTPT VISIT, EST, LEVL II, 10-19 MIN: ICD-10-PCS | Mod: HCNC,95,, | Performed by: INTERNAL MEDICINE

## 2023-02-28 PROCEDURE — 99212 OFFICE O/P EST SF 10 MIN: CPT | Mod: HCNC,95,, | Performed by: INTERNAL MEDICINE

## 2023-02-28 PROCEDURE — 1125F AMNT PAIN NOTED PAIN PRSNT: CPT | Mod: HCNC,CPTII,95, | Performed by: INTERNAL MEDICINE

## 2023-02-28 PROCEDURE — 1101F PR PT FALLS ASSESS DOC 0-1 FALLS W/OUT INJ PAST YR: ICD-10-PCS | Mod: HCNC,CPTII,95, | Performed by: INTERNAL MEDICINE

## 2023-02-28 PROCEDURE — 1160F RVW MEDS BY RX/DR IN RCRD: CPT | Mod: HCNC,CPTII,95, | Performed by: INTERNAL MEDICINE

## 2023-02-28 NOTE — PROGRESS NOTES
Subjective:     Samy Alejandre Jr. is here for evaluation of abnormal LFTs    History of Present Illness:  Samy Alejandre Jr. is a 72-year-old male with longstanding history of hypertension, diabetes mellitus, hyperlipidemia and remote history of seizure disorder.  Presented for abnormal LFTs.  Denies liver related complaints.    Duration of abnormality- 2022  Medications/OTC/Herbal- denies  ETOH-1-2 drinks per week  Metabolic issues-yes  BMI-20  Family Hx-no    2/28/2023  No liver related complaints, has been doing okay.  Daughter says as his weight dropped to 120lbs with recent hospitalizations, he has been eating more liberally to gain weight    Review of Systems   Constitutional:  Negative for fatigue, fever and unexpected weight change.   Gastrointestinal:  Negative for abdominal distention, abdominal pain, blood in stool, nausea and vomiting.   Musculoskeletal:  Negative for arthralgias and gait problem.   Skin:  Negative for pallor and rash.   Neurological:  Negative for dizziness.   Hematological:  Does not bruise/bleed easily.   Psychiatric/Behavioral:  Negative for confusion, hallucinations and sleep disturbance.      Objective:     Physical Exam  Vitals reviewed.   Constitutional:       Appearance: Normal appearance.   Eyes:      General: No scleral icterus.  Pulmonary:      Effort: Pulmonary effort is normal.   Abdominal:      General: Bowel sounds are normal.   Musculoskeletal:      Right lower leg: No edema.      Left lower leg: No edema.   Skin:     Coloration: Skin is not jaundiced.       MELD-Na score: 6 at 3/23/2022  4:52 AM  MELD score: 6 at 3/23/2022  4:52 AM  Calculated from:  Serum Creatinine: 0.8 mg/dL (Using min of 1 mg/dL) at 3/23/2022  4:52 AM  Serum Sodium: 137 mmol/L at 3/23/2022  4:52 AM  Total Bilirubin: 0.2 mg/dL (Using min of 1 mg/dL) at 3/23/2022  4:52 AM  INR(ratio): 1.0 at 3/21/2022 12:05 AM  Age: 72 years    WBC   Date Value Ref Range Status   11/17/2022 8.96 3.90 - 12.70 K/uL  Final     Hemoglobin   Date Value Ref Range Status   11/17/2022 11.0 (L) 14.0 - 18.0 g/dL Final     Hematocrit   Date Value Ref Range Status   11/17/2022 32.5 (L) 40.0 - 54.0 % Final     Platelets   Date Value Ref Range Status   11/17/2022 214 150 - 450 K/uL Final     BUN   Date Value Ref Range Status   01/11/2023 25 (H) 8 - 23 mg/dL Final     Creatinine   Date Value Ref Range Status   01/11/2023 1.4 0.5 - 1.4 mg/dL Final     Glucose   Date Value Ref Range Status   01/11/2023 97 70 - 110 mg/dL Final     Calcium   Date Value Ref Range Status   01/11/2023 9.2 8.7 - 10.5 mg/dL Final     Sodium   Date Value Ref Range Status   01/11/2023 139 136 - 145 mmol/L Final     Potassium   Date Value Ref Range Status   01/11/2023 4.6 3.5 - 5.1 mmol/L Final     Chloride   Date Value Ref Range Status   01/11/2023 110 95 - 110 mmol/L Final     Magnesium   Date Value Ref Range Status   07/31/2022 1.7 1.6 - 2.6 mg/dL Final     AST   Date Value Ref Range Status   01/11/2023 21 10 - 40 U/L Final     ALT   Date Value Ref Range Status   01/11/2023 9 (L) 10 - 44 U/L Final     Alkaline Phosphatase   Date Value Ref Range Status   01/11/2023 123 55 - 135 U/L Final     Total Bilirubin   Date Value Ref Range Status   01/11/2023 0.2 0.1 - 1.0 mg/dL Final     Comment:     For infants and newborns, interpretation of results should be based  on gestational age, weight and in agreement with clinical  observations.    Premature Infant recommended reference ranges:  Up to 24 hours.............<8.0 mg/dL  Up to 48 hours............<12.0 mg/dL  3-5 days..................<15.0 mg/dL  6-29 days.................<15.0 mg/dL       Albumin   Date Value Ref Range Status   01/11/2023 3.2 (L) 3.5 - 5.2 g/dL Final     INR   Date Value Ref Range Status   03/21/2022 1.0 0.8 - 1.2 Final     Comment:     Coumadin Therapy:  2.0 - 3.0 for INR for all indicators except mechanical heart valves  and antiphospholipid syndromes which should use 2.5 - 3.5.            Assessment/Plan:     1.Abnormal LFTs:  He has had elevated alkaline phosphatase, AST and ALT early .  Also has risk factors for fatty liver disease including hypertension, diabetes mellitus and hyperlipidemia.  Could be secondary to drug related liver injury.    Obtain a fibroscan   Will initiate work up to rule out infectious/autoimmune/genetic disorders of the liver    2023  Workup for autoimmune liver diseases and infectious diseases is negative with normal serologies.  He does have all the risk factors for NAFLD/[place] with metabolic syndrome.  FibroScan shows F2 to F3 fibrosis.  Discussed these findings with the daughter, requested to maintain better blood sugar control if possible.  We will reassess his LFTs 6 months from now, as of now his most recent transaminases are in normal limits.  He also has been on several different antibiotics for a long period of time.  Currently on Seroquel and statin, cymbalta  Fibroscan reading: 10.9 KPa  Fibrosis:F3   CAP readin dB/m  Steatosis: :<S1     RTC: 6 mo    I have reviewed existing labs, imaging, procedures. Educated daughter  about disease process, prognosis. Ordered required labs, images and discussed treatment plan.     Jada English MD  Transplant Hepatologist  Dept of Hepatology, [facility]

## 2023-03-13 ENCOUNTER — TELEPHONE (OUTPATIENT)
Dept: FAMILY MEDICINE | Facility: CLINIC | Age: 73
End: 2023-03-13
Payer: MEDICARE

## 2023-03-13 DIAGNOSIS — M79.671 PAIN IN BOTH FEET: Primary | ICD-10-CM

## 2023-03-13 DIAGNOSIS — M79.672 PAIN IN BOTH FEET: Primary | ICD-10-CM

## 2023-04-06 RX ORDER — PANTOPRAZOLE SODIUM 40 MG/1
40 TABLET, DELAYED RELEASE ORAL DAILY
Qty: 30 TABLET | Refills: 0 | Status: SHIPPED | OUTPATIENT
Start: 2023-04-06 | End: 2023-07-03 | Stop reason: SDUPTHER

## 2023-04-24 ENCOUNTER — OFFICE VISIT (OUTPATIENT)
Dept: PODIATRY | Facility: CLINIC | Age: 73
End: 2023-04-24
Payer: MEDICARE

## 2023-04-24 ENCOUNTER — LAB VISIT (OUTPATIENT)
Dept: LAB | Facility: HOSPITAL | Age: 73
End: 2023-04-24
Attending: FAMILY MEDICINE
Payer: MEDICARE

## 2023-04-24 VITALS — WEIGHT: 142 LBS | HEIGHT: 69 IN | BODY MASS INDEX: 21.03 KG/M2

## 2023-04-24 DIAGNOSIS — L60.3 ONYCHODYSTROPHY: ICD-10-CM

## 2023-04-24 DIAGNOSIS — I69.359 HEMIPARESIS AFFECTING DOMINANT SIDE AS LATE EFFECT OF CEREBROVASCULAR ACCIDENT (CVA): ICD-10-CM

## 2023-04-24 DIAGNOSIS — E11.22 CONTROLLED TYPE 2 DIABETES MELLITUS WITH STAGE 3 CHRONIC KIDNEY DISEASE, WITHOUT LONG-TERM CURRENT USE OF INSULIN: ICD-10-CM

## 2023-04-24 DIAGNOSIS — N18.30 CONTROLLED TYPE 2 DIABETES MELLITUS WITH STAGE 3 CHRONIC KIDNEY DISEASE, WITHOUT LONG-TERM CURRENT USE OF INSULIN: Primary | ICD-10-CM

## 2023-04-24 DIAGNOSIS — N18.30 CONTROLLED TYPE 2 DIABETES MELLITUS WITH STAGE 3 CHRONIC KIDNEY DISEASE, WITHOUT LONG-TERM CURRENT USE OF INSULIN: ICD-10-CM

## 2023-04-24 DIAGNOSIS — M79.672 PAIN IN BOTH FEET: ICD-10-CM

## 2023-04-24 DIAGNOSIS — E11.22 CONTROLLED TYPE 2 DIABETES MELLITUS WITH STAGE 3 CHRONIC KIDNEY DISEASE, WITHOUT LONG-TERM CURRENT USE OF INSULIN: Primary | ICD-10-CM

## 2023-04-24 DIAGNOSIS — I69.30 HISTORY OF CVA WITH RESIDUAL DEFICIT: ICD-10-CM

## 2023-04-24 DIAGNOSIS — Z79.01 CHRONIC ANTICOAGULATION: ICD-10-CM

## 2023-04-24 DIAGNOSIS — M79.671 PAIN IN BOTH FEET: ICD-10-CM

## 2023-04-24 LAB
ALBUMIN SERPL BCP-MCNC: 3.6 G/DL (ref 3.5–5.2)
ALP SERPL-CCNC: 125 U/L (ref 55–135)
ALT SERPL W/O P-5'-P-CCNC: 18 U/L (ref 10–44)
ANION GAP SERPL CALC-SCNC: 10 MMOL/L (ref 8–16)
AST SERPL-CCNC: 31 U/L (ref 10–40)
BASOPHILS # BLD AUTO: 0.1 K/UL (ref 0–0.2)
BASOPHILS NFR BLD: 1.4 % (ref 0–1.9)
BILIRUB SERPL-MCNC: 0.2 MG/DL (ref 0.1–1)
BUN SERPL-MCNC: 22 MG/DL (ref 8–23)
CALCIUM SERPL-MCNC: 9.4 MG/DL (ref 8.7–10.5)
CHLORIDE SERPL-SCNC: 109 MMOL/L (ref 95–110)
CHOLEST SERPL-MCNC: 151 MG/DL (ref 120–199)
CHOLEST/HDLC SERPL: 4.9 {RATIO} (ref 2–5)
CO2 SERPL-SCNC: 23 MMOL/L (ref 23–29)
CREAT SERPL-MCNC: 1.7 MG/DL (ref 0.5–1.4)
DIFFERENTIAL METHOD: ABNORMAL
EOSINOPHIL # BLD AUTO: 1.1 K/UL (ref 0–0.5)
EOSINOPHIL NFR BLD: 15.2 % (ref 0–8)
ERYTHROCYTE [DISTWIDTH] IN BLOOD BY AUTOMATED COUNT: 15.9 % (ref 11.5–14.5)
EST. GFR  (NO RACE VARIABLE): 42.6 ML/MIN/1.73 M^2
ESTIMATED AVG GLUCOSE: 134 MG/DL (ref 68–131)
GLUCOSE SERPL-MCNC: 100 MG/DL (ref 70–110)
HBA1C MFR BLD: 6.3 % (ref 4–5.6)
HCT VFR BLD AUTO: 31.6 % (ref 40–54)
HDLC SERPL-MCNC: 31 MG/DL (ref 40–75)
HDLC SERPL: 20.5 % (ref 20–50)
HGB BLD-MCNC: 9.9 G/DL (ref 14–18)
IMM GRANULOCYTES # BLD AUTO: 0.01 K/UL (ref 0–0.04)
IMM GRANULOCYTES NFR BLD AUTO: 0.1 % (ref 0–0.5)
LDLC SERPL CALC-MCNC: 72.8 MG/DL (ref 63–159)
LYMPHOCYTES # BLD AUTO: 2.8 K/UL (ref 1–4.8)
LYMPHOCYTES NFR BLD: 37.7 % (ref 18–48)
MCH RBC QN AUTO: 30.9 PG (ref 27–31)
MCHC RBC AUTO-ENTMCNC: 31.3 G/DL (ref 32–36)
MCV RBC AUTO: 99 FL (ref 82–98)
MONOCYTES # BLD AUTO: 0.8 K/UL (ref 0.3–1)
MONOCYTES NFR BLD: 10.3 % (ref 4–15)
NEUTROPHILS # BLD AUTO: 2.6 K/UL (ref 1.8–7.7)
NEUTROPHILS NFR BLD: 35.3 % (ref 38–73)
NONHDLC SERPL-MCNC: 120 MG/DL
NRBC BLD-RTO: 0 /100 WBC
PLATELET # BLD AUTO: 230 K/UL (ref 150–450)
PMV BLD AUTO: 10.9 FL (ref 9.2–12.9)
POTASSIUM SERPL-SCNC: 5.2 MMOL/L (ref 3.5–5.1)
PROT SERPL-MCNC: 6.4 G/DL (ref 6–8.4)
RBC # BLD AUTO: 3.2 M/UL (ref 4.6–6.2)
SODIUM SERPL-SCNC: 142 MMOL/L (ref 136–145)
TRIGL SERPL-MCNC: 236 MG/DL (ref 30–150)
WBC # BLD AUTO: 7.35 K/UL (ref 3.9–12.7)

## 2023-04-24 PROCEDURE — 1160F RVW MEDS BY RX/DR IN RCRD: CPT | Mod: HCNC,CPTII,S$GLB, | Performed by: PODIATRIST

## 2023-04-24 PROCEDURE — 99203 PR OFFICE/OUTPT VISIT, NEW, LEVL III, 30-44 MIN: ICD-10-PCS | Mod: 25,HCNC,S$GLB, | Performed by: PODIATRIST

## 2023-04-24 PROCEDURE — 3008F PR BODY MASS INDEX (BMI) DOCUMENTED: ICD-10-PCS | Mod: HCNC,CPTII,S$GLB, | Performed by: PODIATRIST

## 2023-04-24 PROCEDURE — 3288F FALL RISK ASSESSMENT DOCD: CPT | Mod: HCNC,CPTII,S$GLB, | Performed by: PODIATRIST

## 2023-04-24 PROCEDURE — 11721 PR DEBRIDEMENT OF NAILS, 6 OR MORE: ICD-10-PCS | Mod: Q9,HCNC,S$GLB, | Performed by: PODIATRIST

## 2023-04-24 PROCEDURE — 1126F AMNT PAIN NOTED NONE PRSNT: CPT | Mod: HCNC,CPTII,S$GLB, | Performed by: PODIATRIST

## 2023-04-24 PROCEDURE — 80053 COMPREHEN METABOLIC PANEL: CPT | Mod: HCNC | Performed by: FAMILY MEDICINE

## 2023-04-24 PROCEDURE — 99999 PR PBB SHADOW E&M-EST. PATIENT-LVL III: ICD-10-PCS | Mod: PBBFAC,HCNC,, | Performed by: PODIATRIST

## 2023-04-24 PROCEDURE — 1160F PR REVIEW ALL MEDS BY PRESCRIBER/CLIN PHARMACIST DOCUMENTED: ICD-10-PCS | Mod: HCNC,CPTII,S$GLB, | Performed by: PODIATRIST

## 2023-04-24 PROCEDURE — 1101F PR PT FALLS ASSESS DOC 0-1 FALLS W/OUT INJ PAST YR: ICD-10-PCS | Mod: HCNC,CPTII,S$GLB, | Performed by: PODIATRIST

## 2023-04-24 PROCEDURE — 1159F MED LIST DOCD IN RCRD: CPT | Mod: HCNC,CPTII,S$GLB, | Performed by: PODIATRIST

## 2023-04-24 PROCEDURE — 1159F PR MEDICATION LIST DOCUMENTED IN MEDICAL RECORD: ICD-10-PCS | Mod: HCNC,CPTII,S$GLB, | Performed by: PODIATRIST

## 2023-04-24 PROCEDURE — 99203 OFFICE O/P NEW LOW 30 MIN: CPT | Mod: 25,HCNC,S$GLB, | Performed by: PODIATRIST

## 2023-04-24 PROCEDURE — 80061 LIPID PANEL: CPT | Mod: HCNC | Performed by: FAMILY MEDICINE

## 2023-04-24 PROCEDURE — 36415 COLL VENOUS BLD VENIPUNCTURE: CPT | Mod: HCNC | Performed by: FAMILY MEDICINE

## 2023-04-24 PROCEDURE — 3288F PR FALLS RISK ASSESSMENT DOCUMENTED: ICD-10-PCS | Mod: HCNC,CPTII,S$GLB, | Performed by: PODIATRIST

## 2023-04-24 PROCEDURE — 85025 COMPLETE CBC W/AUTO DIFF WBC: CPT | Mod: HCNC | Performed by: FAMILY MEDICINE

## 2023-04-24 PROCEDURE — 11721 DEBRIDE NAIL 6 OR MORE: CPT | Mod: Q9,HCNC,S$GLB, | Performed by: PODIATRIST

## 2023-04-24 PROCEDURE — 3008F BODY MASS INDEX DOCD: CPT | Mod: HCNC,CPTII,S$GLB, | Performed by: PODIATRIST

## 2023-04-24 PROCEDURE — 83036 HEMOGLOBIN GLYCOSYLATED A1C: CPT | Mod: HCNC | Performed by: FAMILY MEDICINE

## 2023-04-24 PROCEDURE — 1101F PT FALLS ASSESS-DOCD LE1/YR: CPT | Mod: HCNC,CPTII,S$GLB, | Performed by: PODIATRIST

## 2023-04-24 PROCEDURE — 1126F PR PAIN SEVERITY QUANTIFIED, NO PAIN PRESENT: ICD-10-PCS | Mod: HCNC,CPTII,S$GLB, | Performed by: PODIATRIST

## 2023-04-24 PROCEDURE — 99999 PR PBB SHADOW E&M-EST. PATIENT-LVL III: CPT | Mod: PBBFAC,HCNC,, | Performed by: PODIATRIST

## 2023-04-24 NOTE — PROGRESS NOTES
Subjective:       Patient ID: Samy Alejandre Jr. is a 73 y.o. male.    Chief Complaint: Nail Care (C/o fungus on toenails, b/l, pt would like toenails clipped, diabetic, wears tennis and socks, last seen PCP Dr. Barron on 07/06/22)      HPI: Samy Alejandre Jr. presents to the office today, under referral by , Damien Barron MD, for his annual diabetic foot assessment and risk evaluation.  Patient is a DMII. This patient last saw his/her internal/family medicine physician on 07/06/2022 and next appointment on 05/17/2023.  Presents to clinic today with his daughter present.  Currently on long-term anticoagulation therapy secondary to history of CVA and hemiparesis effects the CVA    Hemoglobin A1C   Date Value Ref Range Status   11/17/2022 5.7 (H) 4.0 - 5.6 % Final     Comment:     ADA Screening Guidelines:  5.7-6.4%  Consistent with prediabetes  >or=6.5%  Consistent with diabetes    High levels of fetal hemoglobin interfere with the HbA1C  assay. Heterozygous hemoglobin variants (HbS, HgC, etc)do  not significantly interfere with this assay.   However, presence of multiple variants may affect accuracy.     06/23/2022 5.3 4.0 - 5.6 % Final     Comment:     ADA Screening Guidelines:  5.7-6.4%  Consistent with prediabetes  >or=6.5%  Consistent with diabetes    High levels of fetal hemoglobin interfere with the HbA1C  assay. Heterozygous hemoglobin variants (HbS, HgC, etc)do  not significantly interfere with this assay.   However, presence of multiple variants may affect accuracy.     04/27/2022 5.1 4.0 - 5.6 % Final     Comment:     ADA Screening Guidelines:  5.7-6.4%  Consistent with prediabetes  >or=6.5%  Consistent with diabetes    High levels of fetal hemoglobin interfere with the HbA1C  assay. Heterozygous hemoglobin variants (HbS, HgC, etc)do  not significantly interfere with this assay.   However, presence of multiple variants may affect accuracy.     .    Review of patient's allergies indicates:   Allergen  "Reactions    Chantix [varenicline] Other (See Comments)     Felt bad       Past Medical History:   Diagnosis Date    Anxiety     Depression     Diabetes mellitus, type 2     Hypertension     Stroke        Family History   Problem Relation Age of Onset    Heart disease Mother     Stroke Mother     Cancer Father     COPD Sister        Social History     Socioeconomic History    Marital status:    Tobacco Use    Smoking status: Every Day     Packs/day: 1.00     Types: Cigarettes    Smokeless tobacco: Current     Types: Snuff   Substance and Sexual Activity    Alcohol use: Yes     Alcohol/week: 1.0 standard drink     Types: 1 Shots of liquor per week     Comment: Daily    Drug use: Never    Sexual activity: Not Currently       Past Surgical History:   Procedure Laterality Date    ANGIOGRAPHY OF LOWER EXTREMITY N/A 12/21/2020    Procedure: ANGIOGRAM, LOWER EXTREMITY/Rt leg poss pta/stent;  Surgeon: Todd Michel MD;  Location: [facility] CATH LAB;  Service: Peripheral Vascular;  Laterality: N/A;  rescheduled 12/8    BACK SURGERY  2015    BRONCHOSCOPY Left 3/24/2022    Procedure: Bronchoscopy;  Surgeon: Edward Williamson MD;  Location: [facility] ENDO;  Service: Pulmonary;  Laterality: Left;  poss endobronchial biopsy or brushing    CATARACT EXTRACTION      ESOPHAGOGASTRODUODENOSCOPY N/A 8/24/2022    Procedure: EGD (ESOPHAGOGASTRODUODENOSCOPY);  Surgeon: Ana Gomez MD;  Location: [facility];  Service: Endoscopy;  Laterality: N/A;    ESOPHAGOGASTRODUODENOSCOPY N/A 9/14/2022    Procedure: EGD (ESOPHAGOGASTRODUODENOSCOPY);  Surgeon: Ana Gomez MD;  Location: [facility];  Service: Endoscopy;  Laterality: N/A;    KNEE SURGERY  2012    LITHOTRIPSY  2000       Review of Systems        Objective:   Ht 5' 9" (1.753 m)   Wt 64.4 kg (142 lb)   BMI 20.97 kg/m²     Physical Exam  LOWER EXTREMITY PHYSICAL EXAMINATION    ORTHOPEDIC:  No gross or structural anatomic deformity noted to the right foot left foot.  Ambulating " with a nonantalgic gait.    VASCULAR:  The right dorsalis pedis pulse 2/4 and the right posterior tibial pulse 1/4.  The left dorsalis pedis pulse 2/4 and posterior tibial pulse on the left is 1/4.  Capillary refill is intact.  Pedal hair growth decreased.     NEUROLOGY:  Protective sensation is not intact to the right left foot.  Vibratory sensation is diminished.  Proprioception is intact.  Sharp/dull is reduced.    DERMATOLOGY:  Skin is supple, moist, intact.  There is no signs of callusing, ulcerations, other lesions identified to the dorsal or plantar aspect of the right or left foot.  The R1, 2, 5 and left L1,2, 5 are thickened, discolored dystrophic.  There is subungual debris.  Nail plates have area of dark discoloration.  The remaining nails 3-4 on the right foot and the left foot are elongated but of normal color, thickness, and texture.   There is no signs of ingrowing into the medial or lateral borders.  There is no evidence of wounds or skin breakdown.  No edema or erythema.  No obvious lacerations or fissuring.  Interdigital spaces are clean, dry, intact.  No rashes or scars appreciated.    Assessment:     1. Controlled type 2 diabetes mellitus with stage 3 chronic kidney disease, without long-term current use of insulin    2. Chronic anticoagulation    3. Pain in both feet    4. Hemiparesis affecting dominant side as late effect of cerebrovascular accident (CVA)    5. History of CVA with residual deficit    6. Onychodystrophy        Plan:     Controlled type 2 diabetes mellitus with stage 3 chronic kidney disease, without long-term current use of insulin    Chronic anticoagulation    Pain in both feet  -     Ambulatory referral/consult to Podiatry    Hemiparesis affecting dominant side as late effect of cerebrovascular accident (CVA)    History of CVA with residual deficit    Onychodystrophy      I counseled the patient on his/her Diabetic Mellitus regarding today's clinical examination and objection  findings. We did also discuss recent medication changes, pertinent labs and imaging evaluations and other medical consultation notes and progress notes. Greater than 50% of this visit was spent on counseling and coordination of care. Greater than 20 minutes of this appt. was spent on education about the diabetic foot, in relation to PVD and/or neuropathy, and the prevention of limb loss.     Shoe gear is inspected and wear and proper fit/type. Patient is reminded of the importance of good nutrition and blood sugar control to help prevent podiatric complications of diabetes. Patient instructed on proper foot hygeine. We discussed wearing proper shoe gear, daily foot inspections, never walking without protective shoe gear, never putting sharp instruments to feet.  Patient  will continue to monitor the areas daily, inspect feet, wear protective shoe gear when ambulatory, moisturizer to maintain skin integrity.     Patient's DMI/DMII is managed by Internal/Family Medicine Physician and/or Endocrinology Advanced Practice Provider.    Dystrophic nail plates, as outlined above (R#1,2,5  ; L#1,2,5 ), are sharply debrided with double action nail nipper, and/or with the assistance of a mechanical rotary debbie, with removal of all offending nail and nail border(s), for reduction of pains. Nails are reduced in terms of length, width and girth with removal of subungual debris to facilitate pain free weight bearing and ambulation. The elongated nails as outlined in the objective portion of this note, were trimmed to appropriate length, with a double action nail nipper, for alleviation/reduction of pains as well. Follow up in approx. 3-4 months.

## 2023-04-25 ENCOUNTER — TELEPHONE (OUTPATIENT)
Dept: FAMILY MEDICINE | Facility: CLINIC | Age: 73
End: 2023-04-25
Payer: MEDICARE

## 2023-05-05 RX ORDER — OXYBUTYNIN CHLORIDE 10 MG/1
TABLET, EXTENDED RELEASE ORAL
Qty: 90 TABLET | Refills: 0 | Status: SHIPPED | OUTPATIENT
Start: 2023-05-05 | End: 2023-05-31

## 2023-05-05 NOTE — TELEPHONE ENCOUNTER
No care due was identified.  [facility] Embedded Care Due Messages. Reference number: [id number].   5/04/2023 7:20:34 PM CDT

## 2023-05-17 ENCOUNTER — PES CALL (OUTPATIENT)
Dept: ADMINISTRATIVE | Facility: CLINIC | Age: 73
End: 2023-05-17
Payer: MEDICARE

## 2023-05-17 ENCOUNTER — PATIENT MESSAGE (OUTPATIENT)
Dept: ADMINISTRATIVE | Facility: CLINIC | Age: 73
End: 2023-05-17
Payer: MEDICARE

## 2023-05-30 NOTE — TELEPHONE ENCOUNTER
No care due was identified.  [facility] Embedded Care Due Messages. Reference number: [id number].   5/30/2023 5:59:49 PM CDT

## 2023-05-31 RX ORDER — OXYBUTYNIN CHLORIDE 10 MG/1
TABLET, EXTENDED RELEASE ORAL
Qty: 90 TABLET | Refills: 1 | Status: ON HOLD | OUTPATIENT
Start: 2023-05-31 | End: 2023-10-09 | Stop reason: HOSPADM

## 2023-06-09 DIAGNOSIS — M25.551 RIGHT HIP PAIN: Primary | ICD-10-CM

## 2023-06-09 RX ORDER — QUETIAPINE FUMARATE 50 MG/1
TABLET, FILM COATED ORAL
Qty: 60 TABLET | Refills: 0 | Status: SHIPPED | OUTPATIENT
Start: 2023-06-09 | End: 2023-07-06

## 2023-06-09 NOTE — TELEPHONE ENCOUNTER
Care Due:                  Date            Visit Type   Department     Provider  --------------------------------------------------------------------------------                                EP -                              PRIMARY      [facility]  Last Visit: 07-      CARE ([facility])   MEDICINE       Damien Barron  Next Visit: None Scheduled  None         None Found                                                            Last  Test          Frequency    Reason                     Performed    Due Date  --------------------------------------------------------------------------------    Office Visit  12 months..  DULoxetine, FLUoxetine,    07- 07-                             rosuvastatin, tamsulosin.    [facility] Embedded Care Due Messages. Reference number: [id number].   6/09/2023 3:05:48 PM CDT

## 2023-06-10 RX ORDER — PANTOPRAZOLE SODIUM 40 MG/1
TABLET, DELAYED RELEASE ORAL
Qty: 30 TABLET | Refills: 0 | Status: SHIPPED | OUTPATIENT
Start: 2023-06-10 | End: 2023-07-03

## 2023-06-14 DIAGNOSIS — E78.2 MIXED HYPERLIPIDEMIA: Primary | ICD-10-CM

## 2023-06-14 RX ORDER — ROSUVASTATIN CALCIUM 20 MG/1
TABLET, COATED ORAL
Qty: 90 TABLET | Refills: 1 | Status: SHIPPED | OUTPATIENT
Start: 2023-06-14 | End: 2023-12-11

## 2023-06-14 NOTE — TELEPHONE ENCOUNTER
No care due was identified.  [facility] Embedded Care Due Messages. Reference number: [id number].   6/14/2023 5:32:17 AM CDT

## 2023-06-22 DIAGNOSIS — F02.80 MIXED ALZHEIMER'S AND VASCULAR DEMENTIA: ICD-10-CM

## 2023-06-22 DIAGNOSIS — F01.50 MIXED ALZHEIMER'S AND VASCULAR DEMENTIA: ICD-10-CM

## 2023-06-22 DIAGNOSIS — G30.9 MIXED ALZHEIMER'S AND VASCULAR DEMENTIA: ICD-10-CM

## 2023-06-22 RX ORDER — MEMANTINE HYDROCHLORIDE 10 MG/1
TABLET ORAL
Qty: 180 TABLET | Refills: 3 | Status: SHIPPED | OUTPATIENT
Start: 2023-06-22

## 2023-06-26 DIAGNOSIS — N40.0 BENIGN PROSTATIC HYPERPLASIA WITHOUT LOWER URINARY TRACT SYMPTOMS: ICD-10-CM

## 2023-06-26 RX ORDER — TAMSULOSIN HYDROCHLORIDE 0.4 MG/1
CAPSULE ORAL
Qty: 90 CAPSULE | Refills: 0 | Status: SHIPPED | OUTPATIENT
Start: 2023-06-26 | End: 2023-09-28

## 2023-06-26 NOTE — TELEPHONE ENCOUNTER
No care due was identified.  [facility] Embedded Care Due Messages. Reference number: [id number].   6/26/2023 10:59:34 AM CDT

## 2023-07-03 ENCOUNTER — PATIENT MESSAGE (OUTPATIENT)
Dept: HEPATOLOGY | Facility: CLINIC | Age: 73
End: 2023-07-03
Payer: MEDICARE

## 2023-07-03 RX ORDER — PANTOPRAZOLE SODIUM 40 MG/1
TABLET, DELAYED RELEASE ORAL
Qty: 30 TABLET | Refills: 0 | Status: SHIPPED | OUTPATIENT
Start: 2023-07-03 | End: 2023-10-02

## 2023-07-03 RX ORDER — PANTOPRAZOLE SODIUM 40 MG/1
40 TABLET, DELAYED RELEASE ORAL DAILY
Qty: 30 TABLET | Refills: 6 | Status: SHIPPED | OUTPATIENT
Start: 2023-07-03 | End: 2024-02-14 | Stop reason: SDUPTHER

## 2023-07-06 DIAGNOSIS — M25.551 RIGHT HIP PAIN: ICD-10-CM

## 2023-07-06 RX ORDER — QUETIAPINE FUMARATE 50 MG/1
TABLET, FILM COATED ORAL
Qty: 60 TABLET | Refills: 0 | Status: SHIPPED | OUTPATIENT
Start: 2023-07-06 | End: 2023-08-09

## 2023-07-06 NOTE — TELEPHONE ENCOUNTER
No care due was identified.  [facility] Embedded Care Due Messages. Reference number: [id number].   7/06/2023 9:22:52 AM CDT

## 2023-07-11 ENCOUNTER — OFFICE VISIT (OUTPATIENT)
Dept: NEUROLOGY | Facility: CLINIC | Age: 73
End: 2023-07-11
Payer: MEDICARE

## 2023-07-11 DIAGNOSIS — Z91.89 AT RISK FOR PROLONGED QT INTERVAL SYNDROME: ICD-10-CM

## 2023-07-11 DIAGNOSIS — I69.359 HEMIPARESIS AFFECTING DOMINANT SIDE AS LATE EFFECT OF CEREBROVASCULAR ACCIDENT (CVA): ICD-10-CM

## 2023-07-11 DIAGNOSIS — F02.80 MIXED ALZHEIMER'S AND VASCULAR DEMENTIA: Primary | ICD-10-CM

## 2023-07-11 DIAGNOSIS — I69.30 HISTORY OF CVA WITH RESIDUAL DEFICIT: ICD-10-CM

## 2023-07-11 DIAGNOSIS — G30.9 MIXED ALZHEIMER'S AND VASCULAR DEMENTIA: Primary | ICD-10-CM

## 2023-07-11 DIAGNOSIS — G47.00 INSOMNIA, UNSPECIFIED TYPE: ICD-10-CM

## 2023-07-11 DIAGNOSIS — F01.50 MIXED ALZHEIMER'S AND VASCULAR DEMENTIA: Primary | ICD-10-CM

## 2023-07-11 PROCEDURE — 1159F PR MEDICATION LIST DOCUMENTED IN MEDICAL RECORD: ICD-10-PCS | Mod: HCNC,CPTII,95, | Performed by: NURSE PRACTITIONER

## 2023-07-11 PROCEDURE — 3044F PR MOST RECENT HEMOGLOBIN A1C LEVEL <7.0%: ICD-10-PCS | Mod: HCNC,CPTII,95, | Performed by: NURSE PRACTITIONER

## 2023-07-11 PROCEDURE — 3288F FALL RISK ASSESSMENT DOCD: CPT | Mod: HCNC,CPTII,95, | Performed by: NURSE PRACTITIONER

## 2023-07-11 PROCEDURE — 1159F MED LIST DOCD IN RCRD: CPT | Mod: HCNC,CPTII,95, | Performed by: NURSE PRACTITIONER

## 2023-07-11 PROCEDURE — 99215 OFFICE O/P EST HI 40 MIN: CPT | Mod: HCNC,95,, | Performed by: NURSE PRACTITIONER

## 2023-07-11 PROCEDURE — 1160F RVW MEDS BY RX/DR IN RCRD: CPT | Mod: HCNC,CPTII,95, | Performed by: NURSE PRACTITIONER

## 2023-07-11 PROCEDURE — 3288F PR FALLS RISK ASSESSMENT DOCUMENTED: ICD-10-PCS | Mod: HCNC,CPTII,95, | Performed by: NURSE PRACTITIONER

## 2023-07-11 PROCEDURE — 99215 PR OFFICE/OUTPT VISIT, EST, LEVL V, 40-54 MIN: ICD-10-PCS | Mod: HCNC,95,, | Performed by: NURSE PRACTITIONER

## 2023-07-11 PROCEDURE — 1101F PT FALLS ASSESS-DOCD LE1/YR: CPT | Mod: HCNC,CPTII,95, | Performed by: NURSE PRACTITIONER

## 2023-07-11 PROCEDURE — 1160F PR REVIEW ALL MEDS BY PRESCRIBER/CLIN PHARMACIST DOCUMENTED: ICD-10-PCS | Mod: HCNC,CPTII,95, | Performed by: NURSE PRACTITIONER

## 2023-07-11 PROCEDURE — 3044F HG A1C LEVEL LT 7.0%: CPT | Mod: HCNC,CPTII,95, | Performed by: NURSE PRACTITIONER

## 2023-07-11 PROCEDURE — 1101F PR PT FALLS ASSESS DOC 0-1 FALLS W/OUT INJ PAST YR: ICD-10-PCS | Mod: HCNC,CPTII,95, | Performed by: NURSE PRACTITIONER

## 2023-07-11 NOTE — PROGRESS NOTES
Subjective:       Patient ID: Samy Alejandre Jr. is a 73 y.o. male.    Chief Complaint: MIxed Alzherimer's and vascular dementia           HPI    The patient is here for memory loss.       The patient is presenting with 3-year history of noticeable memory loss (2019). The patient is accompanied by his daughter (Jayshree). The main problems the patient has are related to progressive short term memory loss. He is unable to operate a cell phone and a remote anymore. The patient is not driving. The patient lives with his daughter right now. No confusion around and inside the house. Does have significant trouble remembering the date and time, keeping up with medications and appointments and keeping up with major holidays and political changes. The patient is dependent in handling finances. The patient is becoming more dependent with ADLs. In  the patient developed sudden change in behavior that was very bizarre and lasted for few weeks. Now, he is back to baseline with no agitation or aggression,  hallucinations or delusions.. No language problems. The patient suffered 3 strokes (last one in ). He had 3 seizures so far (childhood, adulthood and middle age).  No history of  severe headaches. No history of hypothyroidism. No history of alcoholism (young onset or old onset). No history of B12 deficiency.Positive history of depression and insomnia. Did not tolerate Trazodone and is developing rapid tolerance to Seroquel. He has lost significant weight provoked by esophageal stricture which has improved . No history of bipolar disorder. No history of Untreated Syphilis.  No history of HIV infection. No toxic exposures.  No history of traumatic brain injury. No tremors or abnormal movements. His gait has declined with high risk of falls. No urinary incontinence. No change in sleep and appetite. Mother had dementia. Today (12-) MOCA 13/30.        01-: Patient new to me but known to Dr. Ken. patient  present for Memory management and results.   Patient tolerating Namenda 10 mg BID. No adverse side effects. Daughter reports medication is helping she states patient is less repetitive. Discussed plan of care Will start Aricept 10 mg po HS slow titration.  Assess EKG 4 week after starting treatment to assess for QT interval. Patient daughter verbalized understanding.     Patient had 2 weeks of interrupted sleep disturbance but now has improved with Seroquel 100 mg po HS work well no adverse side effects.     Work up discussed: 01- Brain MRI Unremarkable. 01- EEG NL. AEEG pending for 02-. Patient daughter denies recent seizure        INTERVAL HISTORY  07-: Patient tolerating Aricept 10 mg po HS and tolerating Namenda 10 mg BID. No adverse side effects. Daughter reports no significant cognitive or behavioral changes noted .     Patient tolerating Seroquel 100 mg po HS work well no adverse side effects.     AEEG pending no seizures noted.       The originating site (patient location) is: Home.        The distant site (neurologist location) is: [facility] at [facility].        The chief complaint leading to consultation is: F/U Memory deficit       Visit type: Virtual visit with synchronous audio and video.        Total time spent with patient: 30 minutes         Special circumstances: This visit occurred during COVID-19 Pandemic Public Health Emergency.         Consent: The patient verbally consented to participating in the video visit and informed that may decline to receive medical services by telemedicine and may withdraw from such care at any time.        I discussed with the patient the nature of our telemedicine visits, that:        I  would evaluate the patient and recommend diagnostics and treatments based on my assessment.     Our sessions are not being recorded and that personal health information is protected.     Our team would provide follow up care in person  if/when the patient needs it.     Virtual (video/telemedicine) visits have significant limitations. A telemedicine exam is primarily focused on the history and what I can observe. Several critical parts of the neurological exam cannot be performed.       Review of Systems   Constitutional:  Positive for activity change, fatigue and unexpected weight change. Negative for appetite change.   HENT:  Negative for hearing loss and tinnitus.    Eyes:  Negative for photophobia and visual disturbance.   Respiratory:  Negative for apnea and shortness of breath.    Cardiovascular:  Negative for chest pain and palpitations.   Gastrointestinal:  Negative for nausea and vomiting.   Endocrine: Negative for cold intolerance and heat intolerance.   Genitourinary:  Negative for difficulty urinating and urgency.   Musculoskeletal:  Positive for back pain and gait problem. Negative for arthralgias, joint swelling, myalgias, neck pain and neck stiffness.   Skin:  Negative for color change and rash.   Allergic/Immunologic: Negative for environmental allergies and immunocompromised state.   Neurological:  Positive for numbness. Negative for dizziness, tremors, seizures, syncope, facial asymmetry, speech difficulty, weakness, light-headedness and headaches.   Hematological:  Negative for adenopathy. Does not bruise/bleed easily.   Psychiatric/Behavioral:  Positive for behavioral problems, confusion, decreased concentration and dysphoric mood. Negative for agitation, hallucinations, self-injury, sleep disturbance and suicidal ideas. The patient is nervous/anxious. The patient is not hyperactive.                Current Outpatient Medications:     blood glucose control, low (TRUE METRIX LEVEL 1) Soln, 1 drop by Misc.(Non-Drug; Combo Route) route once daily., Disp: 1 each, Rfl: 4    blood glucose control, normal (TRUE METRIX LEVEL 2) Soln, 1 drop by Misc.(Non-Drug; Combo Route) route once daily., Disp: 1 each, Rfl: 4    donepeziL (ARICEPT) 10  MG tablet, Take 1 tablet (10 mg total) by mouth every evening., Disp: 30 tablet, Rfl: 11    DULoxetine (CYMBALTA) 30 MG capsule, Take 1 capsule (30 mg total) by mouth once daily., Disp: 90 capsule, Rfl: 3    ELIQUIS 5 mg Tab, Take 1 tablet by mouth twice daily (Patient taking differently: 5 mg daily as needed.), Disp: 148 tablet, Rfl: 0    FLUoxetine 20 MG capsule, Take 1 capsule by mouth once daily, Disp: 90 capsule, Rfl: 0    HYDROcodone-acetaminophen (NORCO) 5-325 mg per tablet, Take 1 tablet by mouth 3 (three) times daily. Increased to 7.5 3x day, Disp: , Rfl:     lancets 32 gauge Misc, 1 lancet by Misc.(Non-Drug; Combo Route) route once daily., Disp: 30 each, Rfl: 4    memantine (NAMENDA) 10 MG Tab, TAKE 1/2 TABLET BY MOUTH TWICE DAILY FOR 1 WEEK, THEN 1 TABLET BY MOUTH TWICE DAILY, Disp: 180 tablet, Rfl: 3    ondansetron (ZOFRAN) 8 MG tablet, Take 1 tablet (8 mg total) by mouth every 8 (eight) hours as needed for Nausea., Disp: 30 tablet, Rfl: 1    oxybutynin (DITROPAN-XL) 10 MG 24 hr tablet, Take 1 tablet by mouth once daily, Disp: 90 tablet, Rfl: 1    pantoprazole (PROTONIX) 40 MG tablet, Take 1 tablet by mouth once daily, Disp: 30 tablet, Rfl: 0    pantoprazole (PROTONIX) 40 MG tablet, Take 1 tablet (40 mg total) by mouth once daily., Disp: 30 tablet, Rfl: 6    QUEtiapine (SEROQUEL) 25 MG Tab, Take 1 tablet (25 mg total) by mouth 2 (two) times daily. (Patient taking differently: Take 50 mg by mouth once daily.), Disp: 60 tablet, Rfl: 11    QUEtiapine (SEROQUEL) 50 MG tablet, Take 1 tablet by mouth twice daily, Disp: 60 tablet, Rfl: 0    rosuvastatin (CRESTOR) 20 MG tablet, Take 1 tablet by mouth once daily, Disp: 90 tablet, Rfl: 1    tamsulosin (FLOMAX) 0.4 mg Cap, Take 1 capsule by mouth once daily, Disp: 90 capsule, Rfl: 0    tiZANidine (ZANAFLEX) 4 MG tablet, Take 1 tablet by mouth three times daily as needed, Disp: 270 tablet, Rfl: 0    walker (ULTRA-LIGHT ROLLATOR) Misc, Use daily for ambulation, Disp:  1 each, Rfl: 0  Past Medical History:   Diagnosis Date    Anxiety     Depression     Diabetes mellitus, type 2     Hypertension     Stroke      Past Surgical History:   Procedure Laterality Date    ANGIOGRAPHY OF LOWER EXTREMITY N/A 12/21/2020    Procedure: ANGIOGRAM, LOWER EXTREMITY/Rt leg poss pta/stent;  Surgeon: Todd Michel MD;  Location: [facility] CATH LAB;  Service: Peripheral Vascular;  Laterality: N/A;  rescheduled 12/8    BACK SURGERY  2015    BRONCHOSCOPY Left 3/24/2022    Procedure: Bronchoscopy;  Surgeon: Edward Williamson MD;  Location: [facility];  Service: Pulmonary;  Laterality: Left;  poss endobronchial biopsy or brushing    CATARACT EXTRACTION      ESOPHAGOGASTRODUODENOSCOPY N/A 8/24/2022    Procedure: EGD (ESOPHAGOGASTRODUODENOSCOPY);  Surgeon: Ana Gomez MD;  Location: [facility];  Service: Endoscopy;  Laterality: N/A;    ESOPHAGOGASTRODUODENOSCOPY N/A 9/14/2022    Procedure: EGD (ESOPHAGOGASTRODUODENOSCOPY);  Surgeon: Ana Gomez MD;  Location: [facility];  Service: Endoscopy;  Laterality: N/A;    KNEE SURGERY  2012    LITHOTRIPSY  2000     Social History     Socioeconomic History    Marital status:    Tobacco Use    Smoking status: Every Day     Packs/day: 1.00     Types: Cigarettes    Smokeless tobacco: Current     Types: Snuff   Substance and Sexual Activity    Alcohol use: Yes     Alcohol/week: 1.0 standard drink     Types: 1 Shots of liquor per week     Comment: Daily    Drug use: Never    Sexual activity: Not Currently             Past/Current Medical/Surgical History, Past/Current Social History, Past/Current Family History and Past/Current Medications were reviewed in detail.        Objective:           VITAL SIGNS WERE REVIEWED      GENERAL APPEARANCE:     The patient looks cachectic     BMI 19.14    No signs of respiratory distress.    Normal breathing pattern.    No dysmorphic features    Normal eye contact.     GENERAL MEDICAL EXAM:    HEENT:  Head is atraumatic  normocephalic. Fundoscopic (Ophthalmoscopic) exam showed no disc edema.      Neck and Axillae: No JVD. No visible lesions.    Cardiopulmonary: No cyanosis. No tachypnea. Normal respiratory effort.    Gastrointestinal/Urogenital:  No jaundice. No stomas or lesions. No visible hernias. No catheters.     Skin, Hair and Nails: No pathognonomic skin rash. No neurofibromatosis. No visible lesions.No stigmata of autoimmune disease. No clubbing.    Limbs: No varicose veins. No visible swelling. Diffuse muscle atrophy.     Muskoskeletal: OA visible deformities.No visible lesions.           Neurologic Exam     Mental Status   Oriented to person.   Oriented to place.   Disoriented to time.   Follows 2 step commands.   Attention: decreased. Concentration: decreased.   Speech: speech is normal   Level of consciousness: alert  Able to name object. Able to repeat. Normal comprehension.       Prominent Psychomotor Slowing      Cranial Nerves   Cranial nerves II through XII intact.     CN II   Visual fields full to confrontation.   Visual acuity: normal  Right visual field deficit: none  Left visual field deficit: none     CN III, IV, VI   Pupils are equal, round, and reactive to light.  Extraocular motions are normal.   Right pupil: Size: 2 mm. Shape: regular. Reactivity: brisk. Consensual response: intact. Accommodation: intact.   Left pupil: Size: 2 mm. Shape: regular. Reactivity: brisk. Consensual response: intact. Accommodation: intact.   CN III: no CN III palsy  CN VI: no CN VI palsy  Nystagmus: none   Diplopia: none  Ophthalmoparesis: none  Upgaze: normal  Downgaze: normal  Conjugate gaze: present  Vestibulo-ocular reflex: present    CN V   Facial sensation intact.   Right facial sensation deficit: none  Left facial sensation deficit: none  Jaw jerk: normal    CN VII   Facial expression full, symmetric.   Right facial weakness: none  Left facial weakness: none    CN VIII   CN VIII normal.   Hearing: intact    CN IX, X   CN IX  normal.   CN X normal.   Palate: symmetric    CN XI   CN XI normal.   Right sternocleidomastoid strength: normal  Left sternocleidomastoid strength: normal  Right trapezius strength: normal  Left trapezius strength: normal    CN XII   CN XII normal.   Tongue: not atrophic  Fasciculations: absent  Tongue deviation: none    Motor Exam   Muscle bulk: decreased  Right arm tone: spastic  Left arm tone: spastic  Right leg tone: spastic  Left leg tone: spastic    Strength   Right neck flexion: 5/5  Left neck flexion: 5/5  Right neck extension: 5/5  Left neck extension: 5/5  Right deltoid: 4/5  Left deltoid: 4/5  Right biceps: 4/5  Left biceps: 4/5  Right triceps: 4/5  Left triceps: 4/5  Right wrist flexion: 4/5  Left wrist flexion: 4/5  Right wrist extension: 4/5  Left wrist extension: 4/5  Right interossei: 4/5  Left interossei: 4/5  Right iliopsoas: 4/5  Left iliopsoas: 4/5  Right quadriceps: 4/5  Left quadriceps: 4/5  Right hamstrin/5  Left hamstrin/5  Right glutei: 4/5  Left glutei: 4/5  Right anterior tibial: 4/5  Left anterior tibial: 4/5  Right posterior tibial: 4/5  Left posterior tibial: 4/5  Right peroneal: 4/5  Left peroneal: 4/5  Right gastroc: 4/5  Left gastroc: 4/5    RUE RLE 4/5    LUE LLE 4+/5      Sensory Exam   Right arm light touch: decreased from fingers  Left arm light touch: decreased from fingers  Right leg light touch: decreased from ankle  Left leg light touch: decreased from ankle  Right arm vibration: normal  Left arm vibration: normal  Right leg vibration: decreased from toes  Left leg vibration: decreased from toes  Right arm proprioception: normal  Left arm proprioception: normal  Right leg proprioception: decreased from toes  Left leg proprioception: decreased from toes  Right arm pinprick: decreased from fingers  Left arm pinprick: decreased from fingers  Right leg pinprick: decreased from ankle  Left leg pinprick: decreased from ankle  Graphesthesia: normal  Stereognosis:  normal    Gait, Coordination, and Reflexes     Gait  Gait: spastic and wide-based    Coordination   Finger to nose coordination: normal  Heel to shin coordination: normal    Tremor   Resting tremor: absent  Intention tremor: absent  Action tremor: absent    Reflexes   Right brachioradialis: 1+  Left brachioradialis: 1+  Right biceps: 1+  Left biceps: 1+  Right triceps: 1+  Left triceps: 1+  Right patellar: 3+  Left patellar: 3+  Right achilles: 2+  Left achilles: 2+  Right plantar: equivocal  Left plantar: equivocal  Right Suárez: absent  Left Suárez: absent  Right ankle clonus: absent  Left ankle clonus: absent  Right pendular knee jerk: absent  Left pendular knee jerk: absent    Lab Results   Component Value Date    WBC 7.35 04/24/2023    HGB 9.9 (L) 04/24/2023    HCT 31.6 (L) 04/24/2023    MCV 99 (H) 04/24/2023     04/24/2023     Sodium   Date Value Ref Range Status   04/24/2023 142 136 - 145 mmol/L Final     Potassium   Date Value Ref Range Status   04/24/2023 5.2 (H) 3.5 - 5.1 mmol/L Final     Chloride   Date Value Ref Range Status   04/24/2023 109 95 - 110 mmol/L Final     CO2   Date Value Ref Range Status   04/24/2023 23 23 - 29 mmol/L Final     Glucose   Date Value Ref Range Status   04/24/2023 100 70 - 110 mg/dL Final     BUN   Date Value Ref Range Status   04/24/2023 22 8 - 23 mg/dL Final     Creatinine   Date Value Ref Range Status   04/24/2023 1.7 (H) 0.5 - 1.4 mg/dL Final     Calcium   Date Value Ref Range Status   04/24/2023 9.4 8.7 - 10.5 mg/dL Final     Total Protein   Date Value Ref Range Status   04/24/2023 6.4 6.0 - 8.4 g/dL Final     Albumin   Date Value Ref Range Status   04/24/2023 3.6 3.5 - 5.2 g/dL Final     Total Bilirubin   Date Value Ref Range Status   04/24/2023 0.2 0.1 - 1.0 mg/dL Final     Comment:     For infants and newborns, interpretation of results should be based  on gestational age, weight and in agreement with clinical  observations.    Premature Infant recommended  reference ranges:  Up to 24 hours.............<8.0 mg/dL  Up to 48 hours............<12.0 mg/dL  3-5 days..................<15.0 mg/dL  6-29 days.................<15.0 mg/dL       Alkaline Phosphatase   Date Value Ref Range Status   2023 125 55 - 135 U/L Final     AST   Date Value Ref Range Status   2023 31 10 - 40 U/L Final     ALT   Date Value Ref Range Status   2023 18 10 - 44 U/L Final     Anion Gap   Date Value Ref Range Status   2023 10 8 - 16 mmol/L Final     eGFR if    Date Value Ref Range Status   2022 >60 >60 mL/min/1.73 m^2 Final     eGFR if non    Date Value Ref Range Status   2022 >60 >60 mL/min/1.73 m^2 Final     Comment:     Calculation used to obtain the estimated glomerular filtration  rate (eGFR) is the CKD-EPI equation.        Lab Results   Component Value Date    [id number] 648 2020     Lab Results   Component Value Date    TSH 2.724 2022     EKG Results:     2023    QT Interval 430, HR 64 NL     2020    B12 NL      2022    TSH NL      2022    CTH Atrophy        2023    Brain MRI Unremarkable.Atrophy        2023    EEG NL        SYLVESTER COGNITIVE ASSESSMENT (MOCA) TOTAL SCORE 30         NORMAL-MILD NCD 26-30    MILD DEMENTIA 20-25    MODERATE DEMENTIA 10-19    SEVERE DEMENTIA <10        DATE 2022       TOTAL SCORE 13       VISUOSPATIAL EXECUTIVE (5) 1       NAMING (3) 3       ATTENTION (6) 4       LANGUAGE (3) 2       ABSTRACTION(2) 0       RECALL (5) 0       ORIENTATION (6) 3               Reviewed the neuroimaging independently       Assessment:       1. Mixed Alzheimer's and vascular dementia    2. At risk for prolonged QT interval syndrome    3. History of CVA with residual deficit    4. Insomnia, unspecified type    5. Hemiparesis affecting dominant side as late effect of cerebrovascular accident (CVA)              Plan:           MIXED DEMENTIA, VASCULAR DEMENTIA  AND ALZHEIMER'S TYPE DEMENTIA, MODERATE, NO BASELINE BEHAVIORAL DISTURBANCES     HISTORY OF EPILEPSY, HISTORY OF RECENT DELIRIUM ()      EVALUATION     AEEG pending       DISEASE-MODIFYING AGENTS     Explained to the patient and family that medications are intended to slow down the progression and not stop it or reverse the disease. The disease is relentless, progressive and so far, cannot be controlled. Progression includes Cognitive decline, Behavioral disturbances, and Motor decline as well.     I counseled the patient and family that the rate of progression is extremely variable, and the average (10 years) is an inaccurate measure.     Continue Memantine/Namenda 10 mg BID.    Continue  Donepezil/Aricept 10 mg QHS.       Check EKG    If GI symptoms become an issue will try rivastigmine/Exelon patches. Will obtain EKG before and after Aricept to verify effects on QT interval!  (Other formulations Adlarity TTS 5/10 mg weekly). Patches are convenient, bypass the GI system but have unintended consequences of being taken off prematurely or being duplicated accidentally.       Recommended against the use of OTC non-FDA evaluated supplements and claims.       SYMPTOMATIC MANAGEMENT-BEHAVIORAL SYMPTOMS       Continue Seroquel 100 MG po HS      Did not tolerate Trazodone.       HOME CARE       Falling Down Precautions.     Avoid driving and access to firearms     Incremental 24/Care     Help with finances and decision making.    Join support group.    Proofing the house and use labeling.    Avoid antihistamines and anticholinergics.    Avoid changing routine.    Use written reminders.    Avoid multitasking.    Healthy diet, exercise (physical and cognitive).    Good sleep hygiene.        CAREGIVERS COUNSELING     For behavioral problems I recommended that family to try some of the following communication tips: Try not to react and stay calm, don't argue, do not use logic, Let the patient feel safe, Keep reminding  the patient and use photos if necessary, Distract the patient, Search for things to distract the person, then talk about what you found. For example, talk about a photograph or keepsake or even food, use gentle touching or hugging to show you care, Body language matters, use a cooling off period if needed, when possible. If safe to do so, give the patient some space or breathing room. Will consider antipsychotic medications as a last resort because of the risk of CAD and CVD.            Recommend reading the following books:     The 36-Hour Day and there are many online and printed resources to help caregivers as well.     Alzheimer's Through the Stages.     Dementia with MENDEL    For More Information About Hallucinations, Delusions, and Paranoia in Alzheimer's   [facility] Alzheimer's and related Dementias Education and Referral (ADEAR) Center   1-289.433.8747 (toll-free)   adear@sammie.nih.gov   www.[place].nih.gov/alzheimers   The [employer] [facility]'s ADEAR Center offers information and free print publications about Alzheimer's disease and related dementias for families, caregivers, and health professionals. ADEAR Center staff answer telephone, email, and written requests and make referrals to local and national resources          PREVENTION OF DELIRIUM       1. Good hydration and avoid electrolyte imbalance  2. Recognize and treat infections immediately especially UTI.  3. Bladder emptying and prevent constipation.   4. Provide stimulating activities and familiar objects  5. Use eyeglasses and hearing aids if needed.   6. Use simple and regular communication about people, current place, and time  7. Mobility and range-of-motion exercises  8. Reduce noise, lighting and avoid sleep interruptions  9. Non-narcotic pain management.  10.Nondrug treatment for sleep problems or anxiety  11. Avoid antihistamines.  12. Avoid narcotics.  13. Avoid benzodiazepines.        MEDICAL/SURGICAL COMORBIDITIES     All relevant  medical comorbidities noted and managed by primary care physician and medical care team.          HEALTHY LIFESTYLE AND PREVENTATIVE CARE    The patient to adhere to the age-appropriate health maintenance guidelines including screening tests and vaccinations. The patient to adhere to  healthy lifestyle, optimal weight, exercise, healthy diet, good sleep hygiene and avoiding drugs including smoking, alcohol and recreational drugs.        RTC in 6 months        Lizbeth Brady MSN NP      Collaborating Provider: Sudhir Ken MD, FAAN Neurologist/Epileptologist    I spent a total of 40 minutes on the day of the visit.  This includes face to face time and non-face to face time preparing to see the patient (eg, review of tests), obtaining and/or reviewing separately obtained history, documenting clinical information in the electronic or other health record, independently interpreting results and communicating results to the patient/family/caregiver, or care coordinator.

## 2023-07-19 ENCOUNTER — PATIENT OUTREACH (OUTPATIENT)
Dept: ADMINISTRATIVE | Facility: HOSPITAL | Age: 73
End: 2023-07-19
Payer: MEDICARE

## 2023-07-19 NOTE — PROGRESS NOTES
Panel Continuity/Not seen in 12 months report: Attempted to contact the patient to schedule annual exam with PCP, no answer, no voicemail.

## 2023-07-25 RX ORDER — FLUOXETINE HYDROCHLORIDE 20 MG/1
CAPSULE ORAL
Qty: 90 CAPSULE | Refills: 3 | Status: SHIPPED | OUTPATIENT
Start: 2023-07-25

## 2023-07-25 RX ORDER — APIXABAN 5 MG/1
TABLET, FILM COATED ORAL
Qty: 90 TABLET | Refills: 3 | Status: SHIPPED | OUTPATIENT
Start: 2023-07-25

## 2023-07-25 NOTE — TELEPHONE ENCOUNTER
No care due was identified.  [facility] Embedded Care Due Messages. Reference number: [id number].   7/25/2023 7:56:22 AM CDT

## 2023-08-02 DIAGNOSIS — F32.A DEPRESSION, UNSPECIFIED DEPRESSION TYPE: Primary | ICD-10-CM

## 2023-08-02 NOTE — TELEPHONE ENCOUNTER
No care due was identified.  [facility] Embedded Care Due Messages. Reference number: [id number].   8/02/2023 6:48:27 PM CDT

## 2023-08-03 RX ORDER — DULOXETIN HYDROCHLORIDE 30 MG/1
30 CAPSULE, DELAYED RELEASE ORAL
Qty: 90 CAPSULE | Refills: 0 | Status: SHIPPED | OUTPATIENT
Start: 2023-08-03 | End: 2023-11-03

## 2023-08-04 ENCOUNTER — OFFICE VISIT (OUTPATIENT)
Dept: FAMILY MEDICINE | Facility: CLINIC | Age: 73
End: 2023-08-04
Payer: MEDICARE

## 2023-08-04 ENCOUNTER — HOSPITAL ENCOUNTER (OUTPATIENT)
Dept: RADIOLOGY | Facility: HOSPITAL | Age: 73
Discharge: HOME | End: 2023-08-04
Attending: FAMILY MEDICINE
Payer: MEDICARE

## 2023-08-04 ENCOUNTER — TELEPHONE (OUTPATIENT)
Dept: CARDIOLOGY | Facility: HOSPITAL | Age: 73
End: 2023-08-04
Payer: MEDICARE

## 2023-08-04 VITALS
SYSTOLIC BLOOD PRESSURE: 120 MMHG | HEART RATE: 68 BPM | WEIGHT: 148.81 LBS | DIASTOLIC BLOOD PRESSURE: 80 MMHG | HEIGHT: 68 IN | BODY MASS INDEX: 22.55 KG/M2 | OXYGEN SATURATION: 99 %

## 2023-08-04 DIAGNOSIS — Z12.11 COLON CANCER SCREENING: ICD-10-CM

## 2023-08-04 DIAGNOSIS — I95.1 ORTHOSTATIC HYPOTENSION: Primary | ICD-10-CM

## 2023-08-04 DIAGNOSIS — R42 DIZZINESS: ICD-10-CM
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
## 2023-08-04 DIAGNOSIS — Z91.89 AT RISK FOR PROLONGED QT INTERVAL SYNDROME: ICD-10-CM

## 2023-08-04 DIAGNOSIS — E11.22 CONTROLLED TYPE 2 DIABETES MELLITUS WITH STAGE 3 CHRONIC KIDNEY DISEASE, WITHOUT LONG-TERM CURRENT USE OF INSULIN: ICD-10-CM

## 2023-08-04 DIAGNOSIS — E78.2 MIXED HYPERLIPIDEMIA: ICD-10-CM

## 2023-08-04 DIAGNOSIS — N18.30 CONTROLLED TYPE 2 DIABETES MELLITUS WITH STAGE 3 CHRONIC KIDNEY DISEASE, WITHOUT LONG-TERM CURRENT USE OF INSULIN: ICD-10-CM

## 2023-08-04 PROCEDURE — 93010 EKG 12-LEAD: ICD-10-PCS | Mod: HCNC,S$GLB,, | Performed by: INTERNAL MEDICINE

## 2023-08-04 PROCEDURE — 71100 XR RIBS 2 VIEW LEFT: ICD-10-PCS | Mod: 26,HCNC,LT, | Performed by: RADIOLOGY

## 2023-08-04 PROCEDURE — 3079F DIAST BP 80-89 MM HG: CPT | Mod: HCNC,CPTII,S$GLB, | Performed by: FAMILY MEDICINE

## 2023-08-04 PROCEDURE — 90677 PNEUMOCOCCAL CONJUGATE VACCINE 20-VALENT: ICD-10-PCS | Mod: HCNC,S$GLB,, | Performed by: FAMILY MEDICINE

## 2023-08-04 PROCEDURE — 71100 X-RAY EXAM RIBS UNI 2 VIEWS: CPT | Mod: 26,HCNC,LT, | Performed by: RADIOLOGY

## 2023-08-04 PROCEDURE — 3074F SYST BP LT 130 MM HG: CPT | Mod: HCNC,CPTII,S$GLB, | Performed by: FAMILY MEDICINE

## 2023-08-04 PROCEDURE — 1159F MED LIST DOCD IN RCRD: CPT | Mod: HCNC,CPTII,S$GLB, | Performed by: FAMILY MEDICINE

## 2023-08-04 PROCEDURE — G0009 PNEUMOCOCCAL CONJUGATE VACCINE 20-VALENT: ICD-10-PCS | Mod: HCNC,S$GLB,, | Performed by: FAMILY MEDICINE

## 2023-08-04 PROCEDURE — 3044F PR MOST RECENT HEMOGLOBIN A1C LEVEL <7.0%: ICD-10-PCS | Mod: HCNC,CPTII,S$GLB, | Performed by: FAMILY MEDICINE

## 2023-08-04 PROCEDURE — 99999 PR PBB SHADOW E&M-EST. PATIENT-LVL V: CPT | Mod: PBBFAC,HCNC,, | Performed by: FAMILY MEDICINE

## 2023-08-04 PROCEDURE — 99214 OFFICE O/P EST MOD 30 MIN: CPT | Mod: HCNC,S$GLB,, | Performed by: FAMILY MEDICINE

## 2023-08-04 PROCEDURE — 90677 PCV20 VACCINE IM: CPT | Mod: HCNC,S$GLB,, | Performed by: FAMILY MEDICINE

## 2023-08-04 PROCEDURE — 93005 ELECTROCARDIOGRAM TRACING: CPT | Mod: HCNC,S$GLB,, | Performed by: FAMILY MEDICINE

## 2023-08-04 PROCEDURE — 99214 PR OFFICE/OUTPT VISIT, EST, LEVL IV, 30-39 MIN: ICD-10-PCS | Mod: HCNC,S$GLB,, | Performed by: FAMILY MEDICINE

## 2023-08-04 PROCEDURE — 3288F FALL RISK ASSESSMENT DOCD: CPT | Mod: HCNC,CPTII,S$GLB, | Performed by: FAMILY MEDICINE

## 2023-08-04 PROCEDURE — 3079F PR MOST RECENT DIASTOLIC BLOOD PRESSURE 80-89 MM HG: ICD-10-PCS | Mod: HCNC,CPTII,S$GLB, | Performed by: FAMILY MEDICINE

## 2023-08-04 PROCEDURE — 99999 PR PBB SHADOW E&M-EST. PATIENT-LVL V: ICD-10-PCS | Mod: PBBFAC,HCNC,, | Performed by: FAMILY MEDICINE

## 2023-08-04 PROCEDURE — 3288F PR FALLS RISK ASSESSMENT DOCUMENTED: ICD-10-PCS | Mod: HCNC,CPTII,S$GLB, | Performed by: FAMILY MEDICINE

## 2023-08-04 PROCEDURE — 3044F HG A1C LEVEL LT 7.0%: CPT | Mod: HCNC,CPTII,S$GLB, | Performed by: FAMILY MEDICINE

## 2023-08-04 PROCEDURE — 3008F PR BODY MASS INDEX (BMI) DOCUMENTED: ICD-10-PCS | Mod: HCNC,CPTII,S$GLB, | Performed by: FAMILY MEDICINE

## 2023-08-04 PROCEDURE — 3008F BODY MASS INDEX DOCD: CPT | Mod: HCNC,CPTII,S$GLB, | Performed by: FAMILY MEDICINE

## 2023-08-04 PROCEDURE — 93005 EKG 12-LEAD: ICD-10-PCS | Mod: HCNC,S$GLB,, | Performed by: FAMILY MEDICINE

## 2023-08-04 PROCEDURE — 93010 ELECTROCARDIOGRAM REPORT: CPT | Mod: HCNC,S$GLB,, | Performed by: INTERNAL MEDICINE

## 2023-08-04 PROCEDURE — 1100F PR PT FALLS ASSESS DOC 2+ FALLS/FALL W/INJURY/YR: ICD-10-PCS | Mod: HCNC,CPTII,S$GLB, | Performed by: FAMILY MEDICINE

## 2023-08-04 PROCEDURE — 1125F AMNT PAIN NOTED PAIN PRSNT: CPT | Mod: HCNC,CPTII,S$GLB, | Performed by: FAMILY MEDICINE

## 2023-08-04 PROCEDURE — 71100 X-RAY EXAM RIBS UNI 2 VIEWS: CPT | Mod: TC,HCNC,FY,PO,LT

## 2023-08-04 PROCEDURE — 1159F PR MEDICATION LIST DOCUMENTED IN MEDICAL RECORD: ICD-10-PCS | Mod: HCNC,CPTII,S$GLB, | Performed by: FAMILY MEDICINE

## 2023-08-04 PROCEDURE — 1100F PTFALLS ASSESS-DOCD GE2>/YR: CPT | Mod: HCNC,CPTII,S$GLB, | Performed by: FAMILY MEDICINE

## 2023-08-04 PROCEDURE — 3074F PR MOST RECENT SYSTOLIC BLOOD PRESSURE < 130 MM HG: ICD-10-PCS | Mod: HCNC,CPTII,S$GLB, | Performed by: FAMILY MEDICINE

## 2023-08-04 PROCEDURE — G0009 ADMIN PNEUMOCOCCAL VACCINE: HCPCS | Mod: HCNC,S$GLB,, | Performed by: FAMILY MEDICINE

## 2023-08-04 PROCEDURE — 1125F PR PAIN SEVERITY QUANTIFIED, PAIN PRESENT: ICD-10-PCS | Mod: HCNC,CPTII,S$GLB, | Performed by: FAMILY MEDICINE

## 2023-08-04 NOTE — PROGRESS NOTES
Chief Complaint:    Chief Complaint   Patient presents with    dizzy spell       History of Present Illness:  8/4/23:  Patient with controlled type 2 DM with stage 3 CKD, HLD presents today for dizziness.     Pt has been taking all of his medicines.  He is had a couple of spells when he feels dizzy when he bends down and tries to stand back up he has fallen a couple of times only momentarily out.  He is still having dizzy spells where he says that the room is spinning around; he says it happens when he bends over and gets up not when he turns his head.  Pt felt lightheaded when he stood up while checking his BP.    Pt is not on any blood pressure medicine because it has been stable without it.     His back hurts on his L side, which he believes may be a cracked rib from syncopal episode.    Pt has gained some weight.  Shingles and tetanus vaccine due.          07/06/2022:-  Patient with controlled type 2 DM with stage 3 CKD, HLD presents today for a three month follow up.     He had a cough for 3-4 months but it mostly cleared up yesterday.  Denies fever. Won't see pulmonary until September. CXR shows a cavity. An ID consult was put in but he was never called. He needs a follow up CT.   His daughter reports trouble swallowing for one year. Food gets stuck in his esophagus. Eating waffles because he can tolerate it. He's had his esophagus stretched before.  Hasn't done his cologuard test yet but he plans to.    Due for shingles, tetanus, and eye exam.   Still smoking          03/09/2022:-  Patient with a hx of DM, hyperlipidemia presents today for right leg pain x 3 days.    Has trouble walking, his calf is sore. No known injury or trauma. Rates pain as 10/10. Left with right-sided deficits from his stroke. On Plavix.     02/23/2022:-  Patient with a hx of DM, hyperlipidemia presents today for a runny nose and intermittent lower abdominal pain x 4 days.    Reports 3-4 episodes of diarrhea, nausea, sinus pain,  congestion, fatigue, and AMS. Lost 10 pounds in 4 days. Denies sore throat,  constipation, blood in stool, dysuria. Stopped taking BCs 4-5 days ago. Took one this morning. Reports no modifying factors for abdominal pain. His diarrhea has stopped because he isn't eating anything.           02/09/2022:  Patient with a hx of DM, hyperlipidemia pesents today for back pain.    Reports lower non-radiating back pain. Mild relief with movement, medications. Was horse playing with his grandson.  Reports food gets stuck in esophagus. Has had his esophagus stretched before.     01/05/2022:-  This is a virtual visit for follow-up of chronic medical problems  Home blood pressure readings have been slightly high  Chronic back pain stable  Kidney function has improved this time still takes BC Powder  Taking Ambien for insomnia  On chronic pain management and also takes marijuana prescription    06/03/2021:-  Doing okay he is planning to have a CLEMENCIA of his back wants to hold his Plavix legs requested by Dr. home med  Diabetes control is stable  Blood pressure normal stools continues to smoke at side effects to Chantix and has not tried the patch did go to the smoking cessation counseling  Suffers with peripheral arterial disease vascular surgeon is proposing a fem-fem graft  Kidney function is slightly worse in he does eat a few BC powders daily.      10/06/2020:-  He has took Keflex for the cellulitis of toe is seems that it was not working 1st now he has been prescribed doxycycline and clindamycin which he has taken today.    07/23/2020:-  Patient says he has some chronic issues with back pain he was on narcotic until he finally weaned himself off this was back   Couple days back he was lifting on his wife and that made his back pain worse.  He has some radiation to the right leg.  He has been taking excessively large number of BC tablets and Tylenol the keep the pain away.  He is interested in seeing pain management.  History of  back surgery in the past.    Patient has hypertension, diabetes type to for number of years and he has 50 pack-year history of smoking still continues to smoke but does want to quit.    He has had at least 2 CVA the 1 was minor from which he completely recovered the 2nd 1 affected his right leg and to a lesser degrees right arm.  This was thought to be due to uncontrolled hypertension and diabetes.  His last A1c was 6.2 does appear that he has chronic kidney disease last creatinine was 1.4.    It has been number of years since he had his colonoscopy      ROS:  Review of Systems   Constitutional:  Negative for appetite change, chills and fever.   HENT:  Negative for congestion, ear pain, postnasal drip, rhinorrhea, sinus pressure and sinus pain.    Eyes:  Negative for pain.   Respiratory:  Negative for chest tightness and shortness of breath.    Cardiovascular:  Negative for chest pain and palpitations.   Gastrointestinal:  Negative for abdominal pain, blood in stool, constipation, diarrhea and nausea.   Genitourinary:  Negative for difficulty urinating, dysuria, flank pain and hematuria.   Musculoskeletal:  Negative for arthralgias and myalgias.   Skin:  Negative for pallor and wound.   Neurological:  Positive for dizziness, syncope and light-headedness. Negative for tremors, speech difficulty and headaches.   Psychiatric/Behavioral:  Negative for behavioral problems, dysphoric mood and sleep disturbance. The patient is not nervous/anxious.    All other systems reviewed and are negative.      Past Medical History:   Diagnosis Date    Anxiety     Depression     Diabetes mellitus, type 2     Hypertension     Stroke        Social History:  Social History     Socioeconomic History    Marital status:    Tobacco Use    Smoking status: Every Day     Current packs/day: 1.00     Types: Cigarettes    Smokeless tobacco: Current     Types: Snuff   Substance and Sexual Activity    Alcohol use: Yes     Alcohol/week: 1.0  "standard drink of alcohol     Types: 1 Shots of liquor per week     Comment: Daily    Drug use: Never    Sexual activity: Not Currently     Social Determinants of Health     Stress: Stress Concern Present (5/15/2020)    [employer] - Occupational Stress Questionnaire     Feeling of Stress : To some extent       Family History:   family history includes COPD in his sister; Cancer in his father; Heart disease in his mother; Stroke in his mother.    Health Maintenance   Topic Date Due    TETANUS VACCINE  Never done    Eye Exam  07/21/2022    Hemoglobin A1c  10/24/2023    Foot Exam  04/24/2024    Lipid Panel  04/24/2024    High Dose Statin  08/04/2024    Hepatitis C Screening  Completed    Abdominal Aortic Aneurysm Screening  Completed       Physical Exam:    Vital Signs  Pulse: 68  SpO2: 99 %  BP: 120/80  BP Location: Right arm  Patient Position: Sitting  Pain Score:   6  Pain Loc: Rib Cage  Height and Weight  Height: 5' 8" (172.7 cm)  Weight: 67.5 kg (148 lb 13 oz)  BSA (Calculated - sq m): 1.8 sq meters  BMI (Calculated): 22.6  Weight in (lb) to have BMI = 25: 164.1]    Body mass index is 22.63 kg/m².    Physical Exam  Vitals and nursing note reviewed.   Constitutional:       Appearance: Normal appearance.   HENT:      Head: Normocephalic and atraumatic.      Right Ear: Tympanic membrane normal. Decreased hearing noted.      Left Ear: Tympanic membrane normal. Decreased hearing noted.   Eyes:      Extraocular Movements: Extraocular movements intact.      Pupils: Pupils are equal, round, and reactive to light.   Cardiovascular:      Rate and Rhythm: Normal rate and regular rhythm.      Pulses: Normal pulses.      Heart sounds: Normal heart sounds. No murmur heard.     No gallop.   Pulmonary:      Effort: Pulmonary effort is normal. No respiratory distress.      Breath sounds: Normal breath sounds. No wheezing, rhonchi or rales.   Abdominal:      General: There is no distension.      " Palpations: Abdomen is soft.      Tenderness: There is no abdominal tenderness.   Musculoskeletal:         General: No swelling, deformity or signs of injury. Normal range of motion.      Cervical back: Normal range of motion.      Thoracic back: Tenderness present.        Back:       Comments: L sided posterior rib tenderness as highlighted above   Skin:     General: Skin is warm and dry.      Capillary Refill: Capillary refill takes less than 2 seconds.      Coloration: Skin is not jaundiced or pale.   Neurological:      General: No focal deficit present.      Mental Status: He is alert and oriented to person, place, and time.      Comments: Ambulates with rollator   Psychiatric:         Mood and Affect: Mood normal.         Behavior: Behavior normal.       Patient did become dizzy upon standing up, 1st time his blood pressure was not heard 2nd time it was normal.  Diabetes Management Status    Statin: Not taking  ACE/ARB: Taking    Screening or Prevention Patient's value Goal Complete/Controlled?   HgA1C Testing and Control   Lab Results   Component Value Date    HGBA1C 6.3 (H) 04/24/2023      Annually/Less than 8% No   Lipid profile : 04/24/2023 Annually No   LDL control Lab Results   Component Value Date    LDLCALC 72.8 04/24/2023    Annually/Less than 100 mg/dl  No   Nephropathy screening Lab Results   Component Value Date    LABMICR 8.0 06/23/2022     Lab Results   Component Value Date    PROTEINUA Trace (A) 11/17/2022    Annually No   Blood pressure BP Readings from Last 1 Encounters:   08/04/23 120/80    Less than 140/90 Yes   Dilated retinal exam : 07/21/2021 Annually Yes   Foot exam   : 04/24/2023 Annually Yes       Assessment:      ICD-10-CM ICD-9-CM   1. Orthostatic hypotension  I95.1 458.0   2. Controlled type 2 diabetes mellitus with stage 3 chronic kidney disease, without long-term current use of insulin  E11.22 250.40    N18.30 585.3   3. Mixed hyperlipidemia  E78.2 272.2   4. Dizziness  R42 780.4    5. Fall, initial encounter  W19.XXXA E888.9   6. Colon cancer screening  Z12.11 V76.51       Plan:  Blood panel; today  Checked his BP sitting down and while standing up.   Tilt table test ordered through cardiologist to measure spells of lightheadedness and dizziness.  Also get a VNG  Will get a fit test for colon screening   Schedule an eye exam Pneumonia vaccine ordered.  Fit test scheduled.  X-ray Ribs 2 view left  Referral to audiology for hearing.  See labs below.    Orders Placed This Encounter   Procedures    X-Ray Ribs 2 View Left    (In Office Administered) Pneumococcal Conjugate Vaccine (20 Valent) (IM)    CBC Auto Differential    Comprehensive Metabolic Panel    Hemoglobin A1C    Lipid Panel    Fecal Immunochemical Test (iFOBT)    Ambulatory referral/consult to Audiology    Tilt table       Current Outpatient Medications   Medication Sig Dispense Refill    blood glucose control, low (TRUE METRIX LEVEL 1) Soln 1 drop by Misc.(Non-Drug; Combo Route) route once daily. 1 each 4    blood glucose control, normal (TRUE METRIX LEVEL 2) Soln 1 drop by Misc.(Non-Drug; Combo Route) route once daily. 1 each 4    donepeziL (ARICEPT) 10 MG tablet Take 1 tablet (10 mg total) by mouth every evening. 30 tablet 11    DULoxetine (CYMBALTA) 30 MG capsule Take 1 capsule by mouth once daily 90 capsule 0    ELIQUIS 5 mg Tab Take 1 tablet by mouth once daily 90 tablet 3    FLUoxetine 20 MG capsule Take 1 capsule by mouth once daily 90 capsule 3    HYDROcodone-acetaminophen (NORCO) 5-325 mg per tablet Take 1 tablet by mouth 3 (three) times daily. Increased to 10 3x day      lancets 32 gauge Misc 1 lancet by Misc.(Non-Drug; Combo Route) route once daily. 30 each 4    memantine (NAMENDA) 10 MG Tab TAKE 1/2 TABLET BY MOUTH TWICE DAILY FOR 1 WEEK, THEN 1 TABLET BY MOUTH TWICE DAILY 180 tablet 3    ondansetron (ZOFRAN) 8 MG tablet Take 1 tablet (8 mg total) by mouth every 8 (eight) hours as needed for Nausea. 30 tablet 1     oxybutynin (DITROPAN-XL) 10 MG 24 hr tablet Take 1 tablet by mouth once daily 90 tablet 1    pantoprazole (PROTONIX) 40 MG tablet Take 1 tablet by mouth once daily 30 tablet 0    pantoprazole (PROTONIX) 40 MG tablet Take 1 tablet (40 mg total) by mouth once daily. 30 tablet 6    QUEtiapine (SEROQUEL) 25 MG Tab Take 1 tablet (25 mg total) by mouth 2 (two) times daily. (Patient taking differently: Take 50 mg by mouth once daily.) 60 tablet 11    QUEtiapine (SEROQUEL) 50 MG tablet Take 1 tablet by mouth twice daily 60 tablet 0    rosuvastatin (CRESTOR) 20 MG tablet Take 1 tablet by mouth once daily 90 tablet 1    tamsulosin (FLOMAX) 0.4 mg Cap Take 1 capsule by mouth once daily 90 capsule 0    tiZANidine (ZANAFLEX) 4 MG tablet Take 1 tablet by mouth three times daily as needed 270 tablet 0    walker (ULTRA-LIGHT ROLLATOR) Misc Use daily for ambulation 1 each 0     No current facility-administered medications for this visit.       There are no discontinued medications.      No follow-ups on file.      Damien Barron MD  Scribe Attestation:   I, Samara Delarosa, am scribing for, and in the presence of, Dr.Arif Barron I performed the above scribed service and the documentation accurately describes the services I performed. I attest to the accuracy of the note.    I, Dr. Damien Barron, reviewed documentation as scribed above. I performed the services described in this documentation.  I agree that the record reflects my personal performance and is accurate and complete. Damien Barron MD.  08/04/2023

## 2023-08-04 NOTE — PROGRESS NOTES
Pneumococcal 20 was given to pt in right deltoid. Patient tolerated well. Patient was asked to wait for 15 min's to watch for any adverse reactions.

## 2023-08-09 DIAGNOSIS — M25.551 RIGHT HIP PAIN: ICD-10-CM

## 2023-08-09 RX ORDER — QUETIAPINE FUMARATE 50 MG/1
TABLET, FILM COATED ORAL
Qty: 60 TABLET | Refills: 0 | Status: SHIPPED | OUTPATIENT
Start: 2023-08-09 | End: 2023-09-05

## 2023-08-09 NOTE — TELEPHONE ENCOUNTER
No care due was identified.  [facility] Embedded Care Due Messages. Reference number: [id number].   8/09/2023 9:27:32 AM CDT

## 2023-08-10 ENCOUNTER — TELEPHONE (OUTPATIENT)
Dept: NEUROLOGY | Facility: CLINIC | Age: 73
End: 2023-08-10
Payer: MEDICARE

## 2023-08-10 NOTE — TELEPHONE ENCOUNTER
----- Message from Melinda Brady NP sent at 8/9/2023 10:04 PM CDT -----  EKG Results:     08-    QT Interval 430, HR 64 NL

## 2023-08-18 RX ORDER — GABAPENTIN 100 MG/1
100 CAPSULE ORAL 3 TIMES DAILY
Qty: 90 CAPSULE | Refills: 11 | Status: ON HOLD | OUTPATIENT
Start: 2023-08-18 | End: 2023-12-18 | Stop reason: HOSPADM

## 2023-09-01 ENCOUNTER — CLINICAL SUPPORT (OUTPATIENT)
Dept: AUDIOLOGY | Facility: CLINIC | Age: 73
End: 2023-09-01
Payer: MEDICARE

## 2023-09-01 DIAGNOSIS — R42 DIZZINESS: ICD-10-CM

## 2023-09-01 PROCEDURE — 92540 PR VESTIBULAR EVAL NYSTAG FOVL&PERPH STIM OSCIL TRACKING: ICD-10-PCS | Mod: HCNC,S$GLB,, | Performed by: AUDIOLOGIST-HEARING AID FITTER

## 2023-09-01 PROCEDURE — 99999 PR PBB SHADOW E&M-EST. PATIENT-LVL I: ICD-10-PCS | Mod: PBBFAC,HCNC,, | Performed by: AUDIOLOGIST-HEARING AID FITTER

## 2023-09-01 PROCEDURE — 99999 PR PBB SHADOW E&M-EST. PATIENT-LVL I: CPT | Mod: PBBFAC,HCNC,, | Performed by: AUDIOLOGIST-HEARING AID FITTER

## 2023-09-01 PROCEDURE — 92540 BASIC VESTIBULAR EVALUATION: CPT | Mod: HCNC,S$GLB,, | Performed by: AUDIOLOGIST-HEARING AID FITTER

## 2023-09-01 NOTE — PROGRESS NOTES
Referring provider:  Dr. Anderson Alejandre Jr. was seen 09/01/2023 for hearing and vestibular testing.    Dizzy symptoms were sensation of lightheadedness and head spinning, and is provoked by head movements and exertion. Patient has had syncopal episodes with the dizziness. He has been diagnosed with orthostatic hypotension. He reports many years history of bilateral hearing loss, noting left poorer than right hearing. He has tinnitus in the bilateral ear that has been present for years. He describes ringing and occasional whooshing. He has history of noise exposure.    Otoscopy:  Right ear: complete cerumen impaction with no visualization of tympanic membrane   Left ear: partial cerumen impaction with partial visualization of tympanic membrane    Audiogram could not be completed due to cerumen impaction.     Videonystagmography (VNG):  Oculomotor function tests (sinusoidal tracking, saccade and OPKs) were normal and symmetric.  Gaze test was absent for nystagmus.  Spontaneous nystagmus was absent.  Head-shake test was absent for after head shake nystagmus.  Ell[place]-Hallpike Left was negative for BPPV. There was 4 down-beating positional nystagmus to head-hanging. Dizziness (lightheadedness) reported only upon sitting.  [place]-Hallpike Right was negative for BPPV. There was 6 d/s right-beating or 4 down-beating positional nystagmus to head-hanging. Dizziness (lightheadedness) reported only upon sitting.  Lateral Roll test was negative for BPPV.   Static Positional nystagmus was absent to supine head center/head right/head left and absent to head-hanging forward.  Calorics could not be tested due to cerumen impaction.    Ellen-Hallpike was retested without the video goggles: No nystagmus or dizziness was elicited to head-hanging; lightheadedness was reported upon sitting.     Impression: Normal VNG with exception of non-localizing positional nystagmus. Negative for BPPV.   Calorics could not be tested today due to  cerumen impaction. Based on patient history and test findings, calorics test is not clinically indicated at this time (low yield). Patient is advised to continue to follow with his PCP and cardiologist. If symptoms persist or worsen, consider returning for calorics test.     Recommendations:  1. Keep cardiology evaluation.   2. ENT for cerumen disimpaction followed by audiogram at patient's convenience.   3. Calorics test as needed, after cerumen disimpaction.     Tracings are scanned into computer.

## 2023-09-03 DIAGNOSIS — M25.551 RIGHT HIP PAIN: ICD-10-CM

## 2023-09-05 RX ORDER — QUETIAPINE FUMARATE 50 MG/1
TABLET, FILM COATED ORAL
Qty: 180 TABLET | Refills: 3 | Status: SHIPPED | OUTPATIENT
Start: 2023-09-05 | End: 2024-03-28 | Stop reason: SDUPTHER

## 2023-09-05 NOTE — TELEPHONE ENCOUNTER
Refill Routing Note   Medication(s) are not appropriate for processing by [facility] for the following reason(s):      Medication outside of protocol    [facility] action(s):  Route Care Due:  None identified            Appointments  past 12m or future 3m with PCP    Date Provider   Last Visit   8/4/2023 Damien Barron MD   Next Visit   Visit date not found Damien Barron MD   ED visits in past 90 days: 0        Note composed:11:18 AM 09/05/2023

## 2023-09-06 ENCOUNTER — TELEPHONE (OUTPATIENT)
Dept: FAMILY MEDICINE | Facility: CLINIC | Age: 73
End: 2023-09-06

## 2023-09-06 DIAGNOSIS — R42 DIZZINESS: Primary | ICD-10-CM

## 2023-09-06 NOTE — TELEPHONE ENCOUNTER
Daughter has been watching blood pressure and heart rate with his dizzy spells   He has been running 110-40-50's   Heart rate in the 40's and irregular    He had VNG on Friday that was negative     He has stopped flomax and oxybutin , he has had an increase in nightly urination but he is dealing with it     Please advise

## 2023-09-13 DIAGNOSIS — R05.9 COUGH, UNSPECIFIED TYPE: Primary | ICD-10-CM

## 2023-09-13 RX ORDER — BENZONATATE 200 MG/1
200 CAPSULE ORAL 3 TIMES DAILY PRN
Qty: 30 CAPSULE | Refills: 0 | Status: SHIPPED | OUTPATIENT
Start: 2023-09-13 | End: 2023-09-23

## 2023-09-13 NOTE — TELEPHONE ENCOUNTER
----- Message from Camilla Brown sent at 9/13/2023  8:44 AM CDT -----  He is needing something for cough.

## 2023-09-14 ENCOUNTER — PATIENT MESSAGE (OUTPATIENT)
Dept: FAMILY MEDICINE | Facility: CLINIC | Age: 73
End: 2023-09-14
Payer: MEDICARE

## 2023-09-14 ENCOUNTER — TELEPHONE (OUTPATIENT)
Dept: FAMILY MEDICINE | Facility: CLINIC | Age: 73
End: 2023-09-14
Payer: MEDICARE

## 2023-09-14 DIAGNOSIS — R05.9 COUGH, UNSPECIFIED TYPE: Primary | ICD-10-CM

## 2023-09-15 ENCOUNTER — HOSPITAL ENCOUNTER (OUTPATIENT)
Dept: RADIOLOGY | Facility: HOSPITAL | Age: 73
Discharge: HOME | End: 2023-09-15
Attending: FAMILY MEDICINE
Payer: MEDICARE

## 2023-09-15 DIAGNOSIS — R05.9 COUGH, UNSPECIFIED TYPE: ICD-10-CM

## 2023-09-15 PROCEDURE — 71046 XR CHEST PA AND LATERAL: ICD-10-PCS | Mod: 26,HCNC,, | Performed by: RADIOLOGY

## 2023-09-15 PROCEDURE — 71046 X-RAY EXAM CHEST 2 VIEWS: CPT | Mod: 26,HCNC,, | Performed by: RADIOLOGY

## 2023-09-15 PROCEDURE — 71046 X-RAY EXAM CHEST 2 VIEWS: CPT | Mod: TC,HCNC,PO

## 2023-09-20 ENCOUNTER — OFFICE VISIT (OUTPATIENT)
Dept: FAMILY MEDICINE | Facility: CLINIC | Age: 73
End: 2023-09-20
Payer: MEDICARE

## 2023-09-20 VITALS
BODY MASS INDEX: 21.6 KG/M2 | HEART RATE: 52 BPM | SYSTOLIC BLOOD PRESSURE: 130 MMHG | WEIGHT: 142.06 LBS | DIASTOLIC BLOOD PRESSURE: 70 MMHG | OXYGEN SATURATION: 99 %

## 2023-09-20 DIAGNOSIS — S23.8XXA SPRAIN OF CHEST WALL, INITIAL ENCOUNTER: ICD-10-CM

## 2023-09-20 DIAGNOSIS — N45.3 EPIDIDYMOORCHITIS: ICD-10-CM

## 2023-09-20 DIAGNOSIS — R05.9 COUGH, UNSPECIFIED TYPE: Primary | ICD-10-CM

## 2023-09-20 LAB
CTP QC/QA: YES
SARS-COV-2 RDRP RESP QL NAA+PROBE: NEGATIVE

## 2023-09-20 PROCEDURE — 3078F DIAST BP <80 MM HG: CPT | Mod: HCNC,CPTII,S$GLB, | Performed by: FAMILY MEDICINE

## 2023-09-20 PROCEDURE — 3044F HG A1C LEVEL LT 7.0%: CPT | Mod: HCNC,CPTII,S$GLB, | Performed by: FAMILY MEDICINE

## 2023-09-20 PROCEDURE — 1125F PR PAIN SEVERITY QUANTIFIED, PAIN PRESENT: ICD-10-PCS | Mod: HCNC,CPTII,S$GLB, | Performed by: FAMILY MEDICINE

## 2023-09-20 PROCEDURE — 3044F PR MOST RECENT HEMOGLOBIN A1C LEVEL <7.0%: ICD-10-PCS | Mod: HCNC,CPTII,S$GLB, | Performed by: FAMILY MEDICINE

## 2023-09-20 PROCEDURE — 1125F AMNT PAIN NOTED PAIN PRSNT: CPT | Mod: HCNC,CPTII,S$GLB, | Performed by: FAMILY MEDICINE

## 2023-09-20 PROCEDURE — 1159F MED LIST DOCD IN RCRD: CPT | Mod: HCNC,CPTII,S$GLB, | Performed by: FAMILY MEDICINE

## 2023-09-20 PROCEDURE — 3008F BODY MASS INDEX DOCD: CPT | Mod: HCNC,CPTII,S$GLB, | Performed by: FAMILY MEDICINE

## 2023-09-20 PROCEDURE — 3288F FALL RISK ASSESSMENT DOCD: CPT | Mod: HCNC,CPTII,S$GLB, | Performed by: FAMILY MEDICINE

## 2023-09-20 PROCEDURE — 3075F PR MOST RECENT SYSTOLIC BLOOD PRESS GE 130-139MM HG: ICD-10-PCS | Mod: HCNC,CPTII,S$GLB, | Performed by: FAMILY MEDICINE

## 2023-09-20 PROCEDURE — 99214 OFFICE O/P EST MOD 30 MIN: CPT | Mod: HCNC,S$GLB,, | Performed by: FAMILY MEDICINE

## 2023-09-20 PROCEDURE — 1101F PT FALLS ASSESS-DOCD LE1/YR: CPT | Mod: HCNC,CPTII,S$GLB, | Performed by: FAMILY MEDICINE

## 2023-09-20 PROCEDURE — 87635 SARS-COV-2 COVID-19 AMP PRB: CPT | Mod: QW,HCNC,S$GLB, | Performed by: FAMILY MEDICINE

## 2023-09-20 PROCEDURE — 3078F PR MOST RECENT DIASTOLIC BLOOD PRESSURE < 80 MM HG: ICD-10-PCS | Mod: HCNC,CPTII,S$GLB, | Performed by: FAMILY MEDICINE

## 2023-09-20 PROCEDURE — 99999 PR PBB SHADOW E&M-EST. PATIENT-LVL IV: CPT | Mod: PBBFAC,HCNC,, | Performed by: FAMILY MEDICINE

## 2023-09-20 PROCEDURE — 99999 PR PBB SHADOW E&M-EST. PATIENT-LVL IV: ICD-10-PCS | Mod: PBBFAC,HCNC,, | Performed by: FAMILY MEDICINE

## 2023-09-20 PROCEDURE — 3008F PR BODY MASS INDEX (BMI) DOCUMENTED: ICD-10-PCS | Mod: HCNC,CPTII,S$GLB, | Performed by: FAMILY MEDICINE

## 2023-09-20 PROCEDURE — 1101F PR PT FALLS ASSESS DOC 0-1 FALLS W/OUT INJ PAST YR: ICD-10-PCS | Mod: HCNC,CPTII,S$GLB, | Performed by: FAMILY MEDICINE

## 2023-09-20 PROCEDURE — 87635: ICD-10-PCS | Mod: QW,HCNC,S$GLB, | Performed by: FAMILY MEDICINE

## 2023-09-20 PROCEDURE — 1159F PR MEDICATION LIST DOCUMENTED IN MEDICAL RECORD: ICD-10-PCS | Mod: HCNC,CPTII,S$GLB, | Performed by: FAMILY MEDICINE

## 2023-09-20 PROCEDURE — 3288F PR FALLS RISK ASSESSMENT DOCUMENTED: ICD-10-PCS | Mod: HCNC,CPTII,S$GLB, | Performed by: FAMILY MEDICINE

## 2023-09-20 PROCEDURE — 99214 PR OFFICE/OUTPT VISIT, EST, LEVL IV, 30-39 MIN: ICD-10-PCS | Mod: HCNC,S$GLB,, | Performed by: FAMILY MEDICINE

## 2023-09-20 PROCEDURE — 3075F SYST BP GE 130 - 139MM HG: CPT | Mod: HCNC,CPTII,S$GLB, | Performed by: FAMILY MEDICINE

## 2023-09-20 RX ORDER — ALBUTEROL SULFATE 90 UG/1
2 AEROSOL, METERED RESPIRATORY (INHALATION) EVERY 4 HOURS PRN
Qty: 18 G | Refills: 2 | Status: SHIPPED | OUTPATIENT
Start: 2023-09-20 | End: 2024-09-19

## 2023-09-20 RX ORDER — INHALER, ASSIST DEVICES
SPACER (EA) MISCELLANEOUS
Qty: 1 EACH | Refills: 1 | Status: SHIPPED | OUTPATIENT
Start: 2023-09-20

## 2023-09-20 RX ORDER — DOXYCYCLINE HYCLATE 100 MG
100 TABLET ORAL EVERY 12 HOURS
Qty: 20 TABLET | Refills: 0 | Status: SHIPPED | OUTPATIENT
Start: 2023-09-20 | End: 2023-09-30

## 2023-09-20 NOTE — PROGRESS NOTES
Chief Complaint:    Chief Complaint   Patient presents with    Back Pain     Back pain and cough x 1 wk       History of Present Illness:    09/20/2023:-  Patient with controlled type 2 DM with stage 3 CKD, HLD presents today for back pain,    COVID negative.  Has had a cough and rhinorrhea for 10 days with green/yellow phlegm. Was on clindamycin for 4 days, he also has tessalon perles. Denies any sinus pressure. Feels SOB when he has coughing spells.  Also c/o of back pain which sometime worsens when he coughs as well as some testicular pain upon palpation.    08/04/2023:  Patient with controlled type 2 DM with stage 3 CKD, HLD presents today for dizziness.     Pt has been taking all of his medicines.  He is had a couple of spells when he feels dizzy when he bends down and tries to stand back up he has fallen a couple of times only momentarily out.  He is still having dizzy spells where he says that the room is spinning around; he says it happens when he bends over and gets up not when he turns his head.  Pt felt lightheaded when he stood up while checking his BP.    Pt is not on any blood pressure medicine because it has been stable without it.     His back hurts on his L side, which he believes may be a cracked rib from syncopal episode.    Pt has gained some weight.  Shingles and tetanus vaccine due.          07/06/2022:-  Patient with controlled type 2 DM with stage 3 CKD, HLD presents today for a three month follow up.     He had a cough for 3-4 months but it mostly cleared up yesterday.  Denies fever. Won't see pulmonary until September. CXR shows a cavity. An ID consult was put in but he was never called. He needs a follow up CT.   His daughter reports trouble swallowing for one year. Food gets stuck in his esophagus. Eating waffles because he can tolerate it. He's had his esophagus stretched before.  Hasn't done his cologuard test yet but he plans to.    Due for shingles, tetanus, and eye exam.   Still  smoking      02/23/2022:-  Patient with a hx of DM, hyperlipidemia presents today for a runny nose and intermittent lower abdominal pain x 4 days.    Reports 3-4 episodes of diarrhea, nausea, sinus pain, congestion, fatigue, and AMS. Lost 10 pounds in 4 days. Denies sore throat,  constipation, blood in stool, dysuria. Stopped taking BCs 4-5 days ago. Took one this morning. Reports no modifying factors for abdominal pain. His diarrhea has stopped because he isn't eating anything.       01/05/2022:-  This is a virtual visit for follow-up of chronic medical problems  Home blood pressure readings have been slightly high  Chronic back pain stable  Kidney function has improved this time still takes BC Powder  Taking Ambien for insomnia  On chronic pain management and also takes marijuana prescription    06/03/2021:-  Doing okay he is planning to have a CLEMENCIA of his back wants to hold his Plavix legs requested by Dr. home med  Diabetes control is stable  Blood pressure normal stools continues to smoke at side effects to Chantix and has not tried the patch did go to the smoking cessation counseling  Suffers with peripheral arterial disease vascular surgeon is proposing a fem-fem graft  Kidney function is slightly worse in he does eat a few BC powders daily.      07/23/2020:-  Patient says he has some chronic issues with back pain he was on narcotic until he finally weaned himself off this was back   Couple days back he was lifting on his wife and that made his back pain worse.  He has some radiation to the right leg.  He has been taking excessively large number of BC tablets and Tylenol the keep the pain away.  He is interested in seeing pain management.  History of back surgery in the past.    Patient has hypertension, diabetes type to for number of years and he has 50 pack-year history of smoking still continues to smoke but does want to quit.    He has had at least 2 CVA the 1 was minor from which he completely recovered  the 2nd 1 affected his right leg and to a lesser degrees right arm.  This was thought to be due to uncontrolled hypertension and diabetes.  His last A1c was 6.2 does appear that he has chronic kidney disease last creatinine was 1.4.    It has been number of years since he had his colonoscopy      ROS:  Review of Systems   Constitutional:  Negative for appetite change, chills and fever.   HENT:  Positive for rhinorrhea. Negative for congestion, ear pain, postnasal drip, sinus pressure and sinus pain.    Eyes:  Negative for pain.   Respiratory:  Positive for cough (productive) and shortness of breath. Negative for chest tightness.    Cardiovascular:  Negative for chest pain and palpitations.   Gastrointestinal:  Negative for abdominal pain, blood in stool, constipation, diarrhea and nausea.   Genitourinary:  Negative for difficulty urinating, dysuria, flank pain and hematuria.   Musculoskeletal:  Negative for arthralgias and myalgias.   Skin:  Negative for pallor and wound.   Neurological:  Positive for dizziness, syncope and light-headedness. Negative for tremors, speech difficulty and headaches.   Psychiatric/Behavioral:  Negative for behavioral problems, dysphoric mood and sleep disturbance. The patient is not nervous/anxious.    All other systems reviewed and are negative.      Past Medical History:   Diagnosis Date    Anxiety     Depression     Diabetes mellitus, type 2     Hypertension     Stroke        Social History:  Social History     Socioeconomic History    Marital status:    Tobacco Use    Smoking status: Every Day     Current packs/day: 1.00     Types: Cigarettes    Smokeless tobacco: Current     Types: Snuff   Substance and Sexual Activity    Alcohol use: Yes     Alcohol/week: 1.0 standard drink of alcohol     Types: 1 Shots of liquor per week     Comment: Daily    Drug use: Never    Sexual activity: Not Currently     Social Determinants of Health     Stress: Stress Concern Present (5/15/2020)     [facility] Occupational [facility] - Occupational Stress Questionnaire     Feeling of Stress : To some extent       Family History:   family history includes COPD in his sister; Cancer in his father; Heart disease in his mother; Stroke in his mother.    Health Maintenance   Topic Date Due    TETANUS VACCINE  Never done    Shingles Vaccine (1 of 2) Never done    Colorectal Cancer Screening  06/16/2021    Eye Exam  07/21/2022    Hemoglobin A1c  02/04/2024    Foot Exam  04/24/2024    Lipid Panel  08/04/2024    High Dose Statin  09/20/2024    Hepatitis C Screening  Completed    Abdominal Aortic Aneurysm Screening  Completed       Physical Exam:    Vital Signs  Pulse: (!) 52  SpO2: 99 %  BP: (!) 150/70  BP Location: Left arm  Patient Position: Sitting  Pain Score: 10-Worst pain ever  Height and Weight  Weight: 64.4 kg (142 lb 1.4 oz)]    Body mass index is 21.6 kg/m².    Physical Exam  Vitals and nursing note reviewed.   Constitutional:       Appearance: Normal appearance.   HENT:      Head: Normocephalic and atraumatic.      Right Ear: Tympanic membrane normal. Decreased hearing noted.      Left Ear: Tympanic membrane normal. Decreased hearing noted.   Eyes:      Extraocular Movements: Extraocular movements intact.      Pupils: Pupils are equal, round, and reactive to light.   Cardiovascular:      Rate and Rhythm: Normal rate and regular rhythm.      Pulses: Normal pulses.      Heart sounds: Normal heart sounds. No murmur heard.     No gallop.   Pulmonary:      Effort: Pulmonary effort is normal. No respiratory distress.      Breath sounds: Wheezing (slight) present. No rhonchi or rales.   Abdominal:      General: There is no distension.      Palpations: Abdomen is soft.      Tenderness: There is no abdominal tenderness.   Genitourinary:     Epididymis:      Right: No tenderness.      Left: Tenderness present.      Comments: No hernia present.  Tenderness of the left testicle and left epididymis  Bilateral  testicular atrophy  Musculoskeletal:         General: No swelling, deformity or signs of injury. Normal range of motion.      Cervical back: Normal range of motion.      Thoracic back: Tenderness present.        Back:       Comments: Bilateral posterior rib tenderness as highlighted above.   Skin:     General: Skin is warm and dry.      Capillary Refill: Capillary refill takes less than 2 seconds.      Coloration: Skin is not jaundiced or pale.   Neurological:      General: No focal deficit present.      Mental Status: He is alert and oriented to person, place, and time.      Comments: Ambulates with rollator   Psychiatric:         Mood and Affect: Mood normal.         Behavior: Behavior normal.       Patient did become dizzy upon standing up, 1st time his blood pressure was not heard 2nd time it was normal.  Diabetes Management Status    Statin: Not taking  ACE/ARB: Taking    Screening or Prevention Patient's value Goal Complete/Controlled?   HgA1C Testing and Control   Lab Results   Component Value Date    HGBA1C 6.2 (H) 08/04/2023      Annually/Less than 8% No   Lipid profile : 08/04/2023 Annually No   LDL control Lab Results   Component Value Date    LDLCALC 67.6 08/04/2023    Annually/Less than 100 mg/dl  No   Nephropathy screening Lab Results   Component Value Date    LABMICR 8.0 06/23/2022     Lab Results   Component Value Date    PROTEINUA Trace (A) 11/17/2022    Annually No   Blood pressure BP Readings from Last 1 Encounters:   09/20/23 (!) 150/70    Less than 140/90 Yes   Dilated retinal exam : 07/21/2021 Annually Yes   Foot exam   : 04/24/2023 Annually Yes       Assessment:      ICD-10-CM ICD-9-CM   1. Cough, unspecified type  R05.9 786.2   2. Epididymoorchitis  N45.3 604.90   3. Sprain of chest wall, initial encounter  S23.8XXA 848.8       Plan:  COVID negative.  Start doxycycline for cough and epididymoorchitis. Start albuterol inhaler, use spacer for easier inhalation.  Order CT Chest Without Contrast  for cough.   For back pain can use icy hot.     Orders Placed This Encounter   Procedures    CT Chest Without Contrast    POCT COVID-19 Rapid Screening       Current Outpatient Medications   Medication Sig Dispense Refill    benzonatate (TESSALON) 200 MG capsule Take 1 capsule (200 mg total) by mouth 3 (three) times daily as needed for Cough. 30 capsule 0    blood glucose control, low (TRUE METRIX LEVEL 1) Soln 1 drop by Misc.(Non-Drug; Combo Route) route once daily. 1 each 4    blood glucose control, normal (TRUE METRIX LEVEL 2) Soln 1 drop by Misc.(Non-Drug; Combo Route) route once daily. 1 each 4    donepeziL (ARICEPT) 10 MG tablet Take 1 tablet (10 mg total) by mouth every evening. 30 tablet 11    DULoxetine (CYMBALTA) 30 MG capsule Take 1 capsule by mouth once daily 90 capsule 0    ELIQUIS 5 mg Tab Take 1 tablet by mouth once daily 90 tablet 3    FLUoxetine 20 MG capsule Take 1 capsule by mouth once daily 90 capsule 3    gabapentin (NEURONTIN) 100 MG capsule Take 1 capsule (100 mg total) by mouth 3 (three) times daily. 90 capsule 11    HYDROcodone-acetaminophen (NORCO) 5-325 mg per tablet Take 1 tablet by mouth 3 (three) times daily. Increased to 10 3x day      lancets 32 gauge Misc 1 lancet by Misc.(Non-Drug; Combo Route) route once daily. 30 each 4    memantine (NAMENDA) 10 MG Tab TAKE 1/2 TABLET BY MOUTH TWICE DAILY FOR 1 WEEK, THEN 1 TABLET BY MOUTH TWICE DAILY 180 tablet 3    ondansetron (ZOFRAN) 8 MG tablet Take 1 tablet (8 mg total) by mouth every 8 (eight) hours as needed for Nausea. 30 tablet 1    oxybutynin (DITROPAN-XL) 10 MG 24 hr tablet Take 1 tablet by mouth once daily 90 tablet 1    pantoprazole (PROTONIX) 40 MG tablet Take 1 tablet by mouth once daily 30 tablet 0    pantoprazole (PROTONIX) 40 MG tablet Take 1 tablet (40 mg total) by mouth once daily. 30 tablet 6    QUEtiapine (SEROQUEL) 25 MG Tab Take 1 tablet (25 mg total) by mouth 2 (two) times daily. (Patient taking differently: Take 50 mg by  mouth once daily.) 60 tablet 11    QUEtiapine (SEROQUEL) 50 MG tablet Take 1 tablet by mouth twice daily 180 tablet 3    rosuvastatin (CRESTOR) 20 MG tablet Take 1 tablet by mouth once daily 90 tablet 1    tamsulosin (FLOMAX) 0.4 mg Cap Take 1 capsule by mouth once daily 90 capsule 0    tiZANidine (ZANAFLEX) 4 MG tablet Take 1 tablet by mouth three times daily as needed 270 tablet 0    walker (ULTRA-LIGHT ROLLATOR) Misc Use daily for ambulation 1 each 0    albuterol (PROVENTIL/VENTOLIN HFA) 90 mcg/actuation inhaler Inhale 2 puffs into the lungs every 4 (four) hours as needed for Wheezing. 18 g 2    doxycycline (VIBRA-TABS) 100 MG tablet Take 1 tablet (100 mg total) by mouth every 12 (twelve) hours. for 10 days 20 tablet 0    inhalat.spacing dev,large mask (BREATHERITE SPACER-MASK,ADULT) Spcr Use as directed for inhalation. 1 each 1     No current facility-administered medications for this visit.       There are no discontinued medications.      No follow-ups on file.      Damien Barron MD  Scribe Attestation:   I, Osmani Ashley, am scribing for, and in the presence of, Dr.Arif Barron I performed the above scribed service and the documentation accurately describes the services I performed. I attest to the accuracy of the note.    I, Dr. Damien Barron, reviewed documentation as scribed above. I performed the services described in this documentation.  I agree that the record reflects my personal performance and is accurate and complete. Damien Barron MD.  09/20/2023

## 2023-09-28 DIAGNOSIS — N40.0 BENIGN PROSTATIC HYPERPLASIA WITHOUT LOWER URINARY TRACT SYMPTOMS: ICD-10-CM

## 2023-09-28 RX ORDER — TAMSULOSIN HYDROCHLORIDE 0.4 MG/1
CAPSULE ORAL
Qty: 90 CAPSULE | Refills: 3 | Status: SHIPPED | OUTPATIENT
Start: 2023-09-28 | End: 2023-10-02

## 2023-09-28 NOTE — TELEPHONE ENCOUNTER
Refill Decision Note   Samy Alejandre  is requesting a refill authorization.  Brief Assessment and Rationale for Refill:  Approve     Medication Therapy Plan:         Comments:     Note composed:10:22 AM 09/28/2023

## 2023-09-28 NOTE — TELEPHONE ENCOUNTER
No care due was identified.  [facility] Embedded Care Due Messages. Reference number: [id number].   9/28/2023 9:20:46 AM CDT

## 2023-09-29 ENCOUNTER — PATIENT MESSAGE (OUTPATIENT)
Dept: FAMILY MEDICINE | Facility: CLINIC | Age: 73
End: 2023-09-29
Payer: MEDICARE

## 2023-09-29 ENCOUNTER — PATIENT MESSAGE (OUTPATIENT)
Dept: PULMONOLOGY | Facility: CLINIC | Age: 73
End: 2023-09-29
Payer: MEDICARE

## 2023-09-29 ENCOUNTER — HOSPITAL ENCOUNTER (OUTPATIENT)
Dept: RADIOLOGY | Facility: HOSPITAL | Age: 73
Discharge: HOME | DRG: 242 | End: 2023-09-29
Attending: FAMILY MEDICINE
Payer: MEDICARE

## 2023-09-29 DIAGNOSIS — N45.3 EPIDIDYMOORCHITIS: ICD-10-CM

## 2023-09-29 DIAGNOSIS — R05.9 COUGH, UNSPECIFIED TYPE: ICD-10-CM

## 2023-09-29 PROCEDURE — 71250 CT THORAX DX C-: CPT | Mod: TC,HCNC

## 2023-09-29 PROCEDURE — 71250 CT THORAX DX C-: CPT | Mod: 26,HCNC,, | Performed by: RADIOLOGY

## 2023-09-29 PROCEDURE — 71250 CT CHEST WITHOUT CONTRAST: ICD-10-PCS | Mod: 26,HCNC,, | Performed by: RADIOLOGY

## 2023-10-02 ENCOUNTER — HOSPITAL ENCOUNTER (OUTPATIENT)
Dept: CARDIOLOGY | Facility: HOSPITAL | Age: 73
Discharge: HOME | DRG: 242 | End: 2023-10-02
Attending: INTERNAL MEDICINE
Payer: MEDICARE

## 2023-10-02 ENCOUNTER — HOSPITAL ENCOUNTER (INPATIENT)
Facility: HOSPITAL | Age: 73
LOS: 7 days | Discharge: HOME HEALTH | DRG: 242 | End: 2023-10-09
Attending: EMERGENCY MEDICINE | Admitting: SPECIALIST
Payer: MEDICARE

## 2023-10-02 ENCOUNTER — OFFICE VISIT (OUTPATIENT)
Dept: PULMONOLOGY | Facility: CLINIC | Age: 73
End: 2023-10-02
Payer: MEDICARE

## 2023-10-02 VITALS
SYSTOLIC BLOOD PRESSURE: 128 MMHG | HEIGHT: 68 IN | OXYGEN SATURATION: 95 % | HEART RATE: 38 BPM | WEIGHT: 148 LBS | RESPIRATION RATE: 17 BRPM | BODY MASS INDEX: 22.43 KG/M2 | DIASTOLIC BLOOD PRESSURE: 74 MMHG

## 2023-10-02 DIAGNOSIS — F01.50 MIXED ALZHEIMER'S AND VASCULAR DEMENTIA: ICD-10-CM

## 2023-10-02 DIAGNOSIS — R00.1 BRADYCARDIA: ICD-10-CM

## 2023-10-02 DIAGNOSIS — N18.30 CONTROLLED TYPE 2 DIABETES MELLITUS WITH STAGE 3 CHRONIC KIDNEY DISEASE, WITHOUT LONG-TERM CURRENT USE OF INSULIN: ICD-10-CM

## 2023-10-02 DIAGNOSIS — J47.9 CYLINDRICAL BRONCHIECTASIS: ICD-10-CM

## 2023-10-02 DIAGNOSIS — T17.500A MUCOID IMPACTION OF BRONCHI: Primary | ICD-10-CM

## 2023-10-02 DIAGNOSIS — R55 SYNCOPE AND COLLAPSE: ICD-10-CM

## 2023-10-02 DIAGNOSIS — G30.9 MIXED ALZHEIMER'S AND VASCULAR DEMENTIA: ICD-10-CM

## 2023-10-02 DIAGNOSIS — N20.0 KIDNEY STONE: ICD-10-CM

## 2023-10-02 DIAGNOSIS — Z72.0 TOBACCO ABUSE DISORDER: ICD-10-CM

## 2023-10-02 DIAGNOSIS — E78.2 MIXED HYPERLIPIDEMIA: ICD-10-CM

## 2023-10-02 DIAGNOSIS — R00.1 SYMPTOMATIC BRADYCARDIA: ICD-10-CM

## 2023-10-02 DIAGNOSIS — N17.9 AKI (ACUTE KIDNEY INJURY): ICD-10-CM

## 2023-10-02 DIAGNOSIS — Z79.01 CHRONIC ANTICOAGULATION: ICD-10-CM

## 2023-10-02 DIAGNOSIS — N20.1 LEFT URETERAL STONE: ICD-10-CM

## 2023-10-02 DIAGNOSIS — R00.1 SEVERE SINUS BRADYCARDIA: Primary | ICD-10-CM

## 2023-10-02 DIAGNOSIS — R53.1 WEAKNESS: ICD-10-CM

## 2023-10-02 DIAGNOSIS — F02.80 MIXED ALZHEIMER'S AND VASCULAR DEMENTIA: ICD-10-CM

## 2023-10-02 DIAGNOSIS — R93.89 ABNORMAL CHEST CT: ICD-10-CM

## 2023-10-02 DIAGNOSIS — E11.22 CONTROLLED TYPE 2 DIABETES MELLITUS WITH STAGE 3 CHRONIC KIDNEY DISEASE, WITHOUT LONG-TERM CURRENT USE OF INSULIN: ICD-10-CM

## 2023-10-02 PROBLEM — J44.9 COPD (CHRONIC OBSTRUCTIVE PULMONARY DISEASE): Status: ACTIVE | Noted: 2023-10-02

## 2023-10-02 PROBLEM — J69.0 ASPIRATION PNEUMONIA: Status: ACTIVE | Noted: 2023-10-02

## 2023-10-02 LAB
ALBUMIN SERPL BCP-MCNC: 2.9 G/DL (ref 3.5–5.2)
ALLENS TEST: ABNORMAL
ALP SERPL-CCNC: 208 U/L (ref 55–135)
ALT SERPL W/O P-5'-P-CCNC: 12 U/L (ref 10–44)
ANION GAP SERPL CALC-SCNC: 13 MMOL/L (ref 8–16)
APTT PPP: 36.2 SEC (ref 21–32)
AST SERPL-CCNC: 22 U/L (ref 10–40)
BASOPHILS # BLD AUTO: 0.08 K/UL (ref 0–0.2)
BASOPHILS NFR BLD: 0.7 % (ref 0–1.9)
BILIRUB SERPL-MCNC: 0.1 MG/DL (ref 0.1–1)
BNP SERPL-MCNC: 72 PG/ML (ref 0–99)
BUN SERPL-MCNC: 32 MG/DL (ref 8–23)
CALCIUM SERPL-MCNC: 9.4 MG/DL (ref 8.7–10.5)
CHLORIDE SERPL-SCNC: 114 MMOL/L (ref 95–110)
CO2 SERPL-SCNC: 14 MMOL/L (ref 23–29)
CREAT SERPL-MCNC: 2.5 MG/DL (ref 0.5–1.4)
DELSYS: ABNORMAL
DIFFERENTIAL METHOD: ABNORMAL
EOSINOPHIL # BLD AUTO: 0.4 K/UL (ref 0–0.5)
EOSINOPHIL NFR BLD: 3.6 % (ref 0–8)
ERYTHROCYTE [DISTWIDTH] IN BLOOD BY AUTOMATED COUNT: 17 % (ref 11.5–14.5)
EST. GFR  (NO RACE VARIABLE): 27 ML/MIN/1.73 M^2
GLUCOSE SERPL-MCNC: 102 MG/DL (ref 70–110)
HCO3 UR-SCNC: 16.5 MMOL/L (ref 24–28)
HCT VFR BLD AUTO: 35.3 % (ref 40–54)
HGB BLD-MCNC: 11.4 G/DL (ref 14–18)
IMM GRANULOCYTES # BLD AUTO: 0.03 K/UL (ref 0–0.04)
IMM GRANULOCYTES NFR BLD AUTO: 0.3 % (ref 0–0.5)
INR PPP: 1.2 (ref 0.8–1.2)
LYMPHOCYTES # BLD AUTO: 2.4 K/UL (ref 1–4.8)
LYMPHOCYTES NFR BLD: 20.9 % (ref 18–48)
MAGNESIUM SERPL-MCNC: 1.9 MG/DL (ref 1.6–2.6)
MCH RBC QN AUTO: 29.8 PG (ref 27–31)
MCHC RBC AUTO-ENTMCNC: 32.3 G/DL (ref 32–36)
MCV RBC AUTO: 92 FL (ref 82–98)
MONOCYTES # BLD AUTO: 0.6 K/UL (ref 0.3–1)
MONOCYTES NFR BLD: 5.5 % (ref 4–15)
NEUTROPHILS # BLD AUTO: 8.1 K/UL (ref 1.8–7.7)
NEUTROPHILS NFR BLD: 69 % (ref 38–73)
NRBC BLD-RTO: 0 /100 WBC
PCO2 BLDA: 33.8 MMHG (ref 35–45)
PH SMN: 7.3 [PH] (ref 7.35–7.45)
PLATELET # BLD AUTO: 288 K/UL (ref 150–450)
PMV BLD AUTO: 10.2 FL (ref 9.2–12.9)
PO2 BLDA: 22 MMHG (ref 40–60)
POC BE: -10 MMOL/L
POC SATURATED O2: 33 % (ref 95–100)
POTASSIUM SERPL-SCNC: 3.6 MMOL/L (ref 3.5–5.1)
PROCALCITONIN SERPL IA-MCNC: 0.13 NG/ML
PROT SERPL-MCNC: 7.3 G/DL (ref 6–8.4)
PROTHROMBIN TIME: 12.1 SEC (ref 9–12.5)
RBC # BLD AUTO: 3.83 M/UL (ref 4.6–6.2)
SAMPLE: ABNORMAL
SITE: ABNORMAL
SODIUM SERPL-SCNC: 141 MMOL/L (ref 136–145)
TROPONIN I SERPL DL<=0.01 NG/ML-MCNC: 0.03 NG/ML (ref 0–0.03)
TSH SERPL DL<=0.005 MIU/L-ACNC: 1.96 UIU/ML (ref 0.4–4)
WBC # BLD AUTO: 11.67 K/UL (ref 3.9–12.7)

## 2023-10-02 PROCEDURE — 3078F PR MOST RECENT DIASTOLIC BLOOD PRESSURE < 80 MM HG: ICD-10-PCS | Mod: HCNC,CPTII,S$GLB, | Performed by: INTERNAL MEDICINE

## 2023-10-02 PROCEDURE — 82803 BLOOD GASES ANY COMBINATION: CPT | Mod: HCNC

## 2023-10-02 PROCEDURE — 93005 ELECTROCARDIOGRAM TRACING: CPT | Mod: HCNC

## 2023-10-02 PROCEDURE — 84484 ASSAY OF TROPONIN QUANT: CPT | Mod: HCNC | Performed by: EMERGENCY MEDICINE

## 2023-10-02 PROCEDURE — 84145 PROCALCITONIN (PCT): CPT | Mod: HCNC | Performed by: INTERNAL MEDICINE

## 2023-10-02 PROCEDURE — 1101F PT FALLS ASSESS-DOCD LE1/YR: CPT | Mod: HCNC,CPTII,S$GLB, | Performed by: INTERNAL MEDICINE

## 2023-10-02 PROCEDURE — 99999 PR PBB SHADOW E&M-EST. PATIENT-LVL V: CPT | Mod: PBBFAC,HCNC,, | Performed by: INTERNAL MEDICINE

## 2023-10-02 PROCEDURE — 99999 PR PBB SHADOW E&M-EST. PATIENT-LVL V: ICD-10-PCS | Mod: PBBFAC,HCNC,, | Performed by: INTERNAL MEDICINE

## 2023-10-02 PROCEDURE — 87070 CULTURE OTHR SPECIMN AEROBIC: CPT | Mod: HCNC | Performed by: NURSE PRACTITIONER

## 2023-10-02 PROCEDURE — 83735 ASSAY OF MAGNESIUM: CPT | Mod: HCNC | Performed by: EMERGENCY MEDICINE

## 2023-10-02 PROCEDURE — 99900035 HC TECH TIME PER 15 MIN (STAT): Mod: HCNC

## 2023-10-02 PROCEDURE — 63600175 PHARM REV CODE 636 W HCPCS: Mod: HCNC | Performed by: NURSE PRACTITIONER

## 2023-10-02 PROCEDURE — 99214 PR OFFICE/OUTPT VISIT, EST, LEVL IV, 30-39 MIN: ICD-10-PCS | Mod: HCNC,S$GLB,, | Performed by: INTERNAL MEDICINE

## 2023-10-02 PROCEDURE — 93010 EKG 12-LEAD: ICD-10-PCS | Mod: HCNC,,, | Performed by: INTERNAL MEDICINE

## 2023-10-02 PROCEDURE — 1101F PR PT FALLS ASSESS DOC 0-1 FALLS W/OUT INJ PAST YR: ICD-10-PCS | Mod: HCNC,CPTII,S$GLB, | Performed by: INTERNAL MEDICINE

## 2023-10-02 PROCEDURE — 93010 ELECTROCARDIOGRAM REPORT: CPT | Mod: HCNC,,, | Performed by: INTERNAL MEDICINE

## 2023-10-02 PROCEDURE — 3008F BODY MASS INDEX DOCD: CPT | Mod: HCNC,CPTII,S$GLB, | Performed by: INTERNAL MEDICINE

## 2023-10-02 PROCEDURE — 80053 COMPREHEN METABOLIC PANEL: CPT | Mod: HCNC | Performed by: EMERGENCY MEDICINE

## 2023-10-02 PROCEDURE — 3074F SYST BP LT 130 MM HG: CPT | Mod: HCNC,CPTII,S$GLB, | Performed by: INTERNAL MEDICINE

## 2023-10-02 PROCEDURE — 20000000 HC ICU ROOM: Mod: HCNC

## 2023-10-02 PROCEDURE — 87205 SMEAR GRAM STAIN: CPT | Mod: HCNC | Performed by: NURSE PRACTITIONER

## 2023-10-02 PROCEDURE — 84443 ASSAY THYROID STIM HORMONE: CPT | Mod: HCNC | Performed by: EMERGENCY MEDICINE

## 2023-10-02 PROCEDURE — 25000003 PHARM REV CODE 250: Mod: HCNC | Performed by: NURSE PRACTITIONER

## 2023-10-02 PROCEDURE — 3288F FALL RISK ASSESSMENT DOCD: CPT | Mod: HCNC,CPTII,S$GLB, | Performed by: INTERNAL MEDICINE

## 2023-10-02 PROCEDURE — 3288F PR FALLS RISK ASSESSMENT DOCUMENTED: ICD-10-PCS | Mod: HCNC,CPTII,S$GLB, | Performed by: INTERNAL MEDICINE

## 2023-10-02 PROCEDURE — 85025 COMPLETE CBC W/AUTO DIFF WBC: CPT | Mod: HCNC | Performed by: EMERGENCY MEDICINE

## 2023-10-02 PROCEDURE — 87186 SC STD MICRODIL/AGAR DIL: CPT | Mod: HCNC | Performed by: NURSE PRACTITIONER

## 2023-10-02 PROCEDURE — 3074F PR MOST RECENT SYSTOLIC BLOOD PRESSURE < 130 MM HG: ICD-10-PCS | Mod: HCNC,CPTII,S$GLB, | Performed by: INTERNAL MEDICINE

## 2023-10-02 PROCEDURE — 3044F HG A1C LEVEL LT 7.0%: CPT | Mod: HCNC,CPTII,S$GLB, | Performed by: INTERNAL MEDICINE

## 2023-10-02 PROCEDURE — 3078F DIAST BP <80 MM HG: CPT | Mod: HCNC,CPTII,S$GLB, | Performed by: INTERNAL MEDICINE

## 2023-10-02 PROCEDURE — 99285 EMERGENCY DEPT VISIT HI MDM: CPT | Mod: 25,HCNC

## 2023-10-02 PROCEDURE — 83880 ASSAY OF NATRIURETIC PEPTIDE: CPT | Mod: HCNC | Performed by: EMERGENCY MEDICINE

## 2023-10-02 PROCEDURE — 85610 PROTHROMBIN TIME: CPT | Mod: HCNC | Performed by: EMERGENCY MEDICINE

## 2023-10-02 PROCEDURE — 36415 COLL VENOUS BLD VENIPUNCTURE: CPT | Mod: HCNC | Performed by: INTERNAL MEDICINE

## 2023-10-02 PROCEDURE — 85730 THROMBOPLASTIN TIME PARTIAL: CPT | Mod: HCNC | Performed by: EMERGENCY MEDICINE

## 2023-10-02 PROCEDURE — 87077 CULTURE AEROBIC IDENTIFY: CPT | Mod: HCNC | Performed by: NURSE PRACTITIONER

## 2023-10-02 PROCEDURE — 3044F PR MOST RECENT HEMOGLOBIN A1C LEVEL <7.0%: ICD-10-PCS | Mod: HCNC,CPTII,S$GLB, | Performed by: INTERNAL MEDICINE

## 2023-10-02 PROCEDURE — 99214 OFFICE O/P EST MOD 30 MIN: CPT | Mod: HCNC,S$GLB,, | Performed by: INTERNAL MEDICINE

## 2023-10-02 PROCEDURE — 3008F PR BODY MASS INDEX (BMI) DOCUMENTED: ICD-10-PCS | Mod: HCNC,CPTII,S$GLB, | Performed by: INTERNAL MEDICINE

## 2023-10-02 RX ORDER — MEMANTINE HYDROCHLORIDE 10 MG/1
10 TABLET ORAL DAILY
Status: DISCONTINUED | OUTPATIENT
Start: 2023-10-03 | End: 2023-10-09 | Stop reason: HOSPADM

## 2023-10-02 RX ORDER — IPRATROPIUM BROMIDE AND ALBUTEROL SULFATE 2.5; .5 MG/3ML; MG/3ML
3 SOLUTION RESPIRATORY (INHALATION) EVERY 6 HOURS PRN
Status: DISCONTINUED | OUTPATIENT
Start: 2023-10-02 | End: 2023-10-09 | Stop reason: HOSPADM

## 2023-10-02 RX ORDER — IBUPROFEN 200 MG
24 TABLET ORAL
Status: DISCONTINUED | OUTPATIENT
Start: 2023-10-02 | End: 2023-10-09 | Stop reason: HOSPADM

## 2023-10-02 RX ORDER — PANTOPRAZOLE SODIUM 40 MG/1
40 TABLET, DELAYED RELEASE ORAL DAILY
Status: DISCONTINUED | OUTPATIENT
Start: 2023-10-03 | End: 2023-10-09 | Stop reason: HOSPADM

## 2023-10-02 RX ORDER — ATORVASTATIN CALCIUM 40 MG/1
80 TABLET, FILM COATED ORAL DAILY
Status: DISCONTINUED | OUTPATIENT
Start: 2023-10-03 | End: 2023-10-09 | Stop reason: HOSPADM

## 2023-10-02 RX ORDER — LEVOFLOXACIN 500 MG/1
500 TABLET, FILM COATED ORAL DAILY
Qty: 10 TABLET | Refills: 0 | Status: ON HOLD | OUTPATIENT
Start: 2023-10-02 | End: 2023-10-09 | Stop reason: HOSPADM

## 2023-10-02 RX ORDER — FAMOTIDINE 20 MG/1
20 TABLET, FILM COATED ORAL DAILY
Status: DISCONTINUED | OUTPATIENT
Start: 2023-10-03 | End: 2023-10-02

## 2023-10-02 RX ORDER — CHLORHEXIDINE GLUCONATE ORAL RINSE 1.2 MG/ML
15 SOLUTION DENTAL 2 TIMES DAILY
Status: DISPENSED | OUTPATIENT
Start: 2023-10-02 | End: 2023-10-07

## 2023-10-02 RX ORDER — GLUCAGON 1 MG
1 KIT INJECTION
Status: DISCONTINUED | OUTPATIENT
Start: 2023-10-02 | End: 2023-10-09 | Stop reason: HOSPADM

## 2023-10-02 RX ORDER — MUPIROCIN 20 MG/G
OINTMENT TOPICAL 2 TIMES DAILY
Status: COMPLETED | OUTPATIENT
Start: 2023-10-02 | End: 2023-10-07

## 2023-10-02 RX ORDER — HYDROCODONE BITARTRATE AND ACETAMINOPHEN 10; 325 MG/1; MG/1
1 TABLET ORAL
Status: ON HOLD | COMMUNITY
Start: 2023-09-24 | End: 2023-12-18

## 2023-10-02 RX ORDER — DONEPEZIL HYDROCHLORIDE 5 MG/1
10 TABLET, FILM COATED ORAL NIGHTLY
Status: DISCONTINUED | OUTPATIENT
Start: 2023-10-02 | End: 2023-10-02

## 2023-10-02 RX ORDER — HEPARIN SODIUM 5000 [USP'U]/ML
5000 INJECTION, SOLUTION INTRAVENOUS; SUBCUTANEOUS EVERY 8 HOURS
Status: DISCONTINUED | OUTPATIENT
Start: 2023-10-02 | End: 2023-10-08

## 2023-10-02 RX ORDER — INSULIN ASPART 100 [IU]/ML
0-10 INJECTION, SOLUTION INTRAVENOUS; SUBCUTANEOUS
Status: DISCONTINUED | OUTPATIENT
Start: 2023-10-02 | End: 2023-10-09 | Stop reason: HOSPADM

## 2023-10-02 RX ORDER — ALBUTEROL SULFATE 1.25 MG/3ML
1.25 SOLUTION RESPIRATORY (INHALATION) 2 TIMES DAILY
Qty: 180 ML | Refills: 11 | Status: SHIPPED | OUTPATIENT
Start: 2023-10-02 | End: 2024-10-01

## 2023-10-02 RX ORDER — IBUPROFEN 200 MG
16 TABLET ORAL
Status: DISCONTINUED | OUTPATIENT
Start: 2023-10-02 | End: 2023-10-09 | Stop reason: HOSPADM

## 2023-10-02 RX ORDER — SODIUM CHLORIDE 9 MG/ML
INJECTION, SOLUTION INTRAVENOUS CONTINUOUS
Status: DISCONTINUED | OUTPATIENT
Start: 2023-10-02 | End: 2023-10-03

## 2023-10-02 RX ORDER — OXYBUTYNIN CHLORIDE 5 MG/1
10 TABLET, EXTENDED RELEASE ORAL DAILY
Status: DISCONTINUED | OUTPATIENT
Start: 2023-10-03 | End: 2023-10-09 | Stop reason: HOSPADM

## 2023-10-02 RX ORDER — SODIUM CHLORIDE 0.9 % (FLUSH) 0.9 %
10 SYRINGE (ML) INJECTION
Status: DISCONTINUED | OUTPATIENT
Start: 2023-10-02 | End: 2023-10-09 | Stop reason: HOSPADM

## 2023-10-02 RX ORDER — FLUOXETINE HYDROCHLORIDE 20 MG/1
20 CAPSULE ORAL DAILY
Status: DISCONTINUED | OUTPATIENT
Start: 2023-10-03 | End: 2023-10-09 | Stop reason: HOSPADM

## 2023-10-02 RX ADMIN — SODIUM CHLORIDE: 9 INJECTION, SOLUTION INTRAVENOUS at 09:10

## 2023-10-02 RX ADMIN — DONEPEZIL HYDROCHLORIDE 10 MG: 5 TABLET, FILM COATED ORAL at 09:10

## 2023-10-02 RX ADMIN — AMPICILLIN AND SULBACTAM 3 G: 2; 1 INJECTION, POWDER, FOR SOLUTION INTRAVENOUS at 06:10

## 2023-10-02 RX ADMIN — HEPARIN SODIUM 5000 UNITS: 5000 INJECTION INTRAVENOUS; SUBCUTANEOUS at 09:10

## 2023-10-02 RX ADMIN — CHLORHEXIDINE GLUCONATE 0.12% ORAL RINSE 15 ML: 1.2 LIQUID ORAL at 09:10

## 2023-10-02 RX ADMIN — MUPIROCIN: 20 OINTMENT TOPICAL at 09:10

## 2023-10-02 RX ADMIN — SODIUM CHLORIDE: 9 INJECTION, SOLUTION INTRAVENOUS at 06:10

## 2023-10-02 NOTE — ASSESSMENT & PLAN NOTE
-patient's chest x-ray concerning for aspiration   -begin Unasyn   -obtain sputum culture if able to produce sample  -we will have ST evaluate patient for recommendations

## 2023-10-02 NOTE — Clinical Note
Patient on 3g Ampicillin q12hr, 2 doses given per ICU. Dr. Lindsey aware, orders to hold 2g ancef IVPB

## 2023-10-02 NOTE — ASSESSMENT & PLAN NOTE
-Sent from pulmonologist's office to the ER 10/2 for evaluation of bradycardia  -EKG reviewed   -cardiology consulted   -echo in the morning   -admit to the ICU for monitoring and telemetry  -NPO after midnight in the event he requires pacemaker placement tomorrow

## 2023-10-02 NOTE — ASSESSMENT & PLAN NOTE
-baseline creatinine 1.5-1.7 per chart review   -creatinine 2.5 at time of admission, daughter reports decreased p.o. intake and some diarrhea   -administer IV fluids and allow p.o. intake  -monitor UOP, RFP, and electrolytes

## 2023-10-02 NOTE — ASSESSMENT & PLAN NOTE
-on Eliquis at home, hold for now as he may need pacemaker  -will provide subcu heparin for prophylaxis currently

## 2023-10-02 NOTE — ED NOTES
Bed: 15  Expected date:   Expected time:   Means of arrival:   Comments:  Hill when clean    Other (Free Text): Patient reports recent red scaly pruritic breakouts on the scattered body since the holiday break, and allergic contact dermatitis is the favored diagnosis.  She will start strict sensitive skin precautions and was provided with systemic and topical steroids for relief. Detail Level: Simple Note Text (......Xxx Chief Complaint.): This diagnosis correlates with the Other (Free Text): Patient will use this temporarily until she receives her script for HTC 2.5% cream.  She understands the risks of atrophy.

## 2023-10-02 NOTE — Clinical Note
The lead was inserted under fluorscopic guidance. The lead was inserted in the right ventricle. A new lead was attached to the right ventricle.

## 2023-10-02 NOTE — SUBJECTIVE & OBJECTIVE
Past Medical History:   Diagnosis Date    Anxiety     Depression     Diabetes mellitus, type 2     Hypertension     Stroke        Past Surgical History:   Procedure Laterality Date    ANGIOGRAPHY OF LOWER EXTREMITY N/A 12/21/2020    Procedure: ANGIOGRAM, LOWER EXTREMITY/Rt leg poss pta/stent;  Surgeon: Todd Michel MD;  Location: [facility] CATH LAB;  Service: Peripheral Vascular;  Laterality: N/A;  rescheduled 12/8    BACK SURGERY  2015    BRONCHOSCOPY Left 3/24/2022    Procedure: Bronchoscopy;  Surgeon: Edward Williamson MD;  Location: [facility] ENDO;  Service: Pulmonary;  Laterality: Left;  poss endobronchial biopsy or brushing    CATARACT EXTRACTION      ESOPHAGOGASTRODUODENOSCOPY N/A 8/24/2022    Procedure: EGD (ESOPHAGOGASTRODUODENOSCOPY);  Surgeon: Ana Gomez MD;  Location: [facility];  Service: Endoscopy;  Laterality: N/A;    ESOPHAGOGASTRODUODENOSCOPY N/A 9/14/2022    Procedure: EGD (ESOPHAGOGASTRODUODENOSCOPY);  Surgeon: Ana Gomez MD;  Location: [facility];  Service: Endoscopy;  Laterality: N/A;    KNEE SURGERY  2012    LITHOTRIPSY  2000       Review of patient's allergies indicates:   Allergen Reactions    Chantix [varenicline] Other (See Comments)     Felt bad       Family History       Problem Relation (Age of Onset)    COPD Sister    Cancer Father    Heart disease Mother    Stroke Mother          Tobacco Use    Smoking status: Every Day     Current packs/day: 1.00     Types: Cigarettes    Smokeless tobacco: Current     Types: Snuff   Substance and Sexual Activity    Alcohol use: Yes     Alcohol/week: 1.0 standard drink of alcohol     Types: 1 Shots of liquor per week     Comment: Daily    Drug use: Never    Sexual activity: Not Currently         Review of Systems   Constitutional:  Positive for activity change, appetite change and fatigue. Negative for fever.   Respiratory:  Positive for cough and shortness of breath. Negative for wheezing.    Cardiovascular:  Negative for chest pain,  palpitations and leg swelling.   Gastrointestinal:  Positive for diarrhea. Negative for abdominal distention, abdominal pain, nausea and vomiting.   Neurological:  Positive for weakness.     Objective:     Vital Signs (Most Recent):  Temp: 98 °F (36.7 °C) (10/02/23 1657)  Pulse: (!) 36 (10/02/23 1755)  Resp: 20 (10/02/23 1755)  BP: (!) 162/72 (10/02/23 1755)  SpO2: 98 % (10/02/23 1755) Vital Signs (24h Range):  Temp:  [98 °F (36.7 °C)] 98 °F (36.7 °C)  Pulse:  [34-38] 36  Resp:  [17-20] 20  SpO2:  [95 %-99 %] 98 %  BP: (128-165)/(72-78) 162/72     Weight: 64.4 kg (142 lb)  Body mass index is 21.59 kg/m².    No intake or output data in the 24 hours ending 10/02/23 1818     Physical Exam  Vitals reviewed.   Constitutional:       General: He is awake. He is not in acute distress.     Appearance: He is well-developed.   Cardiovascular:      Rate and Rhythm: Bradycardia present.      Pulses:           Radial pulses are 2+ on the right side and 2+ on the left side.   Pulmonary:      Effort: Pulmonary effort is normal.      Breath sounds: Normal breath sounds.      Comments: On RA  Abdominal:      General: There is no distension.      Palpations: Abdomen is soft.   Musculoskeletal:      Right lower leg: No edema.      Left lower leg: No edema.      Comments: MCHUGH, right sided weakness from previous stroke    Skin:     General: Skin is warm and dry.   Neurological:      Mental Status: He is alert.      Comments: R sided weakness from previous stroke    Psychiatric:         Behavior: Behavior is cooperative.          Vents:       Lines/Drains/Airways       Peripheral Intravenous Line  Duration                  Peripheral IV - Single Lumen 10/02/23 1715 20 G Anterior;Proximal;Right Forearm <1 day                    Significant Labs:    CBC/Anemia Profile:  Recent Labs   Lab 10/02/23  1711   WBC 11.67   HGB 11.4*   HCT 35.3*      MCV 92   RDW 17.0*        Chemistries:  Recent Labs   Lab 10/02/23  1711      K 3.6    *   CO2 14*   BUN 32*   CREATININE 2.5*   CALCIUM 9.4   ALBUMIN 2.9*   PROT 7.3   BILITOT 0.1   ALKPHOS 208*   ALT 12   AST 22   MG 1.9       All pertinent labs within the past 24 hours have been reviewed.    Significant Imaging:   I have reviewed all pertinent imaging results/findings within the past 24 hours.

## 2023-10-02 NOTE — H&P
[facility] - Emergency Dept.  Critical Care Medicine  History & Physical    Patient Name: Samy Alejandre Jr.  MRN: [id number]  Admission Date: 10/2/2023  Hospital Length of Stay: 0 days  Code Status: Full Code  Attending Physician: Shanon Bridges MD   Primary Care Provider: Damien Barron MD   Principal Problem: Bradycardia    Subjective:     HPI:  73-year-old male with a known past medical history of tobacco abuse, COPD, chronic bronchitis, diabetes type 2, Alzheimer's, and aspiration that presented to the ER 10/2 from his pulmonologist's office after his heart rate was noted to be in the 30s.  History comes from patient's daughter who is at bedside.  She reports over the past 6-8 weeks he has become increasingly weak with decreased activity, decreased appetite, and spells of dizziness.  She reports 1 possible syncopal episode where he fell about 6 weeks ago.  Workup in the ER revealed lab work with a creatinine of 2.5 (creatinine at baseline is 1.5-1.7) and a metabolic acidosis.  EKG in the ER with a rate of 36 and AV dissociation with narrow QRS complex for which cardiology was consulted.  It was recommended that patient be admitted to the ICU for close monitoring overnight with possible pacemaker placement tomorrow.  Critical care team consulted for admission to the ICU and further care management.      Hospital/ICU Course:  No notes on file     Past Medical History:   Diagnosis Date    Anxiety     Depression     Diabetes mellitus, type 2     Hypertension     Stroke        Past Surgical History:   Procedure Laterality Date    ANGIOGRAPHY OF LOWER EXTREMITY N/A 12/21/2020    Procedure: ANGIOGRAM, LOWER EXTREMITY/Rt leg poss pta/stent;  Surgeon: Todd Michel MD;  Location: [facility] CATH LAB;  Service: Peripheral Vascular;  Laterality: N/A;  rescheduled 12/8    BACK SURGERY  2015    BRONCHOSCOPY Left 3/24/2022    Procedure: Bronchoscopy;  Surgeon: Edward Williamson MD;  Location: [facility] ENDO;  Service:  Pulmonary;  Laterality: Left;  poss endobronchial biopsy or brushing    CATARACT EXTRACTION      ESOPHAGOGASTRODUODENOSCOPY N/A 8/24/2022    Procedure: EGD (ESOPHAGOGASTRODUODENOSCOPY);  Surgeon: Ana Gomez MD;  Location: [facility];  Service: Endoscopy;  Laterality: N/A;    ESOPHAGOGASTRODUODENOSCOPY N/A 9/14/2022    Procedure: EGD (ESOPHAGOGASTRODUODENOSCOPY);  Surgeon: Ana Gomez MD;  Location: [facility];  Service: Endoscopy;  Laterality: N/A;    KNEE SURGERY  2012    LITHOTRIPSY  2000       Review of patient's allergies indicates:   Allergen Reactions    Chantix [varenicline] Other (See Comments)     Felt bad       Family History       Problem Relation (Age of Onset)    COPD Sister    Cancer Father    Heart disease Mother    Stroke Mother          Tobacco Use    Smoking status: Every Day     Current packs/day: 1.00     Types: Cigarettes    Smokeless tobacco: Current     Types: Snuff   Substance and Sexual Activity    Alcohol use: Yes     Alcohol/week: 1.0 standard drink of alcohol     Types: 1 Shots of liquor per week     Comment: Daily    Drug use: Never    Sexual activity: Not Currently         Review of Systems   Constitutional:  Positive for activity change, appetite change and fatigue. Negative for fever.   Respiratory:  Positive for cough and shortness of breath. Negative for wheezing.    Cardiovascular:  Negative for chest pain, palpitations and leg swelling.   Gastrointestinal:  Positive for diarrhea. Negative for abdominal distention, abdominal pain, nausea and vomiting.   Neurological:  Positive for weakness.     Objective:     Vital Signs (Most Recent):  Temp: 98 °F (36.7 °C) (10/02/23 1657)  Pulse: (!) 36 (10/02/23 1755)  Resp: 20 (10/02/23 1755)  BP: (!) 162/72 (10/02/23 1755)  SpO2: 98 % (10/02/23 1755) Vital Signs (24h Range):  Temp:  [98 °F (36.7 °C)] 98 °F (36.7 °C)  Pulse:  [34-38] 36  Resp:  [17-20] 20  SpO2:  [95 %-99 %] 98 %  BP: (128-165)/(72-78) 162/72      Weight: 64.4 kg (142 lb)  Body mass index is 21.59 kg/m².    No intake or output data in the 24 hours ending 10/02/23 1818     Physical Exam  Vitals reviewed.   Constitutional:       General: He is awake. He is not in acute distress.     Appearance: He is well-developed.   Cardiovascular:      Rate and Rhythm: Bradycardia present.      Pulses:           Radial pulses are 2+ on the right side and 2+ on the left side.   Pulmonary:      Effort: Pulmonary effort is normal.      Breath sounds: Normal breath sounds.      Comments: On RA  Abdominal:      General: There is no distension.      Palpations: Abdomen is soft.   Musculoskeletal:      Right lower leg: No edema.      Left lower leg: No edema.      Comments: MCHUGH, right sided weakness from previous stroke    Skin:     General: Skin is warm and dry.   Neurological:      Mental Status: He is alert.      Comments: R sided weakness from previous stroke    Psychiatric:         Behavior: Behavior is cooperative.          Vents:       Lines/Drains/Airways       Peripheral Intravenous Line  Duration                  Peripheral IV - Single Lumen 10/02/23 1715 20 G Anterior;Proximal;Right Forearm <1 day                    Significant Labs:    CBC/Anemia Profile:  Recent Labs   Lab 10/02/23  1711   WBC 11.67   HGB 11.4*   HCT 35.3*      MCV 92   RDW 17.0*        Chemistries:  Recent Labs   Lab 10/02/23  1711      K 3.6   *   CO2 14*   BUN 32*   CREATININE 2.5*   CALCIUM 9.4   ALBUMIN 2.9*   PROT 7.3   BILITOT 0.1   ALKPHOS 208*   ALT 12   AST 22   MG 1.9       All pertinent labs within the past 24 hours have been reviewed.    Significant Imaging:   I have reviewed all pertinent imaging results/findings within the past 24 hours.    Assessment/Plan:     Neuro  Mixed Alzheimer's and vascular dementia  -resume home meds as appropriate   -supportive care    History of CVA with residual deficit  -patient with old stroke and residual right-sided weakness    -supportive care, PT/OT    Pulmonary  COPD (chronic obstructive pulmonary disease)  -acute exacerbation   -p.r.n. nebs    Aspiration pneumonia  -patient's chest x-ray concerning for aspiration   -begin Unasyn   -obtain sputum culture if able to produce sample  -we will have ST evaluate patient for recommendations    Cardiac/Vascular  * Bradycardia  -Sent from pulmonologist's office to the ER 10/2 for evaluation of bradycardia  -EKG reviewed   -cardiology consulted   -echo in the morning   -admit to the ICU for monitoring and telemetry  -NPO after midnight in the event he requires pacemaker placement tomorrow    Mixed hyperlipidemia  -resume home med    Renal/  Acute renal failure superimposed on stage 3 chronic kidney disease  -baseline creatinine 1.5-1.7 per chart review   -creatinine 2.5 at time of admission, daughter reports decreased p.o. intake and some diarrhea   -administer IV fluids and allow p.o. intake  -monitor UOP, RFP, and electrolytes    Hematology  History of DVT of lower extremity  -on Eliquis at home, hold for now as he may need pacemaker  -will provide subcu heparin for prophylaxis currently    Endocrine  Controlled type 2 diabetes mellitus with stage 3 chronic kidney disease, without long-term current use of insulin  -SSI   -cardiac diabetic diet       Prophylaxis Measures:  GI ppx: Pantoprazole  VTE ppx: Heparin  Glucose control: Insulin subcutaneous    Code Status: Full Code    Critical Care Time: 42 minutes  Critical secondary to Patient has a condition that poses threat to life and bodily function: bradycardia      Critical care was time spent personally by me on the following activities: development of treatment plan with patient or surrogate and bedside caregivers, discussions with consultants, evaluation of patient's response to treatment, examination of patient, ordering and performing treatments and interventions, ordering and review of laboratory studies, ordering and review of  radiographic studies, pulse oximetry, re-evaluation of patient's condition. This critical care time did not overlap with that of any other provider or involve time for any procedures.     Harry Dalal NP  Critical Care Medicine  [facility] - Emergency Dept.

## 2023-10-02 NOTE — ED PROVIDER NOTES
SCRIBE #1 NOTE: I, Mamta Ruby, am scribing for, and in the presence of, Ida Allen MD. I have scribed the entire note.       History     Chief Complaint   Patient presents with    Bradycardia     Pt seen today at [facility], was sent here due to bradycardia. Pt states only symptom has been dizziness that is intermittent. Pt has been on doxycycline completed course for pneumonia and has chronic anemia.      Review of patient's allergies indicates:   Allergen Reactions    Chantix [varenicline] Other (See Comments)     Felt bad         History of Present Illness     HPI    10/2/2023, 5:04 PM  History obtained from the patient      History of Present Illness: Samy Alejandre Jr. is a 73 y.o. male patient with a PMHx of CVA, HTN, and DM who presents to the Emergency Department for evaluation of bradycardia which has been gradually worsening since Saturday. Pt had a recent pneumonia diagnosis for which he was taking doxycycline but finished his last dose this past Saturday. Pt was seen by Dr. Mckeon for a follow up appointment today and was sent to the ED due to his heart rate being in the 30s. Symptoms are constant and moderate in severity. No mitigating or exacerbating factors reported. Associated sxs include dizziness and diarrhea. Patient denies any fever, chills, nausea, vomiting, and all other sxs at this time. No Prior Tx reported. No further complaints or concerns at this time.       Arrival mode: Personal vehicle  PCP: Damien Barron MD        Past Medical History:  Past Medical History:   Diagnosis Date    Anxiety     Depression     Diabetes mellitus, type 2     Hypertension     Stroke        Past Surgical History:  Past Surgical History:   Procedure Laterality Date    ANGIOGRAPHY OF LOWER EXTREMITY N/A 12/21/2020    Procedure: ANGIOGRAM, LOWER EXTREMITY/Rt leg poss pta/stent;  Surgeon: Todd Michel MD;  Location: [facility] CATH LAB;  Service: Peripheral Vascular;  Laterality: N/A;  rescheduled 12/8     BACK SURGERY  2015    BRONCHOSCOPY Left 3/24/2022    Procedure: Bronchoscopy;  Surgeon: Edward Williamson MD;  Location: [facility];  Service: Pulmonary;  Laterality: Left;  poss endobronchial biopsy or brushing    CATARACT EXTRACTION      ESOPHAGOGASTRODUODENOSCOPY N/A 8/24/2022    Procedure: EGD (ESOPHAGOGASTRODUODENOSCOPY);  Surgeon: Ana Gomez MD;  Location: [facility];  Service: Endoscopy;  Laterality: N/A;    ESOPHAGOGASTRODUODENOSCOPY N/A 9/14/2022    Procedure: EGD (ESOPHAGOGASTRODUODENOSCOPY);  Surgeon: Ana Gomez MD;  Location: [facility];  Service: Endoscopy;  Laterality: N/A;    KNEE SURGERY  2012    LITHOTRIPSY  2000         Family History:  Family History   Problem Relation Age of Onset    Heart disease Mother     Stroke Mother     Cancer Father     COPD Sister        Social History:  Social History     Tobacco Use    Smoking status: Every Day     Current packs/day: 1.00     Types: Cigarettes    Smokeless tobacco: Current     Types: Snuff   Substance and Sexual Activity    Alcohol use: Yes     Alcohol/week: 1.0 standard drink of alcohol     Types: 1 Shots of liquor per week     Comment: Daily    Drug use: Never    Sexual activity: Not Currently        Review of Systems     Review of Systems   Constitutional:  Negative for chills and fever.   HENT:  Negative for sore throat.    Respiratory:  Positive for cough. Negative for shortness of breath.    Cardiovascular:  Negative for chest pain.        (+) Bradycardia   Gastrointestinal:  Negative for nausea and vomiting.   Genitourinary:  Negative for dysuria.   Musculoskeletal:  Negative for back pain.   Skin:  Negative for rash.   Neurological:  Positive for dizziness. Negative for weakness.   Hematological:  Does not bruise/bleed easily.   All other systems reviewed and are negative.       Physical Exam     Initial Vitals [10/02/23 1657]   BP Pulse Resp Temp SpO2   (!) 165/78 (!) 34 20 98 °F (36.7 °C) 99 %      MAP       --           Physical Exam  Nursing Notes and Vital Signs Reviewed.  Constitutional: Patient is in no acute distress. Well-developed and well-nourished.  Head: Atraumatic. Normocephalic.  Eyes: PERRL. EOM intact. Conjunctivae are not pale. No scleral icterus.  ENT: Mucous membranes are moist. Oropharynx is clear and symmetric.    Neck: Supple. Full ROM. No lymphadenopathy.  Cardiovascular: Bradycardic. Regular rhythm. No murmurs, rubs, or gallops. Distal pulses are 2+ and symmetric.  Pulmonary/Chest: No respiratory distress. Clear to auscultation bilaterally. No wheezing or rales.  Abdominal: Soft and non-distended.  There is no tenderness.  No rebound, guarding, or rigidity. Good bowel sounds.  Genitourinary: No CVA tenderness  Musculoskeletal: Moves all extremities. No obvious deformities. No edema. No calf tenderness.  Skin: Warm and dry.  Neurological:  Alert, awake, and appropriate.  Normal speech.  No acute focal neurological deficits are appreciated.  Psychiatric: Normal affect. Good eye contact. Appropriate in content.           ED Course   Critical Care    Date/Time: 10/2/2023 7:35 PM    Performed by: Ida Allen MD  Authorized by: Shanon Bridges MD  Direct patient critical care time: 25 minutes  Additional history critical care time: 8 minutes  Ordering / reviewing critical care time: 10 minutes  Documentation critical care time: 6 minutes  Consulting other physicians critical care time: 6 minutes  Total critical care time (exclusive of procedural time) : 55 minutes  Critical care was necessary to treat or prevent imminent or life-threatening deterioration of the following conditions: cardiac failure, dehydration and renal failure.  Critical care was time spent personally by me on the following activities: blood draw for specimens, development of treatment plan with patient or surrogate, discussions with consultants, discussions with primary provider, interpretation of cardiac output measurements,  "evaluation of patient's response to treatment, examination of patient, obtaining history from patient or surrogate, ordering and performing treatments and interventions, ordering and review of laboratory studies, ordering and review of radiographic studies, pulse oximetry, re-evaluation of patient's condition and review of old charts.        ED Vital Signs:  Vitals:    10/02/23 1657 10/02/23 1755   BP: (!) 165/78 (!) 162/72   Pulse: (!) 34 (!) 36   Resp: 20 20   Temp: 98 °F (36.7 °C)    TempSrc: Oral    SpO2: 99% 98%   Weight: 64.4 kg (142 lb)    Height: 5' 8" (1.727 m)        Abnormal Lab Results:  Labs Reviewed   CBC W/ AUTO DIFFERENTIAL - Abnormal; Notable for the following components:       Result Value    RBC 3.83 (*)     Hemoglobin 11.4 (*)     Hematocrit 35.3 (*)     RDW 17.0 (*)     Gran # (ANC) 8.1 (*)     All other components within normal limits   COMPREHENSIVE METABOLIC PANEL - Abnormal; Notable for the following components:    Chloride 114 (*)     CO2 14 (*)     BUN 32 (*)     Creatinine 2.5 (*)     Albumin 2.9 (*)     Alkaline Phosphatase 208 (*)     eGFR 27 (*)     All other components within normal limits   APTT - Abnormal; Notable for the following components:    aPTT 36.2 (*)     All other components within normal limits   CULTURE, RESPIRATORY   TROPONIN I   B-TYPE NATRIURETIC PEPTIDE   MAGNESIUM   TSH   PROTIME-INR   POCT GLUCOSE MONITORING CONTINUOUS        All Lab Results:  Results for orders placed or performed during the hospital encounter of 10/02/23   CBC auto differential   Result Value Ref Range    WBC 11.67 3.90 - 12.70 K/uL    RBC 3.83 (L) 4.60 - 6.20 M/uL    Hemoglobin 11.4 (L) 14.0 - 18.0 g/dL    Hematocrit 35.3 (L) 40.0 - 54.0 %    MCV 92 82 - 98 fL    MCH 29.8 27.0 - 31.0 pg    MCHC 32.3 32.0 - 36.0 g/dL    RDW 17.0 (H) 11.5 - 14.5 %    Platelets 288 150 - 450 K/uL    MPV 10.2 9.2 - 12.9 fL    Immature Granulocytes 0.3 0.0 - 0.5 %    Gran # (ANC) 8.1 (H) 1.8 - 7.7 K/uL    Immature " Grans (Abs) 0.03 0.00 - 0.04 K/uL    Lymph # 2.4 1.0 - 4.8 K/uL    Mono # 0.6 0.3 - 1.0 K/uL    Eos # 0.4 0.0 - 0.5 K/uL    Baso # 0.08 0.00 - 0.20 K/uL    nRBC 0 0 /100 WBC    Gran % 69.0 38.0 - 73.0 %    Lymph % 20.9 18.0 - 48.0 %    Mono % 5.5 4.0 - 15.0 %    Eosinophil % 3.6 0.0 - 8.0 %    Basophil % 0.7 0.0 - 1.9 %    Differential Method Automated    Comprehensive metabolic panel   Result Value Ref Range    Sodium 141 136 - 145 mmol/L    Potassium 3.6 3.5 - 5.1 mmol/L    Chloride 114 (H) 95 - 110 mmol/L    CO2 14 (L) 23 - 29 mmol/L    Glucose 102 70 - 110 mg/dL    BUN 32 (H) 8 - 23 mg/dL    Creatinine 2.5 (H) 0.5 - 1.4 mg/dL    Calcium 9.4 8.7 - 10.5 mg/dL    Total Protein 7.3 6.0 - 8.4 g/dL    Albumin 2.9 (L) 3.5 - 5.2 g/dL    Total Bilirubin 0.1 0.1 - 1.0 mg/dL    Alkaline Phosphatase 208 (H) 55 - 135 U/L    AST 22 10 - 40 U/L    ALT 12 10 - 44 U/L    eGFR 27 (A) >60 mL/min/1.73 m^2    Anion Gap 13 8 - 16 mmol/L   Troponin I #1   Result Value Ref Range    Troponin I 0.025 0.000 - 0.026 ng/mL   BNP   Result Value Ref Range    BNP 72 0 - 99 pg/mL   Magnesium   Result Value Ref Range    Magnesium 1.9 1.6 - 2.6 mg/dL   TSH   Result Value Ref Range    TSH 1.963 0.400 - 4.000 uIU/mL   Protime-INR   Result Value Ref Range    Prothrombin Time 12.1 9.0 - 12.5 sec    INR 1.2 0.8 - 1.2   APTT   Result Value Ref Range    aPTT 36.2 (H) 21.0 - 32.0 sec         Imaging Results:  Imaging Results              X-Ray Chest AP Portable (Final result)  Result time 10/02/23 17:49:29      Final result by Alexander Lucia MD (10/02/23 17:49:29)                   Impression:      As above.      Electronically signed by: Alexander Lucia  Date:    10/02/2023  Time:    17:49               Narrative:    EXAMINATION:  XR CHEST AP PORTABLE    CLINICAL HISTORY:  Weakness    TECHNIQUE:  Single frontal view of the chest was performed.    COMPARISON:  Multiple priors.    FINDINGS:  Apical scarring.  Right basilar suspected developing  opacity.    Otherwise the lungs are clear, with normal appearance of pulmonary vasculature and no pleural effusion or pneumothorax.    The cardiac silhouette is normal in size. The hilar and mediastinal contours are unremarkable.    Bones are intact.                                                The Emergency Provider reviewed the vital signs and test results, which are outlined above.     ED Discussion       5:34 PM: Discussed pt's case with Dr. Nava (Cardiology) who states patient may need a permanent pacemaker and recommends patient be admitted to the ICU overnight for monitoring. Echo will be done in the morning.    5:37 PM: Discussed case with Shanon Bridges MD (Critical Care Medicine). Dr. Bridges agrees with current care and management of pt and accepts admission.   Admitting Service: Critical Care Medicine  Admitting Physician: Dr. Bridges  Admit to: inpatient    6:12 PM: Re-evaluated pt. I have discussed test results, shared treatment plan, and the need for admission with patient and family at bedside. Pt and family express understanding at this time and agree with all information. All questions answered. Pt and family have no further questions or concerns at this time. Pt is ready for admit.         Medical Decision Making  Bradycardia  Electrolyte abnormality    72 y/o male hx of tobacco abuse, copd, chronic bronchitis, diabetes, seen in pulmonary clinic today by Dr. Morales and was noted to have heart rate in 30s so sent to the ER, patient reports dizzy spells over past several weeks, no chest pain, BP is stable, mentation is normal, not on any beta blockers. Cardiology consulted, reviewed ECG shows AV dissociation with narrow qrs complex, recommends admit to ICU, echo in am, likely will need pacemaker tomorrow, ICU consulted for admission, patient and family updated on plan of care. Of note patient is on Eliquis for hx of DVT, CXR reviewed and normal, lab work otherwise normal.     Amount and/or  Complexity of Data Reviewed  Labs: ordered. Decision-making details documented in ED Course.  Radiology: ordered. Decision-making details documented in ED Course.  ECG/medicine tests: ordered. Decision-making details documented in ED Course.    Risk  Decision regarding hospitalization.    narrow   Additional MDM:   Smoking Cessation: The patient is a smoker. The patient was counseled on smoking cessation for: 4 minutes. The patient was counseled on tobacco related  health complications.           ED Medication(s):  Medications   sodium chloride 0.9% flush 10 mL (has no administration in time range)   mupirocin 2 % ointment (has no administration in time range)   chlorhexidine 0.12 % solution 15 mL (has no administration in time range)   0.9%  NaCl infusion ( Intravenous New Bag 10/2/23 1838)   heparin (porcine) injection 5,000 Units (has no administration in time range)   ampicillin-sulbactam (UNASYN) 3 g in sodium chloride 0.9 % 100 mL IVPB (MB+) (3 g Intravenous New Bag 10/2/23 1838)   glucose chewable tablet 16 g (has no administration in time range)   glucose chewable tablet 24 g (has no administration in time range)   glucagon (human recombinant) injection 1 mg (has no administration in time range)   insulin aspart U-100 pen 0-10 Units (has no administration in time range)   dextrose 10% bolus 125 mL 125 mL (has no administration in time range)   dextrose 10% bolus 250 mL 250 mL (has no administration in time range)   donepeziL tablet 10 mg (has no administration in time range)   FLUoxetine capsule 20 mg (has no administration in time range)   memantine tablet 10 mg (has no administration in time range)   oxybutynin 24 hr tablet 10 mg (has no administration in time range)   pantoprazole EC tablet 40 mg (has no administration in time range)   atorvastatin tablet 80 mg (has no administration in time range)   albuterol-ipratropium 2.5 mg-0.5 mg/3 mL nebulizer solution 3 mL (has no administration in time range)        New Prescriptions    No medications on file               Scribe Attestation:   Scribe #1: I performed the above scribed service and the documentation accurately describes the services I performed. I attest to the accuracy of the note.     Attending:   Physician Attestation Statement for Scribe #1: I, Ida Allen MD, personally performed the services described in this documentation, as scribed by Mamta Ruby, in my presence, and it is both accurate and complete.           Clinical Impression       ICD-10-CM ICD-9-CM   1. Severe sinus bradycardia  R00.1 427.81   2. Bradycardia  R00.1 427.89   3. Weakness  R53.1 780.79   4. Chronic anticoagulation  Z79.01 V58.61   5. Tobacco abuse disorder  Z72.0 305.1       Disposition:   Disposition: Admitted  Condition: Serious         Ida Allen MD  10/02/23 1936

## 2023-10-02 NOTE — PHARMACY MED REC
"Admission Medication History     The home medication history was taken by Jennifer Valente.    You may go to "Admission" then "Reconcile Home Medications" tabs to review and/or act upon these items.     The home medication list has been updated by the Pharmacy department.   Please read ALL comments highlighted in yellow.   Please address this information as you see fit.    Feel free to contact us if you have any questions or require assistance.      The medications listed below were removed from the home medication list. Please reorder if appropriate:  Patient reports no longer taking the following medication(s):  ZANAFLEX 4MG  ZOFRAN 8MG  FLOMAX 0.4MG    Medications listed below were obtained from: Patient/family and Analytic software- DrJoanst, Daughter  (Not in a hospital admission)      Jennifer Valente  [id number]      Current Outpatient Medications on File Prior to Encounter   Medication Sig Dispense Refill Last Dose    albuterol (PROVENTIL/VENTOLIN HFA) 90 mcg/actuation inhaler Inhale 2 puffs into the lungs every 4 (four) hours as needed for Wheezing. 18 g 2 10/2/2023    donepeziL (ARICEPT) 10 MG tablet Take 1 tablet (10 mg total) by mouth every evening. 30 tablet 11 10/1/2023    DULoxetine (CYMBALTA) 30 MG capsule Take 1 capsule by mouth once daily 90 capsule 0 10/2/2023    ELIQUIS 5 mg Tab Take 1 tablet by mouth once daily 90 tablet 3 10/2/2023    FLUoxetine 20 MG capsule Take 1 capsule by mouth once daily 90 capsule 3 10/2/2023    HYDROcodone-acetaminophen (NORCO)  mg per tablet Take 1 tablet by mouth.   10/2/2023 at 12:30pm    memantine (NAMENDA) 10 MG Tab TAKE 1/2 TABLET BY MOUTH TWICE DAILY FOR 1 WEEK, THEN 1 TABLET BY MOUTH TWICE DAILY 180 tablet 3 10/2/2023    pantoprazole (PROTONIX) 40 MG tablet Take 1 tablet (40 mg total) by mouth once daily. 30 tablet 6 10/2/2023    QUEtiapine (SEROQUEL) 50 MG tablet Take 1 tablet by mouth twice daily (Patient taking differently: Take 100 mg by mouth " nightly.) 180 tablet 3 10/1/2023    rosuvastatin (CRESTOR) 20 MG tablet Take 1 tablet by mouth once daily (Patient taking differently: Take 20 mg by mouth every evening.) 90 tablet 1 10/1/2023    albuterol (ACCUNEB) 1.25 mg/3 mL Nebu Take 3 mLs (1.25 mg total) by nebulization 2 (two) times a day. Rescue 180 mL 11     blood glucose control, low (TRUE METRIX LEVEL 1) Soln 1 drop by Misc.(Non-Drug; Combo Route) route once daily. 1 each 4     blood glucose control, normal (TRUE METRIX LEVEL 2) Soln 1 drop by Misc.(Non-Drug; Combo Route) route once daily. 1 each 4     gabapentin (NEURONTIN) 100 MG capsule Take 1 capsule (100 mg total) by mouth 3 (three) times daily. (Patient taking differently: Take 100 mg by mouth 3 (three) times daily. Taken only as needed) 90 capsule 11 Unknown    inhalat.spacing dev,large mask (BREATHERITE SPACER-MASK,ADULT) Spcr Use as directed for inhalation. 1 each 1     lancets 32 gauge Misc 1 lancet by Misc.(Non-Drug; Combo Route) route once daily. 30 each 4     levoFLOXacin (LEVAQUIN) 500 MG tablet Take 1 tablet (500 mg total) by mouth once daily. for 10 days 10 tablet 0     mucus clearing device (AEROBIKA OSCILLATING PEP SYSTM) by Misc.(Non-Drug; Combo Route) route 4 (four) times daily as needed. 1 each 0     oxybutynin (DITROPAN-XL) 10 MG 24 hr tablet Take 1 tablet by mouth once daily (Patient not taking: Reported on 10/2/2023) 90 tablet 1 Not Taking    walker (ULTRA-LIGHT ROLLATOR) Misc Use daily for ambulation 1 each 0                              .

## 2023-10-02 NOTE — Clinical Note
The lead thresholds were tested and was reinserted. The lead was inserted in the right atrium. A new lead was attached to the right atrium.

## 2023-10-02 NOTE — PROGRESS NOTES
Pulmonary Outpatient  Visit     Subjective:       Patient ID: Samy Alejandre Jr. is a 73 y.o. male.    Chief Complaint: Abnormal Ct Scan, Insomnia, and pulmonary cavitary lesion         Samy Alejandre Jr. Is 72 y.o.  Referred by Doris Yanes MD  [id number] [place] [address] [place] [id number]   Last seen March 2022: DVT and Abn chest CT  Grew RANJANA Shah and recently Staph  Treated with Zyvox, 10 days, was unable to tolerate full therapy  Daughter: here  Cough better, Sputum better. Weigh loss: had esophageal dilation:   Needs dilatation  On Elliquis for DVT  Adherent on DOAC      10/02/2023  Followup  Cough, congestion, yellow sputum  Completed course Doxy  Still has cough congestion  Recent chest CT reviewed  Mucous plugging  Daughter also says dizzy sspells bradycardia  Today HR 38          The following portions of the patient's history were reviewed and updated as appropriate: He  has a past medical history of Anxiety, Depression, Diabetes mellitus, type 2, Hypertension, and Stroke.  He does not have any pertinent problems on file.  He  has a past surgical history that includes Knee surgery (2012); Back surgery (2015); Lithotripsy (2000); Angiography of lower extremity (N/A, 12/21/2020); Cataract extraction; Bronchoscopy (Left, 3/24/2022); Esophagogastroduodenoscopy (N/A, 8/24/2022); and Esophagogastroduodenoscopy (N/A, 9/14/2022).  His family history includes COPD in his sister; Cancer in his father; Heart disease in his mother; Stroke in his mother.  He  reports that he has been smoking cigarettes. His smokeless tobacco use includes snuff. He reports current alcohol use of about 1.0 standard drink of alcohol per week. He reports that he does not use drugs.  He has a current medication list which includes the following prescription(s): albuterol, true metrix level 1, blood glucose control, normal, donepezil, duloxetine, eliquis, fluoxetine, gabapentin,  hydrocodone-acetaminophen, hydrocodone-acetaminophen, breatherite spacer-mask,adult, lancets, memantine, ondansetron, oxybutynin, pantoprazole, pantoprazole, quetiapine, rosuvastatin, tamsulosin, tizanidine, ultra-light rollator, albuterol, levofloxacin, and mucus clearing device.  Current Outpatient Medications on File Prior to Visit   Medication Sig Dispense Refill    albuterol (PROVENTIL/VENTOLIN HFA) 90 mcg/actuation inhaler Inhale 2 puffs into the lungs every 4 (four) hours as needed for Wheezing. 18 g 2    blood glucose control, low (TRUE METRIX LEVEL 1) Soln 1 drop by Misc.(Non-Drug; Combo Route) route once daily. 1 each 4    blood glucose control, normal (TRUE METRIX LEVEL 2) Soln 1 drop by Misc.(Non-Drug; Combo Route) route once daily. 1 each 4    donepeziL (ARICEPT) 10 MG tablet Take 1 tablet (10 mg total) by mouth every evening. 30 tablet 11    DULoxetine (CYMBALTA) 30 MG capsule Take 1 capsule by mouth once daily 90 capsule 0    ELIQUIS 5 mg Tab Take 1 tablet by mouth once daily 90 tablet 3    FLUoxetine 20 MG capsule Take 1 capsule by mouth once daily 90 capsule 3    gabapentin (NEURONTIN) 100 MG capsule Take 1 capsule (100 mg total) by mouth 3 (three) times daily. 90 capsule 11    HYDROcodone-acetaminophen (NORCO)  mg per tablet Take 1 tablet by mouth.      HYDROcodone-acetaminophen (NORCO) 5-325 mg per tablet Take 1 tablet by mouth 3 (three) times daily. Increased to 10 3x day      inhalat.spacing dev,large mask (BREATHERITE SPACER-MASK,ADULT) Spcr Use as directed for inhalation. 1 each 1    lancets 32 gauge Misc 1 lancet by Misc.(Non-Drug; Combo Route) route once daily. 30 each 4    memantine (NAMENDA) 10 MG Tab TAKE 1/2 TABLET BY MOUTH TWICE DAILY FOR 1 WEEK, THEN 1 TABLET BY MOUTH TWICE DAILY 180 tablet 3    ondansetron (ZOFRAN) 8 MG tablet Take 1 tablet (8 mg total) by mouth every 8 (eight) hours as needed for Nausea. 30 tablet 1    oxybutynin (DITROPAN-XL) 10 MG 24 hr tablet Take 1 tablet  by mouth once daily 90 tablet 1    pantoprazole (PROTONIX) 40 MG tablet Take 1 tablet by mouth once daily 30 tablet 0    pantoprazole (PROTONIX) 40 MG tablet Take 1 tablet (40 mg total) by mouth once daily. 30 tablet 6    QUEtiapine (SEROQUEL) 50 MG tablet Take 1 tablet by mouth twice daily 180 tablet 3    rosuvastatin (CRESTOR) 20 MG tablet Take 1 tablet by mouth once daily 90 tablet 1    tamsulosin (FLOMAX) 0.4 mg Cap Take 1 capsule by mouth once daily 90 capsule 3    tiZANidine (ZANAFLEX) 4 MG tablet Take 1 tablet by mouth three times daily as needed 270 tablet 0    walker (ULTRA-LIGHT ROLLATOR) Misc Use daily for ambulation 1 each 0     No current facility-administered medications on file prior to visit.     He is allergic to chantix [varenicline]..      Review of Systems   Constitutional:  Positive for weight loss, appetite change and fatigue.   HENT: Negative.     Eyes: Negative.    Respiratory:  Positive for cough.    Genitourinary: Negative.    Endocrine: endocrine negative    Musculoskeletal: Negative.    Skin: Negative.    Gastrointestinal: Negative.    Neurological: Negative.    Psychiatric/Behavioral: Negative.     All other systems reviewed and are negative.      Outpatient Encounter Medications as of 10/2/2023   Medication Sig Dispense Refill    albuterol (PROVENTIL/VENTOLIN HFA) 90 mcg/actuation inhaler Inhale 2 puffs into the lungs every 4 (four) hours as needed for Wheezing. 18 g 2    blood glucose control, low (TRUE METRIX LEVEL 1) Soln 1 drop by Misc.(Non-Drug; Combo Route) route once daily. 1 each 4    blood glucose control, normal (TRUE METRIX LEVEL 2) Soln 1 drop by Misc.(Non-Drug; Combo Route) route once daily. 1 each 4    donepeziL (ARICEPT) 10 MG tablet Take 1 tablet (10 mg total) by mouth every evening. 30 tablet 11    DULoxetine (CYMBALTA) 30 MG capsule Take 1 capsule by mouth once daily 90 capsule 0    ELIQUIS 5 mg Tab Take 1 tablet by mouth once daily 90 tablet 3    FLUoxetine 20 MG  capsule Take 1 capsule by mouth once daily 90 capsule 3    gabapentin (NEURONTIN) 100 MG capsule Take 1 capsule (100 mg total) by mouth 3 (three) times daily. 90 capsule 11    HYDROcodone-acetaminophen (NORCO)  mg per tablet Take 1 tablet by mouth.      HYDROcodone-acetaminophen (NORCO) 5-325 mg per tablet Take 1 tablet by mouth 3 (three) times daily. Increased to 10 3x day      inhalat.spacing dev,large mask (BREATHERITE SPACER-MASK,ADULT) Spcr Use as directed for inhalation. 1 each 1    lancets 32 gauge Misc 1 lancet by Misc.(Non-Drug; Combo Route) route once daily. 30 each 4    memantine (NAMENDA) 10 MG Tab TAKE 1/2 TABLET BY MOUTH TWICE DAILY FOR 1 WEEK, THEN 1 TABLET BY MOUTH TWICE DAILY 180 tablet 3    ondansetron (ZOFRAN) 8 MG tablet Take 1 tablet (8 mg total) by mouth every 8 (eight) hours as needed for Nausea. 30 tablet 1    oxybutynin (DITROPAN-XL) 10 MG 24 hr tablet Take 1 tablet by mouth once daily 90 tablet 1    pantoprazole (PROTONIX) 40 MG tablet Take 1 tablet by mouth once daily 30 tablet 0    pantoprazole (PROTONIX) 40 MG tablet Take 1 tablet (40 mg total) by mouth once daily. 30 tablet 6    QUEtiapine (SEROQUEL) 50 MG tablet Take 1 tablet by mouth twice daily 180 tablet 3    rosuvastatin (CRESTOR) 20 MG tablet Take 1 tablet by mouth once daily 90 tablet 1    tamsulosin (FLOMAX) 0.4 mg Cap Take 1 capsule by mouth once daily 90 capsule 3    tiZANidine (ZANAFLEX) 4 MG tablet Take 1 tablet by mouth three times daily as needed 270 tablet 0    walker (ULTRA-LIGHT ROLLATOR) Misc Use daily for ambulation 1 each 0    albuterol (ACCUNEB) 1.25 mg/3 mL Nebu Take 3 mLs (1.25 mg total) by nebulization 2 (two) times a day. Rescue 180 mL 11    levoFLOXacin (LEVAQUIN) 500 MG tablet Take 1 tablet (500 mg total) by mouth once daily. for 10 days 10 tablet 0    mucus clearing device (AEROBIKA OSCILLATING PEP SYSTM) by Misc.(Non-Drug; Combo Route) route 4 (four) times daily as needed. 1 each 0     No  "facility-administered encounter medications on file as of 10/2/2023.       Objective:     Vital Signs (Most Recent)  Vital Signs  Pulse: (!) 38  Resp: 17  SpO2: 95 %  BP: 128/74  Height and Weight  Height: 5' 8" (172.7 cm)  Weight: 67.1 kg (148 lb)  BSA (Calculated - sq m): 1.79 sq meters  BMI (Calculated): 22.5  Weight in (lb) to have BMI = 25: 164.1]  Wt Readings from Last 2 Encounters:   10/02/23 67.1 kg (148 lb)   09/20/23 64.4 kg (142 lb 1.4 oz)       Physical Exam   Constitutional: He is oriented to person, place, and time. He appears cachectic.   HENT:   Head: Normocephalic.   Cardiovascular: Normal rate and regular rhythm.   No murmur heard.  Pulmonary/Chest: Normal expansion.   Abdominal: Bowel sounds are normal.   Musculoskeletal:         General: Normal range of motion.      Cervical back: Normal range of motion.      Comments: wheelchair   Lymphadenopathy:     He has no axillary adenopathy.   Neurological: He is alert and oriented to person, place, and time.   Skin: Skin is warm.   Psychiatric: He has a normal mood and affect.   Nursing note and vitals reviewed.      Laboratory  Lab Results   Component Value Date    WBC 8.70 08/04/2023    RBC 3.58 (L) 08/04/2023    HGB 10.9 (L) 08/04/2023    HCT 35.8 (L) 08/04/2023     (H) 08/04/2023    MCH 30.4 08/04/2023    MCHC 30.4 (L) 08/04/2023    RDW 17.0 (H) 08/04/2023     08/04/2023    MPV 11.5 08/04/2023    GRAN 3.3 08/04/2023    GRAN 37.7 (L) 08/04/2023    LYMPH 3.3 08/04/2023    LYMPH 37.9 08/04/2023    MONO 0.8 08/04/2023    MONO 9.7 08/04/2023    EOS 1.1 (H) 08/04/2023    BASO 0.12 08/04/2023    EOSINOPHIL 13.1 (H) 08/04/2023    BASOPHIL 1.4 08/04/2023       BMP  Lab Results   Component Value Date     08/04/2023    K 4.3 08/04/2023     08/04/2023    CO2 20 (L) 08/04/2023    BUN 19 08/04/2023    CREATININE 1.7 (H) 08/04/2023    CALCIUM 9.1 08/04/2023    ANIONGAP 13 08/04/2023    ESTGFRAFRICA >60 07/31/2022    EGFRNONAA >60 " "07/31/2022    AST 21 08/04/2023    ALT 10 08/04/2023    PROT 6.8 08/04/2023       Lab Results   Component Value Date    BNP 20 07/31/2022    BNP 23 03/20/2022       Lab Results   Component Value Date    TSH 2.724 03/20/2022       Lab Results   Component Value Date    SEDRATE 70 (H) 03/21/2022       Lab Results   Component Value Date    .2 (H) 03/21/2022     No results found for: "IGE"     No results found for: "ASPERGILLUS"  No results found for: "AFUMIGATUSCL"     No results found for: "ACE"     Diagnostic Results:  I have personally reviewed today the following studies:             CT Chest Without Contrast  Narrative: EXAMINATION:  CT CHEST WITHOUT CONTRAST    CLINICAL HISTORY:  chronic cough; Cough, unspecified    TECHNIQUE:  Low dose axial images, sagittal and coronal reformations were obtained from the thoracic inlet to the lung bases. Contrast was not administered.  All CT scans at this location are performed using dose modulation techniques as appropriate to a performed exam including the following: Automated exposure control; adjustment of the mA and/or kV  according to patient size.    COMPARISON:  07/22/2022    FINDINGS:  Base of Neck: No significant abnormality.    Thoracic soft tissues: Normal.    Aorta: No aneurysm.  Advanced atherosclerotic calcification.    Heart: Normal size. No effusion. Severe aortic and coronary artery atherosclerotic calcification.    Pulmonary vasculature: Pulmonary arteries distribute normally.    Racquel/Mediastinum: No pathologic catrachita enlargement.    Esophagus: Small moderate sliding hiatal hernia.    Upper Abdomen: Innumerable bilateral nonobstructing renal stones.  Unchanged right upper pole simple renal cyst.    Airways: Generalized wall thickening of airways most notable in the right lower lobe indicating airway inflammation.    Lungs/Pleura: Diffuse tree in bud and centrilobular micronodularity suspicious for atypical infection with endobronchial spread of disease, " possibly mycobacterium.  Anterior left apex and superior segment left lower lobe cavitary lesions again noted, with near complete resolution of soft tissue thickening component.  Recommend continued CT follow-up.    Bones: No acute fracture. No suspicious lytic or sclerotic lesions.  Impression: Diffuse tree in bud and centrilobular micronodularity suspicious for atypical infection with endobronchial spread of disease, possibly mycobacterium.  Anterior left apex and superior segment left lower lobe cavitary lesions again noted, with near complete resolution of soft tissue thickening component. Recommend continued CT follow-up.    Generalized wall thickening of airways most notable in the right lower lobe indicating airway inflammation.    Electronically signed by: Prakash Jensen  Date:    09/29/2023  Time:    15:45              Assessment/Plan:     Problem List Items Addressed This Visit       Controlled type 2 diabetes mellitus with stage 3 chronic kidney disease, without long-term current use of insulin    Mixed hyperlipidemia    Mixed Alzheimer's and vascular dementia     Continue on going care         Abnormal chest CT    Mucoid impaction of bronchi - Primary     Aerobika  Abx  Suspect silent aspiration         Relevant Medications    levoFLOXacin (LEVAQUIN) 500 MG tablet    albuterol (ACCUNEB) 1.25 mg/3 mL Nebu    mucus clearing device (AEROBIKA OSCILLATING PEP SYSTM)    Other Relevant Orders    AFB Culture & Smear    AFB Culture & Smear    AFB Culture & Smear    Culture, Respiratory with Gram Stain    Culture, Respiratory with Gram Stain    Culture, Respiratory with Gram Stain    NEBULIZER KIT (SUPPLIES) FOR HOME USE    NEBULIZER FOR HOME USE    Ambulatory referral/consult to Pulmonary Disease Management w/ Respiratory Therapist    Cylindrical bronchiectasis     Added Aerobika and nebulizer  Sputum culture         Relevant Medications    levoFLOXacin (LEVAQUIN) 500 MG tablet    albuterol (ACCUNEB) 1.25 mg/3 mL  Nebu    mucus clearing device (AEROBIKA OSCILLATING PEP SYSTM)    Other Relevant Orders    AFB Culture & Smear    AFB Culture & Smear    AFB Culture & Smear    Culture, Respiratory with Gram Stain    Culture, Respiratory with Gram Stain    Culture, Respiratory with Gram Stain    NEBULIZER KIT (SUPPLIES) FOR HOME USE    NEBULIZER FOR HOME USE    Ambulatory referral/consult to Pulmonary Disease Management w/ Respiratory Therapist    Bradycardia     EKG today  If symptomatic proceed to ER         Relevant Orders    EKG 12-lead         Repeat bronch may be considered if decline  May be having recurrent aspiration  Sent to ER for eval of bradycardia    Follow up in about 4 weeks (around 10/30/2023), or ekg today, Sputum, AFB, Abx, Aerokiba, nebulizer.    This note was prepared using voice recognition system and is likely to have sound alike errors that may have been overlooked even after proof reading.  Please call me with any questions    Discussed diagnosis, its evaluation, treatment and usual course. All questions answered.      Simon Mckeon MD

## 2023-10-03 PROBLEM — I44.2 COMPLETE HEART BLOCK: Status: ACTIVE | Noted: 2023-10-03

## 2023-10-03 LAB
ANION GAP SERPL CALC-SCNC: 13 MMOL/L (ref 8–16)
AORTIC ROOT ANNULUS: 3.94 CM
ASCENDING AORTA: 3.19 CM
AV INDEX (PROSTH): 0.84
AV MEAN GRADIENT: 6 MMHG
AV PEAK GRADIENT: 11 MMHG
AV VALVE AREA BY VELOCITY RATIO: 2.01 CM²
AV VALVE AREA: 2.46 CM²
AV VELOCITY RATIO: 0.69
BASOPHILS # BLD AUTO: 0.08 K/UL (ref 0–0.2)
BASOPHILS NFR BLD: 0.8 % (ref 0–1.9)
BSA FOR ECHO PROCEDURE: 1.71 M2
BUN SERPL-MCNC: 28 MG/DL (ref 8–23)
CALCIUM SERPL-MCNC: 8.6 MG/DL (ref 8.7–10.5)
CHLORIDE SERPL-SCNC: 115 MMOL/L (ref 95–110)
CO2 SERPL-SCNC: 16 MMOL/L (ref 23–29)
CREAT SERPL-MCNC: 2.2 MG/DL (ref 0.5–1.4)
CV ECHO LV RWT: 0.49 CM
DIFFERENTIAL METHOD: ABNORMAL
DOP CALC AO PEAK VEL: 1.67 M/S
DOP CALC AO VTI: 38.5 CM
DOP CALC LVOT AREA: 2.9 CM2
DOP CALC LVOT DIAMETER: 1.93 CM
DOP CALC LVOT PEAK VEL: 1.15 M/S
DOP CALC LVOT STROKE VOLUME: 94.74 CM3
DOP CALC RVOT PEAK VEL: 0.99 M/S
DOP CALC RVOT VTI: 20.2 CM
DOP CALCLVOT PEAK VEL VTI: 32.4 CM
E WAVE DECELERATION TIME: 155.3 MSEC
E/A RATIO: 1.02
E/E' RATIO: 10.64 M/S
ECHO LV POSTERIOR WALL: 1.09 CM (ref 0.6–1.1)
EOSINOPHIL # BLD AUTO: 0.4 K/UL (ref 0–0.5)
EOSINOPHIL NFR BLD: 3.8 % (ref 0–8)
ERYTHROCYTE [DISTWIDTH] IN BLOOD BY AUTOMATED COUNT: 17.2 % (ref 11.5–14.5)
EST. GFR  (NO RACE VARIABLE): 31 ML/MIN/1.73 M^2
FRACTIONAL SHORTENING: 36 % (ref 28–44)
GLUCOSE SERPL-MCNC: 104 MG/DL (ref 70–110)
HCT VFR BLD AUTO: 29 % (ref 40–54)
HGB BLD-MCNC: 9.5 G/DL (ref 14–18)
IMM GRANULOCYTES # BLD AUTO: 0.04 K/UL (ref 0–0.04)
IMM GRANULOCYTES NFR BLD AUTO: 0.4 % (ref 0–0.5)
INTERVENTRICULAR SEPTUM: 1.1 CM (ref 0.6–1.1)
IVC DIAMETER: 1.8 CM
LA MAJOR: 4.77 CM
LA MINOR: 4.9 CM
LA WIDTH: 3.6 CM
LEFT ATRIUM SIZE: 4.64 CM
LEFT ATRIUM VOLUME INDEX: 39.9 ML/M2
LEFT ATRIUM VOLUME: 68.64 CM3
LEFT INTERNAL DIMENSION IN SYSTOLE: 2.85 CM (ref 2.1–4)
LEFT VENTRICLE DIASTOLIC VOLUME INDEX: 52.38 ML/M2
LEFT VENTRICLE DIASTOLIC VOLUME: 90.1 ML
LEFT VENTRICLE MASS INDEX: 99 G/M2
LEFT VENTRICLE SYSTOLIC VOLUME INDEX: 17.9 ML/M2
LEFT VENTRICLE SYSTOLIC VOLUME: 30.85 ML
LEFT VENTRICULAR INTERNAL DIMENSION IN DIASTOLE: 4.45 CM (ref 3.5–6)
LEFT VENTRICULAR MASS: 170.86 G
LV LATERAL E/E' RATIO: 14.9 M/S
LV SEPTAL E/E' RATIO: 8.28 M/S
LVOT MG: 3.53 MMHG
LVOT MV: 0.93 CM/S
LYMPHOCYTES # BLD AUTO: 2.4 K/UL (ref 1–4.8)
LYMPHOCYTES NFR BLD: 22.2 % (ref 18–48)
MAGNESIUM SERPL-MCNC: 1.7 MG/DL (ref 1.6–2.6)
MCH RBC QN AUTO: 30.2 PG (ref 27–31)
MCHC RBC AUTO-ENTMCNC: 32.8 G/DL (ref 32–36)
MCV RBC AUTO: 92 FL (ref 82–98)
MONOCYTES # BLD AUTO: 0.6 K/UL (ref 0.3–1)
MONOCYTES NFR BLD: 6 % (ref 4–15)
MV PEAK A VEL: 1.46 M/S
MV PEAK E VEL: 1.49 M/S
MV STENOSIS PRESSURE HALF TIME: 45.04 MS
MV VALVE AREA P 1/2 METHOD: 4.88 CM2
NEUTROPHILS # BLD AUTO: 7.1 K/UL (ref 1.8–7.7)
NEUTROPHILS NFR BLD: 66.8 % (ref 38–73)
NRBC BLD-RTO: 0 /100 WBC
PISA MRMAX VEL: 4.81 M/S
PISA TR MAX VEL: 1.98 M/S
PLATELET # BLD AUTO: 292 K/UL (ref 150–450)
PMV BLD AUTO: 10.8 FL (ref 9.2–12.9)
POCT GLUCOSE: 116 MG/DL (ref 70–110)
POCT GLUCOSE: 122 MG/DL (ref 70–110)
POCT GLUCOSE: 184 MG/DL (ref 70–110)
POCT GLUCOSE: 98 MG/DL (ref 70–110)
POTASSIUM SERPL-SCNC: 3.4 MMOL/L (ref 3.5–5.1)
PV MEAN GRADIENT: 2 MMHG
RA MAJOR: 3.53 CM
RA PRESSURE ESTIMATED: 8 MMHG
RA WIDTH: 2.8 CM
RBC # BLD AUTO: 3.15 M/UL (ref 4.6–6.2)
RV TB RVSP: 10 MMHG
SODIUM SERPL-SCNC: 144 MMOL/L (ref 136–145)
STJ: 3.42 CM
TDI LATERAL: 0.1 M/S
TDI SEPTAL: 0.18 M/S
TDI: 0.14 M/S
TR MAX PG: 16 MMHG
TR MEAN GRADIENT: 18 MMHG
TRICUSPID ANNULAR PLANE SYSTOLIC EXCURSION: 1.98 CM
TV REST PULMONARY ARTERY PRESSURE: 24 MMHG
WBC # BLD AUTO: 10.6 K/UL (ref 3.9–12.7)
Z-SCORE OF LEFT VENTRICULAR DIMENSION IN END DIASTOLE: -0.7
Z-SCORE OF LEFT VENTRICULAR DIMENSION IN END SYSTOLE: -0.28

## 2023-10-03 PROCEDURE — 97530 THERAPEUTIC ACTIVITIES: CPT | Mod: HCNC

## 2023-10-03 PROCEDURE — 97162 PT EVAL MOD COMPLEX 30 MIN: CPT | Mod: HCNC

## 2023-10-03 PROCEDURE — 83735 ASSAY OF MAGNESIUM: CPT | Mod: HCNC | Performed by: NURSE PRACTITIONER

## 2023-10-03 PROCEDURE — 92610 EVALUATE SWALLOWING FUNCTION: CPT | Mod: HCNC

## 2023-10-03 PROCEDURE — 93010 EKG 12-LEAD: ICD-10-PCS | Mod: HCNC,,, | Performed by: INTERNAL MEDICINE

## 2023-10-03 PROCEDURE — 99223 1ST HOSP IP/OBS HIGH 75: CPT | Mod: HCNC,25,, | Performed by: INTERNAL MEDICINE

## 2023-10-03 PROCEDURE — 36415 COLL VENOUS BLD VENIPUNCTURE: CPT | Mod: HCNC | Performed by: NURSE PRACTITIONER

## 2023-10-03 PROCEDURE — 97166 OT EVAL MOD COMPLEX 45 MIN: CPT | Mod: HCNC

## 2023-10-03 PROCEDURE — 25000003 PHARM REV CODE 250: Mod: HCNC | Performed by: SPECIALIST

## 2023-10-03 PROCEDURE — 80048 BASIC METABOLIC PNL TOTAL CA: CPT | Mod: HCNC | Performed by: NURSE PRACTITIONER

## 2023-10-03 PROCEDURE — 85025 COMPLETE CBC W/AUTO DIFF WBC: CPT | Mod: HCNC | Performed by: NURSE PRACTITIONER

## 2023-10-03 PROCEDURE — 20000000 HC ICU ROOM: Mod: HCNC

## 2023-10-03 PROCEDURE — 93010 ELECTROCARDIOGRAM REPORT: CPT | Mod: HCNC,,, | Performed by: INTERNAL MEDICINE

## 2023-10-03 PROCEDURE — 97535 SELF CARE MNGMENT TRAINING: CPT | Mod: HCNC

## 2023-10-03 PROCEDURE — 63600175 PHARM REV CODE 636 W HCPCS: Mod: HCNC | Performed by: NURSE PRACTITIONER

## 2023-10-03 PROCEDURE — 99223 PR INITIAL HOSPITAL CARE,LEVL III: ICD-10-PCS | Mod: HCNC,25,, | Performed by: INTERNAL MEDICINE

## 2023-10-03 PROCEDURE — 92507 TX SP LANG VOICE COMM INDIV: CPT | Mod: HCNC

## 2023-10-03 PROCEDURE — 93005 ELECTROCARDIOGRAM TRACING: CPT | Mod: HCNC

## 2023-10-03 PROCEDURE — 63600175 PHARM REV CODE 636 W HCPCS: Mod: HCNC | Performed by: SPECIALIST

## 2023-10-03 PROCEDURE — 25000003 PHARM REV CODE 250: Mod: HCNC | Performed by: NURSE PRACTITIONER

## 2023-10-03 RX ORDER — TALC
6 POWDER (GRAM) TOPICAL NIGHTLY PRN
Status: DISCONTINUED | OUTPATIENT
Start: 2023-10-03 | End: 2023-10-09 | Stop reason: HOSPADM

## 2023-10-03 RX ORDER — HYDROCODONE BITARTRATE AND ACETAMINOPHEN 7.5; 325 MG/1; MG/1
1 TABLET ORAL EVERY 6 HOURS PRN
Status: DISCONTINUED | OUTPATIENT
Start: 2023-10-03 | End: 2023-10-06

## 2023-10-03 RX ORDER — ACETAMINOPHEN 325 MG/1
650 TABLET ORAL EVERY 6 HOURS PRN
Status: DISCONTINUED | OUTPATIENT
Start: 2023-10-03 | End: 2023-10-09 | Stop reason: HOSPADM

## 2023-10-03 RX ORDER — MAGNESIUM SULFATE HEPTAHYDRATE 40 MG/ML
2 INJECTION, SOLUTION INTRAVENOUS ONCE
Status: COMPLETED | OUTPATIENT
Start: 2023-10-03 | End: 2023-10-03

## 2023-10-03 RX ADMIN — HEPARIN SODIUM 5000 UNITS: 5000 INJECTION INTRAVENOUS; SUBCUTANEOUS at 02:10

## 2023-10-03 RX ADMIN — SODIUM BICARBONATE: 84 INJECTION, SOLUTION INTRAVENOUS at 12:10

## 2023-10-03 RX ADMIN — PANTOPRAZOLE SODIUM 40 MG: 40 TABLET, DELAYED RELEASE ORAL at 08:10

## 2023-10-03 RX ADMIN — ATORVASTATIN CALCIUM 80 MG: 40 TABLET, FILM COATED ORAL at 08:10

## 2023-10-03 RX ADMIN — OXYBUTYNIN CHLORIDE 10 MG: 5 TABLET, EXTENDED RELEASE ORAL at 09:10

## 2023-10-03 RX ADMIN — HYDROCODONE BITARTRATE AND ACETAMINOPHEN 1 TABLET: 7.5; 325 TABLET ORAL at 08:10

## 2023-10-03 RX ADMIN — SODIUM BICARBONATE: 84 INJECTION, SOLUTION INTRAVENOUS at 06:10

## 2023-10-03 RX ADMIN — CHLORHEXIDINE GLUCONATE 0.12% ORAL RINSE 15 ML: 1.2 LIQUID ORAL at 08:10

## 2023-10-03 RX ADMIN — MEMANTINE 10 MG: 10 TABLET ORAL at 08:10

## 2023-10-03 RX ADMIN — INSULIN ASPART 2 UNITS: 100 INJECTION, SOLUTION INTRAVENOUS; SUBCUTANEOUS at 11:10

## 2023-10-03 RX ADMIN — MAGNESIUM SULFATE HEPTAHYDRATE 2 G: 40 INJECTION, SOLUTION INTRAVENOUS at 06:10

## 2023-10-03 RX ADMIN — Medication 6 MG: at 08:10

## 2023-10-03 RX ADMIN — MUPIROCIN: 20 OINTMENT TOPICAL at 09:10

## 2023-10-03 RX ADMIN — HYDROCODONE BITARTRATE AND ACETAMINOPHEN 1 TABLET: 7.5; 325 TABLET ORAL at 02:10

## 2023-10-03 RX ADMIN — AMPICILLIN AND SULBACTAM 3 G: 2; 1 INJECTION, POWDER, FOR SOLUTION INTRAVENOUS at 03:10

## 2023-10-03 RX ADMIN — AMPICILLIN AND SULBACTAM 3 G: 2; 1 INJECTION, POWDER, FOR SOLUTION INTRAVENOUS at 05:10

## 2023-10-03 RX ADMIN — HEPARIN SODIUM 5000 UNITS: 5000 INJECTION INTRAVENOUS; SUBCUTANEOUS at 10:10

## 2023-10-03 RX ADMIN — POTASSIUM BICARBONATE 20 MEQ: 391 TABLET, EFFERVESCENT ORAL at 06:10

## 2023-10-03 RX ADMIN — MUPIROCIN: 20 OINTMENT TOPICAL at 08:10

## 2023-10-03 RX ADMIN — HEPARIN SODIUM 5000 UNITS: 5000 INJECTION INTRAVENOUS; SUBCUTANEOUS at 06:10

## 2023-10-03 RX ADMIN — FLUOXETINE 20 MG: 20 CAPSULE ORAL at 08:10

## 2023-10-03 NOTE — H&P (VIEW-ONLY)
[facility] ([facility]  Cardiology  Consult Note    Patient Name: Samy Alejandre Jr.  MRN: [id number]  Admission Date: 10/2/2023  Hospital Length of Stay: 1 days  Code Status: Full Code   Attending Provider: Shanon Bridges MD   Consulting Provider: Delonte Lindsey MD  Primary Care Physician: Damien Barron MD  Principal Problem:Bradycardia    Patient information was obtained from patient, relative(s), past medical records and ER records.     Inpatient consult to Cardiology  Consult performed by: Delonte Lindsey MD  Consult ordered by: Ida Allen MD  Reason for consult: Complete heart block        Subjective:     Chief Complaint:  Complete heart block    HPI:   73-year-old male with a known past medical history of tobacco abuse, COPD, chronic bronchitis, diabetes type 2, Alzheimer's, and aspiration that presented to the ER 10/2 from his pulmonologist's office after his heart rate was noted to be in the 30s.  History comes from patient's daughter who is at bedside.  She reports over the past 6-8 weeks he has become increasingly weak with decreased activity, decreased appetite, and spells of dizziness.  She reports 1 possible syncopal episode where he fell about 6 weeks ago.  Workup in the ER revealed lab work with a creatinine of 2.5 (creatinine at baseline is 1.5-1.7) and a metabolic acidosis.  EKG in the ER with a rate of 36 and AV dissociation with narrow QRS complex for which cardiology was consulted.  It was recommended that patient be admitted to the ICU for close monitoring overnight with possible pacemaker placement tomorrow.  Critical care team consulted for admission to the ICU and further care management.     10.3.2023  third-degree AV block.  Took Eliquis yesterday morning.  NPO after midnight.  Plan for dual-chamber pacemaker in the morning.    Discussed with EP.  Discussed with daughter on the phone.  Patient is oriented and alert.  Mild history of vascular dementia as per  the daughter who is a nurse.  Ms. Aponte called her On the phone.      Past Medical History:   Diagnosis Date    Anxiety     Depression     Diabetes mellitus, type 2     Hypertension     Stroke        Past Surgical History:   Procedure Laterality Date    ANGIOGRAPHY OF LOWER EXTREMITY N/A 12/21/2020    Procedure: ANGIOGRAM, LOWER EXTREMITY/Rt leg poss pta/stent;  Surgeon: Todd Michel MD;  Location: [facility] CATH LAB;  Service: Peripheral Vascular;  Laterality: N/A;  rescheduled 12/8    BACK SURGERY  2015    BRONCHOSCOPY Left 3/24/2022    Procedure: Bronchoscopy;  Surgeon: Edward Williamson MD;  Location: [facility] ENDO;  Service: Pulmonary;  Laterality: Left;  poss endobronchial biopsy or brushing    CATARACT EXTRACTION      ESOPHAGOGASTRODUODENOSCOPY N/A 8/24/2022    Procedure: EGD (ESOPHAGOGASTRODUODENOSCOPY);  Surgeon: Ana Gomez MD;  Location: [facility];  Service: Endoscopy;  Laterality: N/A;    ESOPHAGOGASTRODUODENOSCOPY N/A 9/14/2022    Procedure: EGD (ESOPHAGOGASTRODUODENOSCOPY);  Surgeon: Ana Gomez MD;  Location: [facility];  Service: Endoscopy;  Laterality: N/A;    KNEE SURGERY  2012    LITHOTRIPSY  2000       Review of patient's allergies indicates:   Allergen Reactions    Chantix [varenicline] Other (See Comments)     Felt bad       No current facility-administered medications on file prior to encounter.     Current Outpatient Medications on File Prior to Encounter   Medication Sig    albuterol (PROVENTIL/VENTOLIN HFA) 90 mcg/actuation inhaler Inhale 2 puffs into the lungs every 4 (four) hours as needed for Wheezing.    donepeziL (ARICEPT) 10 MG tablet Take 1 tablet (10 mg total) by mouth every evening.    DULoxetine (CYMBALTA) 30 MG capsule Take 1 capsule by mouth once daily    ELIQUIS 5 mg Tab Take 1 tablet by mouth once daily    FLUoxetine 20 MG capsule Take 1 capsule by mouth once daily    HYDROcodone-acetaminophen (NORCO)  mg per tablet Take 1 tablet by mouth.     memantine (NAMENDA) 10 MG Tab TAKE 1/2 TABLET BY MOUTH TWICE DAILY FOR 1 WEEK, THEN 1 TABLET BY MOUTH TWICE DAILY    pantoprazole (PROTONIX) 40 MG tablet Take 1 tablet (40 mg total) by mouth once daily.    QUEtiapine (SEROQUEL) 50 MG tablet Take 1 tablet by mouth twice daily (Patient taking differently: Take 100 mg by mouth nightly.)    rosuvastatin (CRESTOR) 20 MG tablet Take 1 tablet by mouth once daily (Patient taking differently: Take 20 mg by mouth every evening.)    albuterol (ACCUNEB) 1.25 mg/3 mL Nebu Take 3 mLs (1.25 mg total) by nebulization 2 (two) times a day. Rescue    blood glucose control, low (TRUE METRIX LEVEL 1) Soln 1 drop by Misc.(Non-Drug; Combo Route) route once daily.    blood glucose control, normal (TRUE METRIX LEVEL 2) Soln 1 drop by Misc.(Non-Drug; Combo Route) route once daily.    gabapentin (NEURONTIN) 100 MG capsule Take 1 capsule (100 mg total) by mouth 3 (three) times daily. (Patient taking differently: Take 100 mg by mouth 3 (three) times daily. Taken only as needed)    inhalat.spacing dev,large mask (BREATHERITE SPACER-MASK,ADULT) Spcr Use as directed for inhalation.    lancets 32 gauge Misc 1 lancet by Misc.(Non-Drug; Combo Route) route once daily.    levoFLOXacin (LEVAQUIN) 500 MG tablet Take 1 tablet (500 mg total) by mouth once daily. for 10 days    mucus clearing device (AEROBIKA OSCILLATING PEP SYSTM) by Misc.(Non-Drug; Combo Route) route 4 (four) times daily as needed.    oxybutynin (DITROPAN-XL) 10 MG 24 hr tablet Take 1 tablet by mouth once daily (Patient not taking: Reported on 10/2/2023)    walker (ULTRA-LIGHT ROLLATOR) Misc Use daily for ambulation     Family History       Problem Relation (Age of Onset)    COPD Sister    Cancer Father    Heart disease Mother    Stroke Mother          Tobacco Use    Smoking status: Every Day     Current packs/day: 1.00     Types: Cigarettes    Smokeless tobacco: Current     Types: Snuff   Substance and Sexual Activity     Alcohol use: Yes     Alcohol/week: 1.0 standard drink of alcohol     Types: 1 Shots of liquor per week     Comment: Daily    Drug use: Never    Sexual activity: Not Currently     Review of Systems   Cardiovascular:  Negative for chest pain, dyspnea on exertion, palpitations and syncope.   Genitourinary: Negative.    Neurological: Negative.      Objective:     Vital Signs (Most Recent):  Temp: 97.8 °F (36.6 °C) (10/03/23 1115)  Pulse: (!) 39 (10/03/23 1300)  Resp: 19 (10/03/23 1300)  BP: (!) 157/110 (10/03/23 1300)  SpO2: 97 % (10/03/23 1300) Vital Signs (24h Range):  Temp:  [97.8 °F (36.6 °C)-98.9 °F (37.2 °C)] 97.8 °F (36.6 °C)  Pulse:  [33-44] 39  Resp:  [14-40] 19  SpO2:  [86 %-100 %] 97 %  BP: (122-178)/() 157/110     Weight: 60.8 kg (134 lb)  Body mass index is 20.37 kg/m².    SpO2: 97 %         Intake/Output Summary (Last 24 hours) at 10/3/2023 1412  Last data filed at 10/3/2023 1353  Gross per 24 hour   Intake 1870.82 ml   Output 850 ml   Net 1020.82 ml       Lines/Drains/Airways       Peripheral Intravenous Line  Duration                  Peripheral IV - Single Lumen 10/02/23 2044 20 G Anterior;Distal;Left Upper Arm <1 day                     Physical Exam  Vitals reviewed.   Constitutional:       Appearance: He is well-developed.   Neck:      Vascular: No carotid bruit.   Cardiovascular:      Rate and Rhythm: Bradycardia present. Rhythm irregular.      Pulses: Intact distal pulses.      Heart sounds: Normal heart sounds. No murmur heard.  Pulmonary:      Breath sounds: Normal breath sounds.   Neurological:      Mental Status: He is oriented to person, place, and time.          Significant Labs: All pertinent lab results from the last 24 hours have been reviewed. and   Recent Lab Results  (Last 5 results in the past 24 hours)        10/03/23  1119   10/03/23  1001   10/03/23  0730   10/03/23  0348   10/02/23  2258        Allens Test         N/A       Anion Gap       13         Ao root annulus    3.94             Ascending aorta   3.19             Ao peak shani   1.67             Ao VTI   38.50             AV valve area   2.46             SUZANNE by Velocity Ratio   2.01             AV mean gradient   6             AV index (prosthetic)   0.84             AV peak gradient   11             AV Velocity Ratio   0.69             Baso #       0.08         Basophil %       0.8         Site         Other       BSA   1.71             BUN       28         Calcium       8.6         Chloride       115         CO2       16         Creatinine       2.2         Left Ventricle Relative Wall Thickness   0.49             [id number]         Room Air       Differential Method       Automated         E/A ratio   1.02             E/E' ratio   10.64             eGFR       31         Eos #       0.4         Eosinophil %       3.8         E wave deceleration time   155.30             FS   36             Glucose       104         Gran # (ANC)       7.1         Gran %       66.8         Hematocrit       29.0         Hemoglobin       9.5         Immature Grans (Abs)       0.04  Comment: Mild elevation in immature granulocytes is non specific and   can be seen in a variety of conditions including stress response,   acute inflammation, trauma and pregnancy. Correlation with other   laboratory and clinical findings is essential.           Immature Granulocytes       0.4         IVC diameter   1.80             IVSd   1.1             LA WIDTH   3.6             Left Atrium Major Axis   4.77             Left Atrium Minor Axis   4.90             LA size   4.64             LA volume   68.64             LA vol index   39.9             LVOT area   2.9             LV LATERAL E/E' RATIO   14.90             LV SEPTAL E/E' RATIO   8.28             LV EDV BP   90.10             LV Diastolic Volume Index   52.38             LVIDd   4.45             LVIDs   2.85             LV mass   170.86             LV Mass Index   99             Left Ventricular Outflow  Tract Mean Gradient   3.53             Left Ventricular Outflow Tract Mean Velocity   0.93             LVOT diameter   1.93             LVOT peak mark   1.15             LVOT stroke volume   94.74             LVOT peak VTI   32.40             LV ESV BP   30.85             LV Systolic Volume Index   17.9             Lymph #       2.4         Lymph %       22.2         Magnesium        1.7         MCH       30.2         MCHC       32.8         MCV       92         Mean e'   0.14             Mono #       0.6         Mono %       6.0         MPV       10.8         Mr max mark   4.81             MV valve area p 1/2 method   4.88             MV Peak A Mark   1.46             MV Peak E Mark   1.49             MV stenosis pressure 1/2 time   45.04             nRBC       0         Platelet Count       292         POC BE         -10       POC HCO3         16.5       POC PCO2         33.8       POC PH         7.296       POC PO2         22       POC SATURATED O2         33       POCT Glucose 184     122           Potassium       3.4         PV mean gradient   2             Posterior Wall   1.09             RA Major Axis   3.53             Est. RA pres   8             RA Width   2.8             RBC       3.15         RDW       17.2         RV TB RVSP   10             RVOT peak mark   0.99             RVOT peak VTI   20.2             Sample         VENOUS       Sodium       144         STJ   3.42             TAPSE   1.98             TDI SEPTAL   0.18             TDI LATERAL   0.10             Triscuspid Valve Regurgitation Peak Gradient   16             TR Max Mark   1.98             TV mean gradient   18             TV resting pulmonary artery pressure   24             WBC       10.60         ZLVIDD   -0.70             ZLVIDS   -0.28                                    Significant Imaging: EKG: chb  Results for orders placed during the hospital encounter of 10/02/23    Echo    Interpretation Summary    Left Ventricle: The left  ventricle is normal in size. Normal wall thickness. Normal wall motion. There is normal systolic function with a visually estimated ejection fraction of 55 - 70%. There is normal diastolic function.    Left Atrium: Left atrium is mildly dilated.    Right Ventricle: Normal right ventricular cavity size. Wall thickness is normal. Right ventricle wall motion  is normal. Systolic function is normal.    Tricuspid Valve: There is mild regurgitation.    IVC/SVC: Intermediate venous pressure at 8 mmHg.      Assessment and Plan:     Complete heart block  Plan for dual-chamber pacemaker in a.m..  NPO after midnight.    Hold Eliquis.    Discussed with daughter on the phone.        VTE Risk Mitigation (From admission, onward)         Ordered     heparin (porcine) injection 5,000 Units  Every 8 hours         10/02/23 1812     IP VTE HIGH RISK PATIENT  Once         10/02/23 1751     Place sequential compression device  Until discontinued         10/02/23 1751                Thank you for your consult. I will follow-up with patient. Please contact us if you have any additional questions.    Delonte Lindsey MD  Cardiology   [facility] ([facility])

## 2023-10-03 NOTE — HPI
73-year-old male with a known past medical history of tobacco abuse, COPD, chronic bronchitis, diabetes type 2, Alzheimer's, and aspiration that presented to the ER 10/2 from his pulmonologist's office after his heart rate was noted to be in the 30s.  History comes from patient's daughter who is at bedside.  She reports over the past 6-8 weeks he has become increasingly weak with decreased activity, decreased appetite, and spells of dizziness.  She reports 1 possible syncopal episode where he fell about 6 weeks ago.  Workup in the ER revealed lab work with a creatinine of 2.5 (creatinine at baseline is 1.5-1.7) and a metabolic acidosis.  EKG in the ER with a rate of 36 and AV dissociation with narrow QRS complex for which cardiology was consulted.  It was recommended that patient be admitted to the ICU for close monitoring overnight with possible pacemaker placement tomorrow.  Critical care team consulted for admission to the ICU and further care management.     10.3.2023  third-degree AV block.  Took Eliquis yesterday morning.  NPO after midnight.  Plan for dual-chamber pacemaker in the morning.    Discussed with EP.  Discussed with daughter on the phone.  Patient is oriented and alert.  Mild history of vascular dementia as per the daughter who is a nurse.  Ms. Fay called her On the phone.

## 2023-10-03 NOTE — PT/OT/SLP EVAL
"Occupational Therapy Evaluation and Treatment    Name: Samy Alejandre Jr.  MRN: [id number]  Admitting Diagnosis: Bradycardia  Recent Surgery: * No surgery found *      Recommendations:     Discharge Recommendations:  (TBD following pacemaker procedure)  Level of Assistance Recommended:     Discharge Equipment Recommendations: bedside commode  Barriers to discharge: None    Assessment:     Samy Alejandre Jr. is a 73 y.o. male with a medical diagnosis of Bradycardia. He presents with performance deficits affecting function including weakness, impaired endurance, impaired self care skills, impaired functional mobility, decreased coordination, impaired balance, gait instability, decreased upper extremity function, decreased lower extremity function, decreased ROM, impaired cardiopulmonary response to activity, decreased safety awareness, pain. \    Rehab Prognosis: Good; patient would benefit from acute OT services to address these deficits and reach maximum level of function.    Plan:     Patient to be seen 2 x/week to address the above listed problems via self-care/home management, therapeutic activities, therapeutic exercises  Plan of Care Expires: 10/17/23  Plan of Care Reviewed with: patient    Subjective     Chief Complaint: Waiting for procedure  Patient Comments/Goals: Pt stated "I hope I get my procedure today"   Pain/Comfort:  Pain Rating 1: 8/10  Location - Orientation 1: generalized  Location 1: chest (when coughing. Nurse notified.)  Pain Addressed 1: Nurse notified, Distraction, Reposition (activity pacing)  Pain Rating Post-Intervention 1:  (pt did not present any nonverbal signs of pain or distress.)    Occupational Profile:  Patient occupational profile interview completed with patient supine in bed.  Living Environment: Patient lives with their daughter in a single story home with  1 step to enter the  home, with no rail.  Prior Level of Function: Prior to admission, patient  reports he was independent " with ADLs.   Roles and Routines: Patient was not driving prior to admission.  Equipment Used at Home: rollator, cane, straight, shower chair  DME owned (not currently used): none  Assistance Upon Discharge:  Lives with daughter who works during the day and grandson (minor). Pt reports being alone for most of the day.    Objective:     Communicated with pt's nurse, Stephanie, and completed a chart review via Epic prior to session. Patient found right sidelying with telemetry, peripheral IV, pulse ox (continuous), blood pressure cuff upon OT entry to room.    General Precautions: Standard, fall, respiratory   Orthopedic Precautions: N/A   Braces: N/A    Respiratory Status: Room air    Occupational Performance  Bed Mobility:   Rolling/Turning to Right with stand by assistance  Supine to sit from right side of bed with stand by assistance    Functional Mobility/Transfers:  Sit <> Stand Transfer with minimum assistance with no AD  Squat pivot transfer with min A with no AD.  Functional Mobility: not completed at this date due to pt's bradycardia.    Activities of Daily Living:  Grooming: set up assistance with combing hair while seated in chair.  Lower Body Dressing: maximal assistance with donning socks while seated at EOB.    Cognitive/Visual Perceptual:  Cognitive/Psychosocial Skills:    -     Oriented to: Person, Place, Time, Situation  -     Follows Commands/attention: Follows one-step commands  -     Communication: clear/fluent  -     Safety awareness/insight to disability: intact  -     Mood/Affect/Coping skills/emotional control: Appropriate to situation  Visual/Perceptual:    -     Intact  Pt stated he has had ringing in his ear for the last 3 to 4 weeks.    Physical Exam:  Balance:    -     Sitting: contact guard assistance  Dominant hand: Right  Upper Extremity Range of Motion:     -       Right Upper Extremity: WFL  -       Left Upper Extremity: WFL  Upper Extremity Strength:    -       Right Upper Extremity:  Shoulder: 4/5, elbow grossly 4-/5  -       Left Upper Extremity: Shoulder 4/5, elbow grossly 4-/5   Strength:    -       Right Upper Extremity: good  -       Left Upper Extremity: good    AMPAC 6 Click ADL:  AMPAC Total Score: 15    Treatment & Education:  Pt tolerated session well this date.Therapist provided facilitation and instruction of proper body mechanics, energy conservation, and fall prevention strategies during tasks listed above. Patient educated on role of OT, POC, and goals for therapy. Pt verbalizes agreement with POC. Patient educated on importance of OOB activities with staff member assistance and sitting OOB majority of the day. Patient educated on call don't fall policy. Pt encouraged use of call button to meet needs, able to identify call button. Pt 's  IV machine alarm sounded when completing ADL of combing hair due to IV positioning near cubital fossa. Pt's nurse, Stephanie, notified of IV machine and pt's reported pain when coughing.    Patient left up in chair with all lines intact, call button in reach, RN notified, and chair alarm on.    GOALS:   Multidisciplinary Problems       Occupational Therapy Goals          Problem: Occupational Therapy    Goal Priority Disciplines Outcome Interventions   Occupational Therapy Goal     OT, PT/OT     Description: Goals to be met by: 10/17/23     Patient will increase functional independence with ADLs by performing:    LE Dressing with Minimal Assistance while seated at EOB.  Squat pivot transfers with Stand-by Assistance .  Increased functional strength to BUE grossly by 1/2 MMT grades.                         History:     Past Medical History:   Diagnosis Date    Anxiety     Depression     Diabetes mellitus, type 2     Hypertension     Stroke          Past Surgical History:   Procedure Laterality Date    ANGIOGRAPHY OF LOWER EXTREMITY N/A 12/21/2020    Procedure: ANGIOGRAM, LOWER EXTREMITY/Rt leg poss pta/stent;  Surgeon: Todd Michel MD;   Location: [facility] CATH LAB;  Service: Peripheral Vascular;  Laterality: N/A;  rescheduled 12/8    BACK SURGERY  2015    BRONCHOSCOPY Left 3/24/2022    Procedure: Bronchoscopy;  Surgeon: Edward Williamson MD;  Location: [facility];  Service: Pulmonary;  Laterality: Left;  poss endobronchial biopsy or brushing    CATARACT EXTRACTION      ESOPHAGOGASTRODUODENOSCOPY N/A 8/24/2022    Procedure: EGD (ESOPHAGOGASTRODUODENOSCOPY);  Surgeon: Ana Gomez MD;  Location: [facility];  Service: Endoscopy;  Laterality: N/A;    ESOPHAGOGASTRODUODENOSCOPY N/A 9/14/2022    Procedure: EGD (ESOPHAGOGASTRODUODENOSCOPY);  Surgeon: Ana Gomez MD;  Location: [facility];  Service: Endoscopy;  Laterality: N/A;    KNEE SURGERY  2012    LITHOTRIPSY  2000       Time Tracking:     OT Date of Treatment: 10/03/23  OT Start Time: 0745  OT Stop Time: 0810  OT Total Time (min): 25 min    Billable Minutes: Evaluation 15 and Therapeutic Activity 10  GERALD Bolanos  10/3/2023

## 2023-10-03 NOTE — PLAN OF CARE
[facility] - Intensive Care (Hospital)  Initial Discharge Assessment       Primary Care Provider: Damien Barron MD    Admission Diagnosis: Severe sinus bradycardia [R00.1]  Bradycardia [R00.1]  Weakness [R53.1]  Chronic anticoagulation [Z79.01]  MARI (acute kidney injury) [N17.9]  Tobacco abuse disorder [Z72.0]    Admission Date: 10/2/2023  Expected Discharge Date:     Transition of Care Barriers: None    Payor: HU[employer] MANAGED MEDICARE / Plan: HU[employer] MEDICARE HMO / Product Type: Capitation /     Extended Emergency Contact Information  Primary Emergency Contact: swapna chahal  Mobile Phone: [phone]  Relation: Daughter    Discharge Plan A: Home with family         [facility] [address] [place] [address] [facility] [address]  [address] [facility] [address]  [facility] [address]  Phone: [phone] Fax: [phone]    [id number] - [place] - [address] [facility]  [address]  [facility] [address]  Phone: [phone] Fax: [phone]      Initial Assessment (most recent)       Adult Discharge Assessment - 10/03/23 1031          Discharge Assessment    Assessment Type Discharge Planning Assessment     Confirmed/corrected address, phone number and insurance Yes     Source of Information family     When was your last doctors appointment? 10/02/23     Communicated VALERIE with patient/caregiver Date not available/Unable to determine     Reason For Admission Bradycardia     People in Home child(gloria), adult;grandchild(gloria)     Facility Arrived From: home     Do you expect to return to your current living situation? Yes     Do you have help at home or someone to help you manage your care at home? Yes     Who are your caregiver(s) and their phone number(s)? Swapna Chahal (Daughter)     Prior to hospitilization cognitive status: Alert/Oriented     Current cognitive status: Alert/Oriented     Walking or Climbing Stairs ambulation difficulty, requires  equipment     Mobility Management rollator     Dressing/Bathing bathing difficulty, requires equipment     Dressing/Bathing Management shower chair     Readmission within 30 days? No     Patient currently being followed by outpatient case management? No     Do you currently have service(s) that help you manage your care at home? No     Do you take prescription medications? Yes     Do you have prescription coverage? Yes     Coverage MCR     Do you have any problems affording any of your prescribed medications? No     Is the patient taking medications as prescribed? yes     Who is going to help you get home at discharge? Jayshree Chahal (Daughter)     How do you get to doctors appointments? family or friend will provide     Are you on dialysis? No     Do you take coumadin? No     DME Needed Upon Discharge  none     Discharge Plan discussed with: Patient;Adult children     Transition of Care Barriers None     Discharge Plan A Home with family                   Anticipated DC dispo: home with family   Prior Level of Function: ambulates using a rollator, shower chair for bathing   People in home: Jayshree lerma and grandchildren     Comments:  CM met with patient and daughter at bedside to introduce role and discuss discharge planning. Daughter will be help at home and can provide transport at time of discharge. Confirmed demographics, insurance, and emergency contacts. CM discharge needs depends on hospital progress. CM will continue following to assist with other needs.

## 2023-10-03 NOTE — SUBJECTIVE & OBJECTIVE
Past Medical History:   Diagnosis Date    Anxiety     Depression     Diabetes mellitus, type 2     Hypertension     Stroke        Past Surgical History:   Procedure Laterality Date    ANGIOGRAPHY OF LOWER EXTREMITY N/A 12/21/2020    Procedure: ANGIOGRAM, LOWER EXTREMITY/Rt leg poss pta/stent;  Surgeon: Todd Michel MD;  Location: [facility] CATH LAB;  Service: Peripheral Vascular;  Laterality: N/A;  rescheduled 12/8    BACK SURGERY  2015    BRONCHOSCOPY Left 3/24/2022    Procedure: Bronchoscopy;  Surgeon: Edward Williamson MD;  Location: [facility] ENDO;  Service: Pulmonary;  Laterality: Left;  poss endobronchial biopsy or brushing    CATARACT EXTRACTION      ESOPHAGOGASTRODUODENOSCOPY N/A 8/24/2022    Procedure: EGD (ESOPHAGOGASTRODUODENOSCOPY);  Surgeon: Ana Gomez MD;  Location: [facility] ENDO;  Service: Endoscopy;  Laterality: N/A;    ESOPHAGOGASTRODUODENOSCOPY N/A 9/14/2022    Procedure: EGD (ESOPHAGOGASTRODUODENOSCOPY);  Surgeon: Ana Gomez MD;  Location: [facility];  Service: Endoscopy;  Laterality: N/A;    KNEE SURGERY  2012    LITHOTRIPSY  2000       Review of patient's allergies indicates:   Allergen Reactions    Chantix [varenicline] Other (See Comments)     Felt bad       No current facility-administered medications on file prior to encounter.     Current Outpatient Medications on File Prior to Encounter   Medication Sig    albuterol (PROVENTIL/VENTOLIN HFA) 90 mcg/actuation inhaler Inhale 2 puffs into the lungs every 4 (four) hours as needed for Wheezing.    donepeziL (ARICEPT) 10 MG tablet Take 1 tablet (10 mg total) by mouth every evening.    DULoxetine (CYMBALTA) 30 MG capsule Take 1 capsule by mouth once daily    ELIQUIS 5 mg Tab Take 1 tablet by mouth once daily    FLUoxetine 20 MG capsule Take 1 capsule by mouth once daily    HYDROcodone-acetaminophen (NORCO)  mg per tablet Take 1 tablet by mouth.    memantine (NAMENDA) 10 MG Tab TAKE 1/2 TABLET BY MOUTH TWICE DAILY FOR 1 WEEK, THEN 1  TABLET BY MOUTH TWICE DAILY    pantoprazole (PROTONIX) 40 MG tablet Take 1 tablet (40 mg total) by mouth once daily.    QUEtiapine (SEROQUEL) 50 MG tablet Take 1 tablet by mouth twice daily (Patient taking differently: Take 100 mg by mouth nightly.)    rosuvastatin (CRESTOR) 20 MG tablet Take 1 tablet by mouth once daily (Patient taking differently: Take 20 mg by mouth every evening.)    albuterol (ACCUNEB) 1.25 mg/3 mL Nebu Take 3 mLs (1.25 mg total) by nebulization 2 (two) times a day. Rescue    blood glucose control, low (TRUE METRIX LEVEL 1) Soln 1 drop by Misc.(Non-Drug; Combo Route) route once daily.    blood glucose control, normal (TRUE METRIX LEVEL 2) Soln 1 drop by Misc.(Non-Drug; Combo Route) route once daily.    gabapentin (NEURONTIN) 100 MG capsule Take 1 capsule (100 mg total) by mouth 3 (three) times daily. (Patient taking differently: Take 100 mg by mouth 3 (three) times daily. Taken only as needed)    inhalat.spacing dev,large mask (BREATHERITE SPACER-MASK,ADULT) Spcr Use as directed for inhalation.    lancets 32 gauge Misc 1 lancet by Misc.(Non-Drug; Combo Route) route once daily.    levoFLOXacin (LEVAQUIN) 500 MG tablet Take 1 tablet (500 mg total) by mouth once daily. for 10 days    mucus clearing device (AEROBIKA OSCILLATING PEP SYSTM) by Misc.(Non-Drug; Combo Route) route 4 (four) times daily as needed.    oxybutynin (DITROPAN-XL) 10 MG 24 hr tablet Take 1 tablet by mouth once daily (Patient not taking: Reported on 10/2/2023)    walker (ULTRA-LIGHT ROLLATOR) Misc Use daily for ambulation     Family History       Problem Relation (Age of Onset)    COPD Sister    Cancer Father    Heart disease Mother    Stroke Mother          Tobacco Use    Smoking status: Every Day     Current packs/day: 1.00     Types: Cigarettes    Smokeless tobacco: Current     Types: Snuff   Substance and Sexual Activity    Alcohol use: Yes     Alcohol/week: 1.0 standard drink of alcohol     Types: 1 Shots of liquor per week      Comment: Daily    Drug use: Never    Sexual activity: Not Currently     Review of Systems   Cardiovascular:  Negative for chest pain, dyspnea on exertion, palpitations and syncope.   Genitourinary: Negative.    Neurological: Negative.      Objective:     Vital Signs (Most Recent):  Temp: 97.8 °F (36.6 °C) (10/03/23 1115)  Pulse: (!) 39 (10/03/23 1300)  Resp: 19 (10/03/23 1300)  BP: (!) 157/110 (10/03/23 1300)  SpO2: 97 % (10/03/23 1300) Vital Signs (24h Range):  Temp:  [97.8 °F (36.6 °C)-98.9 °F (37.2 °C)] 97.8 °F (36.6 °C)  Pulse:  [33-44] 39  Resp:  [14-40] 19  SpO2:  [86 %-100 %] 97 %  BP: (122-178)/() 157/110     Weight: 60.8 kg (134 lb)  Body mass index is 20.37 kg/m².    SpO2: 97 %         Intake/Output Summary (Last 24 hours) at 10/3/2023 1412  Last data filed at 10/3/2023 1353  Gross per 24 hour   Intake 1870.82 ml   Output 850 ml   Net 1020.82 ml       Lines/Drains/Airways       Peripheral Intravenous Line  Duration                  Peripheral IV - Single Lumen 10/02/23 2044 20 G Anterior;Distal;Left Upper Arm <1 day                     Physical Exam  Vitals reviewed.   Constitutional:       Appearance: He is well-developed.   Neck:      Vascular: No carotid bruit.   Cardiovascular:      Rate and Rhythm: Bradycardia present. Rhythm irregular.      Pulses: Intact distal pulses.      Heart sounds: Normal heart sounds. No murmur heard.  Pulmonary:      Breath sounds: Normal breath sounds.   Neurological:      Mental Status: He is oriented to person, place, and time.          Significant Labs: All pertinent lab results from the last 24 hours have been reviewed. and   Recent Lab Results  (Last 5 results in the past 24 hours)        10/03/23  1119   10/03/23  1001   10/03/23  0730   10/03/23  0348   10/02/23  2258        Allens Test         N/A       Anion Gap       13         Ao root annulus   3.94             Ascending aorta   3.19             Ao peak shani   1.67             Ao VTI   38.50              AV valve area   2.46             SUZANNE by Velocity Ratio   2.01             AV mean gradient   6             AV index (prosthetic)   0.84             AV peak gradient   11             AV Velocity Ratio   0.69             Baso #       0.08         Basophil %       0.8         Site         Other       BSA   1.71             BUN       28         Calcium       8.6         Chloride       115         CO2       16         Creatinine       2.2         Left Ventricle Relative Wall Thickness   0.49             Adaptevas         Room Air       Differential Method       Automated         E/A ratio   1.02             E/E' ratio   10.64             eGFR       31         Eos #       0.4         Eosinophil %       3.8         E wave deceleration time   155.30             FS   36             Glucose       104         Gran # (ANC)       7.1         Gran %       66.8         Hematocrit       29.0         Hemoglobin       9.5         Immature Grans (Abs)       0.04  Comment: Mild elevation in immature granulocytes is non specific and   can be seen in a variety of conditions including stress response,   acute inflammation, trauma and pregnancy. Correlation with other   laboratory and clinical findings is essential.           Immature Granulocytes       0.4         IVC diameter   1.80             IVSd   1.1             LA WIDTH   3.6             Left Atrium Major Axis   4.77             Left Atrium Minor Axis   4.90             LA size   4.64             LA volume   68.64             LA vol index   39.9             LVOT area   2.9             LV LATERAL E/E' RATIO   14.90             LV SEPTAL E/E' RATIO   8.28             LV EDV BP   90.10             LV Diastolic Volume Index   52.38             LVIDd   4.45             LVIDs   2.85             LV mass   170.86             LV Mass Index   99             Left Ventricular Outflow Tract Mean Gradient   3.53             Left Ventricular Outflow Tract Mean Velocity   0.93             LVOT  diameter   1.93             LVOT peak mark   1.15             LVOT stroke volume   94.74             LVOT peak VTI   32.40             LV ESV BP   30.85             LV Systolic Volume Index   17.9             Lymph #       2.4         Lymph %       22.2         Magnesium        1.7         MCH       30.2         MCHC       32.8         MCV       92         Mean e'   0.14             Mono #       0.6         Mono %       6.0         MPV       10.8         Mr max mark   4.81             MV valve area p 1/2 method   4.88             MV Peak A Mark   1.46             MV Peak E Mark   1.49             MV stenosis pressure 1/2 time   45.04             nRBC       0         Platelet Count       292         POC BE         -10       POC HCO3         16.5       POC PCO2         33.8       POC PH         7.296       POC PO2         22       POC SATURATED O2         33       POCT Glucose 184     122           Potassium       3.4         PV mean gradient   2             Posterior Wall   1.09             RA Major Axis   3.53             Est. RA pres   8             RA Width   2.8             RBC       3.15         RDW       17.2         RV TB RVSP   10             RVOT peak mark   0.99             RVOT peak VTI   20.2             Sample         VENOUS       Sodium       144         STJ   3.42             TAPSE   1.98             TDI SEPTAL   0.18             TDI LATERAL   0.10             Triscuspid Valve Regurgitation Peak Gradient   16             TR Max Mark   1.98             TV mean gradient   18             TV resting pulmonary artery pressure   24             WBC       10.60         ZLVIDD   -0.70             ZLVIDS   -0.28                                    Significant Imaging: EKG: chb  Results for orders placed during the hospital encounter of 10/02/23    Echo    Interpretation Summary    Left Ventricle: The left ventricle is normal in size. Normal wall thickness. Normal wall motion. There is normal systolic function with a  visually estimated ejection fraction of 55 - 70%. There is normal diastolic function.    Left Atrium: Left atrium is mildly dilated.    Right Ventricle: Normal right ventricular cavity size. Wall thickness is normal. Right ventricle wall motion  is normal. Systolic function is normal.    Tricuspid Valve: There is mild regurgitation.    IVC/SVC: Intermediate venous pressure at 8 mmHg.

## 2023-10-03 NOTE — ASSESSMENT & PLAN NOTE
-patient's chest x-ray concerning for aspiration, on unasyn, will plan to complete short course of abx   -obtain sputum culture if able to produce sample  -we will have ST evaluate patient for recommendations

## 2023-10-03 NOTE — PROGRESS NOTES
Pharmacist Renal Dose Adjustment Note    Samy Alejandre Jr. is a 73 y.o. male being treated with the medication Unasyn for aspiration pneumonia.     Patient Data:    Vital Signs (Most Recent):  Temp: 98.9 °F (37.2 °C) (10/03/23 0730)  Pulse: (!) 37 (10/03/23 1000)  Resp: (!) 23 (10/03/23 1000)  BP: (!) 151/88 (10/03/23 1000)  SpO2: 100 % (10/03/23 1000) Vital Signs (72h Range):  Temp:  [98 °F (36.7 °C)-98.9 °F (37.2 °C)]   Pulse:  [33-41]   Resp:  [14-40]   BP: (128-178)/(60-98)   SpO2:  [86 %-100 %]      Recent Labs   Lab 10/02/23  1711 10/03/23  0348   CREATININE 2.5* 2.2*     Serum creatinine: 2.2 mg/dL (H) 10/03/23 0348  Estimated creatinine clearance: 26.5 mL/min (A)    Per protocol for CrCl 10-30 ml/min, dose will be reduced from 3 g IV every 8 hours to 3 g IV every 12 hours.     Pharmacist's Name: Katherine E Mcardle  Pharmacist's Extension: [phone]

## 2023-10-03 NOTE — ASSESSMENT & PLAN NOTE
-baseline creatinine 1.5-1.7 per chart review, creatinine 2.5 at time of admission with daughter reporting decreased p.o. intake and some diarrhea, down to 2 this AM  -continue IV fluids and allow p.o. intake  -monitor UOP, RFP, and electrolytes

## 2023-10-03 NOTE — CONSULTS
[facility] ([facility])  Cardiology  Consult Note    Patient Name: Samy Alejandre Jr.  MRN: [id number]  Admission Date: 10/2/2023  Hospital Length of Stay: 1 days  Code Status: Full Code   Attending Provider: Shanon Bridges MD   Consulting Provider: Delonte Lindsey MD  Primary Care Physician: Damien Barron MD  Principal Problem:Bradycardia    Patient information was obtained from patient, relative(s), past medical records and ER records.     Inpatient consult to Cardiology  Consult performed by: Delonte Lindsey MD  Consult ordered by: Ida Allen MD  Reason for consult: Complete heart block        Subjective:     Chief Complaint:  Complete heart block    HPI:   73-year-old male with a known past medical history of tobacco abuse, COPD, chronic bronchitis, diabetes type 2, Alzheimer's, and aspiration that presented to the ER 10/2 from his pulmonologist's office after his heart rate was noted to be in the 30s.  History comes from patient's daughter who is at bedside.  She reports over the past 6-8 weeks he has become increasingly weak with decreased activity, decreased appetite, and spells of dizziness.  She reports 1 possible syncopal episode where he fell about 6 weeks ago.  Workup in the ER revealed lab work with a creatinine of 2.5 (creatinine at baseline is 1.5-1.7) and a metabolic acidosis.  EKG in the ER with a rate of 36 and AV dissociation with narrow QRS complex for which cardiology was consulted.  It was recommended that patient be admitted to the ICU for close monitoring overnight with possible pacemaker placement tomorrow.  Critical care team consulted for admission to the ICU and further care management.     10.3.2023  third-degree AV block.  Took Eliquis yesterday morning.  NPO after midnight.  Plan for dual-chamber pacemaker in the morning.    Discussed with EP.  Discussed with daughter on the phone.  Patient is oriented and alert.  Mild history of vascular dementia as per  the daughter who is a nurse.  Ms. Aponte called her On the phone.      Past Medical History:   Diagnosis Date    Anxiety     Depression     Diabetes mellitus, type 2     Hypertension     Stroke        Past Surgical History:   Procedure Laterality Date    ANGIOGRAPHY OF LOWER EXTREMITY N/A 12/21/2020    Procedure: ANGIOGRAM, LOWER EXTREMITY/Rt leg poss pta/stent;  Surgeon: Todd Michel MD;  Location: [facility] CATH LAB;  Service: Peripheral Vascular;  Laterality: N/A;  rescheduled 12/8    BACK SURGERY  2015    BRONCHOSCOPY Left 3/24/2022    Procedure: Bronchoscopy;  Surgeon: Edward Williamson MD;  Location: [facility] ENDO;  Service: Pulmonary;  Laterality: Left;  poss endobronchial biopsy or brushing    CATARACT EXTRACTION      ESOPHAGOGASTRODUODENOSCOPY N/A 8/24/2022    Procedure: EGD (ESOPHAGOGASTRODUODENOSCOPY);  Surgeon: Ana Gomez MD;  Location: [facility];  Service: Endoscopy;  Laterality: N/A;    ESOPHAGOGASTRODUODENOSCOPY N/A 9/14/2022    Procedure: EGD (ESOPHAGOGASTRODUODENOSCOPY);  Surgeon: Ana Gomez MD;  Location: [facility];  Service: Endoscopy;  Laterality: N/A;    KNEE SURGERY  2012    LITHOTRIPSY  2000       Review of patient's allergies indicates:   Allergen Reactions    Chantix [varenicline] Other (See Comments)     Felt bad       No current facility-administered medications on file prior to encounter.     Current Outpatient Medications on File Prior to Encounter   Medication Sig    albuterol (PROVENTIL/VENTOLIN HFA) 90 mcg/actuation inhaler Inhale 2 puffs into the lungs every 4 (four) hours as needed for Wheezing.    donepeziL (ARICEPT) 10 MG tablet Take 1 tablet (10 mg total) by mouth every evening.    DULoxetine (CYMBALTA) 30 MG capsule Take 1 capsule by mouth once daily    ELIQUIS 5 mg Tab Take 1 tablet by mouth once daily    FLUoxetine 20 MG capsule Take 1 capsule by mouth once daily    HYDROcodone-acetaminophen (NORCO)  mg per tablet Take 1 tablet by mouth.     memantine (NAMENDA) 10 MG Tab TAKE 1/2 TABLET BY MOUTH TWICE DAILY FOR 1 WEEK, THEN 1 TABLET BY MOUTH TWICE DAILY    pantoprazole (PROTONIX) 40 MG tablet Take 1 tablet (40 mg total) by mouth once daily.    QUEtiapine (SEROQUEL) 50 MG tablet Take 1 tablet by mouth twice daily (Patient taking differently: Take 100 mg by mouth nightly.)    rosuvastatin (CRESTOR) 20 MG tablet Take 1 tablet by mouth once daily (Patient taking differently: Take 20 mg by mouth every evening.)    albuterol (ACCUNEB) 1.25 mg/3 mL Nebu Take 3 mLs (1.25 mg total) by nebulization 2 (two) times a day. Rescue    blood glucose control, low (TRUE METRIX LEVEL 1) Soln 1 drop by Misc.(Non-Drug; Combo Route) route once daily.    blood glucose control, normal (TRUE METRIX LEVEL 2) Soln 1 drop by Misc.(Non-Drug; Combo Route) route once daily.    gabapentin (NEURONTIN) 100 MG capsule Take 1 capsule (100 mg total) by mouth 3 (three) times daily. (Patient taking differently: Take 100 mg by mouth 3 (three) times daily. Taken only as needed)    inhalat.spacing dev,large mask (BREATHERITE SPACER-MASK,ADULT) Spcr Use as directed for inhalation.    lancets 32 gauge Misc 1 lancet by Misc.(Non-Drug; Combo Route) route once daily.    levoFLOXacin (LEVAQUIN) 500 MG tablet Take 1 tablet (500 mg total) by mouth once daily. for 10 days    mucus clearing device (AEROBIKA OSCILLATING PEP SYSTM) by Misc.(Non-Drug; Combo Route) route 4 (four) times daily as needed.    oxybutynin (DITROPAN-XL) 10 MG 24 hr tablet Take 1 tablet by mouth once daily (Patient not taking: Reported on 10/2/2023)    walker (ULTRA-LIGHT ROLLATOR) Misc Use daily for ambulation     Family History       Problem Relation (Age of Onset)    COPD Sister    Cancer Father    Heart disease Mother    Stroke Mother          Tobacco Use    Smoking status: Every Day     Current packs/day: 1.00     Types: Cigarettes    Smokeless tobacco: Current     Types: Snuff   Substance and Sexual Activity     Alcohol use: Yes     Alcohol/week: 1.0 standard drink of alcohol     Types: 1 Shots of liquor per week     Comment: Daily    Drug use: Never    Sexual activity: Not Currently     Review of Systems   Cardiovascular:  Negative for chest pain, dyspnea on exertion, palpitations and syncope.   Genitourinary: Negative.    Neurological: Negative.      Objective:     Vital Signs (Most Recent):  Temp: 97.8 °F (36.6 °C) (10/03/23 1115)  Pulse: (!) 39 (10/03/23 1300)  Resp: 19 (10/03/23 1300)  BP: (!) 157/110 (10/03/23 1300)  SpO2: 97 % (10/03/23 1300) Vital Signs (24h Range):  Temp:  [97.8 °F (36.6 °C)-98.9 °F (37.2 °C)] 97.8 °F (36.6 °C)  Pulse:  [33-44] 39  Resp:  [14-40] 19  SpO2:  [86 %-100 %] 97 %  BP: (122-178)/() 157/110     Weight: 60.8 kg (134 lb)  Body mass index is 20.37 kg/m².    SpO2: 97 %         Intake/Output Summary (Last 24 hours) at 10/3/2023 1412  Last data filed at 10/3/2023 1353  Gross per 24 hour   Intake 1870.82 ml   Output 850 ml   Net 1020.82 ml       Lines/Drains/Airways       Peripheral Intravenous Line  Duration                  Peripheral IV - Single Lumen 10/02/23 2044 20 G Anterior;Distal;Left Upper Arm <1 day                     Physical Exam  Vitals reviewed.   Constitutional:       Appearance: He is well-developed.   Neck:      Vascular: No carotid bruit.   Cardiovascular:      Rate and Rhythm: Bradycardia present. Rhythm irregular.      Pulses: Intact distal pulses.      Heart sounds: Normal heart sounds. No murmur heard.  Pulmonary:      Breath sounds: Normal breath sounds.   Neurological:      Mental Status: He is oriented to person, place, and time.          Significant Labs: All pertinent lab results from the last 24 hours have been reviewed. and   Recent Lab Results  (Last 5 results in the past 24 hours)        10/03/23  1119   10/03/23  1001   10/03/23  0730   10/03/23  0348   10/02/23  2258        Allens Test         N/A       Anion Gap       13         Ao root annulus    3.94             Ascending aorta   3.19             Ao peak shani   1.67             Ao VTI   38.50             AV valve area   2.46             SUZANNE by Velocity Ratio   2.01             AV mean gradient   6             AV index (prosthetic)   0.84             AV peak gradient   11             AV Velocity Ratio   0.69             Baso #       0.08         Basophil %       0.8         Site         Other       BSA   1.71             BUN       28         Calcium       8.6         Chloride       115         CO2       16         Creatinine       2.2         Left Ventricle Relative Wall Thickness   0.49             DelGenomes         Room Air       Differential Method       Automated         E/A ratio   1.02             E/E' ratio   10.64             eGFR       31         Eos #       0.4         Eosinophil %       3.8         E wave deceleration time   155.30             FS   36             Glucose       104         Gran # (ANC)       7.1         Gran %       66.8         Hematocrit       29.0         Hemoglobin       9.5         Immature Grans (Abs)       0.04  Comment: Mild elevation in immature granulocytes is non specific and   can be seen in a variety of conditions including stress response,   acute inflammation, trauma and pregnancy. Correlation with other   laboratory and clinical findings is essential.           Immature Granulocytes       0.4         IVC diameter   1.80             IVSd   1.1             LA WIDTH   3.6             Left Atrium Major Axis   4.77             Left Atrium Minor Axis   4.90             LA size   4.64             LA volume   68.64             LA vol index   39.9             LVOT area   2.9             LV LATERAL E/E' RATIO   14.90             LV SEPTAL E/E' RATIO   8.28             LV EDV BP   90.10             LV Diastolic Volume Index   52.38             LVIDd   4.45             LVIDs   2.85             LV mass   170.86             LV Mass Index   99             Left Ventricular Outflow  Tract Mean Gradient   3.53             Left Ventricular Outflow Tract Mean Velocity   0.93             LVOT diameter   1.93             LVOT peak mark   1.15             LVOT stroke volume   94.74             LVOT peak VTI   32.40             LV ESV BP   30.85             LV Systolic Volume Index   17.9             Lymph #       2.4         Lymph %       22.2         Magnesium        1.7         MCH       30.2         MCHC       32.8         MCV       92         Mean e'   0.14             Mono #       0.6         Mono %       6.0         MPV       10.8         Mr max mark   4.81             MV valve area p 1/2 method   4.88             MV Peak A Mark   1.46             MV Peak E Mark   1.49             MV stenosis pressure 1/2 time   45.04             nRBC       0         Platelet Count       292         POC BE         -10       POC HCO3         16.5       POC PCO2         33.8       POC PH         7.296       POC PO2         22       POC SATURATED O2         33       POCT Glucose 184     122           Potassium       3.4         PV mean gradient   2             Posterior Wall   1.09             RA Major Axis   3.53             Est. RA pres   8             RA Width   2.8             RBC       3.15         RDW       17.2         RV TB RVSP   10             RVOT peak mark   0.99             RVOT peak VTI   20.2             Sample         VENOUS       Sodium       144         STJ   3.42             TAPSE   1.98             TDI SEPTAL   0.18             TDI LATERAL   0.10             Triscuspid Valve Regurgitation Peak Gradient   16             TR Max Mark   1.98             TV mean gradient   18             TV resting pulmonary artery pressure   24             WBC       10.60         ZLVIDD   -0.70             ZLVIDS   -0.28                                    Significant Imaging: EKG: chb  Results for orders placed during the hospital encounter of 10/02/23    Echo    Interpretation Summary    Left Ventricle: The left  ventricle is normal in size. Normal wall thickness. Normal wall motion. There is normal systolic function with a visually estimated ejection fraction of 55 - 70%. There is normal diastolic function.    Left Atrium: Left atrium is mildly dilated.    Right Ventricle: Normal right ventricular cavity size. Wall thickness is normal. Right ventricle wall motion  is normal. Systolic function is normal.    Tricuspid Valve: There is mild regurgitation.    IVC/SVC: Intermediate venous pressure at 8 mmHg.      Assessment and Plan:     Complete heart block  Plan for dual-chamber pacemaker in a.m..  NPO after midnight.    Hold Eliquis.    Discussed with daughter on the phone.        VTE Risk Mitigation (From admission, onward)         Ordered     heparin (porcine) injection 5,000 Units  Every 8 hours         10/02/23 1812     IP VTE HIGH RISK PATIENT  Once         10/02/23 1751     Place sequential compression device  Until discontinued         10/02/23 1751                Thank you for your consult. I will follow-up with patient. Please contact us if you have any additional questions.    Delonte Lindsey MD  Cardiology   [facility] ([facility])

## 2023-10-03 NOTE — ASSESSMENT & PLAN NOTE
-on Eliquis at home, hold for now as he will undergo PPM placement 10/4  -will provide subcu heparin for prophylaxis    no wheezing/no dyspnea/no cough

## 2023-10-03 NOTE — ASSESSMENT & PLAN NOTE
-cardiology consulted, plans for PPM placement 10/4, NPO after MN  -echo pending   -continue telemetry

## 2023-10-03 NOTE — ASSESSMENT & PLAN NOTE
Plan for dual-chamber pacemaker in a.m..  NPO after midnight.    Hold Eliquis.    Discussed with daughter on the phone.

## 2023-10-03 NOTE — HOSPITAL COURSE
10/3: no acute events noted overnight, remains sherine in the 30s with stable BP, plans for PPM placement tomorrow   10/4: went for PPM placement this AM and was seen post-procedure, on RA in NAD with pacer capturing at 60 with good hemodynamics, stable for transfer out of the ICU to the floor

## 2023-10-03 NOTE — PROGRESS NOTES
[facility])  Critical Care Medicine  Progress Note    Patient Name: Samy Alejandre Jr.  MRN: [id number]  Admission Date: 10/2/2023  Hospital Length of Stay: 1 days  Code Status: Full Code  Attending Provider: Shanon Bridges MD  Primary Care Provider: Damien Barron MD   Principal Problem: Bradycardia    Subjective:     HPI:  73-year-old male with a known past medical history of tobacco abuse, COPD, chronic bronchitis, diabetes type 2, Alzheimer's, and aspiration that presented to the ER 10/2 from his pulmonologist's office after his heart rate was noted to be in the 30s.  History comes from patient's daughter who is at bedside.  She reports over the past 6-8 weeks he has become increasingly weak with decreased activity, decreased appetite, and spells of dizziness.  She reports 1 possible syncopal episode where he fell about 6 weeks ago.  Workup in the ER revealed lab work with a creatinine of 2.5 (creatinine at baseline is 1.5-1.7) and a metabolic acidosis.  EKG in the ER with a rate of 36 and AV dissociation with narrow QRS complex for which cardiology was consulted.  It was recommended that patient be admitted to the ICU for close monitoring overnight with possible pacemaker placement tomorrow.  Critical care team consulted for admission to the ICU and further care management.      Hospital/ICU Course:  10/3: no acute events noted overnight, remains sherine in the 30s with stable BP, plans for PPM placement tomorrow       ROS complete and negative unless stated in the interval HPI    Objective:     Vital Signs (Most Recent):  Temp: 98.9 °F (37.2 °C) (10/03/23 0730)  Pulse: (!) 37 (10/03/23 1000)  Resp: (!) 23 (10/03/23 1000)  BP: (!) 151/88 (10/03/23 1000)  SpO2: 100 % (10/03/23 1000) Vital Signs (24h Range):  Temp:  [98 °F (36.7 °C)-98.9 °F (37.2 °C)] 98.9 °F (37.2 °C)  Pulse:  [33-41] 37  Resp:  [14-40] 23  SpO2:  [86 %-100 %] 100 %  BP: (128-178)/(60-98) 151/88     Weight: 60.8 kg  (134 lb)  Body mass index is 20.37 kg/m².      Intake/Output Summary (Last 24 hours) at 10/3/2023 1037  Last data filed at 10/3/2023 0900  Gross per 24 hour   Intake 1210.82 ml   Output 575 ml   Net 635.82 ml        Physical Exam  Vitals reviewed.   Constitutional:       General: He is awake. He is not in acute distress.     Appearance: He is well-developed.   Cardiovascular:      Rate and Rhythm: Bradycardia present.      Pulses:           Radial pulses are 2+ on the right side and 2+ on the left side.   Pulmonary:      Effort: Pulmonary effort is normal.      Breath sounds: Normal breath sounds.   Abdominal:      General: There is no distension.      Palpations: Abdomen is soft.   Musculoskeletal:      Right lower leg: No edema.      Left lower leg: No edema.   Skin:     General: Skin is warm and dry.   Neurological:      General: No focal deficit present.      Mental Status: He is alert.   Psychiatric:         Behavior: Behavior is cooperative.             Lines/Drains/Airways       Peripheral Intravenous Line  Duration                  Peripheral IV - Single Lumen 10/02/23 2044 20 G Anterior;Distal;Left Upper Arm <1 day                    Significant Labs:    CBC/Anemia Profile:  Recent Labs   Lab 10/02/23  1711 10/03/23  0348   WBC 11.67 10.60   HGB 11.4* 9.5*   HCT 35.3* 29.0*    292   MCV 92 92   RDW 17.0* 17.2*        Chemistries:  Recent Labs   Lab 10/02/23  1711 10/03/23  0348    144   K 3.6 3.4*   * 115*   CO2 14* 16*   BUN 32* 28*   CREATININE 2.5* 2.2*   CALCIUM 9.4 8.6*   ALBUMIN 2.9*  --    PROT 7.3  --    BILITOT 0.1  --    ALKPHOS 208*  --    ALT 12  --    AST 22  --    MG 1.9 1.7       All pertinent labs within the past 24 hours have been reviewed.    Significant Imaging:  I have reviewed all pertinent imaging results/findings within the past 24 hours.      ABG  Recent Labs   Lab 10/02/23  2258   PH 7.296*   PO2 22*   PCO2 33.8*   HCO3 16.5*   BE -10     Assessment/Plan:      Neuro  Mixed Alzheimer's and vascular dementia  -resume home meds as appropriate   -supportive care    History of CVA with residual deficit  -patient with old stroke and residual right-sided weakness   -supportive care, PT/OT    Pulmonary  COPD (chronic obstructive pulmonary disease)  -not in acute exacerbation   -p.r.n. nebs    Aspiration pneumonia  -patient's chest x-ray concerning for aspiration, on unasyn, will plan to complete short course of abx   -obtain sputum culture if able to produce sample  -we will have ST evaluate patient for recommendations    Cardiac/Vascular  * Bradycardia  -cardiology consulted, plans for PPM placement 10/4, NPO after MN  -echo pending   -continue telemetry     Mixed hyperlipidemia  -statin    Renal/  Acute renal failure superimposed on stage 3 chronic kidney disease  -baseline creatinine 1.5-1.7 per chart review, creatinine 2.5 at time of admission with daughter reporting decreased p.o. intake and some diarrhea, down to 2 this AM  -continue IV fluids and allow p.o. intake  -monitor UOP, RFP, and electrolytes    Hematology  History of DVT of lower extremity  -on Eliquis at home, hold for now as he will undergo PPM placement 10/4  -will provide subcu heparin for prophylaxis     Endocrine  Controlled type 2 diabetes mellitus with stage 3 chronic kidney disease, without long-term current use of insulin  -SSI   -cardiac diabetic diet      Prophylaxis Measures:  GI ppx: Pantoprazole  VTE ppx: Heparin  Glucose control: Insulin subcutaneous    Code Status: Full Code    Critical Care Time: 32 minutes  Critical secondary to Patient has a condition that poses threat to life and bodily function: bradycardia and plans for PPM 10/4      Critical care was time spent personally by me on the following activities: development of treatment plan with patient or surrogate and bedside caregivers, discussions with consultants, evaluation of patient's response to treatment, examination of patient,  ordering and performing treatments and interventions, ordering and review of laboratory studies, ordering and review of radiographic studies, pulse oximetry, re-evaluation of patient's condition. This critical care time did not overlap with that of any other provider or involve time for any procedures.     Harry Dalal NP  Critical Care Medicine  [facility] - Intensive Care ([facility])

## 2023-10-03 NOTE — PLAN OF CARE
OT josue completed. Pt engaged and tolerated therapy well.   Bed Mobility SBA, Sup> EOB SBA, EOB> Stand Min A, Squat pivot transfer to chair Min A.     ADLs: LBD donning socks Max A, simple grooming with comb: set up A while seated in chair.     D/C Rec: TBD following pacemaker placement.

## 2023-10-03 NOTE — PLAN OF CARE
Patient awake, alert and oriented, room air, voids to urinal. Patient heart rate remained in the 30's for the entirety of my shift with stable blood pressures.

## 2023-10-03 NOTE — PT/OT/SLP EVAL
Physical Therapy Evaluation    Patient Name:  Samy Alejandre Jr.   MRN:  [id number]    Recommendations:     Discharge Recommendations:  (TBD)   Discharge Equipment Recommendations: bedside commode   Barriers to discharge: Decreased caregiver support    Assessment:     Samy Alejandre Jr. is a 73 y.o. male admitted with a medical diagnosis of Bradycardia.  He presents with the following impairments/functional limitations: weakness, impaired endurance, impaired functional mobility, gait instability, impaired balance, decreased safety awareness, pain, decreased lower extremity function, decreased upper extremity function, decreased coordination.    Rehab Prognosis: Good; patient would benefit from acute skilled PT services to address these deficits and reach maximum level of function.    Recent Surgery: Procedure(s) (LRB):  INSERTION, CARDIAC PACEMAKER, DUAL CHAMBER (Left)      Plan:     During this hospitalization, patient to be seen 3 x/week to address the identified rehab impairments via gait training, therapeutic activities, therapeutic exercises and progress toward the following goals:    Plan of Care Expires:  10/17/23    Subjective     Chief Complaint: PAINFUL COUGH, COLD, C/O RINGING IN EARS  Patient/Family Comments/goals:   Pain/Comfort:  Pain Rating 1: 8/10  Location - Orientation 1: generalized  Location 1: chest (C/O PAIN MOSTLY WITH COUGH)    Patients cultural, spiritual, Islam conflicts given the current situation:      Living Environment:  PT LIVES WITH DAUGHTER WHO WORKS DURING THE DAY SO HOME ALONE, 1 STORY HOUSE 1 STEP TO ENTER, PT AMB WITH ROLLATOR WALKER HOUSEHOLD DISTANCE, DOES NOT DRIVE, INDEP WITH ADL'S  Prior to admission, patients level of function was YONATAN WITH GAIT.  Equipment used at home: rollator, cane, straight, shower chair.  DME owned (not currently used): none.  Upon discharge, patient will have assistance from DAUGHTER.    Objective:     Communicated with NURSE MCMANUS prior to  "session.  Patient found up in chair with telemetry, peripheral IV, blood pressure cuff, pulse ox (continuous), bed alarm  upon PT entry to room.    General Precautions: Standard, fall  Orthopedic Precautions:N/A   Braces: N/A  Respiratory Status: Room air    Exams:  Cognitive Exam:  Patient is oriented to Person, Place, Time, and Situation  Postural Exam:  Patient presented with the following abnormalities:    -       Rounded shoulders  Sensation:    -       Intact-BLE  RLE ROM: WFL  RLE Strength: GROSSLY 3+/5  LLE ROM: WFL  LLE Strength: GROSSLY 3+/5    Functional Mobility:  Bed Mobility:     Rolling Left:  stand by assistance  Scooting: stand by assistance  Supine to Sit: stand by assistance  Transfers:     Bed to Chair: minimum assistance with  rolling walker  using  Squat Pivot  Balance: FAIR SITTING BALANCE, POOR+ DYNAMIC BALANCE DURING TF, C/O MILD DIZZINESS SEATED EOB THAT DID IMPROVE OVER TIME  HR: 35 BPM AT REST, HR: 38 BPM AFTER TF TO CHAIR    AM-PAC 6 CLICK MOBILITY  Total Score:18     Treatment & Education:  PT EDUCATION:  - ROLE OF P.T. AND POC IN ACUTE CARE HOSPITAL SETTING  - RW USE AND SAFETY DURING TF'S AND GAIT  - ENCOURAGED TO INCREASE TIME OOB IN CHAIR TO TOLERANCE   - EDUCATED IN AND ENCOURAGED TO CONTINUE THERAPUETIC EXERCISES THROUGHOUT THE DAY TO INCREASE ACTIVITY TOLERANCE AND DECREASE RISK FOR PNEUMONIA AND BLOOD CLOTS: HIP FLEX/EXT, HIP ABD/ADD, QUAD SET, HEEL SLIDE, AP  - RISK FOR FALLS DUE TO GENERALIZED WEAKNESS, EDUCATED ON "CALL DON'T FALL", ENCOURAGED TO CALL FOR ASSISTANCE WITH ALL NEEDS SUCH AS BED<>CHAIR TRANSFERS OR TRIPS TO BATHROOM, PT AGREEABLE TO SAFETY PRECAUTIONS    Patient left up in chair with all lines intact, call button in reach, chair alarm on, and NURSE notified.    GOALS:   Multidisciplinary Problems       Physical Therapy Goals          Problem: Physical Therapy    Goal Priority Disciplines Outcome Goal Variances Interventions   Physical Therapy Goal     PT, PT/OT  "     Description: LTG'S TO BE MET IN 14 DAYS (10-17-23)  PT WILL REQUIRE SPV FOR BED MOBILITY  PT WILL REQUIRE CGA FOR BED<>CHAIR TF'S  PT WILL  FEET WITH RW AND ROYAL  PT WILL INC AMPAC SCORE BY 2 POINTS TO PROGRESS GROSS FUNC MOBILITY                         History:     Past Medical History:   Diagnosis Date    Anxiety     Depression     Diabetes mellitus, type 2     Hypertension     Stroke        Past Surgical History:   Procedure Laterality Date    ANGIOGRAPHY OF LOWER EXTREMITY N/A 12/21/2020    Procedure: ANGIOGRAM, LOWER EXTREMITY/Rt leg poss pta/stent;  Surgeon: Todd Michel MD;  Location: [facility] CATH LAB;  Service: Peripheral Vascular;  Laterality: N/A;  rescheduled 12/8    BACK SURGERY  2015    BRONCHOSCOPY Left 3/24/2022    Procedure: Bronchoscopy;  Surgeon: Edward Williamson MD;  Location: [facility];  Service: Pulmonary;  Laterality: Left;  poss endobronchial biopsy or brushing    CATARACT EXTRACTION      ESOPHAGOGASTRODUODENOSCOPY N/A 8/24/2022    Procedure: EGD (ESOPHAGOGASTRODUODENOSCOPY);  Surgeon: Ana Gomez MD;  Location: [facility];  Service: Endoscopy;  Laterality: N/A;    ESOPHAGOGASTRODUODENOSCOPY N/A 9/14/2022    Procedure: EGD (ESOPHAGOGASTRODUODENOSCOPY);  Surgeon: Ana Gomez MD;  Location: [facility];  Service: Endoscopy;  Laterality: N/A;    KNEE SURGERY  2012    LITHOTRIPSY  2000       Time Tracking:     PT Received On: 10/03/23  PT Start Time: 0735     PT Stop Time: 0800  PT Total Time (min): 25 min     Billable Minutes: Evaluation 15 and Therapeutic Activity 10    10/03/2023

## 2023-10-03 NOTE — PT/OT/SLP EVAL
Speech Language Pathology Evaluation  Bedside Swallow    Patient Name:  Samy Alejandre Jr.   MRN:  [id number]  Admitting Diagnosis: Bradycardia    Recommendations:                 General Recommendations:  Dysphagia therapy and Modified barium swallow study  Diet recommendations:  Soft & Bite Sized Diet - IDDSI Level 6, Thin liquids - IDDSI Level 0   Aspiration Precautions:  GERD precautions, Feed only when awake/alert, Frequent oral care, HOB to 90 degrees, Monitor for s/s of aspiration, Remain upright 30 minutes post meal, Small bites/sips, and Standard aspiration precautions   General Precautions: Standard, aspiration  Communication strategies:  provide increased time to answer    History:     Past Medical History:   Diagnosis Date    Anxiety     Depression     Diabetes mellitus, type 2     Hypertension     Stroke        Past Surgical History:   Procedure Laterality Date    ANGIOGRAPHY OF LOWER EXTREMITY N/A 12/21/2020    Procedure: ANGIOGRAM, LOWER EXTREMITY/Rt leg poss pta/stent;  Surgeon: Todd Michel MD;  Location: [facility] CATH LAB;  Service: Peripheral Vascular;  Laterality: N/A;  rescheduled 12/8    BACK SURGERY  2015    BRONCHOSCOPY Left 3/24/2022    Procedure: Bronchoscopy;  Surgeon: Edward Williamson MD;  Location: [facility];  Service: Pulmonary;  Laterality: Left;  poss endobronchial biopsy or brushing    CATARACT EXTRACTION      ESOPHAGOGASTRODUODENOSCOPY N/A 8/24/2022    Procedure: EGD (ESOPHAGOGASTRODUODENOSCOPY);  Surgeon: Ana Gomez MD;  Location: [facility];  Service: Endoscopy;  Laterality: N/A;    ESOPHAGOGASTRODUODENOSCOPY N/A 9/14/2022    Procedure: EGD (ESOPHAGOGASTRODUODENOSCOPY);  Surgeon: Ana Gomez MD;  Location: [facility];  Service: Endoscopy;  Laterality: N/A;    KNEE SURGERY  2012    LITHOTRIPSY  2000       Social History: Patient lives with his daughter in [place].  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  Of note-Pt's daughter is a nurse in family medicine clinic with [employer].    Prior Intubation HX:  N/A; however, scheduled for PPM 10/4/2023    Modified Barium Swallow: N/A    CT CHEST WITHOUT CONTRAST     CLINICAL HISTORY:  chronic cough; Cough, unspecified     TECHNIQUE:  Low dose axial images, sagittal and coronal reformations were obtained from the thoracic inlet to the lung bases. Contrast was not administered.  All CT scans at this location are performed using dose modulation techniques as appropriate to a performed exam including the following: Automated exposure control; adjustment of the mA and/or kV  according to patient size.     COMPARISON:  07/22/2022     FINDINGS:  Base of Neck: No significant abnormality.     Thoracic soft tissues: Normal.     Aorta: No aneurysm.  Advanced atherosclerotic calcification.     Heart: Normal size. No effusion. Severe aortic and coronary artery atherosclerotic calcification.     Pulmonary vasculature: Pulmonary arteries distribute normally.     Racquel/Mediastinum: No pathologic catrachita enlargement.     Esophagus: Small moderate sliding hiatal hernia.     Upper Abdomen: Innumerable bilateral nonobstructing renal stones.  Unchanged right upper pole simple renal cyst.     Airways: Generalized wall thickening of airways most notable in the right lower lobe indicating airway inflammation.     Lungs/Pleura: Diffuse tree in bud and centrilobular micronodularity suspicious for atypical infection with endobronchial spread of disease, possibly mycobacterium.  Anterior left apex and superior segment left lower lobe cavitary lesions again noted, with near complete resolution of soft tissue thickening component.  Recommend continued CT follow-up.     Bones: No acute fracture. No suspicious lytic or sclerotic lesions.     Impression:     Diffuse tree in bud and centrilobular micronodularity suspicious for atypical infection with endobronchial spread of  disease, possibly mycobacterium.  Anterior left apex and superior segment left lower lobe cavitary lesions again noted, with near complete resolution of soft tissue thickening component. Recommend continued CT follow-up.     Generalized wall thickening of airways most notable in the right lower lobe indicating airway inflammation.        Electronically signed by: Prakash Jensen  Date:                                            09/29/2023  Time:                                           15:45    CT HEAD WITHOUT CONTRAST     CLINICAL HISTORY:  Memory loss;Altered mental status, nontraumatic (Ped 0-18y);     TECHNIQUE:  Low dose axial CT images obtained throughout the head without intravenous contrast. Sagittal and coronal reconstructions were performed.     COMPARISON:  07/31/2022     FINDINGS:  Intracranial compartment:     The brain parenchyma demonstrates areas of decreased attenuation with mild to moderate periventricular white matter consistent with chronic microvascular ischemic changes.  Bilateral remote lacunar infarcts..  No parenchymal mass, hemorrhage, edema or major vascular distribution infarct.  Vascular calcifications are noted.     Mild prominence of the sulci and ventricles are consistent with age-related involutional changes.     No extra-axial blood or fluid collections.     Skull/extracranial contents (limited evaluation): No fracture. Mastoid air cells and paranasal sinuses are essentially clear.     Impression:     Chronic microvascular ischemic changes.     All CT scans at this facility use dose modulation, iterative reconstruction, and/or weight based dosing when appropriate to reduce radiation dose to as low as reasonable achievable.        Electronically signed by: Shay Holliday MD  Date:                                            09/28/2022  Time:                                           06:50    XR CHEST AP PORTABLE     CLINICAL HISTORY:  Weakness     TECHNIQUE:  Single frontal view of the  chest was performed.     COMPARISON:  Multiple priors.     FINDINGS:  Apical scarring.  Right basilar suspected developing opacity.     Otherwise the lungs are clear, with normal appearance of pulmonary vasculature and no pleural effusion or pneumothorax.     The cardiac silhouette is normal in size. The hilar and mediastinal contours are unremarkable.     Bones are intact.     Impression:     As above.        Electronically signed by: Alexander Lucia  Date:                                            10/02/2023  Time:                                           17:49    Prior diet: Pt consumes a regular diet but prefers softer solids s/t esophageal dysphagia hx.  Stated he cannot consume bread, meat (ie steak).    Subjective     Pt seen bedside for ST swallowing evaluation in ICU.  No c/o pain.  Pt's daughter present at end of session.  Patient goals: Pacemaker placement     Pain/Comfort:  Pain Rating 1: 0/10  Pain Rating Post-Intervention 1: 0/10  Pain Rating 2: 0/10  Pain Rating Post-Intervention 2: 0/10    Respiratory Status: Room air    Objective:     Oral Musculature Evaluation  Oral Musculature: right weakness, facial asymmetry present (hx CVA)  Dentition: scattered dentition  Secretion Management: adequate  Mucosal Quality: good  Mandibular Strength and Mobility: impaired (OM weakness s/t CVA hx)  Oral Labial Strength and Mobility: impaired retraction  Lingual Strength and Mobility: impaired strength  Velar Elevation: WFL  Buccal Strength and Mobility: WFL  Volitional Cough: present  Volitional Swallow: present  Voice Prior to PO Intake: WFL    Bedside Clinical Swallow Evaluation:   Tea Swallow Protocol:  [place] Swallow Protocol dictates patient remain NPO if failed screener (Lorar et al. 2014).  The [place] Swallow Protocol was administered. The patient was alert and provided the instructions prior to the beginning of the protocol. The patient consumed 3 oz before putting the cup down. Patient drank with  consecutive swallows. Overt s/s of pharyngeal dysphagia included delayed coughing after the swallow; however, productive cough also present in the absence of po intake warranting instrumental assessment.     Clinical Swallow Examination:   Of note, patient self-fed throughout evaluation. Patient presented with:     CONSISTENCY  NOTES   THIN (IDDSI 0) 3 oz water challenge   Cup/straw Intermittent delayed cough (also present without po intake); otherwise, no overt s/s of dysphagia   PUREE (IDDSI 4/Extremely Thick)   TSP/TBSP bites of pudding/applesauce  Intermittent delayed cough (also present without po intake);  otherwise, no overt s/s of dysphagia   SOLID (IDDSI 7/Regular) Bite of Margaret Doone cookie    Reduced efficiency, rate of mastication and Ap transit.  Hx right OM weakness s/t CVA  Delayed cough (also present without po intake)  Required liquid rinse to assist in bolus transit In known hx esophageal dysphagia     Thickened liquids were not used in this assessment. Graciela (2018) reported that thickened liquids have no sound evidence at reducing the risk of pneumonia in patients with dysphagia and can cause harm by increasing their risk of dehydration. It also presents an increased risk of UTI, electrolyte imbalance, constipation, fecal impaction, cognitive impairment, functional decline and even death ([place], 2002; [place], 2016).  Thickened liquids are associated with risks including dehydration, increased pharyngeal residue, potential interference with medication absorption, and decreased quality of life (Cheryl, 2013). Thickened liquids are also more likely to be silently aspirated than thin liquids (Greg et al., 2018). This supports the assertion that we should confirm a patient requires thickened liquids with an instrumental swallow study prior to recommending them.    References:   Cheryl MORENO (2013). Thickening agents used for dysphagia management: Effect on bioavailability of water, medication  and feelings of satiety. Nutrition Journal, 12, 54. https://doi.org/10.1186/8619-1396-33-54    JOSH Garza, CHRISTINA Nash, JHON Leo, & JOSH Hendrickson (2018). Cough response to aspiration in thin and thick fluids during FEES in hospitalized inpatients. International journal of language & communication disorders, 53(5), 909-918. https://doi.org/10.1111/2964-7160.13080    INTERPRETATION AND RISK ASSESSMENT:  Clinical swallow evaluation (CSE) revealed oral phase characterized by lingual, labial, and buccal strength and range of motion reduced for lip closure, bolus preparation and propulsion. The patient had no anterior loss of the bolus with complete closure of the lips around the utensils. No residue remained in the oral cavity following the swallow. Patient with overt clinical sign/symptoms of potential aspiration; however, cough also present in the absence of PO intake. Patient presents with a possibly inefficient swallow as indicated by months of functional decline, including weakness with reduced appetite/po intake. Patient denied globus sensation; however, hx significant for esophageal stricture +dilation and hiatal hernia.  Contributing risk factors for dysphagia include cognitive deficits and deficits in respiratory-swallow coordination. Patient with increased risk for silent aspiration given potential sensory deficits related to hx stroke and COPD.    Clinical signs of oropharyngeal dysphagia, likely acute on chronic related to dx recurrent PNA with significant cardiac, pulmonary and neurological co-morbidities increasing pt's risk of aspiration. Swallowing prognosis is fair.  Instrumental swallow study is indicated to assess the swallowing anatomy, physiology and rule out aspiration of various consistencies.     Compensatory Strategies  aspiration  precautions  GERD precautions      Assessment:     Samy MASSIEL Alejandre Jr. is a 73 y.o. male with an SLP diagnosis of suspected Dysphagia with chest imaging concerning for  aspiration with recurrent PNA in the setting of significant co-morbidities increasing pt's risk, including but not limited to: CVA, dementia, COPD, esophageal dysphagia/stricture/hiatal hernia and recent functional decline/weakness with poor po intake over the past two months.      He presents with adequate ALEXA and ability to communicate functional needs; however, cognitive/memory deficits reported, requiring increased assist at home.  Pt exhibited a strong cough without PO intake and intermittently during bedside CSE warranting comprehensive assessment, including MBSS post-PPM placement (scheduled 10/4/2023).  At this time, pt recommended for IDDSI 6-soft/bite sized solids with IDDSI 0-thin liquids, following aspiration/GERD precautions.      Given pt's pulmonary, cardiac and neurological co-morbidities along with increased risk of aspiration/dysphagia, it is recommended that ACP/goals of care discussion and planning is initiated with providers and pt/family to ensure pt goals are met.      Goals:   Multidisciplinary Problems       SLP Goals          Problem: SLP    Goal Priority Disciplines Outcome   SLP Goal     SLP    Description: 1.  Pt will consume IDDSI 6-soft/bite sized solids with IDDSI 0-thin liquids without incident.    2.  MBSS as comprehensive swallowing assessment.                       Plan:     Patient to be seen:  2 x/week, 3 x/week   Plan of Care expires:  10/10/23  Plan of Care reviewed with:  patient, daughter   SLP Follow-Up:  Yes       Discharge recommendations:   (TBD)   Barriers to Discharge:  None    Time Tracking:     SLP Treatment Date:   10/03/23  Speech Start Time:  1130  Speech Stop Time:  1200     Speech Total Time (min): 45 minutes    Billable Minutes: Speech Therapy Individual 15 minutes, Eval Swallow and Oral Function 15 minutes, and Self Care/Home Management Training 15 minutes    10/03/2023

## 2023-10-03 NOTE — PLAN OF CARE
P.T. EVAL COMPLETE.  PT CURRENTLY REQUIRES SBA FOR BED MOBILITY, ROYAL FOR BED<>CHAIR TF.  P.T. DC REC TBD PENDING FURTHER INTERVENTION AFTER SURGERY

## 2023-10-03 NOTE — PLAN OF CARE
Plan of care reviewed with pt and pt's family at bedside.  Pt remains bradycardic in 30s-40s. Occasional dizziness upon standing/position changes.  Cont bicarb gtt.  Up to chair most of shift.  Tolerating diet, evaluated by SLP this shift.  Prn pain meds for chronic back pain.  Plans for PPM in am. NPO after midnight.  Bed/chair alarms in use, call light within reach.

## 2023-10-03 NOTE — SUBJECTIVE & OBJECTIVE
ROS complete and negative unless stated in the interval HPI    Objective:     Vital Signs (Most Recent):  Temp: 98.9 °F (37.2 °C) (10/03/23 0730)  Pulse: (!) 37 (10/03/23 1000)  Resp: (!) 23 (10/03/23 1000)  BP: (!) 151/88 (10/03/23 1000)  SpO2: 100 % (10/03/23 1000) Vital Signs (24h Range):  Temp:  [98 °F (36.7 °C)-98.9 °F (37.2 °C)] 98.9 °F (37.2 °C)  Pulse:  [33-41] 37  Resp:  [14-40] 23  SpO2:  [86 %-100 %] 100 %  BP: (128-178)/(60-98) 151/88     Weight: 60.8 kg (134 lb)  Body mass index is 20.37 kg/m².      Intake/Output Summary (Last 24 hours) at 10/3/2023 1037  Last data filed at 10/3/2023 0900  Gross per 24 hour   Intake 1210.82 ml   Output 575 ml   Net 635.82 ml        Physical Exam  Vitals reviewed.   Constitutional:       General: He is awake. He is not in acute distress.     Appearance: He is well-developed.   Cardiovascular:      Rate and Rhythm: Bradycardia present.      Pulses:           Radial pulses are 2+ on the right side and 2+ on the left side.   Pulmonary:      Effort: Pulmonary effort is normal.      Breath sounds: Normal breath sounds.   Abdominal:      General: There is no distension.      Palpations: Abdomen is soft.   Musculoskeletal:      Right lower leg: No edema.      Left lower leg: No edema.   Skin:     General: Skin is warm and dry.   Neurological:      General: No focal deficit present.      Mental Status: He is alert.   Psychiatric:         Behavior: Behavior is cooperative.             Lines/Drains/Airways       Peripheral Intravenous Line  Duration                  Peripheral IV - Single Lumen 10/02/23 2044 20 G Anterior;Distal;Left Upper Arm <1 day                    Significant Labs:    CBC/Anemia Profile:  Recent Labs   Lab 10/02/23  1711 10/03/23  0348   WBC 11.67 10.60   HGB 11.4* 9.5*   HCT 35.3* 29.0*    292   MCV 92 92   RDW 17.0* 17.2*        Chemistries:  Recent Labs   Lab 10/02/23  1711 10/03/23  0348    144   K 3.6 3.4*   * 115*   CO2 14* 16*   BUN  32* 28*   CREATININE 2.5* 2.2*   CALCIUM 9.4 8.6*   ALBUMIN 2.9*  --    PROT 7.3  --    BILITOT 0.1  --    ALKPHOS 208*  --    ALT 12  --    AST 22  --    MG 1.9 1.7       All pertinent labs within the past 24 hours have been reviewed.    Significant Imaging:  I have reviewed all pertinent imaging results/findings within the past 24 hours.   (4) rarely moist

## 2023-10-03 NOTE — EICU
Brief Eicu admission review note.  Reason for admission: Bradycardia    Full H&P,notes, labs images reviewed.    Brief history: 72 y/o with h/o DM, COPD, HTN, CVA, mixed Alzheimer's and vascular dementia , DVT on eliquis sent from his pulmonologist office where he had an apt for  chonic coug, bronchiectasis and was found to be bradycardic in 30ties with stable BP, sent to ER.  Pt has been c/o dizziness. He completed a course of doxy for PNA last Saturday.  He is not on any B-blocker, CCB or dig. Labs with worsening creatinine from baseline. CXR with questionable right basilar opacity. EKG not uploaded, per report rate 36 with    AV dissociate and narrow QRS complex. Cardiology consulted, plan for possible pacemaker placement in am.    Evaluation via interactive audio and video telecomminications:  NAD, HR 36-40, /80, RR 20, sats 100% on RA    Problems:  Symptomatic bradycardia, skyler degree AVB  MARI on CKD  Bronchiectasis, cavitary lung lesion, TB gold, MTB PCR neg 20022  S/p doxy tx for recent PNA  H/o DVT 3/21/22 on eliquis    Plan:  Hold donepizil  Continue NS@ 75 cc, monitor UO, bladder protocol or Ortez  Check VBG, if acidotic change to bicarb  Zoll to bedside  TTE  Do not treat SBP < 190 white bradycardic  Doubt  he needs unasyn,  will check procal  On eliquis, will switch to lovenox tmr  SSI    Best practices:    SUP: on PPI already    VTE prevention: lovenox    D/w RN  and NP

## 2023-10-04 DIAGNOSIS — Z95.0 CARDIAC PACEMAKER IN SITU: Primary | ICD-10-CM

## 2023-10-04 DIAGNOSIS — R00.1 BRADYCARDIA: ICD-10-CM

## 2023-10-04 LAB
ANION GAP SERPL CALC-SCNC: 13 MMOL/L (ref 8–16)
BASOPHILS # BLD AUTO: 0.06 K/UL (ref 0–0.2)
BASOPHILS NFR BLD: 0.6 % (ref 0–1.9)
BUN SERPL-MCNC: 22 MG/DL (ref 8–23)
CALCIUM SERPL-MCNC: 8.6 MG/DL (ref 8.7–10.5)
CHLORIDE SERPL-SCNC: 104 MMOL/L (ref 95–110)
CO2 SERPL-SCNC: 22 MMOL/L (ref 23–29)
CREAT SERPL-MCNC: 2.1 MG/DL (ref 0.5–1.4)
DIFFERENTIAL METHOD: ABNORMAL
EOSINOPHIL # BLD AUTO: 0.3 K/UL (ref 0–0.5)
EOSINOPHIL NFR BLD: 3 % (ref 0–8)
ERYTHROCYTE [DISTWIDTH] IN BLOOD BY AUTOMATED COUNT: 16.3 % (ref 11.5–14.5)
EST. GFR  (NO RACE VARIABLE): 33 ML/MIN/1.73 M^2
GLUCOSE SERPL-MCNC: 133 MG/DL (ref 70–110)
HCT VFR BLD AUTO: 28.7 % (ref 40–54)
HGB BLD-MCNC: 9.4 G/DL (ref 14–18)
IMM GRANULOCYTES # BLD AUTO: 0.03 K/UL (ref 0–0.04)
IMM GRANULOCYTES NFR BLD AUTO: 0.3 % (ref 0–0.5)
LYMPHOCYTES # BLD AUTO: 2.8 K/UL (ref 1–4.8)
LYMPHOCYTES NFR BLD: 29.4 % (ref 18–48)
MAGNESIUM SERPL-MCNC: 2.2 MG/DL (ref 1.6–2.6)
MCH RBC QN AUTO: 29.4 PG (ref 27–31)
MCHC RBC AUTO-ENTMCNC: 32.8 G/DL (ref 32–36)
MCV RBC AUTO: 90 FL (ref 82–98)
MONOCYTES # BLD AUTO: 0.8 K/UL (ref 0.3–1)
MONOCYTES NFR BLD: 8.6 % (ref 4–15)
NEUTROPHILS # BLD AUTO: 5.6 K/UL (ref 1.8–7.7)
NEUTROPHILS NFR BLD: 58.1 % (ref 38–73)
NRBC BLD-RTO: 0 /100 WBC
PLATELET # BLD AUTO: 272 K/UL (ref 150–450)
PMV BLD AUTO: 10.7 FL (ref 9.2–12.9)
POCT GLUCOSE: 117 MG/DL (ref 70–110)
POCT GLUCOSE: 97 MG/DL (ref 70–110)
POCT GLUCOSE: 98 MG/DL (ref 70–110)
POTASSIUM SERPL-SCNC: 3.4 MMOL/L (ref 3.5–5.1)
RBC # BLD AUTO: 3.2 M/UL (ref 4.6–6.2)
SODIUM SERPL-SCNC: 139 MMOL/L (ref 136–145)
WBC # BLD AUTO: 9.62 K/UL (ref 3.9–12.7)

## 2023-10-04 PROCEDURE — 25000003 PHARM REV CODE 250: Mod: HCNC | Performed by: NURSE PRACTITIONER

## 2023-10-04 PROCEDURE — 21400001 HC TELEMETRY ROOM: Mod: HCNC

## 2023-10-04 PROCEDURE — 25000003 PHARM REV CODE 250: Mod: HCNC | Performed by: INTERNAL MEDICINE

## 2023-10-04 PROCEDURE — 63600175 PHARM REV CODE 636 W HCPCS: Mod: HCNC | Performed by: NURSE PRACTITIONER

## 2023-10-04 PROCEDURE — 85025 COMPLETE CBC W/AUTO DIFF WBC: CPT | Mod: HCNC | Performed by: NURSE PRACTITIONER

## 2023-10-04 PROCEDURE — 99152 MOD SED SAME PHYS/QHP 5/>YRS: CPT | Mod: HCNC,,, | Performed by: INTERNAL MEDICINE

## 2023-10-04 PROCEDURE — 99024 POSTOP FOLLOW-UP VISIT: CPT | Mod: HCNC,,, | Performed by: INTERNAL MEDICINE

## 2023-10-04 PROCEDURE — 33208 PR INSER HART PACER XVENOUS ATR/VENTR: ICD-10-PCS | Mod: KX,HCNC,, | Performed by: INTERNAL MEDICINE

## 2023-10-04 PROCEDURE — 99153 MOD SED SAME PHYS/QHP EA: CPT | Mod: HCNC | Performed by: INTERNAL MEDICINE

## 2023-10-04 PROCEDURE — 33208 INSRT HEART PM ATRIAL & VENT: CPT | Mod: HCNC | Performed by: INTERNAL MEDICINE

## 2023-10-04 PROCEDURE — 33208 INSRT HEART PM ATRIAL & VENT: CPT | Mod: KX,HCNC,, | Performed by: INTERNAL MEDICINE

## 2023-10-04 PROCEDURE — 63600175 PHARM REV CODE 636 W HCPCS: Mod: HCNC | Performed by: SPECIALIST

## 2023-10-04 PROCEDURE — C1769 GUIDE WIRE: HCPCS | Mod: HCNC | Performed by: INTERNAL MEDICINE

## 2023-10-04 PROCEDURE — 99152 PR MOD CONSCIOUS SEDATION, SAME PHYS, 5+ YRS, FIRST 15 MIN: ICD-10-PCS | Mod: HCNC,,, | Performed by: INTERNAL MEDICINE

## 2023-10-04 PROCEDURE — 80048 BASIC METABOLIC PNL TOTAL CA: CPT | Mod: HCNC | Performed by: NURSE PRACTITIONER

## 2023-10-04 PROCEDURE — 99024 PR POST-OP FOLLOW-UP VISIT: ICD-10-PCS | Mod: HCNC,,, | Performed by: INTERNAL MEDICINE

## 2023-10-04 PROCEDURE — 63600175 PHARM REV CODE 636 W HCPCS: Mod: HCNC | Performed by: INTERNAL MEDICINE

## 2023-10-04 PROCEDURE — 27201423 OPTIME MED/SURG SUP & DEVICES STERILE SUPPLY: Mod: HCNC | Performed by: INTERNAL MEDICINE

## 2023-10-04 PROCEDURE — C1785 PMKR, DUAL, RATE-RESP: HCPCS | Mod: HCNC | Performed by: INTERNAL MEDICINE

## 2023-10-04 PROCEDURE — C1894 INTRO/SHEATH, NON-LASER: HCPCS | Mod: HCNC | Performed by: INTERNAL MEDICINE

## 2023-10-04 PROCEDURE — C1898 LEAD, PMKR, OTHER THAN TRANS: HCPCS | Mod: HCNC | Performed by: INTERNAL MEDICINE

## 2023-10-04 PROCEDURE — 36415 COLL VENOUS BLD VENIPUNCTURE: CPT | Mod: HCNC | Performed by: NURSE PRACTITIONER

## 2023-10-04 PROCEDURE — 83735 ASSAY OF MAGNESIUM: CPT | Mod: HCNC | Performed by: NURSE PRACTITIONER

## 2023-10-04 PROCEDURE — 94761 N-INVAS EAR/PLS OXIMETRY MLT: CPT | Mod: HCNC

## 2023-10-04 PROCEDURE — 25000003 PHARM REV CODE 250: Mod: HCNC | Performed by: SPECIALIST

## 2023-10-04 PROCEDURE — 99152 MOD SED SAME PHYS/QHP 5/>YRS: CPT | Mod: HCNC | Performed by: INTERNAL MEDICINE

## 2023-10-04 DEVICE — IMPLANTABLE DEVICE
Type: IMPLANTABLE DEVICE | Site: HEART | Status: FUNCTIONAL
Brand: SOLIA

## 2023-10-04 DEVICE — IMPLANTABLE DEVICE
Type: IMPLANTABLE DEVICE | Site: HEART | Status: FUNCTIONAL
Brand: EDORA 8 DR-T

## 2023-10-04 RX ORDER — DOXYCYCLINE HYCLATE 100 MG
100 TABLET ORAL EVERY 12 HOURS
Status: COMPLETED | OUTPATIENT
Start: 2023-10-04 | End: 2023-10-08

## 2023-10-04 RX ORDER — LIDOCAINE HYDROCHLORIDE 20 MG/ML
INJECTION, SOLUTION INFILTRATION; PERINEURAL
Status: DISCONTINUED | OUTPATIENT
Start: 2023-10-04 | End: 2023-10-04 | Stop reason: HOSPADM

## 2023-10-04 RX ORDER — CEFAZOLIN SODIUM 2 G/50ML
2 SOLUTION INTRAVENOUS
Status: CANCELLED | OUTPATIENT
Start: 2023-10-04

## 2023-10-04 RX ORDER — SODIUM CHLORIDE 9 MG/ML
INJECTION, SOLUTION INTRAVENOUS CONTINUOUS
Status: ACTIVE | OUTPATIENT
Start: 2023-10-04 | End: 2023-10-05

## 2023-10-04 RX ORDER — CEPHALEXIN 500 MG/1
500 CAPSULE ORAL EVERY 12 HOURS
Status: DISCONTINUED | OUTPATIENT
Start: 2023-10-04 | End: 2023-10-04

## 2023-10-04 RX ORDER — FENTANYL CITRATE 50 UG/ML
INJECTION, SOLUTION INTRAMUSCULAR; INTRAVENOUS
Status: DISCONTINUED | OUTPATIENT
Start: 2023-10-04 | End: 2023-10-04 | Stop reason: HOSPADM

## 2023-10-04 RX ORDER — MIDAZOLAM HYDROCHLORIDE 1 MG/ML
INJECTION, SOLUTION INTRAMUSCULAR; INTRAVENOUS
Status: DISCONTINUED | OUTPATIENT
Start: 2023-10-04 | End: 2023-10-04 | Stop reason: HOSPADM

## 2023-10-04 RX ADMIN — ACETAMINOPHEN 650 MG: 325 TABLET ORAL at 12:10

## 2023-10-04 RX ADMIN — FLUOXETINE 20 MG: 20 CAPSULE ORAL at 10:10

## 2023-10-04 RX ADMIN — HEPARIN SODIUM 5000 UNITS: 5000 INJECTION INTRAVENOUS; SUBCUTANEOUS at 08:10

## 2023-10-04 RX ADMIN — AMPICILLIN AND SULBACTAM 3 G: 2; 1 INJECTION, POWDER, FOR SOLUTION INTRAVENOUS at 05:10

## 2023-10-04 RX ADMIN — CEPHALEXIN 500 MG: 500 CAPSULE ORAL at 11:10

## 2023-10-04 RX ADMIN — HYDROCODONE BITARTRATE AND ACETAMINOPHEN 1 TABLET: 7.5; 325 TABLET ORAL at 02:10

## 2023-10-04 RX ADMIN — MUPIROCIN: 20 OINTMENT TOPICAL at 10:10

## 2023-10-04 RX ADMIN — ATORVASTATIN CALCIUM 80 MG: 40 TABLET, FILM COATED ORAL at 10:10

## 2023-10-04 RX ADMIN — HYDROCODONE BITARTRATE AND ACETAMINOPHEN 1 TABLET: 7.5; 325 TABLET ORAL at 08:10

## 2023-10-04 RX ADMIN — POTASSIUM BICARBONATE 40 MEQ: 391 TABLET, EFFERVESCENT ORAL at 11:10

## 2023-10-04 RX ADMIN — PANTOPRAZOLE SODIUM 40 MG: 40 TABLET, DELAYED RELEASE ORAL at 10:10

## 2023-10-04 RX ADMIN — CHLORHEXIDINE GLUCONATE 0.12% ORAL RINSE 15 ML: 1.2 LIQUID ORAL at 10:10

## 2023-10-04 RX ADMIN — MUPIROCIN: 20 OINTMENT TOPICAL at 08:10

## 2023-10-04 RX ADMIN — CHLORHEXIDINE GLUCONATE 0.12% ORAL RINSE 15 ML: 1.2 LIQUID ORAL at 08:10

## 2023-10-04 RX ADMIN — HEPARIN SODIUM 5000 UNITS: 5000 INJECTION INTRAVENOUS; SUBCUTANEOUS at 01:10

## 2023-10-04 RX ADMIN — OXYBUTYNIN CHLORIDE 10 MG: 5 TABLET, EXTENDED RELEASE ORAL at 10:10

## 2023-10-04 RX ADMIN — SODIUM CHLORIDE: 9 INJECTION, SOLUTION INTRAVENOUS at 05:10

## 2023-10-04 RX ADMIN — DOXYCYCLINE HYCLATE 100 MG: 100 TABLET, COATED ORAL at 08:10

## 2023-10-04 RX ADMIN — CEFEPIME 2 G: 2 INJECTION, POWDER, FOR SOLUTION INTRAVENOUS at 01:10

## 2023-10-04 RX ADMIN — MEMANTINE 10 MG: 10 TABLET ORAL at 10:10

## 2023-10-04 NOTE — HPI
73-year-old male with a known past medical history of tobacco abuse, COPD, chronic bronchitis, diabetes type 2, Alzheimer's, and aspiration that presented to the ER 10/2 from his pulmonologist's office after his heart rate was noted to be in the 30s.He reports over the past 6-8 weeks he has become increasingly weak with decreased activity, decreased appetite, and spells of dizziness. He reports 1 possible syncopal episode where he fell about 6 weeks ago.  Workup in the ER revealed lab work with a creatinine of 2.5 (creatinine at baseline is 1.5-1.7) and a metabolic acidosis.  EKG in the ER with a rate of 36 and AV dissociation with narrow QRS complex for which cardiology was consulted.  Admitted to ICU with a Dx of complete heart block , aspiration PNA and MARI .  Cardiology consulted  which placed a dual chamber pace maker . Started on IVAB for aspiration PNA , sputum cx (+) for pseudomonas  . SLP consulted which rec soft diet with thin liquid . IM consulted  to assume care .

## 2023-10-04 NOTE — NURSING
Pt returned to ICU bed 8 post PPM placement. Pt in NAD. VSS. Paced on monitor. No complaints at this time. Family at bedside.

## 2023-10-04 NOTE — SUBJECTIVE & OBJECTIVE
Review of Systems   Constitutional: Positive for malaise/fatigue.   HENT: Negative.     Eyes: Negative.    Cardiovascular: Negative.    Respiratory: Negative.     Skin: Negative.    Musculoskeletal: Negative.    Gastrointestinal: Negative.    Genitourinary: Negative.    Neurological:  Positive for weakness.   Psychiatric/Behavioral: Negative.       Objective:     Vital Signs (Most Recent):  Temp: 98 °F (36.7 °C) (10/04/23 0800)  Pulse: (!) 34 (10/04/23 0800)  Resp: 17 (10/04/23 0800)  BP: (!) 152/67 (10/04/23 0800)  SpO2: 100 % (10/04/23 0800) Vital Signs (24h Range):  Temp:  [97.8 °F (36.6 °C)-98.6 °F (37 °C)] 98 °F (36.7 °C)  Pulse:  [34-44] 34  Resp:  [13-32] 17  SpO2:  [92 %-100 %] 100 %  BP: (103-157)/() 152/67     Weight: 60.8 kg (134 lb)  Body mass index is 20.37 kg/m².     SpO2: 100 %         Intake/Output Summary (Last 24 hours) at 10/4/2023 1008  Last data filed at 10/4/2023 0800  Gross per 24 hour   Intake 1957.36 ml   Output 750 ml   Net 1207.36 ml       Lines/Drains/Airways       Peripheral Intravenous Line  Duration                  Peripheral IV - Single Lumen 10/03/23 1659 20 G Right Upper Arm <1 day                       Physical Exam  Vitals and nursing note reviewed.   Constitutional:       Appearance: Normal appearance.   HENT:      Head: Normocephalic and atraumatic.   Eyes:      General:         Right eye: No discharge.         Left eye: No discharge.      Pupils: Pupils are equal, round, and reactive to light.   Cardiovascular:      Rate and Rhythm: Regular rhythm. Bradycardia present.      Heart sounds: S1 normal and S2 normal. No murmur heard.     No friction rub.   Pulmonary:      Effort: Pulmonary effort is normal. No respiratory distress.      Breath sounds: Normal breath sounds. No rales.   Abdominal:      Palpations: Abdomen is soft.      Tenderness: There is no abdominal tenderness.   Musculoskeletal:      Cervical back: Neck supple.      Right lower leg: No edema.      Left  "lower leg: No edema.   Skin:     General: Skin is warm and dry.   Neurological:      General: No focal deficit present.      Mental Status: He is alert and oriented to person, place, and time.   Psychiatric:         Mood and Affect: Mood normal.         Behavior: Behavior normal.         Thought Content: Thought content normal.            Significant Labs: BMP:   Recent Labs   Lab 10/02/23  1711 10/03/23  0348 10/04/23  0323    104 133*    144 139   K 3.6 3.4* 3.4*   * 115* 104   CO2 14* 16* 22*   BUN 32* 28* 22   CREATININE 2.5* 2.2* 2.1*   CALCIUM 9.4 8.6* 8.6*   MG 1.9 1.7 2.2   , CMP   Recent Labs   Lab 10/02/23  1711 10/03/23  0348 10/04/23  0323    144 139   K 3.6 3.4* 3.4*   * 115* 104   CO2 14* 16* 22*    104 133*   BUN 32* 28* 22   CREATININE 2.5* 2.2* 2.1*   CALCIUM 9.4 8.6* 8.6*   PROT 7.3  --   --    ALBUMIN 2.9*  --   --    BILITOT 0.1  --   --    ALKPHOS 208*  --   --    AST 22  --   --    ALT 12  --   --    ANIONGAP 13 13 13   , CBC   Recent Labs   Lab 10/02/23  1711 10/03/23  0348 10/04/23  0324   WBC 11.67 10.60 9.62   HGB 11.4* 9.5* 9.4*   HCT 35.3* 29.0* 28.7*    292 272   , INR   Recent Labs   Lab 10/02/23  1834   INR 1.2   , Lipid Panel No results for input(s): "CHOL", "HDL", "LDLCALC", "TRIG", "CHOLHDL" in the last 48 hours., Troponin   Recent Labs   Lab 10/02/23  1711   TROPONINI 0.025   , and All pertinent lab results from the last 24 hours have been reviewed.    Significant Imaging: Echocardiogram: Transthoracic echo (TTE) complete (Cupid Only):   Results for orders placed or performed during the hospital encounter of 10/02/23   Echo   Result Value Ref Range    BSA 1.71 m2    LVOT stroke volume 94.74 cm3    LVIDd 4.45 3.5 - 6.0 cm    LV Systolic Volume 30.85 mL    LV Systolic Volume Index 17.9 mL/m2    LVIDs 2.85 2.1 - 4.0 cm    LV Diastolic Volume 90.10 mL    LV Diastolic Volume Index 52.38 mL/m2    IVS 1.1 0.6 - 1.1 cm    LVOT diameter 1.93 cm "    LVOT area 2.9 cm2    FS 36 28 - 44 %    Left Ventricle Relative Wall Thickness 0.49 cm    Posterior Wall 1.09 0.6 - 1.1 cm    LV mass 170.86 g    LV Mass Index 99 g/m2    MV Peak E Mark 1.49 m/s    TDI LATERAL 0.10 m/s    TDI SEPTAL 0.18 m/s    E/E' ratio 10.64 m/s    MV Peak A Mark 1.46 m/s    TR Max Mark 1.98 m/s    E/A ratio 1.02     E wave deceleration time 155.30 msec    LV SEPTAL E/E' RATIO 8.28 m/s    LV LATERAL E/E' RATIO 14.90 m/s    LVOT peak mark 1.15 m/s    Left Ventricular Outflow Tract Mean Velocity 0.93 cm/s    Left Ventricular Outflow Tract Mean Gradient 3.53 mmHg    LA size 4.64 cm    Left Atrium Minor Axis 4.90 cm    Left Atrium Major Axis 4.77 cm    RVOT peak VTI 20.2 cm    TAPSE 1.98 cm    RA Major Axis 3.53 cm    AV mean gradient 6 mmHg    AV peak gradient 11 mmHg    Ao peak mark 1.67 m/s    Ao VTI 38.50 cm    LVOT peak VTI 32.40 cm    AV valve area 2.46 cm²    AV Velocity Ratio 0.69     AV index (prosthetic) 0.84     SUZANNE by Velocity Ratio 2.01 cm²    Mr max mark 4.81 m/s    MV stenosis pressure 1/2 time 45.04 ms    MV valve area p 1/2 method 4.88 cm2    TV mean gradient 18 mmHg    Triscuspid Valve Regurgitation Peak Gradient 16 mmHg    PV mean gradient 2 mmHg    RVOT peak mark 0.99 m/s    Ao root annulus 3.94 cm    STJ 3.42 cm    Ascending aorta 3.19 cm    IVC diameter 1.80 cm    Mean e' 0.14 m/s    ZLVIDS -0.28     ZLVIDD -0.70     LA Volume Index 39.9 mL/m2    LA volume 68.64 cm3    LA WIDTH 3.6 cm    RA Width 2.8 cm    TV resting pulmonary artery pressure 24 mmHg    RV TB RVSP 10 mmHg    Est. RA pres 8 mmHg    Narrative      Left Ventricle: The left ventricle is normal in size. Normal wall   thickness. Normal wall motion. There is normal systolic function with a   visually estimated ejection fraction of 55 - 70%. There is normal   diastolic function.    Left Atrium: Left atrium is mildly dilated.    Right Ventricle: Normal right ventricular cavity size. Wall thickness   is normal. Right  ventricle wall motion  is normal. Systolic function is   normal.    Tricuspid Valve: There is mild regurgitation.    IVC/SVC: Intermediate venous pressure at 8 mmHg.     , EKG: reviewed, Stress Test: reviewed, and X-Ray: CXR: X-Ray Chest 1 View (CXR): No results found for this visit on 10/02/23.

## 2023-10-04 NOTE — SUBJECTIVE & OBJECTIVE
Past Medical History:   Diagnosis Date    Anxiety     Depression     Diabetes mellitus, type 2     Hypertension     Stroke        Past Surgical History:   Procedure Laterality Date    ANGIOGRAPHY OF LOWER EXTREMITY N/A 12/21/2020    Procedure: ANGIOGRAM, LOWER EXTREMITY/Rt leg poss pta/stent;  Surgeon: Todd Michel MD;  Location: [facility] CATH LAB;  Service: Peripheral Vascular;  Laterality: N/A;  rescheduled 12/8    BACK SURGERY  2015    BRONCHOSCOPY Left 3/24/2022    Procedure: Bronchoscopy;  Surgeon: Edward Williamson MD;  Location: [facility] ENDO;  Service: Pulmonary;  Laterality: Left;  poss endobronchial biopsy or brushing    CATARACT EXTRACTION      ESOPHAGOGASTRODUODENOSCOPY N/A 8/24/2022    Procedure: EGD (ESOPHAGOGASTRODUODENOSCOPY);  Surgeon: Ana Gomez MD;  Location: [facility] ENDO;  Service: Endoscopy;  Laterality: N/A;    ESOPHAGOGASTRODUODENOSCOPY N/A 9/14/2022    Procedure: EGD (ESOPHAGOGASTRODUODENOSCOPY);  Surgeon: Ana Gomez MD;  Location[facility];  Service: Endoscopy;  Laterality: N/A;    KNEE SURGERY  2012    LITHOTRIPSY  2000       Review of patient's allergies indicates:   Allergen Reactions    Chantix [varenicline] Other (See Comments)     Felt bad       No current facility-administered medications on file prior to encounter.     Current Outpatient Medications on File Prior to Encounter   Medication Sig    albuterol (PROVENTIL/VENTOLIN HFA) 90 mcg/actuation inhaler Inhale 2 puffs into the lungs every 4 (four) hours as needed for Wheezing.    donepeziL (ARICEPT) 10 MG tablet Take 1 tablet (10 mg total) by mouth every evening.    DULoxetine (CYMBALTA) 30 MG capsule Take 1 capsule by mouth once daily    ELIQUIS 5 mg Tab Take 1 tablet by mouth once daily    FLUoxetine 20 MG capsule Take 1 capsule by mouth once daily    HYDROcodone-acetaminophen (NORCO)  mg per tablet Take 1 tablet by mouth.    memantine (NAMENDA) 10 MG Tab TAKE 1/2 TABLET BY MOUTH TWICE DAILY FOR 1 WEEK, THEN 1  TABLET BY MOUTH TWICE DAILY    pantoprazole (PROTONIX) 40 MG tablet Take 1 tablet (40 mg total) by mouth once daily.    QUEtiapine (SEROQUEL) 50 MG tablet Take 1 tablet by mouth twice daily (Patient taking differently: Take 100 mg by mouth nightly.)    rosuvastatin (CRESTOR) 20 MG tablet Take 1 tablet by mouth once daily (Patient taking differently: Take 20 mg by mouth every evening.)    albuterol (ACCUNEB) 1.25 mg/3 mL Nebu Take 3 mLs (1.25 mg total) by nebulization 2 (two) times a day. Rescue    blood glucose control, low (TRUE METRIX LEVEL 1) Soln 1 drop by Misc.(Non-Drug; Combo Route) route once daily.    blood glucose control, normal (TRUE METRIX LEVEL 2) Soln 1 drop by Misc.(Non-Drug; Combo Route) route once daily.    gabapentin (NEURONTIN) 100 MG capsule Take 1 capsule (100 mg total) by mouth 3 (three) times daily. (Patient taking differently: Take 100 mg by mouth 3 (three) times daily. Taken only as needed)    inhalat.spacing dev,large mask (BREATHERITE SPACER-MASK,ADULT) Spcr Use as directed for inhalation.    lancets 32 gauge Misc 1 lancet by Misc.(Non-Drug; Combo Route) route once daily.    levoFLOXacin (LEVAQUIN) 500 MG tablet Take 1 tablet (500 mg total) by mouth once daily. for 10 days    mucus clearing device (AEROBIKA OSCILLATING PEP SYSTM) by Misc.(Non-Drug; Combo Route) route 4 (four) times daily as needed.    oxybutynin (DITROPAN-XL) 10 MG 24 hr tablet Take 1 tablet by mouth once daily (Patient not taking: Reported on 10/2/2023)    walker (ULTRA-LIGHT ROLLATOR) Misc Use daily for ambulation     Family History       Problem Relation (Age of Onset)    COPD Sister    Cancer Father    Heart disease Mother    Stroke Mother          Tobacco Use    Smoking status: Every Day     Current packs/day: 1.00     Types: Cigarettes    Smokeless tobacco: Current     Types: Snuff   Substance and Sexual Activity    Alcohol use: Yes     Alcohol/week: 1.0 standard drink of alcohol     Types: 1 Shots of liquor per week      Comment: Daily    Drug use: Never    Sexual activity: Not Currently     Review of Systems   Constitutional:  Positive for activity change, appetite change and fatigue. Negative for fever.   Respiratory:  Positive for cough and shortness of breath. Negative for wheezing.    Cardiovascular:  Negative for chest pain, palpitations and leg swelling.   Gastrointestinal:  Positive for diarrhea. Negative for abdominal distention, abdominal pain, nausea and vomiting.   Neurological:  Positive for weakness.     Objective:     Vital Signs (Most Recent):  Temp: 98.9 °F (37.2 °C) (10/04/23 1605)  Pulse: 69 (10/04/23 1605)  Resp: 16 (10/04/23 1605)  BP: 128/72 (10/04/23 1605)  SpO2: 98 % (10/04/23 1605) Vital Signs (24h Range):  Temp:  [98 °F (36.7 °C)-98.9 °F (37.2 °C)] 98.9 °F (37.2 °C)  Pulse:  [34-73] 69  Resp:  [12-32] 16  SpO2:  [92 %-100 %] 98 %  BP: (103-163)/(58-78) 128/72     Weight: 60.8 kg (134 lb)  Body mass index is 20.37 kg/m².     Physical Exam  Vitals reviewed.   Constitutional:       General: He is awake.      Appearance: He is well-developed.   Cardiovascular:      Rate and Rhythm: Normal rate.      Pulses:           Radial pulses are 2+ on the right side and 2+ on the left side.      Comments: Paced at 60  Pulmonary:      Effort: Pulmonary effort is normal.      Breath sounds: Normal breath sounds.      Comments: On RA  Abdominal:      General: There is no distension.      Palpations: Abdomen is soft.   Musculoskeletal:      Comments: L arm immobilizer in place   Skin:     General: Skin is warm and dry.   Neurological:      General: No focal deficit present.      Mental Status: He is alert.   Psychiatric:         Behavior: Behavior is cooperative.          Significant Labs: All pertinent labs within the past 24 hours have been reviewed.  Recent Lab Results  (Last 5 results in the past 24 hours)        10/04/23  1114   10/04/23  0806   10/04/23  0324   10/04/23  0323   10/03/23  2209        Anion Gap        13         Baso #     0.06           Basophil %     0.6           BUN       22         Calcium       8.6         Chloride       104         CO2       22         Creatinine       2.1         Differential Method     Automated           eGFR       33         Eos #     0.3           Eosinophil %     3.0           Glucose       133         Gran # (ANC)     5.6           Gran %     58.1           Hematocrit     28.7           Hemoglobin     9.4           Immature Grans (Abs)     0.03  Comment: Mild elevation in immature granulocytes is non specific and   can be seen in a variety of conditions including stress response,   acute inflammation, trauma and pregnancy. Correlation with other   laboratory and clinical findings is essential.             Immature Granulocytes     0.3           Lymph #     2.8           Lymph %     29.4           Magnesium        2.2         MCH     29.4           MCHC     32.8           MCV     90           Mono #     0.8           Mono %     8.6           MPV     10.7           nRBC     0           Platelet Count     272           POCT Glucose 98   117       116       Potassium       3.4         RBC     3.20           RDW     16.3           Sodium       139         WBC     9.62                                  Significant Imaging: I have reviewed all pertinent imaging results/findings within the past 24 hours.

## 2023-10-04 NOTE — ASSESSMENT & PLAN NOTE
Patient with acute kidney injury/acute renal failure likely due to pre-renal azotemia due to IVVD MARI is currently improving. Baseline creatinine 1.7 - Labs reviewed- Renal function/electrolytes with Estimated Creatinine Clearance: 27.7 mL/min (A) (based on SCr of 2.1 mg/dL (H)). according to latest data. Monitor urine output and serial BMP and adjust therapy as needed. Avoid nephrotoxins and renally dose meds for GFR listed above.    Gentle IVF

## 2023-10-04 NOTE — ASSESSMENT & PLAN NOTE
Plan for dual-chamber pacemaker in a.m..  NPO after midnight.    Hold Eliquis.    Discussed with daughter on the phone.    10/4/23  PPM planned for today

## 2023-10-04 NOTE — HOSPITAL COURSE
10/4/23 Pt seen and examined resting in bed. Denies any SOB and CP at this time. PPM planned for today    10/5/23 pt seen and examined today, sitting up in bed. Feels ok, wife at bedside. Sling in place, PPM C/D/I, staples. Labs reviewed, chart reviewed    10/6/23 Pt seen and examined today resting in bed, sling in place. Feels good. Denies any CP and CHF sxs at this time. Labs reviewed, chart reviewed    10.8.2023  Worsening cr, found to have obstructing stone, going for a ureteral stent today .  Denies any chest pain or dyspnea.  Complains of flank/back pain.

## 2023-10-04 NOTE — PLAN OF CARE
Pt transferred from ICU. Ppm in place. Pt A/V paced. Room air. Pt denies pains. Sling on left arm.Post pacemaker placement education reinforced. NS infusing at 75 ml/hr.

## 2023-10-04 NOTE — NURSING
Pt transported to bed 239 on telemetry unit. Tele monitor in place. NAD. LUE immobilizer sling and ice pack in use. Pt's belongings including backpack and cell phone with pt. Report given to TIERA Mcfarland, at bedside upon transfer. Updated daughter Jayshree on pt transfer.

## 2023-10-04 NOTE — PT/OT/SLP PROGRESS
Occupational Therapy      Patient Name:  Samy Alejandre Jr.   MRN:  [id number]    Patient not seen today secondary to out of room for PPM placement. Will continue efforts.    Nabila Luna OT  10/4/2023

## 2023-10-04 NOTE — ASSESSMENT & PLAN NOTE
-on Eliquis at home, hold for now as he will undergo PPM placement 10/4  -will provide subcu heparin for prophylaxis   -resume home eliquis when okay with cards

## 2023-10-04 NOTE — ASSESSMENT & PLAN NOTE
-patient's chest x-ray concerning for aspiration  -seen in pulm clinic day of presentation and was to start course of abx at that juncture   -obtain sputum culture if able to produce sample  -ST evaluated and rec modified diet

## 2023-10-04 NOTE — SUBJECTIVE & OBJECTIVE
ROS complete and negative unless stated in the interval HPI    Objective:     Vital Signs (Most Recent):  Temp: 98.3 °F (36.8 °C) (10/04/23 1130)  Pulse: 62 (10/04/23 1100)  Resp: 12 (10/04/23 1100)  BP: (!) 163/72 (10/04/23 1100)  SpO2: 95 % (10/04/23 1100) Vital Signs (24h Range):  Temp:  [98 °F (36.7 °C)-98.6 °F (37 °C)] 98.3 °F (36.8 °C)  Pulse:  [34-70] 62  Resp:  [12-32] 12  SpO2:  [92 %-100 %] 95 %  BP: (103-163)/() 163/72     Weight: 60.8 kg (134 lb)  Body mass index is 20.37 kg/m².      Intake/Output Summary (Last 24 hours) at 10/4/2023 1204  Last data filed at 10/4/2023 1123  Gross per 24 hour   Intake 1846.19 ml   Output 925 ml   Net 921.19 ml        Physical Exam  Vitals reviewed.   Constitutional:       General: He is awake.      Appearance: He is well-developed.   Cardiovascular:      Rate and Rhythm: Normal rate.      Pulses:           Radial pulses are 2+ on the right side and 2+ on the left side.      Comments: Paced at 60  Pulmonary:      Effort: Pulmonary effort is normal.      Breath sounds: Normal breath sounds.      Comments: On RA  Abdominal:      General: There is no distension.      Palpations: Abdomen is soft.   Musculoskeletal:      Comments: L arm immobilizer in place   Skin:     General: Skin is warm and dry.   Neurological:      General: No focal deficit present.      Mental Status: He is alert.   Psychiatric:         Behavior: Behavior is cooperative.                    Lines/Drains/Airways       Peripheral Intravenous Line  Duration                  Peripheral IV - Single Lumen 10/03/23 1659 20 G Right Upper Arm <1 day                    Significant Labs:    CBC/Anemia Profile:  Recent Labs   Lab 10/02/23  1711 10/03/23  0348 10/04/23  0324   WBC 11.67 10.60 9.62   HGB 11.4* 9.5* 9.4*   HCT 35.3* 29.0* 28.7*    292 272   MCV 92 92 90   RDW 17.0* 17.2* 16.3*        Chemistries:  Recent Labs   Lab 10/02/23  1711 10/03/23  0348 10/04/23  0323    144 139   K 3.6 3.4*  3.4*   * 115* 104   CO2 14* 16* 22*   BUN 32* 28* 22   CREATININE 2.5* 2.2* 2.1*   CALCIUM 9.4 8.6* 8.6*   ALBUMIN 2.9*  --   --    PROT 7.3  --   --    BILITOT 0.1  --   --    ALKPHOS 208*  --   --    ALT 12  --   --    AST 22  --   --    MG 1.9 1.7 2.2       All pertinent labs within the past 24 hours have been reviewed.    Significant Imaging:  I have reviewed all pertinent imaging results/findings within the past 24 hours.

## 2023-10-04 NOTE — PROGRESS NOTES
Pharmacist Renal Dose Adjustment Note    Samy Alejandre Jr. is a 73 y.o. male being treated with the medication cephalexin for surgical prophylaxis s/p PPM placement 10/4.    Patient Data:    Vital Signs (Most Recent):  Temp: 98 °F (36.7 °C) (10/04/23 0800)  Pulse: 70 (10/04/23 1045)  Resp: 12 (10/04/23 1045)  BP: (!) 149/68 (10/04/23 1045)  SpO2: (!) 94 % (10/04/23 1045) Vital Signs (72h Range):  Temp:  [97.8 °F (36.6 °C)-98.9 °F (37.2 °C)]   Pulse:  [33-70]   Resp:  [12-40]   BP: (103-178)/()   SpO2:  [86 %-100 %]      Recent Labs   Lab 10/02/23  1711 10/03/23  0348 10/04/23  0323   CREATININE 2.5* 2.2* 2.1*     Serum creatinine: 2.1 mg/dL (H) 10/04/23 0323  Estimated creatinine clearance: 27.7 mL/min (A)    Per protocol for a mild to moderate infection (prophylaxis) & for CrCl 10-50 ml/min, dose will be reduced from 500 mg PO every 8 hours to 500 mg PO every 12 hours.     Pharmacist's Name: Katherine E Mcardle  Pharmacist's Extension: [phone]

## 2023-10-04 NOTE — PLAN OF CARE
Patient awake, alert and oriented, room air, voids to urinal. Patient heart rate remained in the 30's for the entirety of my shift with stable blood pressures. Bicarb drip infusing, abx given. PRN medication given for back pain.

## 2023-10-04 NOTE — CONSULTS
[facility] - Intensive Care ([facility])  [facility] Medicine  Consult Note    Patient Name: Samy Alejandre Jr.  MRN: [id number]  Admission Date: 10/2/2023  Hospital Length of Stay: 2 days  Attending Physician: Binh Boykin MD  Primary Care Provider: Damien Barron MD           Patient information was obtained from patient and ER records.     Consults  Subjective:     Principal Problem: Complete heart block    Chief Complaint:   Chief Complaint   Patient presents with    Bradycardia     Pt seen today at [facility], was sent here due to bradycardia. Pt states only symptom has been dizziness that is intermittent. Pt has been on doxycycline completed course for pneumonia and has chronic anemia.         HPI: 73-year-old male with a known past medical history of tobacco abuse, COPD, chronic bronchitis, diabetes type 2, Alzheimer's, and aspiration that presented to the ER 10/2 from his pulmonologist's office after his heart rate was noted to be in the 30s.He reports over the past 6-8 weeks he has become increasingly weak with decreased activity, decreased appetite, and spells of dizziness. He reports 1 possible syncopal episode where he fell about 6 weeks ago.  Workup in the ER revealed lab work with a creatinine of 2.5 (creatinine at baseline is 1.5-1.7) and a metabolic acidosis.  EKG in the ER with a rate of 36 and AV dissociation with narrow QRS complex for which cardiology was consulted.  Admitted to ICU with a Dx of complete heart block , aspiration PNA and MARI .  Cardiology consulted  which placed a dual chamber pace maker . Started on IVAB for aspiration PNA , sputum cx (+) for pseudomonas  . SLP consulted which rec soft diet with thin liquid . IM consulted  to assume care .      Past Medical History:   Diagnosis Date    Anxiety     Depression     Diabetes mellitus, type 2     Hypertension     Stroke        Past Surgical History:   Procedure Laterality Date    ANGIOGRAPHY OF LOWER EXTREMITY N/A 12/21/2020     Procedure: ANGIOGRAM, LOWER EXTREMITY/Rt leg poss pta/stent;  Surgeon: Todd Michel MD;  Location: [facility] CATH LAB;  Service: Peripheral Vascular;  Laterality: N/A;  rescheduled 12/8    BACK SURGERY  2015    BRONCHOSCOPY Left 3/24/2022    Procedure: Bronchoscopy;  Surgeon: Edward Williamson MD;  Location: [facility] ENDO;  Service: Pulmonary;  Laterality: Left;  poss endobronchial biopsy or brushing    CATARACT EXTRACTION      ESOPHAGOGASTRODUODENOSCOPY N/A 8/24/2022    Procedure: EGD (ESOPHAGOGASTRODUODENOSCOPY);  Surgeon: Ana Gomez MD;  Location: [facility] ENDO;  Service: Endoscopy;  Laterality: N/A;    ESOPHAGOGASTRODUODENOSCOPY N/A 9/14/2022    Procedure: EGD (ESOPHAGOGASTRODUODENOSCOPY);  Surgeon: Ana Gomez MD;  Location: [facility] ENDO;  Service: Endoscopy;  Laterality: N/A;    KNEE SURGERY  2012    LITHOTRIPSY  2000       Review of patient's allergies indicates:   Allergen Reactions    Chantix [varenicline] Other (See Comments)     Felt bad       No current facility-administered medications on file prior to encounter.     Current Outpatient Medications on File Prior to Encounter   Medication Sig    albuterol (PROVENTIL/VENTOLIN HFA) 90 mcg/actuation inhaler Inhale 2 puffs into the lungs every 4 (four) hours as needed for Wheezing.    donepeziL (ARICEPT) 10 MG tablet Take 1 tablet (10 mg total) by mouth every evening.    DULoxetine (CYMBALTA) 30 MG capsule Take 1 capsule by mouth once daily    ELIQUIS 5 mg Tab Take 1 tablet by mouth once daily    FLUoxetine 20 MG capsule Take 1 capsule by mouth once daily    HYDROcodone-acetaminophen (NORCO)  mg per tablet Take 1 tablet by mouth.    memantine (NAMENDA) 10 MG Tab TAKE 1/2 TABLET BY MOUTH TWICE DAILY FOR 1 WEEK, THEN 1 TABLET BY MOUTH TWICE DAILY    pantoprazole (PROTONIX) 40 MG tablet Take 1 tablet (40 mg total) by mouth once daily.    QUEtiapine (SEROQUEL) 50 MG tablet Take 1 tablet by mouth twice daily (Patient taking differently:  Take 100 mg by mouth nightly.)    rosuvastatin (CRESTOR) 20 MG tablet Take 1 tablet by mouth once daily (Patient taking differently: Take 20 mg by mouth every evening.)    albuterol (ACCUNEB) 1.25 mg/3 mL Nebu Take 3 mLs (1.25 mg total) by nebulization 2 (two) times a day. Rescue    blood glucose control, low (TRUE METRIX LEVEL 1) Soln 1 drop by Misc.(Non-Drug; Combo Route) route once daily.    blood glucose control, normal (TRUE METRIX LEVEL 2) Soln 1 drop by Misc.(Non-Drug; Combo Route) route once daily.    gabapentin (NEURONTIN) 100 MG capsule Take 1 capsule (100 mg total) by mouth 3 (three) times daily. (Patient taking differently: Take 100 mg by mouth 3 (three) times daily. Taken only as needed)    inhalat.spacing dev,large mask (BREATHERITE SPACER-MASK,ADULT) Spcr Use as directed for inhalation.    lancets 32 gauge Misc 1 lancet by Misc.(Non-Drug; Combo Route) route once daily.    levoFLOXacin (LEVAQUIN) 500 MG tablet Take 1 tablet (500 mg total) by mouth once daily. for 10 days    mucus clearing device (AEROBIKA OSCILLATING PEP SYSTM) by Misc.(Non-Drug; Combo Route) route 4 (four) times daily as needed.    oxybutynin (DITROPAN-XL) 10 MG 24 hr tablet Take 1 tablet by mouth once daily (Patient not taking: Reported on 10/2/2023)    walker (ULTRA-LIGHT ROLLATOR) Misc Use daily for ambulation     Family History       Problem Relation (Age of Onset)    COPD Sister    Cancer Father    Heart disease Mother    Stroke Mother          Tobacco Use    Smoking status: Every Day     Current packs/day: 1.00     Types: Cigarettes    Smokeless tobacco: Current     Types: Snuff   Substance and Sexual Activity    Alcohol use: Yes     Alcohol/week: 1.0 standard drink of alcohol     Types: 1 Shots of liquor per week     Comment: Daily    Drug use: Never    Sexual activity: Not Currently     Review of Systems   Constitutional:  Positive for activity change, appetite change and fatigue. Negative for fever.    Respiratory:  Positive for cough and shortness of breath. Negative for wheezing.    Cardiovascular:  Negative for chest pain, palpitations and leg swelling.   Gastrointestinal:  Positive for diarrhea. Negative for abdominal distention, abdominal pain, nausea and vomiting.   Neurological:  Positive for weakness.     Objective:     Vital Signs (Most Recent):  Temp: 98.9 °F (37.2 °C) (10/04/23 1605)  Pulse: 69 (10/04/23 1605)  Resp: 16 (10/04/23 1605)  BP: 128/72 (10/04/23 1605)  SpO2: 98 % (10/04/23 1605) Vital Signs (24h Range):  Temp:  [98 °F (36.7 °C)-98.9 °F (37.2 °C)] 98.9 °F (37.2 °C)  Pulse:  [34-73] 69  Resp:  [12-32] 16  SpO2:  [92 %-100 %] 98 %  BP: (103-163)/(58-78) 128/72     Weight: 60.8 kg (134 lb)  Body mass index is 20.37 kg/m².     Physical Exam  Vitals reviewed.   Constitutional:       General: He is awake.      Appearance: He is well-developed.   Cardiovascular:      Rate and Rhythm: Normal rate.      Pulses:           Radial pulses are 2+ on the right side and 2+ on the left side.      Comments: Paced at 60  Pulmonary:      Effort: Pulmonary effort is normal.      Breath sounds: Normal breath sounds.      Comments: On RA  Abdominal:      General: There is no distension.      Palpations: Abdomen is soft.   Musculoskeletal:      Comments: L arm immobilizer in place   Skin:     General: Skin is warm and dry.   Neurological:      General: No focal deficit present.      Mental Status: He is alert.   Psychiatric:         Behavior: Behavior is cooperative.          Significant Labs: All pertinent labs within the past 24 hours have been reviewed.  Recent Lab Results  (Last 5 results in the past 24 hours)        10/04/23  1114   10/04/23  0806   10/04/23  0324   10/04/23  0323   10/03/23  2209        Anion Gap       13         Baso #     0.06           Basophil %     0.6           BUN       22         Calcium       8.6         Chloride       104         CO2       22         Creatinine       2.1          Differential Method     Automated           eGFR       33         Eos #     0.3           Eosinophil %     3.0           Glucose       133         Gran # (ANC)     5.6           Gran %     58.1           Hematocrit     28.7           Hemoglobin     9.4           Immature Grans (Abs)     0.03  Comment: Mild elevation in immature granulocytes is non specific and   can be seen in a variety of conditions including stress response,   acute inflammation, trauma and pregnancy. Correlation with other   laboratory and clinical findings is essential.             Immature Granulocytes     0.3           Lymph #     2.8           Lymph %     29.4           Magnesium        2.2         MCH     29.4           MCHC     32.8           MCV     90           Mono #     0.8           Mono %     8.6           MPV     10.7           nRBC     0           Platelet Count     272           POCT Glucose 98   117       116       Potassium       3.4         RBC     3.20           RDW     16.3           Sodium       139         WBC     9.62                                  Significant Imaging: I have reviewed all pertinent imaging results/findings within the past 24 hours.    Assessment/Plan:     * Complete heart block  S/P dual chamber PPM   Cardi logy on board       COPD (chronic obstructive pulmonary disease)  Stable   cont breathing tx      Aspiration pneumonia  Cont IV cefepime   Sputum cx (+) for pseudomonas   F/U final sensitivity       Bradycardia  Compete heart block   Cardiology onboard  S/P Dial chamber PPM       Mixed Alzheimer's and vascular dementia  Cont supportive tx       History of DVT of lower extremity  Resume DOAC post PPM       Acute renal failure superimposed on stage 3 chronic kidney disease  Patient with acute kidney injury/acute renal failure likely due to pre-renal azotemia due to IVVD MARI is currently improving. Baseline creatinine 1.7 - Labs reviewed- Renal function/electrolytes with Estimated Creatinine Clearance:  27.7 mL/min (A) (based on SCr of 2.1 mg/dL (H)). according to latest data. Monitor urine output and serial BMP and adjust therapy as needed. Avoid nephrotoxins and renally dose meds for GFR listed above.    Gentle IVF     Mixed hyperlipidemia  Cont statin       Controlled type 2 diabetes mellitus with stage 3 chronic kidney disease, without long-term current use of insulin    Cont ISS  Keep CBG < 180     History of CVA with residual deficit    cont statin   Resume eliquis post PPM       VTE Risk Mitigation (From admission, onward)         Ordered     heparin (porcine) injection 5,000 Units  Every 8 hours         10/02/23 1812     IP VTE HIGH RISK PATIENT  Once         10/02/23 1751     Place sequential compression device  Until discontinued         10/02/23 1751                  Thank you for your consult. I will follow-up with patient. Please contact us if you have any additional questions.    Binh Boykin MD  Department of Hospital Medicine   [facility] - Intensive Care ([facility])

## 2023-10-04 NOTE — ASSESSMENT & PLAN NOTE
-baseline creatinine 1.5-1.7 per chart review, creatinine 2.5 at time of admission with daughter reporting decreased p.o. intake and some diarrhea, down to 2 this AM  -allow p.o. intake  -monitor UOP, RFP, and electrolytes

## 2023-10-04 NOTE — PT/OT/SLP PROGRESS
Physical Therapy      Patient Name:  Samy Alejandre Jr.   MRN:  [id number]    Patient not seen today secondary to out of room for PPM placement. Will continue efforts.    Fay Badillo, PT  10/4/2023  2478

## 2023-10-04 NOTE — PROGRESS NOTES
[facility] - Cath Lab ([facility])  Cardiology  Progress Note    Patient Name: Samy Alejandre Jr.  MRN: [id number]  Admission Date: 10/2/2023  Hospital Length of Stay: 2 days  Code Status: Full Code   Attending Physician: Shanon Bridges MD   Primary Care Physician: Damien Barron MD  Expected Discharge Date:   Principal Problem:Bradycardia    Subjective:     Hospital Course:   10/4/23 Pt seen and examined resting in bed. Denies any SOB and CP at this time. PPM planned for today          Review of Systems   Constitutional: Positive for malaise/fatigue.   HENT: Negative.     Eyes: Negative.    Cardiovascular: Negative.    Respiratory: Negative.     Skin: Negative.    Musculoskeletal: Negative.    Gastrointestinal: Negative.    Genitourinary: Negative.    Neurological:  Positive for weakness.   Psychiatric/Behavioral: Negative.       Objective:     Vital Signs (Most Recent):  Temp: 98 °F (36.7 °C) (10/04/23 0800)  Pulse: (!) 34 (10/04/23 0800)  Resp: 17 (10/04/23 0800)  BP: (!) 152/67 (10/04/23 0800)  SpO2: 100 % (10/04/23 0800) Vital Signs (24h Range):  Temp:  [97.8 °F (36.6 °C)-98.6 °F (37 °C)] 98 °F (36.7 °C)  Pulse:  [34-44] 34  Resp:  [13-32] 17  SpO2:  [92 %-100 %] 100 %  BP: (103-157)/() 152/67     Weight: 60.8 kg (134 lb)  Body mass index is 20.37 kg/m².     SpO2: 100 %         Intake/Output Summary (Last 24 hours) at 10/4/2023 1008  Last data filed at 10/4/2023 0800  Gross per 24 hour   Intake 1957.36 ml   Output 750 ml   Net 1207.36 ml       Lines/Drains/Airways       Peripheral Intravenous Line  Duration                  Peripheral IV - Single Lumen 10/03/23 1659 20 G Right Upper Arm <1 day                       Physical Exam  Vitals and nursing note reviewed.   Constitutional:       Appearance: Normal appearance.   HENT:      Head: Normocephalic and atraumatic.   Eyes:      General:         Right eye: No discharge.         Left eye: No discharge.      Pupils: Pupils are equal, round, and reactive to  "light.   Cardiovascular:      Rate and Rhythm: Regular rhythm. Bradycardia present.      Heart sounds: S1 normal and S2 normal. No murmur heard.     No friction rub.   Pulmonary:      Effort: Pulmonary effort is normal. No respiratory distress.      Breath sounds: Normal breath sounds. No rales.   Abdominal:      Palpations: Abdomen is soft.      Tenderness: There is no abdominal tenderness.   Musculoskeletal:      Cervical back: Neck supple.      Right lower leg: No edema.      Left lower leg: No edema.   Skin:     General: Skin is warm and dry.   Neurological:      General: No focal deficit present.      Mental Status: He is alert and oriented to person, place, and time.   Psychiatric:         Mood and Affect: Mood normal.         Behavior: Behavior normal.         Thought Content: Thought content normal.            Significant Labs: BMP:   Recent Labs   Lab 10/02/23  1711 10/03/23  0348 10/04/23  0323    104 133*    144 139   K 3.6 3.4* 3.4*   * 115* 104   CO2 14* 16* 22*   BUN 32* 28* 22   CREATININE 2.5* 2.2* 2.1*   CALCIUM 9.4 8.6* 8.6*   MG 1.9 1.7 2.2   , CMP   Recent Labs   Lab 10/02/23  1711 10/03/23  0348 10/04/23  0323    144 139   K 3.6 3.4* 3.4*   * 115* 104   CO2 14* 16* 22*    104 133*   BUN 32* 28* 22   CREATININE 2.5* 2.2* 2.1*   CALCIUM 9.4 8.6* 8.6*   PROT 7.3  --   --    ALBUMIN 2.9*  --   --    BILITOT 0.1  --   --    ALKPHOS 208*  --   --    AST 22  --   --    ALT 12  --   --    ANIONGAP 13 13 13   , CBC   Recent Labs   Lab 10/02/23  1711 10/03/23  0348 10/04/23  0324   WBC 11.67 10.60 9.62   HGB 11.4* 9.5* 9.4*   HCT 35.3* 29.0* 28.7*    292 272   , INR   Recent Labs   Lab 10/02/23  1834   INR 1.2   , Lipid Panel No results for input(s): "CHOL", "HDL", "LDLCALC", "TRIG", "CHOLHDL" in the last 48 hours., Troponin   Recent Labs   Lab 10/02/23  1711   TROPONINI 0.025   , and All pertinent lab results from the last 24 hours have been " reviewed.    Significant Imaging: Echocardiogram: Transthoracic echo (TTE) complete ([facility] Only):   Results for orders placed or performed during the hospital encounter of 10/02/23   Echo   Result Value Ref Range    BSA 1.71 m2    LVOT stroke volume 94.74 cm3    LVIDd 4.45 3.5 - 6.0 cm    LV Systolic Volume 30.85 mL    LV Systolic Volume Index 17.9 mL/m2    LVIDs 2.85 2.1 - 4.0 cm    LV Diastolic Volume 90.10 mL    LV Diastolic Volume Index 52.38 mL/m2    IVS 1.1 0.6 - 1.1 cm    LVOT diameter 1.93 cm    LVOT area 2.9 cm2    FS 36 28 - 44 %    Left Ventricle Relative Wall Thickness 0.49 cm    Posterior Wall 1.09 0.6 - 1.1 cm    LV mass 170.86 g    LV Mass Index 99 g/m2    MV Peak E Mark 1.49 m/s    TDI LATERAL 0.10 m/s    TDI SEPTAL 0.18 m/s    E/E' ratio 10.64 m/s    MV Peak A Mark 1.46 m/s    TR Max Mark 1.98 m/s    E/A ratio 1.02     E wave deceleration time 155.30 msec    LV SEPTAL E/E' RATIO 8.28 m/s    LV LATERAL E/E' RATIO 14.90 m/s    LVOT peak mark 1.15 m/s    Left Ventricular Outflow Tract Mean Velocity 0.93 cm/s    Left Ventricular Outflow Tract Mean Gradient 3.53 mmHg    LA size 4.64 cm    Left Atrium Minor Axis 4.90 cm    Left Atrium Major Axis 4.77 cm    RVOT peak VTI 20.2 cm    TAPSE 1.98 cm    RA Major Axis 3.53 cm    AV mean gradient 6 mmHg    AV peak gradient 11 mmHg    Ao peak mark 1.67 m/s    Ao VTI 38.50 cm    LVOT peak VTI 32.40 cm    AV valve area 2.46 cm²    AV Velocity Ratio 0.69     AV index (prosthetic) 0.84     SUZANNE by Velocity Ratio 2.01 cm²    Mr max mark 4.81 m/s    MV stenosis pressure 1/2 time 45.04 ms    MV valve area p 1/2 method 4.88 cm2    TV mean gradient 18 mmHg    Triscuspid Valve Regurgitation Peak Gradient 16 mmHg    PV mean gradient 2 mmHg    RVOT peak mark 0.99 m/s    Ao root annulus 3.94 cm    STJ 3.42 cm    Ascending aorta 3.19 cm    IVC diameter 1.80 cm    Mean e' 0.14 m/s    ZLVIDS -0.28     ZLVIDD -0.70     LA Volume Index 39.9 mL/m2    LA volume 68.64 cm3    LA WIDTH 3.6 cm     RA Width 2.8 cm    TV resting pulmonary artery pressure 24 mmHg    RV TB RVSP 10 mmHg    Est. RA pres 8 mmHg    Narrative      Left Ventricle: The left ventricle is normal in size. Normal wall   thickness. Normal wall motion. There is normal systolic function with a   visually estimated ejection fraction of 55 - 70%. There is normal   diastolic function.    Left Atrium: Left atrium is mildly dilated.    Right Ventricle: Normal right ventricular cavity size. Wall thickness   is normal. Right ventricle wall motion  is normal. Systolic function is   normal.    Tricuspid Valve: There is mild regurgitation.    IVC/SVC: Intermediate venous pressure at 8 mmHg.     , EKG: reviewed, Stress Test: reviewed, and X-Ray: CXR: X-Ray Chest 1 View (CXR): No results found for this visit on 10/02/23.    Assessment and Plan:       Complete heart block  Plan for dual-chamber pacemaker in a.m..  NPO after midnight.    Hold Eliquis.    Discussed with daughter on the phone.    10/4/23  PPM planned for today    Mixed hyperlipidemia  statin        VTE Risk Mitigation (From admission, onward)         Ordered     heparin (porcine) injection 5,000 Units  Every 8 hours         10/02/23 1812     IP VTE HIGH RISK PATIENT  Once         10/02/23 1751     Place sequential compression device  Until discontinued         10/02/23 1751                Ericka Bunn NP  Cardiology  [facility] - Cath Lab ([facility])

## 2023-10-04 NOTE — INTERVAL H&P NOTE
The patient has been examined and the H&P has been reviewed:    I concur with the findings and no changes have occurred since H&P was written.    Anesthesia/Surgery risks, benefits and alternative options discussed and understood by patient/family.          Active Hospital Problems    Diagnosis  POA    *Bradycardia [R00.1]  Yes    Complete heart block [I44.2]  Yes    Aspiration pneumonia [J69.0]  Yes    COPD (chronic obstructive pulmonary disease) [J44.9]  Yes    Mixed Alzheimer's and vascular dementia [G30.9, F01.50, F02.80]  Yes    History of DVT of lower extremity [Z86.718]  Not Applicable    Acute renal failure superimposed on stage 3 chronic kidney disease [N17.9, N18.30]  Yes    Controlled type 2 diabetes mellitus with stage 3 chronic kidney disease, without long-term current use of insulin [E11.22, N18.30]  Yes    Mixed hyperlipidemia [E78.2]  Yes    History of CVA with residual deficit [I69.30]  Not Applicable      Resolved Hospital Problems   No resolved problems to display.

## 2023-10-04 NOTE — PROGRESS NOTES
[facility] - Intensive Care ([facility])  Critical Care Medicine  Progress Note    Patient Name: Samy Alejandre Jr.  MRN: [id number]  Admission Date: 10/2/2023  Hospital Length of Stay: 2 days  Code Status: Full Code  Attending Provider: Shanon Bridges MD  Primary Care Provider: Damien Barron MD   Principal Problem: Bradycardia    Subjective:     HPI:  73-year-old male with a known past medical history of tobacco abuse, COPD, chronic bronchitis, diabetes type 2, Alzheimer's, and aspiration that presented to the ER 10/2 from his pulmonologist's office after his heart rate was noted to be in the 30s.  History comes from patient's daughter who is at bedside.  She reports over the past 6-8 weeks he has become increasingly weak with decreased activity, decreased appetite, and spells of dizziness.  She reports 1 possible syncopal episode where he fell about 6 weeks ago.  Workup in the ER revealed lab work with a creatinine of 2.5 (creatinine at baseline is 1.5-1.7) and a metabolic acidosis.  EKG in the ER with a rate of 36 and AV dissociation with narrow QRS complex for which cardiology was consulted.  It was recommended that patient be admitted to the ICU for close monitoring overnight with possible pacemaker placement tomorrow.  Critical care team consulted for admission to the ICU and further care management.      Hospital/ICU Course:  10/3: no acute events noted overnight, remains sherine in the 30s with stable BP, plans for PPM placement tomorrow   10/4: went for PPM placement this AM and was seen post-procedure, on RA in NAD with pacer capturing at 60 with good hemodynamics, stable for transfer out of the ICU to the floor       ROS complete and negative unless stated in the interval HPI    Objective:     Vital Signs (Most Recent):  Temp: 98.3 °F (36.8 °C) (10/04/23 1130)  Pulse: 62 (10/04/23 1100)  Resp: 12 (10/04/23 1100)  BP: (!) 163/72 (10/04/23 1100)  SpO2: 95 % (10/04/23 1100) Vital Signs (24h Range):  Temp:   [98 °F (36.7 °C)-98.6 °F (37 °C)] 98.3 °F (36.8 °C)  Pulse:  [34-70] 62  Resp:  [12-32] 12  SpO2:  [92 %-100 %] 95 %  BP: (103-163)/() 163/72     Weight: 60.8 kg (134 lb)  Body mass index is 20.37 kg/m².      Intake/Output Summary (Last 24 hours) at 10/4/2023 1204  Last data filed at 10/4/2023 1123  Gross per 24 hour   Intake 1846.19 ml   Output 925 ml   Net 921.19 ml        Physical Exam  Vitals reviewed.   Constitutional:       General: He is awake.      Appearance: He is well-developed.   Cardiovascular:      Rate and Rhythm: Normal rate.      Pulses:           Radial pulses are 2+ on the right side and 2+ on the left side.      Comments: Paced at 60  Pulmonary:      Effort: Pulmonary effort is normal.      Breath sounds: Normal breath sounds.      Comments: On RA  Abdominal:      General: There is no distension.      Palpations: Abdomen is soft.   Musculoskeletal:      Comments: L arm immobilizer in place   Skin:     General: Skin is warm and dry.   Neurological:      General: No focal deficit present.      Mental Status: He is alert.   Psychiatric:         Behavior: Behavior is cooperative.                    Lines/Drains/Airways       Peripheral Intravenous Line  Duration                  Peripheral IV - Single Lumen 10/03/23 1659 20 G Right Upper Arm <1 day                    Significant Labs:    CBC/Anemia Profile:  Recent Labs   Lab 10/02/23  1711 10/03/23  0348 10/04/23  0324   WBC 11.67 10.60 9.62   HGB 11.4* 9.5* 9.4*   HCT 35.3* 29.0* 28.7*    292 272   MCV 92 92 90   RDW 17.0* 17.2* 16.3*        Chemistries:  Recent Labs   Lab 10/02/23  1711 10/03/23  0348 10/04/23  0323    144 139   K 3.6 3.4* 3.4*   * 115* 104   CO2 14* 16* 22*   BUN 32* 28* 22   CREATININE 2.5* 2.2* 2.1*   CALCIUM 9.4 8.6* 8.6*   ALBUMIN 2.9*  --   --    PROT 7.3  --   --    BILITOT 0.1  --   --    ALKPHOS 208*  --   --    ALT 12  --   --    AST 22  --   --    MG 1.9 1.7 2.2       All pertinent labs within  the past 24 hours have been reviewed.    Significant Imaging:  I have reviewed all pertinent imaging results/findings within the past 24 hours.      ABG  Recent Labs   Lab 10/02/23  2258   PH 7.296*   PO2 22*   PCO2 33.8*   HCO3 16.5*   BE -10     Assessment/Plan:     Neuro  Mixed Alzheimer's and vascular dementia  -resume home meds as appropriate   -supportive care    History of CVA with residual deficit  -patient with old stroke and residual right-sided weakness   -supportive care, PT/OT    Pulmonary  COPD (chronic obstructive pulmonary disease)  -not in acute exacerbation   -p.r.n. nebs    Aspiration pneumonia  -patient's chest x-ray concerning for aspiration  -seen in pulm clinic day of presentation and was to start course of abx at that juncture   -sputum culture with pseudomonas, abx adjusted to cefepime   -ST evaluated and rec modified diet     Cardiac/Vascular  * Bradycardia  -cardiology consulted  -taken to cath lab 10/4 for PPM placement   -continue telemetry     Mixed hyperlipidemia  -statin    Renal/  Acute renal failure superimposed on stage 3 chronic kidney disease  -baseline creatinine 1.5-1.7 per chart review, creatinine 2.5 at time of admission with daughter reporting decreased p.o. intake and some diarrhea, down to 2 this AM  -allow p.o. intake  -monitor UOP, RFP, and electrolytes    Hematology  History of DVT of lower extremity  -on Eliquis at home, hold for now as he will undergo PPM placement 10/4  -will provide subcu heparin for prophylaxis   -resume home eliquis when okay with cards    Endocrine  Controlled type 2 diabetes mellitus with stage 3 chronic kidney disease, without long-term current use of insulin  -SSI   -cardiac / diabetic diet    Prophylaxis Measures:  GI ppx: Oral Diet  VTE ppx: Heparin  Glucose control: Monitor blood glucose    Code Status: Full Code    Patient stable for care outside of the ICU setting. [facility] [place] Critical Care team will sign off at this time as  patient's critical care issues have resolved and patient is appropriate for ongoing management outside of the ICU setting. Please call if the patient's condition should change and warrant reevaluation.      Harry Dalal NP  Critical Care Medicine  Formerly [facility] - Intensive [facility] ([facility])

## 2023-10-05 LAB
ANION GAP SERPL CALC-SCNC: 14 MMOL/L (ref 8–16)
BASOPHILS # BLD AUTO: 0.05 K/UL (ref 0–0.2)
BASOPHILS NFR BLD: 0.7 % (ref 0–1.9)
BUN SERPL-MCNC: 17 MG/DL (ref 8–23)
CALCIUM SERPL-MCNC: 8.5 MG/DL (ref 8.7–10.5)
CHLORIDE SERPL-SCNC: 107 MMOL/L (ref 95–110)
CO2 SERPL-SCNC: 19 MMOL/L (ref 23–29)
CREAT SERPL-MCNC: 1.9 MG/DL (ref 0.5–1.4)
DIFFERENTIAL METHOD: ABNORMAL
EOSINOPHIL # BLD AUTO: 0.2 K/UL (ref 0–0.5)
EOSINOPHIL NFR BLD: 2.8 % (ref 0–8)
ERYTHROCYTE [DISTWIDTH] IN BLOOD BY AUTOMATED COUNT: 17.2 % (ref 11.5–14.5)
EST. GFR  (NO RACE VARIABLE): 37 ML/MIN/1.73 M^2
GLUCOSE SERPL-MCNC: 85 MG/DL (ref 70–110)
HCT VFR BLD AUTO: 30.6 % (ref 40–54)
HGB BLD-MCNC: 9.4 G/DL (ref 14–18)
IMM GRANULOCYTES # BLD AUTO: 0.04 K/UL (ref 0–0.04)
IMM GRANULOCYTES NFR BLD AUTO: 0.5 % (ref 0–0.5)
LYMPHOCYTES # BLD AUTO: 1.8 K/UL (ref 1–4.8)
LYMPHOCYTES NFR BLD: 23.2 % (ref 18–48)
MAGNESIUM SERPL-MCNC: 2 MG/DL (ref 1.6–2.6)
MCH RBC QN AUTO: 29.7 PG (ref 27–31)
MCHC RBC AUTO-ENTMCNC: 30.7 G/DL (ref 32–36)
MCV RBC AUTO: 97 FL (ref 82–98)
MONOCYTES # BLD AUTO: 0.8 K/UL (ref 0.3–1)
MONOCYTES NFR BLD: 10.2 % (ref 4–15)
NEUTROPHILS # BLD AUTO: 4.8 K/UL (ref 1.8–7.7)
NEUTROPHILS NFR BLD: 62.6 % (ref 38–73)
NRBC BLD-RTO: 0 /100 WBC
PLATELET # BLD AUTO: 265 K/UL (ref 150–450)
PMV BLD AUTO: 11.1 FL (ref 9.2–12.9)
POCT GLUCOSE: 111 MG/DL (ref 70–110)
POCT GLUCOSE: 122 MG/DL (ref 70–110)
POCT GLUCOSE: 170 MG/DL (ref 70–110)
POCT GLUCOSE: 89 MG/DL (ref 70–110)
POCT GLUCOSE: 94 MG/DL (ref 70–110)
POTASSIUM SERPL-SCNC: 4.3 MMOL/L (ref 3.5–5.1)
RBC # BLD AUTO: 3.17 M/UL (ref 4.6–6.2)
SODIUM SERPL-SCNC: 140 MMOL/L (ref 136–145)
WBC # BLD AUTO: 7.63 K/UL (ref 3.9–12.7)

## 2023-10-05 PROCEDURE — 21400001 HC TELEMETRY ROOM: Mod: HCNC

## 2023-10-05 PROCEDURE — 25000003 PHARM REV CODE 250: Mod: HCNC | Performed by: NURSE PRACTITIONER

## 2023-10-05 PROCEDURE — 97116 GAIT TRAINING THERAPY: CPT | Mod: HCNC

## 2023-10-05 PROCEDURE — 25000003 PHARM REV CODE 250: Mod: HCNC | Performed by: INTERNAL MEDICINE

## 2023-10-05 PROCEDURE — 63600175 PHARM REV CODE 636 W HCPCS: Mod: HCNC | Performed by: INTERNAL MEDICINE

## 2023-10-05 PROCEDURE — 83735 ASSAY OF MAGNESIUM: CPT | Mod: HCNC | Performed by: NURSE PRACTITIONER

## 2023-10-05 PROCEDURE — 94761 N-INVAS EAR/PLS OXIMETRY MLT: CPT | Mod: HCNC

## 2023-10-05 PROCEDURE — 99024 POSTOP FOLLOW-UP VISIT: CPT | Mod: HCNC,,, | Performed by: INTERNAL MEDICINE

## 2023-10-05 PROCEDURE — 63600175 PHARM REV CODE 636 W HCPCS: Mod: HCNC | Performed by: NURSE PRACTITIONER

## 2023-10-05 PROCEDURE — 85025 COMPLETE CBC W/AUTO DIFF WBC: CPT | Mod: HCNC | Performed by: NURSE PRACTITIONER

## 2023-10-05 PROCEDURE — 97535 SELF CARE MNGMENT TRAINING: CPT | Mod: HCNC

## 2023-10-05 PROCEDURE — 80048 BASIC METABOLIC PNL TOTAL CA: CPT | Mod: HCNC | Performed by: NURSE PRACTITIONER

## 2023-10-05 PROCEDURE — 92526 ORAL FUNCTION THERAPY: CPT | Mod: HCNC

## 2023-10-05 PROCEDURE — 99024 PR POST-OP FOLLOW-UP VISIT: ICD-10-PCS | Mod: HCNC,,, | Performed by: INTERNAL MEDICINE

## 2023-10-05 PROCEDURE — 97110 THERAPEUTIC EXERCISES: CPT | Mod: HCNC

## 2023-10-05 PROCEDURE — 97530 THERAPEUTIC ACTIVITIES: CPT | Mod: HCNC

## 2023-10-05 PROCEDURE — 36415 COLL VENOUS BLD VENIPUNCTURE: CPT | Mod: HCNC | Performed by: NURSE PRACTITIONER

## 2023-10-05 RX ADMIN — HEPARIN SODIUM 5000 UNITS: 5000 INJECTION INTRAVENOUS; SUBCUTANEOUS at 09:10

## 2023-10-05 RX ADMIN — HEPARIN SODIUM 5000 UNITS: 5000 INJECTION INTRAVENOUS; SUBCUTANEOUS at 06:10

## 2023-10-05 RX ADMIN — HEPARIN SODIUM 5000 UNITS: 5000 INJECTION INTRAVENOUS; SUBCUTANEOUS at 01:10

## 2023-10-05 RX ADMIN — OXYBUTYNIN CHLORIDE 10 MG: 5 TABLET, EXTENDED RELEASE ORAL at 09:10

## 2023-10-05 RX ADMIN — CHLORHEXIDINE GLUCONATE 0.12% ORAL RINSE 15 ML: 1.2 LIQUID ORAL at 08:10

## 2023-10-05 RX ADMIN — HYDROCODONE BITARTRATE AND ACETAMINOPHEN 1 TABLET: 7.5; 325 TABLET ORAL at 06:10

## 2023-10-05 RX ADMIN — PANTOPRAZOLE SODIUM 40 MG: 40 TABLET, DELAYED RELEASE ORAL at 09:10

## 2023-10-05 RX ADMIN — DOXYCYCLINE HYCLATE 100 MG: 100 TABLET, COATED ORAL at 08:10

## 2023-10-05 RX ADMIN — DOXYCYCLINE HYCLATE 100 MG: 100 TABLET, COATED ORAL at 09:10

## 2023-10-05 RX ADMIN — HYDROCODONE BITARTRATE AND ACETAMINOPHEN 1 TABLET: 7.5; 325 TABLET ORAL at 02:10

## 2023-10-05 RX ADMIN — Medication 6 MG: at 08:10

## 2023-10-05 RX ADMIN — INSULIN ASPART 1 UNITS: 100 INJECTION, SOLUTION INTRAVENOUS; SUBCUTANEOUS at 08:10

## 2023-10-05 RX ADMIN — MUPIROCIN: 20 OINTMENT TOPICAL at 09:10

## 2023-10-05 RX ADMIN — MEMANTINE 10 MG: 10 TABLET ORAL at 09:10

## 2023-10-05 RX ADMIN — ACETAMINOPHEN 650 MG: 325 TABLET ORAL at 04:10

## 2023-10-05 RX ADMIN — CHLORHEXIDINE GLUCONATE 0.12% ORAL RINSE 15 ML: 1.2 LIQUID ORAL at 09:10

## 2023-10-05 RX ADMIN — HYDROCODONE BITARTRATE AND ACETAMINOPHEN 1 TABLET: 7.5; 325 TABLET ORAL at 09:10

## 2023-10-05 RX ADMIN — CEFEPIME 2 G: 2 INJECTION, POWDER, FOR SOLUTION INTRAVENOUS at 01:10

## 2023-10-05 RX ADMIN — FLUOXETINE 20 MG: 20 CAPSULE ORAL at 09:10

## 2023-10-05 RX ADMIN — MUPIROCIN: 20 OINTMENT TOPICAL at 08:10

## 2023-10-05 RX ADMIN — ATORVASTATIN CALCIUM 80 MG: 40 TABLET, FILM COATED ORAL at 09:10

## 2023-10-05 NOTE — HOSPITAL COURSE
73-year-old male, was admitted to the Intensive Care Unit (ICU) with a diagnosis of complete heart block, acute kidney injury (MARI), and Pseudomonas pneumonia. He underwent dual chamber pacemaker placement by the cardiology team to address the complete heart block. During his hospitalization, his kidney function initially  gradually improved with intravenous fluids (IVF). Physical therapy (PT) and occupational therapy (OT) recommended home health (HH) support for his recovery.Blood cultures were obtained and were negative, while sputum cultures showed growth of Pseudomonas with sensitivities  to Levaquin   On 10/7, renal function  started worsening ,and a retroperitoneal ultrasound demonstrated mild fullness of the renal pelvis without estefania hydronephrosis. Nephrology was consulted.  On 10/8, a CT abdominal scan revealed left-sided hydronephrosis, bilateral subcentimeter non-obstructing renal calculi, and an obstructing proximal left ureteral calculi measuring up to 5 mm superiorly and up to 3 mm inferiorly. No right-sided hydronephrosis was noted. Despite these findings, the patient's kidney function remained elevated. Urology was consulted, and a plan was made for possible cystoscopy and stent placement.  On 10/9, the patient was assessed at the bedside and deemed suitable for discharge. He underwent cystoscopy with stent placement by the urology team without any complications. His kidney function is improving post stent .  The patient will be discharged on oral Levaquin at a renal dose to treat the Pseudomonas pneumonia. He will be instructed to follow up with the following specialists within the next 1 to 2 weeks:  Urology: For further evaluation and management of renal calculi and stent placement.  Nephrology: To monitor kidney function and ongoing recovery.  Cardiology: To follow up on the dual chamber pacemaker placement.  Primary Care Physician (PCP): For comprehensive post-hospitalization care and  management of chronic conditions.  The patient and his caregivers have been educated on medication management, dietary recommendations, and the importance of continued medical follow-up. They have been advised to seek immediate medical attention for any concerning symptoms or issues

## 2023-10-05 NOTE — PT/OT/SLP PROGRESS
"Speech Language Pathology Treatment    Patient Name:  Samy Alejandre Jr.   MRN:  [id number]  Admitting Diagnosis: Complete heart block    Recommendations:                 General Recommendations:  Follow-up not indicated  Diet recommendations:  Soft & Bite Sized Diet - IDDSI Level 6, Liquid Diet Level: Thin liquids - IDDSI Level 0   Aspiration Precautions: Feed only when awake/alert, Frequent oral care, HOB to 90 degrees, Monitor for s/s of aspiration, Remain upright 30 minutes post meal, Small bites/sips, and Standard aspiration precautions   General Precautions: Standard, aspiration  Communication strategies:  none    Subjective     Pt seen bedside for ST.  "I am waiting on my daughter to bring me home today."  Wife present at bedside and unsure of d/c planning.  No c/o pain.  Feel much better per pt report.  Patient goals: to go home     Pain/Comfort:  Pain Rating 1: 0/10  Pain Rating Post-Intervention 1: 0/10  Pain Rating 2: 0/10  Pain Rating Post-Intervention 2: 0/10    Respiratory Status: Room air    Objective:     Has the patient been evaluated by SLP for swallowing?   Yes  Keep patient NPO? No   Current Respiratory Status: room air    Pt awake/alert and communicative.  Looks and feel much better than during initial ST evaluation in ICU.  Pt s/p PPM placement 10/4/2023 with improving mobility, breath support and cough.  He is tolerating PO medication and diet recommendations, including IDDSI 6-soft/bite sized solids with IDDSI 0-thin liquids without incident.  Instrumental assessment not clinically indicated at this time.  Pt and wife educated on dysphagia/aspiration risks s/t comorbidities, oral care/hygiene and MBSS if needed in the OP setting.      Assessment:     Samy Alejandre Jr. is a 73 y.o. male with an SLP diagnosis of suspected Dysphagia s/t PNA in the setting of significant co-morbidities increasing pt's risk, including but not limited to: cardiac hx, CVA, dementia, COPD, esophageal " dysphagia/stricture/hiatal hernia and recent functional decline/weakness with poor po intake over the past two months.  Pt admitted to [facility] acute with dx complete heart block s/p PPM 10/4/2023.     He presents with adequate ALEXA and ability to communicate functional needs; however, cognitive/memory deficits reported, requiring increased assist at home.  Pt s/p PPM placement with improving mobility, breath support and cough.  He is tolerating PO medication and diet recommendations, including IDDSI 6-soft/bite sized solids with IDDSI 0-thin liquids without incident.  Instrumental assessment not clinically indicated at this time.  Pt and wife educated on dysphagia/aspiration risks s/t comorbidities, oral care/hygiene and MBSS if needed in the OP setting.  No further acute SLP intervention indicated at this time.  MD to re-consult if needed.     Given pt's pulmonary, cardiac and neurological co-morbidities along with increased risk of aspiration/dysphagia, it is recommended that ACP/goals of care discussion and planning is initiated with providers and pt/family to ensure pt goals are met in the future.    Goals:   Multidisciplinary Problems       SLP Goals       Not on file              Multidisciplinary Problems (Resolved)          Problem: SLP    Goal Priority Disciplines Outcome   SLP Goal   (Resolved)     SLP Met   Description: 1.  Pt will consume IDDSI 6-soft/bite sized solids with IDDSI 0-thin liquids without incident.-MET    2.  MBSS as comprehensive swallowing assessment.--Not clinically indicated at this time.  Educated on dysphagia/aspiration risks and if symptoms arise s/t comorbidities, contact PCP for OP MBSS orders.  D/C acute goal.                       Plan:     Patient to be seen:  2 x/week, 3 x/week   Plan of Care expires:   (goals met.  D/C ST)  Plan of Care reviewed with:  patient, spouse   SLP Follow-Up:  No       Discharge recommendations:  home   Barriers to Discharge:  None    Time Tracking:      SLP Treatment Date:   10/05/23  Speech Start Time:  1600  Speech Stop Time:  1630     Speech Total Time (min):  30 min    Billable Minutes: Treatment Swallowing Dysfunction 15 minutes and Self Care/Home Management Training 15 minutes    10/05/2023

## 2023-10-05 NOTE — PLAN OF CARE
Pt tolerated session well this date. Pt Bed Mobility SBA, SUP>EOD CGA, EOB>Stand CGA x 2 trials. Pt ambulated 45 ft with Mod A with unsteady gait. Stand>sit Min A.    ADLs: Pt completed simple grooming of combing hair with set up assist while seated in chair. Pt completed LBD while seated EOB with Max A.    D/C: HHOT - spoke with wife and states pt's preference is for continued OT services at home. Pt's has 24 hour care from 2 grandsons and wife.

## 2023-10-05 NOTE — ASSESSMENT & PLAN NOTE
Patient with acute kidney injury/acute renal failure likely due to pre-renal azotemia due to IVVD MARI is currently improving. Baseline creatinine 1.7 - Labs reviewed- Renal function/electrolytes with Estimated Creatinine Clearance: 30.7 mL/min (A) (based on SCr of 1.9 mg/dL (H)). according to latest data. Monitor urine output and serial BMP and adjust therapy as needed. Avoid nephrotoxins and renally dose meds for GFR listed above.    S/p  IVF

## 2023-10-05 NOTE — PROGRESS NOTES
[facility]  Progress Note    Patient Name: Samy Alejandre Jr.  MRN: [id number]  Patient Class: IP- Inpatient   Admission Date: 10/2/2023  Length of Stay: 3 days  Attending Physician: Binh Harris,*  Primary Care Provider: Damien Barron MD        Subjective:     Principal Problem:Complete heart block        HPI:  73-year-old male with a known past medical history of tobacco abuse, COPD, chronic bronchitis, diabetes type 2, Alzheimer's, and aspiration that presented to the ER 10/2 from his pulmonologist's office after his heart rate was noted to be in the 30s.He reports over the past 6-8 weeks he has become increasingly weak with decreased activity, decreased appetite, and spells of dizziness. He reports 1 possible syncopal episode where he fell about 6 weeks ago.  Workup in the ER revealed lab work with a creatinine of 2.5 (creatinine at baseline is 1.5-1.7) and a metabolic acidosis.  EKG in the ER with a rate of 36 and AV dissociation with narrow QRS complex for which cardiology was consulted.  Admitted to ICU with a Dx of complete heart block , aspiration PNA and MARI .  Cardiology consulted  which placed a dual chamber pace maker . Started on IVAB for aspiration PNA , sputum cx (+) for pseudomonas  . SLP consulted which rec soft diet with thin liquid . IM consulted  to assume care .      Overview/Hospital Course:  74 y/o WM admitted to ICU with a dx of complete heart block , MARI and pseudomonas PNA , S/P Dual chamber pacemaker  by cardiology . Kidney function improving with IVF . PT/OT rec HH . Blood cx NGTD . Sputum cx (+) for pseudomonas  pending sensitivities .      Interval History:     Review of Systems   Constitutional:  Positive for activity change, appetite change and fatigue. Negative for fever.   HENT: Negative.     Eyes: Negative.    Respiratory:  Negative for cough, shortness of breath and wheezing.    Cardiovascular:  Negative for chest pain,  palpitations and leg swelling.   Gastrointestinal:  Negative for abdominal distention, abdominal pain, diarrhea, nausea and vomiting.   Endocrine: Negative.    Genitourinary: Negative.    Musculoskeletal: Negative.    Skin: Negative.    Allergic/Immunologic: Negative.    Neurological:  Positive for weakness.   Hematological: Negative.    Psychiatric/Behavioral: Negative.       Objective:     Vital Signs (Most Recent):  Temp: 97.3 °F (36.3 °C) (10/05/23 0735)  Pulse: 72 (10/05/23 0806)  Resp: 18 (10/05/23 0921)  BP: (!) 116/59 (10/05/23 0735)  SpO2: (!) 94 % (10/05/23 0806) Vital Signs (24h Range):  Temp:  [97.3 °F (36.3 °C)-98.9 °F (37.2 °C)] 97.3 °F (36.3 °C)  Pulse:  [62-92] 72  Resp:  [16-25] 18  SpO2:  [93 %-99 %] 94 %  BP: (116-156)/(59-94) 116/59     Weight: 60.8 kg (134 lb)  Body mass index is 20.37 kg/m².    Intake/Output Summary (Last 24 hours) at 10/5/2023 1155  Last data filed at 10/4/2023 1500  Gross per 24 hour   Intake 100 ml   Output 175 ml   Net -75 ml         Physical Exam  Vitals reviewed.   Constitutional:       General: He is awake.      Appearance: He is well-developed.   HENT:      Nose: Nose normal.   Eyes:      Extraocular Movements: Extraocular movements intact.      Pupils: Pupils are equal, round, and reactive to light.   Cardiovascular:      Rate and Rhythm: Normal rate.      Pulses:           Radial pulses are 2+ on the right side and 2+ on the left side.      Comments: Paced at 60  Pulmonary:      Effort: Pulmonary effort is normal.      Breath sounds: Normal breath sounds.      Comments: On RA  Abdominal:      General: There is no distension.      Palpations: Abdomen is soft.   Musculoskeletal:      Comments: L arm immobilizer in place   Skin:     General: Skin is warm and dry.   Neurological:      General: No focal deficit present.      Mental Status: He is alert.   Psychiatric:         Behavior: Behavior is cooperative.             Significant Labs: All pertinent labs within the past  24 hours have been reviewed.  Recent Lab Results         10/05/23  0434   10/05/23  0429   10/04/23  1730        Anion Gap   14         Baso #   0.05         Basophil %   0.7         BUN   17         Calcium   8.5         Chloride   107         CO2   19         Creatinine   1.9         Differential Method   Automated         eGFR   37         Eos #   0.2         Eosinophil %   2.8         Glucose   85         Gran # (ANC)   4.8         Gran %   62.6         Hematocrit   30.6         Hemoglobin   9.4         Immature Grans (Abs)   0.04  Comment: Mild elevation in immature granulocytes is non specific and   can be seen in a variety of conditions including stress response,   acute inflammation, trauma and pregnancy. Correlation with other   laboratory and clinical findings is essential.           Immature Granulocytes   0.5         Lymph #   1.8         Lymph %   23.2         Magnesium    2.0         MCH   29.7         MCHC   30.7         MCV   97         Mono #   0.8         Mono %   10.2         MPV   11.1         nRBC   0         Platelet Count   265         POCT Glucose 94     97       Potassium   4.3         RBC   3.17         RDW   17.2         Sodium   140         WBC   7.63                 Significant Imaging: I have reviewed all pertinent imaging results/findings within the past 24 hours.      Assessment/Plan:      * Complete heart block  S/P dual chamber PPM   Cardilogy on board       COPD (chronic obstructive pulmonary disease)  Stable   cont breathing tx      Aspiration pneumonia  Cont IV cefepime   Sputum cx (+) for pseudomonas   F/U final sensitivity       Bradycardia  Compete heart block   Cardiology onboard  S/P Dual chamber PPM       Mixed Alzheimer's and vascular dementia  Cont supportive tx       History of DVT of lower extremity  Resume DOAC post PPM       Acute renal failure superimposed on stage 3 chronic kidney disease  Patient with acute kidney injury/acute renal failure likely due to pre-renal  azotemia due to IVVD MARI is currently improving. Baseline creatinine 1.7 - Labs reviewed- Renal function/electrolytes with Estimated Creatinine Clearance: 30.7 mL/min (A) (based on SCr of 1.9 mg/dL (H)). according to latest data. Monitor urine output and serial BMP and adjust therapy as needed. Avoid nephrotoxins and renally dose meds for GFR listed above.    S/p  IVF     Mixed hyperlipidemia  Cont statin       Controlled type 2 diabetes mellitus with stage 3 chronic kidney disease, without long-term current use of insulin    Cont ISS  Keep CBG < 180     History of CVA with residual deficit    cont statin   Resume eliquis post PPM       VTE Risk Mitigation (From admission, onward)         Ordered     heparin (porcine) injection 5,000 Units  Every 8 hours         10/02/23 1812     IP VTE HIGH RISK PATIENT  Once         10/02/23 1751     Place sequential compression device  Until discontinued         10/02/23 1751                Discharge Planning   VALERIE:      Code Status: Full Code   Is the patient medically ready for discharge?:     Reason for patient still in hospital (select all that apply): Treatment  Discharge Plan A: Home with family                  Binh Boykin MD  Department of Hospital Medicine   [facility] - Telemetry ([facility])

## 2023-10-05 NOTE — ASSESSMENT & PLAN NOTE
Plan for dual-chamber pacemaker in a.m..  NPO after midnight.    Hold Eliquis.    Discussed with daughter on the phone.    10/4/23  PPM planned for today    10/5/23  S/p PPM yesterday  Keep sling in place at all time for 3 days then nightly through the end of this month.  F/u in clinic for staple removal with PPM clinic

## 2023-10-05 NOTE — PROGRESS NOTES
[facility] ([facility])  Cardiology  Progress Note    Patient Name: Samy Alejandre Jr.  MRN: [id number]  Admission Date: 10/2/2023  Hospital Length of Stay: 3 days  Code Status: Full Code   Attending Physician: Binh Harris,*   Primary Care Physician: Damien Barron MD  Expected Discharge Date:   Principal Problem:Complete heart block    Subjective:     Hospital Course:   10/4/23 Pt seen and examined resting in bed. Denies any SOB and CP at this time. PPM planned for today    10/5/23 pt seen and examined today, sitting up in bed. Feels ok, wife at bedside. Sling in place, PPM C/D/I, staples. Labs reviewed, chart reviewed          Review of Systems   Constitutional: Negative.   HENT: Negative.     Eyes: Negative.    Cardiovascular: Negative.    Respiratory: Negative.     Skin: Negative.    Musculoskeletal: Negative.    Gastrointestinal: Negative.    Genitourinary: Negative.    Neurological: Negative.    Psychiatric/Behavioral: Negative.       Objective:     Vital Signs (Most Recent):  Temp: 98.7 °F (37.1 °C) (10/05/23 1213)  Pulse: 70 (10/05/23 1213)  Resp: 18 (10/05/23 1213)  BP: (!) 163/79 (10/05/23 1213)  SpO2: 96 % (10/05/23 1213) Vital Signs (24h Range):  Temp:  [97.3 °F (36.3 °C)-98.9 °F (37.2 °C)] 98.7 °F (37.1 °C)  Pulse:  [62-92] 70  Resp:  [16-20] 18  SpO2:  [93 %-99 %] 96 %  BP: (116-163)/(59-94) 163/79     Weight: 60.8 kg (134 lb)  Body mass index is 20.37 kg/m².     SpO2: 96 %         Intake/Output Summary (Last 24 hours) at 10/5/2023 1342  Last data filed at 10/4/2023 1500  Gross per 24 hour   Intake 100 ml   Output --   Net 100 ml       Lines/Drains/Airways       Peripheral Intravenous Line  Duration                  Peripheral IV - Single Lumen 10/05/23 0539 20 G Anterior;Right Forearm <1 day                       Physical Exam  Vitals and nursing note reviewed.   Constitutional:       Appearance: Normal appearance.   HENT:      Head: Normocephalic and atraumatic.   Eyes:      General:  "        Right eye: No discharge.         Left eye: No discharge.      Pupils: Pupils are equal, round, and reactive to light.   Cardiovascular:      Rate and Rhythm: Normal rate and regular rhythm.      Heart sounds: S1 normal and S2 normal. No murmur heard.     No friction rub.   Pulmonary:      Effort: Pulmonary effort is normal. No respiratory distress.      Breath sounds: Normal breath sounds. No rales.   Abdominal:      Palpations: Abdomen is soft.      Tenderness: There is no abdominal tenderness.   Musculoskeletal:      Cervical back: Neck supple.      Right lower leg: No edema.      Left lower leg: No edema.   Skin:     General: Skin is warm and dry.   Neurological:      General: No focal deficit present.      Mental Status: He is alert and oriented to person, place, and time.   Psychiatric:         Mood and Affect: Mood normal.         Behavior: Behavior normal.         Thought Content: Thought content normal.            Significant Labs: BMP:   Recent Labs   Lab 10/04/23  0323 10/05/23  0429   * 85    140   K 3.4* 4.3    107   CO2 22* 19*   BUN 22 17   CREATININE 2.1* 1.9*   CALCIUM 8.6* 8.5*   MG 2.2 2.0   , CMP   Recent Labs   Lab 10/04/23  0323 10/05/23  0429    140   K 3.4* 4.3    107   CO2 22* 19*   * 85   BUN 22 17   CREATININE 2.1* 1.9*   CALCIUM 8.6* 8.5*   ANIONGAP 13 14   , CBC   Recent Labs   Lab 10/04/23  0324 10/05/23  0429   WBC 9.62 7.63   HGB 9.4* 9.4*   HCT 28.7* 30.6*    265   , INR No results for input(s): "INR", "PROTIME" in the last 48 hours., Lipid Panel No results for input(s): "CHOL", "HDL", "LDLCALC", "TRIG", "CHOLHDL" in the last 48 hours., Troponin No results for input(s): "TROPONINI" in the last 48 hours., and All pertinent lab results from the last 24 hours have been reviewed.    Significant Imaging: Echocardiogram: Transthoracic echo (TTE) complete ([facility] Only):   Results for orders placed or performed during the hospital encounter " of 10/02/23   Echo   Result Value Ref Range    BSA 1.71 m2    LVOT stroke volume 94.74 cm3    LVIDd 4.45 3.5 - 6.0 cm    LV Systolic Volume 30.85 mL    LV Systolic Volume Index 17.9 mL/m2    LVIDs 2.85 2.1 - 4.0 cm    LV Diastolic Volume 90.10 mL    LV Diastolic Volume Index 52.38 mL/m2    IVS 1.1 0.6 - 1.1 cm    LVOT diameter 1.93 cm    LVOT area 2.9 cm2    FS 36 28 - 44 %    Left Ventricle Relative Wall Thickness 0.49 cm    Posterior Wall 1.09 0.6 - 1.1 cm    LV mass 170.86 g    LV Mass Index 99 g/m2    MV Peak E Mark 1.49 m/s    TDI LATERAL 0.10 m/s    TDI SEPTAL 0.18 m/s    E/E' ratio 10.64 m/s    MV Peak A Mark 1.46 m/s    TR Max Mark 1.98 m/s    E/A ratio 1.02     E wave deceleration time 155.30 msec    LV SEPTAL E/E' RATIO 8.28 m/s    LV LATERAL E/E' RATIO 14.90 m/s    LVOT peak mark 1.15 m/s    Left Ventricular Outflow Tract Mean Velocity 0.93 cm/s    Left Ventricular Outflow Tract Mean Gradient 3.53 mmHg    LA size 4.64 cm    Left Atrium Minor Axis 4.90 cm    Left Atrium Major Axis 4.77 cm    RVOT peak VTI 20.2 cm    TAPSE 1.98 cm    RA Major Axis 3.53 cm    AV mean gradient 6 mmHg    AV peak gradient 11 mmHg    Ao peak mark 1.67 m/s    Ao VTI 38.50 cm    LVOT peak VTI 32.40 cm    AV valve area 2.46 cm²    AV Velocity Ratio 0.69     AV index (prosthetic) 0.84     SUZANNE by Velocity Ratio 2.01 cm²    Mr max mark 4.81 m/s    MV stenosis pressure 1/2 time 45.04 ms    MV valve area p 1/2 method 4.88 cm2    TV mean gradient 18 mmHg    Triscuspid Valve Regurgitation Peak Gradient 16 mmHg    PV mean gradient 2 mmHg    RVOT peak mark 0.99 m/s    Ao root annulus 3.94 cm    STJ 3.42 cm    Ascending aorta 3.19 cm    IVC diameter 1.80 cm    Mean e' 0.14 m/s    ZLVIDS -0.28     ZLVIDD -0.70     LA Volume Index 39.9 mL/m2    LA volume 68.64 cm3    LA WIDTH 3.6 cm    RA Width 2.8 cm    TV resting pulmonary artery pressure 24 mmHg    RV TB RVSP 10 mmHg    Est. RA pres 8 mmHg    Narrative      Left Ventricle: The left ventricle is  normal in size. Normal wall   thickness. Normal wall motion. There is normal systolic function with a   visually estimated ejection fraction of 55 - 70%. There is normal   diastolic function.    Left Atrium: Left atrium is mildly dilated.    Right Ventricle: Normal right ventricular cavity size. Wall thickness   is normal. Right ventricle wall motion  is normal. Systolic function is   normal.    Tricuspid Valve: There is mild regurgitation.    IVC/SVC: Intermediate venous pressure at 8 mmHg.     , EKG: reviewed, Stress Test: reviewed, and X-Ray: CXR: X-Ray Chest 1 View (CXR): No results found for this visit on 10/02/23.    Assessment and Plan:         * Complete heart block  Plan for dual-chamber pacemaker in a.m..  NPO after midnight.    Hold Eliquis.    Discussed with daughter on the phone.    10/4/23  PPM planned for today    10/5/23  S/p PPM yesterday  Keep sling in place at all time for 3 days then nightly through the end of this month.  F/u in clinic for staple removal with PPM clinic    Mixed hyperlipidemia  statin        VTE Risk Mitigation (From admission, onward)         Ordered     heparin (porcine) injection 5,000 Units  Every 8 hours         10/02/23 1812     IP VTE HIGH RISK PATIENT  Once         10/02/23 1751     Place sequential compression device  Until discontinued         10/02/23 1751                Ericka Bunn NP  Cardiology  [facility] - Tele[facility] ([facility])

## 2023-10-05 NOTE — PROGRESS NOTES
Pharmacist Renal Dose Adjustment Note    Samy Alejandre Jr. is a 73 y.o. male being treated with the medication cefepime.    Patient Data:    Vital Signs (Most Recent):  Temp: 98.7 °F (37.1 °C) (10/05/23 1213)  Pulse: 70 (10/05/23 1213)  Resp: 18 (10/05/23 1213)  BP: (!) 163/79 (10/05/23 1213)  SpO2: 96 % (10/05/23 1213) Vital Signs (72h Range):  Temp:  [97.3 °F (36.3 °C)-98.9 °F (37.2 °C)]   Pulse:  [33-92]   Resp:  [12-40]   BP: (103-178)/()   SpO2:  [86 %-100 %]      Recent Labs   Lab 10/03/23  0348 10/04/23  0323 10/05/23  0429   CREATININE 2.2* 2.1* 1.9*     Serum creatinine: 1.9 mg/dL (H) 10/05/23 0429  Estimated creatinine clearance: 30.7 mL/min (A)    Cefepime 2 g IV q24h adjusted to q12h according to [facility]'s renal dose adjustment policy.        Pharmacist's Name: Emily Escalera PharmD candidate [id number]  Pharmacist's Extension: [phone]

## 2023-10-05 NOTE — PLAN OF CARE
Problem: Adult Inpatient Plan of Care  Goal: Plan of Care Review  Outcome: Ongoing, Progressing  Goal: Patient-Specific Goal (Individualized)  Outcome: Ongoing, Progressing  Goal: Absence of Hospital-Acquired Illness or Injury  Outcome: Ongoing, Progressing  Goal: Optimal Comfort and Wellbeing  Outcome: Ongoing, Progressing  Goal: Readiness for Transition of Care  Outcome: Ongoing, Progressing   Pt oriented x4.  VSS.  Pt remained afebrile throughout this shift.   All meds administered per order.   Pt remained free of falls this shift.   Plan of care reviewed. Patient verbalizes understanding.   Pt moving/turning independently.     Patient on room air. Patient sitting at the edge of the bed with wife bedside.   Bed low, side rails up x 2, wheels locked, call light in reach.   Patient instructed to call for assistance.

## 2023-10-05 NOTE — PLAN OF CARE
PT CURRENTLY REQUIRES SBA FOR BED MOBILITY, CGA FOR SIT<>STAND TF, MODA FOR GAIT HAND HELD ASSIST.  P.T. RECOMMENDS HHPT WITH 24 HOUR CARE FOR SAFETY

## 2023-10-05 NOTE — SUBJECTIVE & OBJECTIVE
Interval History:     Review of Systems   Constitutional:  Positive for activity change, appetite change and fatigue. Negative for fever.   HENT: Negative.     Eyes: Negative.    Respiratory:  Negative for cough, shortness of breath and wheezing.    Cardiovascular:  Negative for chest pain, palpitations and leg swelling.   Gastrointestinal:  Negative for abdominal distention, abdominal pain, diarrhea, nausea and vomiting.   Endocrine: Negative.    Genitourinary: Negative.    Musculoskeletal: Negative.    Skin: Negative.    Allergic/Immunologic: Negative.    Neurological:  Positive for weakness.   Hematological: Negative.    Psychiatric/Behavioral: Negative.       Objective:     Vital Signs (Most Recent):  Temp: 97.3 °F (36.3 °C) (10/05/23 0735)  Pulse: 72 (10/05/23 0806)  Resp: 18 (10/05/23 0921)  BP: (!) 116/59 (10/05/23 0735)  SpO2: (!) 94 % (10/05/23 0806) Vital Signs (24h Range):  Temp:  [97.3 °F (36.3 °C)-98.9 °F (37.2 °C)] 97.3 °F (36.3 °C)  Pulse:  [62-92] 72  Resp:  [16-25] 18  SpO2:  [93 %-99 %] 94 %  BP: (116-156)/(59-94) 116/59     Weight: 60.8 kg (134 lb)  Body mass index is 20.37 kg/m².    Intake/Output Summary (Last 24 hours) at 10/5/2023 1155  Last data filed at 10/4/2023 1500  Gross per 24 hour   Intake 100 ml   Output 175 ml   Net -75 ml         Physical Exam  Vitals reviewed.   Constitutional:       General: He is awake.      Appearance: He is well-developed.   HENT:      Nose: Nose normal.   Eyes:      Extraocular Movements: Extraocular movements intact.      Pupils: Pupils are equal, round, and reactive to light.   Cardiovascular:      Rate and Rhythm: Normal rate.      Pulses:           Radial pulses are 2+ on the right side and 2+ on the left side.      Comments: Paced at 60  Pulmonary:      Effort: Pulmonary effort is normal.      Breath sounds: Normal breath sounds.      Comments: On RA  Abdominal:      General: There is no distension.      Palpations: Abdomen is soft.   Musculoskeletal:       Comments: L arm immobilizer in place   Skin:     General: Skin is warm and dry.   Neurological:      General: No focal deficit present.      Mental Status: He is alert.   Psychiatric:         Behavior: Behavior is cooperative.             Significant Labs: All pertinent labs within the past 24 hours have been reviewed.  Recent Lab Results         10/05/23  0434   10/05/23  0429   10/04/23  1730        Anion Gap   14         Baso #   0.05         Basophil %   0.7         BUN   17         Calcium   8.5         Chloride   107         CO2   19         Creatinine   1.9         Differential Method   Automated         eGFR   37         Eos #   0.2         Eosinophil %   2.8         Glucose   85         Gran # (ANC)   4.8         Gran %   62.6         Hematocrit   30.6         Hemoglobin   9.4         Immature Grans (Abs)   0.04  Comment: Mild elevation in immature granulocytes is non specific and   can be seen in a variety of conditions including stress response,   acute inflammation, trauma and pregnancy. Correlation with other   laboratory and clinical findings is essential.           Immature Granulocytes   0.5         Lymph #   1.8         Lymph %   23.2         Magnesium    2.0         MCH   29.7         MCHC   30.7         MCV   97         Mono #   0.8         Mono %   10.2         MPV   11.1         nRBC   0         Platelet Count   265         POCT Glucose 94     97       Potassium   4.3         RBC   3.17         RDW   17.2         Sodium   140         WBC   7.63                 Significant Imaging: I have reviewed all pertinent imaging results/findings within the past 24 hours.

## 2023-10-05 NOTE — PT/OT/SLP PROGRESS
Physical Therapy Treatment    Patient Name:  Samy Alejandre Jr.   MRN:  [id number]    Recommendations:     Discharge Recommendations: home health PT (WITH 24 HOUR CARE AT HOME FOR SAFETY, PT REPORTS HE HAS GOOD SUPPORT/ASSISTANCE WITH 2 ADULT GRAND SONS AT HOME)  Discharge Equipment Recommendations: bedside commode  Barriers to discharge: None    Assessment:     Samy Alejandre Jr. is a 73 y.o. male admitted with a medical diagnosis of Complete heart block.  He presents with the following impairments/functional limitations: weakness, impaired endurance, gait instability, impaired balance, pain, impaired functional mobility.    Rehab Prognosis: Good; patient would benefit from acute skilled PT services to address these deficits and reach maximum level of function.    Recent Surgery: Procedure(s) (LRB):  INSERTION, CARDIAC PACEMAKER, DUAL CHAMBER (Left) 1 Day Post-Op    Plan:     During this hospitalization, patient to be seen 3 x/week to address the identified rehab impairments via gait training, therapeutic activities, therapeutic exercises and progress toward the following goals:    Plan of Care Expires:  10/19/23 (UPDATED POC)    Subjective     Chief Complaint: PT REPORTS SOME RELIEF WITH PAIN MED, AGREEABLE TO TX.  Patient/Family Comments/goals: RETURN HOME WITH FAMILY SUPPORT  Pain/Comfort:  Pain Rating 1: 4/10  Location - Side 1: Left  Location - Orientation 1: generalized  Location 1: chest (PPM SITE)      Objective:     Communicated with NURSE DOE prior to session.  Patient found supine with telemetry, peripheral IV upon PT entry to room.     General Precautions: Standard, fall, pacemaker  Orthopedic Precautions: N/A  Braces: Sling and swathe-ADJUSTED AS NEEDED  Respiratory Status: Room air     Functional Mobility:  Bed Mobility:     Rolling Left:  stand by assistance  Scooting: stand by assistance  Supine to Sit: stand by assistance  Transfers:     Sit to Stand:  contact guard assistance with no AD  Bed to  "Chair: minimum assistance with  no AD  using  Step Transfer  Gait: PT AMB 45' WITH MODA NO AD USING HAND HELD ASSIST TO RUE, CUES FOR UPRIGHT POSTURE, PT PRESENTS WITH MILD BUCKLING TO R KNEE FROM BASELINE WEAKNESS FROM OLD CVA, NO LOB, QUICK TO FATIGUE BUT GOOD EFFORT  Balance: FAIR SITTING BALANCE, POOR DYNAMIC BALANCE DURING GAIT    AM-PAC 6 CLICK MOBILITY  Turning over in bed (including adjusting bedclothes, sheets and blankets)?: 4  Sitting down on and standing up from a chair with arms (e.g., wheelchair, bedside commode, etc.): 3  Moving from lying on back to sitting on the side of the bed?: 4  Moving to and from a bed to a chair (including a wheelchair)?: 3  Need to walk in hospital room?: 2  Climbing 3-5 steps with a railing?: 1  Basic Mobility Total Score: 17     Treatment & Education:  PT EDUCATION:  - ROLE OF P.T. AND POC IN ACUTE CARE HOSPITAL SETTING  - ENCOURAGED TO INCREASE TIME OOB IN CHAIR TO TOLERANCE   - EDUCATED IN AND ENCOURAGED TO CONTINUE THERAPUETIC EXERCISES THROUGHOUT THE DAY TO INCREASE ACTIVITY TOLERANCE AND DECREASE RISK FOR PNEUMONIA AND BLOOD CLOTS: HIP FLEX/EXT, HIP ABD/ADD, QUAD SET, HEEL SLIDE, AP  - RISK FOR FALLS DUE TO GENERALIZED WEAKNESS, EDUCATED ON "CALL DON'T FALL", ENCOURAGED TO CALL FOR ASSISTANCE WITH ALL NEEDS SUCH AS BED<>CHAIR TRANSFERS OR TRIPS TO BATHROOM, PT AGREEABLE TO SAFETY PRECAUTIONS    Patient left up in chair with all lines intact, call button in reach, chair alarm on, NURSE notified, and WIFE present..    GOALS:   Multidisciplinary Problems       Physical Therapy Goals          Problem: Physical Therapy    Goal Priority Disciplines Outcome Goal Variances Interventions   Physical Therapy Goal     PT, PT/OT      Description: LTG'S TO BE MET IN 14 DAYS (10-19-23)  PT WILL BE [facility] FOR BED MOBILITY  PT WILL REQUIRE SBA FOR BED<>CHAIR TF'S  PT WILL  FEET WITH LRAD AND SBA  PT WILL INC AMPAC SCORE BY 2 POINTS TO PROGRESS GROSS FUNC MOBILITY              "            Time Tracking:     PT Received On: 10/05/23  PT Start Time: 0955     PT Stop Time: 1020  PT Total Time (min): 25 min     Billable Minutes: Gait Training 10 and Therapeutic Activity 15    Treatment Type: Treatment  PT/PTA: PT     Number of PTA visits since last PT visit: 0     10/05/2023

## 2023-10-05 NOTE — SUBJECTIVE & OBJECTIVE
Review of Systems   Constitutional: Negative.   HENT: Negative.     Eyes: Negative.    Cardiovascular: Negative.    Respiratory: Negative.     Skin: Negative.    Musculoskeletal: Negative.    Gastrointestinal: Negative.    Genitourinary: Negative.    Neurological: Negative.    Psychiatric/Behavioral: Negative.       Objective:     Vital Signs (Most Recent):  Temp: 98.7 °F (37.1 °C) (10/05/23 1213)  Pulse: 70 (10/05/23 1213)  Resp: 18 (10/05/23 1213)  BP: (!) 163/79 (10/05/23 1213)  SpO2: 96 % (10/05/23 1213) Vital Signs (24h Range):  Temp:  [97.3 °F (36.3 °C)-98.9 °F (37.2 °C)] 98.7 °F (37.1 °C)  Pulse:  [62-92] 70  Resp:  [16-20] 18  SpO2:  [93 %-99 %] 96 %  BP: (116-163)/(59-94) 163/79     Weight: 60.8 kg (134 lb)  Body mass index is 20.37 kg/m².     SpO2: 96 %         Intake/Output Summary (Last 24 hours) at 10/5/2023 1342  Last data filed at 10/4/2023 1500  Gross per 24 hour   Intake 100 ml   Output --   Net 100 ml       Lines/Drains/Airways       Peripheral Intravenous Line  Duration                  Peripheral IV - Single Lumen 10/05/23 0539 20 G Anterior;Right Forearm <1 day                       Physical Exam  Vitals and nursing note reviewed.   Constitutional:       Appearance: Normal appearance.   HENT:      Head: Normocephalic and atraumatic.   Eyes:      General:         Right eye: No discharge.         Left eye: No discharge.      Pupils: Pupils are equal, round, and reactive to light.   Cardiovascular:      Rate and Rhythm: Normal rate and regular rhythm.      Heart sounds: S1 normal and S2 normal. No murmur heard.     No friction rub.   Pulmonary:      Effort: Pulmonary effort is normal. No respiratory distress.      Breath sounds: Normal breath sounds. No rales.   Abdominal:      Palpations: Abdomen is soft.      Tenderness: There is no abdominal tenderness.   Musculoskeletal:      Cervical back: Neck supple.      Right lower leg: No edema.      Left lower leg: No edema.   Skin:     General: Skin  "is warm and dry.   Neurological:      General: No focal deficit present.      Mental Status: He is alert and oriented to person, place, and time.   Psychiatric:         Mood and Affect: Mood normal.         Behavior: Behavior normal.         Thought Content: Thought content normal.            Significant Labs: BMP:   Recent Labs   Lab 10/04/23  0323 10/05/23  0429   * 85    140   K 3.4* 4.3    107   CO2 22* 19*   BUN 22 17   CREATININE 2.1* 1.9*   CALCIUM 8.6* 8.5*   MG 2.2 2.0   , CMP   Recent Labs   Lab 10/04/23  0323 10/05/23  0429    140   K 3.4* 4.3    107   CO2 22* 19*   * 85   BUN 22 17   CREATININE 2.1* 1.9*   CALCIUM 8.6* 8.5*   ANIONGAP 13 14   , CBC   Recent Labs   Lab 10/04/23  0324 10/05/23  0429   WBC 9.62 7.63   HGB 9.4* 9.4*   HCT 28.7* 30.6*    265   , INR No results for input(s): "INR", "PROTIME" in the last 48 hours., Lipid Panel No results for input(s): "CHOL", "HDL", "LDLCALC", "TRIG", "CHOLHDL" in the last 48 hours., Troponin No results for input(s): "TROPONINI" in the last 48 hours., and All pertinent lab results from the last 24 hours have been reviewed.    Significant Imaging: Echocardiogram: Transthoracic echo (TTE) complete (Cupid Only):   Results for orders placed or performed during the hospital encounter of 10/02/23   Echo   Result Value Ref Range    BSA 1.71 m2    LVOT stroke volume 94.74 cm3    LVIDd 4.45 3.5 - 6.0 cm    LV Systolic Volume 30.85 mL    LV Systolic Volume Index 17.9 mL/m2    LVIDs 2.85 2.1 - 4.0 cm    LV Diastolic Volume 90.10 mL    LV Diastolic Volume Index 52.38 mL/m2    IVS 1.1 0.6 - 1.1 cm    LVOT diameter 1.93 cm    LVOT area 2.9 cm2    FS 36 28 - 44 %    Left Ventricle Relative Wall Thickness 0.49 cm    Posterior Wall 1.09 0.6 - 1.1 cm    LV mass 170.86 g    LV Mass Index 99 g/m2    MV Peak E Mark 1.49 m/s    TDI LATERAL 0.10 m/s    TDI SEPTAL 0.18 m/s    E/E' ratio 10.64 m/s    MV Peak A Mark 1.46 m/s    TR Max Mark 1.98 " m/s    E/A ratio 1.02     E wave deceleration time 155.30 msec    LV SEPTAL E/E' RATIO 8.28 m/s    LV LATERAL E/E' RATIO 14.90 m/s    LVOT peak shani 1.15 m/s    Left Ventricular Outflow Tract Mean Velocity 0.93 cm/s    Left Ventricular Outflow Tract Mean Gradient 3.53 mmHg    LA size 4.64 cm    Left Atrium Minor Axis 4.90 cm    Left Atrium Major Axis 4.77 cm    RVOT peak VTI 20.2 cm    TAPSE 1.98 cm    RA Major Axis 3.53 cm    AV mean gradient 6 mmHg    AV peak gradient 11 mmHg    Ao peak shani 1.67 m/s    Ao VTI 38.50 cm    LVOT peak VTI 32.40 cm    AV valve area 2.46 cm²    AV Velocity Ratio 0.69     AV index (prosthetic) 0.84     SUZANNE by Velocity Ratio 2.01 cm²    Mr max shani 4.81 m/s    MV stenosis pressure 1/2 time 45.04 ms    MV valve area p 1/2 method 4.88 cm2    TV mean gradient 18 mmHg    Triscuspid Valve Regurgitation Peak Gradient 16 mmHg    PV mean gradient 2 mmHg    RVOT peak shani 0.99 m/s    Ao root annulus 3.94 cm    STJ 3.42 cm    Ascending aorta 3.19 cm    IVC diameter 1.80 cm    Mean e' 0.14 m/s    ZLVIDS -0.28     ZLVIDD -0.70     LA Volume Index 39.9 mL/m2    LA volume 68.64 cm3    LA WIDTH 3.6 cm    RA Width 2.8 cm    TV resting pulmonary artery pressure 24 mmHg    RV TB RVSP 10 mmHg    Est. RA pres 8 mmHg    Narrative      Left Ventricle: The left ventricle is normal in size. Normal wall   thickness. Normal wall motion. There is normal systolic function with a   visually estimated ejection fraction of 55 - 70%. There is normal   diastolic function.    Left Atrium: Left atrium is mildly dilated.    Right Ventricle: Normal right ventricular cavity size. Wall thickness   is normal. Right ventricle wall motion  is normal. Systolic function is   normal.    Tricuspid Valve: There is mild regurgitation.    IVC/SVC: Intermediate venous pressure at 8 mmHg.     , EKG: reviewed, Stress Test: reviewed, and X-Ray: CXR: X-Ray Chest 1 View (CXR): No results found for this visit on 10/02/23.

## 2023-10-05 NOTE — PT/OT/SLP PROGRESS
"Occupational Therapy   Treatment    Name: Samy Alejandre Jr.  MRN: [id number]  Admitting Diagnosis:  Complete heart block  1 Day Post-Op PPM procedure    Recommendations:     Discharge Recommendations: home health OT  Discharge Equipment Recommendations:  bedside commode  Barriers to discharge:  None    Assessment:     Samy Alejandre Jr. is a 73 y.o. male with a medical diagnosis of Complete heart block.  He presents with self-care debility. Performance deficits affecting function are weakness, gait instability, decreased upper extremity function, impaired self care skills, impaired functional mobility, impaired balance, pain.     Rehab Prognosis:  Good; patient would benefit from acute skilled OT services to address these deficits and reach maximum level of function.       Plan:     Patient to be seen 2 x/week to address the above listed problems via self-care/home management, therapeutic exercises, therapeutic activities  Plan of Care Expires: 10/17/23  Plan of Care Reviewed with: patient, spouse    Subjective     Chief Complaint: Pain  Patient/Family Comments/goals: To go home  Pain/Comfort:  Pain Rating 1: 4/10  Location - Side 1: Left  Location - Orientation 1: generalized  Location 1: chest (Pacemaker site)  Pain Addressed 1: Reposition, Distraction, Nurse notified (activity pacing)  Pain Rating Post-Intervention 1: 2/10    Objective:     Communicated with: pt's nurse, Bill, and completed Epic chart review prior to session.  Patient found right sidelying with telemetry, peripheral IV upon OT entry to room. Pt's wife present in room.    Upon SOT entry into room pt stated "I need my meds" due to pain. Notified nurse, nurse presented to room for pain medication distribution. Pt agreeable to therapy following pain medication.    General Precautions: Standard, fall, pacemaker    Orthopedic Precautions:N/A  Braces: Sling and swathe  Respiratory Status: Room air     Occupational Performance:   Bed Mobility:  "   Patient completed Supine to Sit with stand by assistance     Functional Mobility/Transfers:  Patient completed Sit <> Stand Transfer with contact guard assistance  with  no assistive device  x 2 trials.  Pt static standing balance for 3 mins before sitting at EOB for rest break.   Functional Mobility: Pt ambulated 45 ft Mod A with HHA for increase activity tolerance for ADL completion.  Pt demonstrated unsteady gait, with knees buckling throughout functional mobility intervention.  Pt reports baseline functional mobility with use of RW, however to adhere to PPD precautions - unable to use RW.    Activities of Daily Living:  Pt completed simple grooming of combing hair with set up assistance while seated in chair with R UE.  Pt completed LBD - donning socks- while seated EOB with Max A.    [facility] 6 Click ADL: 15    Treatment & Education:  Pt tolerated session well. Prior to interventions pt's inquired about pain medication distribution. Pt's nurse, Bill, notified of pt's request and pain medication administered. Following administration of pain medication, pt engaged and participating in therapy. Pt and wife educated on role of OT in acute care services and benefits to participation. Pt and wife in agreement of POC. Pt educated on importance of remaining OOB to promote recovery. Encouraged to stay upright in chair for 2-3 consecutive hours. Pt provided ROM therex to R UE to be completed in chair or in bed to promote and maintain current level of functional independence with desired occupations. Pt completed ROM therex to R UE for 10 reps x 2 planes. Pt and wife educated PPD precautions and sling and hinge requirements. Both verbalize understanding. Pt educated on call dont fall policy. Pt able to identify call button and encouraged use to meet needs.    Therapy spoke with wife and pt's preference for continued OT services is at home. Wife stated pt's grandsons are able to provide physical assistance for pt at  home.    Patient left up in chair with all lines intact, call button in reach, chair alarm on, and pt's wife present.    GOALS:   Multidisciplinary Problems       Occupational Therapy Goals          Problem: Occupational Therapy    Goal Priority Disciplines Outcome Interventions   Occupational Therapy Goal     OT, PT/OT Ongoing, Progressing    Description: Goals to be met by: 10/17/23     Patient will increase functional independence with ADLs by performing:    LE Dressing with Minimal Assistance while seated at EOB.  Squat pivot transfers with Stand-by Assistance .  Increased functional strength to BUE grossly by 1/2 MMT grades.                         Time Tracking:     OT Date of Treatment: 10/05/23  OT Start Time: 1000  OT Stop Time: 1025  OT Total Time (min): 25 min    Billable Minutes:Therapeutic Activity 15  Therapeutic Exercise 10    OT/AJIT: GERALD Hickey  10/5/2023

## 2023-10-06 LAB
ANION GAP SERPL CALC-SCNC: 9 MMOL/L (ref 8–16)
BACTERIA SPEC AEROBE CULT: ABNORMAL
BACTERIA SPEC AEROBE CULT: ABNORMAL
BASOPHILS # BLD AUTO: 0.05 K/UL (ref 0–0.2)
BASOPHILS NFR BLD: 0.7 % (ref 0–1.9)
BILIRUB UR QL STRIP: NEGATIVE
BUN SERPL-MCNC: 17 MG/DL (ref 8–23)
CALCIUM SERPL-MCNC: 8.6 MG/DL (ref 8.7–10.5)
CHLORIDE SERPL-SCNC: 107 MMOL/L (ref 95–110)
CLARITY UR: CLEAR
CO2 SERPL-SCNC: 24 MMOL/L (ref 23–29)
COLOR UR: YELLOW
CREAT SERPL-MCNC: 2.6 MG/DL (ref 0.5–1.4)
DIFFERENTIAL METHOD: ABNORMAL
EOSINOPHIL # BLD AUTO: 0.2 K/UL (ref 0–0.5)
EOSINOPHIL NFR BLD: 3.6 % (ref 0–8)
ERYTHROCYTE [DISTWIDTH] IN BLOOD BY AUTOMATED COUNT: 16.7 % (ref 11.5–14.5)
EST. GFR  (NO RACE VARIABLE): 26 ML/MIN/1.73 M^2
GLUCOSE SERPL-MCNC: 104 MG/DL (ref 70–110)
GLUCOSE UR QL STRIP: NEGATIVE
GRAM STN SPEC: ABNORMAL
HCT VFR BLD AUTO: 27.2 % (ref 40–54)
HGB BLD-MCNC: 8.7 G/DL (ref 14–18)
HGB UR QL STRIP: NEGATIVE
IMM GRANULOCYTES # BLD AUTO: 0.03 K/UL (ref 0–0.04)
IMM GRANULOCYTES NFR BLD AUTO: 0.4 % (ref 0–0.5)
KETONES UR QL STRIP: NEGATIVE
LEUKOCYTE ESTERASE UR QL STRIP: NEGATIVE
LYMPHOCYTES # BLD AUTO: 2.1 K/UL (ref 1–4.8)
LYMPHOCYTES NFR BLD: 31.7 % (ref 18–48)
MAGNESIUM SERPL-MCNC: 1.9 MG/DL (ref 1.6–2.6)
MCH RBC QN AUTO: 30.1 PG (ref 27–31)
MCHC RBC AUTO-ENTMCNC: 32 G/DL (ref 32–36)
MCV RBC AUTO: 94 FL (ref 82–98)
MONOCYTES # BLD AUTO: 0.7 K/UL (ref 0.3–1)
MONOCYTES NFR BLD: 9.8 % (ref 4–15)
NEUTROPHILS # BLD AUTO: 3.6 K/UL (ref 1.8–7.7)
NEUTROPHILS NFR BLD: 53.8 % (ref 38–73)
NITRITE UR QL STRIP: NEGATIVE
NRBC BLD-RTO: 0 /100 WBC
PH UR STRIP: 7 [PH] (ref 5–8)
PLATELET # BLD AUTO: 250 K/UL (ref 150–450)
PMV BLD AUTO: 10 FL (ref 9.2–12.9)
POTASSIUM SERPL-SCNC: 4 MMOL/L (ref 3.5–5.1)
PROT UR QL STRIP: ABNORMAL
RBC # BLD AUTO: 2.89 M/UL (ref 4.6–6.2)
SODIUM SERPL-SCNC: 140 MMOL/L (ref 136–145)
SP GR UR STRIP: 1.01 (ref 1–1.03)
URN SPEC COLLECT METH UR: ABNORMAL
UROBILINOGEN UR STRIP-ACNC: NEGATIVE EU/DL
WBC # BLD AUTO: 6.72 K/UL (ref 3.9–12.7)

## 2023-10-06 PROCEDURE — 80048 BASIC METABOLIC PNL TOTAL CA: CPT | Mod: HCNC | Performed by: NURSE PRACTITIONER

## 2023-10-06 PROCEDURE — 99024 POSTOP FOLLOW-UP VISIT: CPT | Mod: HCNC,,, | Performed by: INTERNAL MEDICINE

## 2023-10-06 PROCEDURE — 51798 US URINE CAPACITY MEASURE: CPT | Mod: HCNC

## 2023-10-06 PROCEDURE — 81003 URINALYSIS AUTO W/O SCOPE: CPT | Mod: HCNC | Performed by: INTERNAL MEDICINE

## 2023-10-06 PROCEDURE — 36415 COLL VENOUS BLD VENIPUNCTURE: CPT | Mod: HCNC | Performed by: NURSE PRACTITIONER

## 2023-10-06 PROCEDURE — 85025 COMPLETE CBC W/AUTO DIFF WBC: CPT | Mod: HCNC | Performed by: NURSE PRACTITIONER

## 2023-10-06 PROCEDURE — 99024 PR POST-OP FOLLOW-UP VISIT: ICD-10-PCS | Mod: HCNC,,, | Performed by: INTERNAL MEDICINE

## 2023-10-06 PROCEDURE — 21400001 HC TELEMETRY ROOM: Mod: HCNC

## 2023-10-06 PROCEDURE — 63600175 PHARM REV CODE 636 W HCPCS: Mod: HCNC | Performed by: NURSE PRACTITIONER

## 2023-10-06 PROCEDURE — 83735 ASSAY OF MAGNESIUM: CPT | Mod: HCNC | Performed by: NURSE PRACTITIONER

## 2023-10-06 PROCEDURE — 94761 N-INVAS EAR/PLS OXIMETRY MLT: CPT | Mod: HCNC

## 2023-10-06 PROCEDURE — 63600175 PHARM REV CODE 636 W HCPCS: Mod: HCNC | Performed by: INTERNAL MEDICINE

## 2023-10-06 PROCEDURE — 97530 THERAPEUTIC ACTIVITIES: CPT | Mod: HCNC,CQ

## 2023-10-06 PROCEDURE — 25000003 PHARM REV CODE 250: Mod: HCNC | Performed by: INTERNAL MEDICINE

## 2023-10-06 PROCEDURE — 25000003 PHARM REV CODE 250: Mod: HCNC | Performed by: NURSE PRACTITIONER

## 2023-10-06 RX ORDER — LACTULOSE 10 G/15ML
30 SOLUTION ORAL ONCE
Status: COMPLETED | OUTPATIENT
Start: 2023-10-06 | End: 2023-10-06

## 2023-10-06 RX ORDER — LEVOFLOXACIN 750 MG/1
750 TABLET ORAL EVERY OTHER DAY
Status: DISCONTINUED | OUTPATIENT
Start: 2023-10-06 | End: 2023-10-09 | Stop reason: HOSPADM

## 2023-10-06 RX ORDER — HYDROCODONE BITARTRATE AND ACETAMINOPHEN 10; 325 MG/1; MG/1
1 TABLET ORAL EVERY 6 HOURS PRN
Status: DISCONTINUED | OUTPATIENT
Start: 2023-10-06 | End: 2023-10-09 | Stop reason: HOSPADM

## 2023-10-06 RX ORDER — SODIUM CHLORIDE 9 MG/ML
INJECTION, SOLUTION INTRAVENOUS CONTINUOUS
Status: DISCONTINUED | OUTPATIENT
Start: 2023-10-06 | End: 2023-10-07

## 2023-10-06 RX ORDER — TAMSULOSIN HYDROCHLORIDE 0.4 MG/1
0.4 CAPSULE ORAL DAILY
Status: DISCONTINUED | OUTPATIENT
Start: 2023-10-06 | End: 2023-10-09 | Stop reason: HOSPADM

## 2023-10-06 RX ADMIN — HEPARIN SODIUM 5000 UNITS: 5000 INJECTION INTRAVENOUS; SUBCUTANEOUS at 02:10

## 2023-10-06 RX ADMIN — CEFEPIME 2 G: 2 INJECTION, POWDER, FOR SOLUTION INTRAVENOUS at 12:10

## 2023-10-06 RX ADMIN — ATORVASTATIN CALCIUM 80 MG: 40 TABLET, FILM COATED ORAL at 09:10

## 2023-10-06 RX ADMIN — HYDROCODONE BITARTRATE AND ACETAMINOPHEN 1 TABLET: 7.5; 325 TABLET ORAL at 09:10

## 2023-10-06 RX ADMIN — OXYBUTYNIN CHLORIDE 10 MG: 5 TABLET, EXTENDED RELEASE ORAL at 09:10

## 2023-10-06 RX ADMIN — MUPIROCIN: 20 OINTMENT TOPICAL at 09:10

## 2023-10-06 RX ADMIN — TAMSULOSIN HYDROCHLORIDE 0.4 MG: 0.4 CAPSULE ORAL at 11:10

## 2023-10-06 RX ADMIN — HEPARIN SODIUM 5000 UNITS: 5000 INJECTION INTRAVENOUS; SUBCUTANEOUS at 05:10

## 2023-10-06 RX ADMIN — HYDROCODONE BITARTRATE AND ACETAMINOPHEN 1 TABLET: 10; 325 TABLET ORAL at 11:10

## 2023-10-06 RX ADMIN — DOXYCYCLINE HYCLATE 100 MG: 100 TABLET, COATED ORAL at 09:10

## 2023-10-06 RX ADMIN — HYDROCODONE BITARTRATE AND ACETAMINOPHEN 1 TABLET: 10; 325 TABLET ORAL at 08:10

## 2023-10-06 RX ADMIN — LEVOFLOXACIN 750 MG: 750 TABLET, FILM COATED ORAL at 09:10

## 2023-10-06 RX ADMIN — HYDROCODONE BITARTRATE AND ACETAMINOPHEN 1 TABLET: 7.5; 325 TABLET ORAL at 01:10

## 2023-10-06 RX ADMIN — MEMANTINE 10 MG: 10 TABLET ORAL at 09:10

## 2023-10-06 RX ADMIN — HEPARIN SODIUM 5000 UNITS: 5000 INJECTION INTRAVENOUS; SUBCUTANEOUS at 09:10

## 2023-10-06 RX ADMIN — FLUOXETINE 20 MG: 20 CAPSULE ORAL at 09:10

## 2023-10-06 RX ADMIN — SODIUM CHLORIDE: 0.9 INJECTION, SOLUTION INTRAVENOUS at 01:10

## 2023-10-06 RX ADMIN — LACTULOSE 30 G: 20 SOLUTION ORAL at 09:10

## 2023-10-06 RX ADMIN — PANTOPRAZOLE SODIUM 40 MG: 40 TABLET, DELAYED RELEASE ORAL at 09:10

## 2023-10-06 RX ADMIN — CHLORHEXIDINE GLUCONATE 0.12% ORAL RINSE 15 ML: 1.2 LIQUID ORAL at 09:10

## 2023-10-06 NOTE — SUBJECTIVE & OBJECTIVE
Review of Systems   Constitutional: Negative.   HENT: Negative.     Eyes: Negative.    Cardiovascular: Negative.    Respiratory: Negative.     Skin: Negative.    Musculoskeletal: Negative.    Gastrointestinal: Negative.    Genitourinary: Negative.    Neurological: Negative.    Psychiatric/Behavioral: Negative.       Objective:     Vital Signs (Most Recent):  Temp: 98.3 °F (36.8 °C) (10/06/23 1102)  Pulse: 66 (10/06/23 1102)  Resp: 18 (10/06/23 1141)  BP: 125/78 (10/06/23 1102)  SpO2: 99 % (10/06/23 1102) Vital Signs (24h Range):  Temp:  [97.6 °F (36.4 °C)-98.4 °F (36.9 °C)] 98.3 °F (36.8 °C)  Pulse:  [60-79] 66  Resp:  [18-20] 18  SpO2:  [96 %-99 %] 99 %  BP: (122-146)/(71-79) 125/78     Weight: 60.8 kg (134 lb)  Body mass index is 20.37 kg/m².     SpO2: 99 %         Intake/Output Summary (Last 24 hours) at 10/6/2023 1331  Last data filed at 10/5/2023 1814  Gross per 24 hour   Intake 866.35 ml   Output --   Net 866.35 ml       Lines/Drains/Airways       Peripheral Intravenous Line  Duration                  Peripheral IV - Single Lumen 10/05/23 0539 20 G Anterior;Right Forearm 1 day                       Physical Exam  Vitals and nursing note reviewed.   Constitutional:       Appearance: Normal appearance.   HENT:      Head: Normocephalic and atraumatic.   Eyes:      General:         Right eye: No discharge.         Left eye: No discharge.      Pupils: Pupils are equal, round, and reactive to light.   Cardiovascular:      Rate and Rhythm: Normal rate and regular rhythm.      Heart sounds: S1 normal and S2 normal. No murmur heard.     No friction rub.   Pulmonary:      Effort: Pulmonary effort is normal. No respiratory distress.      Breath sounds: Normal breath sounds. No rales.   Abdominal:      Palpations: Abdomen is soft.      Tenderness: There is no abdominal tenderness.   Musculoskeletal:      Cervical back: Neck supple.      Right lower leg: No edema.      Left lower leg: No edema.      Comments: Left  "armsling in place   Skin:     General: Skin is warm and dry.   Neurological:      General: No focal deficit present.      Mental Status: He is alert and oriented to person, place, and time.   Psychiatric:         Mood and Affect: Mood normal.         Behavior: Behavior normal.         Thought Content: Thought content normal.            Significant Labs: BMP:   Recent Labs   Lab 10/05/23  0429 10/06/23  0442   GLU 85 104    140   K 4.3 4.0    107   CO2 19* 24   BUN 17 17   CREATININE 1.9* 2.6*   CALCIUM 8.5* 8.6*   MG 2.0 1.9   , CMP   Recent Labs   Lab 10/05/23  0429 10/06/23  0442    140   K 4.3 4.0    107   CO2 19* 24   GLU 85 104   BUN 17 17   CREATININE 1.9* 2.6*   CALCIUM 8.5* 8.6*   ANIONGAP 14 9   , CBC   Recent Labs   Lab 10/05/23  0429 10/06/23  0442   WBC 7.63 6.72   HGB 9.4* 8.7*   HCT 30.6* 27.2*    250   , INR No results for input(s): "INR", "PROTIME" in the last 48 hours., Lipid Panel No results for input(s): "CHOL", "HDL", "LDLCALC", "TRIG", "CHOLHDL" in the last 48 hours., Troponin No results for input(s): "TROPONINI" in the last 48 hours., and All pertinent lab results from the last 24 hours have been reviewed.    Significant Imaging: Cardiac Cath: reviewed, Echocardiogram: Transthoracic echo (TTE) complete (Cupid Only):   Results for orders placed or performed during the hospital encounter of 10/02/23   Echo   Result Value Ref Range    BSA 1.71 m2    LVOT stroke volume 94.74 cm3    LVIDd 4.45 3.5 - 6.0 cm    LV Systolic Volume 30.85 mL    LV Systolic Volume Index 17.9 mL/m2    LVIDs 2.85 2.1 - 4.0 cm    LV Diastolic Volume 90.10 mL    LV Diastolic Volume Index 52.38 mL/m2    IVS 1.1 0.6 - 1.1 cm    LVOT diameter 1.93 cm    LVOT area 2.9 cm2    FS 36 28 - 44 %    Left Ventricle Relative Wall Thickness 0.49 cm    Posterior Wall 1.09 0.6 - 1.1 cm    LV mass 170.86 g    LV Mass Index 99 g/m2    MV Peak E Mark 1.49 m/s    TDI LATERAL 0.10 m/s    TDI SEPTAL 0.18 m/s    E/E' " ratio 10.64 m/s    MV Peak A Mark 1.46 m/s    TR Max Mark 1.98 m/s    E/A ratio 1.02     E wave deceleration time 155.30 msec    LV SEPTAL E/E' RATIO 8.28 m/s    LV LATERAL E/E' RATIO 14.90 m/s    LVOT peak mark 1.15 m/s    Left Ventricular Outflow Tract Mean Velocity 0.93 cm/s    Left Ventricular Outflow Tract Mean Gradient 3.53 mmHg    LA size 4.64 cm    Left Atrium Minor Axis 4.90 cm    Left Atrium Major Axis 4.77 cm    RVOT peak VTI 20.2 cm    TAPSE 1.98 cm    RA Major Axis 3.53 cm    AV mean gradient 6 mmHg    AV peak gradient 11 mmHg    Ao peak mark 1.67 m/s    Ao VTI 38.50 cm    LVOT peak VTI 32.40 cm    AV valve area 2.46 cm²    AV Velocity Ratio 0.69     AV index (prosthetic) 0.84     SUZANNE by Velocity Ratio 2.01 cm²    Mr max mark 4.81 m/s    MV stenosis pressure 1/2 time 45.04 ms    MV valve area p 1/2 method 4.88 cm2    TV mean gradient 18 mmHg    Triscuspid Valve Regurgitation Peak Gradient 16 mmHg    PV mean gradient 2 mmHg    RVOT peak mark 0.99 m/s    Ao root annulus 3.94 cm    STJ 3.42 cm    Ascending aorta 3.19 cm    IVC diameter 1.80 cm    Mean e' 0.14 m/s    ZLVIDS -0.28     ZLVIDD -0.70     LA Volume Index 39.9 mL/m2    LA volume 68.64 cm3    LA WIDTH 3.6 cm    RA Width 2.8 cm    TV resting pulmonary artery pressure 24 mmHg    RV TB RVSP 10 mmHg    Est. RA pres 8 mmHg    Narrative      Left Ventricle: The left ventricle is normal in size. Normal wall   thickness. Normal wall motion. There is normal systolic function with a   visually estimated ejection fraction of 55 - 70%. There is normal   diastolic function.    Left Atrium: Left atrium is mildly dilated.    Right Ventricle: Normal right ventricular cavity size. Wall thickness   is normal. Right ventricle wall motion  is normal. Systolic function is   normal.    Tricuspid Valve: There is mild regurgitation.    IVC/SVC: Intermediate venous pressure at 8 mmHg.     , EKG: reviewed, Stress Test: reviewed, and X-Ray: CXR: X-Ray Chest 1 View (CXR): No  results found for this visit on 10/02/23.

## 2023-10-06 NOTE — PLAN OF CARE
Problem: Adult Inpatient Plan of Care  Goal: Plan of Care Review  Outcome: Ongoing, Progressing     Problem: Diabetes Comorbidity  Goal: Blood Glucose Level Within Targeted Range  Outcome: Ongoing, Progressing     Problem: Fluid and Electrolyte Imbalance (Acute Kidney Injury/Impairment)  Goal: Fluid and Electrolyte Balance  Outcome: Ongoing, Progressing     Problem: Renal Function Impairment (Acute Kidney Injury/Impairment)  Goal: Effective Renal Function  Outcome: Ongoing, Progressing     Problem: Cardiac Output Decreased  Goal: Effective Cardiac Output  Outcome: Ongoing, Progressing     Problem: Fall Injury Risk  Goal: Absence of Fall and Fall-Related Injury  Outcome: Ongoing, Progressing

## 2023-10-06 NOTE — PT/OT/SLP PROGRESS
Physical Therapy  Treatment    Samy Alejandre Jr.   MRN: [id number]   Admitting Diagnosis: Complete heart block    PT Received On: 10/06/23  PT Start Time: 1050     PT Stop Time: 1105    PT Total Time (min): 15 min       Billable Minutes:  Therapeutic Activity 15    Treatment Type: Treatment  PT/PTA: PTA     Number of PTA visits since last PT visit: 1       General Precautions: Standard, fall, other (see comments) (PACEMAKER)  Orthopedic Precautions: LUE non weight bearing  Braces: Sling and swathe  Respiratory Status: Room air         Subjective:  Communicated with patient's nurse, Hilda, and completed Epic chart review prior to session.  Patient with complaints of moderate back pain and feels he is unable to get comfortable.   Patient declined gait training and strengthening exercises as a result but was open to education and transfer training.     Pain/Comfort  Pain Rating 1: 6/10  Location - Orientation 1: lower  Location 1: back  Pain Addressed 1: Other (see comments), Cessation of Activity (ACTIVITY PACING/LIMITED ACTIVITY)  Pain Rating Post-Intervention 1: 6/10    Objective:   Patient found with: peripheral IV, telemetry    Patient found sitting EOB.     Spent several minutes adjusting sling & swathe to fit patient correctly and promote proper wear, maximizing effectiveness.  Offered transfer to chair in attempt to make patient more comfortable.     STS from EOB No AD: CGA    Stand pivot T/F from EOB > chair No AD: Min A     Educated patient on importance of increased tolerance to upright position and direct impact on CV endurance and strength. Patient encouraged to sit up in chair/ EOB, for a minimum of 2 consecutive hours, 3x per day. Encouraged patient to perform AROM TE to BLE throughout the day within all available planes of motion. Also encouraged patient to request assistance from nursing staff to ambulate 1-2x more outside of PT session in order to promote recovery of CV endurance. Re enforced  importance of utilizing call light to meet needs in room and not attempt to get up without staff assistance. Patient verbalized understanding and agreed to comply.      AM-PAC 6 CLICK MOBILITY  How much help from another person does this patient currently need?   1 = Unable, Total/Dependent Assistance  2 = A lot, Maximum/Moderate Assistance  3 = A little, Minimum/Contact Guard/Supervision  4 = None, Modified [place]/Independent    Turning over in bed (including adjusting bedclothes, sheets and blankets)?: 4  Sitting down on and standing up from a chair with arms (e.g., wheelchair, bedside commode, etc.): 3  Moving from lying on back to sitting on the side of the bed?: 4  Moving to and from a bed to a chair (including a wheelchair)?: 3  Need to walk in hospital room?: 1 (REFUSED)  Climbing 3-5 steps with a railing?: 1 (NT)  Basic Mobility Total Score: 16    AM-PAC Raw Score CMS G-Code Modifier Level of Impairment Assistance   6 % Total / Unable   7 - 9 CM 80 - 100% Maximal Assist   10 - 14 CL 60 - 80% Moderate Assist   15 - 19 CK 40 - 60% Moderate Assist   20 - 22 CJ 20 - 40% Minimal Assist   23 CI 1-20% SBA / CGA   24 CH 0% Independent/ Mod I     Patient left up in chair with call button in reach, nurse notified, and wife present. Pillow placed behind back for comfort.     Assessment:  Samy Alejandre Jr. is a 73 y.o. male with a medical diagnosis of Complete heart block and presents with overall decline in functional mobility. Patient would continue to benefit from skilled PT to address functional limitations listed below in order to return to PLOF/decrease caregiver burden. Patient with a limited ability to participate in PT session today due to increased pain. He was willing to perform a transfer to the chair in attempt to find relief. Reports having gotten pain medication earlier but vomited not long after so he is questioning the effectiveness of the medication. Nurse was notified of patient  complaints.     Rehab identified problem list/impairments: weakness, impaired endurance, impaired self care skills, impaired functional mobility, gait instability, impaired balance, decreased upper extremity function, decreased lower extremity function, decreased safety awareness, pain, decreased ROM, impaired coordination, impaired cardiopulmonary response to activity    Rehab potential is fair.    Activity tolerance: Fair    Discharge recommendations: home health PT (WITH 24/7 CARE)      Barriers to discharge:      Equipment recommendations: bedside commode     GOALS:   Multidisciplinary Problems       Physical Therapy Goals          Problem: Physical Therapy    Goal Priority Disciplines Outcome Goal Variances Interventions   Physical Therapy Goal     PT, PT/OT      Description: LTG'S TO BE MET IN 14 DAYS (10-19-23)  PT WILL BE [employer] FOR BED MOBILITY  PT WILL REQUIRE SBA FOR BED<>CHAIR TF'S  PT WILL  FEET WITH LRAD AND SBA  PT WILL INC AMPAC SCORE BY 2 POINTS TO PROGRESS GROSS FUNC MOBILITY                         PLAN:    Patient to be seen 3 x/week to address the above listed problems via gait training, therapeutic activities, therapeutic exercises  Plan of Care expires: 10/19/23  Plan of Care reviewed with: patient, spouse         10/06/2023

## 2023-10-06 NOTE — MEDICAL/APP STUDENT
Date of Admit: 10/2/2023    HPI:         Chief Complaint   Patient presents with   Bradycardia (Pt seen today at pulmonary, was sent here due to bradycardia. Pt states only symptom has been dizziness that is intermittent. Pt has been on doxycycline completed course for pneumonia and has chronic anemia. )    Samy Alejandre Jr. is a 73 y.o. male with a known past medical history of tobacco abuse, COPD, chronic bronchitis, diabetes type 2, Alzheimer's, and aspiration that presented to the ER 10/2 from his pulmonologist's office after his heart rate was noted to be in the 30s.He reports over the past 6-8 weeks he has become increasingly weak with decreased activity, decreased appetite, and spells of dizziness. He reports 1 possible syncopal episode where he fell about 6 weeks ago.  Workup in the ER revealed lab work with a creatinine of 2.5 (creatinine at baseline is 1.5-1.7) and a metabolic acidosis.  EKG in the ER with a rate of 36 and AV dissociation with narrow QRS complex for which cardiology was consulted.  Admitted to ICU with a Dx of complete heart block , aspiration PNA and MARI .  Cardiology consulted  which placed a dual chamber pace maker . Started on IVAB for aspiration PNA , sputum cx (+) for pseudomonas  . SLP consulted which rec soft diet with thin liquid . IM consulted  to assume care .        Overview/Hospital Course:  72 y/o WM admitted to ICU with a dx of complete heart block , MARI and pseudomonas PNA , S/P Dual chamber pacemaker  by cardiology .    Interval History:  His renal functioning has worsened a bit today. His creatinine has increased from 1.9 to 2.6 overnight. Ultrasound shows no clear pathology. Possibly as a result of BPH because they had stopped taking Flomax and oxybutinin.             MEDICATIONS & ALLERGIES:     No current facility-administered medications on file prior to encounter.     Current Outpatient Medications on File Prior to Encounter   Medication Sig Dispense Refill     albuterol (PROVENTIL/VENTOLIN HFA) 90 mcg/actuation inhaler Inhale 2 puffs into the lungs every 4 (four) hours as needed for Wheezing. 18 g 2    donepeziL (ARICEPT) 10 MG tablet Take 1 tablet (10 mg total) by mouth every evening. 30 tablet 11    DULoxetine (CYMBALTA) 30 MG capsule Take 1 capsule by mouth once daily 90 capsule 0    ELIQUIS 5 mg Tab Take 1 tablet by mouth once daily 90 tablet 3    FLUoxetine 20 MG capsule Take 1 capsule by mouth once daily 90 capsule 3    HYDROcodone-acetaminophen (NORCO)  mg per tablet Take 1 tablet by mouth.      memantine (NAMENDA) 10 MG Tab TAKE 1/2 TABLET BY MOUTH TWICE DAILY FOR 1 WEEK, THEN 1 TABLET BY MOUTH TWICE DAILY 180 tablet 3    pantoprazole (PROTONIX) 40 MG tablet Take 1 tablet (40 mg total) by mouth once daily. 30 tablet 6    QUEtiapine (SEROQUEL) 50 MG tablet Take 1 tablet by mouth twice daily (Patient taking differently: Take 100 mg by mouth nightly.) 180 tablet 3    rosuvastatin (CRESTOR) 20 MG tablet Take 1 tablet by mouth once daily (Patient taking differently: Take 20 mg by mouth every evening.) 90 tablet 1    albuterol (ACCUNEB) 1.25 mg/3 mL Nebu Take 3 mLs (1.25 mg total) by nebulization 2 (two) times a day. Rescue 180 mL 11    blood glucose control, low (TRUE METRIX LEVEL 1) Soln 1 drop by Misc.(Non-Drug; Combo Route) route once daily. 1 each 4    blood glucose control, normal (TRUE METRIX LEVEL 2) Soln 1 drop by Misc.(Non-Drug; Combo Route) route once daily. 1 each 4    gabapentin (NEURONTIN) 100 MG capsule Take 1 capsule (100 mg total) by mouth 3 (three) times daily. (Patient taking differently: Take 100 mg by mouth 3 (three) times daily. Taken only as needed) 90 capsule 11    inhalat.spacing dev,large mask (BREATHERITE SPACER-MASK,ADULT) Spcr Use as directed for inhalation. 1 each 1    lancets 32 gauge Misc 1 lancet by Misc.(Non-Drug; Combo Route) route once daily. 30 each 4    levoFLOXacin (LEVAQUIN) 500 MG tablet Take 1 tablet (500 mg total) by  mouth once daily. for 10 days 10 tablet 0    mucus clearing device (AEROBIKA OSCILLATING PEP SYSTM) by Misc.(Non-Drug; Combo Route) route 4 (four) times daily as needed. 1 each 0    oxybutynin (DITROPAN-XL) 10 MG 24 hr tablet Take 1 tablet by mouth once daily (Patient not taking: Reported on 10/2/2023) 90 tablet 1    walker (ULTRA-LIGHT ROLLATOR) Misc Use daily for ambulation 1 each 0        Review of patient's allergies indicates:   Allergen Reactions    Chantix [varenicline] Other (See Comments)     [place] bad         PAST HISTORY:     Past Medical History:   Diagnosis Date    Anxiety     Depression     Diabetes mellitus, type 2     Hypertension     Stroke        Scheduled Meds:    atorvastatin  80 mg Oral Daily    chlorhexidine  15 mL Mouth/Throat BID    doxycycline  100 mg Oral Q12H    FLUoxetine  20 mg Oral Daily    heparin (porcine)  5,000 Units Subcutaneous Q8H    levoFLOXacin  750 mg Oral Every other day    memantine  10 mg Oral Daily    mupirocin   Nasal BID    oxybutynin  10 mg Oral Daily    pantoprazole  40 mg Oral Daily    tamsulosin  0.4 mg Oral Daily      PRN Meds: acetaminophen, albuterol-ipratropium, dextrose 10%, dextrose 10%, glucagon (human recombinant), glucose, glucose, HYDROcodone-acetaminophen, influenza 65up-adj, insulin aspart U-100, melatonin, sodium chloride 0.9%     Past Surgical History:   Procedure Laterality Date    A-V CARDIAC PACEMAKER INSERTION Left 10/4/2023    Procedure: INSERTION, CARDIAC PACEMAKER, DUAL CHAMBER;  Surgeon: Delonte Lindsey MD;  Location: [facility] CATH LAB;  Service: Cardiology;  Laterality: Left;  biotronik    ANGIOGRAPHY OF LOWER EXTREMITY N/A 12/21/2020    Procedure: ANGIOGRAM, LOWER EXTREMITY/Rt leg poss pta/stent;  Surgeon: Todd Michel MD;  Location: [facility] CATH LAB;  Service: Peripheral Vascular;  Laterality: N/A;  rescheduled 12/8    BACK SURGERY  2015    BRONCHOSCOPY Left 3/24/2022    Procedure: Bronchoscopy;  Surgeon: Edward Williamson MD;  Location: [facility]  ENDO;  Service: Pulmonary;  Laterality: Left;  poss endobronchial biopsy or brushing    CATARACT EXTRACTION      ESOPHAGOGASTRODUODENOSCOPY N/A 8/24/2022    Procedure: EGD (ESOPHAGOGASTRODUODENOSCOPY);  Surgeon: Ana Gomez MD;  Location: [facility];  Service: Endoscopy;  Laterality: N/A;    ESOPHAGOGASTRODUODENOSCOPY N/A 9/14/2022    Procedure: EGD (ESOPHAGOGASTRODUODENOSCOPY);  Surgeon: Ana Gomez MD;  Location: [facility];  Service: Endoscopy;  Laterality: N/A;    KNEE SURGERY  2012    LITHOTRIPSY  2000       Family History   Problem Relation Age of Onset    Heart disease Mother     Stroke Mother     Cancer Father     COPD Sister        Social History     Socioeconomic History    Marital status:    Tobacco Use    Smoking status: Every Day     Current packs/day: 1.00     Types: Cigarettes    Smokeless tobacco: Current     Types: Snuff   Substance and Sexual Activity    Alcohol use: Yes     Alcohol/week: 1.0 standard drink of alcohol     Types: 1 Shots of liquor per week     Comment: Daily    Drug use: Never    Sexual activity: Not Currently     Social Determinants of Health     Financial Resource Strain: Low Risk  (10/3/2023)    Overall Financial Resource Strain (CARDIA)     Difficulty of Paying Living Expenses: Not very hard   Food Insecurity: No Food Insecurity (10/3/2023)    Hunger Vital Sign     Worried About Running Out of Food in the Last Year: Never true     Ran Out of Food in the Last Year: Never true   Transportation Needs: No Transportation Needs (10/3/2023)    PRAPARE - Transportation     Lack of Transportation (Medical): No     Lack of Transportation (Non-Medical): No   Physical Activity: Unknown (10/3/2023)    Exercise Vital Sign     Days of Exercise per Week: 0 days   Stress: No Stress Concern Present (10/3/2023)    [employer] [place] - Occupational Stress Questionnaire     Feeling of Stress : Only a little   Social Connections: Unknown (10/3/2023)     Social Connection and Isolation Panel [NHANES]     Frequency of Communication with Friends and Family: More than three times a week     Frequency of Social Gatherings with Friends and Family: Once a week     Active Member of Clubs or Organizations: No     Attends Club or Organization Meetings: Never     Marital Status:    Housing Stability: Low Risk  (10/3/2023)    Housing Stability Vital Sign     Unable to Pay for Housing in the Last Year: No     Number of Places Lived in the Last Year: 1     Unstable Housing in the Last Year: No       Review of Systems:  Constitutional:  Positive for activity change, appetite change and fatigue. Negative for fever.   HENT: Negative.     Eyes: Negative.    Respiratory:  Negative for cough, shortness of breath and wheezing.    Cardiovascular:  Negative for chest pain, palpitations and leg swelling.   Gastrointestinal:  Negative for abdominal distention, abdominal pain, diarrhea, nausea and vomiting.   Endocrine: Negative.    Genitourinary: Negative.    Musculoskeletal: Negative.    Skin: Negative.    Allergic/Immunologic: Negative.    Neurological:  Positive for weakness.   Hematological: Negative.    Psychiatric/Behavioral: Negative    Exam     Vitals:    10/06/23 0818 10/06/23 0920 10/06/23 0945 10/06/23 1102   BP:  (!) 143/79  125/78   BP Location:       Patient Position:    Sitting   Pulse:  77  66   Resp:  18 18 18   Temp:  98.1 °F (36.7 °C)  98.3 °F (36.8 °C)   TempSrc:    Oral   SpO2: 98% 97%  99%   Weight:       Height:           Recent Results (from the past 72 hour(s))   POCT glucose    Collection Time: 10/03/23  5:06 PM   Result Value Ref Range    POCT Glucose 98 70 - 110 mg/dL   POCT glucose    Collection Time: 10/03/23 10:09 PM   Result Value Ref Range    POCT Glucose 116 (H) 70 - 110 mg/dL   Basic metabolic panel    Collection Time: 10/04/23  3:23 AM   Result Value Ref Range    Sodium 139 136 - 145 mmol/L    Potassium 3.4 (L) 3.5 - 5.1 mmol/L    Chloride 104 95  - 110 mmol/L    CO2 22 (L) 23 - 29 mmol/L    Glucose 133 (H) 70 - 110 mg/dL    BUN 22 8 - 23 mg/dL    Creatinine 2.1 (H) 0.5 - 1.4 mg/dL    Calcium 8.6 (L) 8.7 - 10.5 mg/dL    Anion Gap 13 8 - 16 mmol/L    eGFR 33 (A) >60 mL/min/1.73 m^2   Magnesium    Collection Time: 10/04/23  3:23 AM   Result Value Ref Range    Magnesium 2.2 1.6 - 2.6 mg/dL   CBC auto differential    Collection Time: 10/04/23  3:24 AM   Result Value Ref Range    WBC 9.62 3.90 - 12.70 K/uL    RBC 3.20 (L) 4.60 - 6.20 M/uL    Hemoglobin 9.4 (L) 14.0 - 18.0 g/dL    Hematocrit 28.7 (L) 40.0 - 54.0 %    MCV 90 82 - 98 fL    MCH 29.4 27.0 - 31.0 pg    MCHC 32.8 32.0 - 36.0 g/dL    RDW 16.3 (H) 11.5 - 14.5 %    Platelets 272 150 - 450 K/uL    MPV 10.7 9.2 - 12.9 fL    Immature Granulocytes 0.3 0.0 - 0.5 %    Gran # (ANC) 5.6 1.8 - 7.7 K/uL    Immature Grans (Abs) 0.03 0.00 - 0.04 K/uL    Lymph # 2.8 1.0 - 4.8 K/uL    Mono # 0.8 0.3 - 1.0 K/uL    Eos # 0.3 0.0 - 0.5 K/uL    Baso # 0.06 0.00 - 0.20 K/uL    nRBC 0 0 /100 WBC    Gran % 58.1 38.0 - 73.0 %    Lymph % 29.4 18.0 - 48.0 %    Mono % 8.6 4.0 - 15.0 %    Eosinophil % 3.0 0.0 - 8.0 %    Basophil % 0.6 0.0 - 1.9 %    Differential Method Automated    POCT glucose    Collection Time: 10/04/23  8:06 AM   Result Value Ref Range    POCT Glucose 117 (H) 70 - 110 mg/dL   POCT glucose    Collection Time: 10/04/23 11:14 AM   Result Value Ref Range    POCT Glucose 98 70 - 110 mg/dL   POCT glucose    Collection Time: 10/04/23  5:30 PM   Result Value Ref Range    POCT Glucose 97 70 - 110 mg/dL   Basic metabolic panel    Collection Time: 10/05/23  4:29 AM   Result Value Ref Range    Sodium 140 136 - 145 mmol/L    Potassium 4.3 3.5 - 5.1 mmol/L    Chloride 107 95 - 110 mmol/L    CO2 19 (L) 23 - 29 mmol/L    Glucose 85 70 - 110 mg/dL    BUN 17 8 - 23 mg/dL    Creatinine 1.9 (H) 0.5 - 1.4 mg/dL    Calcium 8.5 (L) 8.7 - 10.5 mg/dL    Anion Gap 14 8 - 16 mmol/L    eGFR 37 (A) >60 mL/min/1.73 m^2   Magnesium     Collection Time: 10/05/23  4:29 AM   Result Value Ref Range    Magnesium 2.0 1.6 - 2.6 mg/dL   CBC auto differential    Collection Time: 10/05/23  4:29 AM   Result Value Ref Range    WBC 7.63 3.90 - 12.70 K/uL    RBC 3.17 (L) 4.60 - 6.20 M/uL    Hemoglobin 9.4 (L) 14.0 - 18.0 g/dL    Hematocrit 30.6 (L) 40.0 - 54.0 %    MCV 97 82 - 98 fL    MCH 29.7 27.0 - 31.0 pg    MCHC 30.7 (L) 32.0 - 36.0 g/dL    RDW 17.2 (H) 11.5 - 14.5 %    Platelets 265 150 - 450 K/uL    MPV 11.1 9.2 - 12.9 fL    Immature Granulocytes 0.5 0.0 - 0.5 %    Gran # (ANC) 4.8 1.8 - 7.7 K/uL    Immature Grans (Abs) 0.04 0.00 - 0.04 K/uL    Lymph # 1.8 1.0 - 4.8 K/uL    Mono # 0.8 0.3 - 1.0 K/uL    Eos # 0.2 0.0 - 0.5 K/uL    Baso # 0.05 0.00 - 0.20 K/uL    nRBC 0 0 /100 WBC    Gran % 62.6 38.0 - 73.0 %    Lymph % 23.2 18.0 - 48.0 %    Mono % 10.2 4.0 - 15.0 %    Eosinophil % 2.8 0.0 - 8.0 %    Basophil % 0.7 0.0 - 1.9 %    Differential Method Automated    POCT glucose    Collection Time: 10/05/23  4:34 AM   Result Value Ref Range    POCT Glucose 94 70 - 110 mg/dL   POCT glucose    Collection Time: 10/05/23 11:36 AM   Result Value Ref Range    POCT Glucose 122 (H) 70 - 110 mg/dL   POCT glucose    Collection Time: 10/05/23  4:03 PM   Result Value Ref Range    POCT Glucose 89 70 - 110 mg/dL   POCT glucose    Collection Time: 10/05/23  8:27 PM   Result Value Ref Range    POCT Glucose 170 (H) 70 - 110 mg/dL   Basic metabolic panel    Collection Time: 10/06/23  4:42 AM   Result Value Ref Range    Sodium 140 136 - 145 mmol/L    Potassium 4.0 3.5 - 5.1 mmol/L    Chloride 107 95 - 110 mmol/L    CO2 24 23 - 29 mmol/L    Glucose 104 70 - 110 mg/dL    BUN 17 8 - 23 mg/dL    Creatinine 2.6 (H) 0.5 - 1.4 mg/dL    Calcium 8.6 (L) 8.7 - 10.5 mg/dL    Anion Gap 9 8 - 16 mmol/L    eGFR 26 (A) >60 mL/min/1.73 m^2   Magnesium    Collection Time: 10/06/23  4:42 AM   Result Value Ref Range    Magnesium 1.9 1.6 - 2.6 mg/dL   CBC auto differential    Collection Time:  10/06/23  4:42 AM   Result Value Ref Range    WBC 6.72 3.90 - 12.70 K/uL    RBC 2.89 (L) 4.60 - 6.20 M/uL    Hemoglobin 8.7 (L) 14.0 - 18.0 g/dL    Hematocrit 27.2 (L) 40.0 - 54.0 %    MCV 94 82 - 98 fL    MCH 30.1 27.0 - 31.0 pg    MCHC 32.0 32.0 - 36.0 g/dL    RDW 16.7 (H) 11.5 - 14.5 %    Platelets 250 150 - 450 K/uL    MPV 10.0 9.2 - 12.9 fL    Immature Granulocytes 0.4 0.0 - 0.5 %    Gran # (ANC) 3.6 1.8 - 7.7 K/uL    Immature Grans (Abs) 0.03 0.00 - 0.04 K/uL    Lymph # 2.1 1.0 - 4.8 K/uL    Mono # 0.7 0.3 - 1.0 K/uL    Eos # 0.2 0.0 - 0.5 K/uL    Baso # 0.05 0.00 - 0.20 K/uL    nRBC 0 0 /100 WBC    Gran % 53.8 38.0 - 73.0 %    Lymph % 31.7 18.0 - 48.0 %    Mono % 9.8 4.0 - 15.0 %    Eosinophil % 3.6 0.0 - 8.0 %    Basophil % 0.7 0.0 - 1.9 %    Differential Method Automated         Body mass index is 20.37 kg/m².      Constitutional:       General: He is awake.      Appearance: He is well-developed.   HENT:      Nose: Nose normal.   Eyes:      Extraocular Movements: Extraocular movements intact.      Pupils: Pupils are equal, round, and reactive to light.   Cardiovascular:      Rate and Rhythm: Normal rate.      Pulses:           Radial pulses are 2+ on the right side and 2+ on the left side.      Comments: Paced at about 60  Pulmonary:      Effort: Pulmonary effort is normal.      Breath sounds: Normal breath sounds.      Comments: On RA  Abdominal:      General: There is no distension.      Palpations: Abdomen is soft.   Musculoskeletal:      Comments: L arm immobilizer in place   Skin:     General: Skin is warm and dry.   Neurological:      General: No focal deficit present.      Mental Status: He is alert.   Psychiatric:         Behavior: Behavior is cooperative.     Assessment and Plan   Current Problems List:  Active Hospital Problems    Diagnosis  POA    *Complete heart block [I44.2]  Yes    Bradycardia [R00.1]  Yes    Aspiration pneumonia [J69.0]  Yes    COPD (chronic obstructive pulmonary disease)  [J44.9]  Yes    Mixed Alzheimer's and vascular dementia [G30.9, F01.50, F02.80]  Yes    History of DVT of lower extremity [Z86.718]  Not Applicable    Acute renal failure superimposed on stage 3 chronic kidney disease [N17.9, N18.30]  Yes    Controlled type 2 diabetes mellitus with stage 3 chronic kidney disease, without long-term current use of insulin [E11.22, N18.30]  Yes    Mixed hyperlipidemia [E78.2]  Yes    History of CVA with residual deficit [I69.30]  Not Applicable      Resolved Hospital Problems   No resolved problems to display.     *Complete heart block  S/P dual chamber PPM   Cardilogy on board         COPD (chronic obstructive pulmonary disease)  Stable   cont breathing tx        Aspiration pneumonia  Cont IV cefepime   Sputum cx (+) for pseudomonas   F/U final sensitivity         Bradycardia  Compete heart block   Cardiology onboard  S/P Dual chamber PPM         Mixed Alzheimer's and vascular dementia  Cont supportive tx         History of DVT of lower extremity  Resume DOAC post PPM         Acute renal failure superimposed on stage 3 chronic kidney disease  Patient with acute kidney injury/acute renal failure likely due to pre-renal azotemia due to IVVD MARI is currently improving. Baseline creatinine 1.7 - Labs reviewed- Renal function/electrolytes with Estimated Creatinine Clearance: 30.7 mL/min (A) (based on SCr of 1.9 mg/dL (H)). according to latest data. Monitor urine output and serial BMP and adjust therapy as needed. Avoid nephrotoxins and renally dose meds for GFR listed above.          Mixed hyperlipidemia  Cont statin         Controlled type 2 diabetes mellitus with stage 3 chronic kidney disease, without long-term current use of insulin     Cont ISS  Keep CBG < 180      History of CVA with residual deficit     cont statin   Resume eliquis post PPM             VTE Risk Mitigation (From admission, onward)           Ordered       heparin (porcine) injection 5,000 Units  Every 8 hours          10/02/23 1812       IP VTE HIGH RISK PATIENT  Once         10/02/23 1751       Place sequential compression device  Until discontinued         10/02/23 1751                    Discharge Planning   VALERIE:      Code Status: Full Code   Is the patient medically ready for discharge?:     Reason for patient still in hospital (select all that apply): Treatment  Discharge Plan A: Home with family         Jose Antonio Shayy MS3  [facility]

## 2023-10-06 NOTE — PT/OT/SLP PROGRESS
Physical Therapy      Patient Name:  Samy Alejandre Jr.   MRN:  [id number]    09:15 a.m.  Patient using the restroom at this time and declined need for assistance. Will follow up at next available opportunity.       13:40  Patient sleeping soundly. Per patient's wife, patient finally resting and with reduced pain. Requesting to hold PT services at this time to allow patient to rest. Will follow up at next available opportunity.

## 2023-10-06 NOTE — PROGRESS NOTES
[facility] ([facility])  Cardiology  Progress Note    Patient Name: Samy Alejandre Jr.  MRN: [id number]  Admission Date: 10/2/2023  Hospital Length of Stay: 4 days  Code Status: Full Code   Attending Physician: Binh Harris,*   Primary Care Physician: Damien Barron MD  Expected Discharge Date:   Principal Problem:Complete heart block    Subjective:     Hospital Course:   10/4/23 Pt seen and examined resting in bed. Denies any SOB and CP at this time. PPM planned for today    10/5/23 pt seen and examined today, sitting up in bed. Feels ok, wife at bedside. Sling in place, PPM C/D/I, staples. Labs reviewed, chart reviewed    10/6/23 Pt seen and examined today resting in bed, sling in place. Feels good. Denies any CP and CHF sxs at this time. Labs reviewed, chart reviewed          Review of Systems   Constitutional: Negative.   HENT: Negative.     Eyes: Negative.    Cardiovascular: Negative.    Respiratory: Negative.     Skin: Negative.    Musculoskeletal: Negative.    Gastrointestinal: Negative.    Genitourinary: Negative.    Neurological: Negative.    Psychiatric/Behavioral: Negative.       Objective:     Vital Signs (Most Recent):  Temp: 98.3 °F (36.8 °C) (10/06/23 1102)  Pulse: 66 (10/06/23 1102)  Resp: 18 (10/06/23 1141)  BP: 125/78 (10/06/23 1102)  SpO2: 99 % (10/06/23 1102) Vital Signs (24h Range):  Temp:  [97.6 °F (36.4 °C)-98.4 °F (36.9 °C)] 98.3 °F (36.8 °C)  Pulse:  [60-79] 66  Resp:  [18-20] 18  SpO2:  [96 %-99 %] 99 %  BP: (122-146)/(71-79) 125/78     Weight: 60.8 kg (134 lb)  Body mass index is 20.37 kg/m².     SpO2: 99 %         Intake/Output Summary (Last 24 hours) at 10/6/2023 1331  Last data filed at 10/5/2023 1814  Gross per 24 hour   Intake 866.35 ml   Output --   Net 866.35 ml       Lines/Drains/Airways       Peripheral Intravenous Line  Duration                  Peripheral IV - Single Lumen 10/05/23 0539 20 G Anterior;Right Forearm 1 day                       Physical Exam  Vitals  "and nursing note reviewed.   Constitutional:       Appearance: Normal appearance.   HENT:      Head: Normocephalic and atraumatic.   Eyes:      General:         Right eye: No discharge.         Left eye: No discharge.      Pupils: Pupils are equal, round, and reactive to light.   Cardiovascular:      Rate and Rhythm: Normal rate and regular rhythm.      Heart sounds: S1 normal and S2 normal. No murmur heard.     No friction rub.   Pulmonary:      Effort: Pulmonary effort is normal. No respiratory distress.      Breath sounds: Normal breath sounds. No rales.   Abdominal:      Palpations: Abdomen is soft.      Tenderness: There is no abdominal tenderness.   Musculoskeletal:      Cervical back: Neck supple.      Right lower leg: No edema.      Left lower leg: No edema.      Comments: Left armsling in place   Skin:     General: Skin is warm and dry.   Neurological:      General: No focal deficit present.      Mental Status: He is alert and oriented to person, place, and time.   Psychiatric:         Mood and Affect: Mood normal.         Behavior: Behavior normal.         Thought Content: Thought content normal.            Significant Labs: BMP:   Recent Labs   Lab 10/05/23  0429 10/06/23  0442   GLU 85 104    140   K 4.3 4.0    107   CO2 19* 24   BUN 17 17   CREATININE 1.9* 2.6*   CALCIUM 8.5* 8.6*   MG 2.0 1.9   , CMP   Recent Labs   Lab 10/05/23  0429 10/06/23  0442    140   K 4.3 4.0    107   CO2 19* 24   GLU 85 104   BUN 17 17   CREATININE 1.9* 2.6*   CALCIUM 8.5* 8.6*   ANIONGAP 14 9   , CBC   Recent Labs   Lab 10/05/23  0429 10/06/23  0442   WBC 7.63 6.72   HGB 9.4* 8.7*   HCT 30.6* 27.2*    250   , INR No results for input(s): "INR", "PROTIME" in the last 48 hours., Lipid Panel No results for input(s): "CHOL", "HDL", "LDLCALC", "TRIG", "CHOLHDL" in the last 48 hours., Troponin No results for input(s): "TROPONINI" in the last 48 hours., and All pertinent lab results from the last 24 " hours have been reviewed.    Significant Imaging: Cardiac Cath: reviewed, Echocardiogram: Transthoracic echo (TTE) complete (Cupid Only):   Results for orders placed or performed during the hospital encounter of 10/02/23   Echo   Result Value Ref Range    BSA 1.71 m2    LVOT stroke volume 94.74 cm3    LVIDd 4.45 3.5 - 6.0 cm    LV Systolic Volume 30.85 mL    LV Systolic Volume Index 17.9 mL/m2    LVIDs 2.85 2.1 - 4.0 cm    LV Diastolic Volume 90.10 mL    LV Diastolic Volume Index 52.38 mL/m2    IVS 1.1 0.6 - 1.1 cm    LVOT diameter 1.93 cm    LVOT area 2.9 cm2    FS 36 28 - 44 %    Left Ventricle Relative Wall Thickness 0.49 cm    Posterior Wall 1.09 0.6 - 1.1 cm    LV mass 170.86 g    LV Mass Index 99 g/m2    MV Peak E Mark 1.49 m/s    TDI LATERAL 0.10 m/s    TDI SEPTAL 0.18 m/s    E/E' ratio 10.64 m/s    MV Peak A Mark 1.46 m/s    TR Max Mark 1.98 m/s    E/A ratio 1.02     E wave deceleration time 155.30 msec    LV SEPTAL E/E' RATIO 8.28 m/s    LV LATERAL E/E' RATIO 14.90 m/s    LVOT peak mark 1.15 m/s    Left Ventricular Outflow Tract Mean Velocity 0.93 cm/s    Left Ventricular Outflow Tract Mean Gradient 3.53 mmHg    LA size 4.64 cm    Left Atrium Minor Axis 4.90 cm    Left Atrium Major Axis 4.77 cm    RVOT peak VTI 20.2 cm    TAPSE 1.98 cm    RA Major Axis 3.53 cm    AV mean gradient 6 mmHg    AV peak gradient 11 mmHg    Ao peak mark 1.67 m/s    Ao VTI 38.50 cm    LVOT peak VTI 32.40 cm    AV valve area 2.46 cm²    AV Velocity Ratio 0.69     AV index (prosthetic) 0.84     SUZANNE by Velocity Ratio 2.01 cm²    Mr max mark 4.81 m/s    MV stenosis pressure 1/2 time 45.04 ms    MV valve area p 1/2 method 4.88 cm2    TV mean gradient 18 mmHg    Triscuspid Valve Regurgitation Peak Gradient 16 mmHg    PV mean gradient 2 mmHg    RVOT peak mark 0.99 m/s    Ao root annulus 3.94 cm    STJ 3.42 cm    Ascending aorta 3.19 cm    IVC diameter 1.80 cm    Mean e' 0.14 m/s    ZLVIDS -0.28     ZLVIDD -0.70     LA Volume Index 39.9 mL/m2     LA volume 68.64 cm3    LA WIDTH 3.6 cm    RA Width 2.8 cm    TV resting pulmonary artery pressure 24 mmHg    RV TB RVSP 10 mmHg    Est. RA pres 8 mmHg    Narrative      Left Ventricle: The left ventricle is normal in size. Normal wall   thickness. Normal wall motion. There is normal systolic function with a   visually estimated ejection fraction of 55 - 70%. There is normal   diastolic function.    Left Atrium: Left atrium is mildly dilated.    Right Ventricle: Normal right ventricular cavity size. Wall thickness   is normal. Right ventricle wall motion  is normal. Systolic function is   normal.    Tricuspid Valve: There is mild regurgitation.    IVC/SVC: Intermediate venous pressure at 8 mmHg.     , EKG: reviewed, Stress Test: reviewed, and X-Ray: CXR: X-Ray Chest 1 View (CXR): No results found for this visit on 10/02/23.    Assessment and Plan:       * Complete heart block  Plan for dual-chamber pacemaker in a.m..  NPO after midnight.    Hold Eliquis.    Discussed with daughter on the phone.    10/4/23  PPM planned for today    10/5/23  S/p PPM yesterday  Keep sling in place at all time for 3 days then nightly through the end of this month.  F/u in clinic for staple removal with PPM clinic    Mixed hyperlipidemia  statin        VTE Risk Mitigation (From admission, onward)         Ordered     heparin (porcine) injection 5,000 Units  Every 8 hours         10/02/23 1812     IP VTE HIGH RISK PATIENT  Once         10/02/23 1751     Place sequential compression device  Until discontinued         10/02/23 1751              Cardiology will be available as needed  Ericka Bunn NP  Cardiology  [facility] ([facility])

## 2023-10-07 LAB
ANION GAP SERPL CALC-SCNC: 12 MMOL/L (ref 8–16)
ANION GAP SERPL CALC-SCNC: 13 MMOL/L (ref 8–16)
BASOPHILS # BLD AUTO: 0.07 K/UL (ref 0–0.2)
BASOPHILS NFR BLD: 1 % (ref 0–1.9)
BILIRUB UR QL STRIP: NEGATIVE
BUN SERPL-MCNC: 16 MG/DL (ref 8–23)
BUN SERPL-MCNC: 16 MG/DL (ref 8–23)
CALCIUM SERPL-MCNC: 8.7 MG/DL (ref 8.7–10.5)
CALCIUM SERPL-MCNC: 9.1 MG/DL (ref 8.7–10.5)
CHLORIDE SERPL-SCNC: 106 MMOL/L (ref 95–110)
CHLORIDE SERPL-SCNC: 108 MMOL/L (ref 95–110)
CLARITY UR: CLEAR
CO2 SERPL-SCNC: 19 MMOL/L (ref 23–29)
CO2 SERPL-SCNC: 20 MMOL/L (ref 23–29)
COLOR UR: COLORLESS
CREAT SERPL-MCNC: 2.7 MG/DL (ref 0.5–1.4)
CREAT SERPL-MCNC: 2.8 MG/DL (ref 0.5–1.4)
CREAT UR-MCNC: 52.7 MG/DL (ref 23–375)
CREAT UR-MCNC: 72.8 MG/DL (ref 23–375)
DIFFERENTIAL METHOD: ABNORMAL
EOSINOPHIL # BLD AUTO: 0.3 K/UL (ref 0–0.5)
EOSINOPHIL NFR BLD: 4.7 % (ref 0–8)
ERYTHROCYTE [DISTWIDTH] IN BLOOD BY AUTOMATED COUNT: 16.6 % (ref 11.5–14.5)
EST. GFR  (NO RACE VARIABLE): 23 ML/MIN/1.73 M^2
EST. GFR  (NO RACE VARIABLE): 24 ML/MIN/1.73 M^2
GLUCOSE SERPL-MCNC: 112 MG/DL (ref 70–110)
GLUCOSE SERPL-MCNC: 116 MG/DL (ref 70–110)
GLUCOSE UR QL STRIP: NEGATIVE
HCT VFR BLD AUTO: 29.7 % (ref 40–54)
HGB BLD-MCNC: 9.3 G/DL (ref 14–18)
HGB UR QL STRIP: NEGATIVE
IMM GRANULOCYTES # BLD AUTO: 0.06 K/UL (ref 0–0.04)
IMM GRANULOCYTES NFR BLD AUTO: 0.9 % (ref 0–0.5)
KETONES UR QL STRIP: NEGATIVE
LEUKOCYTE ESTERASE UR QL STRIP: NEGATIVE
LYMPHOCYTES # BLD AUTO: 2 K/UL (ref 1–4.8)
LYMPHOCYTES NFR BLD: 29.4 % (ref 18–48)
MAGNESIUM SERPL-MCNC: 1.7 MG/DL (ref 1.6–2.6)
MCH RBC QN AUTO: 29.2 PG (ref 27–31)
MCHC RBC AUTO-ENTMCNC: 31.3 G/DL (ref 32–36)
MCV RBC AUTO: 93 FL (ref 82–98)
MONOCYTES # BLD AUTO: 0.8 K/UL (ref 0.3–1)
MONOCYTES NFR BLD: 11 % (ref 4–15)
NEUTROPHILS # BLD AUTO: 3.7 K/UL (ref 1.8–7.7)
NEUTROPHILS NFR BLD: 53 % (ref 38–73)
NITRITE UR QL STRIP: NEGATIVE
NRBC BLD-RTO: 0 /100 WBC
PH UR STRIP: 6 [PH] (ref 5–8)
PLATELET # BLD AUTO: 279 K/UL (ref 150–450)
PMV BLD AUTO: 10 FL (ref 9.2–12.9)
POCT GLUCOSE: 90 MG/DL (ref 70–110)
POTASSIUM SERPL-SCNC: 3.7 MMOL/L (ref 3.5–5.1)
POTASSIUM SERPL-SCNC: 3.9 MMOL/L (ref 3.5–5.1)
PROT UR QL STRIP: NEGATIVE
RBC # BLD AUTO: 3.18 M/UL (ref 4.6–6.2)
SODIUM SERPL-SCNC: 139 MMOL/L (ref 136–145)
SODIUM SERPL-SCNC: 139 MMOL/L (ref 136–145)
SODIUM UR-SCNC: 87 MMOL/L (ref 20–250)
SODIUM UR-SCNC: 89 MMOL/L (ref 20–250)
SP GR UR STRIP: 1.01 (ref 1–1.03)
URN SPEC COLLECT METH UR: ABNORMAL
UROBILINOGEN UR STRIP-ACNC: NEGATIVE EU/DL
WBC # BLD AUTO: 6.88 K/UL (ref 3.9–12.7)

## 2023-10-07 PROCEDURE — 80048 BASIC METABOLIC PNL TOTAL CA: CPT | Mod: HCNC | Performed by: NURSE PRACTITIONER

## 2023-10-07 PROCEDURE — 25000003 PHARM REV CODE 250: Mod: HCNC | Performed by: INTERNAL MEDICINE

## 2023-10-07 PROCEDURE — 36415 COLL VENOUS BLD VENIPUNCTURE: CPT | Mod: HCNC | Performed by: NURSE PRACTITIONER

## 2023-10-07 PROCEDURE — 63600175 PHARM REV CODE 636 W HCPCS: Mod: HCNC | Performed by: NURSE PRACTITIONER

## 2023-10-07 PROCEDURE — 84300 ASSAY OF URINE SODIUM: CPT | Mod: 91,HCNC | Performed by: INTERNAL MEDICINE

## 2023-10-07 PROCEDURE — 36415 COLL VENOUS BLD VENIPUNCTURE: CPT | Mod: HCNC | Performed by: INTERNAL MEDICINE

## 2023-10-07 PROCEDURE — 84300 ASSAY OF URINE SODIUM: CPT | Mod: HCNC | Performed by: INTERNAL MEDICINE

## 2023-10-07 PROCEDURE — 80048 BASIC METABOLIC PNL TOTAL CA: CPT | Mod: 91,HCNC | Performed by: INTERNAL MEDICINE

## 2023-10-07 PROCEDURE — 25000003 PHARM REV CODE 250: Mod: HCNC | Performed by: NURSE PRACTITIONER

## 2023-10-07 PROCEDURE — 51798 US URINE CAPACITY MEASURE: CPT | Mod: HCNC

## 2023-10-07 PROCEDURE — 81003 URINALYSIS AUTO W/O SCOPE: CPT | Mod: HCNC | Performed by: INTERNAL MEDICINE

## 2023-10-07 PROCEDURE — 82570 ASSAY OF URINE CREATININE: CPT | Mod: HCNC | Performed by: INTERNAL MEDICINE

## 2023-10-07 PROCEDURE — 21400001 HC TELEMETRY ROOM: Mod: HCNC

## 2023-10-07 PROCEDURE — 82570 ASSAY OF URINE CREATININE: CPT | Mod: 91,HCNC | Performed by: INTERNAL MEDICINE

## 2023-10-07 PROCEDURE — 99222 PR INITIAL HOSPITAL CARE,LEVL II: ICD-10-PCS | Mod: HCNC,,, | Performed by: INTERNAL MEDICINE

## 2023-10-07 PROCEDURE — 83735 ASSAY OF MAGNESIUM: CPT | Mod: HCNC | Performed by: NURSE PRACTITIONER

## 2023-10-07 PROCEDURE — 99222 1ST HOSP IP/OBS MODERATE 55: CPT | Mod: HCNC,,, | Performed by: INTERNAL MEDICINE

## 2023-10-07 PROCEDURE — 97116 GAIT TRAINING THERAPY: CPT | Mod: HCNC,CQ

## 2023-10-07 PROCEDURE — 85025 COMPLETE CBC W/AUTO DIFF WBC: CPT | Mod: HCNC | Performed by: NURSE PRACTITIONER

## 2023-10-07 RX ORDER — SODIUM CHLORIDE 9 MG/ML
INJECTION, SOLUTION INTRAVENOUS CONTINUOUS
Status: DISCONTINUED | OUTPATIENT
Start: 2023-10-07 | End: 2023-10-08

## 2023-10-07 RX ADMIN — ATORVASTATIN CALCIUM 80 MG: 40 TABLET, FILM COATED ORAL at 09:10

## 2023-10-07 RX ADMIN — CHLORHEXIDINE GLUCONATE 0.12% ORAL RINSE 15 ML: 1.2 LIQUID ORAL at 09:10

## 2023-10-07 RX ADMIN — FLUOXETINE 20 MG: 20 CAPSULE ORAL at 09:10

## 2023-10-07 RX ADMIN — HYDROCODONE BITARTRATE AND ACETAMINOPHEN 1 TABLET: 10; 325 TABLET ORAL at 04:10

## 2023-10-07 RX ADMIN — ACETAMINOPHEN 650 MG: 325 TABLET ORAL at 06:10

## 2023-10-07 RX ADMIN — HYDROCODONE BITARTRATE AND ACETAMINOPHEN 1 TABLET: 10; 325 TABLET ORAL at 11:10

## 2023-10-07 RX ADMIN — DOXYCYCLINE HYCLATE 100 MG: 100 TABLET, COATED ORAL at 09:10

## 2023-10-07 RX ADMIN — HEPARIN SODIUM 5000 UNITS: 5000 INJECTION INTRAVENOUS; SUBCUTANEOUS at 10:10

## 2023-10-07 RX ADMIN — PANTOPRAZOLE SODIUM 40 MG: 40 TABLET, DELAYED RELEASE ORAL at 09:10

## 2023-10-07 RX ADMIN — HYDROCODONE BITARTRATE AND ACETAMINOPHEN 1 TABLET: 10; 325 TABLET ORAL at 05:10

## 2023-10-07 RX ADMIN — MEMANTINE 10 MG: 10 TABLET ORAL at 09:10

## 2023-10-07 RX ADMIN — OXYBUTYNIN CHLORIDE 10 MG: 5 TABLET, EXTENDED RELEASE ORAL at 09:10

## 2023-10-07 RX ADMIN — TAMSULOSIN HYDROCHLORIDE 0.4 MG: 0.4 CAPSULE ORAL at 09:10

## 2023-10-07 RX ADMIN — MUPIROCIN: 20 OINTMENT TOPICAL at 09:10

## 2023-10-07 RX ADMIN — HYDROCODONE BITARTRATE AND ACETAMINOPHEN 1 TABLET: 10; 325 TABLET ORAL at 09:10

## 2023-10-07 RX ADMIN — HEPARIN SODIUM 5000 UNITS: 5000 INJECTION INTRAVENOUS; SUBCUTANEOUS at 06:10

## 2023-10-07 NOTE — PT/OT/SLP PROGRESS
Physical Therapy  Treatment    Samy Alejandre Jr.   MRN: [id number]   Admitting Diagnosis: Complete heart block       PT Start Time: 1200     PT Stop Time: 1215    PT Total Time (min): 15 min       Billable Minutes:  Gait Training 15    Treatment Type: Treatment  PT/PTA: PTA     Number of PTA visits since last PT visit: 2       General Precautions: Standard, fall  Orthopedic Precautions: L UE non weight bearing  Braces: Sling and swathe       Subjective:  Communicated with ALEX BELTRAN prior to session.      Pain/Comfort  Pain Rating 1: 0/10  Pain Rating Post-Intervention 1: 0/10      Treatment and Education:    PATIENT IS SITTING EOB UPON ARRIVAL. HE C/O DECREASED SLEEP AND TO COMPLEXITY OF THE SLING AND SWATHE    BED MOB SBA    ADJUSTED L UPPER EXTREMITY SLING BEFORE AMBULATION    PATIENT AMBULATES X 15' WITH MIN A FOR SAFETY. HE FATIGUES QUICKLY.     EDUCATED ON CALL DON'T FALL PROCEDURE     AM-PAC 6 CLICK MOBILITY  How much help from another person does this patient currently need?   1 = Unable, Total/Dependent Assistance  2 = A lot, Maximum/Moderate Assistance  3 = A little, Minimum/Contact Guard/Supervision  4 = None, Modified [place]/Independent    Turning over in bed (including adjusting bedclothes, sheets and blankets)?: 4  Sitting down on and standing up from a chair with arms (e.g., wheelchair, bedside commode, etc.): 3  Moving from lying on back to sitting on the side of the bed?: 4  Moving to and from a bed to a chair (including a wheelchair)?:  (NA)  Need to walk in hospital room?: 3  Climbing 3-5 steps with a railing?: 1    AM-PAC Raw Score CMS G-Code Modifier Level of Impairment Assistance   6 % Total / Unable   7 - 9 CM 80 - 100% Maximal Assist   10 - 14 CL 60 - 80% Moderate Assist   15 - 19 CK 40 - 60% Moderate Assist   20 - 22 CJ 20 - 40% Minimal Assist   23 CI 1-20% SBA / CGA   24 CH 0% Independent/ Mod I     Patient left sitting edge of bed with all lines intact, call button in reach, and  WIFE present.    Assessment:  Samy Alejandre Jr. is a 73 y.o. male with a medical diagnosis of Complete heart block and presents with .    Rehab identified problem list/impairments: weakness, gait instability, decreased upper extremity function, decreased ROM, impaired cardiopulmonary response to activity, decreased lower extremity function, impaired coordination, decreased safety awareness, impaired self care skills, impaired functional mobility    Rehab potential is fair.    Activity tolerance: Fair    Discharge recommendations: home health PT (24/7 CARE)      Barriers to discharge:      Equipment recommendations: bedside commode, bath bench, walker, rolling     GOALS:   Multidisciplinary Problems       Physical Therapy Goals          Problem: Physical Therapy    Goal Priority Disciplines Outcome Goal Variances Interventions   Physical Therapy Goal     PT, PT/OT      Description: LTG'S TO BE MET IN 14 DAYS (10-19-23)  PT WILL BE [employer] FOR BED MOBILITY  PT WILL REQUIRE SBA FOR BED<>CHAIR TF'S  PT WILL  FEET WITH LRAD AND SBA  PT WILL INC AMPAC SCORE BY 2 POINTS TO PROGRESS GROSS FUNC MOBILITY                         PLAN:    Patient to be seen 3 x/week to address the above listed problems via gait training, therapeutic activities, therapeutic exercises  Plan of Care expires: 10/19/23  Plan of Care reviewed with: patient, spouse         10/07/2023

## 2023-10-07 NOTE — ASSESSMENT & PLAN NOTE
Patient with acute kidney injury/acute renal failure likely due to pre-renal azotemia due to IVVD MARI is currently improving. Baseline creatinine 1.7 - Labs reviewed- Renal function/electrolytes with Estimated Creatinine Clearance: 21.6 mL/min (A) (based on SCr of 2.7 mg/dL (H)). according to latest data. Monitor urine output and serial BMP and adjust therapy as needed. Avoid nephrotoxins and renally dose meds for GFR listed above.    Cont   IVF   retroperitoneal us :Mild fullness of the renal pelvis noted without estefania hydronephrosis  Kidney function trending up  Nephrology consulted  CT abd wwo

## 2023-10-07 NOTE — CONSULTS
[facility] - Tele[facility] ([facility])  Nephrology  Consult Note    Patient Name: Samy Alejandre Jr.  MRN: [id number]  Admission Date: 10/2/2023  Hospital Length of Stay: 5 days  Attending Provider: Binh Harris,*   Primary Care Physician: Damien Barron MD  Principal Problem:Complete heart block    Inpatient consult to Nephrology  Consult performed by: Kurt Hopkins MD  Consult ordered by: Binh Harris MD  Reason for consult: MARI        Subjective:     HPI:  73-year-old male admitted with complete heart block.  Noted to have an acute rise in creatinine over the past several days.  Nephrology has been consulted for evaluation.  Patient was seen in his hospital room.  In bed resting comfortably.  No acute distress noted.  It was a family member at the bedside.  All nephrology related questions were answered to their satisfaction.    Patient was noted to have acute kidney injury on admission with a creatinine of 2.5.  It improved with IV hydration down to 1.9 but has started to increase again the past couple days.  Baseline creatinine typically runs around 1.7.    Past Medical History:   Diagnosis Date    Anxiety     Depression     Diabetes mellitus, type 2     Hypertension     Stroke        Past Surgical History:   Procedure Laterality Date    A-V CARDIAC PACEMAKER INSERTION Left 10/4/2023    Procedure: INSERTION, CARDIAC PACEMAKER, DUAL CHAMBER;  Surgeon: Delonte Lindsey MD;  Location: [facility] CATH LAB;  Service: Cardiology;  Laterality: Left;  biotronik    ANGIOGRAPHY OF LOWER EXTREMITY N/A 12/21/2020    Procedure: ANGIOGRAM, LOWER EXTREMITY/Rt leg poss pta/stent;  Surgeon: Todd Michel MD;  Location: [facility] CATH LAB;  Service: Peripheral Vascular;  Laterality: N/A;  rescheduled 12/8    BACK SURGERY  2015    BRONCHOSCOPY Left 3/24/2022    Procedure: Bronchoscopy;  Surgeon: Edward Williamson MD;  Location: [facility] ENDO;  Service: Pulmonary;  Laterality: Left;  poss endobronchial biopsy or brushing     CATARACT EXTRACTION      ESOPHAGOGASTRODUODENOSCOPY N/A 8/24/2022    Procedure: EGD (ESOPHAGOGASTRODUODENOSCOPY);  Surgeon: Ana Gomez MD;  Location: [facility] ENDO;  Service: Endoscopy;  Laterality: N/A;    ESOPHAGOGASTRODUODENOSCOPY N/A 9/14/2022    Procedure: EGD (ESOPHAGOGASTRODUODENOSCOPY);  Surgeon: Ana Gomez MD;  Location: [facility] ENDO;  Service: Endoscopy;  Laterality: N/A;    KNEE SURGERY  2012    LITHOTRIPSY  2000       Review of patient's allergies indicates:   Allergen Reactions    Chantix [varenicline] Other (See Comments)     Felt bad     Current Facility-Administered Medications   Medication Frequency    acetaminophen tablet 650 mg Q6H PRN    albuterol-ipratropium 2.5 mg-0.5 mg/3 mL nebulizer solution 3 mL Q6H PRN    atorvastatin tablet 80 mg Daily    chlorhexidine 0.12 % solution 15 mL BID    dextrose 10% bolus 125 mL 125 mL PRN    dextrose 10% bolus 250 mL 250 mL PRN    doxycycline tablet 100 mg Q12H    FLUoxetine capsule 20 mg Daily    glucagon (human recombinant) injection 1 mg PRN    glucose chewable tablet 16 g PRN    glucose chewable tablet 24 g PRN    heparin (porcine) injection 5,000 Units Q8H    HYDROcodone-acetaminophen  mg per tablet 1 tablet Q6H PRN    influenza 65up-adj (QUADRIVALENT ADJUVANTED PF) vaccine 0.5 mL vaccine x 1 dose    insulin aspart U-100 pen 0-10 Units QID (AC + HS) PRN    levoFLOXacin tablet 750 mg Every other day    melatonin tablet 6 mg Nightly PRN    memantine tablet 10 mg Daily    oxybutynin 24 hr tablet 10 mg Daily    pantoprazole EC tablet 40 mg Daily    sodium chloride 0.9% flush 10 mL PRN    tamsulosin 24 hr capsule 0.4 mg Daily     Family History       Problem Relation (Age of Onset)    COPD Sister    Cancer Father    Heart disease Mother    Stroke Mother          Tobacco Use    Smoking status: Every Day     Current packs/day: 1.00     Types: Cigarettes    Smokeless tobacco: Current     Types: Snuff   Substance and Sexual Activity    Alcohol  use: Yes     Alcohol/week: 1.0 standard drink of alcohol     Types: 1 Shots of liquor per week     Comment: Daily    Drug use: Never    Sexual activity: Not Currently     Review of Systems   Constitutional: Negative.    HENT: Negative.     Respiratory: Negative.     Cardiovascular: Negative.    Gastrointestinal: Negative.    Genitourinary: Negative.    Musculoskeletal: Negative.    Skin: Negative.      Objective:     Vital Signs (Most Recent):  Temp: 98 °F (36.7 °C) (10/07/23 0953)  Pulse: 67 (10/07/23 0953)  Resp: 18 (10/07/23 0958)  BP: (!) 147/79 (10/07/23 0953)  SpO2: 98 % (10/07/23 0953) Vital Signs (24h Range):  Temp:  [97.4 °F (36.3 °C)-98.5 °F (36.9 °C)] 98 °F (36.7 °C)  Pulse:  [62-71] 67  Resp:  [16-20] 18  SpO2:  [97 %-99 %] 98 %  BP: (125-147)/(65-79) 147/79     Weight: 60.8 kg (134 lb) (10/03/23 0815)  Body mass index is 20.37 kg/m².  Body surface area is 1.71 meters squared.    No intake/output data recorded.    Physical Exam  Constitutional:       Appearance: Normal appearance.   HENT:      Head: Normocephalic and atraumatic.   Eyes:      General: No scleral icterus.     Extraocular Movements: Extraocular movements intact.      Pupils: Pupils are equal, round, and reactive to light.   Cardiovascular:      Rate and Rhythm: Normal rate and regular rhythm.   Pulmonary:      Effort: Pulmonary effort is normal.      Breath sounds: No stridor.   Musculoskeletal:      Right lower leg: No edema.      Left lower leg: No edema.   Skin:     General: Skin is warm and dry.   Neurological:      General: No focal deficit present.      Mental Status: He is alert and oriented to person, place, and time.   Psychiatric:         Mood and Affect: Mood normal.         Behavior: Behavior normal.         Significant Labs:  BMP:   Recent Labs   Lab 10/07/23  0528   *      K 3.7      CO2 20*   BUN 16   CREATININE 2.7*   CALCIUM 9.1   MG 1.7     CMP:   Recent Labs   Lab 10/02/23  1711 10/03/23  3958  10/07/23  0528      < > 116*   CALCIUM 9.4   < > 9.1   ALBUMIN 2.9*  --   --    PROT 7.3  --   --       < > 139   K 3.6   < > 3.7   CO2 14*   < > 20*   *   < > 106   BUN 32*   < > 16   CREATININE 2.5*   < > 2.7*   ALKPHOS 208*  --   --    ALT 12  --   --    AST 22  --   --    BILITOT 0.1  --   --     < > = values in this interval not displayed.     All labs within the past 24 hours have been reviewed.    Significant Imaging:  Labs: Reviewed  Reviewed    Assessment/Plan:     Active Diagnoses:    Diagnosis Date Noted POA    PRINCIPAL PROBLEM:  Complete heart block [I44.2] 10/03/2023 Yes    Bradycardia [R00.1] 10/02/2023 Yes    Aspiration pneumonia [J69.0] 10/02/2023 Yes    COPD (chronic obstructive pulmonary disease) [J44.9] 10/02/2023 Yes    Mixed Alzheimer's and vascular dementia [G30.9, F01.50, F02.80] 12/20/2022 Yes    History of DVT of lower extremity [Z86.718] 03/21/2022 Not Applicable    Acute renal failure superimposed on stage 3 chronic kidney disease [N17.9, N18.30] 03/20/2022 Yes    Controlled type 2 diabetes mellitus with stage 3 chronic kidney disease, without long-term current use of insulin [E11.22, N18.30] 06/03/2021 Yes    Mixed hyperlipidemia [E78.2] 06/03/2021 Yes    History of CVA with residual deficit [I69.30] 05/15/2020 Not Applicable      Problems Resolved During this Admission:       Assessment and plan:    1. Acute kidney injury:  Noted to increase from 1.9 up to 2.7 today.  He underwent a renal ultrasound yesterday that showed moderately full bladder with mild fullness of the renal pelvis without estefania hydronephrosis.    At this point I would rule out possibility of obstructive uropathy.  He does have history of BPH and has been on tamsulosin in the past.    Will check urine studies for completeness.    2. CKD 4: Baseline creatinine runs around 1.7.  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    Renal ultrasound showed right kidney 10.4 cm and left kidney 10.2 cm with  increased echogenicity consistent with chronic medical renal disease.    3. Electrolytes: Potassium is stable at 3.0.    4. Acid base: Bicarbonate is slightly low at 20 consistent with MARI.  Will continue to monitor.    5. Volume:  He appears to be dry on exam.      Thank you for your consult.     Kurt Hopkins MD  Nephrology  [facility] ([facility])

## 2023-10-07 NOTE — PROGRESS NOTES
'[facility] - Tele[facility] ([facility] Medicine  Progress Note    Patient Name: Samy Alejandre Jr.  MRN: [id number]  Patient Class: IP- Inpatient   Admission Date: 10/2/2023  Length of Stay: 5 days  Attending Physician: Binh Harris,*  Primary Care Provider: Damien Barron MD        Subjective:     Principal Problem:Complete heart block        HPI:  73-year-old male with a known past medical history of tobacco abuse, COPD, chronic bronchitis, diabetes type 2, Alzheimer's, and aspiration that presented to the ER 10/2 from his pulmonologist's office after his heart rate was noted to be in the 30s.He reports over the past 6-8 weeks he has become increasingly weak with decreased activity, decreased appetite, and spells of dizziness. He reports 1 possible syncopal episode where he fell about 6 weeks ago.  Workup in the ER revealed lab work with a creatinine of 2.5 (creatinine at baseline is 1.5-1.7) and a metabolic acidosis.  EKG in the ER with a rate of 36 and AV dissociation with narrow QRS complex for which cardiology was consulted.  Admitted to ICU with a Dx of complete heart block , aspiration PNA and MARI .  Cardiology consulted  which placed a dual chamber pace maker . Started on IVAB for aspiration PNA , sputum cx (+) for pseudomonas  . SLP consulted which rec soft diet with thin liquid . IM consulted  to assume care .      Overview/Hospital Course:  72 y/o WM admitted to ICU with a dx of complete heart block , MARI and pseudomonas PNA , S/P Dual chamber pacemaker  by cardiology . Kidney function improving with IVF . PT/OT rec HH . Blood cx NGTD . Sputum cx (+) for pseudomonas  pending sensitivities .  10/7 The kidney fucntion cont trending up . Retroperitoenl us show  Mild fullness of the renal pelvis noted without estefania hydronephrosis . Nephrology consulted . Pseudomonas sensitive to flouroquinolone .      Interval History:     Review of Systems  Objective:     Vital Signs (Most Recent):  Temp: 98 °F  (36.7 °C) (10/07/23 1150)  Pulse: 72 (10/07/23 1150)  Resp: 19 (10/07/23 1150)  BP: (!) 147/79 (10/07/23 0953)  SpO2: 97 % (10/07/23 1150) Vital Signs (24h Range):  Temp:  [97.4 °F (36.3 °C)-98.5 °F (36.9 °C)] 98 °F (36.7 °C)  Pulse:  [62-72] 72  Resp:  [16-20] 19  SpO2:  [97 %-99 %] 97 %  BP: (131-147)/(65-79) 147/79     Weight: 60.8 kg (134 lb)  Body mass index is 20.37 kg/m².    Intake/Output Summary (Last 24 hours) at 10/7/2023 1441  Last data filed at 10/7/2023 1017  Gross per 24 hour   Intake 120 ml   Output --   Net 120 ml         Physical Exam  Vitals reviewed.   Constitutional:       Appearance: Normal appearance.   HENT:      Head: Normocephalic and atraumatic.      Nose: Nose normal.      Mouth/Throat:      Mouth: Mucous membranes are moist.   Eyes:      Extraocular Movements: Extraocular movements intact.      Pupils: Pupils are equal, round, and reactive to light.   Cardiovascular:      Rate and Rhythm: Normal rate and regular rhythm.   Abdominal:      General: Abdomen is flat. Bowel sounds are normal. There is no distension.   Musculoskeletal:         General: Normal range of motion.      Cervical back: Normal range of motion.   Skin:     General: Skin is warm.      Capillary Refill: Capillary refill takes less than 2 seconds.   Neurological:      General: No focal deficit present.      Mental Status: He is alert and oriented to person, place, and time. Mental status is at baseline.             Significant Labs: All pertinent labs within the past 24 hours have been reviewed.  Recent Lab Results         10/07/23  0528        Anion Gap 13       Baso # 0.07       Basophil % 1.0       BUN 16       Calcium 9.1       Chloride 106       CO2 20       Creatinine 2.7       Differential Method Automated       eGFR 24       Eos # 0.3       Eosinophil % 4.7       Glucose 116       Gran # (ANC) 3.7       Gran % 53.0       Hematocrit 29.7       Hemoglobin 9.3       Immature Grans (Abs) 0.06  Comment: Mild elevation  in immature granulocytes is non specific and   can be seen in a variety of conditions including stress response,   acute inflammation, trauma and pregnancy. Correlation with other   laboratory and clinical findings is essential.         Immature Granulocytes 0.9       Lymph # 2.0       Lymph % 29.4       Magnesium  1.7       MCH 29.2       MCHC 31.3       MCV 93       Mono # 0.8       Mono % 11.0       MPV 10.0       nRBC 0       Platelet Count 279       Potassium 3.7       RBC 3.18       RDW 16.6       Sodium 139       WBC 6.88               Significant Imaging: I have reviewed all pertinent imaging results/findings within the past 24 hours.      Assessment/Plan:      * Complete heart block  S/P dual chamber PPM   Cardilogy on board       COPD (chronic obstructive pulmonary disease)  Stable   cont breathing tx      Aspiration pneumonia  S/p  IV cefepime   Levaquin renal dose   Sputum cx (+) for pseudomonas        Bradycardia  Compete heart block   Cardiology onboard  S/P Dual chamber PPM       Mixed Alzheimer's and vascular dementia  Cont supportive tx       History of DVT of lower extremity  Resume DOAC post PPM       Acute renal failure superimposed on stage 3 chronic kidney disease  Patient with acute kidney injury/acute renal failure likely due to pre-renal azotemia due to IVVD MARI is currently improving. Baseline creatinine 1.7 - Labs reviewed- Renal function/electrolytes with Estimated Creatinine Clearance: 21.6 mL/min (A) (based on SCr of 2.7 mg/dL (H)). according to latest data. Monitor urine output and serial BMP and adjust therapy as needed. Avoid nephrotoxins and renally dose meds for GFR listed above.    Cont   IVF   retroperitoneal us :Mild fullness of the renal pelvis noted without estefania hydronephrosis  Kidney function trending up  Nephrology consulted  CT abd wwo    Mixed hyperlipidemia  Cont statin       Controlled type 2 diabetes mellitus with stage 3 chronic kidney disease, without long-term  current use of insulin    Cont ISS  Keep CBG < 180     History of CVA with residual deficit    cont statin   Resume eliquis post PPM       VTE Risk Mitigation (From admission, onward)         Ordered     heparin (porcine) injection 5,000 Units  Every 8 hours         10/02/23 1812     IP VTE HIGH RISK PATIENT  Once         10/02/23 1751     Place sequential compression device  Until discontinued         10/02/23 1751                Discharge Planning   VALERIE:      Code Status: Full Code   Is the patient medically ready for discharge?:     Reason for patient still in hospital (select all that apply): Treatment  Discharge Plan A: Home with family                  Binh Boykin MD  Department of Hospital Medicine   [facility] - Telemetry ([facility])

## 2023-10-07 NOTE — PROGRESS NOTES
[facility]  Progress Note    Patient Name: Samy Alejandre Jr.  MRN: [id number]  Patient Class: IP- Inpatient   Admission Date: 10/2/2023  Length of Stay: 5 days  Attending Physician: Binh Harris,*  Primary Care Provider: Damien Barron MD        Subjective:     Principal Problem:Complete heart block        HPI:  73-year-old male with a known past medical history of tobacco abuse, COPD, chronic bronchitis, diabetes type 2, Alzheimer's, and aspiration that presented to the ER 10/2 from his pulmonologist's office after his heart rate was noted to be in the 30s.He reports over the past 6-8 weeks he has become increasingly weak with decreased activity, decreased appetite, and spells of dizziness. He reports 1 possible syncopal episode where he fell about 6 weeks ago.  Workup in the ER revealed lab work with a creatinine of 2.5 (creatinine at baseline is 1.5-1.7) and a metabolic acidosis.  EKG in the ER with a rate of 36 and AV dissociation with narrow QRS complex for which cardiology was consulted.  Admitted to ICU with a Dx of complete heart block , aspiration PNA and MARI .  Cardiology consulted  which placed a dual chamber pace maker . Started on IVAB for aspiration PNA , sputum cx (+) for pseudomonas  . SLP consulted which rec soft diet with thin liquid . IM consulted  to assume care .      Overview/Hospital Course:  74 y/o WM admitted to ICU with a dx of complete heart block , MARI and pseudomonas PNA , S/P Dual chamber pacemaker  by cardiology . Kidney function improving with IVF . PT/OT rec HH . Blood cx NGTD . Sputum cx (+) for pseudomonas  pending sensitivities .      Interval History:     Review of Systems   Constitutional:  Positive for activity change.   HENT: Negative.     Eyes: Negative.    Respiratory: Negative.     Cardiovascular: Negative.    Gastrointestinal:  Positive for constipation.   Endocrine: Negative.    Genitourinary: Negative.    Musculoskeletal:  Negative.    Allergic/Immunologic: Negative.    Neurological: Negative.    Hematological: Negative.    Psychiatric/Behavioral: Negative.       Objective:     Vital Signs (Most Recent):  Temp: 98 °F (36.7 °C) (10/07/23 0529)  Pulse: 68 (10/07/23 0529)  Resp: 17 (10/07/23 0529)  BP: 139/74 (10/07/23 0529)  SpO2: 99 % (10/07/23 0529) Vital Signs (24h Range):  Temp:  [97.4 °F (36.3 °C)-98.5 °F (36.9 °C)] 98 °F (36.7 °C)  Pulse:  [62-77] 68  Resp:  [16-20] 17  SpO2:  [97 %-99 %] 99 %  BP: (125-143)/(65-79) 139/74     Weight: 60.8 kg (134 lb)  Body mass index is 20.37 kg/m².  No intake or output data in the 24 hours ending 10/07/23 0734      Physical Exam  Vitals reviewed.   Constitutional:       Appearance: Normal appearance.   HENT:      Head: Normocephalic and atraumatic.      Nose: Nose normal.      Mouth/Throat:      Mouth: Mucous membranes are moist.   Eyes:      Extraocular Movements: Extraocular movements intact.      Pupils: Pupils are equal, round, and reactive to light.   Cardiovascular:      Rate and Rhythm: Normal rate and regular rhythm.   Abdominal:      General: Abdomen is flat. Bowel sounds are normal. There is no distension.   Musculoskeletal:         General: Normal range of motion.      Cervical back: Normal range of motion.   Skin:     General: Skin is warm.      Capillary Refill: Capillary refill takes less than 2 seconds.   Neurological:      General: No focal deficit present.      Mental Status: He is alert and oriented to person, place, and time. Mental status is at baseline.             Significant Labs: All pertinent labs within the past 24 hours have been reviewed.  Recent Lab Results         10/07/23  0528   10/06/23  1439        Anion Gap 13         Appearance, UA   Clear       Baso # 0.07         Basophil % 1.0         Bilirubin (UA)   Negative       BUN 16         Calcium 9.1         Chloride 106         CO2 20         Color, UA   Yellow       Creatinine 2.7         Differential Method  Automated         eGFR 24         Eos # 0.3         Eosinophil % 4.7         Glucose 116         Glucose, UA   Negative       Gran # (ANC) 3.7         Gran % 53.0         Hematocrit 29.7         Hemoglobin 9.3         Immature Grans (Abs) 0.06  Comment: Mild elevation in immature granulocytes is non specific and   can be seen in a variety of conditions including stress response,   acute inflammation, trauma and pregnancy. Correlation with other   laboratory and clinical findings is essential.           Immature Granulocytes 0.9         Ketones, UA   Negative       Leukocytes, UA   Negative       Lymph # 2.0         Lymph % 29.4         Magnesium  1.7         MCH 29.2         MCHC 31.3         MCV 93         Mono # 0.8         Mono % 11.0         MPV 10.0         NITRITE UA   Negative       nRBC 0         Occult Blood UA   Negative       pH, UA   7.0       Platelet Count 279         Potassium 3.7         Protein, UA   Trace  Comment: Recommend a 24 hour urine protein or a urine   protein/creatinine ratio if globulin induced proteinuria is  clinically suspected.         RBC 3.18         RDW 16.6         Sodium 139         Specific [place], UA   1.015       Specimen UA   Urine, Clean Catch       UROBILINOGEN UA   Negative       WBC 6.88                 Significant Imaging: I have reviewed all pertinent imaging results/findings within the past 24 hours.      Assessment/Plan:      * Complete heart block  S/P dual chamber PPM   Cardilogy on board       COPD (chronic obstructive pulmonary disease)  Stable   cont breathing tx      Aspiration pneumonia  S/p  IV cefepime   Levaquin renal dose   Sputum cx (+) for pseudomonas        Bradycardia  Compete heart block   Cardiology onboard  S/P Dual chamber PPM       Mixed Alzheimer's and vascular dementia  Cont supportive tx       History of DVT of lower extremity  Resume DOAC post PPM       Acute renal failure superimposed on stage 3 chronic kidney disease  Patient with acute  kidney injury/acute renal failure likely due to pre-renal azotemia due to IVVD MARI is currently improving. Baseline creatinine 1.7 - Labs reviewed- Renal function/electrolytes with Estimated Creatinine Clearance: 21.6 mL/min (A) (based on SCr of 2.7 mg/dL (H)). according to latest data. Monitor urine output and serial BMP and adjust therapy as needed. Avoid nephrotoxins and renally dose meds for GFR listed above.    Cont   IVF   retroperitoneal us     Mixed hyperlipidemia  Cont statin       Controlled type 2 diabetes mellitus with stage 3 chronic kidney disease, without long-term current use of insulin    Cont ISS  Keep CBG < 180     History of CVA with residual deficit    cont statin   Resume eliquis post PPM       VTE Risk Mitigation (From admission, onward)         Ordered     heparin (porcine) injection 5,000 Units  Every 8 hours         10/02/23 1812     IP VTE HIGH RISK PATIENT  Once         10/02/23 1751     Place sequential compression device  Until discontinued         10/02/23 1751                Discharge Planning   VALERIE:      Code Status: Full Code   Is the patient medically ready for discharge?:     Reason for patient still in hospital (select all that apply): Treatment  Discharge Plan A: Home with family                  Binh Boykin MD  Department of Hospital Medicine   [facility] - Telemetry ([facility])

## 2023-10-07 NOTE — SUBJECTIVE & OBJECTIVE
Interval History:     Review of Systems  Objective:     Vital Signs (Most Recent):  Temp: 98 °F (36.7 °C) (10/07/23 1150)  Pulse: 72 (10/07/23 1150)  Resp: 19 (10/07/23 1150)  BP: (!) 147/79 (10/07/23 0953)  SpO2: 97 % (10/07/23 1150) Vital Signs (24h Range):  Temp:  [97.4 °F (36.3 °C)-98.5 °F (36.9 °C)] 98 °F (36.7 °C)  Pulse:  [62-72] 72  Resp:  [16-20] 19  SpO2:  [97 %-99 %] 97 %  BP: (131-147)/(65-79) 147/79     Weight: 60.8 kg (134 lb)  Body mass index is 20.37 kg/m².    Intake/Output Summary (Last 24 hours) at 10/7/2023 1441  Last data filed at 10/7/2023 1017  Gross per 24 hour   Intake 120 ml   Output --   Net 120 ml         Physical Exam  Vitals reviewed.   Constitutional:       Appearance: Normal appearance.   HENT:      Head: Normocephalic and atraumatic.      Nose: Nose normal.      Mouth/Throat:      Mouth: Mucous membranes are moist.   Eyes:      Extraocular Movements: Extraocular movements intact.      Pupils: Pupils are equal, round, and reactive to light.   Cardiovascular:      Rate and Rhythm: Normal rate and regular rhythm.   Abdominal:      General: Abdomen is flat. Bowel sounds are normal. There is no distension.   Musculoskeletal:         General: Normal range of motion.      Cervical back: Normal range of motion.   Skin:     General: Skin is warm.      Capillary Refill: Capillary refill takes less than 2 seconds.   Neurological:      General: No focal deficit present.      Mental Status: He is alert and oriented to person, place, and time. Mental status is at baseline.             Significant Labs: All pertinent labs within the past 24 hours have been reviewed.  Recent Lab Results         10/07/23  0528        Anion Gap 13       Baso # 0.07       Basophil % 1.0       BUN 16       Calcium 9.1       Chloride 106       CO2 20       Creatinine 2.7       Differential Method Automated       eGFR 24       Eos # 0.3       Eosinophil % 4.7       Glucose 116       Gran # (ANC) 3.7       Gran % 53.0        Hematocrit 29.7       Hemoglobin 9.3       Immature Grans (Abs) 0.06  Comment: Mild elevation in immature granulocytes is non specific and   can be seen in a variety of conditions including stress response,   acute inflammation, trauma and pregnancy. Correlation with other   laboratory and clinical findings is essential.         Immature Granulocytes 0.9       Lymph # 2.0       Lymph % 29.4       Magnesium  1.7       MCH 29.2       MCHC 31.3       MCV 93       Mono # 0.8       Mono % 11.0       MPV 10.0       nRBC 0       Platelet Count 279       Potassium 3.7       RBC 3.18       RDW 16.6       Sodium 139       WBC 6.88               Significant Imaging: I have reviewed all pertinent imaging results/findings within the past 24 hours.

## 2023-10-07 NOTE — ASSESSMENT & PLAN NOTE
Patient with acute kidney injury/acute renal failure likely due to pre-renal azotemia due to IVVD MARI is currently improving. Baseline creatinine 1.7 - Labs reviewed- Renal function/electrolytes with Estimated Creatinine Clearance: 21.6 mL/min (A) (based on SCr of 2.7 mg/dL (H)). according to latest data. Monitor urine output and serial BMP and adjust therapy as needed. Avoid nephrotoxins and renally dose meds for GFR listed above.    Cont   IVF   retroperitoneal us

## 2023-10-07 NOTE — SUBJECTIVE & OBJECTIVE
Interval History:     Review of Systems   Constitutional:  Positive for activity change.   HENT: Negative.     Eyes: Negative.    Respiratory: Negative.     Cardiovascular: Negative.    Gastrointestinal:  Positive for constipation.   Endocrine: Negative.    Genitourinary: Negative.    Musculoskeletal: Negative.    Allergic/Immunologic: Negative.    Neurological: Negative.    Hematological: Negative.    Psychiatric/Behavioral: Negative.       Objective:     Vital Signs (Most Recent):  Temp: 98 °F (36.7 °C) (10/07/23 0529)  Pulse: 68 (10/07/23 0529)  Resp: 17 (10/07/23 0529)  BP: 139/74 (10/07/23 0529)  SpO2: 99 % (10/07/23 0529) Vital Signs (24h Range):  Temp:  [97.4 °F (36.3 °C)-98.5 °F (36.9 °C)] 98 °F (36.7 °C)  Pulse:  [62-77] 68  Resp:  [16-20] 17  SpO2:  [97 %-99 %] 99 %  BP: (125-143)/(65-79) 139/74     Weight: 60.8 kg (134 lb)  Body mass index is 20.37 kg/m².  No intake or output data in the 24 hours ending 10/07/23 0734      Physical Exam  Vitals reviewed.   Constitutional:       Appearance: Normal appearance.   HENT:      Head: Normocephalic and atraumatic.      Nose: Nose normal.      Mouth/Throat:      Mouth: Mucous membranes are moist.   Eyes:      Extraocular Movements: Extraocular movements intact.      Pupils: Pupils are equal, round, and reactive to light.   Cardiovascular:      Rate and Rhythm: Normal rate and regular rhythm.   Abdominal:      General: Abdomen is flat. Bowel sounds are normal. There is no distension.   Musculoskeletal:         General: Normal range of motion.      Cervical back: Normal range of motion.   Skin:     General: Skin is warm.      Capillary Refill: Capillary refill takes less than 2 seconds.   Neurological:      General: No focal deficit present.      Mental Status: He is alert and oriented to person, place, and time. Mental status is at baseline.             Significant Labs: All pertinent labs within the past 24 hours have been reviewed.  Recent Lab Results          10/07/23  0528   10/06/23  1439        Anion Gap 13         Appearance, UA   Clear       Baso # 0.07         Basophil % 1.0         Bilirubin (UA)   Negative       BUN 16         Calcium 9.1         Chloride 106         CO2 20         Color, UA   Yellow       Creatinine 2.7         Differential Method Automated         eGFR 24         Eos # 0.3         Eosinophil % 4.7         Glucose 116         Glucose, UA   Negative       Gran # (ANC) 3.7         Gran % 53.0         Hematocrit 29.7         Hemoglobin 9.3         Immature Grans (Abs) 0.06  Comment: Mild elevation in immature granulocytes is non specific and   can be seen in a variety of conditions including stress response,   acute inflammation, trauma and pregnancy. Correlation with other   laboratory and clinical findings is essential.           Immature Granulocytes 0.9         Ketones, UA   Negative       Leukocytes, UA   Negative       Lymph # 2.0         Lymph % 29.4         Magnesium  1.7         MCH 29.2         MCHC 31.3         MCV 93         Mono # 0.8         Mono % 11.0         MPV 10.0         NITRITE UA   Negative       nRBC 0         Occult Blood UA   Negative       pH, UA   7.0       Platelet Count 279         Potassium 3.7         Protein, UA   Trace  Comment: Recommend a 24 hour urine protein or a urine   protein/creatinine ratio if globulin induced proteinuria is  clinically suspected.         RBC 3.18         RDW 16.6         Sodium 139         Specific [place], UA   1.015       Specimen UA   Urine, Clean Catch       UROBILINOGEN UA   Negative       WBC 6.88                 Significant Imaging: I have reviewed all pertinent imaging results/findings within the past 24 hours.

## 2023-10-07 NOTE — PLAN OF CARE
PATIENT IS SITTING EOB UPON ARRIVAL. HE C/O DECREASED SLEEP AND TO COMPLEXITY OF THE SLING AND SWATHE    BED MOB SBA    ADJUSTED L UPPER EXTREMITY SLING BEFORE AMBULATION    PATIENT IS UNABLE TO WALK WITH L LOWER EXTREMITY NWB WITH INSTRUCTIONS X 15'. HE FATIGUES QUICKLY.     EDUCATED ON CALL DON'T FALL PROCEDURE.

## 2023-10-08 ENCOUNTER — ANESTHESIA EVENT (OUTPATIENT)
Dept: SURGERY | Facility: HOSPITAL | Age: 73
DRG: 242 | End: 2023-10-08
Payer: MEDICARE

## 2023-10-08 ENCOUNTER — ANESTHESIA (OUTPATIENT)
Dept: SURGERY | Facility: HOSPITAL | Age: 73
DRG: 242 | End: 2023-10-08
Payer: MEDICARE

## 2023-10-08 PROBLEM — N20.0 KIDNEY STONE: Status: ACTIVE | Noted: 2023-10-08

## 2023-10-08 LAB
ANION GAP SERPL CALC-SCNC: 11 MMOL/L (ref 8–16)
BASOPHILS # BLD AUTO: 0.07 K/UL (ref 0–0.2)
BASOPHILS NFR BLD: 1 % (ref 0–1.9)
BUN SERPL-MCNC: 14 MG/DL (ref 8–23)
CALCIUM SERPL-MCNC: 8.5 MG/DL (ref 8.7–10.5)
CHLORIDE SERPL-SCNC: 109 MMOL/L (ref 95–110)
CO2 SERPL-SCNC: 21 MMOL/L (ref 23–29)
CREAT SERPL-MCNC: 2.7 MG/DL (ref 0.5–1.4)
DIFFERENTIAL METHOD: ABNORMAL
EOSINOPHIL # BLD AUTO: 0.4 K/UL (ref 0–0.5)
EOSINOPHIL NFR BLD: 5.8 % (ref 0–8)
ERYTHROCYTE [DISTWIDTH] IN BLOOD BY AUTOMATED COUNT: 16.8 % (ref 11.5–14.5)
EST. GFR  (NO RACE VARIABLE): 24 ML/MIN/1.73 M^2
GLUCOSE SERPL-MCNC: 130 MG/DL (ref 70–110)
HCT VFR BLD AUTO: 26.5 % (ref 40–54)
HGB BLD-MCNC: 8.2 G/DL (ref 14–18)
IMM GRANULOCYTES # BLD AUTO: 0.03 K/UL (ref 0–0.04)
IMM GRANULOCYTES NFR BLD AUTO: 0.4 % (ref 0–0.5)
LYMPHOCYTES # BLD AUTO: 2.3 K/UL (ref 1–4.8)
LYMPHOCYTES NFR BLD: 33.7 % (ref 18–48)
MAGNESIUM SERPL-MCNC: 1.6 MG/DL (ref 1.6–2.6)
MCH RBC QN AUTO: 29 PG (ref 27–31)
MCHC RBC AUTO-ENTMCNC: 30.9 G/DL (ref 32–36)
MCV RBC AUTO: 94 FL (ref 82–98)
MONOCYTES # BLD AUTO: 0.7 K/UL (ref 0.3–1)
MONOCYTES NFR BLD: 10.4 % (ref 4–15)
NEUTROPHILS # BLD AUTO: 3.3 K/UL (ref 1.8–7.7)
NEUTROPHILS NFR BLD: 48.7 % (ref 38–73)
NRBC BLD-RTO: 0 /100 WBC
PLATELET # BLD AUTO: 267 K/UL (ref 150–450)
PMV BLD AUTO: 9.9 FL (ref 9.2–12.9)
POCT GLUCOSE: 90 MG/DL (ref 70–110)
POCT GLUCOSE: 96 MG/DL (ref 70–110)
POTASSIUM SERPL-SCNC: 3.9 MMOL/L (ref 3.5–5.1)
RBC # BLD AUTO: 2.83 M/UL (ref 4.6–6.2)
SODIUM SERPL-SCNC: 141 MMOL/L (ref 136–145)
WBC # BLD AUTO: 6.76 K/UL (ref 3.9–12.7)

## 2023-10-08 PROCEDURE — 25000003 PHARM REV CODE 250: Mod: HCNC | Performed by: INTERNAL MEDICINE

## 2023-10-08 PROCEDURE — 25000003 PHARM REV CODE 250: Mod: HCNC | Performed by: NURSE PRACTITIONER

## 2023-10-08 PROCEDURE — 85025 COMPLETE CBC W/AUTO DIFF WBC: CPT | Mod: HCNC | Performed by: NURSE PRACTITIONER

## 2023-10-08 PROCEDURE — 21400001 HC TELEMETRY ROOM: Mod: HCNC

## 2023-10-08 PROCEDURE — 63600175 PHARM REV CODE 636 W HCPCS: Mod: HCNC | Performed by: NURSE PRACTITIONER

## 2023-10-08 PROCEDURE — C1758 CATHETER, URETERAL: HCPCS | Mod: HCNC | Performed by: UROLOGY

## 2023-10-08 PROCEDURE — 52332 CYSTOSCOPY AND TREATMENT: CPT | Mod: LT,,, | Performed by: UROLOGY

## 2023-10-08 PROCEDURE — C2617 STENT, NON-COR, TEM W/O DEL: HCPCS | Mod: HCNC | Performed by: UROLOGY

## 2023-10-08 PROCEDURE — 36415 COLL VENOUS BLD VENIPUNCTURE: CPT | Mod: HCNC | Performed by: NURSE PRACTITIONER

## 2023-10-08 PROCEDURE — 52332 PR CYSTOSCOPY,INSERT URETERAL STENT: ICD-10-PCS | Mod: LT,,, | Performed by: UROLOGY

## 2023-10-08 PROCEDURE — 71000033 HC RECOVERY, INTIAL HOUR: Mod: HCNC | Performed by: UROLOGY

## 2023-10-08 PROCEDURE — 63600175 PHARM REV CODE 636 W HCPCS: Mod: HCNC | Performed by: NURSE ANESTHETIST, CERTIFIED REGISTERED

## 2023-10-08 PROCEDURE — 99223 1ST HOSP IP/OBS HIGH 75: CPT | Mod: HCNC,,, | Performed by: UROLOGY

## 2023-10-08 PROCEDURE — 99232 SBSQ HOSP IP/OBS MODERATE 35: CPT | Mod: HCNC,,, | Performed by: INTERNAL MEDICINE

## 2023-10-08 PROCEDURE — 37000009 HC ANESTHESIA EA ADD 15 MINS: Mod: HCNC | Performed by: UROLOGY

## 2023-10-08 PROCEDURE — 25500020 PHARM REV CODE 255: Mod: HCNC | Performed by: UROLOGY

## 2023-10-08 PROCEDURE — 99223 PR INITIAL HOSPITAL CARE,LEVL III: ICD-10-PCS | Mod: HCNC,,, | Performed by: UROLOGY

## 2023-10-08 PROCEDURE — 74420 UROGRAPHY RTRGR +-KUB: CPT | Mod: 26,,, | Performed by: UROLOGY

## 2023-10-08 PROCEDURE — 99024 POSTOP FOLLOW-UP VISIT: CPT | Mod: HCNC,,, | Performed by: INTERNAL MEDICINE

## 2023-10-08 PROCEDURE — 87086 URINE CULTURE/COLONY COUNT: CPT | Mod: HCNC | Performed by: UROLOGY

## 2023-10-08 PROCEDURE — 36000707: Mod: HCNC | Performed by: UROLOGY

## 2023-10-08 PROCEDURE — 25000003 PHARM REV CODE 250: Mod: HCNC | Performed by: NURSE ANESTHETIST, CERTIFIED REGISTERED

## 2023-10-08 PROCEDURE — 37000008 HC ANESTHESIA 1ST 15 MINUTES: Mod: HCNC | Performed by: UROLOGY

## 2023-10-08 PROCEDURE — C1769 GUIDE WIRE: HCPCS | Mod: HCNC | Performed by: UROLOGY

## 2023-10-08 PROCEDURE — 80048 BASIC METABOLIC PNL TOTAL CA: CPT | Mod: HCNC | Performed by: NURSE PRACTITIONER

## 2023-10-08 PROCEDURE — 99024 PR POST-OP FOLLOW-UP VISIT: ICD-10-PCS | Mod: HCNC,,, | Performed by: INTERNAL MEDICINE

## 2023-10-08 PROCEDURE — 36000706: Mod: HCNC | Performed by: UROLOGY

## 2023-10-08 PROCEDURE — 99232 PR SUBSEQUENT HOSPITAL CARE,LEVL II: ICD-10-PCS | Mod: HCNC,,, | Performed by: INTERNAL MEDICINE

## 2023-10-08 PROCEDURE — 83735 ASSAY OF MAGNESIUM: CPT | Mod: HCNC | Performed by: NURSE PRACTITIONER

## 2023-10-08 PROCEDURE — 74420 PR  X-RAY RETROGRADE PYELOGRAM: ICD-10-PCS | Mod: 26,,, | Performed by: UROLOGY

## 2023-10-08 DEVICE — STENT URETERAL UNIV 6FR 24CM: Type: IMPLANTABLE DEVICE | Site: URETER | Status: FUNCTIONAL

## 2023-10-08 RX ORDER — ONDANSETRON 2 MG/ML
INJECTION INTRAMUSCULAR; INTRAVENOUS
Status: DISCONTINUED | OUTPATIENT
Start: 2023-10-08 | End: 2023-10-08

## 2023-10-08 RX ORDER — LIDOCAINE HYDROCHLORIDE 20 MG/ML
INJECTION, SOLUTION EPIDURAL; INFILTRATION; INTRACAUDAL; PERINEURAL
Status: DISCONTINUED | OUTPATIENT
Start: 2023-10-08 | End: 2023-10-08

## 2023-10-08 RX ORDER — FENTANYL CITRATE 50 UG/ML
INJECTION, SOLUTION INTRAMUSCULAR; INTRAVENOUS
Status: DISCONTINUED | OUTPATIENT
Start: 2023-10-08 | End: 2023-10-08

## 2023-10-08 RX ORDER — PROPOFOL 10 MG/ML
VIAL (ML) INTRAVENOUS
Status: DISCONTINUED | OUTPATIENT
Start: 2023-10-08 | End: 2023-10-08

## 2023-10-08 RX ADMIN — ONDANSETRON 4 MG: 2 INJECTION INTRAMUSCULAR; INTRAVENOUS at 10:10

## 2023-10-08 RX ADMIN — TAMSULOSIN HYDROCHLORIDE 0.4 MG: 0.4 CAPSULE ORAL at 08:10

## 2023-10-08 RX ADMIN — HYDROCODONE BITARTRATE AND ACETAMINOPHEN 1 TABLET: 10; 325 TABLET ORAL at 08:10

## 2023-10-08 RX ADMIN — ATORVASTATIN CALCIUM 80 MG: 40 TABLET, FILM COATED ORAL at 08:10

## 2023-10-08 RX ADMIN — HEPARIN SODIUM 5000 UNITS: 5000 INJECTION INTRAVENOUS; SUBCUTANEOUS at 05:10

## 2023-10-08 RX ADMIN — MEMANTINE 10 MG: 10 TABLET ORAL at 08:10

## 2023-10-08 RX ADMIN — SODIUM CHLORIDE: 9 INJECTION, SOLUTION INTRAVENOUS at 03:10

## 2023-10-08 RX ADMIN — PROPOFOL 100 MG: 10 INJECTION, EMULSION INTRAVENOUS at 09:10

## 2023-10-08 RX ADMIN — FENTANYL CITRATE 100 MCG: 50 INJECTION, SOLUTION INTRAMUSCULAR; INTRAVENOUS at 09:10

## 2023-10-08 RX ADMIN — SODIUM CHLORIDE, POTASSIUM CHLORIDE, SODIUM LACTATE AND CALCIUM CHLORIDE: 600; 310; 30; 20 INJECTION, SOLUTION INTRAVENOUS at 09:10

## 2023-10-08 RX ADMIN — HYDROCODONE BITARTRATE AND ACETAMINOPHEN 1 TABLET: 10; 325 TABLET ORAL at 07:10

## 2023-10-08 RX ADMIN — LIDOCAINE HYDROCHLORIDE 75 MG: 20 INJECTION, SOLUTION EPIDURAL; INFILTRATION; INTRACAUDAL; PERINEURAL at 09:10

## 2023-10-08 RX ADMIN — DOXYCYCLINE HYCLATE 100 MG: 100 TABLET, COATED ORAL at 08:10

## 2023-10-08 RX ADMIN — FLUOXETINE 20 MG: 20 CAPSULE ORAL at 08:10

## 2023-10-08 RX ADMIN — LEVOFLOXACIN 750 MG: 750 TABLET, FILM COATED ORAL at 08:10

## 2023-10-08 RX ADMIN — OXYBUTYNIN CHLORIDE 10 MG: 5 TABLET, EXTENDED RELEASE ORAL at 08:10

## 2023-10-08 RX ADMIN — APIXABAN 5 MG: 2.5 TABLET, FILM COATED ORAL at 07:10

## 2023-10-08 RX ADMIN — Medication 6 MG: at 07:10

## 2023-10-08 RX ADMIN — PANTOPRAZOLE SODIUM 40 MG: 40 TABLET, DELAYED RELEASE ORAL at 08:10

## 2023-10-08 NOTE — PROGRESS NOTES
[facility] ([facility])  Cardiology  Progress Note    Patient Name: Samy Alejandre Jr.  MRN: [id number]  Admission Date: 10/2/2023  Hospital Length of Stay: 6 days  Code Status: Full Code   Attending Physician: Binh Harris,*   Primary Care Physician: Damien Barron MD  Expected Discharge Date:   Principal Problem:Complete heart block    Subjective:     Hospital Course:   10/4/23 Pt seen and examined resting in bed. Denies any SOB and CP at this time. PPM planned for today    10/5/23 pt seen and examined today, sitting up in bed. Feels ok, wife at bedside. Sling in place, PPM C/D/I, staples. Labs reviewed, chart reviewed    10/6/23 Pt seen and examined today resting in bed, sling in place. Feels good. Denies any CP and CHF sxs at this time. Labs reviewed, chart reviewed    10.8.2023  Worsening cr, found to have obstructing stone, going for a ureteral stent today .  Denies any chest pain or dyspnea.  Complains of flank/back pain.      Interval History:     Review of Systems   Cardiovascular:  Negative for chest pain and dyspnea on exertion.     Objective:     Vital Signs (Most Recent):  Temp: 97.7 °F (36.5 °C) (10/08/23 0826)  Pulse: 66 (10/08/23 0826)  Resp: 18 (10/08/23 0843)  BP: (!) 141/85 (10/08/23 0826)  SpO2: 98 % (10/08/23 0826) Vital Signs (24h Range):  Temp:  [97.6 °F (36.4 °C)-98.7 °F (37.1 °C)] 97.7 °F (36.5 °C)  Pulse:  [62-86] 66  Resp:  [12-19] 18  SpO2:  [97 %-98 %] 98 %  BP: (141-157)/(71-85) 141/85     Weight: 60.8 kg (134 lb)  Body mass index is 20.37 kg/m².     SpO2: 98 %         Intake/Output Summary (Last 24 hours) at 10/8/2023 1025  Last data filed at 10/8/2023 1015  Gross per 24 hour   Intake 2507.5 ml   Output --   Net 2507.5 ml       Lines/Drains/Airways       Peripheral Intravenous Line  Duration                  Peripheral IV - Single Lumen 10/05/23 0539 20 G Anterior;Right Forearm 3 days                       Physical Exam  Vitals reviewed.   Constitutional:        Appearance: He is well-developed.   Neck:      Vascular: No carotid bruit.   Cardiovascular:      Rate and Rhythm: Normal rate and regular rhythm.      Pulses: Intact distal pulses.      Heart sounds: Normal heart sounds. No murmur heard.  Pulmonary:      Breath sounds: Normal breath sounds.   Neurological:      Mental Status: He is oriented to person, place, and time.            Significant Labs: All pertinent lab results from the last 24 hours have been reviewed. and   Recent Lab Results  (Last 5 results in the past 24 hours)        10/08/23  0528   10/08/23  0454   10/07/23  2237   10/07/23  2220   10/07/23  1631        Anion Gap   11       12       Appearance, UA     Clear           Baso #   0.07             Basophil %   1.0             Bilirubin (UA)     Negative           BUN   14       16       Calcium   8.5       8.7       Chloride   109       108       CO2   21       19       Color, UA     Colorless           Creatinine   2.7       2.8       Creatinine, Urine     52.7           Differential Method   Automated             eGFR   24       23       Eos #   0.4             Eosinophil %   5.8             Glucose   130       112       Glucose, UA     Negative           Gran # (ANC)   3.3             Gran %   48.7             Hematocrit   26.5             Hemoglobin   8.2             Immature Grans (Abs)   0.03  Comment: Mild elevation in immature granulocytes is non specific and   can be seen in a variety of conditions including stress response,   acute inflammation, trauma and pregnancy. Correlation with other   laboratory and clinical findings is essential.               Immature Granulocytes   0.4             Ketones, UA     Negative           Leukocytes, UA     Negative           Lymph #   2.3             Lymph %   33.7             Magnesium    1.6             MCH   29.0             MCHC   30.9             MCV   94             Mono #   0.7             Mono %   10.4             MPV   9.9             NITRITE UA      Negative           nRBC   0             Occult Blood UA     Negative           pH, UA     6.0           Platelet Count   267             POCT Glucose 96       90         Potassium   3.9       3.9       Protein, UA     Negative  Comment: Recommend a 24 hour urine protein or a urine   protein/creatinine ratio if globulin induced proteinuria is  clinically suspected.             RBC   2.83             RDW   16.8             Sodium   141       139       Sodium, Urine     87  Comment: The random urine reference ranges provided were established   for 24 hour urine collections.  No reference ranges exist for  random urine specimens.  Correlate clinically.             Specific Ronald, UA     1.010           Specimen UA     Urine, Clean Catch           UROBILINOGEN UA     Negative           WBC   6.76                                        Assessment and Plan:         * Complete heart block  Plan for dual-chamber pacemaker in a.m..  NPO after midnight.    Hold Eliquis.    Discussed with daughter on the phone.    10/4/23  PPM planned for today    10/5/23  S/p PPM yesterday  Keep sling in place at all time for 3 days then nightly through the end of this month.  F/u in clinic for staple removal with PPM clinic    Acute renal failure superimposed on stage 3 chronic kidney disease  Okay to undergo his ureteral stent/urology procedure.  Under general anesthesia.    Reviewed monitor events.    Denies any chest pain.  No dyspnea no fluid overload.    No contraindication cardiac standpoint.    Okay to proceed with his urology procedure and general anesthesia.  Moderate risk patient.     Mixed hyperlipidemia  statin      Discussed with Hospital Medicine and Urology staff.  VTE Risk Mitigation (From admission, onward)         Ordered     apixaban tablet 5 mg  Daily         10/08/23 1024     IP VTE HIGH RISK PATIENT  Once         10/02/23 1751     Place sequential compression device  Until discontinued         10/02/23 1751                 Delonte Lindsey MD  Cardiology  [facility] - Tele[facility] ([facility])

## 2023-10-08 NOTE — TRANSFER OF CARE
"Anesthesia Transfer of Care Note    Patient: Samy Alejandre Jr.    Procedure(s) Performed: Procedure(s) (LRB):  CYSTOSCOPY, WITH RETROGRADE PYELOGRAM AND URETERAL STENT INSERTION (Left)    Patient location: PACU    Anesthesia Type: general    Transport from OR: Transported from OR on room air with adequate spontaneous ventilation    Post pain: adequate analgesia    Post assessment: no apparent anesthetic complications    Post vital signs: stable    Level of consciousness: sedated    Nausea/Vomiting: no nausea/vomiting    Complications: none    Transfer of care protocol was followed      Last vitals:   Visit Vitals  BP (!) 141/85   Pulse 66   Temp 36.5 °C (97.7 °F) (Oral)   Resp 18   Ht 5' 8" (1.727 m)   Wt 60.8 kg (134 lb)   SpO2 98%   BMI 20.37 kg/m²     "

## 2023-10-08 NOTE — PROGRESS NOTES
[facility]  Progress Note    Patient Name: Samy Alejandre Jr.  MRN: [id number]  Patient Class: IP- Inpatient   Admission Date: 10/2/2023  Length of Stay: 6 days  Attending Physician: Binh Hraris,*  Primary Care Provider: Damien Barron MD        Subjective:     Principal Problem:Complete heart block        HPI:  73-year-old male with a known past medical history of tobacco abuse, COPD, chronic bronchitis, diabetes type 2, Alzheimer's, and aspiration that presented to the ER 10/2 from his pulmonologist's office after his heart rate was noted to be in the 30s.He reports over the past 6-8 weeks he has become increasingly weak with decreased activity, decreased appetite, and spells of dizziness. He reports 1 possible syncopal episode where he fell about 6 weeks ago.  Workup in the ER revealed lab work with a creatinine of 2.5 (creatinine at baseline is 1.5-1.7) and a metabolic acidosis.  EKG in the ER with a rate of 36 and AV dissociation with narrow QRS complex for which cardiology was consulted.  Admitted to ICU with a Dx of complete heart block , aspiration PNA and MARI .  Cardiology consulted  which placed a dual chamber pace maker . Started on IVAB for aspiration PNA , sputum cx (+) for pseudomonas  . SLP consulted which rec soft diet with thin liquid . IM consulted  to assume care .      Overview/Hospital Course:  72 y/o WM admitted to ICU with a dx of complete heart block , MARI and pseudomonas PNA , S/P Dual chamber pacemaker  by cardiology . Kidney function improving with IVF . PT/OT rec HH . Blood cx NGTD . Sputum cx (+) for pseudomonas  pending sensitivities .  10/7 The kidney fucntion cont trending up . Retroperitoenl us show  Mild fullness of the renal pelvis noted without estefania hydronephrosis . Nephrology consulted . Pseudomonas sensitive to flouroquinolone .  10/8 CT abd show sided hydronephrosis. Bilateral subcentimeter nonobstructing renal calculi.  Obstructing proximal left ureteral calculi measuring up to 5 mm superiorly and up to 3 mm inferiorly.  No right-sided hydronephrosis . The kidney function remain elevated . Urology consulted and plan for possible cytoscopy and stent placement .       Interval History:     Review of Systems   Constitutional:  Positive for activity change and fatigue.   HENT: Negative.     Eyes: Negative.    Respiratory: Negative.     Cardiovascular: Negative.    Gastrointestinal: Negative.    Endocrine: Negative.    Genitourinary: Negative.    Musculoskeletal:  Positive for back pain.   Allergic/Immunologic: Negative.    Neurological: Negative.    Hematological: Negative.    Psychiatric/Behavioral: Negative.       Objective:     Vital Signs (Most Recent):  Temp: 98.5 °F (36.9 °C) (10/08/23 0452)  Pulse: 71 (10/08/23 0452)  Resp: 18 (10/08/23 0452)  BP: (!) 145/79 (10/08/23 0452)  SpO2: 98 % (10/08/23 0452) Vital Signs (24h Range):  Temp:  [97.6 °F (36.4 °C)-98.7 °F (37.1 °C)] 98.5 °F (36.9 °C)  Pulse:  [62-86] 71  Resp:  [12-19] 18  SpO2:  [97 %-98 %] 98 %  BP: (143-157)/(71-79) 145/79     Weight: 60.8 kg (134 lb)  Body mass index is 20.37 kg/m².    Intake/Output Summary (Last 24 hours) at 10/8/2023 0740  Last data filed at 10/8/2023 0448  Gross per 24 hour   Intake 2027.5 ml   Output --   Net 2027.5 ml         Physical Exam  Vitals reviewed.   Constitutional:       Appearance: Normal appearance.   HENT:      Head: Normocephalic and atraumatic.      Nose: Nose normal.      Mouth/Throat:      Mouth: Mucous membranes are moist.   Eyes:      Extraocular Movements: Extraocular movements intact.      Pupils: Pupils are equal, round, and reactive to light.   Cardiovascular:      Rate and Rhythm: Normal rate and regular rhythm.   Abdominal:      General: Abdomen is flat. Bowel sounds are normal. There is no distension.   Musculoskeletal:         General: Normal range of motion.      Cervical back: Normal range of motion.   Skin:      General: Skin is warm.      Capillary Refill: Capillary refill takes less than 2 seconds.   Neurological:      General: No focal deficit present.      Mental Status: He is alert and oriented to person, place, and time. Mental status is at baseline.             Significant Labs: All pertinent labs within the past 24 hours have been reviewed.  Recent Lab Results  (Last 5 results in the past 24 hours)        10/08/23  0528   10/08/23  0454   10/07/23  2237   10/07/23  2220   10/07/23  1631        Anion Gap   11       12       Appearance, UA     Clear           Baso #   0.07             Basophil %   1.0             Bilirubin (UA)     Negative           BUN   14       16       Calcium   8.5       8.7       Chloride   109       108       CO2   21       19       Color, UA     Colorless           Creatinine   2.7       2.8       Creatinine, Urine     52.7           Differential Method   Automated             eGFR   24       23       Eos #   0.4             Eosinophil %   5.8             Glucose   130       112       Glucose, UA     Negative           Gran # (ANC)   3.3             Gran %   48.7             Hematocrit   26.5             Hemoglobin   8.2             Immature Grans (Abs)   0.03  Comment: Mild elevation in immature granulocytes is non specific and   can be seen in a variety of conditions including stress response,   acute inflammation, trauma and pregnancy. Correlation with other   laboratory and clinical findings is essential.               Immature Granulocytes   0.4             Ketones, UA     Negative           Leukocytes, UA     Negative           Lymph #   2.3             Lymph %   33.7             Magnesium    1.6             MCH   29.0             MCHC   30.9             MCV   94             Mono #   0.7             Mono %   10.4             MPV   9.9             NITRITE UA     Negative           nRBC   0             Occult Blood UA     Negative           pH, UA     6.0           Platelet Count   267              POCT Glucose 96       90         Potassium   3.9       3.9       Protein, UA     Negative  Comment: Recommend a 24 hour urine protein or a urine   protein/creatinine ratio if globulin induced proteinuria is  clinically suspected.             RBC   2.83             RDW   16.8             Sodium   141       139       Sodium, Urine     87  Comment: The random urine reference ranges provided were established   for 24 hour urine collections.  No reference ranges exist for  random urine specimens.  Correlate clinically.             Specific Casey, UA     1.010           Specimen UA     Urine, Clean Catch           UROBILINOGEN UA     Negative           WBC   6.76                                    Significant Imaging: I have reviewed all pertinent imaging results/findings within the past 24 hours.      Assessment/Plan:      * Complete heart block  S/P dual chamber PPM   Cardilogy on board       Kidney stone  CT abd wo: sided hydronephrosis. Bilateral subcentimeter nonobstructing renal calculi. Obstructing proximal left ureteral calculi measuring up to 5 mm superiorly and up to 3 mm inferiorly.  No right-sided hydronephrosis  -Urology consulted       COPD (chronic obstructive pulmonary disease)  Stable   cont breathing tx      Aspiration pneumonia  S/p  IV cefepime   Levaquin renal dose   Sputum cx (+) for pseudomonas        Bradycardia  Compete heart block   Cardiology onboard  S/P Dual chamber PPM       Mixed Alzheimer's and vascular dementia  Cont supportive tx       History of DVT of lower extremity  Resume DOAC post  Cystoscopy       Acute renal failure superimposed on stage 3 chronic kidney disease  Patient with acute kidney injury/acute renal failure likely due to pre-renal azotemia due to IVVD MRAI is currently improving. Baseline creatinine 1.7 - Labs reviewed- Renal function/electrolytes with Estimated Creatinine Clearance: 21.6 mL/min (A) (based on SCr of 2.7 mg/dL (H)). according to latest data.  Monitor urine output and serial BMP and adjust therapy as needed. Avoid nephrotoxins and renally dose meds for GFR listed above.    -retroperitoneal us :Mild fullness of the renal pelvis noted without estefania hydronephrosis  -Kidney function trending up  -Nephrology consulted  -CT abd wo: sided hydronephrosis. Bilateral subcentimeter nonobstructing renal calculi. Obstructing proximal left ureteral calculi measuring up to 5 mm superiorly and up to 3 mm inferiorly.  No right-sided hydronephrosis  -Urology consulted     Mixed hyperlipidemia  Cont statin       Controlled type 2 diabetes mellitus with stage 3 chronic kidney disease, without long-term current use of insulin    Cont ISS  Keep CBG < 180     History of CVA with residual deficit    cont statin   Resume eliquis post PPM       VTE Risk Mitigation (From admission, onward)         Ordered     apixaban tablet 5 mg  Daily         10/08/23 0743     IP VTE HIGH RISK PATIENT  Once         10/02/23 1751     Place sequential compression device  Until discontinued         10/02/23 1751                Discharge Planning   VALERIE:      Code Status: Full Code   Is the patient medically ready for discharge?:     Reason for patient still in hospital (select all that apply): Treatment  Discharge Plan A: Home with family                  Binh Usman Boykin MD  Department of Hospital Medicine   [facility] Tele[facility] ([facility])

## 2023-10-08 NOTE — ASSESSMENT & PLAN NOTE
CT abd wo: sided hydronephrosis. Bilateral subcentimeter nonobstructing renal calculi. Obstructing proximal left ureteral calculi measuring up to 5 mm superiorly and up to 3 mm inferiorly.  No right-sided hydronephrosis  -Urology consulted

## 2023-10-08 NOTE — OP NOTE
[facility]    Date: 10/08/2023    Pre-Op Diagnosis: Left ureteral stone    Op Diagnosis: same    Procedure(s) Performed:    1. Cystoscopy with left six [place] by 24 cm JJ  ureteral stent placement with strings removed  2. Left retrograde ureteral pyelogram with interpretation less 1 hour    Specimen(s): urine for culture    Surgeon: Jamie Martinez MD    Anesthesia: General LMA    Indications: Samy Alejandre Jr. is a 73 y.o. male with left ureteral stone currently admitted s/p pacemaker placement who was noted to have left flank pain and MARI.    Findings:  Successful stent placement    Left retrograde ureteral pyelogram with interpretation less 1 hour - Delicate appearing left distal ureter with left proximal hydronephrosis, no large filling defect, no calyceal blunting    Estimated Blood Loss: min    Drains:  6 [place] x 24 cm left JJ ureteral stent without strings    Procedure in Detail:  After risks, benefits and possible complications of the procedure were explained, the patient elected to undergo the procedure and informed consent was obtained. All questions were answered in the joseluis-operative area. The patient was transferred to the cystoscopy suite and placed on the fluoroscopy table in the supine position.  SCDs were applied and working. Time out was performed, joseluis-procedural antibiotics were given. Anesthesia was administered.  After adequate anesthesia the patient was placed in dorsal lithotomy position and prepped and draped in the usual sterile fashion.     A rigid cystoscope in a 22 Fr sheath was introduced into the patients bladder per urethra. This passed easily.  The entire urethra was visualized and revealed no strictures or masses.  Cystoscopy was performed which showed the right and left ureteral orifices in the normal anatomic position.  There were no bladder tumors, mod  trabeculations, and no stones.      Our attention was turned to the patient's left ureteral orifice.  A Motion  wire was advanced up the left ureteral orifice to the level of the expected renal pelvis.  This was confirmed using fluoroscopy.  A [place] catheter was inserted over this wire and the wire was removed.  A retrograde was obtained, see above for findings.  The wire was then replaced.    We then passed a 6 Fr x 24 cm JJ ureteral stent without strings over the wire to the level of the renal pelvis under direct vision as well as flouroscopy. The guide wire was removed.  A 180 degree coil was observed in the renal pelvis as well as the bladder using fluoroscopy.  A 180 degree coil was also seen using direct visualization in the bladder.     The patient tolerated the procedure well and was transferred to the recovery room in stable condition.      Disposition: The patient will return to his room and can be discharged when appropriate. He will f/u with me after he convalesces for stone removal.    Jamie Martinez MD

## 2023-10-08 NOTE — CONSULTS
[facility] Tele[facility] ([facility])  Nephrology  Consult Note    Patient Name: Samy Alejandre Jr.  MRN: [id number]  Admission Date: 10/2/2023  Hospital Length of Stay: 6 days  Attending Provider: Binh Harris,*   Primary Care Physician: Damien Barron MD  Principal Problem:Complete heart block    Consults  Subjective:     HPI:  73-year-old male admitted with complete heart block.  Noted to have an acute rise in creatinine over the past several days.  Nephrology has been consulted for evaluation.  Patient was seen in his hospital room.  In bed resting comfortably.  No acute distress noted.  It was a family member at the bedside.  All nephrology related questions were answered to their satisfaction.    Patient was noted to have acute kidney injury on admission with a creatinine of 2.5.  It improved with IV hydration down to 1.9 but has started to increase again the past couple days.  Baseline creatinine typically runs around 1.7.    10/08/2023:  Patient was seen in his hospital room.  Sitting in a chair at the bedside.  Appears uncomfortable.  Relates that he is having quite a bit of flank pain.  Imaging studies shows an obstructing stone in his left ureter.  Plan for cysto and stent placement noted.    Past Medical History:   Diagnosis Date    Anxiety     Depression     Diabetes mellitus, type 2     Hypertension     Stroke        Past Surgical History:   Procedure Laterality Date    A-V CARDIAC PACEMAKER INSERTION Left 10/4/2023    Procedure: INSERTION, CARDIAC PACEMAKER, DUAL CHAMBER;  Surgeon: Delonte Lindsey MD;  Location: [facility] CATH LAB;  Service: Cardiology;  Laterality: Left;  biotronik    ANGIOGRAPHY OF LOWER EXTREMITY N/A 12/21/2020    Procedure: ANGIOGRAM, LOWER EXTREMITY/Rt leg poss pta/stent;  Surgeon: Todd Michel MD;  Location: [facility] CATH LAB;  Service: Peripheral Vascular;  Laterality: N/A;  rescheduled 12/8    BACK SURGERY  2015    BRONCHOSCOPY Left 3/24/2022    Procedure: Bronchoscopy;   Surgeon: Edward Williamson MD;  Location: [facility];  Service: Pulmonary;  Laterality: Left;  poss endobronchial biopsy or brushing    CATARACT EXTRACTION      ESOPHAGOGASTRODUODENOSCOPY N/A 8/24/2022    Procedure: EGD (ESOPHAGOGASTRODUODENOSCOPY);  Surgeon: Ana Gomez MD;  Location: [facility] ENDO;  Service: Endoscopy;  Laterality: N/A;    ESOPHAGOGASTRODUODENOSCOPY N/A 9/14/2022    Procedure: EGD (ESOPHAGOGASTRODUODENOSCOPY);  Surgeon: Ana Gomez MD;  Location: [facility];  Service: Endoscopy;  Laterality: N/A;    KNEE SURGERY  2012    LITHOTRIPSY  2000       Review of patient's allergies indicates:   Allergen Reactions    Chantix [varenicline] Other (See Comments)     Felt bad     Current Facility-Administered Medications   Medication Frequency    acetaminophen tablet 650 mg Q6H PRN    albuterol-ipratropium 2.5 mg-0.5 mg/3 mL nebulizer solution 3 mL Q6H PRN    [START ON 10/9/2023] apixaban tablet 5 mg Daily    atorvastatin tablet 80 mg Daily    dextrose 10% bolus 125 mL 125 mL PRN    dextrose 10% bolus 250 mL 250 mL PRN    doxycycline tablet 100 mg Q12H    FLUoxetine capsule 20 mg Daily    glucagon (human recombinant) injection 1 mg PRN    glucose chewable tablet 16 g PRN    glucose chewable tablet 24 g PRN    HYDROcodone-acetaminophen  mg per tablet 1 tablet Q6H PRN    influenza 65up-adj (QUADRIVALENT ADJUVANTED PF) vaccine 0.5 mL vaccine x 1 dose    insulin aspart U-100 pen 0-10 Units QID (AC + HS) PRN    iohexoL (OMNIPAQUE 350) injection PRN    levoFLOXacin tablet 750 mg Every other day    melatonin tablet 6 mg Nightly PRN    memantine tablet 10 mg Daily    oxybutynin 24 hr tablet 10 mg Daily    pantoprazole EC tablet 40 mg Daily    sodium chloride 0.9% flush 10 mL PRN    tamsulosin 24 hr capsule 0.4 mg Daily     Family History       Problem Relation (Age of Onset)    COPD Sister    Cancer Father    Heart disease Mother    Stroke Mother          Tobacco Use    Smoking status: Every Day      Current packs/day: 1.00     Types: Cigarettes    Smokeless tobacco: Current     Types: Snuff   Substance and Sexual Activity    Alcohol use: Yes     Alcohol/week: 1.0 standard drink of alcohol     Types: 1 Shots of liquor per week     Comment: Daily    Drug use: Never    Sexual activity: Not Currently     Review of Systems   Constitutional: Negative.    HENT: Negative.     Respiratory: Negative.     Cardiovascular: Negative.    Gastrointestinal: Negative.    Genitourinary: Negative.    Musculoskeletal: Negative.    Skin: Negative.      Objective:     Vital Signs (Most Recent):  Temp: 97.7 °F (36.5 °C) (10/08/23 0826)  Pulse: 66 (10/08/23 0826)  Resp: 18 (10/08/23 0843)  BP: (!) 141/85 (10/08/23 0826)  SpO2: 98 % (10/08/23 0826) Vital Signs (24h Range):  Temp:  [97.6 °F (36.4 °C)-98.7 °F (37.1 °C)] 97.7 °F (36.5 °C)  Pulse:  [62-86] 66  Resp:  [12-19] 18  SpO2:  [97 %-98 %] 98 %  BP: (141-157)/(71-85) 141/85     Weight: 60.8 kg (134 lb) (10/03/23 0815)  Body mass index is 20.37 kg/m².  Body surface area is 1.71 meters squared.    I/O last 3 completed shifts:  In: 2027.5 [P.O.:240; I.V.:1787.5]  Out: -     Physical Exam  Constitutional:       Appearance: Normal appearance.   HENT:      Head: Normocephalic and atraumatic.   Eyes:      General: No scleral icterus.     Extraocular Movements: Extraocular movements intact.      Pupils: Pupils are equal, round, and reactive to light.   Cardiovascular:      Rate and Rhythm: Normal rate and regular rhythm.   Pulmonary:      Effort: Pulmonary effort is normal.      Breath sounds: No stridor.   Musculoskeletal:      Right lower leg: No edema.      Left lower leg: No edema.   Skin:     General: Skin is warm and dry.   Neurological:      General: No focal deficit present.      Mental Status: He is alert and oriented to person, place, and time.   Psychiatric:         Mood and Affect: Mood normal.         Behavior: Behavior normal.         Significant Labs:  BMP:   Recent Labs    Lab 10/08/23  0454   *      K 3.9      CO2 21*   BUN 14   CREATININE 2.7*   CALCIUM 8.5*   MG 1.6       CMP:   Recent Labs   Lab 10/02/23  1711 10/03/23  0348 10/08/23  0454      < > 130*   CALCIUM 9.4   < > 8.5*   ALBUMIN 2.9*  --   --    PROT 7.3  --   --       < > 141   K 3.6   < > 3.9   CO2 14*   < > 21*   *   < > 109   BUN 32*   < > 14   CREATININE 2.5*   < > 2.7*   ALKPHOS 208*  --   --    ALT 12  --   --    AST 22  --   --    BILITOT 0.1  --   --     < > = values in this interval not displayed.       All labs within the past 24 hours have been reviewed.    Significant Imaging:  Labs: Reviewed  Reviewed    Assessment/Plan:     Active Diagnoses:    Diagnosis Date Noted POA    PRINCIPAL PROBLEM:  Complete heart block [I44.2] 10/03/2023 Yes    Kidney stone [N20.0] 10/08/2023 Yes    Bradycardia [R00.1] 10/02/2023 Yes    Aspiration pneumonia [J69.0] 10/02/2023 Yes    COPD (chronic obstructive pulmonary disease) [J44.9] 10/02/2023 Yes    Mixed Alzheimer's and vascular dementia [G30.9, F01.50, F02.80] 12/20/2022 Yes    History of DVT of lower extremity [Z86.718] 03/21/2022 Not Applicable    Acute renal failure superimposed on stage 3 chronic kidney disease [N17.9, N18.30] 03/20/2022 Yes    Controlled type 2 diabetes mellitus with stage 3 chronic kidney disease, without long-term current use of insulin [E11.22, N18.30] 06/03/2021 Yes    Mixed hyperlipidemia [E78.2] 06/03/2021 Yes    History of CVA with residual deficit [I69.30] 05/15/2020 Not Applicable      Problems Resolved During this Admission:       Assessment and plan:    1. Acute kidney injury:  Creatinine has remained stable this morning at 2.7.  Imaging studies noted obstructing stones in his left ureter.  Urology has been consulted.  Plan for stent placement noted.  Appreciate Urology assistance with management.    Hopefully his creatinine will return to his usual baseline after the obstruction has resolved.    2. CKD  4: Baseline creatinine runs around 1.7.  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    Renal ultrasound showed right kidney 10.4 cm and left kidney 10.2 cm with increased echogenicity consistent with chronic medical renal disease.    3. Electrolytes: Potassium is stable at 3.9.    4. Acid base: Bicarbonate is slightly low at 21 consistent with MARI.  Will continue to monitor.    5. Volume:  He appears to be dry on exam.      Thank you for your consult.     Kurt Hopkins MD  Nephrology  [facility] - Telemetry ([facility])

## 2023-10-08 NOTE — PLAN OF CARE
Problem: Adult Inpatient Plan of Care  Goal: Readiness for Transition of Care  Outcome: Ongoing, Progressing     Problem: Diabetes Comorbidity  Goal: Blood Glucose Level Within Targeted Range  Outcome: Ongoing, Progressing     Problem: Renal Function Impairment (Acute Kidney Injury/Impairment)  Goal: Effective Renal Function  Outcome: Ongoing, Progressing     Problem: Cardiac Output Decreased  Goal: Effective Cardiac Output  Outcome: Ongoing, Progressing     Problem: Fall Injury Risk  Goal: Absence of Fall and Fall-Related Injury  Outcome: Ongoing, Progressing     Chart check complete. Orders reviewed.

## 2023-10-08 NOTE — ANESTHESIA PREPROCEDURE EVALUATION
10/08/2023  Samy Alejandre Jr. is a 73 y.o., male.      Pre-op Assessment    I have reviewed the Patient Summary Reports.     I have reviewed the Nursing Notes. I have reviewed the NPO Status.   I have reviewed the Medications.     Review of Systems  Anesthesia Hx:  No problems with previous Anesthesia  Neg history of prior surgery.   Cardiovascular:   Pacemaker Hypertension Dysrhythmias    Pulmonary:   Pneumonia COPD    Renal/:   Chronic Renal Disease    Endocrine:   Diabetes  Diabetes    Psych:   Psychiatric History          Physical Exam  General: Well nourished, Cooperative, Alert and Oriented    Airway:  Mallampati: II / II  Mouth Opening: Normal  TM Distance: Normal  Neck ROM: Normal ROM    Dental:  Intact    Chest/Lungs:  Clear to auscultation    Heart:  Rate: Normal        Anesthesia Plan  Type of Anesthesia, risks & benefits discussed:    Anesthesia Type: MAC, Gen ETT, Gen Supraglottic Airway  Intra-op Monitoring Plan: Standard ASA Monitors  Post Op Pain Control Plan: multimodal analgesia  Induction:  IV  Airway Plan: Direct  ASA Score: 3  Day of Surgery Review of History & Physical: H&P Update referred to the surgeon/provider.I have interviewed and examined the patient. I have reviewed the patient's H&P dated: There are no significant changes. H&P completed by Anesthesiologist.    Ready For Surgery From Anesthesia Perspective.     .

## 2023-10-08 NOTE — SUBJECTIVE & OBJECTIVE
Interval History:     Review of Systems   Constitutional:  Positive for activity change and fatigue.   HENT: Negative.     Eyes: Negative.    Respiratory: Negative.     Cardiovascular: Negative.    Gastrointestinal: Negative.    Endocrine: Negative.    Genitourinary: Negative.    Musculoskeletal:  Positive for back pain.   Allergic/Immunologic: Negative.    Neurological: Negative.    Hematological: Negative.    Psychiatric/Behavioral: Negative.       Objective:     Vital Signs (Most Recent):  Temp: 98.5 °F (36.9 °C) (10/08/23 0452)  Pulse: 71 (10/08/23 0452)  Resp: 18 (10/08/23 0452)  BP: (!) 145/79 (10/08/23 0452)  SpO2: 98 % (10/08/23 0452) Vital Signs (24h Range):  Temp:  [97.6 °F (36.4 °C)-98.7 °F (37.1 °C)] 98.5 °F (36.9 °C)  Pulse:  [62-86] 71  Resp:  [12-19] 18  SpO2:  [97 %-98 %] 98 %  BP: (143-157)/(71-79) 145/79     Weight: 60.8 kg (134 lb)  Body mass index is 20.37 kg/m².    Intake/Output Summary (Last 24 hours) at 10/8/2023 0740  Last data filed at 10/8/2023 0448  Gross per 24 hour   Intake 2027.5 ml   Output --   Net 2027.5 ml         Physical Exam  Vitals reviewed.   Constitutional:       Appearance: Normal appearance.   HENT:      Head: Normocephalic and atraumatic.      Nose: Nose normal.      Mouth/Throat:      Mouth: Mucous membranes are moist.   Eyes:      Extraocular Movements: Extraocular movements intact.      Pupils: Pupils are equal, round, and reactive to light.   Cardiovascular:      Rate and Rhythm: Normal rate and regular rhythm.   Abdominal:      General: Abdomen is flat. Bowel sounds are normal. There is no distension.   Musculoskeletal:         General: Normal range of motion.      Cervical back: Normal range of motion.   Skin:     General: Skin is warm.      Capillary Refill: Capillary refill takes less than 2 seconds.   Neurological:      General: No focal deficit present.      Mental Status: He is alert and oriented to person, place, and time. Mental status is at baseline.              Significant Labs: All pertinent labs within the past 24 hours have been reviewed.  Recent Lab Results  (Last 5 results in the past 24 hours)        10/08/23  0528   10/08/23  0454   10/07/23  2237   10/07/23  2220   10/07/23  1631        Anion Gap   11       12       Appearance, UA     Clear           Baso #   0.07             Basophil %   1.0             Bilirubin (UA)     Negative           BUN   14       16       Calcium   8.5       8.7       Chloride   109       108       CO2   21       19       Color, UA     Colorless           Creatinine   2.7       2.8       Creatinine, Urine     52.7           Differential Method   Automated             eGFR   24       23       Eos #   0.4             Eosinophil %   5.8             Glucose   130       112       Glucose, UA     Negative           Gran # (ANC)   3.3             Gran %   48.7             Hematocrit   26.5             Hemoglobin   8.2             Immature Grans (Abs)   0.03  Comment: Mild elevation in immature granulocytes is non specific and   can be seen in a variety of conditions including stress response,   acute inflammation, trauma and pregnancy. Correlation with other   laboratory and clinical findings is essential.               Immature Granulocytes   0.4             Ketones, UA     Negative           Leukocytes, UA     Negative           Lymph #   2.3             Lymph %   33.7             Magnesium    1.6             MCH   29.0             MCHC   30.9             MCV   94             Mono #   0.7             Mono %   10.4             MPV   9.9             NITRITE UA     Negative           nRBC   0             Occult Blood UA     Negative           pH, UA     6.0           Platelet Count   267             POCT Glucose 96       90         Potassium   3.9       3.9       Protein, UA     Negative  Comment: Recommend a 24 hour urine protein or a urine   protein/creatinine ratio if globulin induced proteinuria is  clinically suspected.             RBC    2.83             RDW   16.8             Sodium   141       139       Sodium, Urine     87  Comment: The random urine reference ranges provided were established   for 24 hour urine collections.  No reference ranges exist for  random urine specimens.  Correlate clinically.             Specific [place], UA     1.010           Specimen UA     Urine, Clean Catch           UROBILINOGEN UA     Negative           WBC   6.76                                    Significant Imaging: I have reviewed all pertinent imaging results/findings within the past 24 hours.

## 2023-10-08 NOTE — ASSESSMENT & PLAN NOTE
Patient with acute kidney injury/acute renal failure likely due to pre-renal azotemia due to IVVD MARI is currently improving. Baseline creatinine 1.7 - Labs reviewed- Renal function/electrolytes with Estimated Creatinine Clearance: 21.6 mL/min (A) (based on SCr of 2.7 mg/dL (H)). according to latest data. Monitor urine output and serial BMP and adjust therapy as needed. Avoid nephrotoxins and renally dose meds for GFR listed above.    -retroperitoneal us :Mild fullness of the renal pelvis noted without estefania hydronephrosis  -Kidney function trending up  -Nephrology consulted  -CT abd wo: sided hydronephrosis. Bilateral subcentimeter nonobstructing renal calculi. Obstructing proximal left ureteral calculi measuring up to 5 mm superiorly and up to 3 mm inferiorly.  No right-sided hydronephrosis  -Urology consulted

## 2023-10-08 NOTE — ANESTHESIA PROCEDURE NOTES
Intubation    Date/Time: 10/8/2023 9:56 AM    Performed by: Jamie Quinones CRNA  Authorized by: Aparna Donnelly MD    Intubation:     Induction:  Intravenous    Mask Ventilation:  Easy mask    Attempts:  1    Attempted By:  CRNA    Difficult Airway Encountered?: No      Complications:  None    Airway Device:  Supraglottic airway/LMA    Airway Device Size:  4.0    Style/Cuff Inflation:  Cuffed (inflated to minimal occlusive pressure)    Secured at:  The lips    Placement Verified By:  Capnometry    Complicating Factors:  None

## 2023-10-08 NOTE — CONSULTS
Chief Complaint:  Left ureteral stone    HPI:   Samy Alejandre Jr. is a 73 y.o. male with past medical history COPD, chronic bronchitis, diabetes, and complete heart block status post recent pacemaker placement.  Patient has been convalescing in the hospital after his procedure and was noted to have decreasing renal function.  CT scan was obtained which showed a three and 5 mm left proximal ureteral stone.  Patient does have a history of kidney stones, has undergone a procedure in the past but was many years ago.  Has not seen a urologist in quite some time.  He voids with a good stream and feels like he empties well.  He is very uncomfortable appearing.  Denies gross hematuria.  Denies fevers.  Urinalysis negative for infection      PMH:  Past Medical History:   Diagnosis Date    Anxiety     Depression     Diabetes mellitus, type 2     Hypertension     Stroke        PSH:  Past Surgical History:   Procedure Laterality Date    A-V CARDIAC PACEMAKER INSERTION Left 10/4/2023    Procedure: INSERTION, CARDIAC PACEMAKER, DUAL CHAMBER;  Surgeon: Delonte Lindsey MD;  Location: [facility] CATH LAB;  Service: Cardiology;  Laterality: Left;  biotronik    ANGIOGRAPHY OF LOWER EXTREMITY N/A 12/21/2020    Procedure: ANGIOGRAM, LOWER EXTREMITY/Rt leg poss pta/stent;  Surgeon: Todd Michel MD;  Location: [facility] CATH LAB;  Service: Peripheral Vascular;  Laterality: N/A;  rescheduled 12/8    BACK SURGERY  2015    BRONCHOSCOPY Left 3/24/2022    Procedure: Bronchoscopy;  Surgeon: Edward Williamson MD;  Location: [facility];  Service: Pulmonary;  Laterality: Left;  poss endobronchial biopsy or brushing    CATARACT EXTRACTION      ESOPHAGOGASTRODUODENOSCOPY N/A 8/24/2022    Procedure: EGD (ESOPHAGOGASTRODUODENOSCOPY);  Surgeon: Ana Gomez MD;  Location: [facility];  Service: Endoscopy;  Laterality: N/A;    ESOPHAGOGASTRODUODENOSCOPY N/A 9/14/2022    Procedure: EGD (ESOPHAGOGASTRODUODENOSCOPY);  Surgeon: Ana Gomez MD;   Location: [facility];  Service: Endoscopy;  Laterality: N/A;    KNEE SURGERY  2012    LITHOTRIPSY  2000       Family History:  Family History   Problem Relation Age of Onset    Heart disease Mother     Stroke Mother     Cancer Father     COPD Sister        Social History:  Social History     Tobacco Use    Smoking status: Every Day     Current packs/day: 1.00     Types: Cigarettes    Smokeless tobacco: Current     Types: Snuff   Substance Use Topics    Alcohol use: Yes     Alcohol/week: 1.0 standard drink of alcohol     Types: 1 Shots of liquor per week     Comment: Daily    Drug use: Never        Review of Systems:  General: No fever, chills  Skin: No rashes  Chest:  Denies cough and sputum production  Heart: Denies chest pain  Resp: Denies dyspnea  Abdomen: Denies diarrhea, abdominal pain, hematemesis, or blood in stool.  Musculoskeletal: No joint stiffness or swelling. Denies back pain.  : see HPI  Neuro: no dizziness or weakness    Allergies:  Chantix [varenicline]    Medications:    Current Facility-Administered Medications:     acetaminophen tablet 650 mg, 650 mg, Oral, Q6H PRN, Harry Dalal NP, 650 mg at 10/07/23 0607    albuterol-ipratropium 2.5 mg-0.5 mg/3 mL nebulizer solution 3 mL, 3 mL, Nebulization, Q6H PRN, Harry Dalal NP    [START ON 10/9/2023] apixaban tablet 5 mg, 5 mg, Oral, Daily, Pumarol Binh Boykin MD    atorvastatin tablet 80 mg, 80 mg, Oral, Daily, Harry Dalal NP, 80 mg at 10/08/23 0843    dextrose 10% bolus 125 mL 125 mL, 12.5 g, Intravenous, PRN, Harry Dalal NP    dextrose 10% bolus 250 mL 250 mL, 25 g, Intravenous, PRN, Harry Dalal NP    doxycycline tablet 100 mg, 100 mg, Oral, Q12H, Delonte Lindsey MD, 100 mg at 10/08/23 0844    FLUoxetine capsule 20 mg, 20 mg, Oral, Daily, Harry Dalal NP, 20 mg at 10/08/23 0843    glucagon (human recombinant) injection 1 mg, 1 mg, Intramuscular, PRN, Harry Dalal NP    glucose chewable tablet  16 g, 16 g, Oral, PRN, Harry Daall NP    glucose chewable tablet 24 g, 24 g, Oral, PRN, Harry Dalal NP    HYDROcodone-acetaminophen  mg per tablet 1 tablet, 1 tablet, Oral, Q6H PRN, Binh Harris MD, 1 tablet at 10/08/23 0843    influenza 65up-adj (QUADRIVALENT ADJUVANTED PF) vaccine 0.5 mL, 0.5 mL, Intramuscular, vaccine x 1 dose, Shanon Bridges MD    insulin aspart U-100 pen 0-10 Units, 0-10 Units, Subcutaneous, QID (AC + HS) PRN, Harry Dalal NP, 1 Units at 10/05/23 2028    levoFLOXacin tablet 750 mg, 750 mg, Oral, Every other day, Binh Harris MD, 750 mg at 10/08/23 0843    melatonin tablet 6 mg, 6 mg, Oral, Nightly PRN, Bouchra Aragon ACNP-BC, 6 mg at 10/05/23 2029    memantine tablet 10 mg, 10 mg, Oral, Daily, Harry Dalal NP, 10 mg at 10/08/23 0843    oxybutynin 24 hr tablet 10 mg, 10 mg, Oral, Daily, Harry Dalal NP, 10 mg at 10/08/23 0844    pantoprazole EC tablet 40 mg, 40 mg, Oral, Daily, Harry Dalal NP, 40 mg at 10/08/23 0844    sodium chloride 0.9% flush 10 mL, 10 mL, Intravenous, PRN, Harry Dalal NP    tamsulosin 24 hr capsule 0.4 mg, 0.4 mg, Oral, Daily, Binh Harris MD, 0.4 mg at 10/08/23 0843    Physical Exam:  Vitals:    10/08/23 0843   BP:    Pulse:    Resp: 18   Temp:      Body mass index is 20.37 kg/m².  General: awake, alert, cooperative, very uncomfortable appearing  Head: NC/AT  Ears: external ears normal  Eyes: sclera normal  Lungs: normal inspiration, NAD  Heart: well-perfused  Skin: The skin is warm and dry  Ext: No c/c/e.  Neuro: grossly intact, AOx3    RADIOLOGY:  CT ABDOMEN PELVIS WITHOUT CONTRAST 10/07/2023     CLINICAL HISTORY:  abdnormal retroperitonel US;     TECHNIQUE:  Low dose axial images, sagittal and coronal reformations were obtained from the lung bases to the pubic symphysis, .     COMPARISON:  None     FINDINGS:  Heart: Extensive atherosclerotic changes.  Mitral annular  calcification.  Is     Lung Bases: Linear and reticulonodular opacities in the lung bases may relate to chronic endobronchial infectious process.     Liver: Normal in size and attenuation, with no focal hepatic lesions.     Gallbladder: No calcified gallstones.     Bile Ducts: No evidence of dilated ducts.     Pancreas: No mass or peripancreatic fat stranding.     Spleen: Unremarkable.     Adrenals: Unremarkable.     Kidneys/ Ureters: Left-sided hydronephrosis.  Bilateral subcentimeter nonobstructing renal calculi.  Obstructing proximal left ureteral calculi measuring up to 5 mm superiorly and up to 3 mm inferiorly.  No right-sided hydronephrosis.     Bladder: Mild wall thickening.     Reproductive organs: Prostate gland is enlarged.     GI Tract/Mesentery: No evidence of bowel obstruction or inflammation. No secondary signs of appendicitis.  Colonic diverticulosis.  Hiatal hernia.     Peritoneal Space: No ascites. No free air.     Retroperitoneum: No significant adenopathy.     Abdominal wall: Foci of gas in the left hemiabdomen suggestive subcutaneous injection.  Small fat containing umbilical hernia.     Vasculature: No aneurysm.  Atherosclerotic changes.     Bones: No acute fracture.     Impression:     -sided hydronephrosis. Bilateral subcentimeter nonobstructing renal calculi. Obstructing proximal left ureteral calculi measuring up to 5 mm superiorly and up to 3 mm inferiorly.  No right-sided hydronephrosis.     Colonic diverticulosis     Atherosclerotic changes     Linear and reticulonodular opacity in the lung bases may relate to chronic endobronchial infectious process.  Correlate clinically    LABS:  I personally reviewed the following lab values:  Lab Results   Component Value Date    WBC 6.76 10/08/2023    HGB 8.2 (L) 10/08/2023    HCT 26.5 (L) 10/08/2023     10/08/2023     10/08/2023    K 3.9 10/08/2023     10/08/2023    CREATININE 2.7 (H) 10/08/2023    BUN 14 10/08/2023    CO2 21 (L)  10/08/2023    TSH 1.963 10/02/2023    PSA 1.0 01/03/2022    INR 1.2 10/02/2023    HGBA1C 6.2 (H) 08/04/2023    CHOL 135 08/04/2023    TRIG 172 (H) 08/04/2023    HDL 33 (L) 08/04/2023    ALT 12 10/02/2023    AST 22 10/02/2023       URINALYSIS:  Urinalysis negative for infection    Assessment/Plan:   Samy Alejandre Jr. is a 73 y.o. male with:    Left ureteral stone with MARI - to OR for cystoscopy and left ureteral stent placement, we will wait until patient convalesces from his pacemaker placement and treatment for his pneumonia to schedule definitive stone surgery      Thank you for allowing me the opportunity to participate in this patient's care.     Jamie Martinez MD  Urology

## 2023-10-08 NOTE — NURSING TRANSFER
Nursing Transfer Note      10/8/2023   11:00 AM    Nurse giving handoff:Yvonne ALMARAZ RN  Nurse receiving handoff:TIERA Stevens    Reason patient is being transferred: s/p cysto    Transfer To: Telemetry    Transfer via bed    Transfer with cardiac monitoring    Transported by TIERA Russell    Transfer Vital Signs:  Blood Pressure:140/67  Heart Rate:67  O2:95  Temperature:97.5  Respirations:16      Order for Tele Monitor? Yes    4eyes on Skin: yes    Medicines sent: n/a      Any special needs or follow-up needed: n/a    Patient belongings transferred with patient:  none    Chart send with patient: Yes    Notified: daughter    Patient reassessed at: 1100   Upon arrival to floor: cardiac monitor applied, patient oriented to room, call bell in reach, and bed in lowest position

## 2023-10-08 NOTE — ASSESSMENT & PLAN NOTE
Okay to undergo his ureteral stent/urology procedure.  Under general anesthesia.    Reviewed monitor events.    Denies any chest pain.  No dyspnea no fluid overload.    No contraindication cardiac standpoint.    Okay to proceed with his urology procedure and general anesthesia.  Moderate risk patient.

## 2023-10-08 NOTE — ANESTHESIA POSTPROCEDURE EVALUATION
Anesthesia Post Evaluation    Patient: Samy Alejandre Jr.    Procedure(s) Performed: Procedure(s) (LRB):  CYSTOSCOPY, WITH RETROGRADE PYELOGRAM AND URETERAL STENT INSERTION (Left)    Final Anesthesia Type: general      Patient location during evaluation: PACU  Patient participation: Yes- Able to Participate  Level of consciousness: awake and alert  Post-procedure vital signs: reviewed and stable  Pain management: adequate  Airway patency: patent    PONV status at discharge: No PONV  Anesthetic complications: no      Cardiovascular status: blood pressure returned to baseline  Respiratory status: unassisted  Hydration status: euvolemic  Follow-up not needed.          Vitals Value Taken Time   /77 10/08/23 1029   Temp 98..1 10/08/23 1032   Pulse 65 10/08/23 1031   Resp 9 10/08/23 1031   SpO2 100 % 10/08/23 1031   Vitals shown include unvalidated device data.      No case tracking events are documented in the log.      Pain/Dwayne Score: Pain Rating Prior to Med Admin: 8 (10/8/2023  8:43 AM)  Pain Rating Post Med Admin: 0 (10/8/2023 12:33 AM)

## 2023-10-08 NOTE — SUBJECTIVE & OBJECTIVE
Interval History:     Review of Systems   Cardiovascular:  Negative for chest pain and dyspnea on exertion.     Objective:     Vital Signs (Most Recent):  Temp: 97.7 °F (36.5 °C) (10/08/23 0826)  Pulse: 66 (10/08/23 0826)  Resp: 18 (10/08/23 0843)  BP: (!) 141/85 (10/08/23 0826)  SpO2: 98 % (10/08/23 0826) Vital Signs (24h Range):  Temp:  [97.6 °F (36.4 °C)-98.7 °F (37.1 °C)] 97.7 °F (36.5 °C)  Pulse:  [62-86] 66  Resp:  [12-19] 18  SpO2:  [97 %-98 %] 98 %  BP: (141-157)/(71-85) 141/85     Weight: 60.8 kg (134 lb)  Body mass index is 20.37 kg/m².     SpO2: 98 %         Intake/Output Summary (Last 24 hours) at 10/8/2023 1025  Last data filed at 10/8/2023 1015  Gross per 24 hour   Intake 2507.5 ml   Output --   Net 2507.5 ml       Lines/Drains/Airways       Peripheral Intravenous Line  Duration                  Peripheral IV - Single Lumen 10/05/23 0539 20 G Anterior;Right Forearm 3 days                       Physical Exam  Vitals reviewed.   Constitutional:       Appearance: He is well-developed.   Neck:      Vascular: No carotid bruit.   Cardiovascular:      Rate and Rhythm: Normal rate and regular rhythm.      Pulses: Intact distal pulses.      Heart sounds: Normal heart sounds. No murmur heard.  Pulmonary:      Breath sounds: Normal breath sounds.   Neurological:      Mental Status: He is oriented to person, place, and time.            Significant Labs: All pertinent lab results from the last 24 hours have been reviewed. and   Recent Lab Results  (Last 5 results in the past 24 hours)        10/08/23  0528   10/08/23  0454   10/07/23  2237   10/07/23  2220   10/07/23  1631        Anion Gap   11       12       Appearance, UA     Clear           Baso #   0.07             Basophil %   1.0             Bilirubin (UA)     Negative           BUN   14       16       Calcium   8.5       8.7       Chloride   109       108       CO2   21       19       Color, UA     Colorless           Creatinine   2.7       2.8        Creatinine, Urine     52.7           Differential Method   Automated             eGFR   24       23       Eos #   0.4             Eosinophil %   5.8             Glucose   130       112       Glucose, UA     Negative           Gran # (ANC)   3.3             Gran %   48.7             Hematocrit   26.5             Hemoglobin   8.2             Immature Grans (Abs)   0.03  Comment: Mild elevation in immature granulocytes is non specific and   can be seen in a variety of conditions including stress response,   acute inflammation, trauma and pregnancy. Correlation with other   laboratory and clinical findings is essential.               Immature Granulocytes   0.4             Ketones, UA     Negative           Leukocytes, UA     Negative           Lymph #   2.3             Lymph %   33.7             Magnesium    1.6             MCH   29.0             MCHC   30.9             MCV   94             Mono #   0.7             Mono %   10.4             MPV   9.9             NITRITE UA     Negative           nRBC   0             Occult Blood UA     Negative           pH, UA     6.0           Platelet Count   267             POCT Glucose 96       90         Potassium   3.9       3.9       Protein, UA     Negative  Comment: Recommend a 24 hour urine protein or a urine   protein/creatinine ratio if globulin induced proteinuria is  clinically suspected.             RBC   2.83             RDW   16.8             Sodium   141       139       Sodium, Urine     87  Comment: The random urine reference ranges provided were established   for 24 hour urine collections.  No reference ranges exist for  random urine specimens.  Correlate clinically.             Specific [place], UA     1.010           Specimen UA     Urine, Clean Catch           UROBILINOGEN UA     Negative           WBC   6.76

## 2023-10-09 ENCOUNTER — CLINICAL SUPPORT (OUTPATIENT)
Dept: SMOKING CESSATION | Facility: CLINIC | Age: 73
End: 2023-10-09
Payer: COMMERCIAL

## 2023-10-09 VITALS
HEART RATE: 68 BPM | DIASTOLIC BLOOD PRESSURE: 72 MMHG | WEIGHT: 134 LBS | BODY MASS INDEX: 20.31 KG/M2 | OXYGEN SATURATION: 97 % | TEMPERATURE: 98 F | HEIGHT: 68 IN | RESPIRATION RATE: 18 BRPM | SYSTOLIC BLOOD PRESSURE: 136 MMHG

## 2023-10-09 DIAGNOSIS — F17.200 NICOTINE DEPENDENCE: Primary | ICD-10-CM

## 2023-10-09 LAB
ANION GAP SERPL CALC-SCNC: 11 MMOL/L (ref 8–16)
BACTERIA UR CULT: NO GROWTH
BASOPHILS # BLD AUTO: 0.06 K/UL (ref 0–0.2)
BASOPHILS NFR BLD: 0.8 % (ref 0–1.9)
BUN SERPL-MCNC: 15 MG/DL (ref 8–23)
CALCIUM SERPL-MCNC: 9.3 MG/DL (ref 8.7–10.5)
CHLORIDE SERPL-SCNC: 111 MMOL/L (ref 95–110)
CO2 SERPL-SCNC: 22 MMOL/L (ref 23–29)
CREAT SERPL-MCNC: 2 MG/DL (ref 0.5–1.4)
DIFFERENTIAL METHOD: ABNORMAL
EOSINOPHIL # BLD AUTO: 0.3 K/UL (ref 0–0.5)
EOSINOPHIL NFR BLD: 4.2 % (ref 0–8)
ERYTHROCYTE [DISTWIDTH] IN BLOOD BY AUTOMATED COUNT: 17.3 % (ref 11.5–14.5)
EST. GFR  (NO RACE VARIABLE): 35 ML/MIN/1.73 M^2
GLUCOSE SERPL-MCNC: 131 MG/DL (ref 70–110)
HCT VFR BLD AUTO: 28.6 % (ref 40–54)
HGB BLD-MCNC: 9 G/DL (ref 14–18)
IMM GRANULOCYTES # BLD AUTO: 0.05 K/UL (ref 0–0.04)
IMM GRANULOCYTES NFR BLD AUTO: 0.7 % (ref 0–0.5)
LYMPHOCYTES # BLD AUTO: 2.3 K/UL (ref 1–4.8)
LYMPHOCYTES NFR BLD: 30.5 % (ref 18–48)
MAGNESIUM SERPL-MCNC: 1.5 MG/DL (ref 1.6–2.6)
MCH RBC QN AUTO: 29.7 PG (ref 27–31)
MCHC RBC AUTO-ENTMCNC: 31.5 G/DL (ref 32–36)
MCV RBC AUTO: 94 FL (ref 82–98)
MONOCYTES # BLD AUTO: 0.7 K/UL (ref 0.3–1)
MONOCYTES NFR BLD: 9.3 % (ref 4–15)
NEUTROPHILS # BLD AUTO: 4.2 K/UL (ref 1.8–7.7)
NEUTROPHILS NFR BLD: 54.5 % (ref 38–73)
NRBC BLD-RTO: 0 /100 WBC
PLATELET # BLD AUTO: 287 K/UL (ref 150–450)
PMV BLD AUTO: 10.1 FL (ref 9.2–12.9)
POCT GLUCOSE: 127 MG/DL (ref 70–110)
POCT GLUCOSE: 91 MG/DL (ref 70–110)
POTASSIUM SERPL-SCNC: 3.8 MMOL/L (ref 3.5–5.1)
RBC # BLD AUTO: 3.03 M/UL (ref 4.6–6.2)
SODIUM SERPL-SCNC: 144 MMOL/L (ref 136–145)
WBC # BLD AUTO: 7.64 K/UL (ref 3.9–12.7)

## 2023-10-09 PROCEDURE — 99406 BEHAV CHNG SMOKING 3-10 MIN: CPT | Mod: S$GLB,,,

## 2023-10-09 PROCEDURE — 85025 COMPLETE CBC W/AUTO DIFF WBC: CPT | Mod: HCNC | Performed by: NURSE PRACTITIONER

## 2023-10-09 PROCEDURE — 80048 BASIC METABOLIC PNL TOTAL CA: CPT | Mod: HCNC | Performed by: NURSE PRACTITIONER

## 2023-10-09 PROCEDURE — 83735 ASSAY OF MAGNESIUM: CPT | Mod: HCNC | Performed by: NURSE PRACTITIONER

## 2023-10-09 PROCEDURE — 25000003 PHARM REV CODE 250: Mod: HCNC | Performed by: NURSE PRACTITIONER

## 2023-10-09 PROCEDURE — 99232 SBSQ HOSP IP/OBS MODERATE 35: CPT | Mod: HCNC,,, | Performed by: INTERNAL MEDICINE

## 2023-10-09 PROCEDURE — 36415 COLL VENOUS BLD VENIPUNCTURE: CPT | Mod: HCNC | Performed by: NURSE PRACTITIONER

## 2023-10-09 PROCEDURE — 99232 PR SUBSEQUENT HOSPITAL CARE,LEVL II: ICD-10-PCS | Mod: HCNC,,, | Performed by: INTERNAL MEDICINE

## 2023-10-09 PROCEDURE — 99406 PT REFUSED TOBACCO CESSATION: ICD-10-PCS | Mod: S$GLB,,,

## 2023-10-09 PROCEDURE — 25000003 PHARM REV CODE 250: Mod: HCNC | Performed by: INTERNAL MEDICINE

## 2023-10-09 RX ORDER — TAMSULOSIN HYDROCHLORIDE 0.4 MG/1
0.4 CAPSULE ORAL NIGHTLY
Qty: 30 CAPSULE | Refills: 3 | Status: ON HOLD | OUTPATIENT
Start: 2023-10-09 | End: 2023-12-18

## 2023-10-09 RX ORDER — LEVOFLOXACIN 750 MG/1
750 TABLET ORAL EVERY OTHER DAY
Qty: 4 TABLET | Refills: 0 | Status: SHIPPED | OUTPATIENT
Start: 2023-10-10 | End: 2023-10-18

## 2023-10-09 RX ADMIN — HYDROCODONE BITARTRATE AND ACETAMINOPHEN 1 TABLET: 10; 325 TABLET ORAL at 05:10

## 2023-10-09 RX ADMIN — OXYBUTYNIN CHLORIDE 10 MG: 5 TABLET, EXTENDED RELEASE ORAL at 08:10

## 2023-10-09 RX ADMIN — PANTOPRAZOLE SODIUM 40 MG: 40 TABLET, DELAYED RELEASE ORAL at 08:10

## 2023-10-09 RX ADMIN — ATORVASTATIN CALCIUM 80 MG: 40 TABLET, FILM COATED ORAL at 08:10

## 2023-10-09 RX ADMIN — APIXABAN 5 MG: 2.5 TABLET, FILM COATED ORAL at 08:10

## 2023-10-09 RX ADMIN — FLUOXETINE 20 MG: 20 CAPSULE ORAL at 08:10

## 2023-10-09 RX ADMIN — TAMSULOSIN HYDROCHLORIDE 0.4 MG: 0.4 CAPSULE ORAL at 08:10

## 2023-10-09 RX ADMIN — HYDROCODONE BITARTRATE AND ACETAMINOPHEN 1 TABLET: 10; 325 TABLET ORAL at 11:10

## 2023-10-09 RX ADMIN — MEMANTINE 10 MG: 10 TABLET ORAL at 08:10

## 2023-10-09 NOTE — ASSESSMENT & PLAN NOTE
Patient with acute kidney injury/acute renal failure likely due to pre-renal azotemia due to IVVD MARI is currently improving. Baseline creatinine 1.7 - Labs reviewed- Renal function/electrolytes with Estimated Creatinine Clearance: 29.1 mL/min (A) (based on SCr of 2 mg/dL (H)). according to latest data. Monitor urine output and serial BMP and adjust therapy as needed. Avoid nephrotoxins and renally dose meds for GFR listed above.    -retroperitoneal us :Mild fullness of the renal pelvis noted without estefania hydronephrosis  -Kidney function trending up  -Nephrology consulted  -CT abd wo: sided hydronephrosis. Bilateral subcentimeter nonobstructing renal calculi. Obstructing proximal left ureteral calculi measuring up to 5 mm superiorly and up to 3 mm inferiorly.  No right-sided hydronephrosis  -Urology consulted

## 2023-10-09 NOTE — CONSULTS
[facility] - Telemetry ([facility])  Nephrology  Consult Note    Patient Name: Samy Alejandre Jr.  MRN: [id number]  Admission Date: 10/2/2023  Hospital Length of Stay: 7 days  Attending Provider: Binh Harris,*   Primary Care Physician: Damien Barron MD  Principal Problem:Complete heart block    Consults  Subjective:     HPI:  73-year-old male admitted with complete heart block.  Noted to have an acute rise in creatinine over the past several days.  Nephrology has been consulted for evaluation.  Patient was seen in his hospital room.  In bed resting comfortably.  No acute distress noted.  It was a family member at the bedside.  All nephrology related questions were answered to their satisfaction.    Patient was noted to have acute kidney injury on admission with a creatinine of 2.5.  It improved with IV hydration down to 1.9 but has started to increase again the past couple days.  Baseline creatinine typically runs around 1.7.    10/08/2023:  Patient was seen in his hospital room.  Sitting in a chair at the bedside.  Appears uncomfortable.  Relates that he is having quite a bit of flank pain.  Imaging studies shows an obstructing stone in his left ureter.  Plan for cysto and stent placement noted.    10/09/2023:  Patient was seen in his hospital room.  Sitting on the side of the bed resting comfortably.  His wife was at the bedside.  He underwent ureteral stent placement yesterday for obstructing renal calculi.  Reports that his left-sided flank pain has improved.    Past Medical History:   Diagnosis Date    Anxiety     Depression     Diabetes mellitus, type 2     Hypertension     Stroke        Past Surgical History:   Procedure Laterality Date    A-V CARDIAC PACEMAKER INSERTION Left 10/4/2023    Procedure: INSERTION, CARDIAC PACEMAKER, DUAL CHAMBER;  Surgeon: Delonte Lindsey MD;  Location: [facility] CATH LAB;  Service: Cardiology;  Laterality: Left;  biotronik    ANGIOGRAPHY OF LOWER EXTREMITY N/A 12/21/2020     Procedure: ANGIOGRAM, LOWER EXTREMITY/Rt leg poss pta/stent;  Surgeon: Todd Michel MD;  Location: [facility] CATH LAB;  Service: Peripheral Vascular;  Laterality: N/A;  rescheduled 12/8    BACK SURGERY  2015    BRONCHOSCOPY Left 3/24/2022    Procedure: Bronchoscopy;  Surgeon: Edward Williamson MD;  Location: [facility] ENDO;  Service: Pulmonary;  Laterality: Left;  poss endobronchial biopsy or brushing    CATARACT EXTRACTION      ESOPHAGOGASTRODUODENOSCOPY N/A 8/24/2022    Procedure: EGD (ESOPHAGOGASTRODUODENOSCOPY);  Surgeon: Ana Gomez MD;  Location: [facility] ENDO;  Service: Endoscopy;  Laterality: N/A;    ESOPHAGOGASTRODUODENOSCOPY N/A 9/14/2022    Procedure: EGD (ESOPHAGOGASTRODUODENOSCOPY);  Surgeon: Ana Gomez MD;  Location: [facility] ENDO;  Service: Endoscopy;  Laterality: N/A;    KNEE SURGERY  2012    LITHOTRIPSY  2000       Review of patient's allergies indicates:   Allergen Reactions    Chantix [varenicline] Other (See Comments)     Felt bad     Current Facility-Administered Medications   Medication Frequency    acetaminophen tablet 650 mg Q6H PRN    albuterol-ipratropium 2.5 mg-0.5 mg/3 mL nebulizer solution 3 mL Q6H PRN    apixaban tablet 5 mg Daily    atorvastatin tablet 80 mg Daily    dextrose 10% bolus 125 mL 125 mL PRN    dextrose 10% bolus 250 mL 250 mL PRN    FLUoxetine capsule 20 mg Daily    glucagon (human recombinant) injection 1 mg PRN    glucose chewable tablet 16 g PRN    glucose chewable tablet 24 g PRN    HYDROcodone-acetaminophen  mg per tablet 1 tablet Q6H PRN    influenza 65up-adj (QUADRIVALENT ADJUVANTED PF) vaccine 0.5 mL vaccine x 1 dose    insulin aspart U-100 pen 0-10 Units QID (AC + HS) PRN    levoFLOXacin tablet 750 mg Every other day    melatonin tablet 6 mg Nightly PRN    memantine tablet 10 mg Daily    oxybutynin 24 hr tablet 10 mg Daily    pantoprazole EC tablet 40 mg Daily    sodium chloride 0.9% flush 10 mL PRN    tamsulosin 24 hr capsule 0.4 mg Daily     Family History        Problem Relation (Age of Onset)    COPD Sister    Cancer Father    Heart disease Mother    Stroke Mother          Tobacco Use    Smoking status: Every Day     Current packs/day: 1.00     Types: Cigarettes    Smokeless tobacco: Current     Types: Snuff   Substance and Sexual Activity    Alcohol use: Yes     Alcohol/week: 1.0 standard drink of alcohol     Types: 1 Shots of liquor per week     Comment: Daily    Drug use: Never    Sexual activity: Not Currently     Review of Systems   Constitutional: Negative.    HENT: Negative.     Respiratory: Negative.     Cardiovascular: Negative.    Gastrointestinal: Negative.    Genitourinary: Negative.    Musculoskeletal: Negative.    Skin: Negative.      Objective:     Vital Signs (Most Recent):  Temp: 98.2 °F (36.8 °C) (10/09/23 0718)  Pulse: 68 (10/09/23 0718)  Resp: 18 (10/09/23 0718)  BP: 136/72 (10/09/23 0718)  SpO2: 97 % (10/09/23 0718) Vital Signs (24h Range):  Temp:  [97.5 °F (36.4 °C)-98.7 °F (37.1 °C)] 98.2 °F (36.8 °C)  Pulse:  [65-99] 68  Resp:  [10-20] 18  SpO2:  [95 %-100 %] 97 %  BP: (124-154)/(62-77) 136/72     Weight: 60.8 kg (134 lb) (10/03/23 0815)  Body mass index is 20.37 kg/m².  Body surface area is 1.71 meters squared.    I/O last 3 completed shifts:  In: 2487.5 [I.V.:1787.5; IV Piggyback:700]  Out: -     Physical Exam  Constitutional:       Appearance: Normal appearance.   HENT:      Head: Normocephalic and atraumatic.   Eyes:      General: No scleral icterus.     Extraocular Movements: Extraocular movements intact.      Pupils: Pupils are equal, round, and reactive to light.   Cardiovascular:      Rate and Rhythm: Normal rate and regular rhythm.   Pulmonary:      Effort: Pulmonary effort is normal.      Breath sounds: No stridor.   Musculoskeletal:      Right lower leg: No edema.      Left lower leg: No edema.   Skin:     General: Skin is warm and dry.   Neurological:      General: No focal deficit present.      Mental Status: He is alert and  oriented to person, place, and time.   Psychiatric:         Mood and Affect: Mood normal.         Behavior: Behavior normal.         Significant Labs:  BMP:   Recent Labs   Lab 10/09/23  0624   *      K 3.8   *   CO2 22*   BUN 15   CREATININE 2.0*   CALCIUM 9.3   MG 1.5*       CMP:   Recent Labs   Lab 10/02/23  1711 10/03/23  0348 10/09/23  0624      < > 131*   CALCIUM 9.4   < > 9.3   ALBUMIN 2.9*  --   --    PROT 7.3  --   --       < > 144   K 3.6   < > 3.8   CO2 14*   < > 22*   *   < > 111*   BUN 32*   < > 15   CREATININE 2.5*   < > 2.0*   ALKPHOS 208*  --   --    ALT 12  --   --    AST 22  --   --    BILITOT 0.1  --   --     < > = values in this interval not displayed.       All labs within the past 24 hours have been reviewed.    Significant Imaging:  Labs: Reviewed  Reviewed    Assessment/Plan:     Active Diagnoses:    Diagnosis Date Noted POA    PRINCIPAL PROBLEM:  Complete heart block [I44.2] 10/03/2023 Yes    Kidney stone [N20.0] 10/08/2023 Yes    Bradycardia [R00.1] 10/02/2023 Yes    Aspiration pneumonia [J69.0] 10/02/2023 Yes    COPD (chronic obstructive pulmonary disease) [J44.9] 10/02/2023 Yes    Mixed Alzheimer's and vascular dementia [G30.9, F01.50, F02.80] 12/20/2022 Yes    History of DVT of lower extremity [Z86.718] 03/21/2022 Not Applicable    Acute renal failure superimposed on stage 3 chronic kidney disease [N17.9, N18.30] 03/20/2022 Yes    Controlled type 2 diabetes mellitus with stage 3 chronic kidney disease, without long-term current use of insulin [E11.22, N18.30] 06/03/2021 Yes    Mixed hyperlipidemia [E78.2] 06/03/2021 Yes    History of CVA with residual deficit [I69.30] 05/15/2020 Not Applicable      Problems Resolved During this Admission:       Assessment and plan:    1. Acute kidney injury:  Creatinine has improved overnight decreasing to 2.0 after having ureteral stent placement for an obstructing stone on the left.  MARI most consistent with  obstructive uropathy.    He could be discharged from a Nephrology standpoint with follow-up in the clinic within a few weeks.    2. CKD 4: Baseline creatinine runs around 1.7.  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    Renal ultrasound showed right kidney 10.4 cm and left kidney 10.2 cm with increased echogenicity consistent with chronic medical renal disease.    3. Electrolytes: Potassium is stable at 3.8.    4. Acid base: Bicarbonate is slightly low at 22 consistent with MARI.  Will continue to monitor.    5. Volume:  He appears euvolemic to dry on exam.      Thank you for your consult.     Kurt Hopkins MD  Nephrology  [facility] - Telemetry ([facility])

## 2023-10-09 NOTE — PT/OT/SLP PROGRESS
Physical Therapy      Patient Name:  Samy Alejandre Jr.   MRN:  [id number]    14:15   Unable to see patient at this time as she had already D/C from facility. No need for follow up.

## 2023-10-09 NOTE — PLAN OF CARE
10/09/23 1024   Medicare Message   Important Message from Medicare regarding Discharge Appeal Rights Given to patient/caregiver;Explained to patient/caregiver;Signed/date by patient/caregiver   Date IMM was signed 10/09/23   Time IMM was signed 1029

## 2023-10-09 NOTE — ASSESSMENT & PLAN NOTE
Shravan Hawkins is a 69 year old who presents for the following health issues     Cough    History of Present Illness       Reason for visit:  Covid cough sore tongue    She eats 2-3 servings of fruits and vegetables daily.She consumes 0 sweetened beverage(s) daily.She exercises with enough effort to increase her heart rate 10 to 19 minutes per day.  She exercises with enough effort to increase her heart rate 3 or less days per week.   She is taking medications regularly.             Review of Systems   Respiratory: Positive for cough.             Objective    There were no vitals taken for this visit.  There is no height or weight on file to calculate BMI.  Physical Exam                    Resume DOAC post  Cystoscopy

## 2023-10-09 NOTE — DISCHARGE SUMMARY
[facility] - Tele[facility] ([facility] Medicine  Discharge Summary      Patient Name: Samy Alejandre Jr.  MRN: [id number]  JOAQUIM: [id number]  Patient Class: IP- Inpatient  Admission Date: 10/2/2023  Hospital Length of Stay: 7 days  Discharge Date and Time:  10/09/2023 10:29 AM  Attending Physician: Binh Harris,*   Discharging Provider: Binh Boykin MD  Primary Care Provider: Damien Barron MD    Primary Care Team: [facility] MEDICINE D    HPI:   73-year-old male with a known past medical history of tobacco abuse, COPD, chronic bronchitis, diabetes type 2, Alzheimer's, and aspiration that presented to the ER 10/2 from his pulmonologist's office after his heart rate was noted to be in the 30s.He reports over the past 6-8 weeks he has become increasingly weak with decreased activity, decreased appetite, and spells of dizziness. He reports 1 possible syncopal episode where he fell about 6 weeks ago.  Workup in the ER revealed lab work with a creatinine of 2.5 (creatinine at baseline is 1.5-1.7) and a metabolic acidosis.  EKG in the ER with a rate of 36 and AV dissociation with narrow QRS complex for which cardiology was consulted.  Admitted to ICU with a Dx of complete heart block , aspiration PNA and MARI .  Cardiology consulted  which placed a dual chamber pace maker . Started on IVAB for aspiration PNA , sputum cx (+) for pseudomonas  . SLP consulted which rec soft diet with thin liquid . IM consulted  to assume care .      Procedure(s) (LRB):  CYSTOSCOPY, WITH RETROGRADE PYELOGRAM AND URETERAL STENT INSERTION (Left)      Hospital Course:   73-year-old male, was admitted to the Intensive Care Unit (ICU) with a diagnosis of complete heart block, acute kidney injury (MARI), and Pseudomonas pneumonia. He underwent dual chamber pacemaker placement by the cardiology team to address the complete heart block. During his hospitalization, his kidney function initially  gradually improved with intravenous  fluids (IVF). Physical therapy (PT) and occupational therapy (OT) recommended home health (HH) support for his recovery.Blood cultures were obtained and were negative, while sputum cultures showed growth of Pseudomonas with sensitivities  to Levaquin   On 10/7, renal function  started worsening ,and a retroperitoneal ultrasound demonstrated mild fullness of the renal pelvis without estefania hydronephrosis. Nephrology was consulted.  On 10/8, a CT abdominal scan revealed left-sided hydronephrosis, bilateral subcentimeter non-obstructing renal calculi, and an obstructing proximal left ureteral calculi measuring up to 5 mm superiorly and up to 3 mm inferiorly. No right-sided hydronephrosis was noted. Despite these findings, the patient's kidney function remained elevated. Urology was consulted, and a plan was made for possible cystoscopy and stent placement.  On 10/9, the patient was assessed at the bedside and deemed suitable for discharge. He underwent cystoscopy with stent placement by the urology team without any complications. His kidney function is improving post stent .  The patient will be discharged on oral Levaquin at a renal dose to treat the Pseudomonas pneumonia. He will be instructed to follow up with the following specialists within the next 1 to 2 weeks:  Urology: For further evaluation and management of renal calculi and stent placement.  Nephrology: To monitor kidney function and ongoing recovery.  Cardiology: To follow up on the dual chamber pacemaker placement.  Primary Care Physician (PCP): For comprehensive post-hospitalization care and management of chronic conditions.  The patient and his caregivers have been educated on medication management, dietary recommendations, and the importance of continued medical follow-up. They have been advised to seek immediate medical attention for any concerning symptoms or issues       Goals of Care Treatment Preferences:  Code Status: Full Code      Consults:    Consults (From admission, onward)        Status Ordering Provider     Inpatient consult to Urology  Once        Provider:  Jamie Martinez MD    Completed ARIANNA KERR     Inpatient consult to Nephrology  Once        Provider:  Kurt Hopkins MD    Completed ARIANNA KERR     Inpatient consult to Hospitalist  Once        Provider:  (Not yet assigned)    Acknowledged NAV SHAY     IP consult to case management  Once        Provider:  (Not yet assigned)    Completed AMY CAIN     Inpatient consult to Cardiology  Once        Provider:  (Not yet assigned)    Completed EYAL HAWTHORNE          Neuro  Mixed Alzheimer's and vascular dementia  Cont supportive tx       History of CVA with residual deficit    cont statin   Resume eliquis post PPM     Pulmonary  COPD (chronic obstructive pulmonary disease)  Stable   cont breathing tx      Aspiration pneumonia  S/p  IV cefepime   Levaquin renal dose   Sputum cx (+) for pseudomonas        Cardiac/Vascular  * Complete heart block  S/P dual chamber PPM   Cardilogy on board       Bradycardia  Compete heart block   Cardiology onboard  S/P Dual chamber PPM       Mixed hyperlipidemia  Cont statin       Renal/  Kidney stone  CT abd wo: sided hydronephrosis. Bilateral subcentimeter nonobstructing renal calculi. Obstructing proximal left ureteral calculi measuring up to 5 mm superiorly and up to 3 mm inferiorly.  No right-sided hydronephrosis  -Urology consulted       Acute renal failure superimposed on stage 3 chronic kidney disease  Patient with acute kidney injury/acute renal failure likely due to pre-renal azotemia due to IVVD MARI is currently improving. Baseline creatinine 1.7 - Labs reviewed- Renal function/electrolytes with Estimated Creatinine Clearance: 29.1 mL/min (A) (based on SCr of 2 mg/dL (H)). according to latest data. Monitor urine output and serial BMP and adjust therapy as needed. Avoid nephrotoxins and  renally dose meds for GFR listed above.    -retroperitoneal us :Mild fullness of the renal pelvis noted without estefania hydronephrosis  -Kidney function trending up  -Nephrology consulted  -CT abd wo: sided hydronephrosis. Bilateral subcentimeter nonobstructing renal calculi. Obstructing proximal left ureteral calculi measuring up to 5 mm superiorly and up to 3 mm inferiorly.  No right-sided hydronephrosis  -Urology consulted     Endocrine  Controlled type 2 diabetes mellitus with stage 3 chronic kidney disease, without long-term current use of insulin    Cont ISS  Keep CBG < 180     Other  History of DVT of lower extremity  Resume DOAC post  Cystoscopy         Final Active Diagnoses:    Diagnosis Date Noted POA    PRINCIPAL PROBLEM:  Complete heart block [I44.2] 10/03/2023 Yes    Kidney stone [N20.0] 10/08/2023 Yes    Bradycardia [R00.1] 10/02/2023 Yes    Aspiration pneumonia [J69.0] 10/02/2023 Yes    COPD (chronic obstructive pulmonary disease) [J44.9] 10/02/2023 Yes    Mixed Alzheimer's and vascular dementia [G30.9, F01.50, F02.80] 12/20/2022 Yes    History of DVT of lower extremity [Z86.718] 03/21/2022 Not Applicable    Acute renal failure superimposed on stage 3 chronic kidney disease [N17.9, N18.30] 03/20/2022 Yes    Controlled type 2 diabetes mellitus with stage 3 chronic kidney disease, without long-term current use of insulin [E11.22, N18.30] 06/03/2021 Yes    Mixed hyperlipidemia [E78.2] 06/03/2021 Yes    History of CVA with residual deficit [I69.30] 05/15/2020 Not Applicable      Problems Resolved During this Admission:       Discharged Condition: stable    Disposition: Home-Health Care [facility]    Follow Up:   Follow-up Information     Damien Barron MD Follow up on 10/16/2023.    Specialty: Family Medicine  Why: Hospital Follow Up with Damine Barron MD  Monday Oct 16, 2023 10:40 AM  Contact information:  [address] [address] [address]  [phone]             Delonte Lindsey MD  Follow up in 1 week(s).    Specialties: Interventional Cardiology, Cardiology  Contact information:  [address]  [place] [address] [phone]                       Patient Instructions:      [facility]   Standing Status: Future   Referral Priority: Routine Referral Type: Consultation   Referral Reason: Specialty Services Required   Number of Visits Requested: 1     Ambulatory referral/consult to Home Health   Standing Status: Future   Referral Priority: Routine Referral Type: Home Health   Referral Reason: Specialty Services Required   Requested Specialty: Home Health Services   Number of Visits Requested: 1     Ambulatory referral/consult to Urology   Standing Status: Future   Referral Priority: Routine Referral Type: Consultation   Referral Reason: Specialty Services Required   Requested Specialty: Urology   Number of Visits Requested: 1     Ambulatory referral/consult to Nephrology   Standing Status: Future   Referral Priority: Routine Referral Type: Consultation   Referral Reason: Specialty Services Required   Requested Specialty: Nephrology   Number of Visits Requested: 1       Significant Diagnostic Studies: Labs:   BMP:   Recent Labs   Lab 10/07/23  1631 10/08/23  0454 10/09/23  0624   * 130* 131*    141 144   K 3.9 3.9 3.8    109 111*   CO2 19* 21* 22*   BUN 16 14 15   CREATININE 2.8* 2.7* 2.0*   CALCIUM 8.7 8.5* 9.3   MG  --  1.6 1.5*   , CMP   Recent Labs   Lab 10/07/23  1631 10/08/23  0454 10/09/23  0624    141 144   K 3.9 3.9 3.8    109 111*   CO2 19* 21* 22*   * 130* 131*   BUN 16 14 15   CREATININE 2.8* 2.7* 2.0*   CALCIUM 8.7 8.5* 9.3   ANIONGAP 12 11 11    and CBC   Recent Labs   Lab 10/08/23  0454 10/09/23  0624   WBC 6.76 7.64   HGB 8.2* 9.0*   HCT 26.5* 28.6*    287     Microbiology:   Blood Culture   Lab Results   Component Value Date    LABBLOO No growth after 5 days. 03/20/2022        Pending Diagnostic Studies:     None         Medications:  Reconciled Home Medications:      Medication List      START taking these medications    tamsulosin 0.4 mg Cap  Commonly known as: FLOMAX  Take 1 capsule (0.4 mg total) by mouth every evening.        CHANGE how you take these medications    levoFLOXacin 750 MG tablet  Commonly known as: LEVAQUIN  Take 1 tablet (750 mg total) by mouth every other day. for 8 days  Start taking on: October 10, 2023  What changed:   · medication strength  · how much to take  · when to take this     QUEtiapine 50 MG tablet  Commonly known as: SEROQUEL  Take 1 tablet by mouth twice daily  What changed:   · how much to take  · when to take this     rosuvastatin 20 MG tablet  Commonly known as: CRESTOR  Take 1 tablet by mouth once daily  What changed: when to take this        CONTINUE taking these medications    * albuterol 90 mcg/actuation inhaler  Commonly known as: PROVENTIL/VENTOLIN HFA  Inhale 2 puffs into the lungs every 4 (four) hours as needed for Wheezing.     * albuterol 1.25 mg/3 mL Nebu  Commonly known as: ACCUNEB  Take 3 mLs (1.25 mg total) by nebulization 2 (two) times a day. Rescue     blood glucose control, normal Soln  Commonly known as: TRUE METRIX LEVEL 2  1 drop by Misc.(Non-Drug; Combo Route) route once daily.     BREATHERITE SPACER-MASK,ADULT Spcr  Generic drug: inhalat.spacing dev,large mask  Use as directed for inhalation.     donepeziL 10 MG tablet  Commonly known as: ARICEPT  Take 1 tablet (10 mg total) by mouth every evening.     DULoxetine 30 MG capsule  Commonly known as: CYMBALTA  Take 1 capsule by mouth once daily     ELIQUIS 5 mg Tab  Generic drug: apixaban  Take 1 tablet by mouth once daily     FLUoxetine 20 MG capsule  Take 1 capsule by mouth once daily     gabapentin 100 MG capsule  Commonly known as: NEURONTIN  Take 1 capsule (100 mg total) by mouth 3 (three) times daily.     HYDROcodone-acetaminophen  mg per tablet  Commonly known as:  NORCO  Take 1 tablet by mouth.     lancets 32 gauge Misc  1 lancet by Misc.(Non-Drug; Combo Route) route once daily.     memantine 10 MG Tab  Commonly known as: NAMENDA  TAKE 1/2 TABLET BY MOUTH TWICE DAILY FOR 1 WEEK, THEN 1 TABLET BY MOUTH TWICE DAILY     mucus clearing device  Commonly known as: AEROBIKA OSCILLATING PEP SYSTM  by Misc.(Non-Drug; Combo Route) route 4 (four) times daily as needed.     pantoprazole 40 MG tablet  Commonly known as: PROTONIX  Take 1 tablet (40 mg total) by mouth once daily.     TRUE METRIX LEVEL 1 Soln  Generic drug: blood glucose control, low  1 drop by Misc.(Non-Drug; Combo Route) route once daily.     ULTRA-LIGHT ROLLATOR Misc  Generic drug: walker  Use daily for ambulation         * This list has 2 medication(s) that are the same as other medications prescribed for you. Read the directions carefully, and ask your doctor or other care provider to review them with you.            STOP taking these medications    oxybutynin 10 MG 24 hr tablet  Commonly known as: DITROPAN-XL            Indwelling Lines/Drains at time of discharge:   Lines/Drains/Airways     None                 Time spent on the discharge of patient: 30 minutes         Binh Boykin MD  Department of Hospital Medicine  [facility] - Telemetry ([facility])

## 2023-10-09 NOTE — PLAN OF CARE
[facility] - Telemetry (Hospital)  Discharge Final Note    Primary Care Provider: Damien Barron MD    Expected Discharge Date: 10/9/2023    Final Discharge Note (most recent)       Final Note - 10/09/23 1027          Final Note    Assessment Type Final Discharge Note     Anticipated Discharge Disposition Home or Self Care     Hospital Resources/Appts/Education Provided Appointments scheduled and added to AVS        Post-Acute Status    Discharge Delays None known at this time                   Pt to discharge home today. SW met with patient at bedside. Pt denies need for home health care. Pt reports that he has a walker and will be with family.   PCP scheduled on AVS: Hospital Follow Up with Damien Barron MD, Monday Oct 16, 2023 10:40 AM    No additional CM needs for discharge.     Important Message from Medicare  Important Message from Medicare regarding Discharge Appeal Rights: Given to patient/caregiver, Explained to patient/caregiver, Signed/date by patient/caregiver     Date IMM was signed: 10/09/23  Time IMM was signed: 1022    Contact Info       Damien Barron MD   Specialty: Family Medicine   Relationship: PCP - General    [address] [place] [id number]   Phone: [phone]       Next Steps: Follow up on 10/16/2023    Instructions: Hospital Follow Up with Damien Barron MD  Monday Oct 16, 2023 10:40 AM    Delonte Lindsey MD   Specialty: Interventional Cardiology, Cardiology    [address]  [place] [address]   Phone: [phone]       Next Steps: Follow up in 1 week(s)

## 2023-10-10 ENCOUNTER — PES CALL (OUTPATIENT)
Dept: HOME HEALTH SERVICES | Facility: CLINIC | Age: 73
End: 2023-10-10
Payer: MEDICARE

## 2023-10-10 ENCOUNTER — PATIENT OUTREACH (OUTPATIENT)
Dept: ADMINISTRATIVE | Facility: CLINIC | Age: 73
End: 2023-10-10
Payer: MEDICARE

## 2023-10-10 NOTE — PHYSICIAN QUERY
PT Name: Samy Alejandre Jr.  MR #: [id number]  DOCUMENTATION CLARIFICATION     CDS/: Bouchra AtkinsRN              Contact information: [email or web address]  This form is a permanent document in the medical record.     Query Date: October 10, 2023    By submitting this query, we are merely seeking further clarification of documentation. Please utilize your independent clinical judgment when addressing the question(s) below.    The Medical Record contains the following:   Indicators Supporting Clinical Findings Location in Medical Record   x CKD or Chronic Kidney (Renal) Failure/Disease MARI on CKD    CKD 4: Baseline creatinine runs around 1.7.  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      Acute renal failure superimposed on stage 3 chronic kidney disease   eICU 10/2    Nephro Consult 10/7,10/8,10/9    DCS 10/9     x BUN/Creatinine                          GFR  10/02/23 17:11 10/03/23 03:48 10/04/23 03:23 10/05/23 04:29 10/06/23 04:42   BUN 32 (H) 28 (H) 22 17 17   Creatinine 2.5 (H) 2.2 (H) 2.1 (H) 1.9 (H) 2.6 (H)   eGFR 27 ! 31 ! 33 ! 37 ! 26 !      10/07/23 05:28 10/07/23 16:31 10/08/23 04:54 10/09/23 06:24   BUN 16 16 14 15   Creatinine 2.7 (H) 2.8 (H) 2.7 (H) 2.0 (H)   eGFR 24 ! 23 ! 24 ! 35 !        01/11/23 16:09 04/24/23 15:27 08/04/23 09:53   BUN 25 (H) 22 19   Creatinine 1.4 1.7 (H) 1.7 (H)   eGFR 54.1 ! 42.6 ! 42.6 !    Labs 10/2-10/9                                  Labs 1/11/23-8/4/23  (Previous encounters)                Dehydration     x Nausea / Vomiting Gastrointestinal:  Positive for diarrhea. Negative for abdominal distention, abdominal pain, nausea and vomiting.   H&P 10/2    Dialysis / CRRT      Medication      Treatment     x Other Chronic Conditions 73-year-old male with a known past medical history of tobacco abuse, COPD, chronic bronchitis, diabetes type 2, Alzheimer's, and aspiration that presented to the ER 10/2 from his pulmonologist's  office after his heart rate was noted to  be in the 30s. H&P 10/2          Other       Provider, please clarify the CKD stage with known MARI:     [  x ] Chronic Kidney Disease (CKD) stage 3b - Moderately to severely decreased eGFR 30-44     [   ] Chronic Kidney Disease (CKD) stage 4 - Severely decreased eGFR 15-29     [   ] Other (please specify): _________________       Please document in your progress notes daily for the duration of treatment until resolved and include in your discharge summary.    Reference:     JOSH Barakat MD, & YEE Aguilar MD, MS. (2020, June 18). Definition and staging of chronic kidney disease in adults ([id number] JOSE Parker MD, ScD & [id number] ERIBERTO Zimmerman MD, MSc, Eds.). Retrieved October 21, 2020, from [email or web address]/definition-and-staging-of-chronic-kidney-disease-in-adults?search=ckd%20staging&source=search_result&selectedTitle=1~150&usage_type=default&display_rank=1    Form No. 63985

## 2023-10-10 NOTE — PROGRESS NOTES
C3 nurse spoke with Samy Alejandre Jr.'s daughter Jayshree for a TCC post hospital discharge follow up call. The patient has a scheduled HOSFU appointment with Damien Barron MD on 10/16/23 @ 1040.

## 2023-10-13 ENCOUNTER — HOSPITAL ENCOUNTER (OUTPATIENT)
Dept: CARDIOLOGY | Facility: HOSPITAL | Age: 73
Discharge: HOME | End: 2023-10-13
Attending: INTERNAL MEDICINE
Payer: MEDICARE

## 2023-10-13 DIAGNOSIS — Z95.0 CARDIAC PACEMAKER IN SITU: ICD-10-CM

## 2023-10-13 DIAGNOSIS — R00.1 BRADYCARDIA: ICD-10-CM

## 2023-10-13 PROCEDURE — 93280 CARDIAC DEVICE CHECK - IN CLINIC & HOSPITAL: ICD-10-PCS | Mod: 26,HCNC,, | Performed by: INTERNAL MEDICINE

## 2023-10-13 PROCEDURE — 93280 PM DEVICE PROGR EVAL DUAL: CPT | Mod: HCNC

## 2023-10-13 PROCEDURE — 93280 PM DEVICE PROGR EVAL DUAL: CPT | Mod: 26,HCNC,, | Performed by: INTERNAL MEDICINE

## 2023-10-14 ENCOUNTER — TELEPHONE (OUTPATIENT)
Dept: ADMINISTRATIVE | Facility: CLINIC | Age: 73
End: 2023-10-14
Payer: MEDICARE

## 2023-10-14 NOTE — TELEPHONE ENCOUNTER
"Day 5 Post-Discharge SMS Tracker     Phoned patient in response to reply of "5" to post-discharge texting tracker. Unable to leave a voicemail.  Will try again later.      "

## 2023-10-19 ENCOUNTER — TELEPHONE (OUTPATIENT)
Dept: UROLOGY | Facility: CLINIC | Age: 73
End: 2023-10-19
Payer: MEDICARE

## 2023-10-19 ENCOUNTER — TELEPHONE (OUTPATIENT)
Dept: CARDIOLOGY | Facility: CLINIC | Age: 73
End: 2023-10-19
Payer: MEDICARE

## 2023-10-19 NOTE — TELEPHONE ENCOUNTER
Spoke with pt's daughter in regards to getting appt scheduled for pre op              ----- Message from Becky Younger LPN sent at 10/19/2023  1:40 PM CDT -----  Laney, Mr Cai needs a follow up appointment from the hospital, Dr Lindsey placed his pacemaker.  He is doing well but he will need to see him for a preop clearance for kidney stones .  Dr Martinez said he will require another surgery to break up the kidney stones a few weeks from when the pacemaker was placed.  He had an appt with dr Lewis yesterday but had to cancel due to his great grandson was born and had to travel to [facility] .  Can someone please reach out by patient portal or call his daughter, Jayshree at [phone]     Thanks, Becky

## 2023-10-19 NOTE — TELEPHONE ENCOUNTER
Ashley holly spoke to pt's daughter         ----- Message from Becky Younger LPN sent at 10/19/2023 11:54 AM CDT -----  Mr Alejandre needs a follow up appt with Dr Vázquez from his surgery that he had on 10/08/23. His daughter had gotten a call to set it up an appt with Dr Larsen but when she told the nurse that Dr vázquez did his surgery they were going to speak with him and call back but has not received a call back   Please call Jayshree at [phone] to set up an appointment , he needs another surgery for the kidney stones

## 2023-10-23 ENCOUNTER — LAB VISIT (OUTPATIENT)
Dept: LAB | Facility: HOSPITAL | Age: 73
End: 2023-10-23
Attending: FAMILY MEDICINE
Payer: MEDICARE

## 2023-10-23 ENCOUNTER — OFFICE VISIT (OUTPATIENT)
Dept: FAMILY MEDICINE | Facility: CLINIC | Age: 73
End: 2023-10-23
Payer: MEDICARE

## 2023-10-23 VITALS
WEIGHT: 134.25 LBS | BODY MASS INDEX: 20.41 KG/M2 | SYSTOLIC BLOOD PRESSURE: 130 MMHG | DIASTOLIC BLOOD PRESSURE: 80 MMHG | OXYGEN SATURATION: 98 % | HEART RATE: 90 BPM

## 2023-10-23 DIAGNOSIS — Z09 HOSPITAL DISCHARGE FOLLOW-UP: ICD-10-CM

## 2023-10-23 DIAGNOSIS — R32 URINARY INCONTINENCE, UNSPECIFIED TYPE: ICD-10-CM

## 2023-10-23 DIAGNOSIS — R19.7 DIARRHEA, UNSPECIFIED TYPE: ICD-10-CM

## 2023-10-23 DIAGNOSIS — R41.0 CONFUSION: ICD-10-CM

## 2023-10-23 DIAGNOSIS — Z09 HOSPITAL DISCHARGE FOLLOW-UP: Primary | ICD-10-CM

## 2023-10-23 PROCEDURE — 3079F DIAST BP 80-89 MM HG: CPT | Mod: HCNC,CPTII,S$GLB, | Performed by: FAMILY MEDICINE

## 2023-10-23 PROCEDURE — 1125F AMNT PAIN NOTED PAIN PRSNT: CPT | Mod: HCNC,CPTII,S$GLB, | Performed by: FAMILY MEDICINE

## 2023-10-23 PROCEDURE — 1125F PR PAIN SEVERITY QUANTIFIED, PAIN PRESENT: ICD-10-PCS | Mod: HCNC,CPTII,S$GLB, | Performed by: FAMILY MEDICINE

## 2023-10-23 PROCEDURE — 81001 URINALYSIS AUTO W/SCOPE: CPT | Mod: HCNC | Performed by: FAMILY MEDICINE

## 2023-10-23 PROCEDURE — 99495 TRANSJ CARE MGMT MOD F2F 14D: CPT | Mod: HCNC,S$GLB,, | Performed by: FAMILY MEDICINE

## 2023-10-23 PROCEDURE — 3288F FALL RISK ASSESSMENT DOCD: CPT | Mod: HCNC,CPTII,S$GLB, | Performed by: FAMILY MEDICINE

## 2023-10-23 PROCEDURE — 85025 COMPLETE CBC W/AUTO DIFF WBC: CPT | Mod: HCNC | Performed by: FAMILY MEDICINE

## 2023-10-23 PROCEDURE — 99495 TCM SERVICES (MODERATE COMPLEXITY): ICD-10-PCS | Mod: HCNC,S$GLB,, | Performed by: FAMILY MEDICINE

## 2023-10-23 PROCEDURE — 3075F SYST BP GE 130 - 139MM HG: CPT | Mod: HCNC,CPTII,S$GLB, | Performed by: FAMILY MEDICINE

## 2023-10-23 PROCEDURE — 3044F PR MOST RECENT HEMOGLOBIN A1C LEVEL <7.0%: ICD-10-PCS | Mod: HCNC,CPTII,S$GLB, | Performed by: FAMILY MEDICINE

## 2023-10-23 PROCEDURE — 87086 URINE CULTURE/COLONY COUNT: CPT | Mod: HCNC | Performed by: FAMILY MEDICINE

## 2023-10-23 PROCEDURE — 1101F PR PT FALLS ASSESS DOC 0-1 FALLS W/OUT INJ PAST YR: ICD-10-PCS | Mod: HCNC,CPTII,S$GLB, | Performed by: FAMILY MEDICINE

## 2023-10-23 PROCEDURE — 3044F HG A1C LEVEL LT 7.0%: CPT | Mod: HCNC,CPTII,S$GLB, | Performed by: FAMILY MEDICINE

## 2023-10-23 PROCEDURE — 36415 COLL VENOUS BLD VENIPUNCTURE: CPT | Mod: HCNC,PO | Performed by: FAMILY MEDICINE

## 2023-10-23 PROCEDURE — 3079F PR MOST RECENT DIASTOLIC BLOOD PRESSURE 80-89 MM HG: ICD-10-PCS | Mod: HCNC,CPTII,S$GLB, | Performed by: FAMILY MEDICINE

## 2023-10-23 PROCEDURE — 99999 PR PBB SHADOW E&M-EST. PATIENT-LVL II: ICD-10-PCS | Mod: PBBFAC,HCNC,, | Performed by: FAMILY MEDICINE

## 2023-10-23 PROCEDURE — 99999 PR PBB SHADOW E&M-EST. PATIENT-LVL II: CPT | Mod: PBBFAC,HCNC,, | Performed by: FAMILY MEDICINE

## 2023-10-23 PROCEDURE — 3075F PR MOST RECENT SYSTOLIC BLOOD PRESS GE 130-139MM HG: ICD-10-PCS | Mod: HCNC,CPTII,S$GLB, | Performed by: FAMILY MEDICINE

## 2023-10-23 PROCEDURE — 80053 COMPREHEN METABOLIC PANEL: CPT | Mod: HCNC | Performed by: FAMILY MEDICINE

## 2023-10-23 PROCEDURE — 1101F PT FALLS ASSESS-DOCD LE1/YR: CPT | Mod: HCNC,CPTII,S$GLB, | Performed by: FAMILY MEDICINE

## 2023-10-23 PROCEDURE — 3288F PR FALLS RISK ASSESSMENT DOCUMENTED: ICD-10-PCS | Mod: HCNC,CPTII,S$GLB, | Performed by: FAMILY MEDICINE

## 2023-10-23 RX ORDER — OXYBUTYNIN CHLORIDE 5 MG/1
5 TABLET, EXTENDED RELEASE ORAL DAILY
COMMUNITY
End: 2023-12-11

## 2023-10-23 NOTE — PROGRESS NOTES
Chief Complaint:    Chief Complaint   Patient presents with    Follow-up     Hosp f/u       History of Present Illness:    Patient presents today for hospital follow-up,    Family and/or Caretaker present at visit?  Yes.  Diagnostic tests reviewed/disposition: I have reviewed all completed as well as pending diagnostic tests at the time of discharge.  Disease/illness education: y  Home health/community services discussion/referrals: Patient does not have home health established from hospital visit.  They do not need home health.  If needed, we will set up home health for the patient.   Establishment or re-establishment of referral orders for community resources: No other necessary community resources.   Discussion with other health care providers: No discussion with other health care providers necessary.      Presented to ER due to bradycardia in the 30's while at pulmonologist's office. He was admitted for heart block and MARI. He had pacemaker placed and kidney functions were improving from initial w/u, but began to worsen. A cystoscopy was then performed and a stent was placed. He has 2 stones: 3 mm inferiorly and 5 mm superiorly.     Has some confusion for a week. Unable to recall date or month, when asked what year he states it is 2004.     Diarrhea began yesterday, recently on antibiotics from hospital and finished 1 week ago, worried about C. diff. Urgency upon discharge but went away, but has began again 3 days ago. Loss of jose past 2 days. Denies any fever.    Taken off oxybutynin and put on PRN for Norco. Started [facility]       Hospital Course:   73-year-old male, was admitted to the Intensive Care Unit (ICU) with a diagnosis of complete heart block, acute kidney injury (MARI), and Pseudomonas pneumonia. He underwent dual chamber pacemaker placement by the cardiology team to address the complete heart block. During his hospitalization, his kidney function initially  gradually improved with intravenous  fluids (IVF). Physical therapy (PT) and occupational therapy (OT) recommended home health (HH) support for his recovery.Blood cultures were obtained and were negative, while sputum cultures showed growth of Pseudomonas with sensitivities  to Levaquin   On 10/7, renal function  started worsening ,and a retroperitoneal ultrasound demonstrated mild fullness of the renal pelvis without estefania hydronephrosis. Nephrology was consulted.  On 10/8, a CT abdominal scan revealed left-sided hydronephrosis, bilateral subcentimeter non-obstructing renal calculi, and an obstructing proximal left ureteral calculi measuring up to 5 mm superiorly and up to 3 mm inferiorly. No right-sided hydronephrosis was noted. Despite these findings, the patient's kidney function remained elevated. Urology was consulted, and a plan was made for possible cystoscopy and stent placement.  On 10/9, the patient was assessed at the bedside and deemed suitable for discharge. He underwent cystoscopy with stent placement by the urology team without any complications. His kidney function is improving post stent .  The patient will be discharged on oral Levaquin at a renal dose to treat the Pseudomonas pneumonia. He will be instructed to follow up with the following specialists within the next 1 to 2 weeks:  Urology: For further evaluation and management of renal calculi and stent placement.  Nephrology: To monitor kidney function and ongoing recovery.  Cardiology: To follow up on the dual chamber pacemaker placement.  Primary Care Physician (PCP): For comprehensive post-hospitalization care and management of chronic conditions.  The patient and his caregivers have been educated on medication management, dietary recommendations, and the importance of continued medical follow-up. They have been advised to seek immediate medical attention for any concerning symptoms or issues        ROS:  Review of Systems   Constitutional:  Positive for appetite change (loss).  Negative for chills and fever.   HENT:  Negative for congestion, ear pain, postnasal drip, rhinorrhea, sinus pressure and sinus pain.    Eyes:  Negative for pain.   Respiratory:  Negative for cough, chest tightness and shortness of breath.    Cardiovascular:  Negative for chest pain and palpitations.   Gastrointestinal:  Positive for diarrhea. Negative for abdominal pain, blood in stool, constipation and nausea.   Genitourinary:  Positive for urgency. Negative for difficulty urinating, dysuria, flank pain and hematuria.   Musculoskeletal:  Negative for arthralgias and myalgias.   Skin:  Negative for pallor and wound.   Neurological:  Negative for dizziness, tremors, speech difficulty, light-headedness and headaches.   Psychiatric/Behavioral:  Positive for confusion. Negative for behavioral problems, dysphoric mood and sleep disturbance. The patient is not nervous/anxious.    All other systems reviewed and are negative.      Past Medical History:   Diagnosis Date    Anxiety     Depression     Diabetes mellitus, type 2     Hypertension     Stroke        Social History:  Social History     Socioeconomic History    Marital status:    Tobacco Use    Smoking status: Every Day     Current packs/day: 1.00     Types: Cigarettes    Smokeless tobacco: Current     Types: Snuff   Substance and Sexual Activity    Alcohol use: Yes     Alcohol/week: 1.0 standard drink of alcohol     Types: 1 Shots of liquor per week     Comment: Daily    Drug use: Never    Sexual activity: Not Currently     Social Determinants of Health     Financial Resource Strain: Low Risk  (10/3/2023)    Overall Financial Resource Strain (CARDIA)     Difficulty of Paying Living Expenses: Not very hard   Food Insecurity: No Food Insecurity (10/3/2023)    Hunger Vital Sign     Worried About Running Out of Food in the Last Year: Never true     Ran Out of Food in the Last Year: Never true   Transportation Needs: No Transportation Needs (10/3/2023)     PRAPARE - Transportation     Lack of Transportation (Medical): No     Lack of Transportation (Non-Medical): No   Physical Activity: Unknown (10/3/2023)    Exercise Vital Sign     Days of Exercise per Week: 0 days   Stress: No Stress Concern Present (10/3/2023)    [employer] - Occupational Stress Questionnaire     Feeling of Stress : Only a little   Social Connections: Unknown (10/3/2023)    Social Connection and Isolation Panel [NHANES]     Frequency of Communication with Friends and Family: More than three times a week     Frequency of Social Gatherings with Friends and Family: Once a week     Active Member of Clubs or Organizations: No     Attends Club or Organization Meetings: Never     Marital Status:    Housing Stability: Low Risk  (10/3/2023)    Housing Stability Vital Sign     Unable to Pay for Housing in the Last Year: No     Number of Places Lived in the Last Year: 1     Unstable Housing in the Last Year: No       Family History:   family history includes COPD in his sister; Cancer in his father; Heart disease in his mother; Stroke in his mother.    Health Maintenance   Topic Date Due    TETANUS VACCINE  Never done    Shingles Vaccine (1 of 2) Never done    Colorectal Cancer Screening  06/16/2021    Eye Exam  07/21/2022    Hemoglobin A1c  02/04/2024    Foot Exam  04/24/2024    Lipid Panel  08/04/2024    High Dose Statin  10/10/2024    Hepatitis C Screening  Completed    Abdominal Aortic Aneurysm Screening  Completed       Exam:Physical     Vital Signs  Pulse: 90  SpO2: 98 %  BP: 130/80  BP Location: Left arm  Patient Position: Sitting  Pain Score:   5  Height and Weight  Weight: 60.9 kg (134 lb 4.2 oz)]    Body mass index is 20.41 kg/m².    Physical Exam  Constitutional:       Appearance: Normal appearance.   HENT:      Head: Normocephalic and atraumatic.      Right Ear: Tympanic membrane normal.      Left Ear: Tympanic membrane normal.   Eyes:      Extraocular  Movements: Extraocular movements intact.      Pupils: Pupils are equal, round, and reactive to light.   Cardiovascular:      Rate and Rhythm: Normal rate and regular rhythm.      Pulses: Normal pulses.      Heart sounds: Normal heart sounds. No murmur heard.     No gallop.   Pulmonary:      Effort: Pulmonary effort is normal. No respiratory distress.      Breath sounds: Normal breath sounds. No wheezing, rhonchi or rales.   Abdominal:      General: There is no distension.      Palpations: Abdomen is soft.      Tenderness: There is no abdominal tenderness.   Musculoskeletal:         General: No swelling, deformity or signs of injury. Normal range of motion.      Cervical back: Normal range of motion.   Skin:     General: Skin is warm and dry.      Capillary Refill: Capillary refill takes less than 2 seconds.      Coloration: Skin is not jaundiced or pale.   Neurological:      General: No focal deficit present.      Mental Status: He is alert and oriented to person, place, and time.   Psychiatric:         Mood and Affect: Mood normal.         Behavior: Behavior normal.         Cognition and Memory: Memory is impaired.           Assessment:      ICD-10-CM ICD-9-CM   1. Hospital discharge follow-up  Z09 V67.59   2. Diarrhea, unspecified type  R19.7 787.91   3. Urinary incontinence, unspecified type  R32 788.30   4. Confusion  R41.0 298.9         Plan:  Discussed and reviewed recent hospital visit.  Unsure if confusion and memory loss is something new or alzheimer's.  Recent antibiotic usage, check for C. Diff. Due to diarrhea. Urinalysis for urinary incontinence.  Continue with follow-ups.  See labs below.       Orders Placed This Encounter   Procedures    CLOSTRIDIUM DIFFICILE    CBC Auto Differential    Comprehensive Metabolic Panel    Urinalysis, Reflex to Urine Culture Urine, Clean Catch        No follow-ups on file.      Damien Barron MD    Scribe Attestation:   Osmani MORAN, am scribing for, and in the presence  of, Dr.Arif Barron I performed the above scribed service and the documentation accurately describes the services I performed. I attest to the accuracy of the note.    I, Dr. Damien Barron, reviewed documentation as scribed above. I performed the services described in this documentation.  I agree that the record reflects my personal performance and is accurate and complete. Damien Barron MD.  10/23/2023

## 2023-10-24 ENCOUNTER — LAB VISIT (OUTPATIENT)
Dept: LAB | Facility: HOSPITAL | Age: 73
End: 2023-10-24
Attending: FAMILY MEDICINE
Payer: MEDICARE

## 2023-10-24 DIAGNOSIS — R19.7 DIARRHEA, UNSPECIFIED TYPE: ICD-10-CM

## 2023-10-24 DIAGNOSIS — R32 URINARY INCONTINENCE, UNSPECIFIED TYPE: ICD-10-CM

## 2023-10-24 DIAGNOSIS — Z09 HOSPITAL DISCHARGE FOLLOW-UP: ICD-10-CM

## 2023-10-24 LAB
ALBUMIN SERPL BCP-MCNC: 2.7 G/DL (ref 3.5–5.2)
ALP SERPL-CCNC: 154 U/L (ref 55–135)
ALT SERPL W/O P-5'-P-CCNC: 11 U/L (ref 10–44)
ANION GAP SERPL CALC-SCNC: 13 MMOL/L (ref 8–16)
AST SERPL-CCNC: 22 U/L (ref 10–40)
BACTERIA #/AREA URNS AUTO: ABNORMAL /HPF
BASOPHILS # BLD AUTO: 0.07 K/UL (ref 0–0.2)
BASOPHILS NFR BLD: 0.7 % (ref 0–1.9)
BILIRUB SERPL-MCNC: 0.4 MG/DL (ref 0.1–1)
BILIRUB UR QL STRIP: NEGATIVE
BUN SERPL-MCNC: 15 MG/DL (ref 8–23)
CALCIUM SERPL-MCNC: 9.4 MG/DL (ref 8.7–10.5)
CHLORIDE SERPL-SCNC: 105 MMOL/L (ref 95–110)
CLARITY UR REFRACT.AUTO: ABNORMAL
CO2 SERPL-SCNC: 20 MMOL/L (ref 23–29)
COLOR UR AUTO: YELLOW
CREAT SERPL-MCNC: 1.5 MG/DL (ref 0.5–1.4)
DIFFERENTIAL METHOD: ABNORMAL
EOSINOPHIL # BLD AUTO: 0.1 K/UL (ref 0–0.5)
EOSINOPHIL NFR BLD: 1.2 % (ref 0–8)
ERYTHROCYTE [DISTWIDTH] IN BLOOD BY AUTOMATED COUNT: 17.6 % (ref 11.5–14.5)
EST. GFR  (NO RACE VARIABLE): 49.5 ML/MIN/1.73 M^2
GLUCOSE SERPL-MCNC: 110 MG/DL (ref 70–110)
GLUCOSE UR QL STRIP: NEGATIVE
HCT VFR BLD AUTO: 30.8 % (ref 40–54)
HGB BLD-MCNC: 9.7 G/DL (ref 14–18)
HGB UR QL STRIP: ABNORMAL
HYALINE CASTS UR QL AUTO: 1 /LPF
IMM GRANULOCYTES # BLD AUTO: 0.03 K/UL (ref 0–0.04)
IMM GRANULOCYTES NFR BLD AUTO: 0.3 % (ref 0–0.5)
KETONES UR QL STRIP: NEGATIVE
LEUKOCYTE ESTERASE UR QL STRIP: ABNORMAL
LYMPHOCYTES # BLD AUTO: 2.3 K/UL (ref 1–4.8)
LYMPHOCYTES NFR BLD: 23 % (ref 18–48)
MCH RBC QN AUTO: 30.2 PG (ref 27–31)
MCHC RBC AUTO-ENTMCNC: 31.5 G/DL (ref 32–36)
MCV RBC AUTO: 96 FL (ref 82–98)
MICROSCOPIC COMMENT: ABNORMAL
MONOCYTES # BLD AUTO: 1.5 K/UL (ref 0.3–1)
MONOCYTES NFR BLD: 14.8 % (ref 4–15)
NEUTROPHILS # BLD AUTO: 6 K/UL (ref 1.8–7.7)
NEUTROPHILS NFR BLD: 60 % (ref 38–73)
NITRITE UR QL STRIP: NEGATIVE
NRBC BLD-RTO: 0 /100 WBC
PH UR STRIP: 6 [PH] (ref 5–8)
PLATELET # BLD AUTO: 267 K/UL (ref 150–450)
PMV BLD AUTO: 11.5 FL (ref 9.2–12.9)
POTASSIUM SERPL-SCNC: 4 MMOL/L (ref 3.5–5.1)
PROT SERPL-MCNC: 6.6 G/DL (ref 6–8.4)
PROT UR QL STRIP: ABNORMAL
RBC # BLD AUTO: 3.21 M/UL (ref 4.6–6.2)
RBC #/AREA URNS AUTO: >100 /HPF (ref 0–4)
SODIUM SERPL-SCNC: 138 MMOL/L (ref 136–145)
SP GR UR STRIP: 1.01 (ref 1–1.03)
SQUAMOUS #/AREA URNS AUTO: 1 /HPF
URN SPEC COLLECT METH UR: ABNORMAL
WBC # BLD AUTO: 10.01 K/UL (ref 3.9–12.7)
WBC #/AREA URNS AUTO: 100 /HPF (ref 0–5)
WBC CLUMPS UR QL AUTO: ABNORMAL

## 2023-10-24 PROCEDURE — 87449 NOS EACH ORGANISM AG IA: CPT | Mod: HCNC | Performed by: FAMILY MEDICINE

## 2023-10-25 LAB
C DIFF GDH STL QL: NEGATIVE
C DIFF TOX A+B STL QL IA: NEGATIVE

## 2023-10-26 ENCOUNTER — PATIENT MESSAGE (OUTPATIENT)
Dept: UROLOGY | Facility: CLINIC | Age: 73
End: 2023-10-26
Payer: MEDICARE

## 2023-10-26 ENCOUNTER — PATIENT MESSAGE (OUTPATIENT)
Dept: FAMILY MEDICINE | Facility: CLINIC | Age: 73
End: 2023-10-26
Payer: MEDICARE

## 2023-10-26 ENCOUNTER — PATIENT MESSAGE (OUTPATIENT)
Dept: PULMONOLOGY | Facility: CLINIC | Age: 73
End: 2023-10-26
Payer: MEDICARE

## 2023-10-26 ENCOUNTER — TELEPHONE (OUTPATIENT)
Dept: CARDIOLOGY | Facility: HOSPITAL | Age: 73
End: 2023-10-26
Payer: MEDICARE

## 2023-10-26 LAB
BACTERIA UR CULT: NORMAL
BACTERIA UR CULT: NORMAL
POCT GLUCOSE: 158 MG/DL (ref 70–110)

## 2023-10-27 RX ORDER — CIPROFLOXACIN 250 MG/1
250 TABLET, FILM COATED ORAL 2 TIMES DAILY
Qty: 10 TABLET | Refills: 0 | Status: ON HOLD | OUTPATIENT
Start: 2023-10-27 | End: 2023-11-29 | Stop reason: HOSPADM

## 2023-10-30 ENCOUNTER — TELEPHONE (OUTPATIENT)
Dept: PULMONOLOGY | Facility: CLINIC | Age: 73
End: 2023-10-30
Payer: MEDICARE

## 2023-10-30 ENCOUNTER — OFFICE VISIT (OUTPATIENT)
Dept: PULMONOLOGY | Facility: CLINIC | Age: 73
End: 2023-10-30
Payer: MEDICARE

## 2023-10-30 VITALS
SYSTOLIC BLOOD PRESSURE: 132 MMHG | HEART RATE: 76 BPM | WEIGHT: 132 LBS | BODY MASS INDEX: 20 KG/M2 | OXYGEN SATURATION: 98 % | DIASTOLIC BLOOD PRESSURE: 85 MMHG | HEIGHT: 68 IN

## 2023-10-30 DIAGNOSIS — N18.30 CONTROLLED TYPE 2 DIABETES MELLITUS WITH STAGE 3 CHRONIC KIDNEY DISEASE, WITHOUT LONG-TERM CURRENT USE OF INSULIN: ICD-10-CM

## 2023-10-30 DIAGNOSIS — E11.22 CONTROLLED TYPE 2 DIABETES MELLITUS WITH STAGE 3 CHRONIC KIDNEY DISEASE, WITHOUT LONG-TERM CURRENT USE OF INSULIN: ICD-10-CM

## 2023-10-30 DIAGNOSIS — E78.2 MIXED HYPERLIPIDEMIA: ICD-10-CM

## 2023-10-30 DIAGNOSIS — G47.00 INSOMNIA, UNSPECIFIED TYPE: ICD-10-CM

## 2023-10-30 DIAGNOSIS — F01.50 MIXED ALZHEIMER'S AND VASCULAR DEMENTIA: ICD-10-CM

## 2023-10-30 DIAGNOSIS — J47.9 CYLINDRICAL BRONCHIECTASIS: Primary | ICD-10-CM

## 2023-10-30 DIAGNOSIS — Z86.718 HISTORY OF DVT OF LOWER EXTREMITY: ICD-10-CM

## 2023-10-30 DIAGNOSIS — T17.500A MUCOID IMPACTION OF BRONCHI: ICD-10-CM

## 2023-10-30 DIAGNOSIS — G30.9 MIXED ALZHEIMER'S AND VASCULAR DEMENTIA: ICD-10-CM

## 2023-10-30 DIAGNOSIS — F02.80 MIXED ALZHEIMER'S AND VASCULAR DEMENTIA: ICD-10-CM

## 2023-10-30 PROCEDURE — 99213 PR OFFICE/OUTPT VISIT, EST, LEVL III, 20-29 MIN: ICD-10-PCS | Mod: HCNC,95,, | Performed by: INTERNAL MEDICINE

## 2023-10-30 PROCEDURE — 1159F PR MEDICATION LIST DOCUMENTED IN MEDICAL RECORD: ICD-10-PCS | Mod: HCNC,CPTII,95, | Performed by: INTERNAL MEDICINE

## 2023-10-30 PROCEDURE — 3075F PR MOST RECENT SYSTOLIC BLOOD PRESS GE 130-139MM HG: ICD-10-PCS | Mod: HCNC,CPTII,95, | Performed by: INTERNAL MEDICINE

## 2023-10-30 PROCEDURE — 3044F HG A1C LEVEL LT 7.0%: CPT | Mod: HCNC,CPTII,95, | Performed by: INTERNAL MEDICINE

## 2023-10-30 PROCEDURE — 3008F BODY MASS INDEX DOCD: CPT | Mod: HCNC,CPTII,95, | Performed by: INTERNAL MEDICINE

## 2023-10-30 PROCEDURE — 3079F PR MOST RECENT DIASTOLIC BLOOD PRESSURE 80-89 MM HG: ICD-10-PCS | Mod: HCNC,CPTII,95, | Performed by: INTERNAL MEDICINE

## 2023-10-30 PROCEDURE — 3288F FALL RISK ASSESSMENT DOCD: CPT | Mod: HCNC,CPTII,95, | Performed by: INTERNAL MEDICINE

## 2023-10-30 PROCEDURE — 3079F DIAST BP 80-89 MM HG: CPT | Mod: HCNC,CPTII,95, | Performed by: INTERNAL MEDICINE

## 2023-10-30 PROCEDURE — 3288F PR FALLS RISK ASSESSMENT DOCUMENTED: ICD-10-PCS | Mod: HCNC,CPTII,95, | Performed by: INTERNAL MEDICINE

## 2023-10-30 PROCEDURE — 1111F DSCHRG MED/CURRENT MED MERGE: CPT | Mod: HCNC,CPTII,95, | Performed by: INTERNAL MEDICINE

## 2023-10-30 PROCEDURE — 3075F SYST BP GE 130 - 139MM HG: CPT | Mod: HCNC,CPTII,95, | Performed by: INTERNAL MEDICINE

## 2023-10-30 PROCEDURE — 99213 OFFICE O/P EST LOW 20 MIN: CPT | Mod: HCNC,95,, | Performed by: INTERNAL MEDICINE

## 2023-10-30 PROCEDURE — 1160F RVW MEDS BY RX/DR IN RCRD: CPT | Mod: HCNC,CPTII,95, | Performed by: INTERNAL MEDICINE

## 2023-10-30 PROCEDURE — 3008F PR BODY MASS INDEX (BMI) DOCUMENTED: ICD-10-PCS | Mod: HCNC,CPTII,95, | Performed by: INTERNAL MEDICINE

## 2023-10-30 PROCEDURE — 3044F PR MOST RECENT HEMOGLOBIN A1C LEVEL <7.0%: ICD-10-PCS | Mod: HCNC,CPTII,95, | Performed by: INTERNAL MEDICINE

## 2023-10-30 PROCEDURE — 1159F MED LIST DOCD IN RCRD: CPT | Mod: HCNC,CPTII,95, | Performed by: INTERNAL MEDICINE

## 2023-10-30 PROCEDURE — 1100F PTFALLS ASSESS-DOCD GE2>/YR: CPT | Mod: HCNC,CPTII,95, | Performed by: INTERNAL MEDICINE

## 2023-10-30 PROCEDURE — 1100F PR PT FALLS ASSESS DOC 2+ FALLS/FALL W/INJURY/YR: ICD-10-PCS | Mod: HCNC,CPTII,95, | Performed by: INTERNAL MEDICINE

## 2023-10-30 PROCEDURE — 1111F PR DISCHARGE MEDS RECONCILED W/ CURRENT OUTPATIENT MED LIST: ICD-10-PCS | Mod: HCNC,CPTII,95, | Performed by: INTERNAL MEDICINE

## 2023-10-30 PROCEDURE — 1160F PR REVIEW ALL MEDS BY PRESCRIBER/CLIN PHARMACIST DOCUMENTED: ICD-10-PCS | Mod: HCNC,CPTII,95, | Performed by: INTERNAL MEDICINE

## 2023-10-30 NOTE — PROGRESS NOTES
The patient location is: [facility]  The chief complaint leading to consultation is: followup post hospital stay    Visit type: audiovisual    Face to Face time with patient: 6     30 minutes of total time spent on the encounter, which includes face to face time and non-face to face time preparing to see the patient (eg, review of tests), Obtaining and/or reviewing separately obtained history, Documenting clinical information in the electronic or other health record, Independently interpreting results (not separately reported) and communicating results to the patient/family/caregiver, or Care coordination (not separately reported).         Each patient to whom he or she provides medical services by telemedicine is:  (1) informed of the relationship between the physician and patient and the respective role of any other health care provider with respect to management of the patient; and (2) notified that he or she may decline to receive medical services by telemedicine and may withdraw from such care at any time.    Notes:                                             Pulmonary Outpatient  Visit     Subjective:       Patient ID: Samy Alejandre Jr. is a 73 y.o. male.    Chief Complaint: COPD        Samy Alejandre Jr. Is 72 y.o.  Referred by Doris Yanes MD  [address] [place] [address] [address] [id number]   Last seen March 2022: DVT and Abn chest CT  Grew RANJANA Shah and recently Staph  Treated with Zyvox, 10 days, was unable to tolerate full therapy  Daughter: here  Cough better, Sputum better. Weigh loss: had esophageal dilation:   Needs dilatation  On Elliquis for DVT  Adherent on DOAC      10/02/2023  Followup  Cough, congestion, yellow sputum  Completed course Doxy  Still has cough congestion  Recent chest CT reviewed  Mucous plugging  Daughter also says dizzy sspells bradycardia  Today HR 38        10/30/2023  Followup  Daughter on video  Last visit: sent to hospital: bradycardia: PPM placed  Also had  cystoscopy: stent placed  Doing better  Cough resolving, pain resolved  Not wearing Oxygen  Has cardiology Friday and Appt with urology  Needs lithotripsy  Currently on Eliquis  UTI for Abx: Cipro  Using nebulizer  ? Home health : NO, PCP will order  Swallow study results reviewed                   [facility] - Telemetry ([facility])  [facility] Medicine  Discharge Summary        Patient Name: Samy Alejandre Jr.  MRN: [id number]  JOAQUIM: [id number]  Patient Class: IP- Inpatient  Admission Date: 10/2/2023  Hospital Length of Stay: 7 days  Discharge Date and Time:  10/09/2023 10:29 AM  Attending Physician: Binh Harris,*   Discharging Provider: Binh Boykin MD  Primary Care Provider: Damien Barron MD     Primary Care Team: [facility] MEDICINE D     HPI:   73-year-old male with a known past medical history of tobacco abuse, COPD, chronic bronchitis, diabetes type 2, Alzheimer's, and aspiration that presented to the ER 10/2 from his pulmonologist's office after his heart rate was noted to be in the 30s.He reports over the past 6-8 weeks he has become increasingly weak with decreased activity, decreased appetite, and spells of dizziness. He reports 1 possible syncopal episode where he fell about 6 weeks ago.  Workup in the ER revealed lab work with a creatinine of 2.5 (creatinine at baseline is 1.5-1.7) and a metabolic acidosis.  EKG in the ER with a rate of 36 and AV dissociation with narrow QRS complex for which cardiology was consulted.  Admitted to ICU with a Dx of complete heart block , aspiration PNA and MARI .  Cardiology consulted  which placed a dual chamber pace maker . Started on IVAB for aspiration PNA , sputum cx (+) for pseudomonas  . SLP consulted which rec soft diet with thin liquid . IM consulted  to assume care .        Procedure(s) (LRB):  CYSTOSCOPY, WITH RETROGRADE PYELOGRAM AND URETERAL STENT INSERTION (Left)       Hospital Course:   73-year-old male, was admitted to the Intensive Care  Unit (ICU) with a diagnosis of complete heart block, acute kidney injury (MARI), and Pseudomonas pneumonia. He underwent dual chamber pacemaker placement by the cardiology team to address the complete heart block. During his hospitalization, his kidney function initially  gradually improved with intravenous fluids (IVF). Physical therapy (PT) and occupational therapy (OT) recommended home health (HH) support for his recovery.Blood cultures were obtained and were negative, while sputum cultures showed growth of Pseudomonas with sensitivities  to Levaquin   On 10/7, renal function  started worsening ,and a retroperitoneal ultrasound demonstrated mild fullness of the renal pelvis without estefania hydronephrosis. Nephrology was consulted.  On 10/8, a CT abdominal scan revealed left-sided hydronephrosis, bilateral subcentimeter non-obstructing renal calculi, and an obstructing proximal left ureteral calculi measuring up to 5 mm superiorly and up to 3 mm inferiorly. No right-sided hydronephrosis was noted. Despite these findings, the patient's kidney function remained elevated. Urology was consulted, and a plan was made for possible cystoscopy and stent placement.  On 10/9, the patient was assessed at the bedside and deemed suitable for discharge. He underwent cystoscopy with stent placement by the urology team without any complications. His kidney function is improving post stent .  The patient will be discharged on oral Levaquin at a renal dose to treat the Pseudomonas pneumonia. He will be instructed to follow up with the following specialists within the next 1 to 2 weeks:  Urology: For further evaluation and management of renal calculi and stent placement.  Nephrology: To monitor kidney function and ongoing recovery.  Cardiology: To follow up on the dual chamber pacemaker placement.  Primary Care Physician (PCP): For comprehensive post-hospitalization care and management of chronic conditions.  The patient and his  caregivers have been educated on medication management, dietary recommendations, and the importance of continued medical follow-up. They have been advised to seek immediate medical attention for any concerning symptoms or issues        Goals of Care Treatment Preferences:  Code Status: Full Code              The following portions of the patient's history were reviewed and updated as appropriate: He  has a past medical history of Anxiety, Depression, Diabetes mellitus, type 2, Hypertension, and Stroke.  He does not have any pertinent problems on file.  He  has a past surgical history that includes Knee surgery (2012); Back surgery (2015); Lithotripsy (2000); Angiography of lower extremity (N/A, 12/21/2020); Cataract extraction; Bronchoscopy (Left, 3/24/2022); Esophagogastroduodenoscopy (N/A, 8/24/2022); Esophagogastroduodenoscopy (N/A, 9/14/2022); A-V cardiac pacemaker insertion (Left, 10/4/2023); and Cystoscopy with ureteroscopy, retrograde pyelography, and insertion of stent (Left, 10/8/2023).  His family history includes COPD in his sister; Cancer in his father; Heart disease in his mother; Stroke in his mother.  He  reports that he has been smoking cigarettes. His smokeless tobacco use includes snuff. He reports current alcohol use of about 1.0 standard drink of alcohol per week. He reports that he does not use drugs.  He has a current medication list which includes the following prescription(s): albuterol, albuterol, true metrix level 1, blood glucose control, normal, ciprofloxacin hcl, donepezil, duloxetine, eliquis, fluoxetine, gabapentin, hydrocodone-acetaminophen, breatherite spacer-mask,adult, lancets, memantine, mucus clearing device, pantoprazole, quetiapine, rosuvastatin, tamsulosin, ultra-light rollator, and oxybutynin.  Current Outpatient Medications on File Prior to Visit   Medication Sig Dispense Refill    albuterol (ACCUNEB) 1.25 mg/3 mL Nebu Take 3 mLs (1.25 mg total) by nebulization 2 (two) times  a day. Rescue 180 mL 11    albuterol (PROVENTIL/VENTOLIN HFA) 90 mcg/actuation inhaler Inhale 2 puffs into the lungs every 4 (four) hours as needed for Wheezing. 18 g 2    blood glucose control, low (TRUE METRIX LEVEL 1) Soln 1 drop by Misc.(Non-Drug; Combo Route) route once daily. 1 each 4    blood glucose control, normal (TRUE METRIX LEVEL 2) Soln 1 drop by Misc.(Non-Drug; Combo Route) route once daily. 1 each 4    ciprofloxacin HCl (CIPRO) 250 MG tablet Take 1 tablet (250 mg total) by mouth 2 (two) times daily. 10 tablet 0    donepeziL (ARICEPT) 10 MG tablet Take 1 tablet (10 mg total) by mouth every evening. 30 tablet 11    DULoxetine (CYMBALTA) 30 MG capsule Take 1 capsule by mouth once daily 90 capsule 0    ELIQUIS 5 mg Tab Take 1 tablet by mouth once daily 90 tablet 3    FLUoxetine 20 MG capsule Take 1 capsule by mouth once daily 90 capsule 3    gabapentin (NEURONTIN) 100 MG capsule Take 1 capsule (100 mg total) by mouth 3 (three) times daily. 90 capsule 11    HYDROcodone-acetaminophen (NORCO)  mg per tablet Take 1 tablet by mouth.      inhalat.spacing dev,large mask (BREATHERITE SPACER-MASK,ADULT) Spcr Use as directed for inhalation. 1 each 1    lancets 32 gauge Misc 1 lancet by Misc.(Non-Drug; Combo Route) route once daily. 30 each 4    memantine (NAMENDA) 10 MG Tab TAKE 1/2 TABLET BY MOUTH TWICE DAILY FOR 1 WEEK, THEN 1 TABLET BY MOUTH TWICE DAILY 180 tablet 3    mucus clearing device (AEROBIKA OSCILLATING PEP SYSTM) by Misc.(Non-Drug; Combo Route) route 4 (four) times daily as needed. 1 each 0    pantoprazole (PROTONIX) 40 MG tablet Take 1 tablet (40 mg total) by mouth once daily. 30 tablet 6    QUEtiapine (SEROQUEL) 50 MG tablet Take 1 tablet by mouth twice daily (Patient taking differently: Take 50 mg by mouth nightly.) 180 tablet 3    rosuvastatin (CRESTOR) 20 MG tablet Take 1 tablet by mouth once daily (Patient taking differently: Take 20 mg by mouth every evening.) 90 tablet 1    tamsulosin  (FLOMAX) 0.4 mg Cap Take 1 capsule (0.4 mg total) by mouth every evening. 30 capsule 3    walker (ULTRA-LIGHT ROLLATOR) Misc Use daily for ambulation 1 each 0    oxybutynin (DITROPAN-XL) 5 MG TR24 Take 5 mg by mouth once daily.       No current facility-administered medications on file prior to visit.     He is allergic to chantix [varenicline]..      Review of Systems   Constitutional:  Positive for weight loss, appetite change and fatigue.   HENT: Negative.     Eyes: Negative.    Respiratory:  Positive for cough.    Genitourinary: Negative.    Endocrine: endocrine negative    Musculoskeletal: Negative.    Skin: Negative.    Gastrointestinal: Negative.    Neurological: Negative.    Psychiatric/Behavioral: Negative.     All other systems reviewed and are negative.      Outpatient Encounter Medications as of 10/30/2023   Medication Sig Dispense Refill    albuterol (ACCUNEB) 1.25 mg/3 mL Nebu Take 3 mLs (1.25 mg total) by nebulization 2 (two) times a day. Rescue 180 mL 11    albuterol (PROVENTIL/VENTOLIN HFA) 90 mcg/actuation inhaler Inhale 2 puffs into the lungs every 4 (four) hours as needed for Wheezing. 18 g 2    blood glucose control, low (TRUE METRIX LEVEL 1) Soln 1 drop by Misc.(Non-Drug; Combo Route) route once daily. 1 each 4    blood glucose control, normal (TRUE METRIX LEVEL 2) Soln 1 drop by Misc.(Non-Drug; Combo Route) route once daily. 1 each 4    ciprofloxacin HCl (CIPRO) 250 MG tablet Take 1 tablet (250 mg total) by mouth 2 (two) times daily. 10 tablet 0    donepeziL (ARICEPT) 10 MG tablet Take 1 tablet (10 mg total) by mouth every evening. 30 tablet 11    DULoxetine (CYMBALTA) 30 MG capsule Take 1 capsule by mouth once daily 90 capsule 0    ELIQUIS 5 mg Tab Take 1 tablet by mouth once daily 90 tablet 3    FLUoxetine 20 MG capsule Take 1 capsule by mouth once daily 90 capsule 3    gabapentin (NEURONTIN) 100 MG capsule Take 1 capsule (100 mg total) by mouth 3 (three) times daily. 90 capsule 11     "HYDROcodone-acetaminophen (NORCO)  mg per tablet Take 1 tablet by mouth.      inhalat.spacing dev,large mask (BREATHERITE SPACER-MASK,ADULT) Spcr Use as directed for inhalation. 1 each 1    lancets 32 gauge Misc 1 lancet by Misc.(Non-Drug; Combo Route) route once daily. 30 each 4    memantine (NAMENDA) 10 MG Tab TAKE 1/2 TABLET BY MOUTH TWICE DAILY FOR 1 WEEK, THEN 1 TABLET BY MOUTH TWICE DAILY 180 tablet 3    mucus clearing device (AEROBIKA OSCILLATING PEP SYSTM) by Misc.(Non-Drug; Combo Route) route 4 (four) times daily as needed. 1 each 0    pantoprazole (PROTONIX) 40 MG tablet Take 1 tablet (40 mg total) by mouth once daily. 30 tablet 6    QUEtiapine (SEROQUEL) 50 MG tablet Take 1 tablet by mouth twice daily (Patient taking differently: Take 50 mg by mouth nightly.) 180 tablet 3    rosuvastatin (CRESTOR) 20 MG tablet Take 1 tablet by mouth once daily (Patient taking differently: Take 20 mg by mouth every evening.) 90 tablet 1    tamsulosin (FLOMAX) 0.4 mg Cap Take 1 capsule (0.4 mg total) by mouth every evening. 30 capsule 3    walker (ULTRA-LIGHT ROLLATOR) Misc Use daily for ambulation 1 each 0    oxybutynin (DITROPAN-XL) 5 MG TR24 Take 5 mg by mouth once daily.       No facility-administered encounter medications on file as of 10/30/2023.       Objective:     Vital Signs (Most Recent)  Vital Signs  Pulse: 76  SpO2: 98 %  BP: 132/85  Height and Weight  Height: 5' 8" (172.7 cm)  Weight: 59.9 kg (132 lb)  BSA (Calculated - sq m): 1.69 sq meters  BMI (Calculated): 20.1  Weight in (lb) to have BMI = 25: 164.1]  Wt Readings from Last 2 Encounters:   10/30/23 59.9 kg (132 lb)   10/23/23 60.9 kg (134 lb 4.2 oz)       Physical Exam   Constitutional: He is oriented to person, place, and time. He appears cachectic.   HENT:   Head: Normocephalic.   Cardiovascular: Normal rate and regular rhythm.   No murmur heard.  Pulmonary/Chest: Normal expansion.   Abdominal: Bowel sounds are normal.   Musculoskeletal:         " "General: Normal range of motion.      Cervical back: Normal range of motion.      Comments: wheelchair   Lymphadenopathy:     He has no axillary adenopathy.   Neurological: He is alert and oriented to person, place, and time.   Skin: Skin is warm.   Psychiatric: He has a normal mood and affect.   Nursing note and vitals reviewed.      Laboratory  Lab Results   Component Value Date    WBC 10.01 10/23/2023    RBC 3.21 (L) 10/23/2023    HGB 9.7 (L) 10/23/2023    HCT 30.8 (L) 10/23/2023    MCV 96 10/23/2023    MCH 30.2 10/23/2023    MCHC 31.5 (L) 10/23/2023    RDW 17.6 (H) 10/23/2023     10/23/2023    MPV 11.5 10/23/2023    GRAN 6.0 10/23/2023    GRAN 60.0 10/23/2023    LYMPH 2.3 10/23/2023    LYMPH 23.0 10/23/2023    MONO 1.5 (H) 10/23/2023    MONO 14.8 10/23/2023    EOS 0.1 10/23/2023    BASO 0.07 10/23/2023    EOSINOPHIL 1.2 10/23/2023    BASOPHIL 0.7 10/23/2023       BMP  Lab Results   Component Value Date     10/23/2023    K 4.0 10/23/2023     10/23/2023    CO2 20 (L) 10/23/2023    BUN 15 10/23/2023    CREATININE 1.5 (H) 10/23/2023    CALCIUM 9.4 10/23/2023    ANIONGAP 13 10/23/2023    ESTGFRAFRICA >60 07/31/2022    EGFRNONAA >60 07/31/2022    AST 22 10/23/2023    ALT 11 10/23/2023    PROT 6.6 10/23/2023       Lab Results   Component Value Date    BNP 72 10/02/2023    BNP 20 07/31/2022    BNP 23 03/20/2022       Lab Results   Component Value Date    TSH 1.963 10/02/2023       Lab Results   Component Value Date    SEDRATE 70 (H) 03/21/2022       Lab Results   Component Value Date    .2 (H) 03/21/2022     No results found for: "IGE"     No results found for: "ASPERGILLUS"  No results found for: "AFUMIGATUSCL"     No results found for: "ACE"     Diagnostic Results:  I have personally reviewed today the following studies:             Cardiac device check - [facility]  Additional Comments  IN-OFFICE device interrogation and testing performed    MRI safe after 11/18/23  Pacer & leads " implanted 10/4/23, Abdallah  Bio Edora 8 FREDY, [id number], PID: 36  A lead: Bio Solia S53, [id number]   V lead: Bio Solia S60, [id number]  DDD-CLS 60/130, delays 240/200ms    Incision: 4 days post implant.  AquaCel dressing in place, removed using aseptic technique. Lt upper chest, staples present, removed using aseptic technique.  Incision clean, dry, healing. Edges well approximated, bruising and swelling diminishing, no oozing/exudate, no hematoma noted on palpation.  Skin w/d/n, not noticeably warmer than surrounding area.  Discussed signs and symptoms of infection and monitoring incision site.  Re-emphasized arm restrictions on side of device.    Presenting egram demonstrates: AsVp    Underlying rhythm c/w: SR @ 72 bpm, 2:1 conduction    RA pacing 70%, RV pacing 100%, PVCs 0%    Battery Status/Longevity: 8y 2m, 100% remaining    Atrial arrhythmias: none    Ventricular arrhythmias: none    Anticoagulant: Eliquis    CHF: Th impedance 50 ohms    Reprogramming at this visit: none    Nurse note: Issued home monitor during visit    F/U via clinic interrogation in 3 mos    Interrogation performed by ERIBERTO Johnson RN     Report prepared by elvie              Assessment/Plan:     Problem List Items Addressed This Visit       Insomnia    Mucoid impaction of bronchi    Cylindrical bronchiectasis - Primary    Relevant Orders    X-Ray Chest PA And Lateral    Spirometry with/without bronchodilator    Controlled type 2 diabetes mellitus with stage 3 chronic kidney disease, without long-term current use of insulin    Mixed hyperlipidemia    History of DVT of lower extremity    Mixed Alzheimer's and vascular dementia              Follow up in about 3 months (around 1/30/2024), or CXR, tracey.    This note was prepared using voice recognition system and is likely to have sound alike errors that may have been overlooked even after proof reading.  Please call me with any questions    Discussed diagnosis, its evaluation, treatment and  usual course. All questions answered.      Simon Mckeon MD

## 2023-11-02 DIAGNOSIS — F32.A DEPRESSION, UNSPECIFIED DEPRESSION TYPE: ICD-10-CM

## 2023-11-02 NOTE — TELEPHONE ENCOUNTER
Refill Routing Note   Medication(s) are not appropriate for processing by [facility] for the following reason(s):      Required labs abnormal    [facility] action(s):  Defer Care Due:  None identified            Pharmacist review requested: Yes     Appointments  past 12m or future 3m with PCP    Date Provider   Last Visit   10/23/2023 Damien Barron MD   Next Visit   Visit date not found Damien Barron MD   ED visits in past 90 days: 0        Note composed:5:09 PM 11/02/2023

## 2023-11-02 NOTE — TELEPHONE ENCOUNTER
No care due was identified.  [facility] Embedded Care Due Messages. Reference number: [id number].   11/02/2023 12:13:40 PM CDT

## 2023-11-03 RX ORDER — DULOXETIN HYDROCHLORIDE 30 MG/1
30 CAPSULE, DELAYED RELEASE ORAL
Qty: 90 CAPSULE | Refills: 3 | Status: ON HOLD | OUTPATIENT
Start: 2023-11-03 | End: 2023-12-18 | Stop reason: HOSPADM

## 2023-11-03 NOTE — TELEPHONE ENCOUNTER
Refill Decision Note   Samy Hill  is requesting a refill authorization.  Brief Assessment and Rationale for Refill:  Approve     Medication Therapy Plan:       Medication Reconciliation Completed: No   Comments:     No Care Gaps recommended.     Note composed:2:11 PM 11/03/2023

## 2023-11-16 ENCOUNTER — TELEPHONE (OUTPATIENT)
Dept: FAMILY MEDICINE | Facility: CLINIC | Age: 73
End: 2023-11-16
Payer: MEDICARE

## 2023-11-16 DIAGNOSIS — N18.30 STAGE 3 CHRONIC KIDNEY DISEASE, UNSPECIFIED WHETHER STAGE 3A OR 3B CKD: Primary | ICD-10-CM

## 2023-11-17 ENCOUNTER — OFFICE VISIT (OUTPATIENT)
Dept: UROLOGY | Facility: CLINIC | Age: 73
End: 2023-11-17
Payer: MEDICARE

## 2023-11-17 ENCOUNTER — TELEPHONE (OUTPATIENT)
Dept: PULMONOLOGY | Facility: CLINIC | Age: 73
End: 2023-11-17
Payer: MEDICARE

## 2023-11-17 VITALS
BODY MASS INDEX: 20.07 KG/M2 | HEART RATE: 73 BPM | SYSTOLIC BLOOD PRESSURE: 125 MMHG | WEIGHT: 132 LBS | DIASTOLIC BLOOD PRESSURE: 80 MMHG

## 2023-11-17 DIAGNOSIS — Z01.818 PRE-OP TESTING: ICD-10-CM

## 2023-11-17 DIAGNOSIS — N20.0 KIDNEY STONE: ICD-10-CM

## 2023-11-17 DIAGNOSIS — N17.9 AKI (ACUTE KIDNEY INJURY): ICD-10-CM

## 2023-11-17 DIAGNOSIS — N20.1 LEFT URETERAL STONE: Primary | ICD-10-CM

## 2023-11-17 PROCEDURE — 3288F FALL RISK ASSESSMENT DOCD: CPT | Mod: HCNC,CPTII,S$GLB, | Performed by: UROLOGY

## 2023-11-17 PROCEDURE — 3008F BODY MASS INDEX DOCD: CPT | Mod: HCNC,CPTII,S$GLB, | Performed by: UROLOGY

## 2023-11-17 PROCEDURE — 99999 PR PBB SHADOW E&M-EST. PATIENT-LVL V: ICD-10-PCS | Mod: PBBFAC,HCNC,, | Performed by: UROLOGY

## 2023-11-17 PROCEDURE — 3079F PR MOST RECENT DIASTOLIC BLOOD PRESSURE 80-89 MM HG: ICD-10-PCS | Mod: HCNC,CPTII,S$GLB, | Performed by: UROLOGY

## 2023-11-17 PROCEDURE — 1159F MED LIST DOCD IN RCRD: CPT | Mod: HCNC,CPTII,S$GLB, | Performed by: UROLOGY

## 2023-11-17 PROCEDURE — 3079F DIAST BP 80-89 MM HG: CPT | Mod: HCNC,CPTII,S$GLB, | Performed by: UROLOGY

## 2023-11-17 PROCEDURE — 1160F RVW MEDS BY RX/DR IN RCRD: CPT | Mod: HCNC,CPTII,S$GLB, | Performed by: UROLOGY

## 2023-11-17 PROCEDURE — 99214 OFFICE O/P EST MOD 30 MIN: CPT | Mod: HCNC,S$GLB,, | Performed by: UROLOGY

## 2023-11-17 PROCEDURE — 3044F PR MOST RECENT HEMOGLOBIN A1C LEVEL <7.0%: ICD-10-PCS | Mod: HCNC,CPTII,S$GLB, | Performed by: UROLOGY

## 2023-11-17 PROCEDURE — 3008F PR BODY MASS INDEX (BMI) DOCUMENTED: ICD-10-PCS | Mod: HCNC,CPTII,S$GLB, | Performed by: UROLOGY

## 2023-11-17 PROCEDURE — 3288F PR FALLS RISK ASSESSMENT DOCUMENTED: ICD-10-PCS | Mod: HCNC,CPTII,S$GLB, | Performed by: UROLOGY

## 2023-11-17 PROCEDURE — 1159F PR MEDICATION LIST DOCUMENTED IN MEDICAL RECORD: ICD-10-PCS | Mod: HCNC,CPTII,S$GLB, | Performed by: UROLOGY

## 2023-11-17 PROCEDURE — 1160F PR REVIEW ALL MEDS BY PRESCRIBER/CLIN PHARMACIST DOCUMENTED: ICD-10-PCS | Mod: HCNC,CPTII,S$GLB, | Performed by: UROLOGY

## 2023-11-17 PROCEDURE — 3044F HG A1C LEVEL LT 7.0%: CPT | Mod: HCNC,CPTII,S$GLB, | Performed by: UROLOGY

## 2023-11-17 PROCEDURE — 1101F PR PT FALLS ASSESS DOC 0-1 FALLS W/OUT INJ PAST YR: ICD-10-PCS | Mod: HCNC,CPTII,S$GLB, | Performed by: UROLOGY

## 2023-11-17 PROCEDURE — 3074F SYST BP LT 130 MM HG: CPT | Mod: HCNC,CPTII,S$GLB, | Performed by: UROLOGY

## 2023-11-17 PROCEDURE — 1126F AMNT PAIN NOTED NONE PRSNT: CPT | Mod: HCNC,CPTII,S$GLB, | Performed by: UROLOGY

## 2023-11-17 PROCEDURE — 99214 PR OFFICE/OUTPT VISIT, EST, LEVL IV, 30-39 MIN: ICD-10-PCS | Mod: HCNC,S$GLB,, | Performed by: UROLOGY

## 2023-11-17 PROCEDURE — 3074F PR MOST RECENT SYSTOLIC BLOOD PRESSURE < 130 MM HG: ICD-10-PCS | Mod: HCNC,CPTII,S$GLB, | Performed by: UROLOGY

## 2023-11-17 PROCEDURE — 1101F PT FALLS ASSESS-DOCD LE1/YR: CPT | Mod: HCNC,CPTII,S$GLB, | Performed by: UROLOGY

## 2023-11-17 PROCEDURE — 1126F PR PAIN SEVERITY QUANTIFIED, NO PAIN PRESENT: ICD-10-PCS | Mod: HCNC,CPTII,S$GLB, | Performed by: UROLOGY

## 2023-11-17 PROCEDURE — 99999 PR PBB SHADOW E&M-EST. PATIENT-LVL V: CPT | Mod: PBBFAC,HCNC,, | Performed by: UROLOGY

## 2023-11-17 RX ORDER — MIRABEGRON 25 MG/1
25 TABLET, FILM COATED, EXTENDED RELEASE ORAL DAILY
Qty: 30 TABLET | Refills: 11 | Status: SHIPPED | OUTPATIENT
Start: 2023-11-17 | End: 2024-11-16

## 2023-11-17 NOTE — PROGRESS NOTES
Chief Complaint:  Left ureteral stone     HPI:   11/17/2023 - patient returns today for follow-up, has been having lot issues with urgency and frequency due to the stent, overall tolerating the stent pretty well, no new voiding issues, no gross hematuria or dysuria    10/08/2023 - 73 y.o. male with past medical history COPD, chronic bronchitis, diabetes, and complete heart block status post recent pacemaker placement.  Patient has been convalescing in the hospital after his procedure and was noted to have decreasing renal function.  CT scan was obtained which showed a three and 5 mm left proximal ureteral stone.  Patient does have a history of kidney stones, has undergone a procedure in the past but was many years ago.  Has not seen a urologist in quite some time.  He voids with a good stream and feels like he empties well.  He is very uncomfortable appearing.  Denies gross hematuria.  Denies fevers.  Urinalysis negative for infection        PMH:       Past Medical History:   Diagnosis Date    Anxiety      Depression      Diabetes mellitus, type 2      Hypertension      Stroke           PSH:        Past Surgical History:   Procedure Laterality Date    A-V CARDIAC PACEMAKER INSERTION Left 10/4/2023     Procedure: INSERTION, CARDIAC PACEMAKER, DUAL CHAMBER;  Surgeon: Delonte Lindsey MD;  Location: [facility] CATH LAB;  Service: Cardiology;  Laterality: Left;  biotronik    ANGIOGRAPHY OF LOWER EXTREMITY N/A 12/21/2020     Procedure: ANGIOGRAM, LOWER EXTREMITY/Rt leg poss pta/stent;  Surgeon: Todd Michel MD;  Location: [facility] CATH LAB;  Service: Peripheral Vascular;  Laterality: N/A;  rescheduled 12/8    BACK SURGERY   2015    BRONCHOSCOPY Left 3/24/2022     Procedure: Bronchoscopy;  Surgeon: Edward Williamson MD;  Location: [facility] ENDO;  Service: Pulmonary;  Laterality: Left;  poss endobronchial biopsy or brushing    CATARACT EXTRACTION        ESOPHAGOGASTRODUODENOSCOPY N/A 8/24/2022     Procedure: EGD  (ESOPHAGOGASTRODUODENOSCOPY);  Surgeon: Ana Gomez MD;  Location: [facility];  Service: Endoscopy;  Laterality: N/A;    ESOPHAGOGASTRODUODENOSCOPY N/A 9/14/2022     Procedure: EGD (ESOPHAGOGASTRODUODENOSCOPY);  Surgeon: Ana Gomez MD;  Location: [facility];  Service: Endoscopy;  Laterality: N/A;    KNEE SURGERY   2012    LITHOTRIPSY   2000         Family History:        Family History   Problem Relation Age of Onset    Heart disease Mother      Stroke Mother      Cancer Father      COPD Sister           Social History:  Social History            Tobacco Use    Smoking status: Every Day       Current packs/day: 1.00       Types: Cigarettes    Smokeless tobacco: Current       Types: Snuff   Substance Use Topics    Alcohol use: Yes       Alcohol/week: 1.0 standard drink of alcohol       Types: 1 Shots of liquor per week       Comment: Daily    Drug use: Never         Review of Systems:  General: No fever, chills  Skin: No rashes  Chest:  Denies cough and sputum production  Heart: Denies chest pain  Resp: Denies dyspnea  Abdomen: Denies diarrhea, abdominal pain, hematemesis, or blood in stool.  Musculoskeletal: No joint stiffness or swelling. Denies back pain.  : see HPI  Neuro: no dizziness or weakness     Allergies:  Chantix [varenicline]     Medications:     Current Facility-Administered Medications:     acetaminophen tablet 650 mg, 650 mg, Oral, Q6H PRN, Harry Dalal NP, 650 mg at 10/07/23 0607    albuterol-ipratropium 2.5 mg-0.5 mg/3 mL nebulizer solution 3 mL, 3 mL, Nebulization, Q6H PRN, Harry Dalal NP    [START ON 10/9/2023] apixaban tablet 5 mg, 5 mg, Oral, Daily, Binh Harris MD    atorvastatin tablet 80 mg, 80 mg, Oral, Daily, Harry Dalal NP, 80 mg at 10/08/23 0843    dextrose 10% bolus 125 mL 125 mL, 12.5 g, Intravenous, PRN, Harry Dalal, NP    dextrose 10% bolus 250 mL 250 mL, 25 g, Intravenous, PRN, Harry Dalal NP    doxycycline tablet 100  mg, 100 mg, Oral, Q12H, Delonte Lindsey MD, 100 mg at 10/08/23 0844    FLUoxetine capsule 20 mg, 20 mg, Oral, Daily, Harry Dalal NP, 20 mg at 10/08/23 0843    glucagon (human recombinant) injection 1 mg, 1 mg, Intramuscular, PRN, Harry Dalal NP    glucose chewable tablet 16 g, 16 g, Oral, PRN, Harry Dalal NP    glucose chewable tablet 24 g, 24 g, Oral, PRN, Harry Dalal, NP    HYDROcodone-acetaminophen  mg per tablet 1 tablet, 1 tablet, Oral, Q6H PRN, Binh Harris MD, 1 tablet at 10/08/23 0843    influenza 65up-adj (QUADRIVALENT ADJUVANTED PF) vaccine 0.5 mL, 0.5 mL, Intramuscular, vaccine x 1 dose, Shanon Bridges MD    insulin aspart U-100 pen 0-10 Units, 0-10 Units, Subcutaneous, QID (AC + HS) PRN, Harry Dalal NP, 1 Units at 10/05/23 2028    levoFLOXacin tablet 750 mg, 750 mg, Oral, Every other day, Binh Harris MD, 750 mg at 10/08/23 0843    melatonin tablet 6 mg, 6 mg, Oral, Nightly PRN, Bouchra Aragon ACNP-BC, 6 mg at 10/05/23 2029    memantine tablet 10 mg, 10 mg, Oral, Daily, Harry Dalal NP, 10 mg at 10/08/23 0843    oxybutynin 24 hr tablet 10 mg, 10 mg, Oral, Daily, Harry Dalal NP, 10 mg at 10/08/23 0844    pantoprazole EC tablet 40 mg, 40 mg, Oral, Daily, Harry Dalal NP, 40 mg at 10/08/23 0844    sodium chloride 0.9% flush 10 mL, 10 mL, Intravenous, PRN, Harry Dalal, NP    tamsulosin 24 hr capsule 0.4 mg, 0.4 mg, Oral, Daily, Usman Boykin Edgar, MD, 0.4 mg at 10/08/23 0843     Physical Exam:      Vitals:     10/08/23 0843   BP:     Pulse:     Resp: 18   Temp:        Body mass index is 20.37 kg/m².  General: awake, alert, cooperative, very uncomfortable appearing  Head: NC/AT  Ears: external ears normal  Eyes: sclera normal  Lungs: normal inspiration, NAD  Heart: well-perfused  Skin: The skin is warm and dry  Ext: No c/c/e.  Neuro: grossly intact, AOx3     RADIOLOGY:  CT ABDOMEN PELVIS WITHOUT  CONTRAST      10/07/2023     CLINICAL HISTORY:  abdnormal retroperitonel US;     TECHNIQUE:  Low dose axial images, sagittal and coronal reformations were obtained from the lung bases to the pubic symphysis, .     COMPARISON:  None     FINDINGS:  Heart: Extensive atherosclerotic changes.  Mitral annular calcification.  Is     Lung Bases: Linear and reticulonodular opacities in the lung bases may relate to chronic endobronchial infectious process.     Liver: Normal in size and attenuation, with no focal hepatic lesions.     Gallbladder: No calcified gallstones.     Bile Ducts: No evidence of dilated ducts.     Pancreas: No mass or peripancreatic fat stranding.     Spleen: Unremarkable.     Adrenals: Unremarkable.     Kidneys/ Ureters: Left-sided hydronephrosis.  Bilateral subcentimeter nonobstructing renal calculi.  Obstructing proximal left ureteral calculi measuring up to 5 mm superiorly and up to 3 mm inferiorly.  No right-sided hydronephrosis.     Bladder: Mild wall thickening.     Reproductive organs: Prostate gland is enlarged.     GI Tract/Mesentery: No evidence of bowel obstruction or inflammation. No secondary signs of appendicitis.  Colonic diverticulosis.  Hiatal hernia.     Peritoneal Space: No ascites. No free air.     Retroperitoneum: No significant adenopathy.     Abdominal wall: Foci of gas in the left hemiabdomen suggestive subcutaneous injection.  Small fat containing umbilical hernia.     Vasculature: No aneurysm.  Atherosclerotic changes.     Bones: No acute fracture.     Impression:     -sided hydronephrosis. Bilateral subcentimeter nonobstructing renal calculi. Obstructing proximal left ureteral calculi measuring up to 5 mm superiorly and up to 3 mm inferiorly.  No right-sided hydronephrosis.     Colonic diverticulosis     Atherosclerotic changes     Linear and reticulonodular opacity in the lung bases may relate to chronic endobronchial infectious process.  Correlate clinically     LABS:  I  personally reviewed the following lab values:        Lab Results   Component Value Date     WBC 6.76 10/08/2023     HGB 8.2 (L) 10/08/2023     HCT 26.5 (L) 10/08/2023      10/08/2023      10/08/2023     K 3.9 10/08/2023      10/08/2023     CREATININE 2.7 (H) 10/08/2023     BUN 14 10/08/2023     CO2 21 (L) 10/08/2023     TSH 1.963 10/02/2023     PSA 1.0 01/03/2022     INR 1.2 10/02/2023     HGBA1C 6.2 (H) 08/04/2023     CHOL 135 08/04/2023     TRIG 172 (H) 08/04/2023     HDL 33 (L) 08/04/2023     ALT 12 10/02/2023     AST 22 10/02/2023         URINALYSIS:  Urinalysis negative for infection     Assessment/Plan:   Samy Hill Boo is a 73 y.o. male with:     Left ureteral stone with MARI - s/p cysto and stent placement, will plan for definitive stone removal 12/18 pending clearance from Cardiology on 12/11          Jamie Martinez MD  Urology

## 2023-11-22 DIAGNOSIS — E11.9 TYPE 2 DIABETES MELLITUS WITHOUT COMPLICATION: ICD-10-CM

## 2023-11-25 ENCOUNTER — HOSPITAL ENCOUNTER (INPATIENT)
Facility: HOSPITAL | Age: 73
LOS: 4 days | Discharge: HOME HEALTH | DRG: 690 | End: 2023-11-29
Attending: EMERGENCY MEDICINE | Admitting: HOSPITALIST
Payer: MEDICARE

## 2023-11-25 DIAGNOSIS — R10.9 LEFT FLANK PAIN: ICD-10-CM

## 2023-11-25 DIAGNOSIS — R07.9 CHEST PAIN: ICD-10-CM

## 2023-11-25 DIAGNOSIS — Z86.79 HISTORY OF THIRD DEGREE HEART BLOCK: ICD-10-CM

## 2023-11-25 DIAGNOSIS — N30.01 ACUTE CYSTITIS WITH HEMATURIA: ICD-10-CM

## 2023-11-25 DIAGNOSIS — R31.9 URINARY TRACT INFECTION WITH HEMATURIA, SITE UNSPECIFIED: ICD-10-CM

## 2023-11-25 DIAGNOSIS — N39.0 URINARY TRACT INFECTION WITH HEMATURIA, SITE UNSPECIFIED: ICD-10-CM

## 2023-11-25 DIAGNOSIS — R53.83 FATIGUE: ICD-10-CM

## 2023-11-25 DIAGNOSIS — N20.1 URETERAL STONE: ICD-10-CM

## 2023-11-25 DIAGNOSIS — A41.9 SEPSIS, DUE TO UNSPECIFIED ORGANISM, UNSPECIFIED WHETHER ACUTE ORGAN DYSFUNCTION PRESENT: Primary | ICD-10-CM

## 2023-11-25 DIAGNOSIS — R79.89 ELEVATED SERUM CREATININE: ICD-10-CM

## 2023-11-25 LAB
ALBUMIN SERPL BCP-MCNC: 2.5 G/DL (ref 3.5–5.2)
ALP SERPL-CCNC: 170 U/L (ref 55–135)
ALT SERPL W/O P-5'-P-CCNC: 18 U/L (ref 10–44)
ANION GAP SERPL CALC-SCNC: 12 MMOL/L (ref 8–16)
AST SERPL-CCNC: 24 U/L (ref 10–40)
BACTERIA #/AREA URNS HPF: ABNORMAL /HPF
BASOPHILS # BLD AUTO: 0.04 K/UL (ref 0–0.2)
BASOPHILS NFR BLD: 0.4 % (ref 0–1.9)
BILIRUB SERPL-MCNC: 0.4 MG/DL (ref 0.1–1)
BILIRUB UR QL STRIP: NEGATIVE
BNP SERPL-MCNC: 98 PG/ML (ref 0–99)
BUN SERPL-MCNC: 25 MG/DL (ref 8–23)
CALCIUM SERPL-MCNC: 8.4 MG/DL (ref 8.7–10.5)
CHLORIDE SERPL-SCNC: 109 MMOL/L (ref 95–110)
CLARITY UR: ABNORMAL
CO2 SERPL-SCNC: 17 MMOL/L (ref 23–29)
COLOR UR: YELLOW
CREAT SERPL-MCNC: 2.1 MG/DL (ref 0.5–1.4)
DIFFERENTIAL METHOD: ABNORMAL
EOSINOPHIL # BLD AUTO: 0.1 K/UL (ref 0–0.5)
EOSINOPHIL NFR BLD: 1 % (ref 0–8)
ERYTHROCYTE [DISTWIDTH] IN BLOOD BY AUTOMATED COUNT: 16.6 % (ref 11.5–14.5)
EST. GFR  (NO RACE VARIABLE): 33 ML/MIN/1.73 M^2
GLUCOSE SERPL-MCNC: 162 MG/DL (ref 70–110)
GLUCOSE UR QL STRIP: NEGATIVE
HCT VFR BLD AUTO: 27.4 % (ref 40–54)
HCV AB SERPL QL IA: NEGATIVE
HEP C VIRUS HOLD SPECIMEN: NORMAL
HGB BLD-MCNC: 8.9 G/DL (ref 14–18)
HGB UR QL STRIP: ABNORMAL
HIV 1+2 AB+HIV1 P24 AG SERPL QL IA: NEGATIVE
HYALINE CASTS #/AREA URNS LPF: 3 /LPF
IMM GRANULOCYTES # BLD AUTO: 0.04 K/UL (ref 0–0.04)
IMM GRANULOCYTES NFR BLD AUTO: 0.4 % (ref 0–0.5)
KETONES UR QL STRIP: NEGATIVE
LACTATE SERPL-SCNC: 1.1 MMOL/L (ref 0.5–2.2)
LEUKOCYTE ESTERASE UR QL STRIP: ABNORMAL
LIPASE SERPL-CCNC: 3 U/L (ref 4–60)
LYMPHOCYTES # BLD AUTO: 0.6 K/UL (ref 1–4.8)
LYMPHOCYTES NFR BLD: 6.2 % (ref 18–48)
MCH RBC QN AUTO: 29.5 PG (ref 27–31)
MCHC RBC AUTO-ENTMCNC: 32.5 G/DL (ref 32–36)
MCV RBC AUTO: 91 FL (ref 82–98)
MICROSCOPIC COMMENT: ABNORMAL
MONOCYTES # BLD AUTO: 0.5 K/UL (ref 0.3–1)
MONOCYTES NFR BLD: 4.9 % (ref 4–15)
NEUTROPHILS # BLD AUTO: 8.6 K/UL (ref 1.8–7.7)
NEUTROPHILS NFR BLD: 87.1 % (ref 38–73)
NITRITE UR QL STRIP: NEGATIVE
NRBC BLD-RTO: 0 /100 WBC
PH UR STRIP: 6 [PH] (ref 5–8)
PLATELET # BLD AUTO: 222 K/UL (ref 150–450)
PMV BLD AUTO: 9.5 FL (ref 9.2–12.9)
POTASSIUM SERPL-SCNC: 4.1 MMOL/L (ref 3.5–5.1)
PROT SERPL-MCNC: 6.3 G/DL (ref 6–8.4)
PROT UR QL STRIP: ABNORMAL
RBC # BLD AUTO: 3.02 M/UL (ref 4.6–6.2)
RBC #/AREA URNS HPF: >100 /HPF (ref 0–4)
SODIUM SERPL-SCNC: 138 MMOL/L (ref 136–145)
SP GR UR STRIP: 1.01 (ref 1–1.03)
TROPONIN I SERPL DL<=0.01 NG/ML-MCNC: 0.01 NG/ML (ref 0–0.03)
UNIDENT CRYS URNS QL MICRO: ABNORMAL
URN SPEC COLLECT METH UR: ABNORMAL
UROBILINOGEN UR STRIP-ACNC: NEGATIVE EU/DL
WBC # BLD AUTO: 9.89 K/UL (ref 3.9–12.7)
WBC #/AREA URNS HPF: >100 /HPF (ref 0–5)
YEAST URNS QL MICRO: ABNORMAL

## 2023-11-25 PROCEDURE — 87076 CULTURE ANAEROBE IDENT EACH: CPT | Mod: HCNC | Performed by: EMERGENCY MEDICINE

## 2023-11-25 PROCEDURE — 87040 BLOOD CULTURE FOR BACTERIA: CPT | Mod: 59,HCNC | Performed by: EMERGENCY MEDICINE

## 2023-11-25 PROCEDURE — 93005 ELECTROCARDIOGRAM TRACING: CPT | Mod: HCNC

## 2023-11-25 PROCEDURE — 83605 ASSAY OF LACTIC ACID: CPT | Mod: HCNC | Performed by: EMERGENCY MEDICINE

## 2023-11-25 PROCEDURE — 96365 THER/PROPH/DIAG IV INF INIT: CPT | Mod: HCNC

## 2023-11-25 PROCEDURE — 83880 ASSAY OF NATRIURETIC PEPTIDE: CPT | Mod: HCNC | Performed by: EMERGENCY MEDICINE

## 2023-11-25 PROCEDURE — 87088 URINE BACTERIA CULTURE: CPT | Mod: HCNC | Performed by: EMERGENCY MEDICINE

## 2023-11-25 PROCEDURE — 99285 EMERGENCY DEPT VISIT HI MDM: CPT | Mod: 25,HCNC

## 2023-11-25 PROCEDURE — 63600175 PHARM REV CODE 636 W HCPCS: Mod: HCNC | Performed by: EMERGENCY MEDICINE

## 2023-11-25 PROCEDURE — 96361 HYDRATE IV INFUSION ADD-ON: CPT | Mod: HCNC

## 2023-11-25 PROCEDURE — 87086 URINE CULTURE/COLONY COUNT: CPT | Mod: HCNC | Performed by: EMERGENCY MEDICINE

## 2023-11-25 PROCEDURE — 85025 COMPLETE CBC W/AUTO DIFF WBC: CPT | Mod: HCNC | Performed by: EMERGENCY MEDICINE

## 2023-11-25 PROCEDURE — 81000 URINALYSIS NONAUTO W/SCOPE: CPT | Mod: HCNC | Performed by: EMERGENCY MEDICINE

## 2023-11-25 PROCEDURE — 25000003 PHARM REV CODE 250: Mod: HCNC | Performed by: EMERGENCY MEDICINE

## 2023-11-25 PROCEDURE — 84484 ASSAY OF TROPONIN QUANT: CPT | Mod: HCNC | Performed by: EMERGENCY MEDICINE

## 2023-11-25 PROCEDURE — 86803 HEPATITIS C AB TEST: CPT | Mod: HCNC | Performed by: EMERGENCY MEDICINE

## 2023-11-25 PROCEDURE — 80053 COMPREHEN METABOLIC PANEL: CPT | Mod: HCNC | Performed by: EMERGENCY MEDICINE

## 2023-11-25 PROCEDURE — 87154 CUL TYP ID BLD PTHGN 6+ TRGT: CPT | Mod: HCNC | Performed by: EMERGENCY MEDICINE

## 2023-11-25 PROCEDURE — 83690 ASSAY OF LIPASE: CPT | Mod: HCNC | Performed by: EMERGENCY MEDICINE

## 2023-11-25 PROCEDURE — 11000001 HC ACUTE MED/SURG PRIVATE ROOM: Mod: HCNC

## 2023-11-25 PROCEDURE — 87389 HIV-1 AG W/HIV-1&-2 AB AG IA: CPT | Mod: HCNC | Performed by: EMERGENCY MEDICINE

## 2023-11-25 PROCEDURE — 93010 EKG 12-LEAD: ICD-10-PCS | Mod: HCNC,,, | Performed by: INTERNAL MEDICINE

## 2023-11-25 PROCEDURE — 93010 ELECTROCARDIOGRAM REPORT: CPT | Mod: HCNC,,, | Performed by: INTERNAL MEDICINE

## 2023-11-25 PROCEDURE — 87106 FUNGI IDENTIFICATION YEAST: CPT | Mod: HCNC | Performed by: EMERGENCY MEDICINE

## 2023-11-25 RX ORDER — SODIUM CHLORIDE 9 MG/ML
INJECTION, SOLUTION INTRAVENOUS CONTINUOUS
Status: DISCONTINUED | OUTPATIENT
Start: 2023-11-26 | End: 2023-11-26

## 2023-11-25 RX ORDER — ACETAMINOPHEN 500 MG
1000 TABLET ORAL
Status: COMPLETED | OUTPATIENT
Start: 2023-11-25 | End: 2023-11-25

## 2023-11-25 RX ORDER — ACETAMINOPHEN 650 MG/1
650 SUPPOSITORY RECTAL EVERY 4 HOURS PRN
Status: DISCONTINUED | OUTPATIENT
Start: 2023-11-26 | End: 2023-11-29 | Stop reason: HOSPADM

## 2023-11-25 RX ORDER — TALC
6 POWDER (GRAM) TOPICAL NIGHTLY PRN
Status: DISCONTINUED | OUTPATIENT
Start: 2023-11-26 | End: 2023-11-29 | Stop reason: HOSPADM

## 2023-11-25 RX ORDER — SODIUM CHLORIDE 0.9 % (FLUSH) 0.9 %
3 SYRINGE (ML) INJECTION EVERY 12 HOURS PRN
Status: DISCONTINUED | OUTPATIENT
Start: 2023-11-26 | End: 2023-11-29 | Stop reason: HOSPADM

## 2023-11-25 RX ORDER — NALOXONE HCL 0.4 MG/ML
0.02 VIAL (ML) INJECTION
Status: DISCONTINUED | OUTPATIENT
Start: 2023-11-26 | End: 2023-11-29 | Stop reason: HOSPADM

## 2023-11-25 RX ORDER — SIMETHICONE 80 MG
1 TABLET,CHEWABLE ORAL 4 TIMES DAILY PRN
Status: DISCONTINUED | OUTPATIENT
Start: 2023-11-26 | End: 2023-11-29 | Stop reason: HOSPADM

## 2023-11-25 RX ORDER — GLUCAGON 1 MG
1 KIT INJECTION
Status: DISCONTINUED | OUTPATIENT
Start: 2023-11-26 | End: 2023-11-29 | Stop reason: HOSPADM

## 2023-11-25 RX ORDER — IBUPROFEN 200 MG
16 TABLET ORAL
Status: DISCONTINUED | OUTPATIENT
Start: 2023-11-26 | End: 2023-11-29 | Stop reason: HOSPADM

## 2023-11-25 RX ORDER — IBUPROFEN 200 MG
24 TABLET ORAL
Status: DISCONTINUED | OUTPATIENT
Start: 2023-11-26 | End: 2023-11-29 | Stop reason: HOSPADM

## 2023-11-25 RX ORDER — ACETAMINOPHEN 325 MG/1
650 TABLET ORAL EVERY 6 HOURS PRN
Status: DISCONTINUED | OUTPATIENT
Start: 2023-11-26 | End: 2023-11-29 | Stop reason: HOSPADM

## 2023-11-25 RX ORDER — MORPHINE SULFATE 2 MG/ML
2 INJECTION, SOLUTION INTRAMUSCULAR; INTRAVENOUS EVERY 4 HOURS PRN
Status: DISCONTINUED | OUTPATIENT
Start: 2023-11-26 | End: 2023-11-29 | Stop reason: HOSPADM

## 2023-11-25 RX ORDER — PROMETHAZINE HYDROCHLORIDE 25 MG/1
25 TABLET ORAL EVERY 6 HOURS PRN
Status: DISCONTINUED | OUTPATIENT
Start: 2023-11-26 | End: 2023-11-29 | Stop reason: HOSPADM

## 2023-11-25 RX ORDER — ONDANSETRON 2 MG/ML
4 INJECTION INTRAMUSCULAR; INTRAVENOUS EVERY 8 HOURS PRN
Status: DISCONTINUED | OUTPATIENT
Start: 2023-11-26 | End: 2023-11-29 | Stop reason: HOSPADM

## 2023-11-25 RX ORDER — MAG HYDROX/ALUMINUM HYD/SIMETH 200-200-20
30 SUSPENSION, ORAL (FINAL DOSE FORM) ORAL 4 TIMES DAILY PRN
Status: DISCONTINUED | OUTPATIENT
Start: 2023-11-26 | End: 2023-11-29 | Stop reason: HOSPADM

## 2023-11-25 RX ORDER — HYDROCODONE BITARTRATE AND ACETAMINOPHEN 5; 325 MG/1; MG/1
1 TABLET ORAL EVERY 6 HOURS PRN
Status: DISCONTINUED | OUTPATIENT
Start: 2023-11-26 | End: 2023-11-27

## 2023-11-25 RX ORDER — POLYETHYLENE GLYCOL 3350 17 G/17G
17 POWDER, FOR SOLUTION ORAL DAILY PRN
Status: DISCONTINUED | OUTPATIENT
Start: 2023-11-26 | End: 2023-11-29 | Stop reason: HOSPADM

## 2023-11-25 RX ADMIN — ACETAMINOPHEN 1000 MG: 500 TABLET ORAL at 07:11

## 2023-11-25 RX ADMIN — CEFEPIME 2 G: 2 INJECTION, POWDER, FOR SOLUTION INTRAVENOUS at 10:11

## 2023-11-25 RX ADMIN — SODIUM CHLORIDE 1000 ML: 9 INJECTION, SOLUTION INTRAVENOUS at 07:11

## 2023-11-25 RX ADMIN — SODIUM CHLORIDE 1000 ML: 9 INJECTION, SOLUTION INTRAVENOUS at 10:11

## 2023-11-25 RX ADMIN — SODIUM CHLORIDE: 9 INJECTION, SOLUTION INTRAVENOUS at 11:11

## 2023-11-26 PROBLEM — N30.01 ACUTE CYSTITIS WITH HEMATURIA: Status: ACTIVE | Noted: 2023-11-26

## 2023-11-26 LAB
ANION GAP SERPL CALC-SCNC: 12 MMOL/L (ref 8–16)
BASOPHILS # BLD AUTO: 0.04 K/UL (ref 0–0.2)
BASOPHILS NFR BLD: 0.5 % (ref 0–1.9)
BUN SERPL-MCNC: 22 MG/DL (ref 8–23)
CALCIUM SERPL-MCNC: 8.3 MG/DL (ref 8.7–10.5)
CHLORIDE SERPL-SCNC: 114 MMOL/L (ref 95–110)
CO2 SERPL-SCNC: 9 MMOL/L (ref 23–29)
CREAT SERPL-MCNC: 1.8 MG/DL (ref 0.5–1.4)
DIFFERENTIAL METHOD: ABNORMAL
EOSINOPHIL # BLD AUTO: 0.1 K/UL (ref 0–0.5)
EOSINOPHIL NFR BLD: 0.8 % (ref 0–8)
ERYTHROCYTE [DISTWIDTH] IN BLOOD BY AUTOMATED COUNT: 17.5 % (ref 11.5–14.5)
EST. GFR  (NO RACE VARIABLE): 40 ML/MIN/1.73 M^2
GLUCOSE SERPL-MCNC: 101 MG/DL (ref 70–110)
HCT VFR BLD AUTO: 29.5 % (ref 40–54)
HGB BLD-MCNC: 8.4 G/DL (ref 14–18)
IMM GRANULOCYTES # BLD AUTO: 0.02 K/UL (ref 0–0.04)
IMM GRANULOCYTES NFR BLD AUTO: 0.3 % (ref 0–0.5)
LACTATE SERPL-SCNC: 3.8 MMOL/L (ref 0.5–2.2)
LYMPHOCYTES # BLD AUTO: 0.9 K/UL (ref 1–4.8)
LYMPHOCYTES NFR BLD: 11.1 % (ref 18–48)
MCH RBC QN AUTO: 29.2 PG (ref 27–31)
MCHC RBC AUTO-ENTMCNC: 28.5 G/DL (ref 32–36)
MCV RBC AUTO: 102 FL (ref 82–98)
MONOCYTES # BLD AUTO: 0.6 K/UL (ref 0.3–1)
MONOCYTES NFR BLD: 7.8 % (ref 4–15)
NEUTROPHILS # BLD AUTO: 6.3 K/UL (ref 1.8–7.7)
NEUTROPHILS NFR BLD: 79.5 % (ref 38–73)
NRBC BLD-RTO: 0 /100 WBC
PLATELET # BLD AUTO: 172 K/UL (ref 150–450)
PMV BLD AUTO: 10.3 FL (ref 9.2–12.9)
POTASSIUM SERPL-SCNC: 4.9 MMOL/L (ref 3.5–5.1)
RBC # BLD AUTO: 2.88 M/UL (ref 4.6–6.2)
SODIUM SERPL-SCNC: 135 MMOL/L (ref 136–145)
WBC # BLD AUTO: 7.92 K/UL (ref 3.9–12.7)

## 2023-11-26 PROCEDURE — 25000003 PHARM REV CODE 250: Mod: HCNC | Performed by: NURSE PRACTITIONER

## 2023-11-26 PROCEDURE — 94761 N-INVAS EAR/PLS OXIMETRY MLT: CPT | Mod: HCNC

## 2023-11-26 PROCEDURE — 63600175 PHARM REV CODE 636 W HCPCS: Mod: HCNC | Performed by: NURSE PRACTITIONER

## 2023-11-26 PROCEDURE — 85025 COMPLETE CBC W/AUTO DIFF WBC: CPT | Mod: HCNC | Performed by: FAMILY MEDICINE

## 2023-11-26 PROCEDURE — 99223 1ST HOSP IP/OBS HIGH 75: CPT | Mod: HCNC,,, | Performed by: UROLOGY

## 2023-11-26 PROCEDURE — 11000001 HC ACUTE MED/SURG PRIVATE ROOM: Mod: HCNC

## 2023-11-26 PROCEDURE — 25000003 PHARM REV CODE 250: Mod: HCNC | Performed by: FAMILY MEDICINE

## 2023-11-26 PROCEDURE — 83605 ASSAY OF LACTIC ACID: CPT | Mod: HCNC | Performed by: EMERGENCY MEDICINE

## 2023-11-26 PROCEDURE — 99223 PR INITIAL HOSPITAL CARE,LEVL III: ICD-10-PCS | Mod: HCNC,,, | Performed by: UROLOGY

## 2023-11-26 PROCEDURE — 36415 COLL VENOUS BLD VENIPUNCTURE: CPT | Mod: HCNC | Performed by: FAMILY MEDICINE

## 2023-11-26 PROCEDURE — 36415 COLL VENOUS BLD VENIPUNCTURE: CPT | Mod: HCNC | Performed by: EMERGENCY MEDICINE

## 2023-11-26 PROCEDURE — 80048 BASIC METABOLIC PNL TOTAL CA: CPT | Mod: HCNC | Performed by: FAMILY MEDICINE

## 2023-11-26 RX ORDER — TAMSULOSIN HYDROCHLORIDE 0.4 MG/1
0.4 CAPSULE ORAL NIGHTLY
Status: DISCONTINUED | OUTPATIENT
Start: 2023-11-26 | End: 2023-11-29 | Stop reason: HOSPADM

## 2023-11-26 RX ORDER — OXYBUTYNIN CHLORIDE 5 MG/1
5 TABLET, EXTENDED RELEASE ORAL DAILY
Status: DISCONTINUED | OUTPATIENT
Start: 2023-11-26 | End: 2023-11-26

## 2023-11-26 RX ORDER — PANTOPRAZOLE SODIUM 40 MG/1
40 TABLET, DELAYED RELEASE ORAL DAILY
Status: DISCONTINUED | OUTPATIENT
Start: 2023-11-26 | End: 2023-11-29 | Stop reason: HOSPADM

## 2023-11-26 RX ORDER — ATORVASTATIN CALCIUM 40 MG/1
80 TABLET, FILM COATED ORAL DAILY
Status: DISCONTINUED | OUTPATIENT
Start: 2023-11-26 | End: 2023-11-29 | Stop reason: HOSPADM

## 2023-11-26 RX ORDER — DONEPEZIL HYDROCHLORIDE 5 MG/1
10 TABLET, FILM COATED ORAL NIGHTLY
Status: DISCONTINUED | OUTPATIENT
Start: 2023-11-26 | End: 2023-11-29 | Stop reason: HOSPADM

## 2023-11-26 RX ORDER — MEMANTINE HYDROCHLORIDE 10 MG/1
10 TABLET ORAL 2 TIMES DAILY
Status: DISCONTINUED | OUTPATIENT
Start: 2023-11-26 | End: 2023-11-29 | Stop reason: HOSPADM

## 2023-11-26 RX ADMIN — HYDROCODONE BITARTRATE AND ACETAMINOPHEN 1 TABLET: 5; 325 TABLET ORAL at 10:11

## 2023-11-26 RX ADMIN — MEMANTINE 10 MG: 10 TABLET ORAL at 10:11

## 2023-11-26 RX ADMIN — MIRABEGRON 25 MG: 25 TABLET, FILM COATED, EXTENDED RELEASE ORAL at 10:11

## 2023-11-26 RX ADMIN — HYDROCODONE BITARTRATE AND ACETAMINOPHEN 1 TABLET: 5; 325 TABLET ORAL at 04:11

## 2023-11-26 RX ADMIN — ATORVASTATIN CALCIUM 80 MG: 40 TABLET, FILM COATED ORAL at 08:11

## 2023-11-26 RX ADMIN — ACETAMINOPHEN 650 MG: 325 TABLET ORAL at 04:11

## 2023-11-26 RX ADMIN — MELATONIN TAB 3 MG 6 MG: 3 TAB at 10:11

## 2023-11-26 RX ADMIN — SODIUM BICARBONATE: 84 INJECTION, SOLUTION INTRAVENOUS at 12:11

## 2023-11-26 RX ADMIN — CEFEPIME 1 G: 1 INJECTION, POWDER, FOR SOLUTION INTRAMUSCULAR; INTRAVENOUS at 10:11

## 2023-11-26 RX ADMIN — SODIUM CHLORIDE 500 ML: 9 INJECTION, SOLUTION INTRAVENOUS at 04:11

## 2023-11-26 RX ADMIN — MORPHINE SULFATE 2 MG: 2 INJECTION, SOLUTION INTRAMUSCULAR; INTRAVENOUS at 05:11

## 2023-11-26 RX ADMIN — TAMSULOSIN HYDROCHLORIDE 0.4 MG: 0.4 CAPSULE ORAL at 10:11

## 2023-11-26 RX ADMIN — PANTOPRAZOLE SODIUM 40 MG: 40 TABLET, DELAYED RELEASE ORAL at 08:11

## 2023-11-26 RX ADMIN — MEMANTINE 10 MG: 10 TABLET ORAL at 08:11

## 2023-11-26 RX ADMIN — DONEPEZIL HYDROCHLORIDE 10 MG: 5 TABLET, FILM COATED ORAL at 10:11

## 2023-11-26 NOTE — ASSESSMENT & PLAN NOTE
Urinalysis revealed:   Lab Results   Component Value Date    COLORU Yellow 11/25/2023    APPEARANCEUA Hazy (A) 11/25/2023    SPECGRAV 1.015 11/25/2023    PHUR 6.0 11/25/2023    PROTEINUA 1+ (A) 11/25/2023    GLUCUA Negative 11/25/2023    KETONESU Negative 11/25/2023    NITRITE Negative 11/25/2023    UROBILINOGEN Negative 11/25/2023    BILIRUBINUA Negative 11/25/2023    LEUKOCYTESUR 3+ (A) 11/25/2023    RBCUA >100 (H) 11/25/2023    WBCUA >100 (H) 11/25/2023    BACTERIA Rare 11/25/2023    HYALINECASTS 3 (A) 11/25/2023   Received 2g cefepime in ED  Plan:  -UA culture pending  -Continue Abx  -urology consulted

## 2023-11-26 NOTE — SUBJECTIVE & OBJECTIVE
Past Medical History:   Diagnosis Date    Anxiety     Depression     Diabetes mellitus, type 2     Hypertension     Stroke        Past Surgical History:   Procedure Laterality Date    A-V CARDIAC PACEMAKER INSERTION Left 10/4/2023    Procedure: INSERTION, CARDIAC PACEMAKER, DUAL CHAMBER;  Surgeon: Delonte Lindsey MD;  Location: [facility] CATH LAB;  Service: Cardiology;  Laterality: Left;  biotronik    ANGIOGRAPHY OF LOWER EXTREMITY N/A 12/21/2020    Procedure: ANGIOGRAM, LOWER EXTREMITY/Rt leg poss pta/stent;  Surgeon: oTdd Michel MD;  Location: [facility] CATH LAB;  Service: Peripheral Vascular;  Laterality: N/A;  rescheduled 12/8    BACK SURGERY  2015    BRONCHOSCOPY Left 3/24/2022    Procedure: Bronchoscopy;  Surgeon: Edward Williamson MD;  Location: [facility] ENDO;  Service: Pulmonary;  Laterality: Left;  poss endobronchial biopsy or brushing    CATARACT EXTRACTION      CYSTOSCOPY WITH URETEROSCOPY, RETROGRADE PYELOGRAPHY, AND INSERTION OF STENT Left 10/8/2023    Procedure: CYSTOSCOPY, WITH RETROGRADE PYELOGRAM AND URETERAL STENT INSERTION;  Surgeon: Jamie Martinez MD;  Location: [facility] OR;  Service: Urology;  Laterality: Left;    ESOPHAGOGASTRODUODENOSCOPY N/A 8/24/2022    Procedure: EGD (ESOPHAGOGASTRODUODENOSCOPY);  Surgeon: Ana Gomez MD;  Location: [facility] ENDO;  Service: Endoscopy;  Laterality: N/A;    ESOPHAGOGASTRODUODENOSCOPY N/A 9/14/2022    Procedure: EGD (ESOPHAGOGASTRODUODENOSCOPY);  Surgeon: Ana Gomez MD;  Location: [facility];  Service: Endoscopy;  Laterality: N/A;    KNEE SURGERY  2012    LITHOTRIPSY  2000       Review of patient's allergies indicates:   Allergen Reactions    Chantix [varenicline] Other (See Comments)     Felt bad       No current facility-administered medications on file prior to encounter.     Current Outpatient Medications on File Prior to Encounter   Medication Sig    albuterol (ACCUNEB) 1.25 mg/3 mL Nebu Take 3 mLs (1.25 mg total) by nebulization 2 (two) times a  day. Rescue    albuterol (PROVENTIL/VENTOLIN HFA) 90 mcg/actuation inhaler Inhale 2 puffs into the lungs every 4 (four) hours as needed for Wheezing.    blood glucose control, low (TRUE METRIX LEVEL 1) Soln 1 drop by Misc.(Non-Drug; Combo Route) route once daily.    blood glucose control, normal (TRUE METRIX LEVEL 2) Soln 1 drop by Misc.(Non-Drug; Combo Route) route once daily.    ciprofloxacin HCl (CIPRO) 250 MG tablet Take 1 tablet (250 mg total) by mouth 2 (two) times daily. (Patient not taking: Reported on 11/17/2023)    donepeziL (ARICEPT) 10 MG tablet Take 1 tablet (10 mg total) by mouth every evening.    DULoxetine (CYMBALTA) 30 MG capsule Take 1 capsule by mouth once daily    ELIQUIS 5 mg Tab Take 1 tablet by mouth once daily    FLUoxetine 20 MG capsule Take 1 capsule by mouth once daily    gabapentin (NEURONTIN) 100 MG capsule Take 1 capsule (100 mg total) by mouth 3 (three) times daily.    HYDROcodone-acetaminophen (NORCO)  mg per tablet Take 1 tablet by mouth.    inhalat.spacing dev,large mask (BREATHERITE SPACER-MASK,ADULT) Spcr Use as directed for inhalation.    lancets 32 gauge Misc 1 lancet by Misc.(Non-Drug; Combo Route) route once daily.    memantine (NAMENDA) 10 MG Tab TAKE 1/2 TABLET BY MOUTH TWICE DAILY FOR 1 WEEK, THEN 1 TABLET BY MOUTH TWICE DAILY    mirabegron (MYRBETRIQ) 25 mg Tb24 ER tablet Take 1 tablet (25 mg total) by mouth once daily.    mucus clearing device (AEROBIKA OSCILLATING PEP SYSTM) by Misc.(Non-Drug; Combo Route) route 4 (four) times daily as needed.    oxybutynin (DITROPAN-XL) 5 MG TR24 Take 5 mg by mouth once daily.    pantoprazole (PROTONIX) 40 MG tablet Take 1 tablet (40 mg total) by mouth once daily.    QUEtiapine (SEROQUEL) 50 MG tablet Take 1 tablet by mouth twice daily (Patient taking differently: Take 50 mg by mouth nightly.)    rosuvastatin (CRESTOR) 20 MG tablet Take 1 tablet by mouth once daily (Patient taking differently: Take 20 mg by mouth every evening.)     tamsulosin (FLOMAX) 0.4 mg Cap Take 1 capsule (0.4 mg total) by mouth every evening.    walker (ULTRA-LIGHT ROLLATOR) Misc Use daily for ambulation     Family History       Problem Relation (Age of Onset)    COPD Sister    Cancer Father    Heart disease Mother    Stroke Mother          Tobacco Use    Smoking status: Every Day     Current packs/day: 1.00     Types: Cigarettes    Smokeless tobacco: Current     Types: Snuff   Substance and Sexual Activity    Alcohol use: Yes     Alcohol/week: 1.0 standard drink of alcohol     Types: 1 Shots of liquor per week     Comment: Daily    Drug use: Never    Sexual activity: Not Currently     Review of Systems   Constitutional:  Positive for chills and fever.   Genitourinary:  Positive for flank pain. Negative for difficulty urinating, dysuria, frequency, hematuria and urgency.   Neurological:  Positive for weakness (generalized). Negative for dizziness and light-headedness.   All other systems reviewed and are negative.    Objective:     Vital Signs (Most Recent):  Temp: 97.7 °F (36.5 °C) (11/26/23 0431)  Pulse: 80 (11/26/23 0431)  Resp: 17 (11/26/23 0431)  BP: (!) 102/58 (11/26/23 0431)  SpO2: 97 % (11/26/23 0431) Vital Signs (24h Range):  Temp:  [97.7 °F (36.5 °C)-100.3 °F (37.9 °C)] 97.7 °F (36.5 °C)  Pulse:  [] 80  Resp:  [12-25] 17  SpO2:  [96 %-99 %] 97 %  BP: ()/(53-67) 102/58     Weight: 64.8 kg (142 lb 13.7 oz)  Body mass index is 22.37 kg/m².     Physical Exam  Vitals and nursing note reviewed.   Constitutional:       General: He is awake. He is not in acute distress.     Appearance: Normal appearance. He is well-developed and well-groomed. He is ill-appearing. He is not toxic-appearing or diaphoretic.   HENT:      Head: Normocephalic and atraumatic.   Eyes:      Extraocular Movements: Extraocular movements intact.      Conjunctiva/sclera: Conjunctivae normal.   Cardiovascular:      Rate and Rhythm: Normal rate and regular rhythm.      Pulses: Normal  pulses.      Heart sounds: Normal heart sounds. No murmur heard.  Pulmonary:      Effort: Pulmonary effort is normal.      Breath sounds: Normal breath sounds.   Abdominal:      General: Bowel sounds are normal.      Palpations: Abdomen is soft.      Tenderness: There is no abdominal tenderness. There is no right CVA tenderness, left CVA tenderness, guarding or rebound.   Musculoskeletal:      Cervical back: Normal range of motion and neck supple.      Comments: 5/5 strength throughout   Skin:     General: Skin is warm and dry.      Capillary Refill: Capillary refill takes less than 2 seconds.   Neurological:      General: No focal deficit present.      Mental Status: He is alert and oriented to person, place, and time. Mental status is at baseline.      GCS: GCS eye subscore is 4. GCS verbal subscore is 5. GCS motor subscore is 6.      Cranial Nerves: Cranial nerves 2-12 are intact.      Sensory: Sensation is intact.      Motor: Motor function is intact.      Coordination: Coordination is intact.   Psychiatric:         Mood and Affect: Mood normal.         Behavior: Behavior normal. Behavior is cooperative.       LABS:  Recent Results (from the past 24 hour(s))   CBC W/ AUTO DIFFERENTIAL    Collection Time: 11/25/23  7:24 PM   Result Value Ref Range    WBC 9.89 3.90 - 12.70 K/uL    RBC 3.02 (L) 4.60 - 6.20 M/uL    Hemoglobin 8.9 (L) 14.0 - 18.0 g/dL    Hematocrit 27.4 (L) 40.0 - 54.0 %    MCV 91 82 - 98 fL    MCH 29.5 27.0 - 31.0 pg    MCHC 32.5 32.0 - 36.0 g/dL    RDW 16.6 (H) 11.5 - 14.5 %    Platelets 222 150 - 450 K/uL    MPV 9.5 9.2 - 12.9 fL    Immature Granulocytes 0.4 0.0 - 0.5 %    Gran # (ANC) 8.6 (H) 1.8 - 7.7 K/uL    Immature Grans (Abs) 0.04 0.00 - 0.04 K/uL    Lymph # 0.6 (L) 1.0 - 4.8 K/uL    Mono # 0.5 0.3 - 1.0 K/uL    Eos # 0.1 0.0 - 0.5 K/uL    Baso # 0.04 0.00 - 0.20 K/uL    nRBC 0 0 /100 WBC    Gran % 87.1 (H) 38.0 - 73.0 %    Lymph % 6.2 (L) 18.0 - 48.0 %    Mono % 4.9 4.0 - 15.0 %     Eosinophil % 1.0 0.0 - 8.0 %    Basophil % 0.4 0.0 - 1.9 %    Differential Method Automated    Comp. Metabolic Panel    Collection Time: 11/25/23  7:24 PM   Result Value Ref Range    Sodium 138 136 - 145 mmol/L    Potassium 4.1 3.5 - 5.1 mmol/L    Chloride 109 95 - 110 mmol/L    CO2 17 (L) 23 - 29 mmol/L    Glucose 162 (H) 70 - 110 mg/dL    BUN 25 (H) 8 - 23 mg/dL    Creatinine 2.1 (H) 0.5 - 1.4 mg/dL    Calcium 8.4 (L) 8.7 - 10.5 mg/dL    Total Protein 6.3 6.0 - 8.4 g/dL    Albumin 2.5 (L) 3.5 - 5.2 g/dL    Total Bilirubin 0.4 0.1 - 1.0 mg/dL    Alkaline Phosphatase 170 (H) 55 - 135 U/L    AST 24 10 - 40 U/L    ALT 18 10 - 44 U/L    eGFR 33 (A) >60 mL/min/1.73 m^2    Anion Gap 12 8 - 16 mmol/L   Lipase    Collection Time: 11/25/23  7:24 PM   Result Value Ref Range    Lipase 3 (L) 4 - 60 U/L   Troponin I #1    Collection Time: 11/25/23  7:24 PM   Result Value Ref Range    Troponin I 0.007 0.000 - 0.026 ng/mL   BNP    Collection Time: 11/25/23  7:24 PM   Result Value Ref Range    BNP 98 0 - 99 pg/mL   HIV 1/2 Ag/Ab (4th Gen)    Collection Time: 11/25/23  7:27 PM   Result Value Ref Range    HIV 1/2 Ag/Ab Negative Negative   Hepatitis C Antibody    Collection Time: 11/25/23  7:27 PM   Result Value Ref Range    Hepatitis C Ab Negative Negative   HCV Virus Hold Specimen    Collection Time: 11/25/23  7:27 PM   Result Value Ref Range    HEP C Virus Hold Specimen Hold for HCV sendout    Urinalysis, Reflex to Urine Culture Urine, Clean Catch    Collection Time: 11/25/23  9:09 PM    Specimen: Urine   Result Value Ref Range    Specimen UA Urine, Clean Catch     Color, UA Yellow Yellow, Straw, Diana    Appearance, UA Hazy (A) Clear    pH, UA 6.0 5.0 - 8.0    Specific Gravity, UA 1.015 1.005 - 1.030    Protein, UA 1+ (A) Negative    Glucose, UA Negative Negative    Ketones, UA Negative Negative    Bilirubin (UA) Negative Negative    Occult Blood UA 3+ (A) Negative    Nitrite, UA Negative Negative    Urobilinogen, UA Negative  <2.0 EU/dL    Leukocytes, UA 3+ (A) Negative   Urinalysis Microscopic    Collection Time: 11/25/23  9:09 PM   Result Value Ref Range    RBC, UA >100 (H) 0 - 4 /hpf    WBC, UA >100 (H) 0 - 5 /hpf    Bacteria Rare None-Occ /hpf    Yeast, UA Moderate (A) None    Hyaline Casts, UA 3 (A) 0-1/lpf /lpf    Unclass Polly UA Occasional None-Moderate    Microscopic Comment SEE COMMENT    Lactic acid, plasma    Collection Time: 11/25/23 10:05 PM   Result Value Ref Range    Lactate (Lactic Acid) 1.1 0.5 - 2.2 mmol/L   Lactic acid, plasma    Collection Time: 11/26/23  2:44 AM   Result Value Ref Range    Lactate (Lactic Acid) 3.8 (HH) 0.5 - 2.2 mmol/L       RADIOLOGY  CT Renal Stone Study ABD Pelvis WO    Result Date: 11/25/2023  EXAMINATION: CT RENAL STONE STUDY ABD PELVIS WO CLINICAL HISTORY: Flank pain, kidney stone suspected; Unspecified abdominal pain TECHNIQUE: Low dose axial images, sagittal and coronal reformations were obtained from the lung bases to the pubic symphysis.  Contrast was not administered. COMPARISON: Recent prior FINDINGS: Heart: Mitral annular calcification.  Pacemaker leads. Lung Bases: Well aerated, without consolidation or pleural fluid. Liver: Hepatomegaly, with no focal hepatic lesions. Gallbladder: No calcified gallstones. Bile Ducts: No evidence of dilated ducts. Pancreas: No mass or peripancreatic fat stranding. Spleen: Unremarkable. Adrenals: Unremarkable. Kidneys/ Ureters: Numerous small bilateral renal nonobstructing calculi measuring up to 7 mm along the right and 8 mm on the left.  Left-sided ureteral stent with proximal pigtail in the left renal pelvis and distal pigtail within the bladder.  Punctate ureteral stone along the mid left ureteral stent.  Distal left ureteral stone along adjacent to the stent measuring 4 mm.  22 mm left renal cyst.  Chronic perinephric fat stranding. Bladder: Mild wall thickening. Reproductive organs: Unremarkable. GI Tract/Mesentery: No evidence of bowel  obstruction or inflammation.  Hiatal hernia.  Colonic diverticulosis. Peritoneal Space: No ascites. No free air. Retroperitoneum: No significant adenopathy. Abdominal wall: Unremarkable. Vasculature: Atherosclerotic changes of the aorta measuring up to 2.5 cm in the mid aorta Bones: No acute fracture.     Numerous small bilateral renal nonobstructing calculi measuring up to 7 mm along the right and 8 mm on the left. Left-sided ureteral stent with proximal pigtail in the left renal pelvis and distal pigtail within the bladder. Punctate ureteral stone along the mid left ureteral stent.  Distal left ureteral stone along adjacent to the stent measuring 4 mm. 22 mm left renal cyst.  Chronic perinephric fat stranding. All CT scans   are performed using dose optimization techniques including the following: automated exposure control; adjustment of the mA and/or kV; use of iterative reconstruction technique.  Dose modulation was employed for ALARA by means of: Automated exposure control; adjustment of the mA and/or kV according to patient size (this includes techniques or standardized protocols for targeted exams where dose is matched to indication/reason for exam; i.e. extremities or head); and/or use of iterative reconstructive technique. Electronically signed by: Isidoro Hua Date:    11/25/2023 Time:    20:26    X-Ray Chest AP Portable    Result Date: 11/25/2023  EXAMINATION: XR CHEST AP PORTABLE CLINICAL HISTORY: fatigue; TECHNIQUE: Single frontal view of the chest was performed. COMPARISON: None FINDINGS: Left chest pacemakerthe lungs are clear, with normal appearance of pulmonary vasculature and no pleural effusion or pneumothorax. The cardiac silhouette is normal in size. The hilar and mediastinal contours are unremarkable. Bones are intact.     No acute abnormality. Electronically signed by: Isidoro Hua Date:    11/25/2023 Time:    19:24      EKG    MICROBIOLOGY    MDM     Amount and/or Complexity of Data  Reviewed  Clinical lab tests: reviewed  Tests in the radiology section of CPT®: reviewed  Tests in the medicine section of CPT®: reviewed  Discussion of test results with the performing providers: yes  Decide to obtain previous medical records or to obtain history from someone other than the patient: yes  Obtain history from someone other than the patient: yes  Review and summarize past medical records: yes  Discuss the patient with other providers: yes  Independent visualization of images, tracings, or specimens: yes

## 2023-11-26 NOTE — ASSESSMENT & PLAN NOTE
Followed By Dr. Martinez.   Plan:  -IVFs  -analgesics prn  -continue flomax and myrbetriq  -urology consult

## 2023-11-26 NOTE — ASSESSMENT & PLAN NOTE
Urinalysis revealed:   Lab Results   Component Value Date    COLORU Yellow 11/25/2023    APPEARANCEUA Hazy (A) 11/25/2023    SPECGRAV 1.015 11/25/2023    PHUR 6.0 11/25/2023    PROTEINUA 1+ (A) 11/25/2023    GLUCUA Negative 11/25/2023    KETONESU Negative 11/25/2023    NITRITE Negative 11/25/2023    UROBILINOGEN Negative 11/25/2023    BILIRUBINUA Negative 11/25/2023    LEUKOCYTESUR 3+ (A) 11/25/2023    RBCUA >100 (H) 11/25/2023    WBCUA >100 (H) 11/25/2023    BACTERIA Rare 11/25/2023    HYALINECASTS 3 (A) 11/25/2023   Received 2g cefepime in ED  Plan:  -UA culture pending  -Continue Abx  -urology consulted    Cont cefepime  Urine culture pending

## 2023-11-26 NOTE — PLAN OF CARE
Patient remains free from injury this shift. Safety precautions maintained. Side rails up X2, call light within reach and bed alarm set. No s/s of acute distress noted. IVF infusing. Cardiac monitoring in place.       Problem: Adult Inpatient Plan of Care  Goal: Plan of Care Review  Outcome: Ongoing, Progressing  Goal: Patient-Specific Goal (Individualized)  Outcome: Ongoing, Progressing  Goal: Absence of Hospital-Acquired Illness or Injury  Outcome: Ongoing, Progressing  Goal: Optimal Comfort and Wellbeing  Outcome: Ongoing, Progressing  Goal: Readiness for Transition of Care  Outcome: Ongoing, Progressing     Problem: Diabetes Comorbidity  Goal: Blood Glucose Level Within Targeted Range  Outcome: Ongoing, Progressing     Problem: Fluid and Electrolyte Imbalance (Acute Kidney Injury/Impairment)  Goal: Fluid and Electrolyte Balance  Outcome: Ongoing, Progressing     Problem: Renal Function Impairment (Acute Kidney Injury/Impairment)  Goal: Effective Renal Function  Outcome: Ongoing, Progressing     Problem: Oral Intake Inadequate (Acute Kidney Injury/Impairment)  Goal: Optimal Nutrition Intake  Outcome: Ongoing, Progressing     Problem: Skin Injury Risk Increased  Goal: Skin Health and Integrity  Outcome: Ongoing, Progressing

## 2023-11-26 NOTE — PROGRESS NOTES
[facility] Medicine  Progress Note    Patient Name: Samy Alejandre Jr.  MRN: [id number]  Patient Class: IP- Inpatient   Admission Date: 11/25/2023  Length of Stay: 1 days  Attending Physician: Kris Ervin MD  Primary Care Provider: Damien Barron MD        Subjective:     Principal Problem:Acute cystitis with hematuria        HPI:  Samy Alejandre Jr. is a 73 y.o. male with a PMH  has a past medical history of Anxiety, Depression, Diabetes mellitus, type 2, Hypertension, and Stroke.  Presented to the ER for evaluation of worsening flank pain and generalized weakness which started this evening.  Patient typically ambulates with a walker at baseline, but daughter states that patient has been lying in bed due to weakness and pain.  Per daughter at bedside patient attempted to get out of bed to use the restroom and had an episode of incontinence.  Patient has a history of multiple nonobstructing kidney stones bilaterally in his followed by Dr. Martinez with Urology.  Patient is scheduled for lithotripsy on 12/18/23.  Patient recently had cardiac pacemaker placed on 10/04/2023 for third-degree heart block.  Denies any cardiopulmonary symptoms.  However, patient does endorse subjective fever, chills, and diarrhea.  Denies any hematuria or dysuria.    ER workup revealed H/H of 8.9/27.4 with MCV of 91, BUN/creatinine of 25/2.1 with EGFR of 33 (creatinine level between 1.7-2.5 over the last 8 months),  mg/dL, UA consistent with acute cystitis, CT renal study revealed numerous bilateral nonobstructing stones, a stent in the left ureter and chronic perinephric fat stranding.  Chest x-ray showed no acute findings.  EKG revealed sinus rhythm with first-degree AV block with a ventricular rate of 91 beats per minute with left axis deviation right bundle-branch block with a QT/QTC of 446/548.  Patient received 1 g of Tylenol, 2 g cefepime, 2 L normal saline in ED. Dr. Martinez was consulted by ER provider to be made  aware that patient was in ED. Hospital Medicine consulted to admit patient for UTI and intractable pain.  Patient and family at bedside are in agreement with treatment plan.  Patient will be admitted under inpatient status.    PCP: Damien Barron    Overview/Hospital Course:  11/26: cont cefepime for UTI. Urology consulted on case.     Interval History: Patient reported chills overnight. No other issues.     Review of Systems   Constitutional:  Positive for chills and fever. Negative for fatigue.   HENT:  Negative for sinus pressure.    Eyes:  Negative for visual disturbance.   Respiratory:  Negative for shortness of breath.    Cardiovascular:  Negative for chest pain.   Gastrointestinal:  Negative for nausea and vomiting.   Genitourinary:  Positive for flank pain. Negative for difficulty urinating, dysuria, frequency, hematuria and urgency.   Musculoskeletal:  Negative for back pain.   Skin:  Negative for rash.   Neurological:  Positive for weakness (generalized). Negative for dizziness, light-headedness and headaches.   Psychiatric/Behavioral:  Negative for confusion.    All other systems reviewed and are negative.    Objective:     Vital Signs (Most Recent):  Temp: 99.8 °F (37.7 °C) (11/26/23 0721)  Pulse: 87 (11/26/23 0848)  Resp: 18 (11/26/23 0834)  BP: (!) 101/58 (11/26/23 0721)  SpO2: 97 % (11/26/23 0834) Vital Signs (24h Range):  Temp:  [97.7 °F (36.5 °C)-100.3 °F (37.9 °C)] 99.8 °F (37.7 °C)  Pulse:  [] 87  Resp:  [12-25] 18  SpO2:  [96 %-99 %] 97 %  BP: ()/(53-67) 101/58     Weight: 64.8 kg (142 lb 13.7 oz)  Body mass index is 22.37 kg/m².    Intake/Output Summary (Last 24 hours) at 11/26/2023 0956  Last data filed at 11/26/2023 0904  Gross per 24 hour   Intake 2800.19 ml   Output --   Net 2800.19 ml         Physical Exam  Constitutional:       General: He is not in acute distress.     Appearance: He is well-developed. He is not diaphoretic.   HENT:      Head: Normocephalic and atraumatic.    Eyes:      Pupils: Pupils are equal, round, and reactive to light.   Cardiovascular:      Rate and Rhythm: Normal rate and regular rhythm.      Heart sounds: Normal heart sounds. No murmur heard.     No friction rub. No gallop.   Pulmonary:      Effort: Pulmonary effort is normal. No respiratory distress.      Breath sounds: Normal breath sounds. No stridor. No wheezing or rales.   Abdominal:      General: Bowel sounds are normal. There is no distension.      Palpations: Abdomen is soft. There is no mass.      Tenderness: There is no abdominal tenderness. There is no guarding.   Skin:     General: Skin is warm.      Findings: No erythema.   Neurological:      Mental Status: He is alert and oriented to person, place, and time.             Significant Labs: All pertinent labs within the past 24 hours have been reviewed.    Significant Imaging: I have reviewed all pertinent imaging results/findings within the past 24 hours.      Assessment/Plan:      * Acute cystitis with hematuria  Urinalysis revealed:   Lab Results   Component Value Date    COLORU Yellow 11/25/2023    APPEARANCEUA Hazy (A) 11/25/2023    SPECGRAV 1.015 11/25/2023    PHUR 6.0 11/25/2023    PROTEINUA 1+ (A) 11/25/2023    GLUCUA Negative 11/25/2023    KETONESU Negative 11/25/2023    NITRITE Negative 11/25/2023    UROBILINOGEN Negative 11/25/2023    BILIRUBINUA Negative 11/25/2023    LEUKOCYTESUR 3+ (A) 11/25/2023    RBCUA >100 (H) 11/25/2023    WBCUA >100 (H) 11/25/2023    BACTERIA Rare 11/25/2023    HYALINECASTS 3 (A) 11/25/2023   Received 2g cefepime in ED  Plan:  -UA culture pending  -Continue Abx  -urology consulted    Cont cefepime  Urine culture pending               Multiple kidney stones  Followed By Dr. Martinez.   Plan:  -IVFs  -analgesics prn  -continue flomax and myrbetriq  -urology consult      Mixed Alzheimer's and vascular dementia  Patient with dementia with likely etiology of alzheimer's dementia. Dementia is mild. The patient does not  have signs of behavioral disturbance. Home dementia medications are Held or Continued: continued.. Continue non-pharmacologic interventions to prevent delirium (No VS between 11PM-5AM, activity during day, opening blinds, providing glasses/hearing aids, and up in chair during daytime). Will avoid narcotics and benzos unless absolutely necessary. PRN anti-psychotics are not prescribed to avoid self harm behaviors.    Anemia  Patient's anemia is currently controlled. Has not received any PRBCs to date. Etiology likely d/t chronic disease due to Chronic Kidney Disease/ESRD  Current CBC reviewed-   Lab Results   Component Value Date    HGB 8.9 (L) 11/25/2023    HCT 27.4 (L) 11/25/2023     Monitor serial CBC and transfuse if patient becomes hemodynamically unstable, symptomatic or H/H drops below 7/21.    History of DVT of lower extremity  Compliant with Eliquis.  Plan:  -hold due to hematuria      Acute renal failure superimposed on stage 3 chronic kidney disease  Patient with acute kidney injury/acute renal failure likely due to UTI. MARI is currently  being treated . Baseline creatinine  1.7-2.5  - Labs reviewed- Renal function/electrolytes with Estimated Creatinine Clearance: 29.6 mL/min (A) (based on SCr of 2.1 mg/dL (H)). according to latest data. Monitor urine output and serial BMP and adjust therapy as needed. Avoid nephrotoxins and renally dose meds for GFR listed above.    Cont ivf  Repeat bmp today     Mixed hyperlipidemia  Patient is chronically on statin.will continue for now. Last Lipid Panel:   Lab Results   Component Value Date    CHOL 135 08/04/2023    HDL 33 (L) 08/04/2023    LDLCALC 67.6 08/04/2023    TRIG 172 (H) 08/04/2023    CHOLHDL 24.4 08/04/2023     Plan:  -Continue home medication  -low fat/low calorie diet          VTE Risk Mitigation (From admission, onward)           Ordered     Reason for No Pharmacological VTE Prophylaxis  Once        Question:  Reasons:  Answer:  Already adequately  anticoagulated on oral Anticoagulants    11/25/23 2322     IP VTE HIGH RISK PATIENT  Once         11/25/23 2322     Place sequential compression device  Until discontinued         11/25/23 2322                    Discharge Planning   VALERIE:      Code Status: Full Code   Is the patient medically ready for discharge?:     Reason for patient still in hospital (select all that apply): Patient trending condition                     Kris Ervin MD  Department of Hospital Medicine   '[facility]

## 2023-11-26 NOTE — HOSPITAL COURSE
11/26: cont cefepime for UTI. Urology consulted on case.     11/27/23  Urology input appreciated. Recommends to treat UTI and outpatient follow up. Plans to have stent removed 12/2023 after seen by Cardiology. Urine growing yeast. Will continue Cefepime add fluconazole.     11/28/23  Identification is still pending. Will need sensitivities prior to discharge.     11/29/23  Final urine culture grew Candida Glabrate. Will plan to treat with fluconazole for two weeks. 1/2 of blood cultures growing gram positive cocci, but ID by PCR negative and is likely contaminant. He was evaluated by PT/TO who recommended SNF, but patient declined and was agreeable to HH. He was asked to keep appt with Cardiology and PCP.

## 2023-11-26 NOTE — ASSESSMENT & PLAN NOTE
Patient with dementia with likely etiology of alzheimer's dementia. Dementia is mild. The patient does not have signs of behavioral disturbance. Home dementia medications are Held or Continued: continued.. Continue non-pharmacologic interventions to prevent delirium (No VS between 11PM-5AM, activity during day, opening blinds, providing glasses/hearing aids, and up in chair during daytime). Will avoid narcotics and benzos unless absolutely necessary. PRN anti-psychotics are not prescribed to avoid self harm behaviors.

## 2023-11-26 NOTE — ED PROVIDER NOTES
SCRIBE #1 NOTE: I, Muriel Good, am scribing for, and in the presence of, Shay Espana MD. I have scribed the entire note.       History     Chief Complaint   Patient presents with    Weakness    Back Pain     Pt was brought in by [facility], Daughter secondary to increased weakness. Pt's daughter reported that pt was incontinent today, which is not normal for him. She told [facility] that pt has hx kidney stones and sx scheduled for the 18th. Pt c/o of L sided lower back pain.     Review of patient's allergies indicates:   Allergen Reactions    Chantix [varenicline] Other (See Comments)     Felt bad         History of Present Illness     HPI    11/25/2023, 9:52 PM  History obtained from the patient  Additional history obtained from independent historian: Patient's daughter at bedside      History of Present Illness: Samy Alejandre Jr. is a 73 y.o. male patient with a PMHx of DM2, HTN, and CVA who presents to the Emergency Department via [facility] for evaluation of generalized weakness which onset this evening. The patient ambulates with a walker at baseline, but his daughter reports that he has been too weak to do so tonight. She reports an episode of incontinence this evening. She states that the patient has a Hx of kidney stones and has had a stent put in place. He is followed by Dr. Martinez (Urology) and is scheduled for a procedure in December. The patient had a cardiac pacemaker put in place on 10/4/23. Symptoms are constant and moderate in severity. No mitigating or exacerbating factors reported. Associated sxs include fever (T now 100.3°F), chills, and diarrhea. Patient denies any dysuria, hematuria, CP, SOB, cough, and all other sxs at this time. No prior Tx reported. No further complaints or concerns at this time.       Arrival mode: [facility]    PCP: Damien Barron MD        Past Medical History:  Past Medical History:   Diagnosis Date    Anxiety     Depression     Diabetes mellitus, type 2     Hypertension     Stroke         Past Surgical History:  Past Surgical History:   Procedure Laterality Date    A-V CARDIAC PACEMAKER INSERTION Left 10/4/2023    Procedure: INSERTION, CARDIAC PACEMAKER, DUAL CHAMBER;  Surgeon: Delonte Lindsey MD;  Location: [facility] CATH LAB;  Service: Cardiology;  Laterality: Left;  biotronik    ANGIOGRAPHY OF LOWER EXTREMITY N/A 12/21/2020    Procedure: ANGIOGRAM, LOWER EXTREMITY/Rt leg poss pta/stent;  Surgeon: Todd Michel MD;  Location: [facility] CATH LAB;  Service: Peripheral Vascular;  Laterality: N/A;  rescheduled 12/8    BACK SURGERY  2015    BRONCHOSCOPY Left 3/24/2022    Procedure: Bronchoscopy;  Surgeon: Edward Williamson MD;  Location: [facility] ENDO;  Service: Pulmonary;  Laterality: Left;  poss endobronchial biopsy or brushing    CATARACT EXTRACTION      CYSTOSCOPY WITH URETEROSCOPY, RETROGRADE PYELOGRAPHY, AND INSERTION OF STENT Left 10/8/2023    Procedure: CYSTOSCOPY, WITH RETROGRADE PYELOGRAM AND URETERAL STENT INSERTION;  Surgeon: Jamie Martinez MD;  Location: [facility] OR;  Service: Urology;  Laterality: Left;    ESOPHAGOGASTRODUODENOSCOPY N/A 8/24/2022    Procedure: EGD (ESOPHAGOGASTRODUODENOSCOPY);  Surgeon: Ana Gomez MD;  Location: [facility];  Service: Endoscopy;  Laterality: N/A;    ESOPHAGOGASTRODUODENOSCOPY N/A 9/14/2022    Procedure: EGD (ESOPHAGOGASTRODUODENOSCOPY);  Surgeon: Ana Gomez MD;  Location: [facility];  Service: Endoscopy;  Laterality: N/A;    KNEE SURGERY  2012    LITHOTRIPSY  2000         Family History:  Family History   Problem Relation Age of Onset    Heart disease Mother     Stroke Mother     Cancer Father     COPD Sister        Social History:  Social History     Tobacco Use    Smoking status: Every Day     Current packs/day: 1.00     Types: Cigarettes    Smokeless tobacco: Current     Types: Snuff   Substance and Sexual Activity    Alcohol use: Yes     Alcohol/week: 1.0 standard drink of alcohol     Types: 1 Shots of liquor per week     Comment: Daily     Drug use: Never    Sexual activity: Not Currently        Review of Systems     Review of Systems   Constitutional:  Positive for chills and fever (T now 100.3°F).   HENT:  Negative for sore throat.    Respiratory:  Negative for cough and shortness of breath.    Cardiovascular:  Negative for chest pain.   Gastrointestinal:  Positive for diarrhea. Negative for nausea.   Genitourinary:  Negative for dysuria and hematuria.        [+] bowel incontinence   Musculoskeletal:  Negative for back pain.   Skin:  Negative for rash.   Neurological:  Positive for weakness (generalized).   Hematological:  Does not bruise/bleed easily.   All other systems reviewed and are negative.     Physical Exam     Initial Vitals [11/25/23 1826]   BP Pulse Resp Temp SpO2   124/66 103 18 100.3 °F (37.9 °C) 96 %      MAP       --          Physical Exam  Nursing Notes and Vital Signs Reviewed.  Constitutional: Patient is in no acute distress. Sickly appearing  Head: Atraumatic. Normocephalic.  Eyes: PERRL. EOM intact. Conjunctivae are not pale. No scleral icterus.  ENT: Mucous membranes are moist.  Neck: Supple. Full ROM.  Cardiovascular: Regular rate. Regular rhythm. No murmurs, rubs, or gallops. Distal pulses are 2+ and symmetric.  Pulmonary/Chest: No respiratory distress. Clear to auscultation bilaterally. No wheezing or rales.  Abdominal: Soft and non-distended.  There is no tenderness.  No rebound, guarding, or rigidity. Good bowel sounds.  Musculoskeletal: Moves all extremities. No obvious deformities. No edema.  Skin: Warm and dry.  Neurological:  Alert, awake, and appropriate.  Normal speech.  No acute focal neurological deficits are appreciated.  Psychiatric: Normal affect. Good eye contact. Appropriate in content.     ED Course   Critical Care    Date/Time: 11/25/2023 9:53 PM    Performed by: Shay Espana MD  Authorized by: Shay Espana MD  Direct patient critical care time: 17 minutes  Additional history critical care time: 5  minutes  Ordering / reviewing critical care time: 9 minutes  Documentation critical care time: 5 minutes  Consulting other physicians critical care time: 7 minutes  Total critical care time (exclusive of procedural time) : 43 minutes  Critical care time was exclusive of separately billable procedures and treating other patients and teaching time.  Critical care was necessary to treat or prevent imminent or life-threatening deterioration of the following conditions: sepsis.  Critical care was time spent personally by me on the following activities: blood draw for specimens, development of treatment plan with patient or surrogate, discussions with consultants, interpretation of cardiac output measurements, evaluation of patient's response to treatment, examination of patient, obtaining history from patient or surrogate, ordering and performing treatments and interventions, ordering and review of laboratory studies, ordering and review of radiographic studies, pulse oximetry, re-evaluation of patient's condition and review of old charts.        ED Vital Signs:  Vitals:    11/25/23 1826 11/25/23 1923 11/25/23 1928 11/25/23 1930   BP: 124/66 120/67  118/67   Pulse: 103 94 96 94   Resp: 18 15  12   Temp: 100.3 °F (37.9 °C)      SpO2: 96% 98%  99%   Weight:        11/25/23 1945 11/25/23 2002 11/25/23 2110 11/25/23 2130   BP: 112/61 109/60 (!) 102/59 (!) 93/53   Pulse: 85 84 89 85   Resp: (!) 25 20 20 18   Temp:       SpO2: 97% 97% 99% 98%   Weight:        11/25/23 2153   BP:    Pulse:    Resp:    Temp:    SpO2:    Weight: 65.3 kg (144 lb)       Abnormal Lab Results:  Labs Reviewed   CBC W/ AUTO DIFFERENTIAL - Abnormal; Notable for the following components:       Result Value    RBC 3.02 (*)     Hemoglobin 8.9 (*)     Hematocrit 27.4 (*)     RDW 16.6 (*)     Gran # (ANC) 8.6 (*)     Lymph # 0.6 (*)     Gran % 87.1 (*)     Lymph % 6.2 (*)     All other components within normal limits   COMPREHENSIVE METABOLIC PANEL -  Abnormal; Notable for the following components:    CO2 17 (*)     Glucose 162 (*)     BUN 25 (*)     Creatinine 2.1 (*)     Calcium 8.4 (*)     Albumin 2.5 (*)     Alkaline Phosphatase 170 (*)     eGFR 33 (*)     All other components within normal limits   LIPASE - Abnormal; Notable for the following components:    Lipase 3 (*)     All other components within normal limits   URINALYSIS, REFLEX TO URINE CULTURE - Abnormal; Notable for the following components:    Appearance, UA Hazy (*)     Protein, UA 1+ (*)     Occult Blood UA 3+ (*)     Leukocytes, UA 3+ (*)     All other components within normal limits    Narrative:     Specimen Source->Urine   URINALYSIS MICROSCOPIC - Abnormal; Notable for the following components:    RBC, UA >100 (*)     WBC, UA >100 (*)     Yeast, UA Moderate (*)     Hyaline Casts, UA 3 (*)     All other components within normal limits    Narrative:     Specimen Source->Urine   CULTURE, URINE   CULTURE, BLOOD   CULTURE, BLOOD   TROPONIN I   B-TYPE NATRIURETIC PEPTIDE   HIV 1 / 2 ANTIBODY    Narrative:     Release to patient->Immediate   HEPATITIS C ANTIBODY    Narrative:     Release to patient->Immediate   HEP C VIRUS HOLD SPECIMEN    Narrative:     Release to patient->Immediate   LACTIC ACID, PLASMA        All Lab Results:  Results for orders placed or performed during the hospital encounter of 11/25/23   CBC W/ AUTO DIFFERENTIAL   Result Value Ref Range    WBC 9.89 3.90 - 12.70 K/uL    RBC 3.02 (L) 4.60 - 6.20 M/uL    Hemoglobin 8.9 (L) 14.0 - 18.0 g/dL    Hematocrit 27.4 (L) 40.0 - 54.0 %    MCV 91 82 - 98 fL    MCH 29.5 27.0 - 31.0 pg    MCHC 32.5 32.0 - 36.0 g/dL    RDW 16.6 (H) 11.5 - 14.5 %    Platelets 222 150 - 450 K/uL    MPV 9.5 9.2 - 12.9 fL    Immature Granulocytes 0.4 0.0 - 0.5 %    Gran # (ANC) 8.6 (H) 1.8 - 7.7 K/uL    Immature Grans (Abs) 0.04 0.00 - 0.04 K/uL    Lymph # 0.6 (L) 1.0 - 4.8 K/uL    Mono # 0.5 0.3 - 1.0 K/uL    Eos # 0.1 0.0 - 0.5 K/uL    Baso # 0.04 0.00 - 0.20  K/uL    nRBC 0 0 /100 WBC    Gran % 87.1 (H) 38.0 - 73.0 %    Lymph % 6.2 (L) 18.0 - 48.0 %    Mono % 4.9 4.0 - 15.0 %    Eosinophil % 1.0 0.0 - 8.0 %    Basophil % 0.4 0.0 - 1.9 %    Differential Method Automated    Comp. Metabolic Panel   Result Value Ref Range    Sodium 138 136 - 145 mmol/L    Potassium 4.1 3.5 - 5.1 mmol/L    Chloride 109 95 - 110 mmol/L    CO2 17 (L) 23 - 29 mmol/L    Glucose 162 (H) 70 - 110 mg/dL    BUN 25 (H) 8 - 23 mg/dL    Creatinine 2.1 (H) 0.5 - 1.4 mg/dL    Calcium 8.4 (L) 8.7 - 10.5 mg/dL    Total Protein 6.3 6.0 - 8.4 g/dL    Albumin 2.5 (L) 3.5 - 5.2 g/dL    Total Bilirubin 0.4 0.1 - 1.0 mg/dL    Alkaline Phosphatase 170 (H) 55 - 135 U/L    AST 24 10 - 40 U/L    ALT 18 10 - 44 U/L    eGFR 33 (A) >60 mL/min/1.73 m^2    Anion Gap 12 8 - 16 mmol/L   Lipase   Result Value Ref Range    Lipase 3 (L) 4 - 60 U/L   Urinalysis, Reflex to Urine Culture Urine, Clean Catch    Specimen: Urine   Result Value Ref Range    Specimen UA Urine, Clean Catch     Color, UA Yellow Yellow, Straw, Diana    Appearance, UA Hazy (A) Clear    pH, UA 6.0 5.0 - 8.0    Specific Gravity, UA 1.015 1.005 - 1.030    Protein, UA 1+ (A) Negative    Glucose, UA Negative Negative    Ketones, UA Negative Negative    Bilirubin (UA) Negative Negative    Occult Blood UA 3+ (A) Negative    Nitrite, UA Negative Negative    Urobilinogen, UA Negative <2.0 EU/dL    Leukocytes, UA 3+ (A) Negative   Troponin I #1   Result Value Ref Range    Troponin I 0.007 0.000 - 0.026 ng/mL   BNP   Result Value Ref Range    BNP 98 0 - 99 pg/mL   HIV 1/2 Ag/Ab (4th Gen)   Result Value Ref Range    HIV 1/2 Ag/Ab Negative Negative   Hepatitis C Antibody   Result Value Ref Range    Hepatitis C Ab Negative Negative   HCV Virus Hold Specimen   Result Value Ref Range    HEP C Virus Hold Specimen Hold for HCV sendout    Urinalysis Microscopic   Result Value Ref Range    RBC, UA >100 (H) 0 - 4 /hpf    WBC, UA >100 (H) 0 - 5 /hpf    Bacteria Rare None-Occ  /hpf    Yeast, UA Moderate (A) None    Hyaline Casts, UA 3 (A) 0-1/lpf /lpf    Unclass Polly UA Occasional None-Moderate    Microscopic Comment SEE COMMENT          Imaging Results:  Imaging Results              CT Renal Stone Study ABD Pelvis WO (Final result)  Result time 11/25/23 20:26:51      Final result by Isidoro Hua MD (11/25/23 20:26:51)                   Impression:      Numerous small bilateral renal nonobstructing calculi measuring up to 7 mm along the right and 8 mm on the left. Left-sided ureteral stent with proximal pigtail in the left renal pelvis and distal pigtail within the bladder. Punctate ureteral stone along the mid left ureteral stent.  Distal left ureteral stone along adjacent to the stent measuring 4 mm. 22 mm left renal cyst.  Chronic perinephric fat stranding.    All CT scans   are performed using dose optimization techniques including the following: automated exposure control; adjustment of the mA and/or kV; use of iterative reconstruction technique.  Dose modulation was employed for ALARA by means of: Automated exposure control; adjustment of the mA and/or kV according to patient size (this includes techniques or standardized protocols for targeted exams where dose is matched to indication/reason for exam; i.e. extremities or head); and/or use of iterative reconstructive technique.      Electronically signed by: Isidoro Hua  Date:    11/25/2023  Time:    20:26               Narrative:    EXAMINATION:  CT RENAL STONE STUDY ABD PELVIS WO    CLINICAL HISTORY:  Flank pain, kidney stone suspected; Unspecified abdominal pain    TECHNIQUE:  Low dose axial images, sagittal and coronal reformations were obtained from the lung bases to the pubic symphysis.  Contrast was not administered.    COMPARISON:  Recent prior    FINDINGS:  Heart: Mitral annular calcification.  Pacemaker leads.    Lung Bases: Well aerated, without consolidation or pleural fluid.    Liver: Hepatomegaly, with no focal  hepatic lesions.    Gallbladder: No calcified gallstones.    Bile Ducts: No evidence of dilated ducts.    Pancreas: No mass or peripancreatic fat stranding.    Spleen: Unremarkable.    Adrenals: Unremarkable.    Kidneys/ Ureters: Numerous small bilateral renal nonobstructing calculi measuring up to 7 mm along the right and 8 mm on the left.  Left-sided ureteral stent with proximal pigtail in the left renal pelvis and distal pigtail within the bladder.  Punctate ureteral stone along the mid left ureteral stent.  Distal left ureteral stone along adjacent to the stent measuring 4 mm.  22 mm left renal cyst.  Chronic perinephric fat stranding.    Bladder: Mild wall thickening.    Reproductive organs: Unremarkable.    GI Tract/Mesentery: No evidence of bowel obstruction or inflammation.  Hiatal hernia.  Colonic diverticulosis.    Peritoneal Space: No ascites. No free air.    Retroperitoneum: No significant adenopathy.    Abdominal wall: Unremarkable.    Vasculature: Atherosclerotic changes of the aorta measuring up to 2.5 cm in the mid aorta    Bones: No acute fracture.                                       X-Ray Chest AP Portable (Final result)  Result time 11/25/23 19:24:48      Final result by Isidoro Hua MD (11/25/23 19:24:48)                   Impression:      No acute abnormality.      Electronically signed by: Isidoro Hua  Date:    11/25/2023  Time:    19:24               Narrative:    EXAMINATION:  XR CHEST AP PORTABLE    CLINICAL HISTORY:  fatigue;    TECHNIQUE:  Single frontal view of the chest was performed.    COMPARISON:  None    FINDINGS:  Left chest pacemakerthe lungs are clear, with normal appearance of pulmonary vasculature and no pleural effusion or pneumothorax.    The cardiac silhouette is normal in size. The hilar and mediastinal contours are unremarkable.    Bones are intact.                                       The EKG was ordered, reviewed, and independently interpreted by the ED  provider.  Interpretation time: 19:25  Rate: 97 BPM  Rhythm: normal sinus rhythm  Interpretation: Left axis deviation. Right bundle branch block. Possible lateral infarct, age undetermined. No STEMI.           The Emergency Provider reviewed the vital signs and test results, which are outlined above.     ED Discussion     10:00 PM:  Discussed case with Urology Dr. Martinez.  Notified him patient being admitted.  Dr. Martinez is agreeable to patient being admitted.  No additional recs at this time    10:03 PM: Discussed case with Ochoa Cruz MD ([facility]). Dr. Cruz agrees with current care and management of pt and accepts admission.   Admitting Service: [facility]  Admitting Physician: Dr. Cruz  Admit to: Inpatient Med/Tele    10:03 PM: Re-evaluated pt. I have discussed test results, shared treatment plan, and the need for admission with patient and family at bedside. Pt and family express understanding at this time and agree with all information. All questions answered. Pt and family have no further questions or concerns at this time. Pt is ready for admit.         Medical Decision Making  Sepsis. 73 y.o. with ureteral stones and stent in place. He is seeing Dr. Martinez. Outpatient plan was for definitive stone removal 12/18 pending clearance from Cardiology on 12/11. Today he has UTI. Febrile. BP soft. Lactic and blood cultures pending.  Patient was admitted to Medicine with a urology consult    Amount and/or Complexity of Data Reviewed  External Data Reviewed: notes.     Details: Patient sees Dr. Martinez.  History of stones with a current stent.  Plan for a procedure in December pending cardiac clearance.  Need for cardiac clearance is due to his recent third-degree heart block requiring a pacemaker  Labs: ordered. Decision-making details documented in ED Course.  Radiology: ordered. Decision-making details documented in ED Course.  ECG/medicine tests: ordered and independent interpretation  performed. Decision-making details documented in ED Course.    Risk  OTC drugs.  Prescription drug management.  Parenteral controlled substances.  Decision regarding hospitalization.    Critical Care  Total time providing critical care: 43 minutes                ED Medication(s):  Medications   sodium chloride 0.9% bolus 1,000 mL 1,000 mL (has no administration in time range)   ceFEPIme (MAXIPIME) 2 g in dextrose 5 % in water (D5W) 100 mL IVPB (MB+) (has no administration in time range)   sodium chloride 0.9% bolus 1,000 mL 1,000 mL (0 mLs Intravenous Stopped 11/25/23 2031)   acetaminophen tablet 1,000 mg (1,000 mg Oral Given 11/25/23 1926)       New Prescriptions    No medications on file               Scribe Attestation:   Scribe #1: I performed the above scribed service and the documentation accurately describes the services I performed. I attest to the accuracy of the note.     Attending:   Physician Attestation Statement for Scribe #1: I, Shay Espana MD, personally performed the services described in this documentation, as scribed by Muriel Good, in my presence, and it is both accurate and complete.           Clinical Impression       ICD-10-CM ICD-9-CM   1. Sepsis, due to unspecified organism, unspecified whether acute organ dysfunction present  A41.9 038.9     995.91   2. Fatigue  R53.83 780.79   3. Left flank pain  R10.9 789.09   4. Elevated serum creatinine  R79.89 790.99   5. Urinary tract infection with hematuria, site unspecified  N39.0 599.0    R31.9 599.70   6. Ureteral stone  N20.1 592.1   7. History of third degree heart block  Z86.79 V12.59       Disposition:   Disposition: Admitted  Condition: Serious         Shay Espana MD  11/26/23 9635

## 2023-11-26 NOTE — H&P
[facility] Medicine  History & Physical    Patient Name: Samy Alejandre Jr.  MRN: [id number]  Patient Class: IP- Inpatient  Admission Date: 11/25/2023  Attending Physician: Ochoa Cruz MD   Primary Care Provider: Damien Barron MD         Patient information was obtained from patient, relative(s), past medical records, and ER records.     Subjective:     Principal Problem:Acute cystitis with hematuria    Chief Complaint:   Chief Complaint   Patient presents with    Weakness    Back Pain     Pt was brought in by [facility], Daughter secondary to increased weakness. Pt's daughter reported that pt was incontinent today, which is not normal for him. She told [facility] that pt has hx kidney stones and sx scheduled for the 18th. Pt c/o of L sided lower back pain.        HPI: Samy Alejandre Jr. is a 73 y.o. male with a PMH  has a past medical history of Anxiety, Depression, Diabetes mellitus, type 2, Hypertension, and Stroke.  Presented to the ER for evaluation of worsening flank pain and generalized weakness which started this evening.  Patient typically ambulates with a walker at baseline, but daughter states that patient has been lying in bed due to weakness and pain.  Per daughter at bedside patient attempted to get out of bed to use the restroom and had an episode of incontinence.  Patient has a history of multiple nonobstructing kidney stones bilaterally in his followed by Dr. Martinez with Urology.  Patient is scheduled for lithotripsy on 12/18/23.  Patient recently had cardiac pacemaker placed on 10/04/2023 for third-degree heart block.  Denies any cardiopulmonary symptoms.  However, patient does endorse subjective fever, chills, and diarrhea.  Denies any hematuria or dysuria.    ER workup revealed H/H of 8.9/27.4 with MCV of 91, BUN/creatinine of 25/2.1 with EGFR of 33 (creatinine level between 1.7-2.5 over the last 8 months),  mg/dL, UA consistent with acute cystitis, CT renal study revealed  numerous bilateral nonobstructing stones, a stent in the left ureter and chronic perinephric fat stranding.  Chest x-ray showed no acute findings.  EKG revealed sinus rhythm with first-degree AV block with a ventricular rate of 91 beats per minute with left axis deviation right bundle-branch block with a QT/QTC of 446/548.  Patient received 1 g of Tylenol, 2 g cefepime, 2 L normal saline in ED. Dr. Martinez was consulted by ER provider to be made aware that patient was in ED. Hospital Medicine consulted to admit patient for UTI and intractable pain.  Patient and family at bedside are in agreement with treatment plan.  Patient will be admitted under inpatient status.    PCP: Damien Barron    Past Medical History:   Diagnosis Date    Anxiety     Depression     Diabetes mellitus, type 2     Hypertension     Stroke        Past Surgical History:   Procedure Laterality Date    A-V CARDIAC PACEMAKER INSERTION Left 10/4/2023    Procedure: INSERTION, CARDIAC PACEMAKER, DUAL CHAMBER;  Surgeon: Delonte Lindsey MD;  Location: [facility];  Service: Cardiology;  Laterality: Left;  biotronik    ANGIOGRAPHY OF LOWER EXTREMITY N/A 12/21/2020    Procedure: ANGIOGRAM, LOWER EXTREMITY/Rt leg poss pta/stent;  Surgeon: Todd Mihcel MD;  Location: [facility];  Service: Peripheral Vascular;  Laterality: N/A;  rescheduled 12/8    BACK SURGERY  2015    BRONCHOSCOPY Left 3/24/2022    Procedure: Bronchoscopy;  Surgeon: Edward Williamson MD;  Location: [facility];  Service: Pulmonary;  Laterality: Left;  poss endobronchial biopsy or brushing    CATARACT EXTRACTION      CYSTOSCOPY WITH URETEROSCOPY, RETROGRADE PYELOGRAPHY, AND INSERTION OF STENT Left 10/8/2023    Procedure: CYSTOSCOPY, WITH RETROGRADE PYELOGRAM AND URETERAL STENT INSERTION;  Surgeon: Jamie Martinez MD;  Location: [facility] OR;  Service: Urology;  Laterality: Left;    ESOPHAGOGASTRODUODENOSCOPY N/A 8/24/2022    Procedure: EGD (ESOPHAGOGASTRODUODENOSCOPY);  Surgeon:  Ana Gomez MD;  Location: [facility];  Service: Endoscopy;  Laterality: N/A;    ESOPHAGOGASTRODUODENOSCOPY N/A 9/14/2022    Procedure: EGD (ESOPHAGOGASTRODUODENOSCOPY);  Surgeon: Ana Gomez MD;  Location: [facility];  Service: Endoscopy;  Laterality: N/A;    KNEE SURGERY  2012    LITHOTRIPSY  2000       Review of patient's allergies indicates:   Allergen Reactions    Chantix [varenicline] Other (See Comments)     Felt bad       No current facility-administered medications on file prior to encounter.     Current Outpatient Medications on File Prior to Encounter   Medication Sig    albuterol (ACCUNEB) 1.25 mg/3 mL Nebu Take 3 mLs (1.25 mg total) by nebulization 2 (two) times a day. Rescue    albuterol (PROVENTIL/VENTOLIN HFA) 90 mcg/actuation inhaler Inhale 2 puffs into the lungs every 4 (four) hours as needed for Wheezing.    blood glucose control, low (TRUE METRIX LEVEL 1) Soln 1 drop by Misc.(Non-Drug; Combo Route) route once daily.    blood glucose control, normal (TRUE METRIX LEVEL 2) Soln 1 drop by Misc.(Non-Drug; Combo Route) route once daily.    ciprofloxacin HCl (CIPRO) 250 MG tablet Take 1 tablet (250 mg total) by mouth 2 (two) times daily. (Patient not taking: Reported on 11/17/2023)    donepeziL (ARICEPT) 10 MG tablet Take 1 tablet (10 mg total) by mouth every evening.    DULoxetine (CYMBALTA) 30 MG capsule Take 1 capsule by mouth once daily    ELIQUIS 5 mg Tab Take 1 tablet by mouth once daily    FLUoxetine 20 MG capsule Take 1 capsule by mouth once daily    gabapentin (NEURONTIN) 100 MG capsule Take 1 capsule (100 mg total) by mouth 3 (three) times daily.    HYDROcodone-acetaminophen (NORCO)  mg per tablet Take 1 tablet by mouth.    inhalat.spacing dev,large mask (BREATHERITE SPACER-MASK,ADULT) Spcr Use as directed for inhalation.    lancets 32 gauge Misc 1 lancet by Misc.(Non-Drug; Combo Route) route once daily.    memantine (NAMENDA) 10 MG Tab TAKE 1/2 TABLET BY MOUTH TWICE DAILY  FOR 1 WEEK, THEN 1 TABLET BY MOUTH TWICE DAILY    mirabegron (MYRBETRIQ) 25 mg Tb24 ER tablet Take 1 tablet (25 mg total) by mouth once daily.    mucus clearing device (AEROBIKA OSCILLATING PEP SYSTM) by Misc.(Non-Drug; Combo Route) route 4 (four) times daily as needed.    oxybutynin (DITROPAN-XL) 5 MG TR24 Take 5 mg by mouth once daily.    pantoprazole (PROTONIX) 40 MG tablet Take 1 tablet (40 mg total) by mouth once daily.    QUEtiapine (SEROQUEL) 50 MG tablet Take 1 tablet by mouth twice daily (Patient taking differently: Take 50 mg by mouth nightly.)    rosuvastatin (CRESTOR) 20 MG tablet Take 1 tablet by mouth once daily (Patient taking differently: Take 20 mg by mouth every evening.)    tamsulosin (FLOMAX) 0.4 mg Cap Take 1 capsule (0.4 mg total) by mouth every evening.    walker (ULTRA-LIGHT ROLLATOR) Misc Use daily for ambulation     Family History       Problem Relation (Age of Onset)    COPD Sister    Cancer Father    Heart disease Mother    Stroke Mother          Tobacco Use    Smoking status: Every Day     Current packs/day: 1.00     Types: Cigarettes    Smokeless tobacco: Current     Types: Snuff   Substance and Sexual Activity    Alcohol use: Yes     Alcohol/week: 1.0 standard drink of alcohol     Types: 1 Shots of liquor per week     Comment: Daily    Drug use: Never    Sexual activity: Not Currently     Review of Systems   Constitutional:  Positive for chills and fever.   Genitourinary:  Positive for flank pain. Negative for difficulty urinating, dysuria, frequency, hematuria and urgency.   Neurological:  Positive for weakness (generalized). Negative for dizziness and light-headedness.   All other systems reviewed and are negative.    Objective:     Vital Signs (Most Recent):  Temp: 97.7 °F (36.5 °C) (11/26/23 0431)  Pulse: 80 (11/26/23 0431)  Resp: 17 (11/26/23 0431)  BP: (!) 102/58 (11/26/23 0431)  SpO2: 97 % (11/26/23 0431) Vital Signs (24h Range):  Temp:  [97.7 °F (36.5 °C)-100.3 °F (37.9 °C)]  97.7 °F (36.5 °C)  Pulse:  [] 80  Resp:  [12-25] 17  SpO2:  [96 %-99 %] 97 %  BP: ()/(53-67) 102/58     Weight: 64.8 kg (142 lb 13.7 oz)  Body mass index is 22.37 kg/m².     Physical Exam  Vitals and nursing note reviewed.   Constitutional:       General: He is awake. He is not in acute distress.     Appearance: Normal appearance. He is well-developed and well-groomed. He is ill-appearing. He is not toxic-appearing or diaphoretic.   HENT:      Head: Normocephalic and atraumatic.   Eyes:      Extraocular Movements: Extraocular movements intact.      Conjunctiva/sclera: Conjunctivae normal.   Cardiovascular:      Rate and Rhythm: Normal rate and regular rhythm.      Pulses: Normal pulses.      Heart sounds: Normal heart sounds. No murmur heard.  Pulmonary:      Effort: Pulmonary effort is normal.      Breath sounds: Normal breath sounds.   Abdominal:      General: Bowel sounds are normal.      Palpations: Abdomen is soft.      Tenderness: There is no abdominal tenderness. There is no right CVA tenderness, left CVA tenderness, guarding or rebound.   Musculoskeletal:      Cervical back: Normal range of motion and neck supple.      Comments: 5/5 strength throughout   Skin:     General: Skin is warm and dry.      Capillary Refill: Capillary refill takes less than 2 seconds.   Neurological:      General: No focal deficit present.      Mental Status: He is alert and oriented to person, place, and time. Mental status is at baseline.      GCS: GCS eye subscore is 4. GCS verbal subscore is 5. GCS motor subscore is 6.      Cranial Nerves: Cranial nerves 2-12 are intact.      Sensory: Sensation is intact.      Motor: Motor function is intact.      Coordination: Coordination is intact.   Psychiatric:         Mood and Affect: Mood normal.         Behavior: Behavior normal. Behavior is cooperative.       LABS:  Recent Results (from the past 24 hour(s))   CBC W/ AUTO DIFFERENTIAL    Collection Time: 11/25/23  7:24 PM    Result Value Ref Range    WBC 9.89 3.90 - 12.70 K/uL    RBC 3.02 (L) 4.60 - 6.20 M/uL    Hemoglobin 8.9 (L) 14.0 - 18.0 g/dL    Hematocrit 27.4 (L) 40.0 - 54.0 %    MCV 91 82 - 98 fL    MCH 29.5 27.0 - 31.0 pg    MCHC 32.5 32.0 - 36.0 g/dL    RDW 16.6 (H) 11.5 - 14.5 %    Platelets 222 150 - 450 K/uL    MPV 9.5 9.2 - 12.9 fL    Immature Granulocytes 0.4 0.0 - 0.5 %    Gran # (ANC) 8.6 (H) 1.8 - 7.7 K/uL    Immature Grans (Abs) 0.04 0.00 - 0.04 K/uL    Lymph # 0.6 (L) 1.0 - 4.8 K/uL    Mono # 0.5 0.3 - 1.0 K/uL    Eos # 0.1 0.0 - 0.5 K/uL    Baso # 0.04 0.00 - 0.20 K/uL    nRBC 0 0 /100 WBC    Gran % 87.1 (H) 38.0 - 73.0 %    Lymph % 6.2 (L) 18.0 - 48.0 %    Mono % 4.9 4.0 - 15.0 %    Eosinophil % 1.0 0.0 - 8.0 %    Basophil % 0.4 0.0 - 1.9 %    Differential Method Automated    Comp. Metabolic Panel    Collection Time: 11/25/23  7:24 PM   Result Value Ref Range    Sodium 138 136 - 145 mmol/L    Potassium 4.1 3.5 - 5.1 mmol/L    Chloride 109 95 - 110 mmol/L    CO2 17 (L) 23 - 29 mmol/L    Glucose 162 (H) 70 - 110 mg/dL    BUN 25 (H) 8 - 23 mg/dL    Creatinine 2.1 (H) 0.5 - 1.4 mg/dL    Calcium 8.4 (L) 8.7 - 10.5 mg/dL    Total Protein 6.3 6.0 - 8.4 g/dL    Albumin 2.5 (L) 3.5 - 5.2 g/dL    Total Bilirubin 0.4 0.1 - 1.0 mg/dL    Alkaline Phosphatase 170 (H) 55 - 135 U/L    AST 24 10 - 40 U/L    ALT 18 10 - 44 U/L    eGFR 33 (A) >60 mL/min/1.73 m^2    Anion Gap 12 8 - 16 mmol/L   Lipase    Collection Time: 11/25/23  7:24 PM   Result Value Ref Range    Lipase 3 (L) 4 - 60 U/L   Troponin I #1    Collection Time: 11/25/23  7:24 PM   Result Value Ref Range    Troponin I 0.007 0.000 - 0.026 ng/mL   BNP    Collection Time: 11/25/23  7:24 PM   Result Value Ref Range    BNP 98 0 - 99 pg/mL   HIV 1/2 Ag/Ab (4th Gen)    Collection Time: 11/25/23  7:27 PM   Result Value Ref Range    HIV 1/2 Ag/Ab Negative Negative   Hepatitis C Antibody    Collection Time: 11/25/23  7:27 PM   Result Value Ref Range    Hepatitis C Ab Negative  Negative   HCV Virus Hold Specimen    Collection Time: 11/25/23  7:27 PM   Result Value Ref Range    HEP C Virus Hold Specimen Hold for HCV sendout    Urinalysis, Reflex to Urine Culture Urine, Clean Catch    Collection Time: 11/25/23  9:09 PM    Specimen: Urine   Result Value Ref Range    Specimen UA Urine, Clean Catch     Color, UA Yellow Yellow, Straw, Diana    Appearance, UA Hazy (A) Clear    pH, UA 6.0 5.0 - 8.0    Specific Gravity, UA 1.015 1.005 - 1.030    Protein, UA 1+ (A) Negative    Glucose, UA Negative Negative    Ketones, UA Negative Negative    Bilirubin (UA) Negative Negative    Occult Blood UA 3+ (A) Negative    Nitrite, UA Negative Negative    Urobilinogen, UA Negative <2.0 EU/dL    Leukocytes, UA 3+ (A) Negative   Urinalysis Microscopic    Collection Time: 11/25/23  9:09 PM   Result Value Ref Range    RBC, UA >100 (H) 0 - 4 /hpf    WBC, UA >100 (H) 0 - 5 /hpf    Bacteria Rare None-Occ /hpf    Yeast, UA Moderate (A) None    Hyaline Casts, UA 3 (A) 0-1/lpf /lpf    Unclass Polly UA Occasional None-Moderate    Microscopic Comment SEE COMMENT    Lactic acid, plasma    Collection Time: 11/25/23 10:05 PM   Result Value Ref Range    Lactate (Lactic Acid) 1.1 0.5 - 2.2 mmol/L   Lactic acid, plasma    Collection Time: 11/26/23  2:44 AM   Result Value Ref Range    Lactate (Lactic Acid) 3.8 (HH) 0.5 - 2.2 mmol/L       RADIOLOGY  CT Renal Stone Study ABD Pelvis WO    Result Date: 11/25/2023  EXAMINATION: CT RENAL STONE STUDY ABD PELVIS WO CLINICAL HISTORY: Flank pain, kidney stone suspected; Unspecified abdominal pain TECHNIQUE: Low dose axial images, sagittal and coronal reformations were obtained from the lung bases to the pubic symphysis.  Contrast was not administered. COMPARISON: Recent prior FINDINGS: Heart: Mitral annular calcification.  Pacemaker leads. Lung Bases: Well aerated, without consolidation or pleural fluid. Liver: Hepatomegaly, with no focal hepatic lesions. Gallbladder: No calcified  gallstones. Bile Ducts: No evidence of dilated ducts. Pancreas: No mass or peripancreatic fat stranding. Spleen: Unremarkable. Adrenals: Unremarkable. Kidneys/ Ureters: Numerous small bilateral renal nonobstructing calculi measuring up to 7 mm along the right and 8 mm on the left.  Left-sided ureteral stent with proximal pigtail in the left renal pelvis and distal pigtail within the bladder.  Punctate ureteral stone along the mid left ureteral stent.  Distal left ureteral stone along adjacent to the stent measuring 4 mm.  22 mm left renal cyst.  Chronic perinephric fat stranding. Bladder: Mild wall thickening. Reproductive organs: Unremarkable. GI Tract/Mesentery: No evidence of bowel obstruction or inflammation.  Hiatal hernia.  Colonic diverticulosis. Peritoneal Space: No ascites. No free air. Retroperitoneum: No significant adenopathy. Abdominal wall: Unremarkable. Vasculature: Atherosclerotic changes of the aorta measuring up to 2.5 cm in the mid aorta Bones: No acute fracture.     Numerous small bilateral renal nonobstructing calculi measuring up to 7 mm along the right and 8 mm on the left. Left-sided ureteral stent with proximal pigtail in the left renal pelvis and distal pigtail within the bladder. Punctate ureteral stone along the mid left ureteral stent.  Distal left ureteral stone along adjacent to the stent measuring 4 mm. 22 mm left renal cyst.  Chronic perinephric fat stranding. All CT scans   are performed using dose optimization techniques including the following: automated exposure control; adjustment of the mA and/or kV; use of iterative reconstruction technique.  Dose modulation was employed for ALARA by means of: Automated exposure control; adjustment of the mA and/or kV according to patient size (this includes techniques or standardized protocols for targeted exams where dose is matched to indication/reason for exam; i.e. extremities or head); and/or use of iterative reconstructive technique.  Electronically signed by: Isidoro Hua Date:    11/25/2023 Time:    20:26    X-Ray Chest AP Portable    Result Date: 11/25/2023  EXAMINATION: XR CHEST AP PORTABLE CLINICAL HISTORY: fatigue; TECHNIQUE: Single frontal view of the chest was performed. COMPARISON: None FINDINGS: Left chest pacemakerthe lungs are clear, with normal appearance of pulmonary vasculature and no pleural effusion or pneumothorax. The cardiac silhouette is normal in size. The hilar and mediastinal contours are unremarkable. Bones are intact.     No acute abnormality. Electronically signed by: Isidoro Hua Date:    11/25/2023 Time:    19:24      EKG    MICROBIOLOGY    MDM     Amount and/or Complexity of Data Reviewed  Clinical lab tests: reviewed  Tests in the radiology section of CPT®: reviewed  Tests in the medicine section of CPT®: reviewed  Discussion of test results with the performing providers: yes  Decide to obtain previous medical records or to obtain history from someone other than the patient: yes  Obtain history from someone other than the patient: yes  Review and summarize past medical records: yes  Discuss the patient with other providers: yes  Independent visualization of images, tracings, or specimens: yes        Assessment/Plan:     * Acute cystitis with hematuria  Urinalysis revealed:   Lab Results   Component Value Date    COLORU Yellow 11/25/2023    APPEARANCEUA Hazy (A) 11/25/2023    SPECGRAV 1.015 11/25/2023    PHUR 6.0 11/25/2023    PROTEINUA 1+ (A) 11/25/2023    GLUCUA Negative 11/25/2023    KETONESU Negative 11/25/2023    NITRITE Negative 11/25/2023    UROBILINOGEN Negative 11/25/2023    BILIRUBINUA Negative 11/25/2023    LEUKOCYTESUR 3+ (A) 11/25/2023    RBCUA >100 (H) 11/25/2023    WBCUA >100 (H) 11/25/2023    BACTERIA Rare 11/25/2023    HYALINECASTS 3 (A) 11/25/2023   Received 2g cefepime in ED  Plan:  -UA culture pending  -Continue Abx  -urology consulted              Multiple kidney stones  Followed By Dr. Martinez.    Plan:  -IVFs  -analgesics prn  -continue flomax and myrbetriq  -urology consult      Acute renal failure superimposed on stage 3 chronic kidney disease  Patient with acute kidney injury/acute renal failure likely due to UTI. MARI is currently  being treated . Baseline creatinine  1.7-2.5  - Labs reviewed- Renal function/electrolytes with Estimated Creatinine Clearance: 29.6 mL/min (A) (based on SCr of 2.1 mg/dL (H)). according to latest data. Monitor urine output and serial BMP and adjust therapy as needed. Avoid nephrotoxins and renally dose meds for GFR listed above.    Mixed hyperlipidemia  Patient is chronically on statin.will continue for now. Last Lipid Panel:   Lab Results   Component Value Date    CHOL 135 08/04/2023    HDL 33 (L) 08/04/2023    LDLCALC 67.6 08/04/2023    TRIG 172 (H) 08/04/2023    CHOLHDL 24.4 08/04/2023     Plan:  -Continue home medication  -low fat/low calorie diet        History of DVT of lower extremity  Compliant with Eliquis.  Plan:  -hold due to hematuria      Anemia  Patient's anemia is currently controlled. Has not received any PRBCs to date. Etiology likely d/t chronic disease due to Chronic Kidney Disease/ESRD  Current CBC reviewed-   Lab Results   Component Value Date    HGB 8.9 (L) 11/25/2023    HCT 27.4 (L) 11/25/2023     Monitor serial CBC and transfuse if patient becomes hemodynamically unstable, symptomatic or H/H drops below 7/21.    Mixed Alzheimer's and vascular dementia  Patient with dementia with likely etiology of alzheimer's dementia. Dementia is mild. The patient does not have signs of behavioral disturbance. Home dementia medications are Held or Continued: continued.. Continue non-pharmacologic interventions to prevent delirium (No VS between 11PM-5AM, activity during day, opening blinds, providing glasses/hearing aids, and up in chair during daytime). Will avoid narcotics and benzos unless absolutely necessary. PRN anti-psychotics are not prescribed to avoid self  harm behaviors.      VTE Risk Mitigation (From admission, onward)           Ordered     Reason for No Pharmacological VTE Prophylaxis  Once        Question:  Reasons:  Answer:  Already adequately anticoagulated on oral Anticoagulants    11/25/23 2322     IP VTE HIGH RISK PATIENT  Once         11/25/23 2322     Place sequential compression device  Until discontinued         11/25/23 2322                     //Core Measures   -DVT proph: SCDs, withholding anticoagulation pending surgical intervention and presence of hematuria  -Code status Full    -Surrogate:daughter    Components of this note were documented using a voice recognition system and are subject to errors not corrected at the time the document was proof read. Please contact the author for any clarifications.     Marco Cruz NP  Department of Hospital Medicine  [facility]

## 2023-11-26 NOTE — ASSESSMENT & PLAN NOTE
Patient is chronically on statin.will continue for now. Last Lipid Panel:   Lab Results   Component Value Date    CHOL 135 08/04/2023    HDL 33 (L) 08/04/2023    LDLCALC 67.6 08/04/2023    TRIG 172 (H) 08/04/2023    CHOLHDL 24.4 08/04/2023     Plan:  -Continue home medication  -low fat/low calorie diet

## 2023-11-26 NOTE — HPI
Samy Alejandre Jr. is a 73 y.o. male with a PMH  has a past medical history of Anxiety, Depression, Diabetes mellitus, type 2, Hypertension, and Stroke.  Presented to the ER for evaluation of worsening flank pain and generalized weakness which started this evening.  Patient typically ambulates with a walker at baseline, but daughter states that patient has been lying in bed due to weakness and pain.  Per daughter at bedside patient attempted to get out of bed to use the restroom and had an episode of incontinence.  Patient has a history of multiple nonobstructing kidney stones bilaterally in his followed by Dr. Martinez with Urology.  Patient is scheduled for lithotripsy on 12/18/23.  Patient recently had cardiac pacemaker placed on 10/04/2023 for third-degree heart block.  Denies any cardiopulmonary symptoms.  However, patient does endorse subjective fever, chills, and diarrhea.  Denies any hematuria or dysuria.    ER workup revealed H/H of 8.9/27.4 with MCV of 91, BUN/creatinine of 25/2.1 with EGFR of 33 (creatinine level between 1.7-2.5 over the last 8 months),  mg/dL, UA consistent with acute cystitis, CT renal study revealed numerous bilateral nonobstructing stones, a stent in the left ureter and chronic perinephric fat stranding.  Chest x-ray showed no acute findings.  EKG revealed sinus rhythm with first-degree AV block with a ventricular rate of 91 beats per minute with left axis deviation right bundle-branch block with a QT/QTC of 446/548.  Patient received 1 g of Tylenol, 2 g cefepime, 2 L normal saline in ED. Dr. Martinez was consulted by ER provider to be made aware that patient was in ED. Hospital Medicine consulted to admit patient for UTI and intractable pain.  Patient and family at bedside are in agreement with treatment plan.  Patient will be admitted under inpatient status.    PCP: Damien Barron

## 2023-11-26 NOTE — H&P (VIEW-ONLY)
Chief Complaint:  Left ureteral stone     HPI:   11/26/2023 - returned to the ED with lethargy and fevers yesterday, CT showed stent in good position with no hydro, no right ureteral stones    11/17/2023 - patient returns today for follow-up, has been having lot issues with urgency and frequency due to the stent, overall tolerating the stent pretty well, no new voiding issues, no gross hematuria or dysuria    10/08/2023 - 73 y.o. male with past medical history COPD, chronic bronchitis, diabetes, and complete heart block status post recent pacemaker placement.  Patient has been convalescing in the hospital after his procedure and was noted to have decreasing renal function.  CT scan was obtained which showed a three and 5 mm left proximal ureteral stone.  Patient does have a history of kidney stones, has undergone a procedure in the past but was many years ago.  Has not seen a urologist in quite some time.  He voids with a good stream and feels like he empties well.  He is very uncomfortable appearing.  Denies gross hematuria.  Denies fevers.  Urinalysis negative for infection        PMH:       Past Medical History:   Diagnosis Date    Anxiety      Depression      Diabetes mellitus, type 2      Hypertension      Stroke           PSH:        Past Surgical History:   Procedure Laterality Date    A-V CARDIAC PACEMAKER INSERTION Left 10/4/2023     Procedure: INSERTION, CARDIAC PACEMAKER, DUAL CHAMBER;  Surgeon: Delonte Lindsey MD;  Location: [facility] CATH LAB;  Service: Cardiology;  Laterality: Left;  biotronik    ANGIOGRAPHY OF LOWER EXTREMITY N/A 12/21/2020     Procedure: ANGIOGRAM, LOWER EXTREMITY/Rt leg poss pta/stent;  Surgeon: Todd Michel MD;  Location: [facility] CATH LAB;  Service: Peripheral Vascular;  Laterality: N/A;  rescheduled 12/8    BACK SURGERY   2015    BRONCHOSCOPY Left 3/24/2022     Procedure: Bronchoscopy;  Surgeon: Edward Williamson MD;  Location: [facility] ENDO;  Service: Pulmonary;  Laterality: Left;   poss endobronchial biopsy or brushing    CATARACT EXTRACTION        ESOPHAGOGASTRODUODENOSCOPY N/A 8/24/2022     Procedure: EGD (ESOPHAGOGASTRODUODENOSCOPY);  Surgeon: Ana Gomez MD;  Location: [facility];  Service: Endoscopy;  Laterality: N/A;    ESOPHAGOGASTRODUODENOSCOPY N/A 9/14/2022     Procedure: EGD (ESOPHAGOGASTRODUODENOSCOPY);  Surgeon: Ana Gomez MD;  Location: [facility];  Service: Endoscopy;  Laterality: N/A;    KNEE SURGERY   2012    LITHOTRIPSY   2000         Family History:        Family History   Problem Relation Age of Onset    Heart disease Mother      Stroke Mother      Cancer Father      COPD Sister           Social History:  Social History            Tobacco Use    Smoking status: Every Day       Current packs/day: 1.00       Types: Cigarettes    Smokeless tobacco: Current       Types: Snuff   Substance Use Topics    Alcohol use: Yes       Alcohol/week: 1.0 standard drink of alcohol       Types: 1 Shots of liquor per week       Comment: Daily    Drug use: Never         Review of Systems:  General: No fever, chills  Skin: No rashes  Chest:  Denies cough and sputum production  Heart: Denies chest pain  Resp: Denies dyspnea  Abdomen: Denies diarrhea, abdominal pain, hematemesis, or blood in stool.  Musculoskeletal: No joint stiffness or swelling. Denies back pain.  : see HPI  Neuro: no dizziness or weakness     Allergies:  Chantix [varenicline]     Medications:     Current Facility-Administered Medications:     acetaminophen tablet 650 mg, 650 mg, Oral, Q6H PRN, Harry Dalal NP, 650 mg at 10/07/23 0607    albuterol-ipratropium 2.5 mg-0.5 mg/3 mL nebulizer solution 3 mL, 3 mL, Nebulization, Q6H PRN, Harry Dalal NP    [START ON 10/9/2023] apixaban tablet 5 mg, 5 mg, Oral, Daily, Binh Harris MD    atorvastatin tablet 80 mg, 80 mg, Oral, Daily, Harry Dalal NP, 80 mg at 10/08/23 0843    dextrose 10% bolus 125 mL 125 mL, 12.5 g, Intravenous, PRN,  Harry Dalal NP    dextrose 10% bolus 250 mL 250 mL, 25 g, Intravenous, PRN, Harry Dalal NP    doxycycline tablet 100 mg, 100 mg, Oral, Q12H, Delonte Lindsey MD, 100 mg at 10/08/23 0844    FLUoxetine capsule 20 mg, 20 mg, Oral, Daily, Harry Dalal NP, 20 mg at 10/08/23 0843    glucagon (human recombinant) injection 1 mg, 1 mg, Intramuscular, PRN, Harry Dalal NP    glucose chewable tablet 16 g, 16 g, Oral, PRN, Harry Dalal NP    glucose chewable tablet 24 g, 24 g, Oral, PRN, Harry Dalal NP    HYDROcodone-acetaminophen  mg per tablet 1 tablet, 1 tablet, Oral, Q6H PRN, Binh Harris MD, 1 tablet at 10/08/23 0843    influenza 65up-adj (QUADRIVALENT ADJUVANTED PF) vaccine 0.5 mL, 0.5 mL, Intramuscular, vaccine x 1 dose, Shanon Bridges MD    insulin aspart U-100 pen 0-10 Units, 0-10 Units, Subcutaneous, QID (AC + HS) PRN, Harry Dalal NP, 1 Units at 10/05/23 2028    levoFLOXacin tablet 750 mg, 750 mg, Oral, Every other day, Binh Harris MD, 750 mg at 10/08/23 0843    melatonin tablet 6 mg, 6 mg, Oral, Nightly PRN, Bouchra Aragon ACNP-BC, 6 mg at 10/05/23 2029    memantine tablet 10 mg, 10 mg, Oral, Daily, Harry Dalal NP, 10 mg at 10/08/23 0843    oxybutynin 24 hr tablet 10 mg, 10 mg, Oral, Daily, Harry Dalal, NP, 10 mg at 10/08/23 0844    pantoprazole EC tablet 40 mg, 40 mg, Oral, Daily, Harry Dalal NP, 40 mg at 10/08/23 0844    sodium chloride 0.9% flush 10 mL, 10 mL, Intravenous, PRN, Harry Dalal, NP    tamsulosin 24 hr capsule 0.4 mg, 0.4 mg, Oral, Daily, Binh Harris MD, 0.4 mg at 10/08/23 0843     Physical Exam:      Vitals:     10/08/23 0843   BP:     Pulse:     Resp: 18   Temp:        Body mass index is 20.37 kg/m².  General: awake, alert, cooperative, very uncomfortable appearing  Head: NC/AT  Ears: external ears normal  Eyes: sclera normal  Lungs: normal inspiration, NAD  Heart:  well-perfused  Skin: The skin is warm and dry  Ext: No c/c/e.  Neuro: grossly intact, AOx3     RADIOLOGY:  CT RENAL STONE STUDY ABD PELVIS WO     CLINICAL HISTORY:  Flank pain, kidney stone suspected; Unspecified abdominal pain     TECHNIQUE:  Low dose axial images, sagittal and coronal reformations were obtained from the lung bases to the pubic symphysis.  Contrast was not administered.     COMPARISON:  Recent prior     FINDINGS:  Heart: Mitral annular calcification.  Pacemaker leads.     Lung Bases: Well aerated, without consolidation or pleural fluid.     Liver: Hepatomegaly, with no focal hepatic lesions.     Gallbladder: No calcified gallstones.     Bile Ducts: No evidence of dilated ducts.     Pancreas: No mass or peripancreatic fat stranding.     Spleen: Unremarkable.     Adrenals: Unremarkable.     Kidneys/ Ureters: Numerous small bilateral renal nonobstructing calculi measuring up to 7 mm along the right and 8 mm on the left.  Left-sided ureteral stent with proximal pigtail in the left renal pelvis and distal pigtail within the bladder.  Punctate ureteral stone along the mid left ureteral stent.  Distal left ureteral stone along adjacent to the stent measuring 4 mm.  22 mm left renal cyst.  Chronic perinephric fat stranding.     Bladder: Mild wall thickening.     Reproductive organs: Unremarkable.     GI Tract/Mesentery: No evidence of bowel obstruction or inflammation.  Hiatal hernia.  Colonic diverticulosis.     Peritoneal Space: No ascites. No free air.     Retroperitoneum: No significant adenopathy.     Abdominal wall: Unremarkable.     Vasculature: Atherosclerotic changes of the aorta measuring up to 2.5 cm in the mid aorta     Bones: No acute fracture.     Impression:     Numerous small bilateral renal nonobstructing calculi measuring up to 7 mm along the right and 8 mm on the left. Left-sided ureteral stent with proximal pigtail in the left renal pelvis and distal pigtail within the bladder. Punctate  ureteral stone along the mid left ureteral stent.  Distal left ureteral stone along adjacent to the stent measuring 4 mm. 22 mm left renal cyst.  Chronic perinephric fat stranding.     LABS:  I personally reviewed the following lab values:        Lab Results   Component Value Date     WBC 6.76 10/08/2023     HGB 8.2 (L) 10/08/2023     HCT 26.5 (L) 10/08/2023      10/08/2023      10/08/2023     K 3.9 10/08/2023      10/08/2023     CREATININE 2.7 (H) 10/08/2023     BUN 14 10/08/2023     CO2 21 (L) 10/08/2023     TSH 1.963 10/02/2023     PSA 1.0 01/03/2022     INR 1.2 10/02/2023     HGBA1C 6.2 (H) 08/04/2023     CHOL 135 08/04/2023     TRIG 172 (H) 08/04/2023     HDL 33 (L) 08/04/2023     ALT 12 10/02/2023     AST 22 10/02/2023         URINALYSIS:  Urinalysis negative for infection     Assessment/Plan:   Samy Alejandre Jr. is a 73 y.o. male with:     UTI/fevers - continue cefepime, adjust based on culture data    Left ureteral stone with MARI - s/p cysto and stent placement, will plan for definitive stone removal 12/18 pending clearance from Cardiology on 12/11        Jamie Martinez MD  Urology

## 2023-11-26 NOTE — PLAN OF CARE
[facility] - Med Surg  Initial Discharge Assessment       Primary Care Provider: Damien Barron MD    Admission Diagnosis: Ureteral stone [N20.1]  Fatigue [R53.83]  Elevated serum creatinine [R79.89]  Left flank pain [R10.9]  History of third degree heart block [Z86.79]  Urinary tract infection with hematuria, site unspecified [N39.0, R31.9]  Sepsis, due to unspecified organism, unspecified whether acute organ dysfunction present [A41.9]    Admission Date: 11/25/2023  Expected Discharge Date:     Transition of Care Barriers: None    Payor: [employer] MANAGED MEDICARE / Plan: [employer] MEDICARE HMO / Product Type: Capitation /     Extended Emergency Contact Information  Primary Emergency Contact: swapna brown  Mobile Phone: [phone]  Relation: Daughter              [id number] - [place], [place] - [address]  [address]  [place] [address]  Phone: [phone] Fax: [phone]    [id number] - [place], [place] - [address] AT [facility]  [address]  [facility] [place] [address]  Phone: [phone] Fax: [phone]      Initial Assessment (most recent)       Adult Discharge Assessment - 11/26/23 1132          Discharge Assessment    Assessment Type Discharge Planning Assessment     Confirmed/corrected address, phone number and insurance Yes     Confirmed Demographics Correct on Facesheet     Source of Information patient     Does patient/caregiver understand observation status Yes     Communicated VALERIE with patient/caregiver Date not available/Unable to determine     People in Home child(gloria), adult     Do you expect to return to your current living situation? Yes     Do you have help at home or someone to help you manage your care at home? No     Prior to hospitilization cognitive status: Alert/Oriented     Current cognitive status: Alert/Oriented     Equipment Currently Used at Home rollator     Readmission within 30 days? No      Patient currently being followed by outpatient case management? No     Do you currently have service(s) that help you manage your care at home? No     Do you take prescription medications? Yes     Do you have prescription coverage? Yes     Do you have any problems affording any of your prescribed medications? No     Is the patient taking medications as prescribed? yes     Who is going to help you get home at discharge? family     How do you get to doctors appointments? family or friend will provide     Are you on dialysis? No     Do you take coumadin? No     DME Needed Upon Discharge  none     Discharge Plan discussed with: Adult children     Transition of Care Barriers None

## 2023-11-26 NOTE — SUBJECTIVE & OBJECTIVE
Interval History: Patient reported chills overnight. No other issues.     Review of Systems   Constitutional:  Positive for chills and fever. Negative for fatigue.   HENT:  Negative for sinus pressure.    Eyes:  Negative for visual disturbance.   Respiratory:  Negative for shortness of breath.    Cardiovascular:  Negative for chest pain.   Gastrointestinal:  Negative for nausea and vomiting.   Genitourinary:  Positive for flank pain. Negative for difficulty urinating, dysuria, frequency, hematuria and urgency.   Musculoskeletal:  Negative for back pain.   Skin:  Negative for rash.   Neurological:  Positive for weakness (generalized). Negative for dizziness, light-headedness and headaches.   Psychiatric/Behavioral:  Negative for confusion.    All other systems reviewed and are negative.    Objective:     Vital Signs (Most Recent):  Temp: 99.8 °F (37.7 °C) (11/26/23 0721)  Pulse: 87 (11/26/23 0848)  Resp: 18 (11/26/23 0834)  BP: (!) 101/58 (11/26/23 0721)  SpO2: 97 % (11/26/23 0834) Vital Signs (24h Range):  Temp:  [97.7 °F (36.5 °C)-100.3 °F (37.9 °C)] 99.8 °F (37.7 °C)  Pulse:  [] 87  Resp:  [12-25] 18  SpO2:  [96 %-99 %] 97 %  BP: ()/(53-67) 101/58     Weight: 64.8 kg (142 lb 13.7 oz)  Body mass index is 22.37 kg/m².    Intake/Output Summary (Last 24 hours) at 11/26/2023 0956  Last data filed at 11/26/2023 0904  Gross per 24 hour   Intake 2800.19 ml   Output --   Net 2800.19 ml         Physical Exam  Constitutional:       General: He is not in acute distress.     Appearance: He is well-developed. He is not diaphoretic.   HENT:      Head: Normocephalic and atraumatic.   Eyes:      Pupils: Pupils are equal, round, and reactive to light.   Cardiovascular:      Rate and Rhythm: Normal rate and regular rhythm.      Heart sounds: Normal heart sounds. No murmur heard.     No friction rub. No gallop.   Pulmonary:      Effort: Pulmonary effort is normal. No respiratory distress.      Breath sounds: Normal breath  sounds. No stridor. No wheezing or rales.   Abdominal:      General: Bowel sounds are normal. There is no distension.      Palpations: Abdomen is soft. There is no mass.      Tenderness: There is no abdominal tenderness. There is no guarding.   Skin:     General: Skin is warm.      Findings: No erythema.   Neurological:      Mental Status: He is alert and oriented to person, place, and time.             Significant Labs: All pertinent labs within the past 24 hours have been reviewed.    Significant Imaging: I have reviewed all pertinent imaging results/findings within the past 24 hours.

## 2023-11-26 NOTE — ASSESSMENT & PLAN NOTE
Patient with acute kidney injury/acute renal failure likely due to UTI. MARI is currently  being treated . Baseline creatinine  1.7-2.5  - Labs reviewed- Renal function/electrolytes with Estimated Creatinine Clearance: 29.6 mL/min (A) (based on SCr of 2.1 mg/dL (H)). according to latest data. Monitor urine output and serial BMP and adjust therapy as needed. Avoid nephrotoxins and renally dose meds for GFR listed above.    Cont ivf  Repeat bmp today

## 2023-11-26 NOTE — CONSULTS
Chief Complaint:  Left ureteral stone     HPI:   11/26/2023 - returned to the ED with lethargy and fevers yesterday, CT showed stent in good position with no hydro, no right ureteral stones    11/17/2023 - patient returns today for follow-up, has been having lot issues with urgency and frequency due to the stent, overall tolerating the stent pretty well, no new voiding issues, no gross hematuria or dysuria    10/08/2023 - 73 y.o. male with past medical history COPD, chronic bronchitis, diabetes, and complete heart block status post recent pacemaker placement.  Patient has been convalescing in the hospital after his procedure and was noted to have decreasing renal function.  CT scan was obtained which showed a three and 5 mm left proximal ureteral stone.  Patient does have a history of kidney stones, has undergone a procedure in the past but was many years ago.  Has not seen a urologist in quite some time.  He voids with a good stream and feels like he empties well.  He is very uncomfortable appearing.  Denies gross hematuria.  Denies fevers.  Urinalysis negative for infection        PMH:       Past Medical History:   Diagnosis Date    Anxiety      Depression      Diabetes mellitus, type 2      Hypertension      Stroke           PSH:        Past Surgical History:   Procedure Laterality Date    A-V CARDIAC PACEMAKER INSERTION Left 10/4/2023     Procedure: INSERTION, CARDIAC PACEMAKER, DUAL CHAMBER;  Surgeon: Delonte Lindsey MD;  Location: [facility] CATH LAB;  Service: Cardiology;  Laterality: Left;  biotronik    ANGIOGRAPHY OF LOWER EXTREMITY N/A 12/21/2020     Procedure: ANGIOGRAM, LOWER EXTREMITY/Rt leg poss pta/stent;  Surgeon: Todd Michel MD;  Location: [facility] CATH LAB;  Service: Peripheral Vascular;  Laterality: N/A;  rescheduled 12/8    BACK SURGERY   2015    BRONCHOSCOPY Left 3/24/2022     Procedure: Bronchoscopy;  Surgeon: Edward Williamson MD;  Location: [facility] ENDO;  Service: Pulmonary;  Laterality: Left;   poss endobronchial biopsy or brushing    CATARACT EXTRACTION        ESOPHAGOGASTRODUODENOSCOPY N/A 8/24/2022     Procedure: EGD (ESOPHAGOGASTRODUODENOSCOPY);  Surgeon: Ana Gomez MD;  Location: [facility];  Service: Endoscopy;  Laterality: N/A;    ESOPHAGOGASTRODUODENOSCOPY N/A 9/14/2022     Procedure: EGD (ESOPHAGOGASTRODUODENOSCOPY);  Surgeon: Ana Gomez MD;  Location: [facility];  Service: Endoscopy;  Laterality: N/A;    KNEE SURGERY   2012    LITHOTRIPSY   2000         Family History:        Family History   Problem Relation Age of Onset    Heart disease Mother      Stroke Mother      Cancer Father      COPD Sister           Social History:  Social History            Tobacco Use    Smoking status: Every Day       Current packs/day: 1.00       Types: Cigarettes    Smokeless tobacco: Current       Types: Snuff   Substance Use Topics    Alcohol use: Yes       Alcohol/week: 1.0 standard drink of alcohol       Types: 1 Shots of liquor per week       Comment: Daily    Drug use: Never         Review of Systems:  General: No fever, chills  Skin: No rashes  Chest:  Denies cough and sputum production  Heart: Denies chest pain  Resp: Denies dyspnea  Abdomen: Denies diarrhea, abdominal pain, hematemesis, or blood in stool.  Musculoskeletal: No joint stiffness or swelling. Denies back pain.  : see HPI  Neuro: no dizziness or weakness     Allergies:  Chantix [varenicline]     Medications:     Current Facility-Administered Medications:     acetaminophen tablet 650 mg, 650 mg, Oral, Q6H PRN, Harry Dalal NP, 650 mg at 10/07/23 0607    albuterol-ipratropium 2.5 mg-0.5 mg/3 mL nebulizer solution 3 mL, 3 mL, Nebulization, Q6H PRN, Harry Dalal NP    [START ON 10/9/2023] apixaban tablet 5 mg, 5 mg, Oral, Daily, Binh Harris MD    atorvastatin tablet 80 mg, 80 mg, Oral, Daily, Harry Dalal NP, 80 mg at 10/08/23 0843    dextrose 10% bolus 125 mL 125 mL, 12.5 g, Intravenous, PRN,  Harry Dalal NP    dextrose 10% bolus 250 mL 250 mL, 25 g, Intravenous, PRN, Harry Dalal NP    doxycycline tablet 100 mg, 100 mg, Oral, Q12H, Delonte Lindsey MD, 100 mg at 10/08/23 0844    FLUoxetine capsule 20 mg, 20 mg, Oral, Daily, Harry Dalal NP, 20 mg at 10/08/23 0843    glucagon (human recombinant) injection 1 mg, 1 mg, Intramuscular, PRN, Harry Dalal NP    glucose chewable tablet 16 g, 16 g, Oral, PRN, Harry Dalal NP    glucose chewable tablet 24 g, 24 g, Oral, PRN, Harry Dalal NP    HYDROcodone-acetaminophen  mg per tablet 1 tablet, 1 tablet, Oral, Q6H PRN, Binh Harris MD, 1 tablet at 10/08/23 0843    influenza 65up-adj (QUADRIVALENT ADJUVANTED PF) vaccine 0.5 mL, 0.5 mL, Intramuscular, vaccine x 1 dose, Shanon Bridges MD    insulin aspart U-100 pen 0-10 Units, 0-10 Units, Subcutaneous, QID (AC + HS) PRN, Harry Dalal NP, 1 Units at 10/05/23 2028    levoFLOXacin tablet 750 mg, 750 mg, Oral, Every other day, Binh Harris MD, 750 mg at 10/08/23 0843    melatonin tablet 6 mg, 6 mg, Oral, Nightly PRN, Bouchra Aragon ACNP-BC, 6 mg at 10/05/23 2029    memantine tablet 10 mg, 10 mg, Oral, Daily, Harry Dalal NP, 10 mg at 10/08/23 0843    oxybutynin 24 hr tablet 10 mg, 10 mg, Oral, Daily, Harry Dalal, NP, 10 mg at 10/08/23 0844    pantoprazole EC tablet 40 mg, 40 mg, Oral, Daily, Harry Dalal NP, 40 mg at 10/08/23 0844    sodium chloride 0.9% flush 10 mL, 10 mL, Intravenous, PRN, Harry Dalal, NP    tamsulosin 24 hr capsule 0.4 mg, 0.4 mg, Oral, Daily, Binh Harris MD, 0.4 mg at 10/08/23 0843     Physical Exam:      Vitals:     10/08/23 0843   BP:     Pulse:     Resp: 18   Temp:        Body mass index is 20.37 kg/m².  General: awake, alert, cooperative, very uncomfortable appearing  Head: NC/AT  Ears: external ears normal  Eyes: sclera normal  Lungs: normal inspiration, NAD  Heart:  well-perfused  Skin: The skin is warm and dry  Ext: No c/c/e.  Neuro: grossly intact, AOx3     RADIOLOGY:  CT RENAL STONE STUDY ABD PELVIS WO     CLINICAL HISTORY:  Flank pain, kidney stone suspected; Unspecified abdominal pain     TECHNIQUE:  Low dose axial images, sagittal and coronal reformations were obtained from the lung bases to the pubic symphysis.  Contrast was not administered.     COMPARISON:  Recent prior     FINDINGS:  Heart: Mitral annular calcification.  Pacemaker leads.     Lung Bases: Well aerated, without consolidation or pleural fluid.     Liver: Hepatomegaly, with no focal hepatic lesions.     Gallbladder: No calcified gallstones.     Bile Ducts: No evidence of dilated ducts.     Pancreas: No mass or peripancreatic fat stranding.     Spleen: Unremarkable.     Adrenals: Unremarkable.     Kidneys/ Ureters: Numerous small bilateral renal nonobstructing calculi measuring up to 7 mm along the right and 8 mm on the left.  Left-sided ureteral stent with proximal pigtail in the left renal pelvis and distal pigtail within the bladder.  Punctate ureteral stone along the mid left ureteral stent.  Distal left ureteral stone along adjacent to the stent measuring 4 mm.  22 mm left renal cyst.  Chronic perinephric fat stranding.     Bladder: Mild wall thickening.     Reproductive organs: Unremarkable.     GI Tract/Mesentery: No evidence of bowel obstruction or inflammation.  Hiatal hernia.  Colonic diverticulosis.     Peritoneal Space: No ascites. No free air.     Retroperitoneum: No significant adenopathy.     Abdominal wall: Unremarkable.     Vasculature: Atherosclerotic changes of the aorta measuring up to 2.5 cm in the mid aorta     Bones: No acute fracture.     Impression:     Numerous small bilateral renal nonobstructing calculi measuring up to 7 mm along the right and 8 mm on the left. Left-sided ureteral stent with proximal pigtail in the left renal pelvis and distal pigtail within the bladder. Punctate  ureteral stone along the mid left ureteral stent.  Distal left ureteral stone along adjacent to the stent measuring 4 mm. 22 mm left renal cyst.  Chronic perinephric fat stranding.     LABS:  I personally reviewed the following lab values:        Lab Results   Component Value Date     WBC 6.76 10/08/2023     HGB 8.2 (L) 10/08/2023     HCT 26.5 (L) 10/08/2023      10/08/2023      10/08/2023     K 3.9 10/08/2023      10/08/2023     CREATININE 2.7 (H) 10/08/2023     BUN 14 10/08/2023     CO2 21 (L) 10/08/2023     TSH 1.963 10/02/2023     PSA 1.0 01/03/2022     INR 1.2 10/02/2023     HGBA1C 6.2 (H) 08/04/2023     CHOL 135 08/04/2023     TRIG 172 (H) 08/04/2023     HDL 33 (L) 08/04/2023     ALT 12 10/02/2023     AST 22 10/02/2023         URINALYSIS:  Urinalysis negative for infection     Assessment/Plan:   Samy Alejandre Jr. is a 73 y.o. male with:     UTI/fevers - continue cefepime, adjust based on culture data    Left ureteral stone with MARI - s/p cysto and stent placement, will plan for definitive stone removal 12/18 pending clearance from Cardiology on 12/11        Jamie Martinez MD  Urology

## 2023-11-26 NOTE — ASSESSMENT & PLAN NOTE
Patient with acute kidney injury/acute renal failure likely due to UTI. MARI is currently  being treated . Baseline creatinine  1.7-2.5  - Labs reviewed- Renal function/electrolytes with Estimated Creatinine Clearance: 29.6 mL/min (A) (based on SCr of 2.1 mg/dL (H)). according to latest data. Monitor urine output and serial BMP and adjust therapy as needed. Avoid nephrotoxins and renally dose meds for GFR listed above.

## 2023-11-26 NOTE — PLAN OF CARE
Patient is awake, alert and oriented x 3. Vitally the patient is stable and denies any concerns regarding treatment. Will continue to monitor the patient and administer scheduled medications. Encouraged pt to call for help if needing to get out of bed. Bed alarm set.

## 2023-11-26 NOTE — ASSESSMENT & PLAN NOTE
Patient's anemia is currently controlled. Has not received any PRBCs to date. Etiology likely d/t chronic disease due to Chronic Kidney Disease/ESRD  Current CBC reviewed-   Lab Results   Component Value Date    HGB 8.9 (L) 11/25/2023    HCT 27.4 (L) 11/25/2023     Monitor serial CBC and transfuse if patient becomes hemodynamically unstable, symptomatic or H/H drops below 7/21.

## 2023-11-27 ENCOUNTER — CLINICAL SUPPORT (OUTPATIENT)
Dept: SMOKING CESSATION | Facility: CLINIC | Age: 73
End: 2023-11-27

## 2023-11-27 DIAGNOSIS — F17.200 NICOTINE DEPENDENCE: Primary | ICD-10-CM

## 2023-11-27 LAB
ANION GAP SERPL CALC-SCNC: 11 MMOL/L (ref 8–16)
BASOPHILS # BLD AUTO: 0.04 K/UL (ref 0–0.2)
BASOPHILS NFR BLD: 0.6 % (ref 0–1.9)
BUN SERPL-MCNC: 19 MG/DL (ref 8–23)
CALCIUM SERPL-MCNC: 8 MG/DL (ref 8.7–10.5)
CHLORIDE SERPL-SCNC: 105 MMOL/L (ref 95–110)
CO2 SERPL-SCNC: 19 MMOL/L (ref 23–29)
CREAT SERPL-MCNC: 1.7 MG/DL (ref 0.5–1.4)
DIFFERENTIAL METHOD: ABNORMAL
EOSINOPHIL # BLD AUTO: 0.2 K/UL (ref 0–0.5)
EOSINOPHIL NFR BLD: 2.4 % (ref 0–8)
ERYTHROCYTE [DISTWIDTH] IN BLOOD BY AUTOMATED COUNT: 17 % (ref 11.5–14.5)
EST. GFR  (NO RACE VARIABLE): 43 ML/MIN/1.73 M^2
GLUCOSE SERPL-MCNC: 136 MG/DL (ref 70–110)
HCT VFR BLD AUTO: 23.1 % (ref 40–54)
HGB BLD-MCNC: 7.5 G/DL (ref 14–18)
IMM GRANULOCYTES # BLD AUTO: 0.02 K/UL (ref 0–0.04)
IMM GRANULOCYTES NFR BLD AUTO: 0.3 % (ref 0–0.5)
LACTATE SERPL-SCNC: 1.2 MMOL/L (ref 0.5–2.2)
LYMPHOCYTES # BLD AUTO: 0.8 K/UL (ref 1–4.8)
LYMPHOCYTES NFR BLD: 11.8 % (ref 18–48)
MCH RBC QN AUTO: 29.1 PG (ref 27–31)
MCHC RBC AUTO-ENTMCNC: 32.5 G/DL (ref 32–36)
MCV RBC AUTO: 90 FL (ref 82–98)
MONOCYTES # BLD AUTO: 0.9 K/UL (ref 0.3–1)
MONOCYTES NFR BLD: 12.5 % (ref 4–15)
NEUTROPHILS # BLD AUTO: 5.1 K/UL (ref 1.8–7.7)
NEUTROPHILS NFR BLD: 72.4 % (ref 38–73)
NRBC BLD-RTO: 0 /100 WBC
PLATELET # BLD AUTO: 175 K/UL (ref 150–450)
PMV BLD AUTO: 10.5 FL (ref 9.2–12.9)
POTASSIUM SERPL-SCNC: 3.4 MMOL/L (ref 3.5–5.1)
RBC # BLD AUTO: 2.58 M/UL (ref 4.6–6.2)
SODIUM SERPL-SCNC: 135 MMOL/L (ref 136–145)
WBC # BLD AUTO: 7.1 K/UL (ref 3.9–12.7)

## 2023-11-27 PROCEDURE — 99406 BEHAV CHNG SMOKING 3-10 MIN: CPT | Mod: S$GLB,,,

## 2023-11-27 PROCEDURE — 25000003 PHARM REV CODE 250: Mod: HCNC | Performed by: NURSE PRACTITIONER

## 2023-11-27 PROCEDURE — 25000003 PHARM REV CODE 250: Mod: HCNC | Performed by: EMERGENCY MEDICINE

## 2023-11-27 PROCEDURE — 11000001 HC ACUTE MED/SURG PRIVATE ROOM: Mod: HCNC

## 2023-11-27 PROCEDURE — 25000003 PHARM REV CODE 250: Mod: HCNC | Performed by: FAMILY MEDICINE

## 2023-11-27 PROCEDURE — 85025 COMPLETE CBC W/AUTO DIFF WBC: CPT | Mod: HCNC | Performed by: FAMILY MEDICINE

## 2023-11-27 PROCEDURE — 63600175 PHARM REV CODE 636 W HCPCS: Mod: HCNC | Performed by: NURSE PRACTITIONER

## 2023-11-27 PROCEDURE — 99406 PT REFUSED TOBACCO CESSATION: ICD-10-PCS | Mod: S$GLB,,,

## 2023-11-27 PROCEDURE — 36415 COLL VENOUS BLD VENIPUNCTURE: CPT | Mod: HCNC | Performed by: FAMILY MEDICINE

## 2023-11-27 PROCEDURE — 36415 COLL VENOUS BLD VENIPUNCTURE: CPT | Mod: HCNC | Performed by: NURSE PRACTITIONER

## 2023-11-27 PROCEDURE — 80048 BASIC METABOLIC PNL TOTAL CA: CPT | Mod: HCNC | Performed by: FAMILY MEDICINE

## 2023-11-27 PROCEDURE — S4991 NICOTINE PATCH NONLEGEND: HCPCS | Mod: HCNC | Performed by: EMERGENCY MEDICINE

## 2023-11-27 PROCEDURE — 83605 ASSAY OF LACTIC ACID: CPT | Mod: HCNC | Performed by: NURSE PRACTITIONER

## 2023-11-27 RX ORDER — HYDROCODONE BITARTRATE AND ACETAMINOPHEN 10; 325 MG/1; MG/1
1 TABLET ORAL EVERY 6 HOURS PRN
Status: DISCONTINUED | OUTPATIENT
Start: 2023-11-27 | End: 2023-11-29 | Stop reason: HOSPADM

## 2023-11-27 RX ORDER — IBUPROFEN 200 MG
1 TABLET ORAL DAILY
Status: DISCONTINUED | OUTPATIENT
Start: 2023-11-27 | End: 2023-11-29 | Stop reason: HOSPADM

## 2023-11-27 RX ORDER — POTASSIUM CHLORIDE 20 MEQ/1
20 TABLET, EXTENDED RELEASE ORAL ONCE
Status: COMPLETED | OUTPATIENT
Start: 2023-11-27 | End: 2023-11-27

## 2023-11-27 RX ADMIN — NICOTINE 1 PATCH: 21 PATCH, EXTENDED RELEASE TRANSDERMAL at 03:11

## 2023-11-27 RX ADMIN — HYDROCODONE BITARTRATE AND ACETAMINOPHEN 1 TABLET: 10; 325 TABLET ORAL at 09:11

## 2023-11-27 RX ADMIN — CEFEPIME 1 G: 1 INJECTION, POWDER, FOR SOLUTION INTRAMUSCULAR; INTRAVENOUS at 09:11

## 2023-11-27 RX ADMIN — HYDROCODONE BITARTRATE AND ACETAMINOPHEN 1 TABLET: 10; 325 TABLET ORAL at 01:11

## 2023-11-27 RX ADMIN — TAMSULOSIN HYDROCHLORIDE 0.4 MG: 0.4 CAPSULE ORAL at 09:11

## 2023-11-27 RX ADMIN — ACETAMINOPHEN 650 MG: 325 TABLET ORAL at 01:11

## 2023-11-27 RX ADMIN — CEFEPIME 1 G: 1 INJECTION, POWDER, FOR SOLUTION INTRAMUSCULAR; INTRAVENOUS at 11:11

## 2023-11-27 RX ADMIN — MIRABEGRON 25 MG: 25 TABLET, FILM COATED, EXTENDED RELEASE ORAL at 10:11

## 2023-11-27 RX ADMIN — MEMANTINE 10 MG: 10 TABLET ORAL at 09:11

## 2023-11-27 RX ADMIN — ATORVASTATIN CALCIUM 80 MG: 40 TABLET, FILM COATED ORAL at 09:11

## 2023-11-27 RX ADMIN — DONEPEZIL HYDROCHLORIDE 10 MG: 5 TABLET, FILM COATED ORAL at 09:11

## 2023-11-27 RX ADMIN — SODIUM BICARBONATE: 84 INJECTION, SOLUTION INTRAVENOUS at 11:11

## 2023-11-27 RX ADMIN — PANTOPRAZOLE SODIUM 40 MG: 40 TABLET, DELAYED RELEASE ORAL at 09:11

## 2023-11-27 RX ADMIN — FLUCONAZOLE 100 MG: 2 INJECTION, SOLUTION INTRAVENOUS at 10:11

## 2023-11-27 RX ADMIN — SODIUM BICARBONATE: 84 INJECTION, SOLUTION INTRAVENOUS at 02:11

## 2023-11-27 RX ADMIN — POTASSIUM CHLORIDE 20 MEQ: 1500 TABLET, EXTENDED RELEASE ORAL at 09:11

## 2023-11-27 NOTE — PLAN OF CARE
Free from injury. Family at bedside. Tele-sitter in use. Told to call for assistance. 12hr chart check complete.

## 2023-11-27 NOTE — PLAN OF CARE
Discussed poc with pt, pt verbalized understanding    Purposeful rounding every 2hours    VS wnl  Cardiac monitoring in use, pt is ANN MARIE, tele monitor #[id number]  Fall precautions in place, remains injury free  Pain and nausea under control with PRN meds  IVFs  Accurate I&Os  Abx given as prescribed  Bed locked at lowest position  Call light within reach  Chart check complete  Will cont with POC    Problem: Adult Inpatient Plan of Care  Goal: Plan of Care Review  Outcome: Ongoing, Progressing  Goal: Patient-Specific Goal (Individualized)  Outcome: Ongoing, Progressing  Goal: Absence of Hospital-Acquired Illness or Injury  Outcome: Ongoing, Progressing  Goal: Optimal Comfort and Wellbeing  Outcome: Ongoing, Progressing  Goal: Readiness for Transition of Care  Outcome: Ongoing, Progressing     Problem: Diabetes Comorbidity  Goal: Blood Glucose Level Within Targeted Range  Outcome: Ongoing, Progressing     Problem: Fluid and Electrolyte Imbalance (Acute Kidney Injury/Impairment)  Goal: Fluid and Electrolyte Balance  Outcome: Ongoing, Progressing     Problem: Oral Intake Inadequate (Acute Kidney Injury/Impairment)  Goal: Optimal Nutrition Intake  Outcome: Ongoing, Progressing     Problem: Renal Function Impairment (Acute Kidney Injury/Impairment)  Goal: Effective Renal Function  Outcome: Ongoing, Progressing     Problem: Skin Injury Risk Increased  Goal: Skin Health and Integrity  Outcome: Ongoing, Progressing     Problem: Impaired Wound Healing  Goal: Optimal Wound Healing  Outcome: Ongoing, Progressing

## 2023-11-27 NOTE — CONSULTS
11/27/23 0935   WOCN Assessment   WOCN Total Time (mins) 30   Visit Date 11/27/23   Visit Time 0935   Consult Type New   WOCN Speciality Wound   Wound moisture   Intervention assessed;applied   Pressure Injury Prevention    Check Moisture Management Pad Done   Sacral Foam Dressing Patient incontinent, barrier cream applied        Altered Skin Integrity 11/25/23 2330 Perineum   Date First Assessed/Time First Assessed: 11/25/23 2330   Altered Skin Integrity Present on Admission - Did Patient arrive to the hospital with altered skin?: yes  Location: Perineum   Wound Image    Dressing Appearance Open to air   Drainage Amount None   Appearance Pink   Care Cleansed with:;Soap and water;Applied:;Skin Barrier

## 2023-11-28 PROBLEM — E87.8 ELECTROLYTE ABNORMALITY: Status: ACTIVE | Noted: 2023-11-28

## 2023-11-28 LAB
ANION GAP SERPL CALC-SCNC: 11 MMOL/L (ref 8–16)
BASOPHILS # BLD AUTO: 0.01 K/UL (ref 0–0.2)
BASOPHILS NFR BLD: 0.2 % (ref 0–1.9)
BUN SERPL-MCNC: 17 MG/DL (ref 8–23)
CALCIUM SERPL-MCNC: 7.9 MG/DL (ref 8.7–10.5)
CHLORIDE SERPL-SCNC: 102 MMOL/L (ref 95–110)
CO2 SERPL-SCNC: 27 MMOL/L (ref 23–29)
CREAT SERPL-MCNC: 1.5 MG/DL (ref 0.5–1.4)
DIFFERENTIAL METHOD: ABNORMAL
EOSINOPHIL # BLD AUTO: 0.4 K/UL (ref 0–0.5)
EOSINOPHIL NFR BLD: 6 % (ref 0–8)
ERYTHROCYTE [DISTWIDTH] IN BLOOD BY AUTOMATED COUNT: 16.5 % (ref 11.5–14.5)
EST. GFR  (NO RACE VARIABLE): 49 ML/MIN/1.73 M^2
GLUCOSE SERPL-MCNC: 137 MG/DL (ref 70–110)
HCT VFR BLD AUTO: 23.6 % (ref 40–54)
HGB BLD-MCNC: 7.7 G/DL (ref 14–18)
IMM GRANULOCYTES # BLD AUTO: 0.03 K/UL (ref 0–0.04)
IMM GRANULOCYTES NFR BLD AUTO: 0.5 % (ref 0–0.5)
LYMPHOCYTES # BLD AUTO: 0.8 K/UL (ref 1–4.8)
LYMPHOCYTES NFR BLD: 11.6 % (ref 18–48)
MAGNESIUM SERPL-MCNC: 1.3 MG/DL (ref 1.6–2.6)
MCH RBC QN AUTO: 28.9 PG (ref 27–31)
MCHC RBC AUTO-ENTMCNC: 32.6 G/DL (ref 32–36)
MCV RBC AUTO: 89 FL (ref 82–98)
MONOCYTES # BLD AUTO: 1 K/UL (ref 0.3–1)
MONOCYTES NFR BLD: 14.6 % (ref 4–15)
NEUTROPHILS # BLD AUTO: 4.5 K/UL (ref 1.8–7.7)
NEUTROPHILS NFR BLD: 67.1 % (ref 38–73)
NRBC BLD-RTO: 0 /100 WBC
PLATELET # BLD AUTO: 159 K/UL (ref 150–450)
PMV BLD AUTO: 10.6 FL (ref 9.2–12.9)
POTASSIUM SERPL-SCNC: 3.1 MMOL/L (ref 3.5–5.1)
RBC # BLD AUTO: 2.66 M/UL (ref 4.6–6.2)
SODIUM SERPL-SCNC: 140 MMOL/L (ref 136–145)
WBC # BLD AUTO: 6.63 K/UL (ref 3.9–12.7)

## 2023-11-28 PROCEDURE — 36415 COLL VENOUS BLD VENIPUNCTURE: CPT | Mod: HCNC | Performed by: FAMILY MEDICINE

## 2023-11-28 PROCEDURE — 25000003 PHARM REV CODE 250: Mod: HCNC | Performed by: NURSE PRACTITIONER

## 2023-11-28 PROCEDURE — 25000003 PHARM REV CODE 250: Mod: HCNC | Performed by: EMERGENCY MEDICINE

## 2023-11-28 PROCEDURE — 63600175 PHARM REV CODE 636 W HCPCS: Mod: HCNC | Performed by: NURSE PRACTITIONER

## 2023-11-28 PROCEDURE — 83735 ASSAY OF MAGNESIUM: CPT | Mod: HCNC | Performed by: FAMILY MEDICINE

## 2023-11-28 PROCEDURE — 80048 BASIC METABOLIC PNL TOTAL CA: CPT | Mod: HCNC | Performed by: FAMILY MEDICINE

## 2023-11-28 PROCEDURE — 11000001 HC ACUTE MED/SURG PRIVATE ROOM: Mod: HCNC

## 2023-11-28 PROCEDURE — 63600175 PHARM REV CODE 636 W HCPCS: Mod: HCNC | Performed by: EMERGENCY MEDICINE

## 2023-11-28 PROCEDURE — S4991 NICOTINE PATCH NONLEGEND: HCPCS | Mod: HCNC | Performed by: EMERGENCY MEDICINE

## 2023-11-28 PROCEDURE — 25000003 PHARM REV CODE 250: Mod: HCNC | Performed by: FAMILY MEDICINE

## 2023-11-28 PROCEDURE — 85025 COMPLETE CBC W/AUTO DIFF WBC: CPT | Mod: HCNC | Performed by: FAMILY MEDICINE

## 2023-11-28 RX ORDER — CYANOCOBALAMIN 1000 UG/ML
1000 INJECTION, SOLUTION INTRAMUSCULAR; SUBCUTANEOUS DAILY
Status: COMPLETED | OUTPATIENT
Start: 2023-11-28 | End: 2023-11-29

## 2023-11-28 RX ORDER — MAGNESIUM SULFATE HEPTAHYDRATE 40 MG/ML
2 INJECTION, SOLUTION INTRAVENOUS ONCE
Status: COMPLETED | OUTPATIENT
Start: 2023-11-28 | End: 2023-11-28

## 2023-11-28 RX ADMIN — NICOTINE 1 PATCH: 21 PATCH, EXTENDED RELEASE TRANSDERMAL at 09:11

## 2023-11-28 RX ADMIN — FLUCONAZOLE 100 MG: 2 INJECTION, SOLUTION INTRAVENOUS at 09:11

## 2023-11-28 RX ADMIN — DONEPEZIL HYDROCHLORIDE 10 MG: 5 TABLET, FILM COATED ORAL at 09:11

## 2023-11-28 RX ADMIN — CEFEPIME 1 G: 1 INJECTION, POWDER, FOR SOLUTION INTRAMUSCULAR; INTRAVENOUS at 09:11

## 2023-11-28 RX ADMIN — TAMSULOSIN HYDROCHLORIDE 0.4 MG: 0.4 CAPSULE ORAL at 09:11

## 2023-11-28 RX ADMIN — POTASSIUM BICARBONATE 50 MEQ: 978 TABLET, EFFERVESCENT ORAL at 09:11

## 2023-11-28 RX ADMIN — MAGNESIUM SULFATE HEPTAHYDRATE 2 G: 40 INJECTION, SOLUTION INTRAVENOUS at 02:11

## 2023-11-28 RX ADMIN — CYANOCOBALAMIN 1000 MCG: 1000 INJECTION INTRAMUSCULAR; SUBCUTANEOUS at 09:11

## 2023-11-28 RX ADMIN — CEFEPIME 1 G: 1 INJECTION, POWDER, FOR SOLUTION INTRAMUSCULAR; INTRAVENOUS at 10:11

## 2023-11-28 RX ADMIN — HYDROCODONE BITARTRATE AND ACETAMINOPHEN 1 TABLET: 10; 325 TABLET ORAL at 10:11

## 2023-11-28 RX ADMIN — HYDROCODONE BITARTRATE AND ACETAMINOPHEN 1 TABLET: 10; 325 TABLET ORAL at 09:11

## 2023-11-28 RX ADMIN — MEMANTINE 10 MG: 10 TABLET ORAL at 09:11

## 2023-11-28 RX ADMIN — MIRABEGRON 25 MG: 25 TABLET, FILM COATED, EXTENDED RELEASE ORAL at 09:11

## 2023-11-28 RX ADMIN — MORPHINE SULFATE 2 MG: 2 INJECTION, SOLUTION INTRAMUSCULAR; INTRAVENOUS at 11:11

## 2023-11-28 RX ADMIN — PANTOPRAZOLE SODIUM 40 MG: 40 TABLET, DELAYED RELEASE ORAL at 09:11

## 2023-11-28 RX ADMIN — ATORVASTATIN CALCIUM 80 MG: 40 TABLET, FILM COATED ORAL at 09:11

## 2023-11-28 RX ADMIN — HYDROCODONE BITARTRATE AND ACETAMINOPHEN 1 TABLET: 10; 325 TABLET ORAL at 02:11

## 2023-11-28 NOTE — ASSESSMENT & PLAN NOTE
Patient's anemia is currently controlled. Has not received any PRBCs to date. Etiology likely d/t chronic disease due to Chronic Kidney Disease/ESRD  Current CBC reviewed-   Lab Results   Component Value Date    HGB 7.7 (L) 11/28/2023    HCT 23.6 (L) 11/28/2023     Monitor serial CBC and transfuse if patient becomes hemodynamically unstable, symptomatic or H/H drops below 7/21.

## 2023-11-28 NOTE — ASSESSMENT & PLAN NOTE
Urinalysis revealed:   Lab Results   Component Value Date    COLORU Yellow 11/25/2023    APPEARANCEUA Hazy (A) 11/25/2023    SPECGRAV 1.015 11/25/2023    PHUR 6.0 11/25/2023    PROTEINUA 1+ (A) 11/25/2023    GLUCUA Negative 11/25/2023    KETONESU Negative 11/25/2023    NITRITE Negative 11/25/2023    UROBILINOGEN Negative 11/25/2023    BILIRUBINUA Negative 11/25/2023    LEUKOCYTESUR 3+ (A) 11/25/2023    RBCUA >100 (H) 11/25/2023    WBCUA >100 (H) 11/25/2023    BACTERIA Rare 11/25/2023    HYALINECASTS 3 (A) 11/25/2023   Received 2g cefepime in ED  Plan:  -UA culture pending  -Continue Abx  -urology consulted    Cont cefepime  Urine culture pending         11/27/23  Urine culture growing yeast. Continue Cefepime add Fluconazole

## 2023-11-28 NOTE — SUBJECTIVE & OBJECTIVE
Interval History: continues to have flank pain. Analgesia optimized. Urine culture growing yeast.     Review of Systems   Constitutional:  Negative for chills, fatigue and fever.   HENT:  Negative for sinus pressure.    Eyes:  Negative for visual disturbance.   Respiratory:  Negative for shortness of breath.    Cardiovascular:  Negative for chest pain.   Gastrointestinal:  Negative for nausea and vomiting.   Genitourinary:  Positive for flank pain. Negative for difficulty urinating, dysuria, frequency, hematuria and urgency.   Musculoskeletal:  Negative for back pain.   Skin:  Negative for rash.   Neurological:  Positive for weakness (generalized). Negative for dizziness, light-headedness and headaches.   Psychiatric/Behavioral:  Negative for confusion.    All other systems reviewed and are negative.    Objective:     Vital Signs (Most Recent):  Temp: 98.3 °F (36.8 °C) (11/27/23 1552)  Pulse: 71 (11/27/23 1552)  Resp: 18 (11/27/23 1552)  BP: (!) 107/59 (11/27/23 1552)  SpO2: 96 % (11/27/23 1552) Vital Signs (24h Range):  Temp:  [98.3 °F (36.8 °C)-100.3 °F (37.9 °C)] 98.3 °F (36.8 °C)  Pulse:  [66-93] 71  Resp:  [18] 18  SpO2:  [95 %-96 %] 96 %  BP: ()/(53-61) 107/59     Weight: 64.8 kg (142 lb 13.7 oz)  Body mass index is 22.37 kg/m².    Intake/Output Summary (Last 24 hours) at 11/27/2023 1840  Last data filed at 11/27/2023 1145  Gross per 24 hour   Intake 120 ml   Output 1350 ml   Net -1230 ml         Physical Exam  Constitutional:       General: He is not in acute distress.     Appearance: He is well-developed. He is not diaphoretic.   HENT:      Head: Normocephalic and atraumatic.   Eyes:      Pupils: Pupils are equal, round, and reactive to light.   Cardiovascular:      Rate and Rhythm: Normal rate and regular rhythm.      Heart sounds: Normal heart sounds. No murmur heard.     No friction rub. No gallop.   Pulmonary:      Effort: Pulmonary effort is normal. No respiratory distress.      Breath sounds:  Normal breath sounds. No stridor. No wheezing or rales.   Abdominal:      General: Bowel sounds are normal. There is no distension.      Palpations: Abdomen is soft. There is no mass.      Tenderness: There is no abdominal tenderness. There is no guarding.   Skin:     General: Skin is warm.      Findings: No erythema.   Neurological:      Mental Status: He is alert and oriented to person, place, and time.           Significant Labs: All pertinent labs within the past 24 hours have been reviewed.  CBC:   Recent Labs   Lab 11/25/23 1924 11/26/23  1004 11/27/23  0518   WBC 9.89 7.92 7.10   HGB 8.9* 8.4* 7.5*   HCT 27.4* 29.5* 23.1*    172 175     CMP:   Recent Labs   Lab 11/25/23 1924 11/26/23 1004 11/27/23  0518    135* 135*   K 4.1 4.9 3.4*    114* 105   CO2 17* 9* 19*   * 101 136*   BUN 25* 22 19   CREATININE 2.1* 1.8* 1.7*   CALCIUM 8.4* 8.3* 8.0*   PROT 6.3  --   --    ALBUMIN 2.5*  --   --    BILITOT 0.4  --   --    ALKPHOS 170*  --   --    AST 24  --   --    ALT 18  --   --    ANIONGAP 12 12 11       Significant Imaging: I have reviewed all pertinent imaging results/findings within the past 24 hours.

## 2023-11-28 NOTE — ASSESSMENT & PLAN NOTE
Followed By Dr. Martinez.   Plan:  -IVFs  -analgesics prn  -continue flomax and myrbetriq  -urology consult    11/27/23  No intervention needed during hospitalization. Outpatient follow up

## 2023-11-28 NOTE — PROGRESS NOTES
[facility]  Progress Note    Patient Name: Samy Alejandre Jr.  MRN: [id number]  Patient Class: IP- Inpatient   Admission Date: 11/25/2023  Length of Stay: 2 days  Attending Physician: Cisco Enriquez MD  Primary Care Provider: Damien Barron MD    Subjective:     Principal Problem:Acute cystitis with hematuria        HPI:  Samy Alejandre Jr. is a 73 y.o. male with a PMH  has a past medical history of Anxiety, Depression, Diabetes mellitus, type 2, Hypertension, and Stroke.  Presented to the ER for evaluation of worsening flank pain and generalized weakness which started this evening.  Patient typically ambulates with a walker at baseline, but daughter states that patient has been lying in bed due to weakness and pain.  Per daughter at bedside patient attempted to get out of bed to use the restroom and had an episode of incontinence.  Patient has a history of multiple nonobstructing kidney stones bilaterally in his followed by Dr. Martinez with Urology.  Patient is scheduled for lithotripsy on 12/18/23.  Patient recently had cardiac pacemaker placed on 10/04/2023 for third-degree heart block.  Denies any cardiopulmonary symptoms.  However, patient does endorse subjective fever, chills, and diarrhea.  Denies any hematuria or dysuria.    ER workup revealed H/H of 8.9/27.4 with MCV of 91, BUN/creatinine of 25/2.1 with EGFR of 33 (creatinine level between 1.7-2.5 over the last 8 months),  mg/dL, UA consistent with acute cystitis, CT renal study revealed numerous bilateral nonobstructing stones, a stent in the left ureter and chronic perinephric fat stranding.  Chest x-ray showed no acute findings.  EKG revealed sinus rhythm with first-degree AV block with a ventricular rate of 91 beats per minute with left axis deviation right bundle-branch block with a QT/QTC of 446/548.  Patient received 1 g of Tylenol, 2 g cefepime, 2 L normal saline in ED. Dr. Martinez was consulted by ER provider to be  made aware that patient was in ED. Hospital Medicine consulted to admit patient for UTI and intractable pain.  Patient and family at bedside are in agreement with treatment plan.  Patient will be admitted under inpatient status.    PCP: Damien Barron    Overview/Hospital Course:  11/26: cont cefepime for UTI. Urology consulted on case.     11/27/23  Urine growing yeast. Will continue Cefepime add fluconazole.     Interval History: continues to have flank pain. Analgesia optimized. Urine culture growing yeast.     Review of Systems   Constitutional:  Negative for chills, fatigue and fever.   HENT:  Negative for sinus pressure.    Eyes:  Negative for visual disturbance.   Respiratory:  Negative for shortness of breath.    Cardiovascular:  Negative for chest pain.   Gastrointestinal:  Negative for nausea and vomiting.   Genitourinary:  Positive for flank pain. Negative for difficulty urinating, dysuria, frequency, hematuria and urgency.   Musculoskeletal:  Negative for back pain.   Skin:  Negative for rash.   Neurological:  Positive for weakness (generalized). Negative for dizziness, light-headedness and headaches.   Psychiatric/Behavioral:  Negative for confusion.    All other systems reviewed and are negative.    Objective:     Vital Signs (Most Recent):  Temp: 98.3 °F (36.8 °C) (11/27/23 1552)  Pulse: 71 (11/27/23 1552)  Resp: 18 (11/27/23 1552)  BP: (!) 107/59 (11/27/23 1552)  SpO2: 96 % (11/27/23 1552) Vital Signs (24h Range):  Temp:  [98.3 °F (36.8 °C)-100.3 °F (37.9 °C)] 98.3 °F (36.8 °C)  Pulse:  [66-93] 71  Resp:  [18] 18  SpO2:  [95 %-96 %] 96 %  BP: ()/(53-61) 107/59     Weight: 64.8 kg (142 lb 13.7 oz)  Body mass index is 22.37 kg/m².    Intake/Output Summary (Last 24 hours) at 11/27/2023 1840  Last data filed at 11/27/2023 1145  Gross per 24 hour   Intake 120 ml   Output 1350 ml   Net -1230 ml         Physical Exam  Constitutional:       General: He is not in acute distress.     Appearance: He is  well-developed. He is not diaphoretic.   HENT:      Head: Normocephalic and atraumatic.   Eyes:      Pupils: Pupils are equal, round, and reactive to light.   Cardiovascular:      Rate and Rhythm: Normal rate and regular rhythm.      Heart sounds: Normal heart sounds. No murmur heard.     No friction rub. No gallop.   Pulmonary:      Effort: Pulmonary effort is normal. No respiratory distress.      Breath sounds: Normal breath sounds. No stridor. No wheezing or rales.   Abdominal:      General: Bowel sounds are normal. There is no distension.      Palpations: Abdomen is soft. There is no mass.      Tenderness: There is no abdominal tenderness. There is no guarding.   Skin:     General: Skin is warm.      Findings: No erythema.   Neurological:      Mental Status: He is alert and oriented to person, place, and time.           Significant Labs: All pertinent labs within the past 24 hours have been reviewed.  CBC:   Recent Labs   Lab 11/25/23 1924 11/26/23  1004 11/27/23  0518   WBC 9.89 7.92 7.10   HGB 8.9* 8.4* 7.5*   HCT 27.4* 29.5* 23.1*    172 175     CMP:   Recent Labs   Lab 11/25/23 1924 11/26/23  1004 11/27/23  0518    135* 135*   K 4.1 4.9 3.4*    114* 105   CO2 17* 9* 19*   * 101 136*   BUN 25* 22 19   CREATININE 2.1* 1.8* 1.7*   CALCIUM 8.4* 8.3* 8.0*   PROT 6.3  --   --    ALBUMIN 2.5*  --   --    BILITOT 0.4  --   --    ALKPHOS 170*  --   --    AST 24  --   --    ALT 18  --   --    ANIONGAP 12 12 11       Significant Imaging: I have reviewed all pertinent imaging results/findings within the past 24 hours.    Assessment/Plan:      * Acute cystitis with hematuria  Urinalysis revealed:   Lab Results   Component Value Date    COLORU Yellow 11/25/2023    APPEARANCEUA Hazy (A) 11/25/2023    SPECGRAV 1.015 11/25/2023    PHUR 6.0 11/25/2023    PROTEINUA 1+ (A) 11/25/2023    GLUCUA Negative 11/25/2023    KETONESU Negative 11/25/2023    NITRITE Negative 11/25/2023    UROBILINOGEN Negative  11/25/2023    BILIRUBINUA Negative 11/25/2023    LEUKOCYTESUR 3+ (A) 11/25/2023    RBCUA >100 (H) 11/25/2023    WBCUA >100 (H) 11/25/2023    BACTERIA Rare 11/25/2023    HYALINECASTS 3 (A) 11/25/2023   Received 2g cefepime in ED  Plan:  -UA culture pending  -Continue Abx  -urology consulted    Cont cefepime  Urine culture pending         11/27/23  Urine culture growing yeast. Continue Cefepime add Fluconazole        Multiple kidney stones  Followed By Dr. Martinez.   Plan:  -IVFs  -analgesics prn  -continue flomax and myrbetriq  -urology consult    11/27/23  No intervention needed during hospitalization. Outpatient follow up      Mixed Alzheimer's and vascular dementia  Patient with dementia with likely etiology of alzheimer's dementia. Dementia is mild. The patient does not have signs of behavioral disturbance. Home dementia medications are Held or Continued: continued.. Continue non-pharmacologic interventions to prevent delirium (No VS between 11PM-5AM, activity during day, opening blinds, providing glasses/hearing aids, and up in chair during daytime). Will avoid narcotics and benzos unless absolutely necessary. PRN anti-psychotics are not prescribed to avoid self harm behaviors.    Anemia  Patient's anemia is currently controlled. Has not received any PRBCs to date. Etiology likely d/t chronic disease due to Chronic Kidney Disease/ESRD  Current CBC reviewed-   Lab Results   Component Value Date    HGB 8.9 (L) 11/25/2023    HCT 27.4 (L) 11/25/2023     Monitor serial CBC and transfuse if patient becomes hemodynamically unstable, symptomatic or H/H drops below 7/21.    History of DVT of lower extremity  Compliant with Eliquis.  Plan:  -hold due to hematuria      Acute renal failure superimposed on stage 3 chronic kidney disease  Patient with acute kidney injury/acute renal failure likely due to UTI. MARI is currently  being treated . Baseline creatinine  1.7-2.5  - Labs reviewed- Renal function/electrolytes with  Estimated Creatinine Clearance: 36.5 mL/min (A) (based on SCr of 1.7 mg/dL (H)). according to latest data. Monitor urine output and serial BMP and adjust therapy as needed. Avoid nephrotoxins and renally dose meds for GFR listed above.    Cont ivf  Repeat bmp today     11/27/23  Kidney function gradually improving.     Mixed hyperlipidemia  Patient is chronically on statin.will continue for now. Last Lipid Panel:   Lab Results   Component Value Date    CHOL 135 08/04/2023    HDL 33 (L) 08/04/2023    LDLCALC 67.6 08/04/2023    TRIG 172 (H) 08/04/2023    CHOLHDL 24.4 08/04/2023     Plan:  -Continue home medication  -low fat/low calorie diet          VTE Risk Mitigation (From admission, onward)           Ordered     Reason for No Pharmacological VTE Prophylaxis  Once        Question:  Reasons:  Answer:  Already adequately anticoagulated on oral Anticoagulants    11/25/23 2322     IP VTE HIGH RISK PATIENT  Once         11/25/23 2322     Place sequential compression device  Until discontinued         11/25/23 2322                    Discharge Planning   VALERIE:      Code Status: Full Code   Is the patient medically ready for discharge?:     Reason for patient still in hospital (select all that apply): Treatment             Sarika Nelson NP  Department of Hospital Medicine   [facility] - Med Surg

## 2023-11-28 NOTE — ASSESSMENT & PLAN NOTE
Urinalysis revealed:   Lab Results   Component Value Date    COLORU Yellow 11/25/2023    APPEARANCEUA Hazy (A) 11/25/2023    SPECGRAV 1.015 11/25/2023    PHUR 6.0 11/25/2023    PROTEINUA 1+ (A) 11/25/2023    GLUCUA Negative 11/25/2023    KETONESU Negative 11/25/2023    NITRITE Negative 11/25/2023    UROBILINOGEN Negative 11/25/2023    BILIRUBINUA Negative 11/25/2023    LEUKOCYTESUR 3+ (A) 11/25/2023    RBCUA >100 (H) 11/25/2023    WBCUA >100 (H) 11/25/2023    BACTERIA Rare 11/25/2023    HYALINECASTS 3 (A) 11/25/2023   Received 2g cefepime in ED  Plan:  -UA culture pending  -Continue Abx  -urology consulted    Cont cefepime  Urine culture pending         11/27/23  Urine culture growing yeast. Continue Cefepime add Fluconazole    11/28/23  Final culture sensitivities are pending. Continue fluconazole.

## 2023-11-28 NOTE — PLAN OF CARE
Discussed poc with pt, pt verbalized understanding  Purposeful rounding every 2hours  VS wnl  Cardiac monitoring in use, pt is ANN MARIE, tele monitor #[id number]  Blood glucose monitoring   Fall precautions in place, remains injury free  Pain and nausea under control with PRN meds  IVFs  Accurate I&Os  Abx given as prescribed  Bed locked at lowest position  Call light within reach  Chart check complete  Will cont with POC    Problem: Adult Inpatient Plan of Care  Goal: Plan of Care Review  Outcome: Ongoing, Progressing  Goal: Patient-Specific Goal (Individualized)  Outcome: Ongoing, Progressing  Goal: Absence of Hospital-Acquired Illness or Injury  Outcome: Ongoing, Progressing  Goal: Optimal Comfort and Wellbeing  Outcome: Ongoing, Progressing  Goal: Readiness for Transition of Care  Outcome: Ongoing, Progressing     Problem: Diabetes Comorbidity  Goal: Blood Glucose Level Within Targeted Range  Outcome: Ongoing, Progressing     Problem: Fluid and Electrolyte Imbalance (Acute Kidney Injury/Impairment)  Goal: Fluid and Electrolyte Balance  Outcome: Ongoing, Progressing     Problem: Oral Intake Inadequate (Acute Kidney Injury/Impairment)  Goal: Optimal Nutrition Intake  Outcome: Ongoing, Progressing     Problem: Renal Function Impairment (Acute Kidney Injury/Impairment)  Goal: Effective Renal Function  Outcome: Ongoing, Progressing     Problem: Skin Injury Risk Increased  Goal: Skin Health and Integrity  Outcome: Ongoing, Progressing     Problem: Impaired Wound Healing  Goal: Optimal Wound Healing  Outcome: Ongoing, Progressing

## 2023-11-28 NOTE — SUBJECTIVE & OBJECTIVE
Interval History: ready to go home.spoke to daughter, Jayshree about plan of care. Will need final urine cultures prior to discharge.    Review of Systems   Constitutional:  Negative for chills, fatigue and fever.   HENT:  Negative for sinus pressure.    Eyes:  Negative for visual disturbance.   Respiratory:  Negative for shortness of breath.    Cardiovascular:  Negative for chest pain.   Gastrointestinal:  Negative for nausea and vomiting.   Genitourinary:  Positive for flank pain. Negative for difficulty urinating, dysuria, frequency, hematuria and urgency.   Musculoskeletal:  Negative for back pain.   Skin:  Negative for rash.   Neurological:  Positive for weakness (generalized). Negative for dizziness, light-headedness and headaches.   Psychiatric/Behavioral:  Negative for confusion.    All other systems reviewed and are negative.      Objective:     Vital Signs (Most Recent):  Temp: 97.6 °F (36.4 °C) (11/28/23 1151)  Pulse: 79 (11/28/23 1515)  Resp: 18 (11/28/23 1411)  BP: 136/65 (11/28/23 1151)  SpO2: 97 % (11/28/23 1151) Vital Signs (24h Range):  Temp:  [97.5 °F (36.4 °C)-99 °F (37.2 °C)] 97.6 °F (36.4 °C)  Pulse:  [68-88] 79  Resp:  [18] 18  SpO2:  [97 %-100 %] 97 %  BP: ()/(60-74) 136/65     Weight: 64.8 kg (142 lb 13.7 oz)  Body mass index is 22.37 kg/m².    Intake/Output Summary (Last 24 hours) at 11/28/2023 1651  Last data filed at 11/28/2023 0847  Gross per 24 hour   Intake 2206.07 ml   Output --   Net 2206.07 ml         Physical Exam  Constitutional:       General: He is not in acute distress.     Appearance: He is well-developed. He is not diaphoretic.   HENT:      Head: Normocephalic and atraumatic.   Eyes:      Pupils: Pupils are equal, round, and reactive to light.   Cardiovascular:      Rate and Rhythm: Normal rate and regular rhythm.      Heart sounds: Normal heart sounds. No murmur heard.     No friction rub. No gallop.   Pulmonary:      Effort: Pulmonary effort is normal. No respiratory  distress.      Breath sounds: Normal breath sounds. No stridor. No wheezing or rales.   Abdominal:      General: Bowel sounds are normal. There is no distension.      Palpations: Abdomen is soft. There is no mass.      Tenderness: There is no abdominal tenderness. There is no guarding.   Skin:     General: Skin is warm.      Findings: No erythema.   Neurological:      Mental Status: He is alert and oriented to person, place, and time.             Significant Labs: All pertinent labs within the past 24 hours have been reviewed.  CBC:   Recent Labs   Lab 11/27/23 0518 11/28/23  0555   WBC 7.10 6.63   HGB 7.5* 7.7*   HCT 23.1* 23.6*    159     CMP:   Recent Labs   Lab 11/27/23 0518 11/28/23  0555   * 140   K 3.4* 3.1*    102   CO2 19* 27   * 137*   BUN 19 17   CREATININE 1.7* 1.5*   CALCIUM 8.0* 7.9*   ANIONGAP 11 11       Significant Imaging: I have reviewed all pertinent imaging results/findings within the past 24 hours.

## 2023-11-28 NOTE — ASSESSMENT & PLAN NOTE
Patient with acute kidney injury/acute renal failure likely due to UTI. MARI is currently  being treated . Baseline creatinine  1.7-2.5  - Labs reviewed- Renal function/electrolytes with Estimated Creatinine Clearance: 36.5 mL/min (A) (based on SCr of 1.7 mg/dL (H)). according to latest data. Monitor urine output and serial BMP and adjust therapy as needed. Avoid nephrotoxins and renally dose meds for GFR listed above.    Cont ivf  Repeat bmp today     11/27/23  Kidney function gradually improving.

## 2023-11-28 NOTE — PROGRESS NOTES
[facility] Medicine  Progress Note    Patient Name: Samy Alejandre Jr.  MRN: [id number]  Patient Class: IP- Inpatient   Admission Date: 11/25/2023  Length of Stay: 3 days  Attending Physician: Cisco Enriquez MD  Primary Care Provider: Damien Barron MD        Subjective:     Principal Problem:Acute cystitis with hematuria    HPI:  Samy Alejandre Jr. is a 73 y.o. male with a PMH  has a past medical history of Anxiety, Depression, Diabetes mellitus, type 2, Hypertension, and Stroke.  Presented to the ER for evaluation of worsening flank pain and generalized weakness which started this evening.  Patient typically ambulates with a walker at baseline, but daughter states that patient has been lying in bed due to weakness and pain.  Per daughter at bedside patient attempted to get out of bed to use the restroom and had an episode of incontinence.  Patient has a history of multiple nonobstructing kidney stones bilaterally in his followed by Dr. Martinez with Urology.  Patient is scheduled for lithotripsy on 12/18/23.  Patient recently had cardiac pacemaker placed on 10/04/2023 for third-degree heart block.  Denies any cardiopulmonary symptoms.  However, patient does endorse subjective fever, chills, and diarrhea.  Denies any hematuria or dysuria.    ER workup revealed H/H of 8.9/27.4 with MCV of 91, BUN/creatinine of 25/2.1 with EGFR of 33 (creatinine level between 1.7-2.5 over the last 8 months),  mg/dL, UA consistent with acute cystitis, CT renal study revealed numerous bilateral nonobstructing stones, a stent in the left ureter and chronic perinephric fat stranding.  Chest x-ray showed no acute findings.  EKG revealed sinus rhythm with first-degree AV block with a ventricular rate of 91 beats per minute with left axis deviation right bundle-branch block with a QT/QTC of 446/548.  Patient received 1 g of Tylenol, 2 g cefepime, 2 L normal saline in ED. Dr. Martinez was consulted by ER provider to be  made aware that patient was in ED. Hospital Medicine consulted to admit patient for UTI and intractable pain.  Patient and family at bedside are in agreement with treatment plan.  Patient will be admitted under inpatient status.    PCP: Damien Barron    Overview/Hospital Course:  11/26: cont cefepime for UTI. Urology consulted on case.     11/27/23  Urine growing yeast. Will continue Cefepime add fluconazole.     11/28/23  Identification is still pending. Will need sensitivities prior to discharge.     Interval History: ready to go home.spoke to daughter, Jayshree about plan of care. Will need final urine cultures prior to discharge.    Review of Systems   Constitutional:  Negative for chills, fatigue and fever.   HENT:  Negative for sinus pressure.    Eyes:  Negative for visual disturbance.   Respiratory:  Negative for shortness of breath.    Cardiovascular:  Negative for chest pain.   Gastrointestinal:  Negative for nausea and vomiting.   Genitourinary:  Positive for flank pain. Negative for difficulty urinating, dysuria, frequency, hematuria and urgency.   Musculoskeletal:  Negative for back pain.   Skin:  Negative for rash.   Neurological:  Positive for weakness (generalized). Negative for dizziness, light-headedness and headaches.   Psychiatric/Behavioral:  Negative for confusion.    All other systems reviewed and are negative.      Objective:     Vital Signs (Most Recent):  Temp: 97.6 °F (36.4 °C) (11/28/23 1151)  Pulse: 79 (11/28/23 1515)  Resp: 18 (11/28/23 1411)  BP: 136/65 (11/28/23 1151)  SpO2: 97 % (11/28/23 1151) Vital Signs (24h Range):  Temp:  [97.5 °F (36.4 °C)-99 °F (37.2 °C)] 97.6 °F (36.4 °C)  Pulse:  [68-88] 79  Resp:  [18] 18  SpO2:  [97 %-100 %] 97 %  BP: ()/(60-74) 136/65     Weight: 64.8 kg (142 lb 13.7 oz)  Body mass index is 22.37 kg/m².    Intake/Output Summary (Last 24 hours) at 11/28/2023 1651  Last data filed at 11/28/2023 0847  Gross per 24 hour   Intake 2206.07 ml   Output --    Net 2206.07 ml         Physical Exam  Constitutional:       General: He is not in acute distress.     Appearance: He is well-developed. He is not diaphoretic.   HENT:      Head: Normocephalic and atraumatic.   Eyes:      Pupils: Pupils are equal, round, and reactive to light.   Cardiovascular:      Rate and Rhythm: Normal rate and regular rhythm.      Heart sounds: Normal heart sounds. No murmur heard.     No friction rub. No gallop.   Pulmonary:      Effort: Pulmonary effort is normal. No respiratory distress.      Breath sounds: Normal breath sounds. No stridor. No wheezing or rales.   Abdominal:      General: Bowel sounds are normal. There is no distension.      Palpations: Abdomen is soft. There is no mass.      Tenderness: There is no abdominal tenderness. There is no guarding.   Skin:     General: Skin is warm.      Findings: No erythema.   Neurological:      Mental Status: He is alert and oriented to person, place, and time.             Significant Labs: All pertinent labs within the past 24 hours have been reviewed.  CBC:   Recent Labs   Lab 11/27/23 0518 11/28/23  0555   WBC 7.10 6.63   HGB 7.5* 7.7*   HCT 23.1* 23.6*    159     CMP:   Recent Labs   Lab 11/27/23 0518 11/28/23  0555   * 140   K 3.4* 3.1*    102   CO2 19* 27   * 137*   BUN 19 17   CREATININE 1.7* 1.5*   CALCIUM 8.0* 7.9*   ANIONGAP 11 11       Significant Imaging: I have reviewed all pertinent imaging results/findings within the past 24 hours.    Assessment/Plan:      * Acute cystitis with hematuria  Urinalysis revealed:   Lab Results   Component Value Date    COLORU Yellow 11/25/2023    APPEARANCEUA Hazy (A) 11/25/2023    SPECGRAV 1.015 11/25/2023    PHUR 6.0 11/25/2023    PROTEINUA 1+ (A) 11/25/2023    GLUCUA Negative 11/25/2023    KETONESU Negative 11/25/2023    NITRITE Negative 11/25/2023    UROBILINOGEN Negative 11/25/2023    BILIRUBINUA Negative 11/25/2023    LEUKOCYTESUR 3+ (A) 11/25/2023    RBCUA >100 (H)  11/25/2023    WBCUA >100 (H) 11/25/2023    BACTERIA Rare 11/25/2023    HYALINECASTS 3 (A) 11/25/2023   Received 2g cefepime in ED  Plan:  -UA culture pending  -Continue Abx  -urology consulted    Cont cefepime  Urine culture pending         11/27/23  Urine culture growing yeast. Continue Cefepime add Fluconazole    11/28/23  Final culture sensitivities are pending. Continue fluconazole.        Electrolyte abnormality  Replace potassium and magnesium      Multiple kidney stones  Followed By Dr. Martinez.   Plan:  -IVFs  -analgesics prn  -continue flomax and myrbetriq  -urology consult    11/27/23  No intervention needed during hospitalization. Outpatient follow up      Mixed Alzheimer's and vascular dementia  Patient with dementia with likely etiology of alzheimer's dementia. Dementia is mild. The patient does not have signs of behavioral disturbance. Home dementia medications are Held or Continued: continued.. Continue non-pharmacologic interventions to prevent delirium (No VS between 11PM-5AM, activity during day, opening blinds, providing glasses/hearing aids, and up in chair during daytime). Will avoid narcotics and benzos unless absolutely necessary. PRN anti-psychotics are not prescribed to avoid self harm behaviors.    Anemia  Patient's anemia is currently controlled. Has not received any PRBCs to date. Etiology likely d/t chronic disease due to Chronic Kidney Disease/ESRD  Current CBC reviewed-   Lab Results   Component Value Date    HGB 7.7 (L) 11/28/2023    HCT 23.6 (L) 11/28/2023     Monitor serial CBC and transfuse if patient becomes hemodynamically unstable, symptomatic or H/H drops below 7/21.    History of DVT of lower extremity  Compliant with Eliquis.  Plan:  -hold due to hematuria      Acute renal failure superimposed on stage 3 chronic kidney disease  Patient with acute kidney injury/acute renal failure likely due to UTI. MARI is currently  being treated . Baseline creatinine  1.7-2.5  - Labs  reviewed- Renal function/electrolytes with Estimated Creatinine Clearance: 36.5 mL/min (A) (based on SCr of 1.7 mg/dL (H)). according to latest data. Monitor urine output and serial BMP and adjust therapy as needed. Avoid nephrotoxins and renally dose meds for GFR listed above.    Cont ivf  Repeat bmp today     11/27/23  Kidney function gradually improving.     Mixed hyperlipidemia  Patient is chronically on statin.will continue for now. Last Lipid Panel:   Lab Results   Component Value Date    CHOL 135 08/04/2023    HDL 33 (L) 08/04/2023    LDLCALC 67.6 08/04/2023    TRIG 172 (H) 08/04/2023    CHOLHDL 24.4 08/04/2023     Plan:  -Continue home medication  -low fat/low calorie diet          VTE Risk Mitigation (From admission, onward)           Ordered     Reason for No Pharmacological VTE Prophylaxis  Once        Question:  Reasons:  Answer:  Already adequately anticoagulated on oral Anticoagulants    11/25/23 2322     IP VTE HIGH RISK PATIENT  Once         11/25/23 2322     Place sequential compression device  Until discontinued         11/25/23 2322                    Discharge Planning   VALERIE:      Code Status: Full Code   Is the patient medically ready for discharge?:     Reason for patient still in hospital (select all that apply): Treatment             Sarika Nelson NP  Department of Hospital Medicine   [facility] - Med Surg

## 2023-11-28 NOTE — PLAN OF CARE
Free from falls. Tele-sitter in use. IV abx maintained. No s/s of acute distress. 12hr chart check complete.

## 2023-11-29 VITALS
OXYGEN SATURATION: 98 % | WEIGHT: 142.88 LBS | HEART RATE: 72 BPM | HEIGHT: 67 IN | DIASTOLIC BLOOD PRESSURE: 59 MMHG | RESPIRATION RATE: 18 BRPM | SYSTOLIC BLOOD PRESSURE: 124 MMHG | TEMPERATURE: 99 F | BODY MASS INDEX: 22.43 KG/M2

## 2023-11-29 LAB
ACINETOBACTER CALCOACETICUS/BAUMANNII COMPLEX: NOT DETECTED
ANION GAP SERPL CALC-SCNC: 8 MMOL/L (ref 8–16)
BACTERIA UR CULT: ABNORMAL
BACTEROIDES FRAGILIS: NOT DETECTED
BASOPHILS # BLD AUTO: 0.03 K/UL (ref 0–0.2)
BASOPHILS NFR BLD: 0.4 % (ref 0–1.9)
BUN SERPL-MCNC: 14 MG/DL (ref 8–23)
CALCIUM SERPL-MCNC: 8.3 MG/DL (ref 8.7–10.5)
CANDIDA ALBICANS: NOT DETECTED
CANDIDA AURIS: NOT DETECTED
CANDIDA GLABRATA: NOT DETECTED
CANDIDA KRUSEI: NOT DETECTED
CANDIDA PARAPSILOSIS: NOT DETECTED
CANDIDA TROPICALIS: NOT DETECTED
CHLORIDE SERPL-SCNC: 101 MMOL/L (ref 95–110)
CO2 SERPL-SCNC: 30 MMOL/L (ref 23–29)
CREAT SERPL-MCNC: 1.3 MG/DL (ref 0.5–1.4)
CRYPTOCOCCUS NEOFORMANS/GATTII: NOT DETECTED
CTX-M GENE: NORMAL
DIFFERENTIAL METHOD: ABNORMAL
ENTEROBACTER CLOACAE COMPLEX: NOT DETECTED
ENTEROBACTERALES: NOT DETECTED
ENTEROCOCCUS FAECALIS: NOT DETECTED
ENTEROCOCCUS FAECIUM: NOT DETECTED
EOSINOPHIL # BLD AUTO: 0.5 K/UL (ref 0–0.5)
EOSINOPHIL NFR BLD: 6 % (ref 0–8)
ERYTHROCYTE [DISTWIDTH] IN BLOOD BY AUTOMATED COUNT: 16.8 % (ref 11.5–14.5)
ESCHERICHIA COLI: NOT DETECTED
EST. GFR  (NO RACE VARIABLE): 59 ML/MIN/1.73 M^2
GLUCOSE SERPL-MCNC: 105 MG/DL (ref 70–110)
HAEMOPHILUS INFLUENZAE: NOT DETECTED
HCT VFR BLD AUTO: 23.7 % (ref 40–54)
HGB BLD-MCNC: 7.7 G/DL (ref 14–18)
IMM GRANULOCYTES # BLD AUTO: 0.03 K/UL (ref 0–0.04)
IMM GRANULOCYTES NFR BLD AUTO: 0.4 % (ref 0–0.5)
IMP GENE: NORMAL
KLEBSIELLA AEROGENES: NOT DETECTED
KLEBSIELLA OXYTOCA: NOT DETECTED
KLEBSIELLA PNEUMONIAE GROUP: NOT DETECTED
KPC: NORMAL
LISTERIA MONOCYTOGENES: NOT DETECTED
LYMPHOCYTES # BLD AUTO: 1.5 K/UL (ref 1–4.8)
LYMPHOCYTES NFR BLD: 19.4 % (ref 18–48)
MCH RBC QN AUTO: 28.8 PG (ref 27–31)
MCHC RBC AUTO-ENTMCNC: 32.5 G/DL (ref 32–36)
MCR-1: NORMAL
MCV RBC AUTO: 89 FL (ref 82–98)
MEC A/C AND MREJ (MRSA): NORMAL
MEC A/C: NORMAL
MONOCYTES # BLD AUTO: 1.1 K/UL (ref 0.3–1)
MONOCYTES NFR BLD: 15.3 % (ref 4–15)
NDM GENE: NORMAL
NEISSERIA MENINGITIDIS: NOT DETECTED
NEUTROPHILS # BLD AUTO: 4.4 K/UL (ref 1.8–7.7)
NEUTROPHILS NFR BLD: 58.5 % (ref 38–73)
NRBC BLD-RTO: 0 /100 WBC
OXA-48-LIKE: NORMAL
PLATELET # BLD AUTO: 175 K/UL (ref 150–450)
PMV BLD AUTO: 10.8 FL (ref 9.2–12.9)
POTASSIUM SERPL-SCNC: 3.1 MMOL/L (ref 3.5–5.1)
PROTEUS SPECIES: NOT DETECTED
PSEUDOMONAS AERUGINOSA: NOT DETECTED
RBC # BLD AUTO: 2.67 M/UL (ref 4.6–6.2)
SALMONELLA SP: NOT DETECTED
SERRATIA MARCESCENS: NOT DETECTED
SODIUM SERPL-SCNC: 139 MMOL/L (ref 136–145)
STAPHYLOCOCCUS AUREUS: NOT DETECTED
STAPHYLOCOCCUS EPIDERMIDIS: NOT DETECTED
STAPHYLOCOCCUS LUGDUNESIS: NOT DETECTED
STAPHYLOCOCCUS SPECIES: NOT DETECTED
STENOTROPHOMONAS MALTOPHILIA: NOT DETECTED
STREPTOCOCCUS AGALACTIAE: NOT DETECTED
STREPTOCOCCUS PNEUMONIAE: NOT DETECTED
STREPTOCOCCUS PYOGENES: NOT DETECTED
STREPTOCOCCUS SPECIES: NOT DETECTED
VAN A/B: NORMAL
VIM GENE: NORMAL
WBC # BLD AUTO: 7.46 K/UL (ref 3.9–12.7)

## 2023-11-29 PROCEDURE — 25000003 PHARM REV CODE 250: Mod: HCNC | Performed by: NURSE PRACTITIONER

## 2023-11-29 PROCEDURE — S4991 NICOTINE PATCH NONLEGEND: HCPCS | Mod: HCNC | Performed by: EMERGENCY MEDICINE

## 2023-11-29 PROCEDURE — 97530 THERAPEUTIC ACTIVITIES: CPT | Mod: HCNC

## 2023-11-29 PROCEDURE — 97166 OT EVAL MOD COMPLEX 45 MIN: CPT | Mod: HCNC

## 2023-11-29 PROCEDURE — 97162 PT EVAL MOD COMPLEX 30 MIN: CPT | Mod: HCNC

## 2023-11-29 PROCEDURE — 63600175 PHARM REV CODE 636 W HCPCS: Mod: HCNC | Performed by: EMERGENCY MEDICINE

## 2023-11-29 PROCEDURE — 85025 COMPLETE CBC W/AUTO DIFF WBC: CPT | Mod: HCNC | Performed by: NURSE PRACTITIONER

## 2023-11-29 PROCEDURE — 97116 GAIT TRAINING THERAPY: CPT | Mod: HCNC

## 2023-11-29 PROCEDURE — 80048 BASIC METABOLIC PNL TOTAL CA: CPT | Mod: HCNC | Performed by: NURSE PRACTITIONER

## 2023-11-29 PROCEDURE — 97535 SELF CARE MNGMENT TRAINING: CPT | Mod: HCNC

## 2023-11-29 PROCEDURE — 63600175 PHARM REV CODE 636 W HCPCS: Mod: HCNC | Performed by: NURSE PRACTITIONER

## 2023-11-29 PROCEDURE — 36415 COLL VENOUS BLD VENIPUNCTURE: CPT | Mod: HCNC | Performed by: NURSE PRACTITIONER

## 2023-11-29 PROCEDURE — 25000003 PHARM REV CODE 250: Mod: HCNC | Performed by: EMERGENCY MEDICINE

## 2023-11-29 RX ORDER — FLUCONAZOLE 100 MG/1
200 TABLET ORAL NIGHTLY
Status: DISCONTINUED | OUTPATIENT
Start: 2023-11-29 | End: 2023-11-29 | Stop reason: DRUGHIGH

## 2023-11-29 RX ORDER — FLUCONAZOLE 100 MG/1
100 TABLET ORAL NIGHTLY
Status: DISCONTINUED | OUTPATIENT
Start: 2023-11-29 | End: 2023-11-29 | Stop reason: HOSPADM

## 2023-11-29 RX ORDER — FLUCONAZOLE 100 MG/1
100 TABLET ORAL DAILY
Qty: 14 TABLET | Refills: 0 | Status: SHIPPED | OUTPATIENT
Start: 2023-11-29 | End: 2023-12-13

## 2023-11-29 RX ORDER — FLUCONAZOLE 100 MG/1
100 TABLET ORAL NIGHTLY
Status: DISCONTINUED | OUTPATIENT
Start: 2023-11-29 | End: 2023-11-29

## 2023-11-29 RX ORDER — SODIUM CHLORIDE 9 MG/ML
INJECTION, SOLUTION INTRAVENOUS
Status: DISCONTINUED | OUTPATIENT
Start: 2023-11-29 | End: 2023-11-29 | Stop reason: HOSPADM

## 2023-11-29 RX ADMIN — MORPHINE SULFATE 2 MG: 2 INJECTION, SOLUTION INTRAMUSCULAR; INTRAVENOUS at 11:11

## 2023-11-29 RX ADMIN — PANTOPRAZOLE SODIUM 40 MG: 40 TABLET, DELAYED RELEASE ORAL at 08:11

## 2023-11-29 RX ADMIN — CYANOCOBALAMIN 1000 MCG: 1000 INJECTION INTRAMUSCULAR; SUBCUTANEOUS at 09:11

## 2023-11-29 RX ADMIN — HYDROCODONE BITARTRATE AND ACETAMINOPHEN 1 TABLET: 10; 325 TABLET ORAL at 09:11

## 2023-11-29 RX ADMIN — SODIUM CHLORIDE: 9 INJECTION, SOLUTION INTRAVENOUS at 09:11

## 2023-11-29 RX ADMIN — POTASSIUM BICARBONATE 20 MEQ: 391 TABLET, EFFERVESCENT ORAL at 05:11

## 2023-11-29 RX ADMIN — ATORVASTATIN CALCIUM 80 MG: 40 TABLET, FILM COATED ORAL at 08:11

## 2023-11-29 RX ADMIN — NICOTINE 1 PATCH: 21 PATCH, EXTENDED RELEASE TRANSDERMAL at 09:11

## 2023-11-29 RX ADMIN — CEFEPIME 1 G: 1 INJECTION, POWDER, FOR SOLUTION INTRAMUSCULAR; INTRAVENOUS at 09:11

## 2023-11-29 RX ADMIN — MEMANTINE 10 MG: 10 TABLET ORAL at 08:11

## 2023-11-29 NOTE — PLAN OF CARE
[facility] - [facility]  Discharge Reassessment    Primary Care Provider: Damien Barron MD    Expected Discharge Date:     Reassessment (most recent)       Discharge Reassessment - 11/29/23 0847          Discharge Reassessment    Assessment Type Discharge Planning Reassessment     Did the patient's condition or plan change since previous assessment? No     Discharge Plan discussed with: Patient     Communicated VALERIE with patient/caregiver Date not available/Unable to determine     Discharge Plan A [facility]

## 2023-11-29 NOTE — PLAN OF CARE
Problem: Adult Inpatient Plan of Care  Goal: Plan of Care Review  Outcome: Ongoing, Progressing     Problem: Adult Inpatient Plan of Care  Goal: Patient-Specific Goal (Individualized)  Outcome: Ongoing, Progressing  Flowsheets (Taken 11/29/2023 0328)  Anxieties, Fears or Concerns: pain control  Individualized Care Needs: small light     Problem: Diabetes Comorbidity  Goal: Blood Glucose Level Within Targeted Range  Outcome: Ongoing, Progressing     Problem: Skin Injury Risk Increased  Goal: Skin Health and Integrity  Outcome: Ongoing, Progressing     Problem: Impaired Wound Healing  Goal: Optimal Wound Healing  Outcome: Ongoing, Progressing    Discussed POC with pt and spouse, verbalized understanding.  Patient remains free from injury.  Safety precautions maintained.   Call light and personal belongings within reach, bed in lowest position with bed wheels locked.   No s/s of acute distress.  Purposeful rounding continued this shift.  Pain controlled per MD order.  Cardiac monitoring in place, tele box number [id number]. Reading normal sinus  Diet orders continued, pt diet: cardiac  Vital signs continued per orders this shift.   Q2 repositioning independently  Patient mobility status chair fast  Chart and orders review completed. Pt education about care completed.

## 2023-11-29 NOTE — PHYSICIAN QUERY
PT Name: Samy Alejandre Jr.  MR #: [id number]     DOCUMENTATION CLARIFICATION   Noa Da Silva RN, CCDS    [email or web address]    Documentation Excellence  This form is a permanent document in the medical record.    Query Date: November 29, 2023    By submitting this query, we are merely seeking further clarification of documentation to reflect the severity of illness of your patient.   Please utilize your independent clinical judgment when addressing the question(s) below.    The Medical Record reflects the following:     Indicators  Supporting Clinical Findings Location in Medical Record   x Lab Value(s)    11/25/23 19:24 11/25/23 22:05 11/27/23 05:18 11/27/23 18:54 11/28/23 05:55 11/29/23 05:23   Potassium 4.1 4.9 3.4 (L)   3.1 (L) 3.1 (L)     MG 1.3 on 11/28/23   lab   x Treatment/  Medication MG 2 gm IVPB 11/28 x2     potassium bicarbonate disintegrating tablet 50 mEq  PO  11/28  potassium chloride SA CR tablet 20 mEq   PO 11/27       mar    Other       Provider, please specify the diagnosis or diagnoses that correspond(s) to the above indicators.                      Ramses all that apply:    [  x ] Hypokalemia    -  not present on admit       [   ] Hypomagnesemia   &   Present on admit = yes     [   ] Hypomagnesemia  -  not present on admit       [   ] Other electrolyte disturbance (please specify): __________     [   ]  Clinically Undetermined     Please document in your progress notes daily for the duration of treatment until resolved, and include in your discharge summary.

## 2023-11-29 NOTE — PLAN OF CARE
[facility]  Discharge Final Note    Primary Care Provider: Damien Barron MD    Expected Discharge Date: 11/29/2023    Final Discharge Note (most recent)       Final Note - 11/29/23 1525          Final Note    Assessment Type Final Discharge Note     Anticipated Discharge Disposition Home-Health Care Svc        Post-Acute Status    Post-Acute Authorization Home Health

## 2023-11-29 NOTE — PROGRESS NOTES
Pharmacist Renal Dose Adjustment Note    Samy Alejandre Jr. is a 73 y.o. male being treated with the medication fluconazole     Patient Data:    Vital Signs (Most Recent):  Temp: 98.4 °F (36.9 °C) (11/29/23 1150)  Pulse: 69 (11/29/23 1150)  Resp: 18 (11/29/23 1150)  BP: 117/71 (11/29/23 1150)  SpO2: 96 % (11/29/23 1150) Vital Signs (72h Range):  Temp:  [97.5 °F (36.4 °C)-100.3 °F (37.9 °C)]   Pulse:  [66-93]   Resp:  [16-18]   BP: ()/(53-74)   SpO2:  [95 %-100 %]      Recent Labs   Lab 11/27/23  0518 11/28/23  0555 11/29/23  0523   CREATININE 1.7* 1.5* 1.3     Serum creatinine: 1.3 mg/dL 11/29/23 0523  Estimated creatinine clearance: 47.8 mL/min    Medication:fluconazole 200mg daily will be changed to fluconazole 100mg daily for crcl 10-50 ml/min    Pharmacist's Name: Donna Mehta  Pharmacist's Extension: [phone]

## 2023-11-29 NOTE — PROGRESS NOTES
Pharmacist Intervention IV to PO Note    Samy Alejandre Jr. is a 73 y.o. male being treated with IV medication fluconazole    Patient Data:    Vital Signs (Most Recent):  Temp: 98.5 °F (36.9 °C) (11/29/23 0019)  Pulse: 66 (11/29/23 0400)  Resp: 18 (11/29/23 0019)  BP: (!) 114/57 (11/29/23 0019)  SpO2: 96 % (11/29/23 0019) Vital Signs (72h Range):  Temp:  [97.5 °F (36.4 °C)-100.3 °F (37.9 °C)]   Pulse:  []   Resp:  [16-18]   BP: ()/(53-74)   SpO2:  [95 %-100 %]      CBC:  Recent Labs   Lab 11/26/23  1004 11/27/23  0518 11/28/23  0555   WBC 7.92 7.10 6.63   RBC 2.88* 2.58* 2.66*   HGB 8.4* 7.5* 7.7*   HCT 29.5* 23.1* 23.6*    175 159   * 90 89   MCH 29.2 29.1 28.9   MCHC 28.5* 32.5 32.6     CMP:     Recent Labs   Lab 11/25/23  1924 11/26/23  1004 11/27/23  0518 11/28/23  0555   * 101 136* 137*   CALCIUM 8.4* 8.3* 8.0* 7.9*   ALBUMIN 2.5*  --   --   --    PROT 6.3  --   --   --     135* 135* 140   K 4.1 4.9 3.4* 3.1*   CO2 17* 9* 19* 27    114* 105 102   BUN 25* 22 19 17   CREATININE 2.1* 1.8* 1.7* 1.5*   ALKPHOS 170*  --   --   --    ALT 18  --   --   --    AST 24  --   --   --    BILITOT 0.4  --   --   --        Dietary Orders:  Diet Orders            Dietary nutrition supplements Boost Plus - Chocolate starting at 11/27 1715    Diet Cardiac: Cardiac starting at 11/25 2321            Based on the following criteria, this patient qualifies for intravenous to oral conversion:  [x] The patients gastrointestinal tract is functioning (tolerating medications via oral or enteral route for 24 hours and tolerating food or enteral feeds for 24 hours).  [x] The patient is hemodynamically stable for 24 hours (heart rate <100 beats per minute, systolic blood pressure >99 mm Hg, and respiratory rate <20 breaths per minute).  [x] The patient shows clinical improvement (afebrile for at least 24 hours and white blood cell count downtrending or normalized). Additionally, the patient must  be non-neutropenic (absolute neutrophil count >500 cells/mm3).  [x] For antimicrobials, the patient has received IV therapy for at least 24 hours.    IV medication (fluconazole 100 mg q24h) will be changed to oral medication (fluconazole 100 mg q24h)    Pharmacist's Name: Liza Miguel  Pharmacist's Extension: [phone]

## 2023-11-29 NOTE — PT/OT/SLP EVAL
"Physical Therapy Evaluation    Patient Name:  Samy Alejandre Jr.   MRN:  [id number]    Recommendations:     Discharge Recommendations: Moderate Intensity Therapy   Discharge Equipment Recommendations: bedside commode, rolling walker  Barriers to discharge: None    Assessment:     Samy Alejandre Jr. is a 73 y.o. male admitted with a medical diagnosis of Acute cystitis with hematuria.  He presents with the following impairments/functional limitations: weakness, impaired endurance, impaired functional mobility, gait instability, impaired balance, decreased coordination, decreased safety awareness which limits mobility and increases risk of falls. Pt with reports of low back pain and overall weakness. Pt will benefit from continued PT services. Recommend moderate intensity therapy.    Rehab Prognosis: Good; patient would benefit from acute skilled PT services to address these deficits and reach maximum level of function.    Recent Surgery: * No surgery found *      Plan:     During this hospitalization, patient to be seen 3 x/week to address the identified rehab impairments via gait training, therapeutic activities, therapeutic exercises, neuromuscular re-education and progress toward the following goals:    Plan of Care Expires:  12/13/23    Subjective     Chief Complaint: acute cystitis and back pain  Patient/Family Comments/goals: to go home/get better "I think it's my kidneys" referring to source of pain  Pain/Comfort:  Pain Rating 1: 8/10  Pain Addressed 1: Reposition, Distraction  Pain Rating Post-Intervention 1: 8/10    Patients cultural, spiritual, Tenriism conflicts given the current situation: no    Living Environment:  Pt lives with spouse in [facility] with no steps at entry  Prior to admission, patients level of function was independent with ADLs, ambulatory in household only utilizing furniture for stability. Pt reported that he does not like to use his AD.  Equipment used at home: cane, straight, rollator, " shower chair, wheelchair.  DME owned (not currently used): none.  Upon discharge, patient will have assistance from spouse.    Objective:     Communicated with nursing (Lakeisha Mansfield) and performed chart review via epic system prior to session.  Patient found supine with peripheral IV, telemetry  upon PT entry to room. Wife at bedside    General Precautions: Standard, fall  Orthopedic Precautions:N/A   Braces: N/A  Respiratory Status: Room air    Exams:  Cognitive Exam:  Patient is oriented to Person, Place, Time, and Situation  Gross Motor Coordination:  WFL  Postural Exam:  Patient presented with the following abnormalities:    -       Rounded shoulders  -       Forward head  RLE ROM: WFL at hip and ankle, decreased terminal knee extension   RLE Strength: WFL  LLE ROM: WFL at hip and ankle, decreased terminal knee extension  LLE Strength: WFL    Functional Mobility:  Bed Mobility:     Scooting: contact guard assistance  Supine to Sit: contact guard assistance  Sit to Supine: contact guard assistance  Transfers:     Sit to Stand:  moderate assistance with rolling walker  Bed to Chair: moderate assistance with  rolling walker  using  Stand Pivot  Gait: 10ft min A with RW, demonstrates slow pace, flexed posture, decreased heel strike bilaterally and crouched posture  Balance: poor plus      AM-PAC 6 CLICK MOBILITY  Total Score:16       Treatment & Education:  Educated pt on benefits of consistent participation in PT services to meet functional goals. Educated pt on seated therex to promote strength and joint mobility. Exercises included AP, LAQ, marching. Educated to perform exercises intermittently throughout day to tolerance. Educated pt on importance of sitting OOB to promote endurance and overall activity tolerance. Educated pt on call don't fall policy and use of call button to alert nursing staff of needs. Pt expressed understanding.     Patient left supine with all lines intact, call button in reach, nursing  notified, and nursing and wife present.    GOALS:   Multidisciplinary Problems       Physical Therapy Goals          Problem: Physical Therapy    Goal Priority Disciplines Outcome Goal Variances Interventions   Physical Therapy Goal     PT, PT/OT      Description: Pt will perform bed mobility independently in order to participate in EOB activity.  Pt will perform transfers independently in order to participate in OOB activity.   Pt will ambulate 350ft mod I with LRAD in order to participate in daily tasks.                        History:     Past Medical History:   Diagnosis Date    Anxiety     Depression     Diabetes mellitus, type 2     Hypertension     Stroke        Past Surgical History:   Procedure Laterality Date    A-V CARDIAC PACEMAKER INSERTION Left 10/4/2023    Procedure: INSERTION, CARDIAC PACEMAKER, DUAL CHAMBER;  Surgeon: Delonte Lindsey MD;  Location: [facility] CATH LAB;  Service: Cardiology;  Laterality: Left;  biotronik    ANGIOGRAPHY OF LOWER EXTREMITY N/A 12/21/2020    Procedure: ANGIOGRAM, LOWER EXTREMITY/Rt leg poss pta/stent;  Surgeon: Todd Michel MD;  Location: [facility] CATH LAB;  Service: Peripheral Vascular;  Laterality: N/A;  rescheduled 12/8    BACK SURGERY  2015    BRONCHOSCOPY Left 3/24/2022    Procedure: Bronchoscopy;  Surgeon: Edward Williamson MD;  Location: [facility] ENDO;  Service: Pulmonary;  Laterality: Left;  poss endobronchial biopsy or brushing    CATARACT EXTRACTION      CYSTOSCOPY WITH URETEROSCOPY, RETROGRADE PYELOGRAPHY, AND INSERTION OF STENT Left 10/8/2023    Procedure: CYSTOSCOPY, WITH RETROGRADE PYELOGRAM AND URETERAL STENT INSERTION;  Surgeon: Jamie Martinez MD;  Location: [facility] OR;  Service: Urology;  Laterality: Left;    ESOPHAGOGASTRODUODENOSCOPY N/A 8/24/2022    Procedure: EGD (ESOPHAGOGASTRODUODENOSCOPY);  Surgeon: Ana Gomez MD;  Location: [facility] ENDO;  Service: Endoscopy;  Laterality: N/A;    ESOPHAGOGASTRODUODENOSCOPY N/A 9/14/2022    Procedure: EGD  (ESOPHAGOGASTRODUODENOSCOPY);  Surgeon: Ana Gomez MD;  Location: [facility];  Service: Endoscopy;  Laterality: N/A;    KNEE SURGERY  2012    LITHOTRIPSY  2000       Time Tracking:     PT Received On: 11/29/23  PT Start Time: 1055     PT Stop Time: 1128  PT Total Time (min): 33 min     Billable Minutes: Evaluation 13 and Gait Training 20      11/29/2023

## 2023-11-29 NOTE — PT/OT/SLP EVAL
Occupational Therapy Evaluation and Treatment    Name: Samy Alejandre Jr.  MRN: [id number]  Admitting Diagnosis: Acute cystitis with hematuria  Recent Surgery: * No surgery found *      Recommendations:     Discharge Recommendations: Moderate Intensity Therapy  Level of Assistance Recommended: 24 hours significant assistance  Discharge Equipment Recommendations: none  Barriers to discharge:      Assessment:     Samy Alejandre Jr. is a 73 y.o. male with a medical diagnosis of Acute cystitis with hematuria. He presents with performance deficits affecting function including weakness, impaired functional mobility, decreased safety awareness, impaired endurance, gait instability, impaired balance, impaired self care skills, decreased upper extremity function.     Rehab Prognosis: Good; patient would benefit from acute OT services to address these deficits and reach maximum level of function.    Plan:     Patient to be seen 2 x/week to address the above listed problems via self-care/home management, therapeutic activities, therapeutic exercises  Plan of Care Expires: 12/13/23  Plan of Care Reviewed with: patient, spouse    Subjective     Chief Complaint: DEBILITY AND GENERALIZED WEAKNESS  Patient Comments/Goals: RETURN HOME  Pain/Comfort:  Pain Rating 1: 10/10    Patients cultural, spiritual, Anabaptism conflicts given the current situation:      Social History:  Living Environment: Patient lives alone in a single story home with number of inside stair(s): 0  Prior Level of Function: Prior to admission, patient was modified independent  Roles and Routines: Patient was not driving and not working prior to admission.  Equipment Used at Home: rollator  DME owned (not currently used):  0  Assistance Upon Discharge: family    Objective:     Communicated with NURSE AND EPIC CHART REVIEW prior to session. Patient found HOB elevated with telemetry upon OT entry to room.    General Precautions: Standard, fall   Orthopedic  Precautions: N/A   Braces: N/A    Respiratory Status: Room air    Occupational Performance    Gait belt applied - Yes    Bed Mobility:   Rolling/Turning to Right with minimum assistance  Scooting anteriorly to EOB to have both feet planted on floor: moderate assistance  Supine to sit from right side of bed with moderate assistance    Functional Mobility/Transfers:  Sit <> Stand Transfer with moderate assistance with rolling walker  Bed <> Chair Transfer using Step Transfer technique with moderate assistance with rolling walker  Functional Mobility: PT AMBULATED 25 FEET WITH ROLLING WALKER WITH MIN A VC SAFETY AND POSTURE    Activities of Daily Living:  Upper Body Dressing: minimum assistance  Lower Body Dressing: MOD A   Toileting: maximal assistance    Cognitive/Visual Perceptual:  Cognitive/Psychosocial Skills:    -     Oriented to: Person, Place, Time, Situation  -     Follows Commands/attention: Follows two-step commands  -     Communication: clear/fluent  -     Memory: No Deficits noted  -     Safety awareness/insight to disability: impaired    Physical Exam:  Upper Extremity Range of Motion:     -       Right Upper Extremity: WFL  -       Left Upper Extremity: WFL  Upper Extremity Strength:    -       Right Upper Extremity: MMT: 3/5 GROSSLY  -       Left Upper Extremity: MMT: 3/5 grossly   Strength:    -       Right Upper Extremity: mmt: 3/5 grossly  -       Left Upper Extremity: mmt: 3/5 grossly    AMPAC 6 Click ADL:  AMPAC Total Score: 14    Treatment & Education:  Therapist provided facilitation and instruction of proper body mechanics, energy conservation, and fall prevention strategies during tasks listed above  Patient educated on role of OT, POC, and goals for therapy  Patient educated on importance of OOB activities with staff member assistance and sitting OOB majority of the day    Educated patient on importance of increased tolerance to upright position and direct impact on CV endurance and  strength. Patient encouraged to sit up in chair/ EOB, for a minimum of 2 consecutive hours including for all meals.. Encouraged patient to perform AROM TE to BUE throughout the day within all available planes of motion. Re enforced importance of utilizing call light to meet needs in room and not attempt to get up without staff assistance. Patient verbalized understanding and agreed to comply.        Patient clear to stand pivot transfer with RN/PCT, assist xstep<pivot transfer  .    Patient left up in chair with all lines intact, call button in reach, RN notified, and RN and spouse present.    GOALS:   Multidisciplinary Problems       Occupational Therapy Goals       Not on file                    History:     Past Medical History:   Diagnosis Date    Anxiety     Depression     Diabetes mellitus, type 2     Hypertension     Stroke          Past Surgical History:   Procedure Laterality Date    A-V CARDIAC PACEMAKER INSERTION Left 10/4/2023    Procedure: INSERTION, CARDIAC PACEMAKER, DUAL CHAMBER;  Surgeon: Delonte Lindsey MD;  Location: [facility] CATH LAB;  Service: Cardiology;  Laterality: Left;  biotronik    ANGIOGRAPHY OF LOWER EXTREMITY N/A 12/21/2020    Procedure: ANGIOGRAM, LOWER EXTREMITY/Rt leg poss pta/stent;  Surgeon: Todd Michel MD;  Location: [facility] CATH LAB;  Service: Peripheral Vascular;  Laterality: N/A;  rescheduled 12/8    BACK SURGERY  2015    BRONCHOSCOPY Left 3/24/2022    Procedure: Bronchoscopy;  Surgeon: Edward Williamson MD;  Location: [facility] ENDO;  Service: Pulmonary;  Laterality: Left;  poss endobronchial biopsy or brushing    CATARACT EXTRACTION      CYSTOSCOPY WITH URETEROSCOPY, RETROGRADE PYELOGRAPHY, AND INSERTION OF STENT Left 10/8/2023    Procedure: CYSTOSCOPY, WITH RETROGRADE PYELOGRAM AND URETERAL STENT INSERTION;  Surgeon: Jamie Martinez MD;  Location: [facility] OR;  Service: Urology;  Laterality: Left;    ESOPHAGOGASTRODUODENOSCOPY N/A 8/24/2022    Procedure: EGD  (ESOPHAGOGASTRODUODENOSCOPY);  Surgeon: Ana Gomez MD;  Location: [facility];  Service: Endoscopy;  Laterality: N/A;    ESOPHAGOGASTRODUODENOSCOPY N/A 9/14/2022    Procedure: EGD (ESOPHAGOGASTRODUODENOSCOPY);  Surgeon: Ana Gomez MD;  Location: [facility];  Service: Endoscopy;  Laterality: N/A;    KNEE SURGERY  2012    LITHOTRIPSY  2000       Time Tracking:     OT Date of Treatment: 11/29/23  OT Start Time: 1116  OT Stop Time: 1140  OT Total Time (min): 24 min    Billable Minutes: Evaluation 14 minutes and Self Care/Home Management 10 minutes    11/29/2023

## 2023-11-29 NOTE — PLAN OF CARE
EVAL AND TX COMPLETED: facilitated bed mobility with CGA, transfers with min A with RW. Ambulated 10 ft x 2 min A with RW. Recommend mod intensity therapy

## 2023-11-29 NOTE — PLAN OF CARE
11/29/23 1524   Post-Acute Status   Post-Acute Authorization Home Health   Home Health Status Referrals Sent   Discharge Plan   Discharge Plan A Home Health     SW sent a  referral to [facility].

## 2023-11-29 NOTE — PLAN OF CARE
Discussed poc with pt, pt verbalized understanding    Purposeful rounding every 2hours    VS wnl  Cardiac monitoring in use, pt is NSR, tele monitor # [id number]   Fall precautions in place, remains injury free  Pain under control with PRN meds    Accurate I&Os  Abx given as prescribed  Bed locked at lowest position  Call light within reach    Chart check complete  Patient is ready for discharge

## 2023-12-01 LAB
BACTERIA BLD CULT: ABNORMAL
BACTERIA BLD CULT: NORMAL

## 2023-12-01 NOTE — DISCHARGE SUMMARY
[facility] Medicine  Discharge Summary      Patient Name: Samy Alejandre Jr.  MRN: [id number]  [facility]: [id number]  Patient Class: IP- Inpatient  Admission Date: 11/25/2023  Hospital Length of Stay: 4 days  Discharge Date and Time:  11/30/2023 7:18 PM  Attending Physician: Bozena att. providers found   Discharging Provider: Sarika Nelson NP  Primary Care Provider: Damien Barron MD    Primary Care Team: Networked reference to record PCT     HPI:   Samy Alejandre Jr. is a 73 y.o. male with a PMH  has a past medical history of Anxiety, Depression, Diabetes mellitus, type 2, Hypertension, and Stroke.  Presented to the ER for evaluation of worsening flank pain and generalized weakness which started this evening.  Patient typically ambulates with a walker at baseline, but daughter states that patient has been lying in bed due to weakness and pain.  Per daughter at bedside patient attempted to get out of bed to use the restroom and had an episode of incontinence.  Patient has a history of multiple nonobstructing kidney stones bilaterally in his followed by Dr. Martinez with Urology.  Patient is scheduled for lithotripsy on 12/18/23.  Patient recently had cardiac pacemaker placed on 10/04/2023 for third-degree heart block.  Denies any cardiopulmonary symptoms.  However, patient does endorse subjective fever, chills, and diarrhea.  Denies any hematuria or dysuria.    ER workup revealed H/H of 8.9/27.4 with MCV of 91, BUN/creatinine of 25/2.1 with EGFR of 33 (creatinine level between 1.7-2.5 over the last 8 months),  mg/dL, UA consistent with acute cystitis, CT renal study revealed numerous bilateral nonobstructing stones, a stent in the left ureter and chronic perinephric fat stranding.  Chest x-ray showed no acute findings.  EKG revealed sinus rhythm with first-degree AV block with a ventricular rate of 91 beats per minute with left axis deviation right bundle-branch block with a QT/QTC of 446/548.   Patient received 1 g of Tylenol, 2 g cefepime, 2 L normal saline in ED. Dr. Martinez was consulted by ER provider to be made aware that patient was in ED. Hospital Medicine consulted to admit patient for UTI and intractable pain.  Patient and family at bedside are in agreement with treatment plan.  Patient will be admitted under inpatient status.    PCP: Damien Barron    * No surgery found *      Hospital Course:   11/26: cont cefepime for UTI. Urology consulted on case.     11/27/23  Urology input appreciated. Recommends to treat UTI and outpatient follow up. Plans to have stent removed 12/2023 after seen by Cardiology. Urine growing yeast. Will continue Cefepime add fluconazole.     11/28/23  Identification is still pending. Will need sensitivities prior to discharge.     11/29/23  Final urine culture grew Candida Glabrate. Will plan to treat with fluconazole for two weeks. 1/2 of blood cultures growing gram positive cocci, but ID by PCR negative and is likely contaminant. He was evaluated by PT/TO who recommended SNF, but patient declined and was agreeable to HH. He was asked to keep appt with Cardiology and PCP.      Goals of Care Treatment Preferences:  Code Status: Full Code      Consults:   Consults (From admission, onward)          Status Ordering Provider     Inpatient consult to Urology  Once        Provider:  Jamie Martinez MD    Completed LUDIN BOUCHER            No new Assessment & Plan notes have been filed under this hospital service since the last note was generated.  Service: Hospital Medicine    Final Active Diagnoses:    Diagnosis Date Noted POA    PRINCIPAL PROBLEM:  Acute cystitis with hematuria [N30.01] 11/26/2023 Yes    Electrolyte abnormality [E87.8] 11/28/2023 Yes    Multiple kidney stones [N20.0] 10/08/2023 Yes    Mixed Alzheimer's and vascular dementia [G30.9, F01.50, F02.80] 12/20/2022 Yes    Anemia [D64.9] 03/24/2022 Yes    History of DVT of lower extremity [Z86.718]  "03/21/2022 Not Applicable    Acute renal failure superimposed on stage 3 chronic kidney disease [N17.9, N18.30] 03/20/2022 Yes    Mixed hyperlipidemia [E78.2] 06/03/2021 Yes      Problems Resolved During this Admission:       Discharged Condition: stable    Disposition: [facility]-Health Care [facility]    Follow Up:    Patient Instructions:      COMMODE FOR HOME USE     Order Specific Question Answer Comments   Type: Standard    Height: 5' 7" (1.702 m)    Weight: 64.8 kg (142 lb 13.7 oz)    Does patient have medical equipment at home? rollator    Length of need (1-99 months): 99      Ambulatory referral/consult to Outpatient Case Management   Referral Priority: Routine Referral Type: Consultation   Referral Reason: Specialty Services Required   Number of Visits Requested: 1     Ambulatory referral/consult to Home Health   Referral Priority: Routine Referral Type: Home Health   Referral Reason: Specialty Services Required   Requested Specialty: Home Health Services   Number of Visits Requested: 1     Reason for not Prescribing Nicotine Replacement     Order Specific Question Answer Comments   Reason for not Prescribing: Patient refused      Reason for not Ordering Smoking Cessation Referral     Order Specific Question Answer Comments   Reason for not ordering: Patient refused        Significant Diagnostic Studies: Labs: CMP   Recent Labs   Lab 11/29/23 0523      K 3.1*      CO2 30*      BUN 14   CREATININE 1.3   CALCIUM 8.3*   ANIONGAP 8    and CBC   Recent Labs   Lab 11/29/23 0523   WBC 7.46   HGB 7.7*   HCT 23.7*          Pending Diagnostic Studies:       None           Medications:  Reconciled Home Medications:      Medication List        START taking these medications      fluconazole 100 MG tablet  Commonly known as: DIFLUCAN  Take 1 tablet (100 mg total) by mouth once daily. for 14 days            CHANGE how you take these medications      QUEtiapine 50 MG tablet  Commonly known as: " SEROQUEL  Take 1 tablet by mouth twice daily  What changed: when to take this     rosuvastatin 20 MG tablet  Commonly known as: CRESTOR  Take 1 tablet by mouth once daily  What changed: when to take this            CONTINUE taking these medications      * albuterol 90 mcg/actuation inhaler  Commonly known as: PROVENTIL/VENTOLIN HFA  Inhale 2 puffs into the lungs every 4 (four) hours as needed for Wheezing.     * albuterol 1.25 mg/3 mL Nebu  Commonly known as: ACCUNEB  Take 3 mLs (1.25 mg total) by nebulization 2 (two) times a day. Rescue     blood glucose control, normal Soln  Commonly known as: TRUE METRIX LEVEL 2  1 drop by Misc.(Non-Drug; Combo Route) route once daily.     BREATHERITE SPACER-MASK,ADULT Spcr  Generic drug: inhalat.spacing dev,large mask  Use as directed for inhalation.     donepeziL 10 MG tablet  Commonly known as: ARICEPT  Take 1 tablet (10 mg total) by mouth every evening.     DULoxetine 30 MG capsule  Commonly known as: CYMBALTA  Take 1 capsule by mouth once daily     ELIQUIS 5 mg Tab  Generic drug: apixaban  Take 1 tablet by mouth once daily     FLUoxetine 20 MG capsule  Take 1 capsule by mouth once daily     gabapentin 100 MG capsule  Commonly known as: NEURONTIN  Take 1 capsule (100 mg total) by mouth 3 (three) times daily.     HYDROcodone-acetaminophen  mg per tablet  Commonly known as: NORCO  Take 1 tablet by mouth.     lancets 32 gauge Misc  1 lancet by Misc.(Non-Drug; Combo Route) route once daily.     memantine 10 MG Tab  Commonly known as: NAMENDA  TAKE 1/2 TABLET BY MOUTH TWICE DAILY FOR 1 WEEK, THEN 1 TABLET BY MOUTH TWICE DAILY     mucus clearing device  Commonly known as: AEROBIKA OSCILLATING PEP SYSTM  by Misc.(Non-Drug; Combo Route) route 4 (four) times daily as needed.     MYRBETRIQ 25 mg Tb24 ER tablet  Generic drug: mirabegron  Take 1 tablet (25 mg total) by mouth once daily.     oxybutynin 5 MG Tr24  Commonly known as: DITROPAN-XL  Take 5 mg by mouth once daily.      pantoprazole 40 MG tablet  Commonly known as: PROTONIX  Take 1 tablet (40 mg total) by mouth once daily.     tamsulosin 0.4 mg Cap  Commonly known as: FLOMAX  Take 1 capsule (0.4 mg total) by mouth every evening.     TRUE METRIX LEVEL 1 Soln  Generic drug: blood glucose control, low  1 drop by Misc.(Non-Drug; Combo Route) route once daily.     ULTRA-LIGHT ROLLATOR Misc  Generic drug: walker  Use daily for ambulation           * This list has 2 medication(s) that are the same as other medications prescribed for you. Read the directions carefully, and ask your doctor or other care provider to review them with you.                STOP taking these medications      ciprofloxacin HCl 250 MG tablet  Commonly known as: CIPRO              Indwelling Lines/Drains at time of discharge:   Lines/Drains/Airways       None                   Time spent on the discharge of patient: >35 minutes         Sarika Nelson NP  Department of Hospital Medicine  [facility] - Med Surg

## 2023-12-05 ENCOUNTER — TELEPHONE (OUTPATIENT)
Dept: FAMILY MEDICINE | Facility: CLINIC | Age: 73
End: 2023-12-05
Payer: MEDICARE

## 2023-12-05 DIAGNOSIS — G30.9 MIXED ALZHEIMER'S AND VASCULAR DEMENTIA: ICD-10-CM

## 2023-12-05 DIAGNOSIS — F01.50 MIXED ALZHEIMER'S AND VASCULAR DEMENTIA: ICD-10-CM

## 2023-12-05 DIAGNOSIS — J44.9 CHRONIC OBSTRUCTIVE PULMONARY DISEASE, UNSPECIFIED COPD TYPE: ICD-10-CM

## 2023-12-05 DIAGNOSIS — Z09 HOSPITAL DISCHARGE FOLLOW-UP: ICD-10-CM

## 2023-12-05 DIAGNOSIS — I69.30 HISTORY OF CVA WITH RESIDUAL DEFICIT: Primary | ICD-10-CM

## 2023-12-05 DIAGNOSIS — F02.80 MIXED ALZHEIMER'S AND VASCULAR DEMENTIA: ICD-10-CM

## 2023-12-06 ENCOUNTER — PES CALL (OUTPATIENT)
Dept: HOME HEALTH SERVICES | Facility: CLINIC | Age: 73
End: 2023-12-06
Payer: MEDICARE

## 2023-12-09 DIAGNOSIS — E78.2 MIXED HYPERLIPIDEMIA: ICD-10-CM

## 2023-12-09 NOTE — TELEPHONE ENCOUNTER
No care due was identified.  [facility] Embedded Care Due Messages. Reference number: [id number].   12/09/2023 9:13:46 AM CST

## 2023-12-11 ENCOUNTER — OFFICE VISIT (OUTPATIENT)
Dept: CARDIOLOGY | Facility: CLINIC | Age: 73
End: 2023-12-11
Payer: MEDICARE

## 2023-12-11 ENCOUNTER — OUTPATIENT CASE MANAGEMENT (OUTPATIENT)
Dept: ADMINISTRATIVE | Facility: OTHER | Age: 73
End: 2023-12-11
Payer: MEDICARE

## 2023-12-11 ENCOUNTER — OFFICE VISIT (OUTPATIENT)
Dept: INTERNAL MEDICINE | Facility: CLINIC | Age: 73
End: 2023-12-11
Payer: MEDICARE

## 2023-12-11 VITALS
WEIGHT: 126.31 LBS | HEART RATE: 84 BPM | BODY MASS INDEX: 19.82 KG/M2 | SYSTOLIC BLOOD PRESSURE: 138 MMHG | DIASTOLIC BLOOD PRESSURE: 78 MMHG | HEIGHT: 67 IN | OXYGEN SATURATION: 97 %

## 2023-12-11 VITALS
DIASTOLIC BLOOD PRESSURE: 71 MMHG | OXYGEN SATURATION: 97 % | SYSTOLIC BLOOD PRESSURE: 99 MMHG | RESPIRATION RATE: 18 BRPM | TEMPERATURE: 97 F

## 2023-12-11 DIAGNOSIS — Z95.0 CARDIAC PACEMAKER IN SITU: ICD-10-CM

## 2023-12-11 DIAGNOSIS — J44.9 CHRONIC OBSTRUCTIVE PULMONARY DISEASE, UNSPECIFIED COPD TYPE: ICD-10-CM

## 2023-12-11 DIAGNOSIS — I69.30 HISTORY OF CVA WITH RESIDUAL DEFICIT: ICD-10-CM

## 2023-12-11 DIAGNOSIS — N18.30 CONTROLLED TYPE 2 DIABETES MELLITUS WITH STAGE 3 CHRONIC KIDNEY DISEASE, WITHOUT LONG-TERM CURRENT USE OF INSULIN: ICD-10-CM

## 2023-12-11 DIAGNOSIS — Z86.718 HISTORY OF DVT OF LOWER EXTREMITY: Primary | ICD-10-CM

## 2023-12-11 DIAGNOSIS — Z01.818 PRE-OP TESTING: ICD-10-CM

## 2023-12-11 DIAGNOSIS — Z01.810 PREOP CARDIOVASCULAR EXAM: Primary | ICD-10-CM

## 2023-12-11 DIAGNOSIS — N20.0 MULTIPLE KIDNEY STONES: ICD-10-CM

## 2023-12-11 DIAGNOSIS — E78.2 MIXED HYPERLIPIDEMIA: ICD-10-CM

## 2023-12-11 DIAGNOSIS — E11.22 CONTROLLED TYPE 2 DIABETES MELLITUS WITH STAGE 3 CHRONIC KIDNEY DISEASE, WITHOUT LONG-TERM CURRENT USE OF INSULIN: ICD-10-CM

## 2023-12-11 DIAGNOSIS — I44.2 COMPLETE HEART BLOCK: ICD-10-CM

## 2023-12-11 PROCEDURE — 3044F HG A1C LEVEL LT 7.0%: CPT | Mod: HCNC,CPTII,S$GLB, | Performed by: INTERNAL MEDICINE

## 2023-12-11 PROCEDURE — 1160F RVW MEDS BY RX/DR IN RCRD: CPT | Mod: HCNC,CPTII,S$GLB, | Performed by: INTERNAL MEDICINE

## 2023-12-11 PROCEDURE — 3044F PR MOST RECENT HEMOGLOBIN A1C LEVEL <7.0%: ICD-10-PCS | Mod: HCNC,CPTII,S$GLB, | Performed by: INTERNAL MEDICINE

## 2023-12-11 PROCEDURE — 1101F PT FALLS ASSESS-DOCD LE1/YR: CPT | Mod: HCNC,CPTII,S$GLB, | Performed by: INTERNAL MEDICINE

## 2023-12-11 PROCEDURE — 3078F DIAST BP <80 MM HG: CPT | Mod: HCNC,CPTII,S$GLB, | Performed by: INTERNAL MEDICINE

## 2023-12-11 PROCEDURE — 3078F PR MOST RECENT DIASTOLIC BLOOD PRESSURE < 80 MM HG: ICD-10-PCS | Mod: HCNC,CPTII,S$GLB, | Performed by: INTERNAL MEDICINE

## 2023-12-11 PROCEDURE — 3075F SYST BP GE 130 - 139MM HG: CPT | Mod: HCNC,CPTII,S$GLB, | Performed by: INTERNAL MEDICINE

## 2023-12-11 PROCEDURE — 1159F MED LIST DOCD IN RCRD: CPT | Mod: HCNC,CPTII,S$GLB, | Performed by: INTERNAL MEDICINE

## 2023-12-11 PROCEDURE — 3074F SYST BP LT 130 MM HG: CPT | Mod: HCNC,CPTII,S$GLB, | Performed by: INTERNAL MEDICINE

## 2023-12-11 PROCEDURE — 3288F FALL RISK ASSESSMENT DOCD: CPT | Mod: HCNC,CPTII,S$GLB, | Performed by: INTERNAL MEDICINE

## 2023-12-11 PROCEDURE — 99999 PR PBB SHADOW E&M-EST. PATIENT-LVL IV: ICD-10-PCS | Mod: PBBFAC,HCNC,,

## 2023-12-11 PROCEDURE — 99999 PR PBB SHADOW E&M-EST. PATIENT-LVL IV: CPT | Mod: PBBFAC,HCNC,,

## 2023-12-11 PROCEDURE — 3288F PR FALLS RISK ASSESSMENT DOCUMENTED: ICD-10-PCS | Mod: HCNC,CPTII,S$GLB, | Performed by: INTERNAL MEDICINE

## 2023-12-11 PROCEDURE — 1126F AMNT PAIN NOTED NONE PRSNT: CPT | Mod: HCNC,CPTII,S$GLB, | Performed by: INTERNAL MEDICINE

## 2023-12-11 PROCEDURE — 1159F PR MEDICATION LIST DOCUMENTED IN MEDICAL RECORD: ICD-10-PCS | Mod: HCNC,CPTII,S$GLB, | Performed by: INTERNAL MEDICINE

## 2023-12-11 PROCEDURE — 1160F PR REVIEW ALL MEDS BY PRESCRIBER/CLIN PHARMACIST DOCUMENTED: ICD-10-PCS | Mod: HCNC,CPTII,S$GLB, | Performed by: INTERNAL MEDICINE

## 2023-12-11 PROCEDURE — 1111F DSCHRG MED/CURRENT MED MERGE: CPT | Mod: HCNC,CPTII,S$GLB, | Performed by: INTERNAL MEDICINE

## 2023-12-11 PROCEDURE — 1101F PR PT FALLS ASSESS DOC 0-1 FALLS W/OUT INJ PAST YR: ICD-10-PCS | Mod: HCNC,CPTII,S$GLB, | Performed by: INTERNAL MEDICINE

## 2023-12-11 PROCEDURE — 1111F PR DISCHARGE MEDS RECONCILED W/ CURRENT OUTPATIENT MED LIST: ICD-10-PCS | Mod: HCNC,CPTII,S$GLB, | Performed by: INTERNAL MEDICINE

## 2023-12-11 PROCEDURE — 3008F BODY MASS INDEX DOCD: CPT | Mod: HCNC,CPTII,S$GLB, | Performed by: INTERNAL MEDICINE

## 2023-12-11 PROCEDURE — 3075F PR MOST RECENT SYSTOLIC BLOOD PRESS GE 130-139MM HG: ICD-10-PCS | Mod: HCNC,CPTII,S$GLB, | Performed by: INTERNAL MEDICINE

## 2023-12-11 PROCEDURE — 1126F PR PAIN SEVERITY QUANTIFIED, NO PAIN PRESENT: ICD-10-PCS | Mod: HCNC,CPTII,S$GLB, | Performed by: INTERNAL MEDICINE

## 2023-12-11 PROCEDURE — 99204 OFFICE O/P NEW MOD 45 MIN: CPT | Mod: HCNC,S$GLB,, | Performed by: INTERNAL MEDICINE

## 2023-12-11 PROCEDURE — 99999 PR PBB SHADOW E&M-EST. PATIENT-LVL IV: CPT | Mod: PBBFAC,HCNC,, | Performed by: INTERNAL MEDICINE

## 2023-12-11 PROCEDURE — 99214 PR OFFICE/OUTPT VISIT, EST, LEVL IV, 30-39 MIN: ICD-10-PCS | Mod: HCNC,S$GLB,, | Performed by: INTERNAL MEDICINE

## 2023-12-11 PROCEDURE — 99214 OFFICE O/P EST MOD 30 MIN: CPT | Mod: HCNC,S$GLB,, | Performed by: INTERNAL MEDICINE

## 2023-12-11 PROCEDURE — 99204 PR OFFICE/OUTPT VISIT, NEW, LEVL IV, 45-59 MIN: ICD-10-PCS | Mod: HCNC,S$GLB,, | Performed by: INTERNAL MEDICINE

## 2023-12-11 PROCEDURE — 3074F PR MOST RECENT SYSTOLIC BLOOD PRESSURE < 130 MM HG: ICD-10-PCS | Mod: HCNC,CPTII,S$GLB, | Performed by: INTERNAL MEDICINE

## 2023-12-11 PROCEDURE — 99999 PR PBB SHADOW E&M-EST. PATIENT-LVL IV: ICD-10-PCS | Mod: PBBFAC,HCNC,, | Performed by: INTERNAL MEDICINE

## 2023-12-11 PROCEDURE — 3008F PR BODY MASS INDEX (BMI) DOCUMENTED: ICD-10-PCS | Mod: HCNC,CPTII,S$GLB, | Performed by: INTERNAL MEDICINE

## 2023-12-11 RX ORDER — ROSUVASTATIN CALCIUM 20 MG/1
TABLET, COATED ORAL
Qty: 90 TABLET | Refills: 0 | Status: SHIPPED | OUTPATIENT
Start: 2023-12-11 | End: 2024-03-11

## 2023-12-11 NOTE — PROGRESS NOTES
Preoperative History and Physical                                                              Hospital Medicine                                                                      Chief Complaint: Preoperative evaluation     History of Present Illness:      Samy Alejandre Jr. is a 73 y.o. male who presents to the office today for a preoperative consultation at the request of Dr. Nino Martinez who plans on performing CYSTOURETEROSCOPY, WITH HOLMIUM LASER LITHOTRIPSY OF URETERAL CALCULUS AND STENT INSERTION , Left on December 18.     Functional Status:      The patient is not able to climb a flight of stairs. The patient is able to ambulate with rolling walker without difficulty. The patient's functional status is not affected by the surgical problem. The patient's functional status is affected by shortness of breath, chest pain, dyspnea on exertion and fatigue.    MET score greater than 4    Past Medical History:      Past Medical History:   Diagnosis Date    Anxiety     Deep vein thrombosis     2021    Depression     Diabetes mellitus, type 2     Hypertension     Seizures     Stroke         Past Surgical History:      Past Surgical History:   Procedure Laterality Date    A-V CARDIAC PACEMAKER INSERTION Left 10/4/2023    Procedure: INSERTION, CARDIAC PACEMAKER, DUAL CHAMBER;  Surgeon: Delonte Lindsey MD;  Location: [facility] CATH LAB;  Service: Cardiology;  Laterality: Left;  biotronik    ANGIOGRAPHY OF LOWER EXTREMITY N/A 12/21/2020    Procedure: ANGIOGRAM, LOWER EXTREMITY/Rt leg poss pta/stent;  Surgeon: Todd Michel MD;  Location: [facility] CATH LAB;  Service: Peripheral Vascular;  Laterality: N/A;  rescheduled 12/8    BACK SURGERY  2015    BRONCHOSCOPY Left 3/24/2022    Procedure: Bronchoscopy;  Surgeon: Edward Williamson MD;  Location: [facility] ENDO;  Service: Pulmonary;  Laterality: Left;  poss endobronchial biopsy or brushing    CATARACT EXTRACTION      CYSTOSCOPY WITH  URETEROSCOPY, RETROGRADE PYELOGRAPHY, AND INSERTION OF STENT Left 10/8/2023    Procedure: CYSTOSCOPY, WITH RETROGRADE PYELOGRAM AND URETERAL STENT INSERTION;  Surgeon: Jamie Martinez MD;  Location: [facility] OR;  Service: Urology;  Laterality: Left;    ESOPHAGOGASTRODUODENOSCOPY N/A 8/24/2022    Procedure: EGD (ESOPHAGOGASTRODUODENOSCOPY);  Surgeon: Ana Gomez MD;  Location: [facility] ENDO;  Service: Endoscopy;  Laterality: N/A;    ESOPHAGOGASTRODUODENOSCOPY N/A 9/14/2022    Procedure: EGD (ESOPHAGOGASTRODUODENOSCOPY);  Surgeon: Ana Gomez MD;  Location: [facility];  Service: Endoscopy;  Laterality: N/A;    KNEE SURGERY  2012    LITHOTRIPSY  2000        Social History:      Social History     Socioeconomic History    Marital status:    Tobacco Use    Smoking status: Every Day     Current packs/day: 1.00     Types: Cigarettes    Smokeless tobacco: Current     Types: Snuff    Tobacco comments:     Reduced use, approx. 1 cig/day   Substance and Sexual Activity    Alcohol use: Not Currently     Alcohol/week: 1.0 standard drink of alcohol     Types: 1 Shots of liquor per week    Drug use: Never    Sexual activity: Not Currently     Social Determinants of Health     Financial Resource Strain: Low Risk  (11/27/2023)    Overall Financial Resource Strain (CARDIA)     Difficulty of Paying Living Expenses: Not hard at all   Food Insecurity: No Food Insecurity (11/27/2023)    Hunger Vital Sign     Worried About Running Out of Food in the Last Year: Never true     Ran Out of Food in the Last Year: Never true   Transportation Needs: No Transportation Needs (11/27/2023)    PRAPARE - Transportation     Lack of Transportation (Medical): No     Lack of Transportation (Non-Medical): No   Physical Activity: Inactive (11/27/2023)    Exercise Vital Sign     Days of Exercise per Week: 0 days     Minutes of Exercise per Session: 0 min   Stress: No Stress Concern Present (11/27/2023)    [employer] [place] [employer] - Occupational Stress Questionnaire     Feeling of Stress : Only a little   Social Connections: Unknown (11/27/2023)    Social Connection and Isolation Panel [NHANES]     Frequency of Communication with Friends and Family: More than three times a week     Frequency of Social Gatherings with Friends and Family: Once a week     Active Member of Clubs or Organizations: No     Attends Club or Organization Meetings: Never     Marital Status:    Housing Stability: Low Risk  (11/27/2023)    Housing Stability Vital Sign     Unable to Pay for Housing in the Last Year: No     Number of Places Lived in the Last Year: 1     Unstable Housing in the Last Year: No        Family History:      Family History   Problem Relation Age of Onset    Heart disease Mother     Stroke Mother     Cancer Father     COPD Sister        Allergies:      Review of patient's allergies indicates:   Allergen Reactions    Chantix [varenicline] Other (See Comments)     Felt bad       Medications:      Current Outpatient Medications   Medication Sig    albuterol (ACCUNEB) 1.25 mg/3 mL Nebu Take 3 mLs (1.25 mg total) by nebulization 2 (two) times a day. Rescue    albuterol (PROVENTIL/VENTOLIN HFA) 90 mcg/actuation inhaler Inhale 2 puffs into the lungs every 4 (four) hours as needed for Wheezing.    donepeziL (ARICEPT) 10 MG tablet Take 1 tablet (10 mg total) by mouth every evening.    DULoxetine (CYMBALTA) 30 MG capsule Take 1 capsule by mouth once daily    ELIQUIS 5 mg Tab Take 1 tablet by mouth once daily    fluconazole (DIFLUCAN) 100 MG tablet Take 1 tablet (100 mg total) by mouth once daily. for 14 days    FLUoxetine 20 MG capsule Take 1 capsule by mouth once daily    HYDROcodone-acetaminophen (NORCO)  mg per tablet Take 1 tablet by mouth.    memantine (NAMENDA) 10 MG Tab TAKE 1/2 TABLET BY MOUTH TWICE DAILY FOR 1 WEEK, THEN 1 TABLET BY MOUTH TWICE DAILY    mirabegron (MYRBETRIQ) 25 mg Tb24 ER tablet Take 1 tablet (25  mg total) by mouth once daily.    pantoprazole (PROTONIX) 40 MG tablet Take 1 tablet (40 mg total) by mouth once daily.    QUEtiapine (SEROQUEL) 50 MG tablet Take 1 tablet by mouth twice daily    rosuvastatin (CRESTOR) 20 MG tablet Take 1 tablet by mouth once daily    tamsulosin (FLOMAX) 0.4 mg Cap Take 1 capsule (0.4 mg total) by mouth every evening.    blood glucose control, low (TRUE METRIX LEVEL 1) Soln 1 drop by Misc.(Non-Drug; Combo Route) route once daily.    blood glucose control, normal (TRUE METRIX LEVEL 2) Soln 1 drop by Misc.(Non-Drug; Combo Route) route once daily.    gabapentin (NEURONTIN) 100 MG capsule Take 1 capsule (100 mg total) by mouth 3 (three) times daily.    inhalat.spacing dev,large mask (BREATHERITE SPACER-MASK,ADULT) Spcr Use as directed for inhalation.    lancets 32 gauge Misc 1 lancet by Misc.(Non-Drug; Combo Route) route once daily.    mucus clearing device (AEROBIKA OSCILLATING PEP SYSTM) by Misc.(Non-Drug; Combo Route) route 4 (four) times daily as needed.    walker (ULTRA-LIGHT ROLLATOR) Misc Use daily for ambulation     No current facility-administered medications for this visit.       Vitals:      Vitals:    12/11/23 1217   BP: 99/71   Resp: 18   Temp: 97.2 °F (36.2 °C)       Review of Systems:        Constitutional: Negative for fever, chills, weight loss, malaise/fatigue and diaphoresis.   HENT: Negative for hearing loss, ear pain, nosebleeds, congestion, sore throat, neck pain, tinnitus and ear discharge.    Eyes: Negative for blurred vision, double vision, photophobia, pain, discharge and redness.   Respiratory: Negative for cough, hemoptysis, sputum production, shortness of breath, wheezing and stridor.    Cardiovascular: Negative for chest pain, palpitations, orthopnea, claudication, leg swelling and PND.   Gastrointestinal: Negative for heartburn, nausea, vomiting, abdominal pain, diarrhea, constipation, blood in stool and melena.   Genitourinary: Negative for dysuria,  urgency, frequency, hematuria and flank pain.   Musculoskeletal: Negative for myalgias, back pain, joint pain and falls.   Skin: Negative for itching and rash.   Neurological: Negative for dizziness, tingling, tremors, sensory change, speech change, focal weakness, seizures, loss of consciousness, weakness and headaches.   Endo/Heme/Allergies: Negative for environmental allergies and polydipsia. Does not bruise/bleed easily.   Psychiatric/Behavioral: Negative for depression, suicidal ideas, hallucinations, memory loss and substance abuse. The patient is not nervous/anxious and does not have insomnia.    All 14 systems reviewed and negative except as noted above.    Physical Exam:      Constitutional: Appears well-developed, well-nourished and in no acute distress.  Patient is oriented to person, place, and time.   Head: Normocephalic and atraumatic. Mucous membranes moist.  Neck: Neck supple no mass.   Cardiovascular: Normal rate and regular rhythm.  S1 S2 appreciated by ascultation.  Pulmonary/Chest: Effort normal and clear to auscultation bilaterally. No respiratory distress.   Abdomen: Soft. Non-tender and non-distended. Bowel sounds are normal.   Neurological: Patient is alert and oriented to person, place and time. Moves all extremities.  Skin: Warm and dry. No lesions.  Extremities: No clubbing, cyanosis or edema.    Laboratory data:      Reviewed and noted in plan where applicable. Please see chart for full laboratory data.          Lab Results   Component Value Date    WBC 7.46 11/29/2023    HGB 7.7 (L) 11/29/2023    HCT 23.7 (L) 11/29/2023    MCV 89 11/29/2023     11/29/2023           Predictors of intubation difficulty:       Morbid obesity? no   Anatomically abnormal facies? no   Prominent incisors? no   Receding mandible? no   Short, thick neck? no   Neck range of motion: normal   Dentition: No chipped, loose, or missing teeth.  Based on the Modified Mallampati, patient is a mallampati score: II  (hard and soft palate, upper portion of tonsils anduvula visible)    Cardiographics:      ECG: Normal sinus rhythm   PRV pacing     Echocardiogram:  EF: 55-70%, Normal Diastolic Function    Imaging:      Chest x-ray: normal and reviewed by myself    Assessment and Plan:      Multiple kidney stones  Planned for CYSTOURETEROSCOPY, WITH HOLMIUM LASER LITHOTRIPSY OF URETERAL CALCULUS AND STENT INSERTION , Left with Dr. Nino Martinez on 12/18/23.     Known risk factors for perioperative complications: Diabetes mellitus  Renal dysfunction    Difficulty with intubation is not anticipated.    Cardiac Risk Estimation: Based on the Revised Cardiac Risk index, patient is a Class 2 risk with a 6.0% risk of a major cardiac event in a low risk procedure.    1.) Preoperative workup as follows: ECG, hemoglobin, hematocrit, electrolytes, creatinine, glucose, liver function studies.  2.) Change in medication regimen before surgery: discontinue ASA, NSAIDs 7 days before surgery, hold Metformin 24 hours prior to surgery.  3.) Prophylaxis for cardiac events with perioperative beta-blockers: not indicated.  4.) Invasive hemodynamic monitoring perioperatively: at the discretion of anesthesiologist.  5.) Deep vein thrombosis prophylaxis postoperatively: intermittent pneumatic compression boots and regimen to be chosen by surgical team.  6.) Surveillance for postoperative MI with ECG immediately postoperatively and on postoperati ve days 1 and 2 AND troponin levels 24 hours postoperatively and on day 4 or hospital discharge (whichever comes first): not indicated.  7.) Current medications which may produce withdrawal symptoms if withheld perioperatively: None  8.) Other measures:  None     --Morning of Procedure medications: fluoxetine, memantine, mirabegron, pantoprazole, flomax.    --Hold all other medications morning of surgery   --Resume all medications post-operatively  --Hold ASA, NSAIDs and all vitamins/supplements 7 days prior to  "procedure  ---Eliquis hold 48 Hours, last dose 11/15/23 evening dose  --Hold Plavix- start today (12/11/23)      History of DVT of lower extremity  Evaluated by Cardiology.  OK to hold Eliquis 3 days prior to surgery.     Complete heart block  Chronic. Evaluated by cardiology on 12/11/23.    Per review of note, recommendations as follows:  "Elevated periop risk of CV events for non-high risk procedure.  Good functional and exercise capacity.  No chest pain, active arrhythmia and CHF symptoms.  Ok to proceed the scheduled surgery without further cardiac study.  OK to hold eliquis 3 days before the procedure and resume ASAP postop."    Controlled type 2 diabetes mellitus with stage 3 chronic kidney disease, without long-term current use of insulin  Chronic, well controlled.   HgA1c: 6.2 (8/4/23)    --Not currently taking hyperglycemia medications          Electronically signed by Janiya Licea NP on 12/11/2023 at 12:37 PM.     "

## 2023-12-11 NOTE — DISCHARGE INSTRUCTIONS
Pre op instructions reviewed with patient & daughter during Clinic Visit with Provider, verbalized understanding.    To confirm, Surgery is scheduled on 12/18/23. We will call you late afternoon the business day prior to surgery with your arrival time.    *Please report to the [facility] Lobby (1st Floor) located off of [facility] (2nd Entrance/Building on the left, in front of the flag pole).  Address: [address] [place] [address] [place]. [id number]      INSTRUCTIONS IMPORTANT!!!  DO NOT Eat, Drink, or Smoke after 12 midnight unless instructed otherwise by your Surgeon. OK to brush teeth, no gum, candy or mints!    MORNING OF SURGERY, drink small sip of water with the following medications instructed by Pre-Admit Provider:  Fluoxetine  Memantine  Mirabegron  Pantoprazole  Flomax       *Additional Medication Instructions: Hold Plavix starting today! Stop Eliquis 48 hrs prior to surgery!    Diabetic Patients: If you take diabetic or weight loss medication, Do NOT take morning of surgery unless instructed by Doctor. Metformin to be stopped 24 hrs prior to surgery. Ozempic/ Mounjaro/ Wegovy/ Trulicity/ Semaglutide or any weight loss injections to be stopped 7 days prior to surgery. DO NOT take long-acting insulin the evening before surgery. Blood sugars will be checked in pre-op by Nurse.    *Patients should HOLD all vitamins, herbal supplements, weight loss medication, aspirin products & NSAIDS 7 days prior to surgery, as these can thin the blood. Ok to take Tylenol.    ____  Avoid Alcoholic beverages 3 days prior to surgery, as it can thin the blood.  ____  NO Acrylic/fake nails or nail polish worn day of surgery (specifically hand/arm & foot surgeries).  ____  NO powder, lotions, deodorants, oils or cream on body.  ____  Remove all jewelry, piercings, & foreign objects prior to arrival and leave at home.  ____  Remove Dentures, Hearing Aids & Contact Lens prior to surgery.  ____  Bring photo ID and insurance  information to hospital (Leave Valuables at Home).  ____  If going home the same day, arrange for a ride home. You will not be able to drive for 24 hrs if Anesthesia was used.   ____  Females (ages 11-60): may need to give a urine sample the morning of surgery; please see Pre op Nurse prior to using the restroom.  ____  Males: Stop ED medications (Viagra, Cialis) 24 hrs prior to surgery.  ____  Wear clean, loose fitting clothing to allow for dressings/ bandages.      Bathing Instructions:    -Shower with anti-bacterial Soap (Hibiclens or Dial) the night before surgery and the morning of.   -Do not use Hibiclens on your face or genitals.   -Apply clean clothes after shower.  -Do not shave your face or body 2 days prior to surgery unless instructed otherwise by your Surgeon.  -Do not shave pubic hair 7 days prior to surgery (gyn pt's).    [facility] Visitor/Ride Policy:  Only 2 adults allowed in pre op/recovery area during your procedure. You MUST HAVE A RIDE HOME from a responsible adult that you know and trust. Medical Transport, Uber or Lyft can ONLY be used if patient has a responsible adult to accompany them during ride home.    Discharge Instructions: You will receive Post-op/Discharge instructions by your Discharge Nurse prior to going home.   *Prevention of surgical site infections:   -Keep incisions clean and dry.   -Do not soak/submerge incisions in water until completely healed.   -Do not apply lotions, powders, creams, or deodorants to site.   -Always make sure hands are cleaned with antibacterial soap/ alcohol-based  prior to touching the surgical site.        *Signs and symptoms of Infection Before or After Surgery:               !!!If you experience any fever, chills, nausea/ vomiting, foul odor/ excessive drainage from surgical site, flu-like symptoms, new wounds or cuts, PLEASE CALL THE SURGEON OFFICE at [phone] or SEND MESSAGE THROUGH [email or web address]!!!       *If you are running late day of  surgery, please call the Surgery Dept @ [phone].    *Billing question, please call  [phone]       Thank you,  -[facility] Pre Admit Dept.  [phone] or [phone]  M-F 7:30 am-4:00 pm (Closed Major Holidays)

## 2023-12-11 NOTE — PROGRESS NOTES
Subjective:   Patient ID:  Samy Alejandre Jr. is a 73 y.o. male who presents for follow up of Shortness of Breath (Sob with exertion) and Dizziness      73 yo male, preop clearance of removal of ureterttlq stent  PMH h/o CVA 3 times last one in , residual right side weakness. DM > 10 yrs, HTN anxiety h/O DVT in  on eliquis    LE venous US Right lower extremity veins: Extensive deep venous thrombus which is acute appearing in the femoral vein extending through the popliteal vein and into the peroneal and posterior tibial veins.  Occasional nose bleeding  No chest pain dyspnea faint   Occasional dizziness   No leg swelling. Sleeps on 1 pillow  Smoking 1 ppd and drinking  Egd done in  esophageal stenosis    Interval history  10/23 s/p PPM for CHB and CT scan was obtained which showed a three and 5 mm left proximal ureteral stone. S./p ureteral stent  Plan to removal of stent   Pre care giver, pt is dementia. C/o SOB with exertion. H/o smoker and on breathing rx. Recently h/o PNA on 08/23.   No chest pain dizziness, walker dependent due to weakness and h/o CVA  No orthopnea and PND  11/23 EKG SR V pacing. BNP normal , BP LDL and A1c controlled  10/23 echo nl biv function            Past Medical History:   Diagnosis Date    Anxiety     Deep vein thrombosis     2021    Depression     Diabetes mellitus, type 2     Hypertension     Seizures     Stroke        Past Surgical History:   Procedure Laterality Date    A-V CARDIAC PACEMAKER INSERTION Left 10/4/2023    Procedure: INSERTION, CARDIAC PACEMAKER, DUAL CHAMBER;  Surgeon: Delonte Lindsey MD;  Location: [facility] CATH LAB;  Service: Cardiology;  Laterality: Left;  biotronik    ANGIOGRAPHY OF LOWER EXTREMITY N/A 12/21/2020    Procedure: ANGIOGRAM, LOWER EXTREMITY/Rt leg poss pta/stent;  Surgeon: Todd Michel MD;  Location: [facility] CATH LAB;  Service: Peripheral Vascular;  Laterality: N/A;  rescheduled 12/8    BACK SURGERY  2015    BRONCHOSCOPY  Left 3/24/2022    Procedure: Bronchoscopy;  Surgeon: Edward Williamson MD;  Location: [facility] ENDO;  Service: Pulmonary;  Laterality: Left;  poss endobronchial biopsy or brushing    CATARACT EXTRACTION      CYSTOSCOPY WITH URETEROSCOPY, RETROGRADE PYELOGRAPHY, AND INSERTION OF STENT Left 10/8/2023    Procedure: CYSTOSCOPY, WITH RETROGRADE PYELOGRAM AND URETERAL STENT INSERTION;  Surgeon: Jamie Martinez MD;  Location: [facility] OR;  Service: Urology;  Laterality: Left;    ESOPHAGOGASTRODUODENOSCOPY N/A 8/24/2022    Procedure: EGD (ESOPHAGOGASTRODUODENOSCOPY);  Surgeon: Ana Gomez MD;  Location: [facility] ENDO;  Service: Endoscopy;  Laterality: N/A;    ESOPHAGOGASTRODUODENOSCOPY N/A 9/14/2022    Procedure: EGD (ESOPHAGOGASTRODUODENOSCOPY);  Surgeon: Ana Gomez MD;  Location: [facility];  Service: Endoscopy;  Laterality: N/A;    KNEE SURGERY  2012    LITHOTRIPSY  2000       Social History     Tobacco Use    Smoking status: Every Day     Current packs/day: 1.00     Types: Cigarettes    Smokeless tobacco: Current     Types: Snuff    Tobacco comments:     Reduced use, approx. 1 cig/day   Substance Use Topics    Alcohol use: Not Currently     Alcohol/week: 1.0 standard drink of alcohol     Types: 1 Shots of liquor per week    Drug use: Never       Family History   Problem Relation Age of Onset    Heart disease Mother     Stroke Mother     Cancer Father     COPD Sister          ROS    Objective:   Physical Exam  HENT:      Head: Normocephalic.   Eyes:      Pupils: Pupils are equal, round, and reactive to light.   Neck:      Thyroid: No thyromegaly.      Vascular: Normal carotid pulses. No carotid bruit or JVD.   Cardiovascular:      Rate and Rhythm: Normal rate and regular rhythm. No extrasystoles are present.     Chest Wall: PMI is not displaced.      Pulses: Normal pulses.      Heart sounds: Normal heart sounds. No murmur heard.     No gallop. No S3 sounds.   Pulmonary:      Effort: No respiratory distress.       Breath sounds: Normal breath sounds. No stridor.   Abdominal:      General: Bowel sounds are normal.      Palpations: Abdomen is soft.      Tenderness: There is no abdominal tenderness. There is no rebound.   Skin:     Findings: No rash.   Neurological:      Mental Status: He is alert.         Lab Results   Component Value Date    CHOL 135 08/04/2023    CHOL 151 04/24/2023    CHOL 109 (L) 04/27/2022     Lab Results   Component Value Date    HDL 33 (L) 08/04/2023    HDL 31 (L) 04/24/2023    HDL 33 (L) 04/27/2022     Lab Results   Component Value Date    LDLCALC 67.6 08/04/2023    LDLCALC 72.8 04/24/2023    LDLCALC 57.2 (L) 04/27/2022     Lab Results   Component Value Date    TRIG 172 (H) 08/04/2023    TRIG 236 (H) 04/24/2023    TRIG 94 04/27/2022     Lab Results   Component Value Date    CHOLHDL 24.4 08/04/2023    CHOLHDL 20.5 04/24/2023    CHOLHDL 30.3 04/27/2022       Chemistry        Component Value Date/Time     11/29/2023 0523    K 3.1 (L) 11/29/2023 0523     11/29/2023 0523    CO2 30 (H) 11/29/2023 0523    BUN 14 11/29/2023 0523    CREATININE 1.3 11/29/2023 0523     11/29/2023 0523        Component Value Date/Time    CALCIUM 8.3 (L) 11/29/2023 0523    ALKPHOS 170 (H) 11/25/2023 1924    AST 24 11/25/2023 1924    ALT 18 11/25/2023 1924    BILITOT 0.4 11/25/2023 1924    ESTGFRAFRICA >60 07/31/2022 2037    EGFRNONAA >60 07/31/2022 2037          Lab Results   Component Value Date    HGBA1C 6.2 (H) 08/04/2023     Lab Results   Component Value Date    TSH 1.963 10/02/2023     Lab Results   Component Value Date    INR 1.2 10/02/2023    INR 1.0 03/21/2022     Lab Results   Component Value Date    WBC 7.46 11/29/2023    HGB 7.7 (L) 11/29/2023    HCT 23.7 (L) 11/29/2023    MCV 89 11/29/2023     11/29/2023     BMP  Sodium   Date Value Ref Range Status   11/29/2023 139 136 - 145 mmol/L Final     Potassium   Date Value Ref Range Status   11/29/2023 3.1 (L) 3.5 - 5.1 mmol/L Final     Chloride   Date  Value Ref Range Status   11/29/2023 101 95 - 110 mmol/L Final     CO2   Date Value Ref Range Status   11/29/2023 30 (H) 23 - 29 mmol/L Final     BUN   Date Value Ref Range Status   11/29/2023 14 8 - 23 mg/dL Final     Creatinine   Date Value Ref Range Status   11/29/2023 1.3 0.5 - 1.4 mg/dL Final     Calcium   Date Value Ref Range Status   11/29/2023 8.3 (L) 8.7 - 10.5 mg/dL Final     Anion Gap   Date Value Ref Range Status   11/29/2023 8 8 - 16 mmol/L Final     eGFR if    Date Value Ref Range Status   07/31/2022 >60 >60 mL/min/1.73 m^2 Final     eGFR if non    Date Value Ref Range Status   07/31/2022 >60 >60 mL/min/1.73 m^2 Final     Comment:     Calculation used to obtain the estimated glomerular filtration  rate (eGFR) is the CKD-EPI equation.        BNP  @LABRCNTIP(BNP,BNPTRIAGEBLO)@  @LABRCNTIP(troponini)@  CrCl cannot be calculated (Patient's most recent lab result is older than the maximum 7 days allowed.).  No results found in the last 24 hours.  No results found in the last 24 hours.  No results found in the last 24 hours.    Assessment:      1. Preop cardiovascular exam    2. Mixed hyperlipidemia    3. Complete heart block    4. Cardiac pacemaker in situ    5. History of CVA with residual deficit    6. Chronic obstructive pulmonary disease, unspecified COPD type    7. Controlled type 2 diabetes mellitus with stage 3 chronic kidney disease, without long-term current use of insulin        Plan:   Elevated periop risk of CV events for non-high risk procedure.  Good functional and exercise capacity.  No chest pain, active arrhythmia and CHF symptoms.  Ok to proceed the scheduled surgery without further cardiac study.  OK to hold eliquis 3 days before the procedure and resume ASAP postop.      Continue eliquis and statin  RTC in 6 m

## 2023-12-11 NOTE — PROGRESS NOTES
Outpatient Care Management  Patient Does Not Consent    Patient: Samy Alejandre JrMichaela  MRN:  [id number]  Date of Service:  12/11/2023  Completed by:  Noa Dominguez RN    Chief Complaint   Patient presents with    OPCM Enrollment Call    Case Closure       Patient Summary           Consent Received:  Decline

## 2023-12-11 NOTE — ASSESSMENT & PLAN NOTE
Planned for CYSTOURETEROSCOPY, WITH HOLMIUM LASER LITHOTRIPSY OF URETERAL CALCULUS AND STENT INSERTION , Left with Dr. Nino Martinez on 12/18/23.     Known risk factors for perioperative complications: Diabetes mellitus  Renal dysfunction    Difficulty with intubation is not anticipated.    Cardiac Risk Estimation: Based on the Revised Cardiac Risk index, patient is a Class 2 risk with a 6.0% risk of a major cardiac event in a low risk procedure.    1.) Preoperative workup as follows: ECG, hemoglobin, hematocrit, electrolytes, creatinine, glucose, liver function studies.  2.) Change in medication regimen before surgery: discontinue ASA, NSAIDs 7 days before surgery, hold Metformin 24 hours prior to surgery.  3.) Prophylaxis for cardiac events with perioperative beta-blockers: not indicated.  4.) Invasive hemodynamic monitoring perioperatively: at the discretion of anesthesiologist.  5.) Deep vein thrombosis prophylaxis postoperatively: intermittent pneumatic compression boots and regimen to be chosen by surgical team.  6.) Surveillance for postoperative MI with ECG immediately postoperatively and on postoperati ve days 1 and 2 AND troponin levels 24 hours postoperatively and on day 4 or hospital discharge (whichever comes first): not indicated.  7.) Current medications which may produce withdrawal symptoms if withheld perioperatively: None  8.) Other measures:  None     --Morning of Procedure medications: fluoxetine, memantine, mirabegron, pantoprazole, flomax.    --Hold all other medications morning of surgery   --Resume all medications post-operatively  --Hold ASA, NSAIDs and all vitamins/supplements 7 days prior to procedure  ---Eliquis hold 48 Hours, last dose 11/15/23 evening dose  --Hold Plavix- start today (12/11/23)

## 2023-12-11 NOTE — ASSESSMENT & PLAN NOTE
Chronic, well controlled.   HgA1c: 6.2 (8/4/23)    --Not currently taking hyperglycemia medications

## 2023-12-11 NOTE — ASSESSMENT & PLAN NOTE
"Chronic. Evaluated by cardiology on 12/11/23.    Per review of note, recommendations as follows:  "Elevated periop risk of CV events for non-high risk procedure.  Good functional and exercise capacity.  No chest pain, active arrhythmia and CHF symptoms.  Ok to proceed the scheduled surgery without further cardiac study.  OK to hold eliquis 3 days before the procedure and resume ASAP postop."  "

## 2023-12-11 NOTE — PRE-PROCEDURE INSTRUCTIONS
Pre op instructions reviewed with patient & daughter during Clinic Visit with Provider, verbalized understanding.    To confirm, Surgery is scheduled on 12/18/23. We will call you late afternoon the business day prior to surgery with your arrival time.    *Please report to the [facility] (1st Floor) located off of [facility] (2nd Entrance/Building on the left, in front of the flag pole).  Address: [address] [place] [address] Eduarda [place]. [id number]      INSTRUCTIONS IMPORTANT!!!  DO NOT Eat, Drink, or Smoke after 12 midnight unless instructed otherwise by your Surgeon. OK to brush teeth, no gum, candy or mints!    MORNING OF SURGERY, drink small sip of water with the following medications instructed by Pre-Admit Provider:  Fluoxetine  Memantine  Mirabegron  Pantoprazole  Flomax       *Additional Medication Instructions: Hold Plavix starting today! Stop Eliquis 48 hrs prior to surgery!    Diabetic Patients: If you take diabetic or weight loss medication, Do NOT take morning of surgery unless instructed by Doctor. Metformin to be stopped 24 hrs prior to surgery. Ozempic/ Mounjaro/ Wegovy/ Trulicity/ Semaglutide or any weight loss injections to be stopped 7 days prior to surgery. DO NOT take long-acting insulin the evening before surgery. Blood sugars will be checked in pre-op by Nurse.    *Patients should HOLD all vitamins, herbal supplements, weight loss medication, aspirin products & NSAIDS 7 days prior to surgery, as these can thin the blood. Ok to take Tylenol.    ____  Avoid Alcoholic beverages 3 days prior to surgery, as it can thin the blood.  ____  NO Acrylic/fake nails or nail polish worn day of surgery (specifically hand/arm & foot surgeries).  ____  NO powder, lotions, deodorants, oils or cream on body.  ____  Remove all jewelry, piercings, & foreign objects prior to arrival and leave at home.  ____  Remove Dentures, Hearing Aids & Contact Lens prior to surgery.  ____  Bring photo ID and insurance  information to hospital (Leave Valuables at Home).  ____  If going home the same day, arrange for a ride home. You will not be able to drive for 24 hrs if Anesthesia was used.   ____  Females (ages 11-60): may need to give a urine sample the morning of surgery; please see Pre op Nurse prior to using the restroom.  ____  Males: Stop ED medications (Viagra, Cialis) 24 hrs prior to surgery.  ____  Wear clean, loose fitting clothing to allow for dressings/ bandages.      Bathing Instructions:    -Shower with anti-bacterial Soap (Hibiclens or Dial) the night before surgery and the morning of.   -Do not use Hibiclens on your face or genitals.   -Apply clean clothes after shower.  -Do not shave your face or body 2 days prior to surgery unless instructed otherwise by your Surgeon.  -Do not shave pubic hair 7 days prior to surgery (gyn pt's).    [facility] Visitor/Ride Policy:  Only 2 adults allowed in pre op/recovery area during your procedure. You MUST HAVE A RIDE HOME from a responsible adult that you know and trust. Medical Transport, Uber or Lyft can ONLY be used if patient has a responsible adult to accompany them during ride home.    Discharge Instructions: You will receive Post-op/Discharge instructions by your Discharge Nurse prior to going home.   *Prevention of surgical site infections:   -Keep incisions clean and dry.   -Do not soak/submerge incisions in water until completely healed.   -Do not apply lotions, powders, creams, or deodorants to site.   -Always make sure hands are cleaned with antibacterial soap/ alcohol-based  prior to touching the surgical site.        *Signs and symptoms of Infection Before or After Surgery:               !!!If you experience any fever, chills, nausea/ vomiting, foul odor/ excessive drainage from surgical site, flu-like symptoms, new wounds or cuts, PLEASE CALL THE SURGEON OFFICE at [phone] or SEND MESSAGE THROUGH docTrackr!!!       *If you are running late day of  surgery, please call the Surgery Dept @ [phone].    *Billing question, please call  [phone]       Thank you,  -[facility] Pre Admit Dept.  [phone] or [phone]  M-F 7:30 am-4:00 pm (Closed Major Holidays)

## 2023-12-12 ENCOUNTER — CARE AT HOME (OUTPATIENT)
Dept: HOME HEALTH SERVICES | Facility: CLINIC | Age: 73
End: 2023-12-12
Payer: MEDICARE

## 2023-12-12 VITALS
DIASTOLIC BLOOD PRESSURE: 75 MMHG | OXYGEN SATURATION: 98 % | HEART RATE: 86 BPM | SYSTOLIC BLOOD PRESSURE: 107 MMHG | RESPIRATION RATE: 18 BRPM

## 2023-12-12 DIAGNOSIS — D64.9 ANEMIA, UNSPECIFIED TYPE: ICD-10-CM

## 2023-12-12 DIAGNOSIS — N18.30 ACUTE RENAL FAILURE SUPERIMPOSED ON STAGE 3 CHRONIC KIDNEY DISEASE, UNSPECIFIED ACUTE RENAL FAILURE TYPE, UNSPECIFIED WHETHER STAGE 3A OR 3B CKD: ICD-10-CM

## 2023-12-12 DIAGNOSIS — R09.81 NASAL CONGESTION: ICD-10-CM

## 2023-12-12 DIAGNOSIS — N20.0 MULTIPLE KIDNEY STONES: ICD-10-CM

## 2023-12-12 DIAGNOSIS — Z09 HOSPITAL DISCHARGE FOLLOW-UP: Primary | ICD-10-CM

## 2023-12-12 DIAGNOSIS — F01.50 MIXED ALZHEIMER'S AND VASCULAR DEMENTIA: ICD-10-CM

## 2023-12-12 DIAGNOSIS — Z86.718 HISTORY OF DVT OF LOWER EXTREMITY: ICD-10-CM

## 2023-12-12 DIAGNOSIS — N30.01 ACUTE CYSTITIS WITH HEMATURIA: ICD-10-CM

## 2023-12-12 DIAGNOSIS — N17.9 ACUTE RENAL FAILURE SUPERIMPOSED ON STAGE 3 CHRONIC KIDNEY DISEASE, UNSPECIFIED ACUTE RENAL FAILURE TYPE, UNSPECIFIED WHETHER STAGE 3A OR 3B CKD: ICD-10-CM

## 2023-12-12 DIAGNOSIS — F02.80 MIXED ALZHEIMER'S AND VASCULAR DEMENTIA: ICD-10-CM

## 2023-12-12 DIAGNOSIS — I69.30 HISTORY OF CVA WITH RESIDUAL DEFICIT: ICD-10-CM

## 2023-12-12 DIAGNOSIS — G30.9 MIXED ALZHEIMER'S AND VASCULAR DEMENTIA: ICD-10-CM

## 2023-12-12 PROCEDURE — 3074F PR MOST RECENT SYSTOLIC BLOOD PRESSURE < 130 MM HG: ICD-10-PCS | Mod: CPTII,S$GLB,,

## 2023-12-12 PROCEDURE — 3044F HG A1C LEVEL LT 7.0%: CPT | Mod: CPTII,S$GLB,,

## 2023-12-12 PROCEDURE — 99349 HOME/RES VST EST MOD MDM 40: CPT | Mod: S$GLB,,,

## 2023-12-12 PROCEDURE — 3078F PR MOST RECENT DIASTOLIC BLOOD PRESSURE < 80 MM HG: ICD-10-PCS | Mod: CPTII,S$GLB,,

## 2023-12-12 PROCEDURE — 3074F SYST BP LT 130 MM HG: CPT | Mod: CPTII,S$GLB,,

## 2023-12-12 PROCEDURE — 3078F DIAST BP <80 MM HG: CPT | Mod: CPTII,S$GLB,,

## 2023-12-12 PROCEDURE — 1125F PR PAIN SEVERITY QUANTIFIED, PAIN PRESENT: ICD-10-PCS | Mod: CPTII,S$GLB,,

## 2023-12-12 PROCEDURE — 1125F AMNT PAIN NOTED PAIN PRSNT: CPT | Mod: CPTII,S$GLB,,

## 2023-12-12 PROCEDURE — 99349 PR HOME VISIT,ESTAB PATIENT,LEVEL III: ICD-10-PCS | Mod: S$GLB,,,

## 2023-12-12 PROCEDURE — 3044F PR MOST RECENT HEMOGLOBIN A1C LEVEL <7.0%: ICD-10-PCS | Mod: CPTII,S$GLB,,

## 2023-12-12 RX ORDER — FLUTICASONE PROPIONATE 50 MCG
1 SPRAY, SUSPENSION (ML) NASAL DAILY
Qty: 18.2 ML | Refills: 0 | Status: SHIPPED | OUTPATIENT
Start: 2023-12-12

## 2023-12-12 NOTE — ASSESSMENT & PLAN NOTE
Treated with IV Cefepime inpatient  Urine culture with candida glabrata  On Diflucan 14 day course  Continue current medication  Monitor

## 2023-12-12 NOTE — ASSESSMENT & PLAN NOTE
eGFR 33 on admit, improved to 59 with recent labs  Avoid nephrotoxins/renally dose meds  Monitor

## 2023-12-12 NOTE — PROGRESS NOTES
[facility]  Transitional Care Management (TCM) Home Visit    Encounter Provider: Rafa Zuleta   PCP: Damien Barron MD  Consult Requested By: Dr. Damien Barron  Admit Date: 11/25/23   IP Discharge Date: 11/29/23  Hospital Length of Stay:RRHLOS@ days  Days since discharge (from IP or SNF): 13   [facility] Contact Note: 12/6  Hospital Diagnosis: History of CVA with residual deficit [I69.30];Mixed Alzheimer's and vascular dementia [G30.9, F01.50, F02.80];Hospital discharge follow-up [Z09]     HISTORY OF PRESENT ILLNESS      Patient ID: Samy Alejandre Jr. is a 73 y.o. male was recently admitted to the hospital, this is their TCM encounter.    Hospital Course Synopsis:  Samy Alejandre Jr. is a 73 y.o. male with a PMH  has a past medical history of Anxiety, Depression, Diabetes mellitus, type 2, Hypertension, and Stroke.  Presented to the ER for evaluation of worsening flank pain and generalized weakness which started this evening.  Patient typically ambulates with a walker at baseline, but daughter states that patient has been lying in bed due to weakness and pain.  Per daughter at bedside patient attempted to get out of bed to use the restroom and had an episode of incontinence.  Patient has a history of multiple nonobstructing kidney stones bilaterally in his followed by Dr. Martinez with Urology.  Patient is scheduled for lithotripsy on 12/18/23.  Patient recently had cardiac pacemaker placed on 10/04/2023 for third-degree heart block.  Denies any cardiopulmonary symptoms.  However, patient does endorse subjective fever, chills, and diarrhea.  Denies any hematuria or dysuria.     ER workup revealed H/H of 8.9/27.4 with MCV of 91, BUN/creatinine of 25/2.1 with EGFR of 33 (creatinine level between 1.7-2.5 over the last 8 months),  mg/dL, UA consistent with acute cystitis, CT renal study revealed numerous bilateral nonobstructing stones, a stent in the left ureter and chronic perinephric fat stranding.   Chest x-ray showed no acute findings.  EKG revealed sinus rhythm with first-degree AV block with a ventricular rate of 91 beats per minute with left axis deviation right bundle-branch block with a QT/QTC of 446/548.  Patient received 1 g of Tylenol, 2 g cefepime, 2 L normal saline in ED. Dr. Martinez was consulted by ER provider to be made aware that patient was in ED. Hospital Medicine consulted to admit patient for UTI and intractable pain.  Patient and family at bedside are in agreement with treatment plan.     Hospital Course:   11/26: cont cefepime for UTI. Urology consulted on case.      11/27/23  Urology recommends to treat UTI and outpatient follow up. Plans to have stent removed 12/2023 after seen by Cardiology. Urine growing yeast. Will continue Cefepime add fluconazole.      11/29/23  Final urine culture grew Candida Glabrate. Will plan to treat with fluconazole for two weeks. 1/2 of blood cultures growing gram positive cocci, but ID by PCR negative and is likely contaminant. He was evaluated by PT/TO who recommended SNF, but patient declined and was agreeable to HH. He was asked to keep appt with Cardiology and PCP.        Doing ok since discharge. Currently working with [facility] and PT/OT. Ambulates with walker. Has some right CVA tenderness. Reports some nasal congestion, will send flonase to pharmacy. Reports fall yesterday, denies injuries. Reports compliance with medications. Has procedure with urology 12/18. Questions elicited. Time allowed for questions, all issues addressed. Contact info given for any concerns or changes.     DECISION MAKING TODAY       Assessment & Plan:  1. Hospital discharge follow-up  -     Ambulatory referral/consult to [facility]    2. Acute cystitis with hematuria  Assessment & Plan:  Treated with IV Cefepime inpatient  Urine culture with candida glabrata  On Diflucan 14 day course  Continue current medication  Monitor      3. Multiple  kidney stones  Assessment & Plan:  CT with numerous small bilateral renal nonobstructing calculi measuring up to 7 mm along the right and 8 mm on the left. Left-sided ureteral stent with proximal pigtail in the left renal pelvis and distal pigtail within the bladder. Punctate ureteral stone along the mid left ureteral stent.  Distal left ureteral stone along adjacent to the stent measuring 4 mm. 22 mm left renal cyst.   Cysto with lithotripsy and stent placement 12/18 with Dr. Martinez  Card clearance yesterday  Continue current plan  Monitor      4. Acute renal failure superimposed on stage 3 chronic kidney disease, unspecified acute renal failure type, unspecified whether stage 3a or 3b CKD  Assessment & Plan:  eGFR 33 on admit, improved to 59 with recent labs  Avoid nephrotoxins/renally dose meds  Monitor        5. History of CVA with residual deficit  Assessment & Plan:  Continue current medication  Continue HH with PT/OT    Orders:  -     Ambulatory referral/consult to [facility]    6. Mixed Alzheimer's and vascular dementia  Assessment & Plan:  Stable  Currently on Aricept and Namenda  Continue current plan  Continue supportive care  Monitor    Orders:  -     [facility]    7. Anemia, unspecified type  Assessment & Plan:  Stable  H/H 7.7/23.7  Monitor      8. History of DVT of lower extremity  Assessment & Plan:  Continue Eliquis  Hold 3 days prior to surgery   Monitor      9. Nasal congestion  Comments:  flonase daily  Sent to pharmacy  Orders:  -     fluticasone propionate (FLONASE) 50 mcg/actuation nasal spray; 1 spray (50 mcg total) by Each Nostril route once daily.  Dispense: 18.2 mL; Refill: 0         Medication List on Discharge:     Medication List            Accurate as of December 12, 2023 11:25 AM. If you have any questions, ask your nurse or doctor.                START taking these medications       fluticasone propionate 50 mcg/actuation nasal spray  Commonly known as: FLONASE  1 spray (50 mcg total) by Each Nostril route once daily.            CONTINUE taking these medications      * albuterol 90 mcg/actuation inhaler  Commonly known as: PROVENTIL/VENTOLIN HFA  Inhale 2 puffs into the lungs every 4 (four) hours as needed for Wheezing.     * albuterol 1.25 mg/3 mL Nebu  Commonly known as: ACCUNEB  Take 3 mLs (1.25 mg total) by nebulization 2 (two) times a day. Rescue     blood glucose control, normal Soln  Commonly known as: TRUE METRIX LEVEL 2  1 drop by Misc.(Non-Drug; Combo Route) route once daily.     BREATHERITE SPACER-MASK,ADULT Spcr  Generic drug: inhalat.spacing dev,large mask  Use as directed for inhalation.     donepeziL 10 MG tablet  Commonly known as: ARICEPT  Take 1 tablet (10 mg total) by mouth every evening.     DULoxetine 30 MG capsule  Commonly known as: CYMBALTA  Take 1 capsule by mouth once daily     ELIQUIS 5 mg Tab  Generic drug: apixaban  Take 1 tablet by mouth once daily     fluconazole 100 MG tablet  Commonly known as: DIFLUCAN  Take 1 tablet (100 mg total) by mouth once daily. for 14 days     FLUoxetine 20 MG capsule  Take 1 capsule by mouth once daily     gabapentin 100 MG capsule  Commonly known as: NEURONTIN  Take 1 capsule (100 mg total) by mouth 3 (three) times daily.     HYDROcodone-acetaminophen  mg per tablet  Commonly known as: NORCO  Take 1 tablet by mouth.     lancets 32 gauge Misc  1 lancet by Misc.(Non-Drug; Combo Route) route once daily.     memantine 10 MG Tab  Commonly known as: NAMENDA  TAKE 1/2 TABLET BY MOUTH TWICE DAILY FOR 1 WEEK, THEN 1 TABLET BY MOUTH TWICE DAILY     mucus clearing device  Commonly known as: AEROBIKA OSCILLATING PEP SYSTM  by Misc.(Non-Drug; Combo Route) route 4 (four) times daily as needed.     MYRBETRIQ 25 mg Tb24 ER tablet  Generic drug: mirabegron  Take 1 tablet (25 mg total) by mouth once daily.     pantoprazole 40 MG  tablet  Commonly known as: PROTONIX  Take 1 tablet (40 mg total) by mouth once daily.     QUEtiapine 50 MG tablet  Commonly known as: SEROQUEL  Take 1 tablet by mouth twice daily     rosuvastatin 20 MG tablet  Commonly known as: CRESTOR  Take 1 tablet by mouth once daily     tamsulosin 0.4 mg Cap  Commonly known as: FLOMAX  Take 1 capsule (0.4 mg total) by mouth every evening.     TRUE METRIX LEVEL 1 Soln  Generic drug: blood glucose control, low  1 drop by Misc.(Non-Drug; Combo Route) route once daily.     ULTRA-LIGHT ROLLATOR Misc  Generic drug: walker  Use daily for ambulation           * This list has 2 medication(s) that are the same as other medications prescribed for you. Read the directions carefully, and ask your doctor or other care provider to review them with you.                  Medication Reconciliation:  Were medications changed on discharge? Yes  Were medications in the home? Yes  Is the patient taking the medications as directed? Yes  Does the patient understand the medications and changes? Yes  Does updated med list accurately reflects meds patient is currently taking? Yes    ENVIRONMENT OF CARE   Family and/or Caregiver present at visit?  Yes  Name of Caregiver: grandson  History provided by: patient    Advance Care Planning   Advanced Care Planning Status:  Patient has had an ACP conversation  Living Will: No  Power of : No  [place]: No    Does Caregiver have HCPoA: No  Changes today: None     Impression upon entering the home:  Physical Dwelling: single family home   Appearance of home environment: cleaniness: clean, walking pathways: clear, lighting: adequate, and home structure: sound structure  Functional Status: independent  Mobility: ambulatory with device  Nutritional access: available food but inadequate intake  Home Health: Yes,  Agency [facility] .    DME/Supplies: rolling walker and shower chair     Diagnostic tests reviewed/disposition: I have reviewed all completed as well  as pending diagnostic tests at the time of discharge.  Disease/illness education: Take all medication as prescribed. Activity as tolerated. Keep all upcoming appts.   Establishment or re-establishment of referral orders for community resources: No other necessary community resources.   Discussion with other health care providers: No discussion with other health care providers necessary.   Does patient have a PCP at [facility]? Yes   Repatriation plan with PCP? follow-up with PCP within 30d   Does patient have an ostomy (ileostomy, colostomy, suprapubic catheter, nephrostomy tube, tracheostomy, PEG tube, pleurex catheter, cholecystostomy, etc)? No  Were BPAs reviewed? Yes    Social History     Socioeconomic History    Marital status:    Tobacco Use    Smoking status: Every Day     Current packs/day: 1.00     Types: Cigarettes    Smokeless tobacco: Current     Types: Snuff    Tobacco comments:     Reduced use, approx. 1 cig/day   Substance and Sexual Activity    Alcohol use: Not Currently     Alcohol/week: 1.0 standard drink of alcohol     Types: 1 Shots of liquor per week    Drug use: Never    Sexual activity: Not Currently     Social Determinants of Health     Financial Resource Strain: Low Risk  (11/27/2023)    Overall Financial Resource Strain (CARDIA)     Difficulty of Paying Living Expenses: Not hard at all   Food Insecurity: No Food Insecurity (11/27/2023)    Hunger Vital Sign     Worried About Running Out of Food in the Last Year: Never true     Ran Out of Food in the Last Year: Never true   Transportation Needs: No Transportation Needs (11/27/2023)    PRAPARE - Transportation     Lack of Transportation (Medical): No     Lack of Transportation (Non-Medical): No   Physical Activity: Inactive (11/27/2023)    Exercise Vital Sign     Days of Exercise per Week: 0 days     Minutes of Exercise per Session: 0 min   Stress: No Stress Concern Present (11/27/2023)    [employer] -  Occupational Stress Questionnaire     Feeling of Stress : Only a little   Social Connections: Unknown (11/27/2023)    Social Connection and Isolation Panel [NHANES]     Frequency of Communication with Friends and Family: More than three times a week     Frequency of Social Gatherings with Friends and Family: Once a week     Active Member of Clubs or Organizations: No     Attends Club or Organization Meetings: Never     Marital Status:    Housing Stability: Low Risk  (11/27/2023)    Housing Stability Vital Sign     Unable to Pay for Housing in the Last Year: No     Number of Places Lived in the Last Year: 1     Unstable Housing in the Last Year: No       OBJECTIVE:     Vital Signs:  Vitals:    12/12/23 1003   BP: 107/75   Pulse: 86   Resp: 18       Review of Systems   Constitutional: Negative.    HENT:  Positive for congestion.    Eyes: Negative.    Respiratory:  Positive for shortness of breath. Negative for chest tightness.    Cardiovascular: Negative.  Negative for leg swelling.   Gastrointestinal: Negative.    Endocrine: Negative.    Genitourinary: Negative.    Musculoskeletal:  Positive for back pain and gait problem.   Skin: Negative.    Allergic/Immunologic: Negative.    Neurological:  Positive for weakness.   Hematological: Negative.    Psychiatric/Behavioral: Negative.  Negative for agitation.    All other systems reviewed and are negative.    Physical Exam:  Physical Exam  Vitals reviewed.   Constitutional:       General: He is not in acute distress.     Appearance: Normal appearance. He is well-developed.   HENT:      Head: Normocephalic and atraumatic.      Nose: Congestion present.      Mouth/Throat:      Mouth: Mucous membranes are dry.      Pharynx: Oropharynx is clear.   Eyes:      Pupils: Pupils are equal, round, and reactive to light.   Cardiovascular:      Rate and Rhythm: Normal rate and regular rhythm.      Pulses: Normal pulses.      Heart sounds: Normal heart sounds.   Pulmonary:       Effort: Pulmonary effort is normal.      Breath sounds: Normal breath sounds.   Abdominal:      General: Bowel sounds are normal.      Palpations: Abdomen is soft.      Tenderness: There is right CVA tenderness.   Musculoskeletal:         General: Normal range of motion.      Cervical back: Normal range of motion and neck supple.   Skin:     General: Skin is warm and dry.   Neurological:      General: No focal deficit present.      Mental Status: He is alert and oriented to person, place, and time. Mental status is at baseline.      Motor: Weakness present.      Gait: Gait abnormal.   Psychiatric:         Mood and Affect: Mood normal.         Behavior: Behavior normal.         Thought Content: Thought content normal.         Judgment: Judgment normal.       INSTRUCTIONS FOR PATIENT:   - Continue all medications, treatments and therapies as ordered.   - Follow all instructions, recommendations as discussed.  - Maintain Safety Precautions at all times.  - Attend all medical appointments as scheduled.  - For worsening symptoms: call Primary Care Physician or Nurse Practitioner.  - For emergencies, call 911 or immediately report to the nearest emergency room.   Scheduled Follow-up, Appts Reviewed with Modifications if Needed: Yes  Future Appointments   Date Time Provider Department Center   1/16/2024  2:00 PM Melinda Brady NP [facility] NEURO [facility]   1/17/2024  3:00 PM [facility]ER [facility], ON [facility]THM[facility] ON [facility]R PRO  Medical [facility]   2/6/2024  9:15 AM ASHISH MARTIN [facility]   2/6/2024  9:30 AM PULMONARY LAB 2, [facility] PULMFS [facility]   2/6/2024 10:00 AM Simon Mckeon MD [facility] PULMSVC [facility]   2/7/2024  4:00 PM Monty Aviles MD [facility] NEPH [facility]   6/12/2024 11:20 AM Wero Lewis MD [facility] CARDIO [facility]       Signature: Rafa Zuleta NP    Transition of Care Visit:  I have reviewed and updated the history and problem list.  I have reconciled the medication list.  I have discussed the  hospitalization and current medical issues, prognosis and plans with the patient/family.

## 2023-12-12 NOTE — ASSESSMENT & PLAN NOTE
CT with numerous small bilateral renal nonobstructing calculi measuring up to 7 mm along the right and 8 mm on the left. Left-sided ureteral stent with proximal pigtail in the left renal pelvis and distal pigtail within the bladder. Punctate ureteral stone along the mid left ureteral stent.  Distal left ureteral stone along adjacent to the stent measuring 4 mm. 22 mm left renal cyst.   Cysto with lithotripsy and stent placement 12/18 with Dr. Martinez  Card clearance yesterday  Continue current plan  Monitor

## 2023-12-12 NOTE — ASSESSMENT & PLAN NOTE
Stable  Currently on Aricept and Namenda  Continue current plan  Continue supportive care  Monitor

## 2023-12-15 ENCOUNTER — TELEPHONE (OUTPATIENT)
Dept: PREADMISSION TESTING | Facility: HOSPITAL | Age: 73
End: 2023-12-15
Payer: MEDICARE

## 2023-12-15 NOTE — TELEPHONE ENCOUNTER
CORRECTION OF ARRIVAL TIME:     Please arrive to [facility] ([facility] at 10:45 AM on 12/18/23 for your scheduled procedure.  Address: [address] [place] [address] [place] [id number] (2nd Building on left, 1st Floor Lobby)  >>>NO eating or drinking after midnight unless instructed otherwise by your Surgeon<<<    Thank you,  -[facility]  Mon-Fri 7:30 am - 4 pm [phone]

## 2023-12-15 NOTE — TELEPHONE ENCOUNTER
Called and spoke with patient's daughter / transportation about the following:     Please arrive to [facility] ([facility]) at 10:45 on 12/18/23 for your scheduled procedure.  Address: [address] [place] [address] [place]. [address] (2nd Building on left, 1st Floor Lobby)  >>>NO eating or drinking after midnight unless instructed otherwise by your Surgeon<<<    Thank you,  -[facility]  Mon-Fri 7:30 am - 4 pm [phone]

## 2023-12-18 ENCOUNTER — HOSPITAL ENCOUNTER (OUTPATIENT)
Facility: HOSPITAL | Age: 73
Discharge: HOME | End: 2023-12-18
Attending: UROLOGY | Admitting: UROLOGY
Payer: MEDICARE

## 2023-12-18 ENCOUNTER — ANESTHESIA (OUTPATIENT)
Dept: SURGERY | Facility: HOSPITAL | Age: 73
End: 2023-12-18
Payer: MEDICARE

## 2023-12-18 ENCOUNTER — ANESTHESIA EVENT (OUTPATIENT)
Dept: SURGERY | Facility: HOSPITAL | Age: 73
End: 2023-12-18
Payer: MEDICARE

## 2023-12-18 DIAGNOSIS — N20.0 MULTIPLE KIDNEY STONES: Primary | ICD-10-CM

## 2023-12-18 DIAGNOSIS — N20.1 LEFT URETERAL STONE: ICD-10-CM

## 2023-12-18 DIAGNOSIS — N20.0 KIDNEY STONE: ICD-10-CM

## 2023-12-18 LAB
POCT GLUCOSE: 126 MG/DL (ref 70–110)
POCT GLUCOSE: 137 MG/DL (ref 70–110)

## 2023-12-18 PROCEDURE — C1769 GUIDE WIRE: HCPCS | Mod: HCNC | Performed by: UROLOGY

## 2023-12-18 PROCEDURE — 52356 CYSTO/URETERO W/LITHOTRIPSY: CPT | Mod: LT,,, | Performed by: UROLOGY

## 2023-12-18 PROCEDURE — 36000706: Mod: HCNC | Performed by: UROLOGY

## 2023-12-18 PROCEDURE — 27201423 OPTIME MED/SURG SUP & DEVICES STERILE SUPPLY: Mod: HCNC | Performed by: UROLOGY

## 2023-12-18 PROCEDURE — 71000033 HC RECOVERY, INTIAL HOUR: Mod: HCNC | Performed by: UROLOGY

## 2023-12-18 PROCEDURE — C1747 OPTIME ENDOSCOPE, SINGLE, URINARY TRACT: HCPCS | Mod: HCNC | Performed by: UROLOGY

## 2023-12-18 PROCEDURE — 36000707: Mod: HCNC | Performed by: UROLOGY

## 2023-12-18 PROCEDURE — 71000015 HC POSTOP RECOV 1ST HR: Mod: HCNC | Performed by: UROLOGY

## 2023-12-18 PROCEDURE — C2617 STENT, NON-COR, TEM W/O DEL: HCPCS | Mod: HCNC | Performed by: UROLOGY

## 2023-12-18 PROCEDURE — C1894 INTRO/SHEATH, NON-LASER: HCPCS | Mod: HCNC | Performed by: UROLOGY

## 2023-12-18 PROCEDURE — 27200651 HC AIRWAY, LMA: Mod: HCNC | Performed by: ANESTHESIOLOGY

## 2023-12-18 PROCEDURE — 63600175 PHARM REV CODE 636 W HCPCS: Mod: HCNC | Performed by: UROLOGY

## 2023-12-18 PROCEDURE — 88300 SURGICAL PATH GROSS: CPT | Mod: 26,HCNC,, | Performed by: STUDENT IN AN ORGANIZED HEALTH CARE EDUCATION/TRAINING PROGRAM

## 2023-12-18 PROCEDURE — 37000008 HC ANESTHESIA 1ST 15 MINUTES: Mod: HCNC | Performed by: UROLOGY

## 2023-12-18 PROCEDURE — 82365 CALCULUS SPECTROSCOPY: CPT | Mod: HCNC | Performed by: UROLOGY

## 2023-12-18 PROCEDURE — 37000009 HC ANESTHESIA EA ADD 15 MINS: Mod: HCNC | Performed by: UROLOGY

## 2023-12-18 PROCEDURE — 52356 PR CYSTO/URETERO W/LITHOTRIPSY: ICD-10-PCS | Mod: LT,,, | Performed by: UROLOGY

## 2023-12-18 PROCEDURE — 63600175 PHARM REV CODE 636 W HCPCS: Mod: HCNC | Performed by: NURSE ANESTHETIST, CERTIFIED REGISTERED

## 2023-12-18 PROCEDURE — 25000003 PHARM REV CODE 250: Mod: HCNC | Performed by: NURSE ANESTHETIST, CERTIFIED REGISTERED

## 2023-12-18 PROCEDURE — 88300 SURGICAL PATH GROSS: CPT | Mod: HCNC | Performed by: STUDENT IN AN ORGANIZED HEALTH CARE EDUCATION/TRAINING PROGRAM

## 2023-12-18 PROCEDURE — 88300 PR  SURG PATH,GROSS,LEVEL I: ICD-10-PCS | Mod: 26,HCNC,, | Performed by: STUDENT IN AN ORGANIZED HEALTH CARE EDUCATION/TRAINING PROGRAM

## 2023-12-18 DEVICE — STENT URETERAL UNIV 6FR 22CM: Type: IMPLANTABLE DEVICE | Site: URETER | Status: FUNCTIONAL

## 2023-12-18 RX ORDER — NITROFURANTOIN 25; 75 MG/1; MG/1
100 CAPSULE ORAL 2 TIMES DAILY
Qty: 6 CAPSULE | Refills: 0 | Status: SHIPPED | OUTPATIENT
Start: 2023-12-18 | End: 2023-12-21

## 2023-12-18 RX ORDER — LIDOCAINE HYDROCHLORIDE 10 MG/ML
INJECTION, SOLUTION EPIDURAL; INFILTRATION; INTRACAUDAL; PERINEURAL
Status: DISCONTINUED | OUTPATIENT
Start: 2023-12-18 | End: 2023-12-18

## 2023-12-18 RX ORDER — FENTANYL CITRATE 50 UG/ML
INJECTION, SOLUTION INTRAMUSCULAR; INTRAVENOUS
Status: DISCONTINUED | OUTPATIENT
Start: 2023-12-18 | End: 2023-12-18

## 2023-12-18 RX ORDER — TAMSULOSIN HYDROCHLORIDE 0.4 MG/1
0.4 CAPSULE ORAL NIGHTLY
Qty: 30 CAPSULE | Refills: 3 | Status: SHIPPED | OUTPATIENT
Start: 2023-12-18 | End: 2024-02-19 | Stop reason: SDUPTHER

## 2023-12-18 RX ORDER — HYDROCODONE BITARTRATE AND ACETAMINOPHEN 10; 325 MG/1; MG/1
1 TABLET ORAL EVERY 4 HOURS PRN
Qty: 20 TABLET | Refills: 0 | Status: SHIPPED | OUTPATIENT
Start: 2023-12-18

## 2023-12-18 RX ORDER — SODIUM CHLORIDE, SODIUM LACTATE, POTASSIUM CHLORIDE, CALCIUM CHLORIDE 600; 310; 30; 20 MG/100ML; MG/100ML; MG/100ML; MG/100ML
INJECTION, SOLUTION INTRAVENOUS CONTINUOUS PRN
Status: DISCONTINUED | OUTPATIENT
Start: 2023-12-18 | End: 2023-12-18

## 2023-12-18 RX ORDER — KETOROLAC TROMETHAMINE 30 MG/ML
15 INJECTION, SOLUTION INTRAMUSCULAR; INTRAVENOUS EVERY 8 HOURS PRN
Status: DISCONTINUED | OUTPATIENT
Start: 2023-12-18 | End: 2023-12-18 | Stop reason: HOSPADM

## 2023-12-18 RX ORDER — OXYCODONE AND ACETAMINOPHEN 5; 325 MG/1; MG/1
1 TABLET ORAL
Status: DISCONTINUED | OUTPATIENT
Start: 2023-12-18 | End: 2023-12-18 | Stop reason: HOSPADM

## 2023-12-18 RX ORDER — CEFAZOLIN SODIUM 2 G/50ML
2 SOLUTION INTRAVENOUS
Status: COMPLETED | OUTPATIENT
Start: 2023-12-18 | End: 2023-12-18

## 2023-12-18 RX ORDER — HYDROMORPHONE HYDROCHLORIDE 2 MG/ML
0.2 INJECTION, SOLUTION INTRAMUSCULAR; INTRAVENOUS; SUBCUTANEOUS EVERY 5 MIN PRN
Status: DISCONTINUED | OUTPATIENT
Start: 2023-12-18 | End: 2023-12-18 | Stop reason: HOSPADM

## 2023-12-18 RX ORDER — PHENYLEPHRINE HYDROCHLORIDE 10 MG/ML
INJECTION INTRAVENOUS
Status: DISCONTINUED | OUTPATIENT
Start: 2023-12-18 | End: 2023-12-18

## 2023-12-18 RX ORDER — ONDANSETRON 2 MG/ML
4 INJECTION INTRAMUSCULAR; INTRAVENOUS DAILY PRN
Status: DISCONTINUED | OUTPATIENT
Start: 2023-12-18 | End: 2023-12-18 | Stop reason: HOSPADM

## 2023-12-18 RX ORDER — PROPOFOL 10 MG/ML
VIAL (ML) INTRAVENOUS
Status: DISCONTINUED | OUTPATIENT
Start: 2023-12-18 | End: 2023-12-18

## 2023-12-18 RX ORDER — ONDANSETRON 2 MG/ML
INJECTION INTRAMUSCULAR; INTRAVENOUS
Status: DISCONTINUED | OUTPATIENT
Start: 2023-12-18 | End: 2023-12-18

## 2023-12-18 RX ADMIN — CEFAZOLIN SODIUM 2 G: 2 SOLUTION INTRAVENOUS at 12:12

## 2023-12-18 RX ADMIN — ONDANSETRON 4 MG: 2 INJECTION INTRAMUSCULAR; INTRAVENOUS at 12:12

## 2023-12-18 RX ADMIN — PHENYLEPHRINE HYDROCHLORIDE 100 MCG: 10 INJECTION INTRAVENOUS at 12:12

## 2023-12-18 RX ADMIN — SODIUM CHLORIDE, SODIUM LACTATE, POTASSIUM CHLORIDE, AND CALCIUM CHLORIDE: 600; 310; 30; 20 INJECTION, SOLUTION INTRAVENOUS at 01:12

## 2023-12-18 RX ADMIN — SODIUM CHLORIDE, SODIUM LACTATE, POTASSIUM CHLORIDE, AND CALCIUM CHLORIDE: 600; 310; 30; 20 INJECTION, SOLUTION INTRAVENOUS at 12:12

## 2023-12-18 RX ADMIN — PROPOFOL 70 MG: 10 INJECTION, EMULSION INTRAVENOUS at 12:12

## 2023-12-18 RX ADMIN — LIDOCAINE HYDROCHLORIDE 50 MG: 10 SOLUTION INTRAVENOUS at 12:12

## 2023-12-18 RX ADMIN — FENTANYL CITRATE 100 MCG: 50 INJECTION, SOLUTION INTRAMUSCULAR; INTRAVENOUS at 12:12

## 2023-12-18 NOTE — OP NOTE
[facility]  Operative Note    Date: 12/18/2023    Pre-Op Diagnosis: left ureteral stones    Post-Op Diagnosis: same    Procedure(s) Performed:   1.  Left ureteroscopy, laser lithotripsy, basket stone extraction  2.  Cystoscopy  3.  Placement of a six Irish by 22 cm JJ ureteral stent with strings attached  4.  Fluoro < 1 h    Specimen(s):  Stone for analysis    Surgeon: Jamie Martinez MD    Anesthesia: General LMA anesthesia    Indications: Samy Alejandre Jr. is a 73 y.o. male with a left ureteral stone, presenting for definitive stone management.  He currently does have a JJ ureteral stent in place.      Findings:  Accommodating left ureter, stone noted, fragmented and extracted, ureteral access sheath placed, large mid pole stone dusted and all large fragments removed, stent left with strings attached    Estimated Blood Loss: min    Drains: 6 Fr x 22 cm JJ ureteral stent with strings    Procedure in detail:  After informed consent was obtained, the patient was brought the the cystoscopy suite and placed in the supine position.  SCDs were applied and working.  Anesthesia was administered.  The patient was then placed in the dorsal lithotomy position and prepped and draped in the usual sterile fashion.      A rigid cystoscope in a 22 Fr sheath was introduced into the patient's urethra.  This passed easily.  The entire urethra was visualized which showed no strictures or masses.  Formal cystoscopy was performed which revealed no masses or lesions suspicious for malignancy, no bladder stones, no bladder diverticuli, no trabeculations.  The ureteral orifices were visualized in the normal anatomic position bilaterally and efflux was visualized.      A motion wire was passed up the left ureteral orifice alongside the indwelling stent and up into the kidney.  This passed easily and placement was confirmed using fluoro.  The cystoscope was removed keeping the guidewire in place and the wire was  secured to the drape.  The cystoscope was reinserted and the stent was removed.    An 8 Fr rigid ureteroscope was passed into the patient's bladder alongside the wire under direct vision.  It was then passed through the left ureteral orifice alongside the wire and was advanced as proximally as the patient's anatomy would allow, which was to proximal ureter.  A stone was noted, a 272 micron laser fiber was inserted and the stone was fragmented.  A ZeroTip Nitinol basket was inserted and all stone fragments were extracted and placed into the bladder.  A second motion wire was inserted through the scope and was advanced to the renal pelvis under fluoro. A 10/12 Fr 45cm ureteral access sheath was inserted under fluoro without difficulty. A eDealya disposable ureteroscope was inserted and complete pyeloscopy was performed. 2 stones were noted in the midpole.  The laser fiber was reinserted and the stone dusted. Stone fragments were extracted until no fragments were remaining larger then the tip of the nitinol basket. The ureteroscope and ureteral access sheath were removed under direct vision, there was no evidence of ureteral injury or perforation. The bladder was drained with cystoscope removed.     Over the indwelling wire, a 6 Fr x 22 cm JJ ureteral stent with strings was passed over the wire and up into the renal pelvis using fluoro.  When the coil appeared to be in good position in the kidney and the radio-opaque marker of the pusher was at the inferior pubis, the wire was removed under continuous fluoro.  Good coils were seen in the kidney and the bladder using fluoro.      The patient tolerated the procedure well and was transferred to the recovery room in stable condition.      Disposition:  The patient will follow up with me in 6 weeks with a renal US    Jamie Martinez MD

## 2023-12-18 NOTE — DISCHARGE INSTRUCTIONS
Patient has a stent in place with strings attached, no soaking in tubs until stent is removed. Stent is left hanging from the urethra. Patient can remove stent 12/20 by slowly pulling the string. Pain control with tylenol, motrin, and PRN narcotics. Patient should also complete the antibiotics provided. Hematuria and flank pain are normal with stent in place. Patient can resume prior diet and activity level immediately, no other restrictions.

## 2023-12-18 NOTE — PLAN OF CARE
Gave home care instructions to patient, wife and daughter; verbalized understanding.  All questions answered to the satisfaction of all.

## 2023-12-18 NOTE — TRANSFER OF CARE
"Anesthesia Transfer of Care Note    Patient: Samy Alejandre JrMichaela    Procedure(s) Performed: Procedure(s) (LRB):  CYSTOURETEROSCOPY, WITH HOLMIUM LASER LITHOTRIPSY OF URETERAL CALCULUS AND STENT INSERTION (Left)    Patient location: PACU    Anesthesia Type: general    Transport from OR: Transported from OR on room air with adequate spontaneous ventilation    Post pain: adequate analgesia    Post assessment: no apparent anesthetic complications and tolerated procedure well    Post vital signs: stable    Level of consciousness: responds to stimulation    Nausea/Vomiting: no nausea/vomiting    Complications: none    Transfer of care protocol was followed      Last vitals: Visit Vitals  /71 (BP Location: Right arm, Patient Position: Sitting)   Pulse 89   Temp 36.9 °C (98.4 °F) (Temporal)   Resp 18   Ht 5' 7" (1.702 m)   Wt 57.2 kg (126 lb)   SpO2 (!) 18%   BMI 19.73 kg/m²     "

## 2023-12-18 NOTE — DISCHARGE SUMMARY
[facility] (Hospital)  Discharge Note  Short Stay    Procedure(s) (LRB):  CYSTOURETEROSCOPY, WITH HOLMIUM LASER LITHOTRIPSY OF URETERAL CALCULUS AND STENT INSERTION (Left)      OUTCOME: Patient tolerated treatment/procedure well without complication and is now ready for discharge.    DISPOSITION: Home or Self Care    FINAL DIAGNOSIS:  Multiple kidney stones    FOLLOWUP: In clinic    DISCHARGE INSTRUCTIONS:    Discharge Procedure Orders   Diet Adult Regular     Notify your health care provider if you experience any of the following:  temperature >100.4     Notify your health care provider if you experience any of the following:  persistent nausea and vomiting or diarrhea     Notify your health care provider if you experience any of the following:  severe uncontrolled pain     Notify your health care provider if you experience any of the following:  difficulty breathing or increased cough     Notify your health care provider if you experience any of the following:  severe persistent headache     Notify your health care provider if you experience any of the following:  worsening rash     Notify your health care provider if you experience any of the following:  persistent dizziness, light-headedness, or visual disturbances     Notify your health care provider if you experience any of the following:  increased confusion or weakness     Activity as tolerated

## 2023-12-18 NOTE — ANESTHESIA PREPROCEDURE EVALUATION
12/18/2023  Samy Alejandre Jr. is a 73 y.o., male.    Patient Active Problem List   Diagnosis    History of CVA with residual deficit    Insomnia    Hemiparesis affecting dominant side as late effect of cerebrovascular accident (CVA)    Controlled type 2 diabetes mellitus with stage 3 chronic kidney disease, without long-term current use of insulin    Mixed hyperlipidemia    Acute renal failure superimposed on stage 3 chronic kidney disease    Pulmonary cavitary lesion    Weight loss    History of DVT of lower extremity    Anemia    Dysphagia    Esophageal stricture    Mixed Alzheimer's and vascular dementia    Memory difficulties    At risk for prolonged QT interval syndrome    Abnormal chest CT    Mucoid impaction of bronchi    Cylindrical bronchiectasis    Bradycardia    Aspiration pneumonia    COPD (chronic obstructive pulmonary disease)    Complete heart block    Multiple kidney stones    Acute cystitis with hematuria    Electrolyte abnormality    Preop cardiovascular exam    Cardiac pacemaker in situ     Past Surgical History:   Procedure Laterality Date    A-V CARDIAC PACEMAKER INSERTION Left 10/4/2023    Procedure: INSERTION, CARDIAC PACEMAKER, DUAL CHAMBER;  Surgeon: Delonte Lindsey MD;  Location: [facility] CATH LAB;  Service: Cardiology;  Laterality: Left;  biotronik    ANGIOGRAPHY OF LOWER EXTREMITY N/A 12/21/2020    Procedure: ANGIOGRAM, LOWER EXTREMITY/Rt leg poss pta/stent;  Surgeon: Todd Michel MD;  Location: [facility] CATH LAB;  Service: Peripheral Vascular;  Laterality: N/A;  rescheduled 12/8    BACK SURGERY  2015    BRONCHOSCOPY Left 3/24/2022    Procedure: Bronchoscopy;  Surgeon: Edward Williamson MD;  Location: [facility] ENDO;  Service: Pulmonary;  Laterality: Left;  poss endobronchial biopsy or brushing    CATARACT EXTRACTION      CYSTOSCOPY WITH URETEROSCOPY, RETROGRADE PYELOGRAPHY, AND INSERTION  OF STENT Left 10/8/2023    Procedure: CYSTOSCOPY, WITH RETROGRADE PYELOGRAM AND URETERAL STENT INSERTION;  Surgeon: Jamie Martinez MD;  Location: [facility] OR;  Service: Urology;  Laterality: Left;    ESOPHAGOGASTRODUODENOSCOPY N/A 8/24/2022    Procedure: EGD (ESOPHAGOGASTRODUODENOSCOPY);  Surgeon: Ana Gomez MD;  Location: [facility] ENDO;  Service: Endoscopy;  Laterality: N/A;    ESOPHAGOGASTRODUODENOSCOPY N/A 9/14/2022    Procedure: EGD (ESOPHAGOGASTRODUODENOSCOPY);  Surgeon: Ana Gomez MD;  Location: [facility] ENDO;  Service: Endoscopy;  Laterality: N/A;    KNEE SURGERY  2012    LITHOTRIPSY  2000       Pre-op Assessment    I have reviewed the Patient Summary Reports.    I have reviewed the NPO Status.   I have reviewed the Medications.     Review of Systems  Anesthesia Hx:  No problems with previous Anesthesia                Social:  Smoker       Hematology/Oncology:       -- Anemia:                                  Cardiovascular:    Pacemaker Hypertension              ECG has been reviewed. History of DVT    Seen and evaluated by cards 12/11/23:  Elevated periop risk of CV events for non-high risk procedure.  Good functional and exercise capacity.  No chest pain, active arrhythmia and CHF symptoms.  Ok to proceed the scheduled surgery without further cardiac study.  OK to hold eliquis 3 days before the procedure and resume ASAP postop.                           Pulmonary:   COPD                     Renal/:   renal calculi               Hepatic/GI:  Hepatic/GI Normal                 Neurological:   CVA             Dementia                         Endocrine:  Diabetes               Physical Exam  General: Well nourished    Airway:  Mallampati: II   Mouth Opening: Normal  TM Distance: Normal  Neck ROM: Normal ROM    Dental:  Intact        Anesthesia Plan  Type of Anesthesia, risks & benefits discussed:    Anesthesia Type: Gen ETT, Gen Supraglottic Airway  Intra-op Monitoring Plan: Standard ASA  Monitors  Post Op Pain Control Plan: multimodal analgesia  Induction:  IV  Airway Plan: , Post-Induction  Informed Consent: Informed consent signed with the Patient and all parties understand the risks and agree with anesthesia plan.  All questions answered.   ASA Score: 3    Ready For Surgery From Anesthesia Perspective.     .      Chemistry        Component Value Date/Time     11/29/2023 0523    K 3.1 (L) 11/29/2023 0523     11/29/2023 0523    CO2 30 (H) 11/29/2023 0523    BUN 14 11/29/2023 0523    CREATININE 1.3 11/29/2023 0523     11/29/2023 0523        Component Value Date/Time    CALCIUM 8.3 (L) 11/29/2023 0523    ALKPHOS 170 (H) 11/25/2023 1924    AST 24 11/25/2023 1924    ALT 18 11/25/2023 1924    BILITOT 0.4 11/25/2023 1924    ESTGFRAFRICA >60 07/31/2022 2037    EGFRNONAA >60 07/31/2022 2037        Lab Results   Component Value Date    WBC 7.46 11/29/2023    HGB 7.7 (L) 11/29/2023    HCT 23.7 (L) 11/29/2023    MCV 89 11/29/2023     11/29/2023       Normal sinus rhythm   PRV pacing   When compared with ECG of 03-OCT-2023 08:28,   Paced rhythm has replaced AV block.   Confirmed by Viet Sue MD ([id number]) on 11/27/2023 9:07:07 PM     Echo 10/3/23:    Left Ventricle: The left ventricle is normal in size. Normal wall thickness. Normal wall motion. There is normal systolic function with a visually estimated ejection fraction of 55 - 70%. There is normal diastolic function.    Left Atrium: Left atrium is mildly dilated.    Right Ventricle: Normal right ventricular cavity size. Wall thickness is normal. Right ventricle wall motion  is normal. Systolic function is normal.    Tricuspid Valve: There is mild regurgitation.    IVC/SVC: Intermediate venous pressure at 8 mmHg.

## 2023-12-18 NOTE — ANESTHESIA PROCEDURE NOTES
Intubation    Date/Time: 12/18/2023 12:16 PM    Performed by: Tj Sparrow CRNA  Authorized by: Cesar Erickson II, MD    Intubation:     Induction:  Intravenous    Intubated:  Postinduction    Mask Ventilation:  Not attempted    Attempts:  1    Attempted By:  CRNA    Difficult Airway Encountered?: No      Complications:  None    Airway Device:  Supraglottic airway/LMA    Airway Device Size:  4.0    Style/Cuff Inflation:  Cuffed (inflated to minimal occlusive pressure)    Secured at:  The lips    Placement Verified By:  Capnometry    Complicating Factors:  None    Findings Post-Intubation:  BS equal bilateral and atraumatic/condition of teeth unchanged

## 2023-12-18 NOTE — ANESTHESIA POSTPROCEDURE EVALUATION
Anesthesia Post Evaluation    Patient: Samy Alejandre JrMichaela    Procedure(s) Performed: Procedure(s) (LRB):  CYSTOURETEROSCOPY, WITH HOLMIUM LASER LITHOTRIPSY OF URETERAL CALCULUS AND STENT INSERTION (Left)    Final Anesthesia Type: general      Patient location during evaluation: PACU  Patient participation: Yes- Able to Participate  Level of consciousness: awake and alert  Post-procedure vital signs: reviewed and stable  Pain management: adequate  Airway patency: patent  ERWIN mitigation strategies: Extubation while patient is awake  PONV status at discharge: No PONV  Anesthetic complications: no      Cardiovascular status: hemodynamically stable  Respiratory status: spontaneous ventilation  Hydration status: euvolemic  Follow-up not needed.              Vitals Value Taken Time   /80 12/18/23 1415   Temp 36.3 °C (97.4 °F) 12/18/23 1415   Pulse 82 12/18/23 1420   Resp 30 12/18/23 1419   SpO2 97 % 12/18/23 1420   Vitals shown include unvalidated device data.      Event Time   Out of Recovery 14:19:22         Pain/Dwayne Score: Dwayne Score: 9 (12/18/2023  2:15 PM)

## 2023-12-19 VITALS
DIASTOLIC BLOOD PRESSURE: 80 MMHG | WEIGHT: 126 LBS | RESPIRATION RATE: 17 BRPM | SYSTOLIC BLOOD PRESSURE: 137 MMHG | HEART RATE: 67 BPM | OXYGEN SATURATION: 98 % | TEMPERATURE: 97 F | HEIGHT: 67 IN | BODY MASS INDEX: 19.78 KG/M2

## 2023-12-20 ENCOUNTER — PATIENT MESSAGE (OUTPATIENT)
Dept: UROLOGY | Facility: CLINIC | Age: 73
End: 2023-12-20
Payer: MEDICARE

## 2023-12-20 ENCOUNTER — TELEPHONE (OUTPATIENT)
Dept: UROLOGY | Facility: CLINIC | Age: 73
End: 2023-12-20
Payer: MEDICARE

## 2023-12-20 DIAGNOSIS — N20.0 KIDNEY STONE: ICD-10-CM

## 2023-12-20 DIAGNOSIS — N20.1 LEFT URETERAL STONE: Primary | ICD-10-CM

## 2023-12-20 NOTE — TELEPHONE ENCOUNTER
Tried contacting patient reference to 6 week post-op appointment. There was no answer, left VM for patient, will also send my-chart message. Patient scheduled for 6 week post-op appointment on 02/06/2024 at 3:45PM with renal ultrasound being scheduled a week prior on 01/31/2024.

## 2023-12-20 NOTE — TELEPHONE ENCOUNTER
----- Message from Jamie Martinez MD sent at 12/18/2023  1:27 PM CST -----  F/u 6 weeks with renal us, thanks

## 2023-12-21 ENCOUNTER — TELEPHONE (OUTPATIENT)
Dept: UROLOGY | Facility: CLINIC | Age: 73
End: 2023-12-21
Payer: MEDICARE

## 2023-12-21 NOTE — TELEPHONE ENCOUNTER
Message sent to DR vázquez       ----- Message from Noa Reeves sent at 12/19/2023  3:16 PM CST -----  Contact: Anupama/[facility]  Anupama is calling in rgd to needing an order for the pt to remove the stint that was placed on on Monday. Put order in [facility] for home health to remove the stint or fax order [phone] yasmin/mpd

## 2023-12-27 DIAGNOSIS — I69.30 HISTORY OF CVA WITH RESIDUAL DEFICIT: Primary | ICD-10-CM

## 2023-12-27 RX ORDER — CLOPIDOGREL BISULFATE 75 MG/1
75 TABLET ORAL DAILY
Qty: 30 TABLET | Refills: 11 | Status: SHIPPED | OUTPATIENT
Start: 2023-12-27 | End: 2024-12-26

## 2023-12-28 ENCOUNTER — EXTERNAL HOME HEALTH (OUTPATIENT)
Dept: HOME HEALTH SERVICES | Facility: HOSPITAL | Age: 73
End: 2023-12-28
Payer: MEDICARE

## 2023-12-28 LAB
COMPN STONE: NORMAL
LABORATORY COMMENT REPORT: NORMAL
SPECIMEN SOURCE: NORMAL
STONE ANALYSIS IR-IMP: NORMAL

## 2024-01-01 PROBLEM — J69.0 ASPIRATION PNEUMONIA: Status: RESOLVED | Noted: 2023-10-02 | Resolved: 2024-01-01

## 2024-01-05 ENCOUNTER — CLINICAL SUPPORT (OUTPATIENT)
Dept: CARDIOLOGY | Facility: HOSPITAL | Age: 74
End: 2024-01-05
Attending: INTERNAL MEDICINE
Payer: MEDICARE

## 2024-01-05 ENCOUNTER — CLINICAL SUPPORT (OUTPATIENT)
Dept: CARDIOLOGY | Facility: HOSPITAL | Age: 74
End: 2024-01-05

## 2024-01-05 DIAGNOSIS — Z95.0 PRESENCE OF CARDIAC PACEMAKER: ICD-10-CM

## 2024-01-05 PROCEDURE — 93296 REM INTERROG EVL PM/IDS: CPT | Mod: HCNC | Performed by: INTERNAL MEDICINE

## 2024-01-05 PROCEDURE — 93294 REM INTERROG EVL PM/LDLS PM: CPT | Mod: HCNC,S$GLB,, | Performed by: INTERNAL MEDICINE

## 2024-01-09 LAB
FINAL PATHOLOGIC DIAGNOSIS: NORMAL
GROSS: NORMAL
Lab: NORMAL

## 2024-01-16 ENCOUNTER — OFFICE VISIT (OUTPATIENT)
Dept: NEUROLOGY | Facility: CLINIC | Age: 74
End: 2024-01-16
Payer: MEDICARE

## 2024-01-16 VITALS — HEIGHT: 67 IN | BODY MASS INDEX: 19.73 KG/M2

## 2024-01-16 DIAGNOSIS — E78.2 MIXED HYPERLIPIDEMIA: ICD-10-CM

## 2024-01-16 DIAGNOSIS — J47.9 CYLINDRICAL BRONCHIECTASIS: ICD-10-CM

## 2024-01-16 DIAGNOSIS — Z91.89 AT RISK FOR PROLONGED QT INTERVAL SYNDROME: ICD-10-CM

## 2024-01-16 DIAGNOSIS — E11.22 CONTROLLED TYPE 2 DIABETES MELLITUS WITH STAGE 3 CHRONIC KIDNEY DISEASE, WITHOUT LONG-TERM CURRENT USE OF INSULIN: ICD-10-CM

## 2024-01-16 DIAGNOSIS — I69.30 HISTORY OF CVA WITH RESIDUAL DEFICIT: ICD-10-CM

## 2024-01-16 DIAGNOSIS — G47.00 INSOMNIA, UNSPECIFIED TYPE: ICD-10-CM

## 2024-01-16 DIAGNOSIS — R00.1 BRADYCARDIA: ICD-10-CM

## 2024-01-16 DIAGNOSIS — N18.30 CONTROLLED TYPE 2 DIABETES MELLITUS WITH STAGE 3 CHRONIC KIDNEY DISEASE, WITHOUT LONG-TERM CURRENT USE OF INSULIN: ICD-10-CM

## 2024-01-16 DIAGNOSIS — G30.9 MIXED ALZHEIMER'S AND VASCULAR DEMENTIA: Primary | ICD-10-CM

## 2024-01-16 DIAGNOSIS — F01.50 MIXED ALZHEIMER'S AND VASCULAR DEMENTIA: Primary | ICD-10-CM

## 2024-01-16 DIAGNOSIS — R41.3 MEMORY DIFFICULTIES: ICD-10-CM

## 2024-01-16 DIAGNOSIS — Z95.0 CARDIAC PACEMAKER IN SITU: ICD-10-CM

## 2024-01-16 DIAGNOSIS — R13.10 DYSPHAGIA, UNSPECIFIED TYPE: ICD-10-CM

## 2024-01-16 DIAGNOSIS — Z86.718 HISTORY OF DVT OF LOWER EXTREMITY: ICD-10-CM

## 2024-01-16 DIAGNOSIS — J44.9 CHRONIC OBSTRUCTIVE PULMONARY DISEASE, UNSPECIFIED COPD TYPE: ICD-10-CM

## 2024-01-16 DIAGNOSIS — N20.0 MULTIPLE KIDNEY STONES: ICD-10-CM

## 2024-01-16 DIAGNOSIS — J98.4 PULMONARY CAVITARY LESION: ICD-10-CM

## 2024-01-16 DIAGNOSIS — Z01.810 PREOP CARDIOVASCULAR EXAM: ICD-10-CM

## 2024-01-16 DIAGNOSIS — R63.4 WEIGHT LOSS: ICD-10-CM

## 2024-01-16 DIAGNOSIS — I69.359 HEMIPARESIS AFFECTING DOMINANT SIDE AS LATE EFFECT OF CEREBROVASCULAR ACCIDENT (CVA): ICD-10-CM

## 2024-01-16 DIAGNOSIS — F02.80 MIXED ALZHEIMER'S AND VASCULAR DEMENTIA: Primary | ICD-10-CM

## 2024-01-16 PROCEDURE — 1159F MED LIST DOCD IN RCRD: CPT | Mod: HCNC,CPTII,95, | Performed by: NURSE PRACTITIONER

## 2024-01-16 PROCEDURE — 99215 OFFICE O/P EST HI 40 MIN: CPT | Mod: HCNC,95,, | Performed by: NURSE PRACTITIONER

## 2024-01-16 PROCEDURE — 1101F PT FALLS ASSESS-DOCD LE1/YR: CPT | Mod: HCNC,CPTII,95, | Performed by: NURSE PRACTITIONER

## 2024-01-16 PROCEDURE — 1160F RVW MEDS BY RX/DR IN RCRD: CPT | Mod: HCNC,CPTII,95, | Performed by: NURSE PRACTITIONER

## 2024-01-16 PROCEDURE — 3008F BODY MASS INDEX DOCD: CPT | Mod: HCNC,CPTII,95, | Performed by: NURSE PRACTITIONER

## 2024-01-16 PROCEDURE — 1126F AMNT PAIN NOTED NONE PRSNT: CPT | Mod: HCNC,CPTII,95, | Performed by: NURSE PRACTITIONER

## 2024-01-16 PROCEDURE — 3288F FALL RISK ASSESSMENT DOCD: CPT | Mod: HCNC,CPTII,95, | Performed by: NURSE PRACTITIONER

## 2024-01-16 NOTE — PROGRESS NOTES
Subjective:       Patient ID: Samy Alejandre Jr. is a 73 y.o. male.    Chief Complaint: Mixed Alzheimer's and vascular dementi          HPI    The patient is here for memory loss.       The patient is presenting with 3-year history of noticeable memory loss (2019). The patient is accompanied by his daughter (Jayshree). The main problems the patient has are related to progressive short term memory loss. He is unable to operate a cell phone and a remote anymore. The patient is not driving. The patient lives with his daughter right now. No confusion around and inside the house. Does have significant trouble remembering the date and time, keeping up with medications and appointments and keeping up with major holidays and political changes. The patient is dependent in handling finances. The patient is becoming more dependent with ADLs. In  the patient developed sudden change in behavior that was very bizarre and lasted for few weeks. Now, he is back to baseline with no agitation or aggression,  hallucinations or delusions.. No language problems. The patient suffered 3 strokes (last one in ). He had 3 seizures so far (childhood, adulthood and middle age).  No history of  severe headaches. No history of hypothyroidism. No history of alcoholism (young onset or old onset). No history of B12 deficiency.Positive history of depression and insomnia. Did not tolerate Trazodone and is developing rapid tolerance to Seroquel. He has lost significant weight provoked by esophageal stricture which has improved . No history of bipolar disorder. No history of Untreated Syphilis.  No history of HIV infection. No toxic exposures.  No history of traumatic brain injury. No tremors or abnormal movements. His gait has declined with high risk of falls. No urinary incontinence. No change in sleep and appetite. Mother had dementia. Today (12-) MOCA 13/30. 01- Brain MRI Unremarkable. 01- EEG NL. AEEG pending for  02-. Patient daughter denies recent seizure    07-: Patient tolerating Aricept 10 mg po HS and tolerating Namenda 10 mg BID. No adverse side effects. Daughter reports no significant cognitive or behavioral changes noted. Patient tolerating Seroquel 100 mg po HS work well no adverse side effects. AEEG pending no seizures noted.     INTERVAL HISTORY 01-: Patient present virtually. Accompanied by grandson. Patient doing well. He is no longer on Aricept 10 mg po HS stopped due to bradycardia.  Stopped prior to Pacemaker placement on 10-. Patient has had multiple Cystourethroscopy on 10- and repeat procedure with Cystourethroscopy with Lithotripsy 12- recovering well.   Tolerating Namenda 10 mg BID. No adverse side effects. Daughter reports no significant cognitive or behavioral changes noted .     Patient tolerating Seroquel 100 mg po HS work well no adverse side effects.      The originating site (patient location) is: Home.        The distant site (neurologist location) is: [facility] at [facility].        The chief complaint leading to consultation is: F/U Memory deficit       Visit type: Virtual visit with synchronous audio and video.        Total time spent with patient: 30 minutes         Special circumstances: This visit occurred during COVID-19 Pandemic Public Health Emergency.         Consent: The patient verbally consented to participating in the video visit and informed that may decline to receive medical services by telemedicine and may withdraw from such care at any time.        I discussed with the patient the nature of our telemedicine visits, that:        I  would evaluate the patient and recommend diagnostics and treatments based on my assessment.     Our sessions are not being recorded and that personal health information is protected.     Our team would provide follow up care in person if/when the patient needs it.     Virtual  (video/telemedicine) visits have significant limitations. A telemedicine exam is primarily focused on the history and what I can observe. Several critical parts of the neurological exam cannot be performed.       Review of Systems   Constitutional:  Positive for activity change, fatigue and unexpected weight change. Negative for appetite change.   HENT:  Negative for hearing loss and tinnitus.    Eyes:  Negative for photophobia and visual disturbance.   Respiratory:  Negative for apnea and shortness of breath.    Cardiovascular:  Negative for chest pain and palpitations.   Gastrointestinal:  Negative for nausea and vomiting.   Endocrine: Negative for cold intolerance and heat intolerance.   Genitourinary:  Negative for difficulty urinating and urgency.   Musculoskeletal:  Positive for back pain and gait problem. Negative for arthralgias, joint swelling, myalgias, neck pain and neck stiffness.   Skin:  Negative for color change and rash.   Allergic/Immunologic: Negative for environmental allergies and immunocompromised state.   Neurological:  Positive for numbness. Negative for dizziness, tremors, seizures, syncope, facial asymmetry, speech difficulty, weakness, light-headedness and headaches.   Hematological:  Negative for adenopathy. Does not bruise/bleed easily.   Psychiatric/Behavioral:  Positive for behavioral problems, confusion, decreased concentration and dysphoric mood. Negative for agitation, hallucinations, self-injury, sleep disturbance and suicidal ideas. The patient is nervous/anxious. The patient is not hyperactive.                  Current Outpatient Medications:     albuterol (ACCUNEB) 1.25 mg/3 mL Nebu, Take 3 mLs (1.25 mg total) by nebulization 2 (two) times a day. Rescue, Disp: 180 mL, Rfl: 11    albuterol (PROVENTIL/VENTOLIN HFA) 90 mcg/actuation inhaler, Inhale 2 puffs into the lungs every 4 (four) hours as needed for Wheezing., Disp: 18 g, Rfl: 2    blood glucose control, low (TRUE METRIX LEVEL  1) Soln, 1 drop by Misc.(Non-Drug; Combo Route) route once daily., Disp: 1 each, Rfl: 4    blood glucose control, normal (TRUE METRIX LEVEL 2) Soln, 1 drop by Misc.(Non-Drug; Combo Route) route once daily., Disp: 1 each, Rfl: 4    clopidogreL (PLAVIX) 75 mg tablet, Take 1 tablet (75 mg total) by mouth once daily., Disp: 30 tablet, Rfl: 11    ELIQUIS 5 mg Tab, Take 1 tablet by mouth once daily, Disp: 90 tablet, Rfl: 3    FLUoxetine 20 MG capsule, Take 1 capsule by mouth once daily, Disp: 90 capsule, Rfl: 3    fluticasone propionate (FLONASE) 50 mcg/actuation nasal spray, 1 spray (50 mcg total) by Each Nostril route once daily., Disp: 18.2 mL, Rfl: 0    HYDROcodone-acetaminophen (NORCO)  mg per tablet, Take 1 tablet by mouth every 4 (four) hours as needed for Pain., Disp: 20 tablet, Rfl: 0    inhalat.spacing dev,large mask (BREATHERITE SPACER-MASK,ADULT) Spcr, Use as directed for inhalation., Disp: 1 each, Rfl: 1    lancets 32 gauge Misc, 1 lancet by Misc.(Non-Drug; Combo Route) route once daily., Disp: 30 each, Rfl: 4    memantine (NAMENDA) 10 MG Tab, TAKE 1/2 TABLET BY MOUTH TWICE DAILY FOR 1 WEEK, THEN 1 TABLET BY MOUTH TWICE DAILY, Disp: 180 tablet, Rfl: 3    mirabegron (MYRBETRIQ) 25 mg Tb24 ER tablet, Take 1 tablet (25 mg total) by mouth once daily., Disp: 30 tablet, Rfl: 11    mucus clearing device (AEROBIKA OSCILLATING PEP SYSTM), by Misc.(Non-Drug; Combo Route) route 4 (four) times daily as needed., Disp: 1 each, Rfl: 0    pantoprazole (PROTONIX) 40 MG tablet, Take 1 tablet (40 mg total) by mouth once daily., Disp: 30 tablet, Rfl: 6    QUEtiapine (SEROQUEL) 50 MG tablet, Take 1 tablet by mouth twice daily, Disp: 180 tablet, Rfl: 3    rosuvastatin (CRESTOR) 20 MG tablet, Take 1 tablet by mouth once daily, Disp: 90 tablet, Rfl: 0    tamsulosin (FLOMAX) 0.4 mg Cap, Take 1 capsule (0.4 mg total) by mouth every evening., Disp: 30 capsule, Rfl: 3    walker (ULTRA-LIGHT ROLLATOR) Misc, Use daily for  ambulation, Disp: 1 each, Rfl: 0  Past Medical History:   Diagnosis Date    Anxiety     Deep vein thrombosis     2021    Depression     Diabetes mellitus, type 2     Hypertension     Seizures     Stroke      Past Surgical History:   Procedure Laterality Date    A-V CARDIAC PACEMAKER INSERTION Left 10/4/2023    Procedure: INSERTION, CARDIAC PACEMAKER, DUAL CHAMBER;  Surgeon: Delonte Lindsey MD;  Location: [facility] CATH LAB;  Service: Cardiology;  Laterality: Left;  biotronik    ANGIOGRAPHY OF LOWER EXTREMITY N/A 12/21/2020    Procedure: ANGIOGRAM, LOWER EXTREMITY/Rt leg poss pta/stent;  Surgeon: Todd Michel MD;  Location: [facility] CATH LAB;  Service: Peripheral Vascular;  Laterality: N/A;  rescheduled 12/8    BACK SURGERY  2015    BRONCHOSCOPY Left 3/24/2022    Procedure: Bronchoscopy;  Surgeon: Edward Williamson MD;  Location: [facility];  Service: Pulmonary;  Laterality: Left;  poss endobronchial biopsy or brushing    CATARACT EXTRACTION      CYSTOSCOPY WITH URETEROSCOPY, RETROGRADE PYELOGRAPHY, AND INSERTION OF STENT Left 10/8/2023    Procedure: CYSTOSCOPY, WITH RETROGRADE PYELOGRAM AND URETERAL STENT INSERTION;  Surgeon: Jamie Martinez MD;  Location: [facility];  Service: Urology;  Laterality: Left;    CYSTOURETEROSCOPY, WITH HOLMIUM LASER LITHOTRIPSY OF URETERAL CALCULUS AND STENT INSERTION Left 12/18/2023    Procedure: CYSTOURETEROSCOPY, WITH HOLMIUM LASER LITHOTRIPSY OF URETERAL CALCULUS AND STENT INSERTION;  Surgeon: Jamie Martinez MD;  Location: [facility];  Service: Urology;  Laterality: Left;    ESOPHAGOGASTRODUODENOSCOPY N/A 8/24/2022    Procedure: EGD (ESOPHAGOGASTRODUODENOSCOPY);  Surgeon: Ana Gomez MD;  Location: [facility];  Service: Endoscopy;  Laterality: N/A;    ESOPHAGOGASTRODUODENOSCOPY N/A 9/14/2022    Procedure: EGD (ESOPHAGOGASTRODUODENOSCOPY);  Surgeon: Ana Gomez MD;  Location: [facility];  Service: Endoscopy;  Laterality: N/A;    KNEE SURGERY  2012    LITHOTRIPSY   2000     Social History     Socioeconomic History    Marital status:    Tobacco Use    Smoking status: Every Day     Current packs/day: 1.00     Types: Cigarettes    Smokeless tobacco: Current     Types: Snuff    Tobacco comments:     Reduced use, approx. 1 cig/day   Substance and Sexual Activity    Alcohol use: Not Currently     Alcohol/week: 1.0 standard drink of alcohol     Types: 1 Shots of liquor per week    Drug use: Never    Sexual activity: Not Currently     Social Determinants of Health     Financial Resource Strain: Low Risk  (11/27/2023)    Overall Financial Resource Strain (CARDIA)     Difficulty of Paying Living Expenses: Not hard at all   Food Insecurity: No Food Insecurity (11/27/2023)    Hunger Vital Sign     Worried About Running Out of Food in the Last Year: Never true     Ran Out of Food in the Last Year: Never true   Transportation Needs: No Transportation Needs (11/27/2023)    PRAPARE - Transportation     Lack of Transportation (Medical): No     Lack of Transportation (Non-Medical): No   Physical Activity: Inactive (11/27/2023)    Exercise Vital Sign     Days of Exercise per Week: 0 days     Minutes of Exercise per Session: 0 min   Stress: No Stress Concern Present (11/27/2023)    [employer] [place] [employer] - Occupational Stress Questionnaire     Feeling of Stress : Only a little   Social Connections: Unknown (11/27/2023)    Social Connection and Isolation Panel [NHANES]     Frequency of Communication with Friends and Family: More than three times a week     Frequency of Social Gatherings with Friends and Family: Once a week     Active Member of Clubs or Organizations: No     Attends Club or Organization Meetings: Never     Marital Status:    Housing Stability: Low Risk  (11/27/2023)    Housing Stability Vital Sign     Unable to Pay for Housing in the Last Year: No     Number of Places Lived in the Last Year: 1     Unstable Housing in the Last Year: No              Past/Current Medical/Surgical History, Past/Current Social History, Past/Current Family History and Past/Current Medications were reviewed in detail.        Objective:           VITAL SIGNS WERE REVIEWED      GENERAL APPEARANCE:     The patient looks cachectic     BMI 19.14    No signs of respiratory distress.    Normal breathing pattern.    No dysmorphic features    Normal eye contact.     GENERAL MEDICAL EXAM:    HEENT:  Head is atraumatic normocephalic. Fundoscopic (Ophthalmoscopic) exam showed no disc edema.      Neck and Axillae: No JVD. No visible lesions.    Cardiopulmonary: No cyanosis. No tachypnea. Normal respiratory effort.    Gastrointestinal/Urogenital:  No jaundice. No stomas or lesions. No visible hernias. No catheters.     Skin, Hair and Nails: No pathognonomic skin rash. No neurofibromatosis. No visible lesions.No stigmata of autoimmune disease. No clubbing.    Limbs: No varicose veins. No visible swelling. Diffuse muscle atrophy.     Muskoskeletal: OA visible deformities.No visible lesions.           Neurologic Exam     Mental Status   Oriented to person.   Oriented to place.   Disoriented to time.   Follows 2 step commands.   Attention: decreased. Concentration: decreased.   Speech: speech is normal   Level of consciousness: alert  Able to name object. Able to repeat. Normal comprehension.       Prominent Psychomotor Slowing      Cranial Nerves   Cranial nerves II through XII intact.     CN II   Visual fields full to confrontation.   Visual acuity: normal  Right visual field deficit: none  Left visual field deficit: none     CN III, IV, VI   Pupils are equal, round, and reactive to light.  Extraocular motions are normal.   Right pupil: Size: 2 mm. Shape: regular. Reactivity: brisk. Consensual response: intact. Accommodation: intact.   Left pupil: Size: 2 mm. Shape: regular. Reactivity: brisk. Consensual response: intact. Accommodation: intact.   CN III: no CN III palsy  CN VI: no CN VI  palsy  Nystagmus: none   Diplopia: none  Ophthalmoparesis: none  Upgaze: normal  Downgaze: normal  Conjugate gaze: present  Vestibulo-ocular reflex: present    CN V   Facial sensation intact.   Right facial sensation deficit: none  Left facial sensation deficit: none  Jaw jerk: normal    CN VII   Facial expression full, symmetric.   Right facial weakness: none  Left facial weakness: none    CN VIII   CN VIII normal.   Hearing: intact    CN IX, X   CN IX normal.   CN X normal.   Palate: symmetric    CN XI   CN XI normal.   Right sternocleidomastoid strength: normal  Left sternocleidomastoid strength: normal  Right trapezius strength: normal  Left trapezius strength: normal    CN XII   CN XII normal.   Tongue: not atrophic  Fasciculations: absent  Tongue deviation: none    Motor Exam   Muscle bulk: decreased  Right arm tone: spastic  Left arm tone: spastic  Right leg tone: spastic  Left leg tone: spastic    Strength   Right neck flexion: 5/5  Left neck flexion: 5/5  Right neck extension: 5/5  Left neck extension: 5/5  Right deltoid: 4/5  Left deltoid: 4/5  Right biceps: 4/5  Left biceps: 4/5  Right triceps: 4/5  Left triceps: 4/5  Right wrist flexion: 4/5  Left wrist flexion: 4/5  Right wrist extension: 4/5  Left wrist extension: 4/5  Right interossei: 4/5  Left interossei: 4/5  Right iliopsoas: 4/5  Left iliopsoas: 4/5  Right quadriceps: 4/5  Left quadriceps: 4/5  Right hamstrin/5  Left hamstrin/5  Right glutei: 4/5  Left glutei: 4/5  Right anterior tibial: 4/5  Left anterior tibial: 4/5  Right posterior tibial: 4/5  Left posterior tibial: 4/5  Right peroneal: 4/5  Left peroneal: 4/5  Right gastroc: 4/5  Left gastroc: 4/5    RUE RLE 4/5    LUE LLE 4+/5      Sensory Exam   Right arm light touch: decreased from fingers  Left arm light touch: decreased from fingers  Right leg light touch: decreased from ankle  Left leg light touch: decreased from ankle  Right arm vibration: normal  Left arm vibration:  normal  Right leg vibration: decreased from toes  Left leg vibration: decreased from toes  Right arm proprioception: normal  Left arm proprioception: normal  Right leg proprioception: decreased from toes  Left leg proprioception: decreased from toes  Right arm pinprick: decreased from fingers  Left arm pinprick: decreased from fingers  Right leg pinprick: decreased from ankle  Left leg pinprick: decreased from ankle  Graphesthesia: normal  Stereognosis: normal    Gait, Coordination, and Reflexes     Gait  Gait: spastic and wide-based    Coordination   Finger to nose coordination: normal  Heel to shin coordination: normal    Tremor   Resting tremor: absent  Intention tremor: absent  Action tremor: absent    Reflexes   Right brachioradialis: 1+  Left brachioradialis: 1+  Right biceps: 1+  Left biceps: 1+  Right triceps: 1+  Left triceps: 1+  Right patellar: 3+  Left patellar: 3+  Right achilles: 2+  Left achilles: 2+  Right plantar: equivocal  Left plantar: equivocal  Right Suárez: absent  Left Suárez: absent  Right ankle clonus: absent  Left ankle clonus: absent  Right pendular knee jerk: absent  Left pendular knee jerk: absent      Lab Results   Component Value Date    WBC 7.46 11/29/2023    HGB 7.7 (L) 11/29/2023    HCT 23.7 (L) 11/29/2023    MCV 89 11/29/2023     11/29/2023     Sodium   Date Value Ref Range Status   11/29/2023 139 136 - 145 mmol/L Final     Potassium   Date Value Ref Range Status   11/29/2023 3.1 (L) 3.5 - 5.1 mmol/L Final     Chloride   Date Value Ref Range Status   11/29/2023 101 95 - 110 mmol/L Final     CO2   Date Value Ref Range Status   11/29/2023 30 (H) 23 - 29 mmol/L Final     Glucose   Date Value Ref Range Status   11/29/2023 105 70 - 110 mg/dL Final     BUN   Date Value Ref Range Status   11/29/2023 14 8 - 23 mg/dL Final     Creatinine   Date Value Ref Range Status   11/29/2023 1.3 0.5 - 1.4 mg/dL Final     Calcium   Date Value Ref Range Status   11/29/2023 8.3 (L) 8.7 - 10.5  mg/dL Final     Total Protein   Date Value Ref Range Status   2023 6.3 6.0 - 8.4 g/dL Final     Albumin   Date Value Ref Range Status   2023 2.5 (L) 3.5 - 5.2 g/dL Final     Total Bilirubin   Date Value Ref Range Status   2023 0.4 0.1 - 1.0 mg/dL Final     Comment:     For infants and newborns, interpretation of results should be based  on gestational age, weight and in agreement with clinical  observations.    Premature Infant recommended reference ranges:  Up to 24 hours.............<8.0 mg/dL  Up to 48 hours............<12.0 mg/dL  3-5 days..................<15.0 mg/dL  6-29 days.................<15.0 mg/dL       Alkaline Phosphatase   Date Value Ref Range Status   2023 170 (H) 55 - 135 U/L Final     AST   Date Value Ref Range Status   2023 24 10 - 40 U/L Final     ALT   Date Value Ref Range Status   2023 18 10 - 44 U/L Final     Anion Gap   Date Value Ref Range Status   2023 8 8 - 16 mmol/L Final     eGFR if    Date Value Ref Range Status   2022 >60 >60 mL/min/1.73 m^2 Final     eGFR if non    Date Value Ref Range Status   2022 >60 >60 mL/min/1.73 m^2 Final     Comment:     Calculation used to obtain the estimated glomerular filtration  rate (eGFR) is the CKD-EPI equation.        Lab Results   Component Value Date    [id number] 648 2020     Lab Results   Component Value Date    TSH 1.963 10/02/2023     EKG Results:     2023    QT Interval 430, HR 64 NL     2020    B12 NL      2022    TSH NL      2022    CTH Atrophy        2023    Brain MRI Unremarkable.Atrophy        2023    EEG NL        SYLVESTER COGNITIVE ASSESSMENT (MOCA) TOTAL SCORE 30         NORMAL-MILD NCD 26-30    MILD DEMENTIA 20-25    MODERATE DEMENTIA 10-19    SEVERE DEMENTIA <10        DATE 2022       TOTAL SCORE 13       VISUOSPATIAL EXECUTIVE (5) 1       NAMING (3) 3       ATTENTION (6) 4       LANGUAGE (3) 2        ABSTRACTION(2) 0       RECALL (5) 0       ORIENTATION (6) 3               Reviewed the neuroimaging independently       Assessment:       1. Mixed Alzheimer's and vascular dementia    2. History of CVA with residual deficit    3. Insomnia, unspecified type    4. At risk for prolonged QT interval syndrome    5. Hemiparesis affecting dominant side as late effect of cerebrovascular accident (CVA)    6. Memory difficulties    7. Cardiac pacemaker in situ    8. Controlled type 2 diabetes mellitus with stage 3 chronic kidney disease, without long-term current use of insulin    9. Mixed hyperlipidemia    10. Pulmonary cavitary lesion    11. Weight loss    12. History of DVT of lower extremity    13. Dysphagia, unspecified type    14. Cylindrical bronchiectasis    15. Chronic obstructive pulmonary disease, unspecified COPD type    16. Bradycardia    17. Multiple kidney stones    18. Preop cardiovascular exam                Plan:           MIXED DEMENTIA, VASCULAR DEMENTIA AND ALZHEIMER'S TYPE DEMENTIA, MODERATE, NO BASELINE BEHAVIORAL DISTURBANCES     HISTORY OF EPILEPSY, HISTORY OF RECENT DELIRIUM () STATUS POST PACEMAKER PLACEMENT 10-/ STATUS POST CYSTOURETHROSCOPY WITH LITHOTRIPSY 12-       EVALUATION     AEEG pending       DISEASE-MODIFYING AGENTS     Explained to the patient and family that medications are intended to slow down the progression and not stop it or reverse the disease. The disease is relentless, progressive and so far, cannot be controlled. Progression includes Cognitive decline, Behavioral disturbances, and Motor decline as well.     I counseled the patient and family that the rate of progression is extremely variable, and the average (10 years) is an inaccurate measure.     Continue Memantine/Namenda 10 mg BID.    Discontinue  Donepezil/Aricept 10 mg QHS.     Follow up with Cardiology r/t  S/P PM    If GI symptoms become an issue will try rivastigmine/Exelon patches. Will obtain  EKG before and after Aricept to verify effects on QT interval!  (Other formulations Adlarity TTS 5/10 mg weekly). Patches are convenient, bypass the GI system but have unintended consequences of being taken off prematurely or being duplicated accidentally.       Recommended against the use of OTC non-FDA evaluated supplements and claims.       SYMPTOMATIC MANAGEMENT-BEHAVIORAL SYMPTOMS       Continue Seroquel 100 MG po HS      Did not tolerate Trazodone.       HOME CARE       Falling Down Precautions.     Avoid driving and access to firearms     Incremental 24/Care     Help with finances and decision making.    Join support group.    Proofing the house and use labeling.    Avoid antihistamines and anticholinergics.    Avoid changing routine.    Use written reminders.    Avoid multitasking.    Healthy diet, exercise (physical and cognitive).    Good sleep hygiene.        CAREGIVERS COUNSELING     For behavioral problems I recommended that family to try some of the following communication tips: Try not to react and stay calm, don't argue, do not use logic, Let the patient feel safe, Keep reminding the patient and use photos if necessary, Distract the patient, Search for things to distract the person, then talk about what you found. For example, talk about a photograph or keepsake or even food, use gentle touching or hugging to show you care, Body language matters, use a cooling off period if needed, when possible. If safe to do so, give the patient some space or breathing room. Will consider antipsychotic medications as a last resort because of the risk of CAD and CVD.            Recommend reading the following books:     The 36-Hour Day and there are many online and printed resources to help caregivers as well.     Alzheimer's Through the Stages.     Dementia with [email or web address].    For More Information About Hallucinations, Delusions, and Paranoia in Alzheimer's   [place] Alzheimer's and related Dementias Education and  Referral (ADEAR) Center   1-616.413.1731 (toll-free)   adear@[place].nih.gov   www.[place].nih.gov/alzheimers   The National [place] [facility]'s [facility] offers information and free print publications about Alzheimer's disease and related dementias for families, caregivers, and health professionals. [facility] staff answer telephone, email, and written requests and make referrals to local and national resources          PREVENTION OF DELIRIUM       1. Good hydration and avoid electrolyte imbalance  2. Recognize and treat infections immediately especially UTI.  3. Bladder emptying and prevent constipation.   4. Provide stimulating activities and familiar objects  5. Use eyeglasses and hearing aids if needed.   6. Use simple and regular communication about people, current place, and time  7. Mobility and range-of-motion exercises  8. Reduce noise, lighting and avoid sleep interruptions  9. Non-narcotic pain management.  10.Nondrug treatment for sleep problems or anxiety  11. Avoid antihistamines.  12. Avoid narcotics.  13. Avoid benzodiazepines.        MEDICAL/SURGICAL COMORBIDITIES     All relevant medical comorbidities noted and managed by primary care physician and medical care team.          HEALTHY LIFESTYLE AND PREVENTATIVE CARE    The patient to adhere to the age-appropriate health maintenance guidelines including screening tests and vaccinations. The patient to adhere to  healthy lifestyle, optimal weight, exercise, healthy diet, good sleep hygiene and avoiding drugs including smoking, alcohol and recreational drugs.        RTC in 6 months        Lizbeth Brady MSN NP      Collaborating Provider: Sudhir Ken MD, FAAN Neurologist/Epileptologist    I spent a total of 40 minutes on the day of the visit.  This includes face to face time and non-face to face time preparing to see the patient (eg, review of tests), obtaining and/or reviewing separately obtained history, documenting clinical information in the  electronic or other health record, independently interpreting results and communicating results to the patient/family/caregiver, or care coordinator.

## 2024-01-17 ENCOUNTER — HOSPITAL ENCOUNTER (OUTPATIENT)
Dept: CARDIOLOGY | Facility: HOSPITAL | Age: 74
Discharge: HOME | End: 2024-01-17
Attending: INTERNAL MEDICINE
Payer: MEDICARE

## 2024-01-17 DIAGNOSIS — Z95.0 CARDIAC PACEMAKER IN SITU: ICD-10-CM

## 2024-01-17 DIAGNOSIS — R00.1 BRADYCARDIA: ICD-10-CM

## 2024-01-17 PROCEDURE — 93280 PM DEVICE PROGR EVAL DUAL: CPT | Mod: HCNC

## 2024-01-17 PROCEDURE — 93280 PM DEVICE PROGR EVAL DUAL: CPT | Mod: 26,HCNC,, | Performed by: INTERNAL MEDICINE

## 2024-01-29 ENCOUNTER — DOCUMENT SCAN (OUTPATIENT)
Dept: HOME HEALTH SERVICES | Facility: HOSPITAL | Age: 74
End: 2024-01-29
Payer: MEDICARE

## 2024-02-06 ENCOUNTER — OFFICE VISIT (OUTPATIENT)
Dept: PULMONOLOGY | Facility: CLINIC | Age: 74
End: 2024-02-06
Attending: INTERNAL MEDICINE
Payer: MEDICARE

## 2024-02-06 ENCOUNTER — PATIENT MESSAGE (OUTPATIENT)
Dept: UROLOGY | Facility: CLINIC | Age: 74
End: 2024-02-06

## 2024-02-06 ENCOUNTER — HOSPITAL ENCOUNTER (OUTPATIENT)
Dept: RADIOLOGY | Facility: HOSPITAL | Age: 74
Discharge: HOME | End: 2024-02-06
Attending: INTERNAL MEDICINE
Payer: MEDICARE

## 2024-02-06 ENCOUNTER — HOSPITAL ENCOUNTER (OUTPATIENT)
Dept: RADIOLOGY | Facility: HOSPITAL | Age: 74
Discharge: HOME | End: 2024-02-06
Attending: UROLOGY
Payer: MEDICARE

## 2024-02-06 ENCOUNTER — OFFICE VISIT (OUTPATIENT)
Dept: UROLOGY | Facility: CLINIC | Age: 74
End: 2024-02-06
Payer: MEDICARE

## 2024-02-06 VITALS
HEART RATE: 90 BPM | OXYGEN SATURATION: 98 % | HEIGHT: 67 IN | BODY MASS INDEX: 22.84 KG/M2 | DIASTOLIC BLOOD PRESSURE: 82 MMHG | WEIGHT: 145.5 LBS | SYSTOLIC BLOOD PRESSURE: 138 MMHG | RESPIRATION RATE: 16 BRPM

## 2024-02-06 VITALS
DIASTOLIC BLOOD PRESSURE: 84 MMHG | BODY MASS INDEX: 22.91 KG/M2 | HEIGHT: 67 IN | WEIGHT: 145.94 LBS | SYSTOLIC BLOOD PRESSURE: 146 MMHG | HEART RATE: 76 BPM

## 2024-02-06 DIAGNOSIS — N20.0 KIDNEY STONE: ICD-10-CM

## 2024-02-06 DIAGNOSIS — F01.50 MIXED ALZHEIMER'S AND VASCULAR DEMENTIA: ICD-10-CM

## 2024-02-06 DIAGNOSIS — J47.9 CYLINDRICAL BRONCHIECTASIS: ICD-10-CM

## 2024-02-06 DIAGNOSIS — N20.0 KIDNEY STONE: Primary | ICD-10-CM

## 2024-02-06 DIAGNOSIS — Z86.718 HISTORY OF DVT OF LOWER EXTREMITY: ICD-10-CM

## 2024-02-06 DIAGNOSIS — E78.2 MIXED HYPERLIPIDEMIA: ICD-10-CM

## 2024-02-06 DIAGNOSIS — G30.9 MIXED ALZHEIMER'S AND VASCULAR DEMENTIA: ICD-10-CM

## 2024-02-06 DIAGNOSIS — N18.30 CONTROLLED TYPE 2 DIABETES MELLITUS WITH STAGE 3 CHRONIC KIDNEY DISEASE, WITHOUT LONG-TERM CURRENT USE OF INSULIN: ICD-10-CM

## 2024-02-06 DIAGNOSIS — N20.1 LEFT URETERAL STONE: ICD-10-CM

## 2024-02-06 DIAGNOSIS — J47.9 CYLINDRICAL BRONCHIECTASIS: Primary | ICD-10-CM

## 2024-02-06 DIAGNOSIS — E11.22 CONTROLLED TYPE 2 DIABETES MELLITUS WITH STAGE 3 CHRONIC KIDNEY DISEASE, WITHOUT LONG-TERM CURRENT USE OF INSULIN: ICD-10-CM

## 2024-02-06 DIAGNOSIS — F02.80 MIXED ALZHEIMER'S AND VASCULAR DEMENTIA: ICD-10-CM

## 2024-02-06 LAB
BRPFT: NORMAL
FEF 25 75 CHG: 11.6 %
FEF 25 75 LLN: 0.92
FEF 25 75 POST REF: 93.7 %
FEF 25 75 PRE REF: 84 %
FEF 25 75 REF: 2.18
FET100 CHG: 94.3 %
FEV1 CHG: 14.1 %
FEV1 FVC CHG: -3 %
FEV1 FVC LLN: 62
FEV1 FVC POST REF: 94.8 %
FEV1 FVC PRE REF: 97.7 %
FEV1 FVC REF: 76
FEV1 LLN: 2.04
FEV1 POST REF: 96.3 %
FEV1 PRE REF: 84.4 %
FEV1 REF: 2.85
FVC CHG: 17.6 %
FVC LLN: 2.76
FVC POST REF: 101.1 %
FVC PRE REF: 86 %
FVC REF: 3.75
PEF CHG: -18.1 %
PEF LLN: 5.36
PEF POST REF: 76.1 %
PEF PRE REF: 92.9 %
PEF REF: 7.48
POST FEF 25 75: 2.04 L/S (ref 0.92–3.44)
POST FET 100: 4.66 SEC
POST FEV1 FVC: 72.31 % (ref 62.47–90)
POST FEV1: 2.74 L (ref 2.04–3.65)
POST FVC: 3.79 L (ref 2.76–4.73)
POST PEF: 5.69 L/S (ref 5.36–9.6)
PRE FEF 25 75: 1.83 L/S (ref 0.92–3.44)
PRE FET 100: 2.4 SEC
PRE FEV1 FVC: 74.51 % (ref 62.47–90)
PRE FEV1: 2.4 L (ref 2.04–3.65)
PRE FVC: 3.22 L (ref 2.76–4.73)
PRE PEF: 6.95 L/S (ref 5.36–9.6)

## 2024-02-06 PROCEDURE — 1160F RVW MEDS BY RX/DR IN RCRD: CPT | Mod: HCNC,CPTII,S$GLB, | Performed by: UROLOGY

## 2024-02-06 PROCEDURE — 3288F FALL RISK ASSESSMENT DOCD: CPT | Mod: HCNC,CPTII,S$GLB, | Performed by: UROLOGY

## 2024-02-06 PROCEDURE — 99213 OFFICE O/P EST LOW 20 MIN: CPT | Mod: HCNC,S$GLB,, | Performed by: UROLOGY

## 2024-02-06 PROCEDURE — 76770 US EXAM ABDO BACK WALL COMP: CPT | Mod: TC,HCNC

## 2024-02-06 PROCEDURE — 94060 EVALUATION OF WHEEZING: CPT | Mod: HCNC,S$GLB,, | Performed by: INTERNAL MEDICINE

## 2024-02-06 PROCEDURE — 71046 X-RAY EXAM CHEST 2 VIEWS: CPT | Mod: 26,HCNC,, | Performed by: RADIOLOGY

## 2024-02-06 PROCEDURE — 3079F DIAST BP 80-89 MM HG: CPT | Mod: HCNC,CPTII,S$GLB, | Performed by: UROLOGY

## 2024-02-06 PROCEDURE — 3079F DIAST BP 80-89 MM HG: CPT | Mod: HCNC,CPTII,S$GLB, | Performed by: INTERNAL MEDICINE

## 2024-02-06 PROCEDURE — 1159F MED LIST DOCD IN RCRD: CPT | Mod: HCNC,CPTII,S$GLB, | Performed by: INTERNAL MEDICINE

## 2024-02-06 PROCEDURE — 1160F RVW MEDS BY RX/DR IN RCRD: CPT | Mod: HCNC,CPTII,S$GLB, | Performed by: INTERNAL MEDICINE

## 2024-02-06 PROCEDURE — 1101F PT FALLS ASSESS-DOCD LE1/YR: CPT | Mod: HCNC,CPTII,S$GLB, | Performed by: INTERNAL MEDICINE

## 2024-02-06 PROCEDURE — 3008F BODY MASS INDEX DOCD: CPT | Mod: HCNC,CPTII,S$GLB, | Performed by: INTERNAL MEDICINE

## 2024-02-06 PROCEDURE — 3008F BODY MASS INDEX DOCD: CPT | Mod: HCNC,CPTII,S$GLB, | Performed by: UROLOGY

## 2024-02-06 PROCEDURE — 71046 X-RAY EXAM CHEST 2 VIEWS: CPT | Mod: TC,HCNC

## 2024-02-06 PROCEDURE — 1159F MED LIST DOCD IN RCRD: CPT | Mod: HCNC,CPTII,S$GLB, | Performed by: UROLOGY

## 2024-02-06 PROCEDURE — 1101F PT FALLS ASSESS-DOCD LE1/YR: CPT | Mod: HCNC,CPTII,S$GLB, | Performed by: UROLOGY

## 2024-02-06 PROCEDURE — 3077F SYST BP >= 140 MM HG: CPT | Mod: HCNC,CPTII,S$GLB, | Performed by: UROLOGY

## 2024-02-06 PROCEDURE — 1126F AMNT PAIN NOTED NONE PRSNT: CPT | Mod: HCNC,CPTII,S$GLB, | Performed by: UROLOGY

## 2024-02-06 PROCEDURE — 3075F SYST BP GE 130 - 139MM HG: CPT | Mod: HCNC,CPTII,S$GLB, | Performed by: INTERNAL MEDICINE

## 2024-02-06 PROCEDURE — 99214 OFFICE O/P EST MOD 30 MIN: CPT | Mod: 25,HCNC,S$GLB, | Performed by: INTERNAL MEDICINE

## 2024-02-06 PROCEDURE — 99999 PR PBB SHADOW E&M-EST. PATIENT-LVL IV: CPT | Mod: PBBFAC,HCNC,, | Performed by: UROLOGY

## 2024-02-06 PROCEDURE — 76770 US EXAM ABDO BACK WALL COMP: CPT | Mod: 26,HCNC,, | Performed by: RADIOLOGY

## 2024-02-06 PROCEDURE — 99999 PR PBB SHADOW E&M-EST. PATIENT-LVL V: CPT | Mod: PBBFAC,HCNC,, | Performed by: INTERNAL MEDICINE

## 2024-02-06 PROCEDURE — 3288F FALL RISK ASSESSMENT DOCD: CPT | Mod: HCNC,CPTII,S$GLB, | Performed by: INTERNAL MEDICINE

## 2024-02-06 NOTE — PROGRESS NOTES
Chief Complaint:  Left ureteral stone     HPI:   02/06/2024 - patient returns today for follow-up, no new issues after surgery, stent came out okay, had a little bit of pain the day after the stent came out but none since then, renal ultrasound shows no hydro, patient has known right-sided renal stones    11/17/2023 - patient returns today for follow-up, has been having lot issues with urgency and frequency due to the stent, overall tolerating the stent pretty well, no new voiding issues, no gross hematuria or dysuria    10/08/2023 - 73 y.o. male with past medical history COPD, chronic bronchitis, diabetes, and complete heart block status post recent pacemaker placement.  Patient has been convalescing in the hospital after his procedure and was noted to have decreasing renal function.  CT scan was obtained which showed a three and 5 mm left proximal ureteral stone.  Patient does have a history of kidney stones, has undergone a procedure in the past but was many years ago.  Has not seen a urologist in quite some time.  He voids with a good stream and feels like he empties well.  He is very uncomfortable appearing.  Denies gross hematuria.  Denies fevers.  Urinalysis negative for infection        PMH:       Past Medical History:   Diagnosis Date    Anxiety      Depression      Diabetes mellitus, type 2      Hypertension      Stroke           PSH:        Past Surgical History:   Procedure Laterality Date    A-V CARDIAC PACEMAKER INSERTION Left 10/4/2023     Procedure: INSERTION, CARDIAC PACEMAKER, DUAL CHAMBER;  Surgeon: Delonte Lindsey MD;  Location: [facility] CATH LAB;  Service: Cardiology;  Laterality: Left;  biotronik    ANGIOGRAPHY OF LOWER EXTREMITY N/A 12/21/2020     Procedure: ANGIOGRAM, LOWER EXTREMITY/Rt leg poss pta/stent;  Surgeon: Todd Michel MD;  Location: [facility] CATH LAB;  Service: Peripheral Vascular;  Laterality: N/A;  rescheduled 12/8    BACK SURGERY   2015    BRONCHOSCOPY Left 3/24/2022      Procedure: Bronchoscopy;  Surgeon: Edward Williamson MD;  Location: [facility];  Service: Pulmonary;  Laterality: Left;  poss endobronchial biopsy or brushing    CATARACT EXTRACTION        ESOPHAGOGASTRODUODENOSCOPY N/A 8/24/2022     Procedure: EGD (ESOPHAGOGASTRODUODENOSCOPY);  Surgeon: Ana Gomez MD;  Location: [facility];  Service: Endoscopy;  Laterality: N/A;    ESOPHAGOGASTRODUODENOSCOPY N/A 9/14/2022     Procedure: EGD (ESOPHAGOGASTRODUODENOSCOPY);  Surgeon: Ana Gomez MD;  Location: [facility];  Service: Endoscopy;  Laterality: N/A;    KNEE SURGERY   2012    LITHOTRIPSY   2000         Family History:        Family History   Problem Relation Age of Onset    Heart disease Mother      Stroke Mother      Cancer Father      COPD Sister           Social History:  Social History            Tobacco Use    Smoking status: Every Day       Current packs/day: 1.00       Types: Cigarettes    Smokeless tobacco: Current       Types: Snuff   Substance Use Topics    Alcohol use: Yes       Alcohol/week: 1.0 standard drink of alcohol       Types: 1 Shots of liquor per week       Comment: Daily    Drug use: Never         Review of Systems:  General: No fever, chills  Skin: No rashes  Chest:  Denies cough and sputum production  Heart: Denies chest pain  Resp: Denies dyspnea  Abdomen: Denies diarrhea, abdominal pain, hematemesis, or blood in stool.  Musculoskeletal: No joint stiffness or swelling. Denies back pain.  : see HPI  Neuro: no dizziness or weakness     Allergies:  Chantix [varenicline]     Medications:     Current Facility-Administered Medications:     acetaminophen tablet 650 mg, 650 mg, Oral, Q6H PRN, Harry Dalal, NP, 650 mg at 10/07/23 0607    albuterol-ipratropium 2.5 mg-0.5 mg/3 mL nebulizer solution 3 mL, 3 mL, Nebulization, Q6H PRN, Harry Dalal NP    [START ON 10/9/2023] apixaban tablet 5 mg, 5 mg, Oral, Daily, Pumarol Binh Boykin MD    atorvastatin tablet 80 mg, 80 mg, Oral,  Daily, Harry Dalal NP, 80 mg at 10/08/23 0843    dextrose 10% bolus 125 mL 125 mL, 12.5 g, Intravenous, PRN, Harry Dalal NP    dextrose 10% bolus 250 mL 250 mL, 25 g, Intravenous, PRN, Harry Dalal NP    doxycycline tablet 100 mg, 100 mg, Oral, Q12H, Delonte Lindsey MD, 100 mg at 10/08/23 0844    FLUoxetine capsule 20 mg, 20 mg, Oral, Daily, Harry Dalal NP, 20 mg at 10/08/23 0843    glucagon (human recombinant) injection 1 mg, 1 mg, Intramuscular, PRN, Harry Dalal NP    glucose chewable tablet 16 g, 16 g, Oral, PRN, Harry Dalal NP    glucose chewable tablet 24 g, 24 g, Oral, PRN, Harry Dalal NP    HYDROcodone-acetaminophen  mg per tablet 1 tablet, 1 tablet, Oral, Q6H PRN, Binh Harris MD, 1 tablet at 10/08/23 0843    influenza 65up-adj (QUADRIVALENT ADJUVANTED PF) vaccine 0.5 mL, 0.5 mL, Intramuscular, vaccine x 1 dose, Shanon Bridges MD    insulin aspart U-100 pen 0-10 Units, 0-10 Units, Subcutaneous, QID (AC + HS) PRN, Harry Dalal NP, 1 Units at 10/05/23 2028    levoFLOXacin tablet 750 mg, 750 mg, Oral, Every other day, Binh Harris MD, 750 mg at 10/08/23 0843    melatonin tablet 6 mg, 6 mg, Oral, Nightly PRN, Bouchra Aragon ACNP-BC, 6 mg at 10/05/23 2029    memantine tablet 10 mg, 10 mg, Oral, Daily, Harry Dalal, NP, 10 mg at 10/08/23 0843    oxybutynin 24 hr tablet 10 mg, 10 mg, Oral, Daily, Harry Dalal NP, 10 mg at 10/08/23 0844    pantoprazole EC tablet 40 mg, 40 mg, Oral, Daily, Harry Dalal, NP, 40 mg at 10/08/23 0844    sodium chloride 0.9% flush 10 mL, 10 mL, Intravenous, PRN, Harry Dalal, NP    tamsulosin 24 hr capsule 0.4 mg, 0.4 mg, Oral, Daily, Binh Harris MD, 0.4 mg at 10/08/23 0843     Physical Exam:      Vitals:     10/08/23 0843   BP:     Pulse:     Resp: 18   Temp:        Body mass index is 20.37 kg/m².  General: awake, alert, cooperative, very  uncomfortable appearing  Head: NC/AT  Ears: external ears normal  Eyes: sclera normal  Lungs: normal inspiration, NAD  Heart: well-perfused  Skin: The skin is warm and dry  Ext: No c/c/e.  Neuro: grossly intact, AOx3     RADIOLOGY:  US RETROPERITONEAL COMPLETE 02/06/2024     CLINICAL HISTORY:  Calculus of ureter     TECHNIQUE:  Ultrasound of the kidneys and urinary bladder was performed including color flow and Doppler evaluation of the kidneys.     COMPARISON:  10/06/2023, CT dated 11/25/2023     FINDINGS:  Right kidney: The right kidney measures 10.0 cm. No cortical thinning. No loss of corticomedullary distinction. There is a 2.2 cm simple cyst present at the upper pole which does not appear significantly changed.  Multiple punctate echogenic foci are seen near the lower pole which are suspicious for small stones.  No hydronephrosis.     Left kidney: The left kidney measures 10.7 cm. No cortical thinning. No loss of corticomedullary distinction. No mass. Multiple scattered echogenic foci are seen throughout the left kidney measuring up to 7 mm at the upper pole which are suspicious for stones.  No hydronephrosis.     The bladder is partially distended at the time of scanning and has an unremarkable appearance.  Prostate measures roughly 3.7 x 3.6 x 4.2 cm for an estimated volume of 29.4 cc.     Impression:     1. Bilateral nephrolithiasis as above.  No hydronephrosis     LABS:  I personally reviewed the following lab values:        Lab Results   Component Value Date     WBC 6.76 10/08/2023     HGB 8.2 (L) 10/08/2023     HCT 26.5 (L) 10/08/2023      10/08/2023      10/08/2023     K 3.9 10/08/2023      10/08/2023     CREATININE 2.7 (H) 10/08/2023     BUN 14 10/08/2023     CO2 21 (L) 10/08/2023     TSH 1.963 10/02/2023     PSA 1.0 01/03/2022     INR 1.2 10/02/2023     HGBA1C 6.2 (H) 08/04/2023     CHOL 135 08/04/2023     TRIG 172 (H) 08/04/2023     HDL 33 (L) 08/04/2023     ALT 12 10/02/2023      AST 22 10/02/2023         URINALYSIS:  Urinalysis negative for infection     Assessment/Plan:   Samy Alejandre Jr. is a 73 y.o. male with:     Left ureteral stone s/p left URS/LL, stones were CaOx, OMI negative, f/u 6 months with OMI          Jamie Martinez MD  Urology

## 2024-02-06 NOTE — PROGRESS NOTES
Pulmonary Outpatient  Visit     Subjective:       Patient ID: Samy Alejandre Jr. is a 74 y.o. male.    Chief Complaint: Bronchiectasis        Samy Alejandre Jr. Is 72 y.o.  Referred by Doris Yanes MD  [id number] [place] [address] [place] [id number]   Last seen March 2022: DVT and Abn chest CT  Grew RANJANA Shah and recently Staph  Treated with Zyvox, 10 days, was unable to tolerate full therapy  Daughter: here  Cough better, Sputum better. Weigh loss: had esophageal dilation:   Needs dilatation  On Elliquis for DVT  Adherent on DOAC      10/02/2023  Followup  Cough, congestion, yellow sputum  Completed course Doxy  Still has cough congestion  Recent chest CT reviewed  Mucous plugging  Daughter also says dizzy sspells bradycardia  Today HR 38        10/30/2023  Followup  Daughter on video  Last visit: sent to hospital: bradycardia: PPM placed  Also had cystoscopy: stent placed  Doing better  Cough resolving, pain resolved  Not wearing Oxygen  Has cardiology Friday and Appt with urology  Needs lithotripsy  Currently on Eliquis  UTI for Abx: Cipro  Using nebulizer  ? Home health : NO, PCP will order  Swallow study results reviewed      02/06/2024  Followup  Here with daugther  No respiratory issues  No O2  Has Home health  SP kidney stone removal  Gained weight from 135 lb to 145 lb            [facility]  Hospital Medicine  Discharge Summary        Patient Name: Samy Alejandre Jr.  MRN: [id number]  JOAQUIM: [id number]  Patient Class: IP- Inpatient  Admission Date: 11/25/2023  Hospital Length of Stay: 4 days  Discharge Date and Time:  11/30/2023 7:18 PM  Attending Physician: No att. providers found   Discharging Provider: Sarika Nelson NP  Primary Care Provider: Damien Barron MD     Primary Care Team: Networked reference to record PCT      HPI:   Samy Alejandre Jr. is a 73 y.o. male with a PMH  has a past medical history of Anxiety, Depression, Diabetes  mellitus, type 2, Hypertension, and Stroke.  Presented to the ER for evaluation of worsening flank pain and generalized weakness which started this evening.  Patient typically ambulates with a walker at baseline, but daughter states that patient has been lying in bed due to weakness and pain.  Per daughter at bedside patient attempted to get out of bed to use the restroom and had an episode of incontinence.  Patient has a history of multiple nonobstructing kidney stones bilaterally in his followed by Dr. Martinez with Urology.  Patient is scheduled for lithotripsy on 12/18/23.  Patient recently had cardiac pacemaker placed on 10/04/2023 for third-degree heart block.  Denies any cardiopulmonary symptoms.  However, patient does endorse subjective fever, chills, and diarrhea.  Denies any hematuria or dysuria.     ER workup revealed H/H of 8.9/27.4 with MCV of 91, BUN/creatinine of 25/2.1 with EGFR of 33 (creatinine level between 1.7-2.5 over the last 8 months),  mg/dL, UA consistent with acute cystitis, CT renal study revealed numerous bilateral nonobstructing stones, a stent in the left ureter and chronic perinephric fat stranding.  Chest x-ray showed no acute findings.  EKG revealed sinus rhythm with first-degree AV block with a ventricular rate of 91 beats per minute with left axis deviation right bundle-branch block with a QT/QTC of 446/548.  Patient received 1 g of Tylenol, 2 g cefepime, 2 L normal saline in ED. Dr. Martinez was consulted by ER provider to be made aware that patient was in ED. Hospital Medicine consulted to admit patient for UTI and intractable pain.  Patient and family at bedside are in agreement with treatment plan.  Patient will be admitted under inpatient status.    PCP: Damien Barron     * No surgery found *       Hospital Course:   11/26: cont cefepime for UTI. Urology consulted on case.      11/27/23  Urology input appreciated. Recommends to treat UTI and outpatient follow up. Plans to  have stent removed 12/2023 after seen by Cardiology. Urine growing yeast. Will continue Cefepime add fluconazole.      11/28/23  Identification is still pending. Will need sensitivities prior to discharge.      11/29/23  Final urine culture grew Candida Glabrate. Will plan to treat with fluconazole for two weeks. 1/2 of blood cultures growing gram positive cocci, but ID by PCR negative and is likely contaminant. He was evaluated by PT/TO who recommended SNF, but patient declined and was agreeable to HH. He was asked to keep appt with Cardiology and PCP.       Goals of Care Treatment Preferences:  Code Status: Full Code        Consults:   Consults (From admission, onward)            Status Ordering Provider       Inpatient consult to Urology  Once        Provider:  Jamie Martinez MD    Completed LUDIN BOUCHER                  The following portions of the patient's history were reviewed and updated as appropriate: He  has a past medical history of Anxiety, Deep vein thrombosis, Depression, Diabetes mellitus, type 2, Hypertension, Seizures, and Stroke.  He does not have any pertinent problems on file.  He  has a past surgical history that includes Knee surgery (2012); Back surgery (2015); Lithotripsy (2000); Angiography of lower extremity (N/A, 12/21/2020); Cataract extraction; Bronchoscopy (Left, 3/24/2022); Esophagogastroduodenoscopy (N/A, 8/24/2022); Esophagogastroduodenoscopy (N/A, 9/14/2022); A-V cardiac pacemaker insertion (Left, 10/4/2023); Cystoscopy with ureteroscopy, retrograde pyelography, and insertion of stent (Left, 10/8/2023); and cystoureteroscopy, with holmium laser lithotripsy of ureteral calculus and stent insertion (Left, 12/18/2023).  His family history includes COPD in his sister; Cancer in his father; Heart disease in his mother; Stroke in his mother.  He  reports that he has been smoking cigarettes. His smokeless tobacco use includes snuff. He reports that he does not currently  use alcohol after a past usage of about 1.0 standard drink of alcohol per week. He reports that he does not use drugs.  He has a current medication list which includes the following prescription(s): albuterol, albuterol, true metrix level 1, blood glucose control, normal, clopidogrel, eliquis, fluoxetine, fluticasone propionate, hydrocodone-acetaminophen, breatherite spacer-mask,adult, lancets, memantine, myrbetriq, mucus clearing device, pantoprazole, quetiapine, rosuvastatin, tamsulosin, and ultra-light rollator.  Current Outpatient Medications on File Prior to Visit   Medication Sig Dispense Refill    albuterol (ACCUNEB) 1.25 mg/3 mL Nebu Take 3 mLs (1.25 mg total) by nebulization 2 (two) times a day. Rescue 180 mL 11    albuterol (PROVENTIL/VENTOLIN HFA) 90 mcg/actuation inhaler Inhale 2 puffs into the lungs every 4 (four) hours as needed for Wheezing. 18 g 2    blood glucose control, low (TRUE METRIX LEVEL 1) Soln 1 drop by Misc.(Non-Drug; Combo Route) route once daily. 1 each 4    blood glucose control, normal (TRUE METRIX LEVEL 2) Soln 1 drop by Misc.(Non-Drug; Combo Route) route once daily. 1 each 4    clopidogreL (PLAVIX) 75 mg tablet Take 1 tablet (75 mg total) by mouth once daily. 30 tablet 11    ELIQUIS 5 mg Tab Take 1 tablet by mouth once daily 90 tablet 3    FLUoxetine 20 MG capsule Take 1 capsule by mouth once daily 90 capsule 3    fluticasone propionate (FLONASE) 50 mcg/actuation nasal spray 1 spray (50 mcg total) by Each Nostril route once daily. 18.2 mL 0    HYDROcodone-acetaminophen (NORCO)  mg per tablet Take 1 tablet by mouth every 4 (four) hours as needed for Pain. 20 tablet 0    inhalat.spacing dev,large mask (BREATHERITE SPACER-MASK,ADULT) Spcr Use as directed for inhalation. 1 each 1    lancets 32 gauge Misc 1 lancet by Misc.(Non-Drug; Combo Route) route once daily. 30 each 4    memantine (NAMENDA) 10 MG Tab TAKE 1/2 TABLET BY MOUTH TWICE DAILY FOR 1 WEEK, THEN 1 TABLET BY MOUTH TWICE  DAILY 180 tablet 3    mirabegron (MYRBETRIQ) 25 mg Tb24 ER tablet Take 1 tablet (25 mg total) by mouth once daily. 30 tablet 11    mucus clearing device (AEROBIKA OSCILLATING PEP SYSTM) by Misc.(Non-Drug; Combo Route) route 4 (four) times daily as needed. 1 each 0    pantoprazole (PROTONIX) 40 MG tablet Take 1 tablet (40 mg total) by mouth once daily. 30 tablet 6    QUEtiapine (SEROQUEL) 50 MG tablet Take 1 tablet by mouth twice daily 180 tablet 3    rosuvastatin (CRESTOR) 20 MG tablet Take 1 tablet by mouth once daily 90 tablet 0    tamsulosin (FLOMAX) 0.4 mg Cap Take 1 capsule (0.4 mg total) by mouth every evening. 30 capsule 3    walker (ULTRA-LIGHT ROLLATOR) Misc Use daily for ambulation 1 each 0     No current facility-administered medications on file prior to visit.     He is allergic to chantix [varenicline]..      Review of Systems   Constitutional:  Negative for weight loss, appetite change and fatigue.   HENT: Negative.     Eyes: Negative.    Respiratory:  Negative for cough.    Genitourinary: Negative.    Endocrine: endocrine negative    Musculoskeletal: Negative.    Skin: Negative.    Gastrointestinal: Negative.    Neurological: Negative.    Psychiatric/Behavioral: Negative.     All other systems reviewed and are negative.      Outpatient Encounter Medications as of 2/6/2024   Medication Sig Dispense Refill    albuterol (ACCUNEB) 1.25 mg/3 mL Nebu Take 3 mLs (1.25 mg total) by nebulization 2 (two) times a day. Rescue 180 mL 11    albuterol (PROVENTIL/VENTOLIN HFA) 90 mcg/actuation inhaler Inhale 2 puffs into the lungs every 4 (four) hours as needed for Wheezing. 18 g 2    blood glucose control, low (TRUE METRIX LEVEL 1) Soln 1 drop by Misc.(Non-Drug; Combo Route) route once daily. 1 each 4    blood glucose control, normal (TRUE METRIX LEVEL 2) Soln 1 drop by Misc.(Non-Drug; Combo Route) route once daily. 1 each 4    clopidogreL (PLAVIX) 75 mg tablet Take 1 tablet (75 mg total) by mouth once daily. 30  "tablet 11    ELIQUIS 5 mg Tab Take 1 tablet by mouth once daily 90 tablet 3    FLUoxetine 20 MG capsule Take 1 capsule by mouth once daily 90 capsule 3    fluticasone propionate (FLONASE) 50 mcg/actuation nasal spray 1 spray (50 mcg total) by Each Nostril route once daily. 18.2 mL 0    HYDROcodone-acetaminophen (NORCO)  mg per tablet Take 1 tablet by mouth every 4 (four) hours as needed for Pain. 20 tablet 0    inhalat.spacing dev,large mask (BREATHERITE SPACER-MASK,ADULT) Spcr Use as directed for inhalation. 1 each 1    lancets 32 gauge Misc 1 lancet by Misc.(Non-Drug; Combo Route) route once daily. 30 each 4    memantine (NAMENDA) 10 MG Tab TAKE 1/2 TABLET BY MOUTH TWICE DAILY FOR 1 WEEK, THEN 1 TABLET BY MOUTH TWICE DAILY 180 tablet 3    mirabegron (MYRBETRIQ) 25 mg Tb24 ER tablet Take 1 tablet (25 mg total) by mouth once daily. 30 tablet 11    mucus clearing device (AEROBIKA OSCILLATING PEP SYSTM) by Misc.(Non-Drug; Combo Route) route 4 (four) times daily as needed. 1 each 0    pantoprazole (PROTONIX) 40 MG tablet Take 1 tablet (40 mg total) by mouth once daily. 30 tablet 6    QUEtiapine (SEROQUEL) 50 MG tablet Take 1 tablet by mouth twice daily 180 tablet 3    rosuvastatin (CRESTOR) 20 MG tablet Take 1 tablet by mouth once daily 90 tablet 0    tamsulosin (FLOMAX) 0.4 mg Cap Take 1 capsule (0.4 mg total) by mouth every evening. 30 capsule 3    walker (ULTRA-LIGHT ROLLATOR) Misc Use daily for ambulation 1 each 0    [DISCONTINUED] donepeziL (ARICEPT) 10 MG tablet Take 1 tablet (10 mg total) by mouth every evening. 30 tablet 11     No facility-administered encounter medications on file as of 2/6/2024.       Objective:     Vital Signs (Most Recent)  Vital Signs  Pulse: 90  Resp: 16  SpO2: 98 %  BP: 138/82  Height and Weight  Height: 5' 7" (170.2 cm)  Weight: 66 kg (145 lb 8.1 oz)  BSA (Calculated - sq m): 1.77 sq meters  BMI (Calculated): 22.8  Weight in (lb) to have BMI = 25: 159.3]  Wt Readings from Last 2 " Encounters:   02/06/24 66 kg (145 lb 8.1 oz)   12/18/23 57.2 kg (126 lb)       Physical Exam   Constitutional: He is oriented to person, place, and time. He appears not cachectic.   HENT:   Head: Normocephalic.   Cardiovascular: Normal rate and regular rhythm.   No murmur heard.  Pulmonary/Chest: Normal expansion.   Abdominal: Bowel sounds are normal.   Musculoskeletal:         General: Normal range of motion.      Cervical back: Normal range of motion.      Comments: wheelchair   Lymphadenopathy:     He has no axillary adenopathy.   Neurological: He is alert and oriented to person, place, and time.   Skin: Skin is warm.   Psychiatric: He has a normal mood and affect.   Nursing note and vitals reviewed.      Laboratory  Lab Results   Component Value Date    WBC 7.46 11/29/2023    RBC 2.67 (L) 11/29/2023    HGB 7.7 (L) 11/29/2023    HCT 23.7 (L) 11/29/2023    MCV 89 11/29/2023    MCH 28.8 11/29/2023    MCHC 32.5 11/29/2023    RDW 16.8 (H) 11/29/2023     11/29/2023    MPV 10.8 11/29/2023    GRAN 4.4 11/29/2023    GRAN 58.5 11/29/2023    LYMPH 1.5 11/29/2023    LYMPH 19.4 11/29/2023    MONO 1.1 (H) 11/29/2023    MONO 15.3 (H) 11/29/2023    EOS 0.5 11/29/2023    BASO 0.03 11/29/2023    EOSINOPHIL 6.0 11/29/2023    BASOPHIL 0.4 11/29/2023       BMP  Lab Results   Component Value Date     11/29/2023    K 3.1 (L) 11/29/2023     11/29/2023    CO2 30 (H) 11/29/2023    BUN 14 11/29/2023    CREATININE 1.3 11/29/2023    CALCIUM 8.3 (L) 11/29/2023    ANIONGAP 8 11/29/2023    ESTGFRAFRICA >60 07/31/2022    EGFRNONAA >60 07/31/2022    AST 24 11/25/2023    ALT 18 11/25/2023    PROT 6.3 11/25/2023       Lab Results   Component Value Date    BNP 98 11/25/2023    BNP 72 10/02/2023    BNP 20 07/31/2022    BNP 23 03/20/2022       Lab Results   Component Value Date    TSH 1.963 10/02/2023       Lab Results   Component Value Date    SEDRATE 70 (H) 03/21/2022       Lab Results   Component Value Date    .2 (H)  "03/21/2022     No results found for: "IGE"     No results found for: "ASPERGILLUS"  No results found for: "AFUMIGATUSCL"     No results found for: "ACE"     Diagnostic Results:  I have personally reviewed today the following studies:             X-Ray Chest PA And Lateral  Narrative: EXAM: XR CHEST PA AND LATERAL    CLINICAL HISTORY:  Bronchiectasis    TECHNIQUE: 2 views of the chest.    COMPARISON: None    FINDINGS:  Left dual lead pacemaker.  Normal heart size.  Mild calcification of aortic arch.    No acute infiltrate.  Impression:  No acute abnormality.    Finalized on: 2/6/2024 10:25 AM By:  Kurt Yun MD  [facility]# [id number]      2024-02-06 10:27:31.614    [facility]       Spirometrry  Normal  FEV1: 2.40L( 84.4%), FVC 3.22L( 86%)  FEV1/FVC 75       Assessment/Plan:     Problem List Items Addressed This Visit       Mixed hyperlipidemia    Controlled type 2 diabetes mellitus with stage 3 chronic kidney disease, without long-term current use of insulin    History of DVT of lower extremity    Mixed Alzheimer's and vascular dementia     stable         Cylindrical bronchiectasis - Primary     Continue Nebulizer therapy         Relevant Orders    X-Ray Chest PA And Lateral    Spirometry without Bronchodilator          Use nebulizer bid    Follow up in about 6 months (around 8/6/2024), or cxr, tracey.    This note was prepared using voice recognition system and is likely to have sound alike errors that may have been overlooked even after proof reading.  Please call me with any questions    Discussed diagnosis, its evaluation, treatment and usual course. All questions answered.      Simon Mckeon MD    "

## 2024-02-09 ENCOUNTER — TELEPHONE (OUTPATIENT)
Dept: FAMILY MEDICINE | Facility: CLINIC | Age: 74
End: 2024-02-09
Payer: MEDICARE

## 2024-02-09 DIAGNOSIS — Z09 HOSPITAL DISCHARGE FOLLOW-UP: Primary | ICD-10-CM

## 2024-02-09 LAB
OHS CV AF BURDEN PERCENT: < 1
OHS CV DC REMOTE DEVICE TYPE: NORMAL
OHS CV ICD SHOCK: NO
OHS CV RV PACING PERCENT: 100 %

## 2024-02-12 LAB
OHS CV AF BURDEN PERCENT: < 1
OHS CV DC REMOTE DEVICE TYPE: NORMAL
OHS CV RV PACING PERCENT: 100 %

## 2024-02-14 DIAGNOSIS — E11.9 TYPE 2 DIABETES MELLITUS WITHOUT COMPLICATION: ICD-10-CM

## 2024-02-14 DIAGNOSIS — K21.9 GASTROESOPHAGEAL REFLUX DISEASE, UNSPECIFIED WHETHER ESOPHAGITIS PRESENT: Primary | ICD-10-CM

## 2024-02-14 RX ORDER — PANTOPRAZOLE SODIUM 40 MG/1
40 TABLET, DELAYED RELEASE ORAL DAILY
Qty: 30 TABLET | Refills: 6 | Status: SHIPPED | OUTPATIENT
Start: 2024-02-14

## 2024-02-19 DIAGNOSIS — N18.30 STAGE 3 CHRONIC KIDNEY DISEASE, UNSPECIFIED WHETHER STAGE 3A OR 3B CKD: Primary | ICD-10-CM

## 2024-02-19 RX ORDER — TAMSULOSIN HYDROCHLORIDE 0.4 MG/1
0.4 CAPSULE ORAL NIGHTLY
Qty: 90 CAPSULE | Refills: 3 | Status: SHIPPED | OUTPATIENT
Start: 2024-02-19 | End: 2025-02-18

## 2024-02-26 PROBLEM — N17.9 ACUTE RENAL FAILURE SUPERIMPOSED ON STAGE 3 CHRONIC KIDNEY DISEASE: Status: RESOLVED | Noted: 2022-03-20 | Resolved: 2024-02-26

## 2024-02-26 PROBLEM — N18.30 ACUTE RENAL FAILURE SUPERIMPOSED ON STAGE 3 CHRONIC KIDNEY DISEASE: Status: RESOLVED | Noted: 2022-03-20 | Resolved: 2024-02-26

## 2024-02-27 ENCOUNTER — TELEPHONE (OUTPATIENT)
Dept: FAMILY MEDICINE | Facility: CLINIC | Age: 74
End: 2024-02-27
Payer: MEDICARE

## 2024-02-27 ENCOUNTER — DOCUMENT SCAN (OUTPATIENT)
Dept: HOME HEALTH SERVICES | Facility: HOSPITAL | Age: 74
End: 2024-02-27
Payer: MEDICARE

## 2024-02-27 DIAGNOSIS — N18.30 STAGE 3 CHRONIC KIDNEY DISEASE, UNSPECIFIED WHETHER STAGE 3A OR 3B CKD: Primary | ICD-10-CM

## 2024-02-27 DIAGNOSIS — N18.30 CONTROLLED TYPE 2 DIABETES MELLITUS WITH STAGE 3 CHRONIC KIDNEY DISEASE, WITHOUT LONG-TERM CURRENT USE OF INSULIN: ICD-10-CM

## 2024-02-27 DIAGNOSIS — E11.22 CONTROLLED TYPE 2 DIABETES MELLITUS WITH STAGE 3 CHRONIC KIDNEY DISEASE, WITHOUT LONG-TERM CURRENT USE OF INSULIN: ICD-10-CM

## 2024-02-27 DIAGNOSIS — E78.2 MIXED HYPERLIPIDEMIA: ICD-10-CM

## 2024-03-10 DIAGNOSIS — E78.2 MIXED HYPERLIPIDEMIA: ICD-10-CM

## 2024-03-10 NOTE — TELEPHONE ENCOUNTER
No care due was identified.  [facility] Embedded Care Due Messages. Reference number: [id number].   3/10/2024 3:00:30 PM CDT

## 2024-03-11 RX ORDER — ROSUVASTATIN CALCIUM 20 MG/1
TABLET, COATED ORAL
Qty: 90 TABLET | Refills: 0 | Status: SHIPPED | OUTPATIENT
Start: 2024-03-11 | End: 2024-06-10

## 2024-03-28 DIAGNOSIS — M25.551 RIGHT HIP PAIN: ICD-10-CM

## 2024-03-28 RX ORDER — QUETIAPINE FUMARATE 50 MG/1
150 TABLET, FILM COATED ORAL NIGHTLY
Qty: 270 TABLET | Refills: 3 | Status: SHIPPED | OUTPATIENT
Start: 2024-03-28

## 2024-03-28 NOTE — TELEPHONE ENCOUNTER
No care due was identified.  [facility] Embedded Care Due Messages. Reference number: [id number].   3/28/2024 4:49:58 PM CDT

## 2024-04-05 ENCOUNTER — CLINICAL SUPPORT (OUTPATIENT)
Dept: CARDIOLOGY | Facility: HOSPITAL | Age: 74
End: 2024-04-05
Payer: MEDICARE

## 2024-04-05 ENCOUNTER — CLINICAL SUPPORT (OUTPATIENT)
Dept: CARDIOLOGY | Facility: HOSPITAL | Age: 74
End: 2024-04-05
Attending: INTERNAL MEDICINE
Payer: MEDICARE

## 2024-04-05 DIAGNOSIS — Z95.0 PRESENCE OF CARDIAC PACEMAKER: ICD-10-CM

## 2024-04-05 PROCEDURE — 93294 REM INTERROG EVL PM/LDLS PM: CPT | Mod: HCNC,S$GLB,, | Performed by: INTERNAL MEDICINE

## 2024-04-05 PROCEDURE — 93296 REM INTERROG EVL PM/IDS: CPT | Mod: HCNC | Performed by: INTERNAL MEDICINE

## 2024-04-11 DIAGNOSIS — G62.9 NEUROPATHY: Primary | ICD-10-CM

## 2024-04-11 RX ORDER — GABAPENTIN 100 MG/1
100 CAPSULE ORAL NIGHTLY
Qty: 30 CAPSULE | Refills: 11 | Status: SHIPPED | OUTPATIENT
Start: 2024-04-11 | End: 2025-04-11

## 2024-04-11 NOTE — TELEPHONE ENCOUNTER
No care due was identified.  [facility] Embedded Care Due Messages. Reference number: [id number].   4/11/2024 3:27:33 PM CDT

## 2024-04-17 NOTE — PROGRESS NOTES
Learning About Being Active as an Older Adult  Why is being active important as you get older?     Being active is one of the best things you can do for your health. And it's never too late to start. Being active--or getting active, if you aren't already--has definite benefits. It can:  Give you more energy,  Keep your mind sharp.  Improve balance to reduce your risk of falls.  Help you manage chronic illness with fewer medicines.  No matter how old you are, how fit you are, or what health problems you have, there is a form of activity that will work for you. And the more physical activity you can do, the better your overall health will be.  What kinds of activity can help you stay healthy?  Being more active will make your daily activities easier. Physical activity includes planned exercise and things you do in daily life. There are four types of activity:  Aerobic.  Doing aerobic activity makes your heart and lungs strong.  Includes walking, dancing, and gardening.  Aim for at least 2½ hours spread throughout the week.  It improves your energy and can help you sleep better.  Muscle-strengthening.  This type of activity can help maintain muscle and strengthen bones.  Includes climbing stairs, using resistance bands, and lifting or carrying heavy loads.  Aim for at least twice a week.  It can help protect the knees and other joints.  Stretching.  Stretching gives you better range of motion in joints and muscles.  Includes upper arm stretches, calf stretches, and gentle yoga.  Aim for at least twice a week, preferably after your muscles are warmed up from other activities.  It can help you function better in daily life.  Balancing.  This helps you stay coordinated and have good posture.  Includes heel-to-toe walking, drew chi, and certain types of yoga.  Aim for at least 3 days a week.  It can reduce your risk of falling.  Even if you have a hard time meeting the recommendations, it's better to be more active  Unable to obtain sputum sample from patient after administering Sodium Chloride 3%. Culture cup left bedside.  MD and Nurse notified.

## 2024-04-28 LAB
OHS CV AF BURDEN PERCENT: < 1
OHS CV DC REMOTE DEVICE TYPE: NORMAL
OHS CV RV PACING PERCENT: 100 %

## 2024-05-04 ENCOUNTER — PATIENT MESSAGE (OUTPATIENT)
Dept: ADMINISTRATIVE | Facility: HOSPITAL | Age: 74
End: 2024-05-04
Payer: MEDICARE

## 2024-05-10 ENCOUNTER — TELEPHONE (OUTPATIENT)
Dept: FAMILY MEDICINE | Facility: CLINIC | Age: 74
End: 2024-05-10
Payer: MEDICARE

## 2024-05-10 DIAGNOSIS — H53.8 BLURRY VISION: Primary | ICD-10-CM

## 2024-06-07 ENCOUNTER — LAB VISIT (OUTPATIENT)
Dept: LAB | Facility: HOSPITAL | Age: 74
End: 2024-06-07
Attending: FAMILY MEDICINE
Payer: MEDICARE

## 2024-06-07 ENCOUNTER — TELEPHONE (OUTPATIENT)
Dept: FAMILY MEDICINE | Facility: CLINIC | Age: 74
End: 2024-06-07
Payer: MEDICARE

## 2024-06-07 DIAGNOSIS — R30.0 DYSURIA: ICD-10-CM

## 2024-06-07 DIAGNOSIS — R30.0 DYSURIA: Primary | ICD-10-CM

## 2024-06-07 PROCEDURE — 81001 URINALYSIS AUTO W/SCOPE: CPT | Mod: HCNC | Performed by: FAMILY MEDICINE

## 2024-06-07 PROCEDURE — 87086 URINE CULTURE/COLONY COUNT: CPT | Mod: HCNC | Performed by: FAMILY MEDICINE

## 2024-06-08 LAB
BACTERIA #/AREA URNS AUTO: ABNORMAL /HPF
BILIRUB UR QL STRIP: NEGATIVE
CLARITY UR REFRACT.AUTO: CLEAR
COLOR UR AUTO: YELLOW
GLUCOSE UR QL STRIP: NEGATIVE
HGB UR QL STRIP: NEGATIVE
KETONES UR QL STRIP: NEGATIVE
LEUKOCYTE ESTERASE UR QL STRIP: ABNORMAL
MICROSCOPIC COMMENT: ABNORMAL
NITRITE UR QL STRIP: NEGATIVE
PH UR STRIP: 6 [PH] (ref 5–8)
PROT UR QL STRIP: ABNORMAL
RBC #/AREA URNS AUTO: 8 /HPF (ref 0–4)
SP GR UR STRIP: 1.02 (ref 1–1.03)
URN SPEC COLLECT METH UR: ABNORMAL
WBC #/AREA URNS AUTO: >100 /HPF (ref 0–5)
WBC CLUMPS UR QL AUTO: ABNORMAL

## 2024-06-09 DIAGNOSIS — E78.2 MIXED HYPERLIPIDEMIA: ICD-10-CM

## 2024-06-09 LAB
BACTERIA UR CULT: NORMAL
BACTERIA UR CULT: NORMAL

## 2024-06-09 NOTE — TELEPHONE ENCOUNTER
Care Due:                  Date            Visit Type   Department     Provider  --------------------------------------------------------------------------------                                [facility]  Last Visit: 10-      FOLLOW UP    MEDICINE       Damien Barron  Next Visit: None Scheduled  None         None Found                                                            Last  Test          Frequency    Reason                     Performed    Due Date  --------------------------------------------------------------------------------    Lipid Panel.  12 months..  rosuvastatin.............  08- 07-    Health system Embedded Care Due Messages. Reference number: [id number].   6/09/2024 11:16:58 AM CDT

## 2024-06-10 RX ORDER — ROSUVASTATIN CALCIUM 20 MG/1
TABLET, COATED ORAL
Qty: 90 TABLET | Refills: 0 | Status: SHIPPED | OUTPATIENT
Start: 2024-06-10

## 2024-06-11 DIAGNOSIS — Z76.89 ENCOUNTER TO ESTABLISH CARE: Primary | ICD-10-CM

## 2024-06-11 DIAGNOSIS — Z00.00 ROUTINE ADULT HEALTH MAINTENANCE: ICD-10-CM

## 2024-06-12 ENCOUNTER — TELEPHONE (OUTPATIENT)
Dept: CARDIOLOGY | Facility: CLINIC | Age: 74
End: 2024-06-12
Payer: MEDICARE

## 2024-06-12 NOTE — TELEPHONE ENCOUNTER
Called and spoke to patient daughter in regards of changing her dad's Jaleel/EKG appt to 09/04 with Josh        ----- Message from Becky Younger LPN sent at 6/12/2024  8:32 AM CDT -----  Has to cancel appt , daughter can't leave work . He is doing well. First available appt is in September which is fine but needs to move his EKG as well   Thank you

## 2024-07-02 DIAGNOSIS — G30.9 MIXED ALZHEIMER'S AND VASCULAR DEMENTIA: ICD-10-CM

## 2024-07-02 DIAGNOSIS — F02.80 MIXED ALZHEIMER'S AND VASCULAR DEMENTIA: ICD-10-CM

## 2024-07-02 DIAGNOSIS — F01.50 MIXED ALZHEIMER'S AND VASCULAR DEMENTIA: ICD-10-CM

## 2024-07-02 RX ORDER — MEMANTINE HYDROCHLORIDE 10 MG/1
TABLET ORAL
Qty: 180 TABLET | Refills: 0 | Status: SHIPPED | OUTPATIENT
Start: 2024-07-02

## 2024-07-05 ENCOUNTER — CLINICAL SUPPORT (OUTPATIENT)
Dept: CARDIOLOGY | Facility: HOSPITAL | Age: 74
End: 2024-07-05

## 2024-07-05 ENCOUNTER — CLINICAL SUPPORT (OUTPATIENT)
Dept: CARDIOLOGY | Facility: HOSPITAL | Age: 74
End: 2024-07-05
Attending: INTERNAL MEDICINE
Payer: MEDICARE

## 2024-07-05 DIAGNOSIS — Z95.0 PRESENCE OF CARDIAC PACEMAKER: ICD-10-CM

## 2024-07-05 PROCEDURE — 93296 REM INTERROG EVL PM/IDS: CPT | Mod: HCNC | Performed by: INTERNAL MEDICINE

## 2024-07-05 PROCEDURE — 93294 REM INTERROG EVL PM/LDLS PM: CPT | Mod: HCNC,S$GLB,, | Performed by: INTERNAL MEDICINE

## 2024-08-03 ENCOUNTER — PATIENT MESSAGE (OUTPATIENT)
Dept: ADMINISTRATIVE | Facility: HOSPITAL | Age: 74
End: 2024-08-03
Payer: MEDICARE

## 2024-08-05 RX ORDER — FLUOXETINE HYDROCHLORIDE 20 MG/1
20 CAPSULE ORAL DAILY
Qty: 90 CAPSULE | Refills: 3 | Status: SHIPPED | OUTPATIENT
Start: 2024-08-05

## 2024-08-05 RX ORDER — FLUOXETINE HYDROCHLORIDE 20 MG/1
CAPSULE ORAL
Qty: 90 CAPSULE | Refills: 0 | Status: SHIPPED | OUTPATIENT
Start: 2024-08-05 | End: 2024-08-05 | Stop reason: SDUPTHER

## 2024-08-13 LAB
OHS CV AF BURDEN PERCENT: < 1
OHS CV DC REMOTE DEVICE TYPE: NORMAL
OHS CV RV PACING PERCENT: 100 %

## 2024-09-04 ENCOUNTER — OFFICE VISIT (OUTPATIENT)
Dept: CARDIOLOGY | Facility: CLINIC | Age: 74
End: 2024-09-04
Payer: MEDICARE

## 2024-09-04 VITALS
BODY MASS INDEX: 24.12 KG/M2 | OXYGEN SATURATION: 98 % | HEIGHT: 67 IN | SYSTOLIC BLOOD PRESSURE: 134 MMHG | DIASTOLIC BLOOD PRESSURE: 82 MMHG | HEART RATE: 95 BPM | WEIGHT: 153.69 LBS

## 2024-09-04 DIAGNOSIS — I69.30 HISTORY OF CVA WITH RESIDUAL DEFICIT: ICD-10-CM

## 2024-09-04 DIAGNOSIS — Z95.0 CARDIAC PACEMAKER IN SITU: ICD-10-CM

## 2024-09-04 DIAGNOSIS — I44.2 COMPLETE HEART BLOCK: ICD-10-CM

## 2024-09-04 DIAGNOSIS — E11.22 CONTROLLED TYPE 2 DIABETES MELLITUS WITH STAGE 3 CHRONIC KIDNEY DISEASE, WITHOUT LONG-TERM CURRENT USE OF INSULIN: ICD-10-CM

## 2024-09-04 DIAGNOSIS — N18.30 CONTROLLED TYPE 2 DIABETES MELLITUS WITH STAGE 3 CHRONIC KIDNEY DISEASE, WITHOUT LONG-TERM CURRENT USE OF INSULIN: ICD-10-CM

## 2024-09-04 DIAGNOSIS — I82.4Y1 ACUTE DEEP VEIN THROMBOSIS (DVT) OF PROXIMAL VEIN OF RIGHT LOWER EXTREMITY: ICD-10-CM

## 2024-09-04 DIAGNOSIS — K21.9 GASTROESOPHAGEAL REFLUX DISEASE, UNSPECIFIED WHETHER ESOPHAGITIS PRESENT: ICD-10-CM

## 2024-09-04 DIAGNOSIS — J44.9 CHRONIC OBSTRUCTIVE PULMONARY DISEASE, UNSPECIFIED COPD TYPE: ICD-10-CM

## 2024-09-04 DIAGNOSIS — R06.02 SOB (SHORTNESS OF BREATH): Primary | ICD-10-CM

## 2024-09-04 DIAGNOSIS — E78.2 MIXED HYPERLIPIDEMIA: ICD-10-CM

## 2024-09-04 DIAGNOSIS — R00.1 BRADYCARDIA: ICD-10-CM

## 2024-09-04 PROCEDURE — 99999 PR PBB SHADOW E&M-EST. PATIENT-LVL IV: CPT | Mod: PBBFAC,HCNC,, | Performed by: INTERNAL MEDICINE

## 2024-09-04 PROCEDURE — 3008F BODY MASS INDEX DOCD: CPT | Mod: HCNC,CPTII,S$GLB, | Performed by: INTERNAL MEDICINE

## 2024-09-04 PROCEDURE — 1126F AMNT PAIN NOTED NONE PRSNT: CPT | Mod: HCNC,CPTII,S$GLB, | Performed by: INTERNAL MEDICINE

## 2024-09-04 PROCEDURE — 99215 OFFICE O/P EST HI 40 MIN: CPT | Mod: HCNC,S$GLB,, | Performed by: INTERNAL MEDICINE

## 2024-09-04 PROCEDURE — 1159F MED LIST DOCD IN RCRD: CPT | Mod: HCNC,CPTII,S$GLB, | Performed by: INTERNAL MEDICINE

## 2024-09-04 PROCEDURE — 1101F PT FALLS ASSESS-DOCD LE1/YR: CPT | Mod: HCNC,CPTII,S$GLB, | Performed by: INTERNAL MEDICINE

## 2024-09-04 PROCEDURE — 3075F SYST BP GE 130 - 139MM HG: CPT | Mod: HCNC,CPTII,S$GLB, | Performed by: INTERNAL MEDICINE

## 2024-09-04 PROCEDURE — 3079F DIAST BP 80-89 MM HG: CPT | Mod: HCNC,CPTII,S$GLB, | Performed by: INTERNAL MEDICINE

## 2024-09-04 PROCEDURE — 1160F RVW MEDS BY RX/DR IN RCRD: CPT | Mod: HCNC,CPTII,S$GLB, | Performed by: INTERNAL MEDICINE

## 2024-09-04 PROCEDURE — 3288F FALL RISK ASSESSMENT DOCD: CPT | Mod: HCNC,CPTII,S$GLB, | Performed by: INTERNAL MEDICINE

## 2024-09-04 RX ORDER — ASPIRIN 81 MG/1
81 TABLET ORAL DAILY
Start: 2024-09-04

## 2024-09-04 NOTE — PROGRESS NOTES
Subjective:   Patient ID:  Samy Alejandre Jr. is a 74 y.o. male who presents for follow up of No chief complaint on file.      75 yo male, 6 m f/u  PMH h/o CVA 3 times last one in , residual right side weakness. DM > 10 yrs, HTN anxiety h/O DVT in  on eliquis    LE venous US Right lower extremity veins: Extensive deep venous thrombus which is acute appearing in the femoral vein extending through the popliteal vein and into the peroneal and posterior tibial veins.  Occasional nose bleeding  No chest pain dyspnea faint   Occasional dizziness   No leg swelling. Sleeps on 1 pillow  Smoking 1 ppd and drinking  Egd done in  esophageal stenosis    12/23 visit  10/23 s/p PPM for CHB and CT scan was obtained which showed a three and 5 mm left proximal ureteral stone. S./p ureteral stent  Plan to removal of stent   Pre care giver, pt is dementia. C/o SOB with exertion. H/o smoker and on breathing rx. Recently h/o PNA on 08/23.   No chest pain dizziness, walker dependent due to weakness and h/o CVA  No orthopnea and PND  11/23 EKG SR V pacing. BNP normal , BP LDL and A1c controlled  10/23 echo nl biv function    Interval history  EKG reviewed by myself today reveals AS and   Lower back pain  No chest pain dyspnea dizziness orthopnea  Serial BNPs < 100              Past Medical History:   Diagnosis Date    Anxiety     Deep vein thrombosis     2021    Depression     Diabetes mellitus, type 2     Hypertension     Seizures     Stroke        Past Surgical History:   Procedure Laterality Date    A-V CARDIAC PACEMAKER INSERTION Left 10/4/2023    Procedure: INSERTION, CARDIAC PACEMAKER, DUAL CHAMBER;  Surgeon: Delonte Lindsey MD;  Location: [facility] CATH LAB;  Service: Cardiology;  Laterality: Left;  biotronik    ANGIOGRAPHY OF LOWER EXTREMITY N/A 12/21/2020    Procedure: ANGIOGRAM, LOWER EXTREMITY/Rt leg poss pta/stent;  Surgeon: Todd Michel MD;  Location: [facility] CATH LAB;  Service: Peripheral  Vascular;  Laterality: N/A;  rescheduled 12/8    BACK SURGERY  2015    BRONCHOSCOPY Left 3/24/2022    Procedure: Bronchoscopy;  Surgeon: Edward Williamson MD;  Location: [facility];  Service: Pulmonary;  Laterality: Left;  poss endobronchial biopsy or brushing    CATARACT EXTRACTION      CYSTOSCOPY WITH URETEROSCOPY, RETROGRADE PYELOGRAPHY, AND INSERTION OF STENT Left 10/8/2023    Procedure: CYSTOSCOPY, WITH RETROGRADE PYELOGRAM AND URETERAL STENT INSERTION;  Surgeon: Jamie Martinez MD;  Location: [facility] OR;  Service: Urology;  Laterality: Left;    CYSTOURETEROSCOPY, WITH HOLMIUM LASER LITHOTRIPSY OF URETERAL CALCULUS AND STENT INSERTION Left 12/18/2023    Procedure: CYSTOURETEROSCOPY, WITH HOLMIUM LASER LITHOTRIPSY OF URETERAL CALCULUS AND STENT INSERTION;  Surgeon: Jamie Martinez MD;  Location: [facility] OR;  Service: Urology;  Laterality: Left;    ESOPHAGOGASTRODUODENOSCOPY N/A 8/24/2022    Procedure: EGD (ESOPHAGOGASTRODUODENOSCOPY);  Surgeon: Ana Gomez MD;  Location: [facility];  Service: Endoscopy;  Laterality: N/A;    ESOPHAGOGASTRODUODENOSCOPY N/A 9/14/2022    Procedure: EGD (ESOPHAGOGASTRODUODENOSCOPY);  Surgeon: Ana Gomez MD;  Location: [facility];  Service: Endoscopy;  Laterality: N/A;    KNEE SURGERY  2012    LITHOTRIPSY  2000       Social History     Tobacco Use    Smoking status: Every Day     Current packs/day: 1.00     Types: Cigarettes    Smokeless tobacco: Current     Types: Snuff    Tobacco comments:     Reduced use, approx. 1 cig/day   Substance Use Topics    Alcohol use: Not Currently     Alcohol/week: 1.0 standard drink of alcohol     Types: 1 Shots of liquor per week    Drug use: Never       Family History   Problem Relation Name Age of Onset    Heart disease Mother      Stroke Mother      Cancer Father      COPD Sister           ROS    Objective:   Physical Exam  HENT:      Head: Normocephalic.   Eyes:      Pupils: Pupils are equal, round, and reactive to light.    Neck:      Thyroid: No thyromegaly.      Vascular: Normal carotid pulses. No carotid bruit or JVD.   Cardiovascular:      Rate and Rhythm: Normal rate and regular rhythm. No extrasystoles are present.     Chest Wall: PMI is not displaced.      Pulses: Normal pulses.      Heart sounds: Normal heart sounds. No murmur heard.     No gallop. No S3 sounds.   Pulmonary:      Effort: No respiratory distress.      Breath sounds: Normal breath sounds. No stridor.   Abdominal:      General: Bowel sounds are normal.      Palpations: Abdomen is soft.      Tenderness: There is no abdominal tenderness. There is no rebound.   Skin:     Findings: No rash.   Neurological:      Mental Status: He is alert.         Lab Results   Component Value Date    CHOL 135 08/04/2023    CHOL 151 04/24/2023    CHOL 109 (L) 04/27/2022     Lab Results   Component Value Date    HDL 33 (L) 08/04/2023    HDL 31 (L) 04/24/2023    HDL 33 (L) 04/27/2022     Lab Results   Component Value Date    LDLCALC 67.6 08/04/2023    LDLCALC 72.8 04/24/2023    LDLCALC 57.2 (L) 04/27/2022     Lab Results   Component Value Date    TRIG 172 (H) 08/04/2023    TRIG 236 (H) 04/24/2023    TRIG 94 04/27/2022     Lab Results   Component Value Date    CHOLHDL 24.4 08/04/2023    CHOLHDL 20.5 04/24/2023    CHOLHDL 30.3 04/27/2022       Chemistry        Component Value Date/Time     11/29/2023 0523    K 3.1 (L) 11/29/2023 0523     11/29/2023 0523    CO2 30 (H) 11/29/2023 0523    BUN 14 11/29/2023 0523    CREATININE 1.3 11/29/2023 0523     11/29/2023 0523        Component Value Date/Time    CALCIUM 8.3 (L) 11/29/2023 0523    ALKPHOS 170 (H) 11/25/2023 1924    AST 24 11/25/2023 1924    ALT 18 11/25/2023 1924    BILITOT 0.4 11/25/2023 1924    ESTGFRAFRICA >60 07/31/2022 2037    EGFRNONAA >60 07/31/2022 2037          Lab Results   Component Value Date    HGBA1C 6.2 (H) 08/04/2023     Lab Results   Component Value Date    TSH 1.963 10/02/2023     Lab Results    Component Value Date    INR 1.2 10/02/2023    INR 1.0 03/21/2022     Lab Results   Component Value Date    WBC 7.46 11/29/2023    HGB 7.7 (L) 11/29/2023    HCT 23.7 (L) 11/29/2023    MCV 89 11/29/2023     11/29/2023     BMP  Sodium   Date Value Ref Range Status   11/29/2023 139 136 - 145 mmol/L Final     Potassium   Date Value Ref Range Status   11/29/2023 3.1 (L) 3.5 - 5.1 mmol/L Final     Chloride   Date Value Ref Range Status   11/29/2023 101 95 - 110 mmol/L Final     CO2   Date Value Ref Range Status   11/29/2023 30 (H) 23 - 29 mmol/L Final     BUN   Date Value Ref Range Status   11/29/2023 14 8 - 23 mg/dL Final     Creatinine   Date Value Ref Range Status   11/29/2023 1.3 0.5 - 1.4 mg/dL Final     Calcium   Date Value Ref Range Status   11/29/2023 8.3 (L) 8.7 - 10.5 mg/dL Final     Anion Gap   Date Value Ref Range Status   11/29/2023 8 8 - 16 mmol/L Final     eGFR if    Date Value Ref Range Status   07/31/2022 >60 >60 mL/min/1.73 m^2 Final     eGFR if non    Date Value Ref Range Status   07/31/2022 >60 >60 mL/min/1.73 m^2 Final     Comment:     Calculation used to obtain the estimated glomerular filtration  rate (eGFR) is the CKD-EPI equation.        BNP  @LABRCNTIP(BNP,BNPTRIAGEBLO)@  @LABRCNTIP(troponini)@  CrCl cannot be calculated (Patient's most recent lab result is older than the maximum 7 days allowed.).  No results found in the last 24 hours.  No results found in the last 24 hours.  No results found in the last 24 hours.    Assessment:      1. SOB (shortness of breath)    2. Acute deep vein thrombosis (DVT) of proximal vein of right lower extremity    3. Complete heart block    4. History of CVA with residual deficit    5. Bradycardia    6. Cardiac pacemaker in situ    7. Mixed hyperlipidemia    8. Chronic obstructive pulmonary disease, unspecified COPD type    9. Controlled type 2 diabetes mellitus with stage 3 chronic kidney disease, without long-term current  use of insulin        Plan:   [place] MPI for SOB and DM r/o ischemia  D/c plavix and add asa 81 mg  Continue eliquis 5 mg bid and statin  DM Rx per PCP  Counseled DASH  Check Lipid profile with PCP in 6 months  Recommend heart-healthy diet, weight control and regular exercise.  Juanjose. Risk modification.   I have reviewed all pertinent labs and cardiac studies independently. Plans and recommendations have been formulated under my direct supervision. All questions answered and patient voiced understanding.   If symptoms persist go to the ED  RTC in 12 months

## 2024-09-05 DIAGNOSIS — E78.2 MIXED HYPERLIPIDEMIA: ICD-10-CM

## 2024-09-05 RX ORDER — ROSUVASTATIN CALCIUM 20 MG/1
TABLET, COATED ORAL
Qty: 90 TABLET | Refills: 0 | Status: SHIPPED | OUTPATIENT
Start: 2024-09-05

## 2024-09-05 RX ORDER — PANTOPRAZOLE SODIUM 40 MG/1
40 TABLET, DELAYED RELEASE ORAL
Qty: 30 TABLET | Refills: 0 | Status: SHIPPED | OUTPATIENT
Start: 2024-09-05

## 2024-09-05 NOTE — TELEPHONE ENCOUNTER
Care Due:                  Date            Visit Type   Department     Provider  --------------------------------------------------------------------------------                                [facility] FAMILY  Last Visit: 10-      FOLLOW UP    MEDICINE       Damien Barron  Next Visit: None Scheduled  None         None Found                                                            Last  Test          Frequency    Reason                     Performed    Due Date  --------------------------------------------------------------------------------    CMP.........  12 months..  rosuvastatin.............  11- 11-    Lipid Panel.  12 months..  rosuvastatin.............  08- 07-    [employer] Embedded Care Due Messages. Reference number: [id number].   9/04/2024 9:22:26 PM CDT

## 2024-09-05 NOTE — TELEPHONE ENCOUNTER
No care due was identified.  Health [facility] Embedded Care Due Messages. Reference number: [id number].   9/05/2024 9:54:52 AM CDT

## 2024-09-25 DIAGNOSIS — F02.80 MIXED ALZHEIMER'S AND VASCULAR DEMENTIA: ICD-10-CM

## 2024-09-25 DIAGNOSIS — F01.50 MIXED ALZHEIMER'S AND VASCULAR DEMENTIA: ICD-10-CM

## 2024-09-25 DIAGNOSIS — G30.9 MIXED ALZHEIMER'S AND VASCULAR DEMENTIA: ICD-10-CM

## 2024-09-25 RX ORDER — MEMANTINE HYDROCHLORIDE 10 MG/1
TABLET ORAL
Qty: 180 TABLET | Refills: 0 | Status: SHIPPED | OUTPATIENT
Start: 2024-09-25

## 2024-10-02 DIAGNOSIS — K21.9 GASTROESOPHAGEAL REFLUX DISEASE, UNSPECIFIED WHETHER ESOPHAGITIS PRESENT: ICD-10-CM

## 2024-10-02 RX ORDER — PANTOPRAZOLE SODIUM 40 MG/1
40 TABLET, DELAYED RELEASE ORAL
Qty: 30 TABLET | Refills: 0 | Status: SHIPPED | OUTPATIENT
Start: 2024-10-02

## 2024-10-02 NOTE — TELEPHONE ENCOUNTER
No care due was identified.  [facility] Embedded Care Due Messages. Reference number: [id number].   10/02/2024 9:10:14 AM CDT

## 2024-10-04 ENCOUNTER — CLINICAL SUPPORT (OUTPATIENT)
Dept: CARDIOLOGY | Facility: HOSPITAL | Age: 74
End: 2024-10-04
Attending: INTERNAL MEDICINE
Payer: MEDICARE

## 2024-10-04 ENCOUNTER — PATIENT MESSAGE (OUTPATIENT)
Dept: UROLOGY | Facility: CLINIC | Age: 74
End: 2024-10-04
Payer: MEDICARE

## 2024-10-04 DIAGNOSIS — Z95.0 PRESENCE OF CARDIAC PACEMAKER: ICD-10-CM

## 2024-10-04 PROCEDURE — 93296 REM INTERROG EVL PM/IDS: CPT | Mod: HCNC | Performed by: INTERNAL MEDICINE

## 2024-10-04 PROCEDURE — 93294 REM INTERROG EVL PM/LDLS PM: CPT | Mod: HCNC,S$GLB,, | Performed by: INTERNAL MEDICINE

## 2024-10-14 ENCOUNTER — PATIENT OUTREACH (OUTPATIENT)
Dept: ADMINISTRATIVE | Facility: HOSPITAL | Age: 74
End: 2024-10-14
Payer: MEDICARE

## 2024-10-14 NOTE — PROGRESS NOTES
Attempted to contact the patient to discuss overdue HM screenings, no answer, left voicemail. Patient has upcoming lab appointment on 11/1/24, urine micro order linked.

## 2024-10-18 DIAGNOSIS — M25.551 RIGHT HIP PAIN: ICD-10-CM

## 2024-10-19 NOTE — TELEPHONE ENCOUNTER
Care Due:                  Date            Visit Type   Department     Provider  --------------------------------------------------------------------------------                                [facility] FAMILY  Last Visit: 10-      FOLLOW UP    MEDICINE       Damien Barron  Next Visit: None Scheduled  None         None Found                                                            Last  Test          Frequency    Reason                     Performed    Due Date  --------------------------------------------------------------------------------    Office Visit  15 months..  FLUoxetine, pantoprazole,   10-   01-                             rosuvastatin, tamsulosin.    Health [facility] Embedded Care Due Messages. Reference number: [id number].   10/18/2024 9:18:11 PM CDT

## 2024-10-21 LAB
OHS CV AF BURDEN PERCENT: < 1
OHS CV DC REMOTE DEVICE TYPE: NORMAL
OHS CV RV PACING PERCENT: 100 %

## 2024-10-21 RX ORDER — QUETIAPINE FUMARATE 50 MG/1
50 TABLET, FILM COATED ORAL 2 TIMES DAILY
Qty: 180 TABLET | Refills: 0 | Status: SHIPPED | OUTPATIENT
Start: 2024-10-21

## 2024-10-26 DIAGNOSIS — N20.1 LEFT URETERAL STONE: ICD-10-CM

## 2024-10-28 ENCOUNTER — PATIENT OUTREACH (OUTPATIENT)
Dept: ADMINISTRATIVE | Facility: HOSPITAL | Age: 74
End: 2024-10-28
Payer: MEDICARE

## 2024-10-29 RX ORDER — MIRABEGRON 25 MG/1
25 TABLET, FILM COATED, EXTENDED RELEASE ORAL
Qty: 30 TABLET | Refills: 11 | Status: SHIPPED | OUTPATIENT
Start: 2024-10-29

## 2024-11-04 ENCOUNTER — PATIENT OUTREACH (OUTPATIENT)
Dept: ADMINISTRATIVE | Facility: HOSPITAL | Age: 74
End: 2024-11-04
Payer: MEDICARE

## 2024-11-13 DIAGNOSIS — K21.9 GASTROESOPHAGEAL REFLUX DISEASE, UNSPECIFIED WHETHER ESOPHAGITIS PRESENT: ICD-10-CM

## 2024-11-13 RX ORDER — PANTOPRAZOLE SODIUM 40 MG/1
40 TABLET, DELAYED RELEASE ORAL
Qty: 30 TABLET | Refills: 0 | Status: SHIPPED | OUTPATIENT
Start: 2024-11-13

## 2024-11-13 NOTE — TELEPHONE ENCOUNTER
Care Due:                  Date            Visit Type   Department     Provider  --------------------------------------------------------------------------------                                [facility]  Last Visit: 10-      FOLLOW UP    MEDICINE       Damien Barron  Next Visit: None Scheduled  None         None Found                                                            Last  Test          Frequency    Reason                     Performed    Due Date  --------------------------------------------------------------------------------    CMP.........  12 months..  rosuvastatin.............  11- 11-    Lipid Panel.  12 months..  rosuvastatin.............  08- 07-    [employer] Embedded Care Due Messages. Reference number: [id number].   11/13/2024 12:48:59 AM CST

## 2024-11-15 ENCOUNTER — TELEPHONE (OUTPATIENT)
Dept: FAMILY MEDICINE | Facility: CLINIC | Age: 74
End: 2024-11-15
Payer: MEDICARE

## 2024-11-15 NOTE — TELEPHONE ENCOUNTER
That is not likely to get better, need a dentist, may have to drill out the cavity. Probably touching the nerve.

## 2024-11-15 NOTE — TELEPHONE ENCOUNTER
Addendum  created 05/04/18 0804 by Jonas Teran MD    Sign clinical note       He has a tooth abscess, started on Augmentin 875 mg x 3 days , not getting any better not any worse   Can we change to a different antibiotic?

## 2024-11-29 ENCOUNTER — APPOINTMENT (OUTPATIENT)
Dept: RADIOLOGY | Facility: HOSPITAL | Age: 74
End: 2024-11-29
Attending: UROLOGY
Payer: MEDICARE

## 2024-11-29 DIAGNOSIS — N20.0 KIDNEY STONE: ICD-10-CM

## 2024-11-29 PROCEDURE — 76770 US EXAM ABDO BACK WALL COMP: CPT | Mod: 26,HCNC,, | Performed by: STUDENT IN AN ORGANIZED HEALTH CARE EDUCATION/TRAINING PROGRAM

## 2024-11-29 PROCEDURE — 76770 US EXAM ABDO BACK WALL COMP: CPT | Mod: TC,HCNC,PO

## 2024-12-04 ENCOUNTER — PATIENT MESSAGE (OUTPATIENT)
Dept: UROLOGY | Facility: CLINIC | Age: 74
End: 2024-12-04
Payer: MEDICARE

## 2024-12-05 ENCOUNTER — OFFICE VISIT (OUTPATIENT)
Dept: UROLOGY | Facility: CLINIC | Age: 74
End: 2024-12-05
Payer: MEDICARE

## 2024-12-05 VITALS
DIASTOLIC BLOOD PRESSURE: 82 MMHG | SYSTOLIC BLOOD PRESSURE: 142 MMHG | WEIGHT: 150 LBS | HEIGHT: 67 IN | BODY MASS INDEX: 23.54 KG/M2 | HEART RATE: 80 BPM

## 2024-12-05 DIAGNOSIS — N20.0 KIDNEY STONE: Primary | ICD-10-CM

## 2024-12-05 PROCEDURE — 3079F DIAST BP 80-89 MM HG: CPT | Mod: HCNC,CPTII,S$GLB, | Performed by: UROLOGY

## 2024-12-05 PROCEDURE — 1159F MED LIST DOCD IN RCRD: CPT | Mod: HCNC,CPTII,S$GLB, | Performed by: UROLOGY

## 2024-12-05 PROCEDURE — 3008F BODY MASS INDEX DOCD: CPT | Mod: HCNC,CPTII,S$GLB, | Performed by: UROLOGY

## 2024-12-05 PROCEDURE — 1101F PT FALLS ASSESS-DOCD LE1/YR: CPT | Mod: HCNC,CPTII,S$GLB, | Performed by: UROLOGY

## 2024-12-05 PROCEDURE — 99999 PR PBB SHADOW E&M-EST. PATIENT-LVL III: CPT | Mod: PBBFAC,HCNC,, | Performed by: UROLOGY

## 2024-12-05 PROCEDURE — 1126F AMNT PAIN NOTED NONE PRSNT: CPT | Mod: HCNC,CPTII,S$GLB, | Performed by: UROLOGY

## 2024-12-05 PROCEDURE — 3077F SYST BP >= 140 MM HG: CPT | Mod: HCNC,CPTII,S$GLB, | Performed by: UROLOGY

## 2024-12-05 PROCEDURE — 99213 OFFICE O/P EST LOW 20 MIN: CPT | Mod: HCNC,S$GLB,, | Performed by: UROLOGY

## 2024-12-05 PROCEDURE — 1160F RVW MEDS BY RX/DR IN RCRD: CPT | Mod: HCNC,CPTII,S$GLB, | Performed by: UROLOGY

## 2024-12-05 PROCEDURE — 3288F FALL RISK ASSESSMENT DOCD: CPT | Mod: HCNC,CPTII,S$GLB, | Performed by: UROLOGY

## 2024-12-05 NOTE — PROGRESS NOTES
Chief Complaint:  Left ureteral stone     HPI:   12/05/2024 - returns today for follow-up, no new issues in the interim, no stone episodes, notes some urinary frequency Q 2 hours, has a decent stream and feels like he empties, renal ultrasound shows stable stones and a right-sided complex cyst    02/06/2024 - patient returns today for follow-up, no new issues after surgery, stent came out okay, had a little bit of pain the day after the stent came out but none since then, renal ultrasound shows no hydro, patient has known right-sided renal stones    11/17/2023 - patient returns today for follow-up, has been having lot issues with urgency and frequency due to the stent, overall tolerating the stent pretty well, no new voiding issues, no gross hematuria or dysuria    10/08/2023 - 73 y.o. male with past medical history COPD, chronic bronchitis, diabetes, and complete heart block status post recent pacemaker placement.  Patient has been convalescing in the hospital after his procedure and was noted to have decreasing renal function.  CT scan was obtained which showed a three and 5 mm left proximal ureteral stone.  Patient does have a history of kidney stones, has undergone a procedure in the past but was many years ago.  Has not seen a urologist in quite some time.  He voids with a good stream and feels like he empties well.  He is very uncomfortable appearing.  Denies gross hematuria.  Denies fevers.  Urinalysis negative for infection        PMH:       Past Medical History:   Diagnosis Date    Anxiety      Depression      Diabetes mellitus, type 2      Hypertension      Stroke           PSH:        Past Surgical History:   Procedure Laterality Date    A-V CARDIAC PACEMAKER INSERTION Left 10/4/2023     Procedure: INSERTION, CARDIAC PACEMAKER, DUAL CHAMBER;  Surgeon: Delonte Lindsey MD;  Location: [facility] CATH LAB;  Service: Cardiology;  Laterality: Left;  biotronik    ANGIOGRAPHY OF LOWER EXTREMITY N/A 12/21/2020      Procedure: ANGIOGRAM, LOWER EXTREMITY/Rt leg poss pta/stent;  Surgeon: Todd Michel MD;  Location: [facility] CATH LAB;  Service: Peripheral Vascular;  Laterality: N/A;  rescheduled 12/8    BACK SURGERY   2015    BRONCHOSCOPY Left 3/24/2022     Procedure: Bronchoscopy;  Surgeon: Edward Williamson MD;  Location: [facility] ENDO;  Service: Pulmonary;  Laterality: Left;  poss endobronchial biopsy or brushing    CATARACT EXTRACTION        ESOPHAGOGASTRODUODENOSCOPY N/A 8/24/2022     Procedure: EGD (ESOPHAGOGASTRODUODENOSCOPY);  Surgeon: Ana Gomez MD;  Location: [facility] ENDO;  Service: Endoscopy;  Laterality: N/A;    ESOPHAGOGASTRODUODENOSCOPY N/A 9/14/2022     Procedure: EGD (ESOPHAGOGASTRODUODENOSCOPY);  Surgeon: Ana Gomez MD;  Location: [facility];  Service: Endoscopy;  Laterality: N/A;    KNEE SURGERY   2012    LITHOTRIPSY   2000         Family History:        Family History   Problem Relation Age of Onset    Heart disease Mother      Stroke Mother      Cancer Father      COPD Sister           Social History:  Social History            Tobacco Use    Smoking status: Every Day       Current packs/day: 1.00       Types: Cigarettes    Smokeless tobacco: Current       Types: Snuff   Substance Use Topics    Alcohol use: Yes       Alcohol/week: 1.0 standard drink of alcohol       Types: 1 Shots of liquor per week       Comment: Daily    Drug use: Never         Review of Systems:  General: No fever, chills  Skin: No rashes  Chest:  Denies cough and sputum production  Heart: Denies chest pain  Resp: Denies dyspnea  Abdomen: Denies diarrhea, abdominal pain, hematemesis, or blood in stool.  Musculoskeletal: No joint stiffness or swelling. Denies back pain.  : see HPI  Neuro: no dizziness or weakness     Allergies:  Chantix [varenicline]     Medications:     Current Facility-Administered Medications:     acetaminophen tablet 650 mg, 650 mg, Oral, Q6H PRN, Harry Dalal NP, 650 mg at 10/07/23 0607     albuterol-ipratropium 2.5 mg-0.5 mg/3 mL nebulizer solution 3 mL, 3 mL, Nebulization, Q6H PRN, Harry Dalal NP    [START ON 10/9/2023] apixaban tablet 5 mg, 5 mg, Oral, Daily, Binh Harris MD    atorvastatin tablet 80 mg, 80 mg, Oral, Daily, Harry Dalal NP, 80 mg at 10/08/23 0843    dextrose 10% bolus 125 mL 125 mL, 12.5 g, Intravenous, PRN, Harry Dalal NP    dextrose 10% bolus 250 mL 250 mL, 25 g, Intravenous, PRN, Harry Dalal NP    doxycycline tablet 100 mg, 100 mg, Oral, Q12H, Delonte Lindsey MD, 100 mg at 10/08/23 0844    FLUoxetine capsule 20 mg, 20 mg, Oral, Daily, Harry Dalal NP, 20 mg at 10/08/23 0843    glucagon (human recombinant) injection 1 mg, 1 mg, Intramuscular, PRN, Harry Dalal NP    glucose chewable tablet 16 g, 16 g, Oral, PRN, Harry Dalal NP    glucose chewable tablet 24 g, 24 g, Oral, PRN, Harry Dalal NP    HYDROcodone-acetaminophen  mg per tablet 1 tablet, 1 tablet, Oral, Q6H PRN, Binh Harris MD, 1 tablet at 10/08/23 0843    influenza 65up-adj (QUADRIVALENT ADJUVANTED PF) vaccine 0.5 mL, 0.5 mL, Intramuscular, vaccine x 1 dose, Shanon Bridges MD    insulin aspart U-100 pen 0-10 Units, 0-10 Units, Subcutaneous, QID (AC + HS) PRN, Harry Dalal NP, 1 Units at 10/05/23 2028    levoFLOXacin tablet 750 mg, 750 mg, Oral, Every other day, Binh Harris MD, 750 mg at 10/08/23 0843    melatonin tablet 6 mg, 6 mg, Oral, Nightly PRN, Bouchra Aragon ACNP-BC, 6 mg at 10/05/23 2029    memantine tablet 10 mg, 10 mg, Oral, Daily, Harry Dalal NP, 10 mg at 10/08/23 0843    oxybutynin 24 hr tablet 10 mg, 10 mg, Oral, Daily, Harry Dalal NP, 10 mg at 10/08/23 0844    pantoprazole EC tablet 40 mg, 40 mg, Oral, Daily, Harry Dalal NP, 40 mg at 10/08/23 0844    sodium chloride 0.9% flush 10 mL, 10 mL, Intravenous, PRN, Harry Dalal, NP    tamsulosin 24 hr capsule 0.4 mg,  0.4 mg, Oral, Daily, PumaBinh Garcia MD, 0.4 mg at 10/08/23 0843     Physical Exam:      Vitals:     10/08/23 0843   BP:     Pulse:     Resp: 18   Temp:        Body mass index is 20.37 kg/m².  General: awake, alert, cooperative, very uncomfortable appearing  Head: NC/AT  Ears: external ears normal  Eyes: sclera normal  Lungs: normal inspiration, NAD  Heart: well-perfused  Skin: The skin is warm and dry  Ext: No c/c/e.  Neuro: grossly intact, AOx3     RADIOLOGY:  US RETROPERITONEAL COMPLETE     CLINICAL HISTORY:  [N20.0]-Calculus of kidney.     COMPARISON: Retroperitoneal ultrasound 02/06/2024     TECHNIQUE: Sonographic evaluation of the kidneys and urinary bladder performed using grayscale and color Doppler in addition to spectral Doppler to evaluate renal resistive indices.     FINDINGS:  The right kidney measures 9.5 cm in long axis.  Resistive index measures 0.76.  No cortical thinning or loss in corticomedullary distinction.  No solid mass or hydronephrosis.  There are scattered echogenic foci suggesting nonobstructive stones measuring up to 9 mm.  There is a 2.0 cm cyst with septation no internal vascularity at superior pole.  Finding is similar in size the prior examination, however with possible septal thickening versus artifact on today's exam.     The left kidney measures 9.8 cm in long axis.  Resistive index measures 0.71.  No cortical thinning or loss in corticomedullary distinction.  No solid mass, nephrolithiasis, or hydronephrosis.  Of note, left renal visualization is somewhat limited due to bowel gas and rib shadowing.     The urinary bladder is partially distended at the time of examination and appears unremarkable.     Spine resistive index measures 0.67.        Impression:     Right nephrolithiasis.     2.0 cm mildly complex right renal cyst, with possible septal thickening versus artifact on today's exam.  Possible Bosniak category  IIF.     LABS:  I personally reviewed the following lab  values:        Lab Results   Component Value Date     WBC 6.76 10/08/2023     HGB 8.2 (L) 10/08/2023     HCT 26.5 (L) 10/08/2023      10/08/2023      10/08/2023     K 3.9 10/08/2023      10/08/2023     CREATININE 2.7 (H) 10/08/2023     BUN 14 10/08/2023     CO2 21 (L) 10/08/2023     TSH 1.963 10/02/2023     PSA 1.0 01/03/2022     INR 1.2 10/02/2023     HGBA1C 6.2 (H) 08/04/2023     CHOL 135 08/04/2023     TRIG 172 (H) 08/04/2023     HDL 33 (L) 08/04/2023     ALT 12 10/02/2023     AST 22 10/02/2023         URINALYSIS:  Urinalysis negative for infection     Assessment/Plan:   Samy Alejandre  is a 73 y.o. male with:     Left ureteral stone s/p left URS/LL, stones were CaOx, OMI negative, f/u 6 months with OMI       Jamie Martinez MD  Urology

## 2024-12-12 DIAGNOSIS — K21.9 GASTROESOPHAGEAL REFLUX DISEASE, UNSPECIFIED WHETHER ESOPHAGITIS PRESENT: ICD-10-CM

## 2024-12-12 RX ORDER — PANTOPRAZOLE SODIUM 40 MG/1
40 TABLET, DELAYED RELEASE ORAL
Qty: 30 TABLET | Refills: 0 | Status: SHIPPED | OUTPATIENT
Start: 2024-12-12

## 2024-12-12 NOTE — TELEPHONE ENCOUNTER
No care due was identified.  Health [facility] Embedded Care Due Messages. Reference number: [id number].   12/12/2024 9:07:29 AM CST

## 2024-12-19 ENCOUNTER — OFFICE VISIT (OUTPATIENT)
Dept: OPHTHALMOLOGY | Facility: CLINIC | Age: 74
End: 2024-12-19
Payer: MEDICARE

## 2024-12-19 DIAGNOSIS — Z96.1 PSEUDOPHAKIA OF BOTH EYES: ICD-10-CM

## 2024-12-19 DIAGNOSIS — H43.812 PVD (POSTERIOR VITREOUS DETACHMENT), LEFT: Primary | ICD-10-CM

## 2024-12-19 DIAGNOSIS — H52.7 REFRACTIVE ERRORS: ICD-10-CM

## 2024-12-19 DIAGNOSIS — E11.9 DIABETES MELLITUS TYPE 2 WITHOUT RETINOPATHY: ICD-10-CM

## 2024-12-19 PROCEDURE — 1159F MED LIST DOCD IN RCRD: CPT | Mod: HCNC,CPTII,S$GLB, | Performed by: OPTOMETRIST

## 2024-12-19 PROCEDURE — 92014 COMPRE OPH EXAM EST PT 1/>: CPT | Mod: HCNC,S$GLB,, | Performed by: OPTOMETRIST

## 2024-12-19 PROCEDURE — 2023F DILAT RTA XM W/O RTNOPTHY: CPT | Mod: HCNC,CPTII,S$GLB, | Performed by: OPTOMETRIST

## 2024-12-19 PROCEDURE — 92015 DETERMINE REFRACTIVE STATE: CPT | Mod: HCNC,S$GLB,, | Performed by: OPTOMETRIST

## 2024-12-19 PROCEDURE — 99999 PR PBB SHADOW E&M-EST. PATIENT-LVL III: CPT | Mod: PBBFAC,HCNC,, | Performed by: OPTOMETRIST

## 2024-12-19 NOTE — PROGRESS NOTES
SUBJECTIVE  Samy Alejandre Jr. is 74 y.o. male  Uncorrected distance visual acuity was 20/40 in the right eye and 20/60 in the left eye. Uncorrected near visual acuity was J4 in the right eye and Fix and follow in the left eye.   Chief Complaint   Patient presents with    Diabetic Eye Exam     Diagnosed with diabetes in 2012  Lab Results       Component                Value               Date                       HGBA1C                   6.2 (H)             08/04/2023            Vision changes since last eye exam?: yes. OD has cleared up (black dot gone)     Any eye pain today: no    Other ocular symptoms: yes  spider web for 3 weeks in OS    Interested in contact lens fitting today? no                              HPI     Diabetic Eye Exam     Additional comments: Diagnosed with diabetes in 2012  Lab Results       Component                Value               Date                       HGBA1C                   6.2 (H)             08/04/2023            Vision changes since last eye exam?: yes. OD has cleared up (black dot   gone)     Any eye pain today: no    Other ocular symptoms: yes  spider web for 3 weeks in OS    Interested in contact lens fitting today? no                              Last edited by Chelsie Garrett on 12/19/2024  3:27 PM.         Assessment /Plan :  1. PVD (posterior vitreous detachment), left  Consult Dr. Garvin   Discussed signs and symptoms of retinal detachment.  Informed patient to return to clinic if any worsening of symptoms or decreased vision.    2. Diabetes mellitus type 2 without retinopathy  No Background Diabetic Retinopathy  Strict BG control, f/u w/ PCP, and annual DFE  Stressed importance of DM control to preserve vision     3. Pseudophakia of both eyes  Well-centered, stable IOL OU. Monitor annually.     4. Refractive errors  Dispense Final Rx for glasses.  RTC 1 month with Bharathi  Discussed above and answered questions.

## 2024-12-26 DIAGNOSIS — F01.50 MIXED ALZHEIMER'S AND VASCULAR DEMENTIA: ICD-10-CM

## 2024-12-26 DIAGNOSIS — F02.80 MIXED ALZHEIMER'S AND VASCULAR DEMENTIA: ICD-10-CM

## 2024-12-26 DIAGNOSIS — G30.9 MIXED ALZHEIMER'S AND VASCULAR DEMENTIA: ICD-10-CM

## 2024-12-31 RX ORDER — MEMANTINE HYDROCHLORIDE 10 MG/1
TABLET ORAL
Qty: 180 TABLET | Refills: 0 | Status: SHIPPED | OUTPATIENT
Start: 2024-12-31

## 2024-12-31 NOTE — TELEPHONE ENCOUNTER
Next appt 02/18/2025  Lov 01/16/2024  
Rx request submitted to pharmacy   
Follow up in office in 1 week for blood pressure check and 6 weeks for postpartum visit

## 2025-01-03 ENCOUNTER — CLINICAL SUPPORT (OUTPATIENT)
Dept: CARDIOLOGY | Facility: HOSPITAL | Age: 75
End: 2025-01-03
Attending: INTERNAL MEDICINE
Payer: MEDICARE

## 2025-01-03 ENCOUNTER — CLINICAL SUPPORT (OUTPATIENT)
Dept: CARDIOLOGY | Facility: HOSPITAL | Age: 75
End: 2025-01-03
Payer: MEDICARE

## 2025-01-03 DIAGNOSIS — Z95.0 PRESENCE OF CARDIAC PACEMAKER: ICD-10-CM

## 2025-01-03 PROCEDURE — 93294 REM INTERROG EVL PM/LDLS PM: CPT | Mod: S$GLB,,, | Performed by: INTERNAL MEDICINE

## 2025-01-03 PROCEDURE — 93296 REM INTERROG EVL PM/IDS: CPT | Performed by: INTERNAL MEDICINE

## 2025-01-17 DIAGNOSIS — M25.551 RIGHT HIP PAIN: ICD-10-CM

## 2025-01-17 RX ORDER — QUETIAPINE FUMARATE 50 MG/1
50 TABLET, FILM COATED ORAL 2 TIMES DAILY
Qty: 180 TABLET | Refills: 3 | Status: SHIPPED | OUTPATIENT
Start: 2025-01-17

## 2025-01-17 NOTE — TELEPHONE ENCOUNTER
Care Due:                  Date            Visit Type   Department     Provider  --------------------------------------------------------------------------------                                [facility] FAMILY  Last Visit: 10-      FOLLOW UP    MEDICINE       Damien Barron  Next Visit: None Scheduled  None         None Found                                                            Last  Test          Frequency    Reason                     Performed    Due Date  --------------------------------------------------------------------------------    Office Visit  15 months..  FLUoxetine, pantoprazole,   10-   01-                             rosuvastatin, tamsulosin.    CMP.........  12 months..  rosuvastatin.............  11- 11-    Lipid Panel.  12 months..  rosuvastatin.............  08- 07-    Health [facility] Embedded Care Due Messages. Reference number: [id number].   1/17/2025 8:22:38 AM CST

## 2025-01-18 LAB
OHS CV AF BURDEN PERCENT: < 1
OHS CV DC REMOTE DEVICE TYPE: NORMAL
OHS CV RV PACING PERCENT: 100 %

## 2025-01-23 DIAGNOSIS — K21.9 GASTROESOPHAGEAL REFLUX DISEASE, UNSPECIFIED WHETHER ESOPHAGITIS PRESENT: ICD-10-CM

## 2025-01-23 NOTE — TELEPHONE ENCOUNTER
No care due was identified.  [facility] Embedded Care Due Messages. Reference number: [id number].   1/23/2025 4:53:43 PM CST

## 2025-01-24 RX ORDER — PANTOPRAZOLE SODIUM 40 MG/1
40 TABLET, DELAYED RELEASE ORAL
Qty: 30 TABLET | Refills: 0 | Status: SHIPPED | OUTPATIENT
Start: 2025-01-24

## 2025-01-24 NOTE — TELEPHONE ENCOUNTER
Refill Routing Note   Medication(s) are not appropriate for processing by [facility] for the following reason(s):        Patient not seen by provider within 15 months  ED/Hospital Visit since last OV with provider  New or recently adjusted medication    [facility] action(s):  Defer        Medication Therapy Plan: 90 day ordering not established      Appointments  past 12m or future 3m with PCP    Date Provider   Last Visit   10/23/2023 Damien Barron MD   Next Visit   Visit date not found Damien Barron MD   ED visits in past 90 days: 0        Note composed:11:37 AM 01/24/2025

## 2025-01-25 DIAGNOSIS — E78.2 MIXED HYPERLIPIDEMIA: ICD-10-CM

## 2025-01-25 NOTE — TELEPHONE ENCOUNTER
No care due was identified.  [facility] Embedded Care Due Messages. Reference number: [id number].   1/25/2025 4:25:40 PM CST

## 2025-01-26 DIAGNOSIS — E78.2 MIXED HYPERLIPIDEMIA: ICD-10-CM

## 2025-01-26 RX ORDER — ROSUVASTATIN CALCIUM 20 MG/1
20 TABLET, COATED ORAL DAILY
Qty: 90 TABLET | Refills: 1 | OUTPATIENT
Start: 2025-01-26

## 2025-01-26 NOTE — TELEPHONE ENCOUNTER
No care due was identified.  [facility] Embedded Care Due Messages. Reference number: [id number].   1/26/2025 3:52:31 PM CST

## 2025-01-27 RX ORDER — ROSUVASTATIN CALCIUM 20 MG/1
TABLET, COATED ORAL
Qty: 90 TABLET | Refills: 0 | OUTPATIENT
Start: 2025-01-27

## 2025-01-27 RX ORDER — ROSUVASTATIN CALCIUM 20 MG/1
20 TABLET, COATED ORAL DAILY
Qty: 20 TABLET | Refills: 0 | Status: SHIPPED | OUTPATIENT
Start: 2025-01-27

## 2025-01-29 DIAGNOSIS — E11.9 TYPE 2 DIABETES MELLITUS WITHOUT COMPLICATION: ICD-10-CM

## 2025-01-30 DIAGNOSIS — Z00.00 ENCOUNTER FOR MEDICARE ANNUAL WELLNESS EXAM: ICD-10-CM

## 2025-02-10 ENCOUNTER — PATIENT OUTREACH (OUTPATIENT)
Dept: ADMINISTRATIVE | Facility: HOSPITAL | Age: 75
End: 2025-02-10
Payer: MEDICARE

## 2025-02-18 DIAGNOSIS — F02.80 MIXED ALZHEIMER'S AND VASCULAR DEMENTIA: ICD-10-CM

## 2025-02-18 DIAGNOSIS — K21.9 GASTROESOPHAGEAL REFLUX DISEASE, UNSPECIFIED WHETHER ESOPHAGITIS PRESENT: ICD-10-CM

## 2025-02-18 DIAGNOSIS — N18.30 STAGE 3 CHRONIC KIDNEY DISEASE, UNSPECIFIED WHETHER STAGE 3A OR 3B CKD: ICD-10-CM

## 2025-02-18 DIAGNOSIS — G30.9 MIXED ALZHEIMER'S AND VASCULAR DEMENTIA: ICD-10-CM

## 2025-02-18 DIAGNOSIS — F01.50 MIXED ALZHEIMER'S AND VASCULAR DEMENTIA: ICD-10-CM

## 2025-02-18 DIAGNOSIS — E78.2 MIXED HYPERLIPIDEMIA: ICD-10-CM

## 2025-02-18 RX ORDER — TAMSULOSIN HYDROCHLORIDE 0.4 MG/1
0.4 CAPSULE ORAL NIGHTLY
Qty: 90 CAPSULE | Refills: 3 | Status: SHIPPED | OUTPATIENT
Start: 2025-02-18 | End: 2026-02-18

## 2025-02-18 RX ORDER — ROSUVASTATIN CALCIUM 20 MG/1
20 TABLET, COATED ORAL DAILY
Qty: 90 TABLET | Refills: 0 | Status: SHIPPED | OUTPATIENT
Start: 2025-02-18

## 2025-02-18 RX ORDER — MEMANTINE HYDROCHLORIDE 10 MG/1
10 TABLET ORAL 2 TIMES DAILY
Qty: 180 TABLET | Refills: 0 | Status: SHIPPED | OUTPATIENT
Start: 2025-02-18 | End: 2025-05-19

## 2025-02-18 RX ORDER — PANTOPRAZOLE SODIUM 40 MG/1
40 TABLET, DELAYED RELEASE ORAL DAILY
Qty: 90 TABLET | Refills: 0 | Status: SHIPPED | OUTPATIENT
Start: 2025-02-18

## 2025-02-18 NOTE — TELEPHONE ENCOUNTER
No care due was identified.  [facility] Embedded Care Due Messages. Reference number: [id number].   2/18/2025 12:37:42 PM CST

## 2025-02-19 DIAGNOSIS — Z95.0 CARDIAC PACEMAKER IN SITU: Primary | ICD-10-CM

## 2025-02-19 DIAGNOSIS — I44.2 COMPLETE HEART BLOCK: ICD-10-CM

## 2025-02-19 DIAGNOSIS — R00.1 BRADYCARDIA: ICD-10-CM

## 2025-02-19 NOTE — TELEPHONE ENCOUNTER
Refill Routing Note   Medication(s) are not appropriate for processing by [facility] for the following reason(s):        Patient not seen by provider within 15 months  ED/Hospital Visit since last OV with provider  Required labs outdated    [facility] action(s):  Defer               Appointments  past 12m or future 3m with PCP    Date Provider   Last Visit   10/23/2023 Damien Barron MD   Next Visit   3/14/2025 Damien Barron MD   ED visits in past 90 days: 0        Note composed:8:14 PM 02/18/2025

## 2025-02-27 DIAGNOSIS — K21.9 GASTROESOPHAGEAL REFLUX DISEASE, UNSPECIFIED WHETHER ESOPHAGITIS PRESENT: ICD-10-CM

## 2025-02-27 DIAGNOSIS — I69.30 HISTORY OF CVA WITH RESIDUAL DEFICIT: Primary | ICD-10-CM

## 2025-02-28 RX ORDER — CLOPIDOGREL BISULFATE 75 MG/1
75 TABLET ORAL DAILY
Qty: 30 TABLET | Refills: 11 | Status: SHIPPED | OUTPATIENT
Start: 2025-02-28 | End: 2026-02-28

## 2025-02-28 RX ORDER — PANTOPRAZOLE SODIUM 40 MG/1
40 TABLET, DELAYED RELEASE ORAL DAILY
Qty: 90 TABLET | Refills: 0 | Status: SHIPPED | OUTPATIENT
Start: 2025-02-28

## 2025-02-28 NOTE — TELEPHONE ENCOUNTER
Refill Routing Note   Medication(s) are not appropriate for processing by [facility] for the following reason(s):        New or recently adjusted medication  Patient not seen by provider within 15 months  ED/Hospital Visit since last OV with provider    [facility] action(s):  Defer             Appointments  past 12m or future 3m with PCP    Date Provider   Last Visit   10/23/2023 Damien Barron MD   Next Visit   3/14/2025 Damien Barron MD   ED visits in past 90 days: 0        Note composed:8:32 AM 02/28/2025

## 2025-02-28 NOTE — TELEPHONE ENCOUNTER
No care due was identified.  [facility] Embedded Care Due Messages. Reference number: [id number].   2/27/2025 7:02:43 PM CST

## 2025-04-04 ENCOUNTER — CLINICAL SUPPORT (OUTPATIENT)
Dept: CARDIOLOGY | Facility: HOSPITAL | Age: 75
End: 2025-04-04
Attending: INTERNAL MEDICINE
Payer: MEDICARE

## 2025-04-04 ENCOUNTER — CLINICAL SUPPORT (OUTPATIENT)
Dept: CARDIOLOGY | Facility: HOSPITAL | Age: 75
End: 2025-04-04
Payer: MEDICARE

## 2025-04-04 DIAGNOSIS — Z95.0 PRESENCE OF CARDIAC PACEMAKER: ICD-10-CM

## 2025-04-04 PROCEDURE — 93294 REM INTERROG EVL PM/LDLS PM: CPT | Mod: HCNC,S$GLB,, | Performed by: INTERNAL MEDICINE

## 2025-04-04 PROCEDURE — 93296 REM INTERROG EVL PM/IDS: CPT | Mod: HCNC | Performed by: INTERNAL MEDICINE

## 2025-04-15 ENCOUNTER — PATIENT OUTREACH (OUTPATIENT)
Dept: ADMINISTRATIVE | Facility: HOSPITAL | Age: 75
End: 2025-04-15
Payer: MEDICARE

## 2025-04-15 NOTE — PROGRESS NOTES
VBHM Score: 5     Colon Cancer Screening  Urine Screening  Hemoglobin A1c  Lipid Panel  Foot Exam    Influenza Vaccine  Shingles/Zoster Vaccine  RSV Vaccine           Additional Notes:  Attempted to contact the patient to schedule overdue annual exam with PCP and overdue HM screenings, left voicemail.

## 2025-04-19 LAB
OHS CV AF BURDEN PERCENT: < 1
OHS CV DC REMOTE DEVICE TYPE: NORMAL
OHS CV RV PACING PERCENT: 100 %

## 2025-05-03 NOTE — [employer] PATIENT INSTRUCTIONS
Discharge Summary/Instructions after an Endoscopic Procedure  Patient Name: Samy Alejandre  Patient MRN: [id number]  Patient YOB: 1950 Wednesday, September 14, 2022 Ana Gomez MD  Dear patient,  As a result of recent federal legislation (The Federal Cures Act), you may   receive lab or pathology results from your procedure in your [facility]   account before your physician is able to contact you. Your physician or   their representative will relay the results to you with their   recommendations at their soonest availability.  Thank you,  RESTRICTIONS:  During your procedure today, you received medications for sedation.  These   medications may affect your judgment, balance and coordination.  Therefore,   for 24 hours, you have the following restrictions:   - DO NOT drive a car, operate machinery, make legal/financial decisions,   sign important papers or drink alcohol.    ACTIVITY:  Today: no heavy lifting, straining or running due to procedural   sedation/anesthesia.  The following day: return to full activity including work.  DIET:  Eat and drink normally unless instructed otherwise.     TREATMENT FOR COMMON SIDE EFFECTS:  - Mild abdominal pain, nausea, belching, bloating or excessive gas:  rest,   eat lightly and use a heating pad.  - Sore Throat: treat with throat lozenges and/or gargle with warm salt   water.  - Because air was used during the procedure, expelling large amounts of air   from your rectum or belching is normal.  - If a bowel prep was taken, you may not have a bowel movement for 1-3 days.    This is normal.  SYMPTOMS TO WATCH FOR AND REPORT TO YOUR PHYSICIAN:  1. Abdominal pain or bloating, other than gas cramps.  2. Chest pain.  3. Back pain.  4. Signs of infection such as: chills or fever occurring within 24 hours   after the procedure.  5. Rectal bleeding, which would show as bright red, maroon, or black stools.   (A tablespoon of blood from the rectum is not serious,  especially if   hemorrhoids are present.)  6. Vomiting.  7. Weakness or dizziness.  GO DIRECTLY TO THE NEAREST EMERGENCY ROOM IF YOU HAVE ANY OF THE FOLLOWING:      Difficulty breathing              Chills and/or fever over 101 F   Persistent vomiting and/or vomiting blood   Severe abdominal pain   Severe chest pain   Black, tarry stools   Bleeding- more than one tablespoon   Any other symptom or condition that you feel may need urgent attention  Your doctor recommends these additional instructions:  If any biopsies were taken, your doctors clinic will contact you in 1 to 2   weeks with any results.  - Discharge patient to home (via wheelchair).   - Resume previous diet.   - Continue present medications.   - Await pathology results.   - Telephone GI clinic for pathology results in 2 weeks.   - Patient has a contact number available for emergencies.  The signs and   symptoms of potential delayed complications were discussed with the   patient.  Return to normal activities tomorrow.  Written discharge   instructions were provided to the patient.  For questions, problems or results please call your physician Ana Gomez MD at Work:  [phone]  If you have any questions about the above instructions, call the GI   department at [phone] or call the endoscopy unit at [phone]   from 7am until 3 pm.  [facility], EMERGENCY ROOM PHONE NUMBER:   [phone]  IF A COMPLICATION OR EMERGENCY SITUATION ARISES AND YOU ARE UNABLE TO REACH   YOUR PHYSICIAN - GO DIRECTLY TO THE EMERGENCY ROOM.  I have read or have had read to me these discharge instructions for my   procedure and have received a written copy.  I understand these   instructions and will follow-up with my physician if I have any questions.     __________________________________       _____________________________________  Nurse Signature                                          Patient/Designated   Responsible Party  Signature  MD Ana Soliz MD  9/14/2022 12:27:55 PM  [employer]   83

## 2025-05-07 ENCOUNTER — PATIENT OUTREACH (OUTPATIENT)
Dept: ADMINISTRATIVE | Facility: HOSPITAL | Age: 75
End: 2025-05-07
Payer: MEDICARE

## 2025-06-05 ENCOUNTER — APPOINTMENT (OUTPATIENT)
Dept: RADIOLOGY | Facility: HOSPITAL | Age: 75
End: 2025-06-05
Attending: UROLOGY
Payer: MEDICARE

## 2025-06-05 DIAGNOSIS — N20.0 KIDNEY STONE: ICD-10-CM

## 2025-06-05 PROCEDURE — 76770 US EXAM ABDO BACK WALL COMP: CPT | Mod: TC,HCNC,PO

## 2025-06-05 PROCEDURE — 76770 US EXAM ABDO BACK WALL COMP: CPT | Mod: 26,HCNC,, | Performed by: RADIOLOGY

## 2025-06-12 ENCOUNTER — HOSPITAL ENCOUNTER (OUTPATIENT)
Dept: RADIOLOGY | Facility: HOSPITAL | Age: 75
Discharge: HOME | End: 2025-06-12
Attending: UROLOGY
Payer: MEDICARE

## 2025-06-12 ENCOUNTER — OFFICE VISIT (OUTPATIENT)
Dept: UROLOGY | Facility: CLINIC | Age: 75
End: 2025-06-12
Payer: MEDICARE

## 2025-06-12 VITALS
BODY MASS INDEX: 23.48 KG/M2 | HEART RATE: 80 BPM | SYSTOLIC BLOOD PRESSURE: 125 MMHG | WEIGHT: 149.94 LBS | RESPIRATION RATE: 16 BRPM | DIASTOLIC BLOOD PRESSURE: 76 MMHG

## 2025-06-12 DIAGNOSIS — N20.0 KIDNEY STONE: ICD-10-CM

## 2025-06-12 DIAGNOSIS — N20.0 KIDNEY STONE: Primary | ICD-10-CM

## 2025-06-12 PROCEDURE — 1159F MED LIST DOCD IN RCRD: CPT | Mod: CPTII,S$GLB,, | Performed by: UROLOGY

## 2025-06-12 PROCEDURE — 3074F SYST BP LT 130 MM HG: CPT | Mod: CPTII,S$GLB,, | Performed by: UROLOGY

## 2025-06-12 PROCEDURE — 74018 RADEX ABDOMEN 1 VIEW: CPT | Mod: 26,HCNC,, | Performed by: RADIOLOGY

## 2025-06-12 PROCEDURE — 99214 OFFICE O/P EST MOD 30 MIN: CPT | Mod: S$GLB,,, | Performed by: UROLOGY

## 2025-06-12 PROCEDURE — 3288F FALL RISK ASSESSMENT DOCD: CPT | Mod: CPTII,S$GLB,, | Performed by: UROLOGY

## 2025-06-12 PROCEDURE — 3008F BODY MASS INDEX DOCD: CPT | Mod: CPTII,S$GLB,, | Performed by: UROLOGY

## 2025-06-12 PROCEDURE — 74018 RADEX ABDOMEN 1 VIEW: CPT | Mod: TC,HCNC

## 2025-06-12 PROCEDURE — 1126F AMNT PAIN NOTED NONE PRSNT: CPT | Mod: CPTII,S$GLB,, | Performed by: UROLOGY

## 2025-06-12 PROCEDURE — 3078F DIAST BP <80 MM HG: CPT | Mod: CPTII,S$GLB,, | Performed by: UROLOGY

## 2025-06-12 PROCEDURE — 3072F LOW RISK FOR RETINOPATHY: CPT | Mod: CPTII,S$GLB,, | Performed by: UROLOGY

## 2025-06-12 PROCEDURE — 99999 PR PBB SHADOW E&M-EST. PATIENT-LVL IV: CPT | Mod: PBBFAC,,, | Performed by: UROLOGY

## 2025-06-12 PROCEDURE — 1160F RVW MEDS BY RX/DR IN RCRD: CPT | Mod: CPTII,S$GLB,, | Performed by: UROLOGY

## 2025-06-12 PROCEDURE — 1101F PT FALLS ASSESS-DOCD LE1/YR: CPT | Mod: CPTII,S$GLB,, | Performed by: UROLOGY

## 2025-06-12 NOTE — PROGRESS NOTES
Chief Complaint:  Left ureteral stone     HPI:   06/12/2025 - patient returns today for follow-up, no new issues in the interim, no stone episodes, renal ultrasound shows a new left lower pole stone, stable right-sided stone, no hematuria or dysuria    12/05/2024 - returns today for follow-up, no new issues in the interim, no stone episodes, notes some urinary frequency Q 2 hours, has a decent stream and feels like he empties, renal ultrasound shows stable stones and a right-sided complex cyst    02/06/2024 - patient returns today for follow-up, no new issues after surgery, stent came out okay, had a little bit of pain the day after the stent came out but none since then, renal ultrasound shows no hydro, patient has known right-sided renal stones    11/17/2023 - patient returns today for follow-up, has been having lot issues with urgency and frequency due to the stent, overall tolerating the stent pretty well, no new voiding issues, no gross hematuria or dysuria    10/08/2023 - 73 y.o. male with past medical history COPD, chronic bronchitis, diabetes, and complete heart block status post recent pacemaker placement.  Patient has been convalescing in the hospital after his procedure and was noted to have decreasing renal function.  CT scan was obtained which showed a three and 5 mm left proximal ureteral stone.  Patient does have a history of kidney stones, has undergone a procedure in the past but was many years ago.  Has not seen a urologist in quite some time.  He voids with a good stream and feels like he empties well.  He is very uncomfortable appearing.  Denies gross hematuria.  Denies fevers.  Urinalysis negative for infection        PMH:       Past Medical History:   Diagnosis Date    Anxiety      Depression      Diabetes mellitus, type 2      Hypertension      Stroke           PSH:        Past Surgical History:   Procedure Laterality Date    A-V CARDIAC PACEMAKER INSERTION Left 10/4/2023     Procedure:  INSERTION, CARDIAC PACEMAKER, DUAL CHAMBER;  Surgeon: Delonte Lindsey MD;  Location: [facility] CATH LAB;  Service: Cardiology;  Laterality: Left;  biotronik    ANGIOGRAPHY OF LOWER EXTREMITY N/A 12/21/2020     Procedure: ANGIOGRAM, LOWER EXTREMITY/Rt leg poss pta/stent;  Surgeon: Todd Michel MD;  Location: [facility] CATH LAB;  Service: Peripheral Vascular;  Laterality: N/A;  rescheduled 12/8    BACK SURGERY   2015    BRONCHOSCOPY Left 3/24/2022     Procedure: Bronchoscopy;  Surgeon: Edward Williamson MD;  Location: [facility] ENDO;  Service: Pulmonary;  Laterality: Left;  poss endobronchial biopsy or brushing    CATARACT EXTRACTION        ESOPHAGOGASTRODUODENOSCOPY N/A 8/24/2022     Procedure: EGD (ESOPHAGOGASTRODUODENOSCOPY);  Surgeon: Ana Gomez MD;  Location: [facility];  Service: Endoscopy;  Laterality: N/A;    ESOPHAGOGASTRODUODENOSCOPY N/A 9/14/2022     Procedure: EGD (ESOPHAGOGASTRODUODENOSCOPY);  Surgeon: Ana Gomez MD;  Location: [facility];  Service: Endoscopy;  Laterality: N/A;    KNEE SURGERY   2012    LITHOTRIPSY   2000         Family History:        Family History   Problem Relation Age of Onset    Heart disease Mother      Stroke Mother      Cancer Father      COPD Sister           Social History:  Social History            Tobacco Use    Smoking status: Every Day       Current packs/day: 1.00       Types: Cigarettes    Smokeless tobacco: Current       Types: Snuff   Substance Use Topics    Alcohol use: Yes       Alcohol/week: 1.0 standard drink of alcohol       Types: 1 Shots of liquor per week       Comment: Daily    Drug use: Never         Review of Systems:  General: No fever, chills  Skin: No rashes  Chest:  Denies cough and sputum production  Heart: Denies chest pain  Resp: Denies dyspnea  Abdomen: Denies diarrhea, abdominal pain, hematemesis, or blood in stool.  Musculoskeletal: No joint stiffness or swelling. Denies back pain.  : see HPI  Neuro: no dizziness or weakness      Allergies:  Chantix [varenicline]     Medications:     Current Facility-Administered Medications:     acetaminophen tablet 650 mg, 650 mg, Oral, Q6H PRN, Harry Dalal NP, 650 mg at 10/07/23 0607    albuterol-ipratropium 2.5 mg-0.5 mg/3 mL nebulizer solution 3 mL, 3 mL, Nebulization, Q6H PRN, Harry Dalal NP    [START ON 10/9/2023] apixaban tablet 5 mg, 5 mg, Oral, Daily, Binh Harris MD    atorvastatin tablet 80 mg, 80 mg, Oral, Daily, Harry Dalal NP, 80 mg at 10/08/23 0843    dextrose 10% bolus 125 mL 125 mL, 12.5 g, Intravenous, PRN, Harry Dalal NP    dextrose 10% bolus 250 mL 250 mL, 25 g, Intravenous, PRN, Harry Dalal NP    doxycycline tablet 100 mg, 100 mg, Oral, Q12H, Delonte Lindsey MD, 100 mg at 10/08/23 0844    FLUoxetine capsule 20 mg, 20 mg, Oral, Daily, Harry Dalal NP, 20 mg at 10/08/23 0843    glucagon (human recombinant) injection 1 mg, 1 mg, Intramuscular, PRN, Harry Dalal NP    glucose chewable tablet 16 g, 16 g, Oral, PRN, Harry Dalal NP    glucose chewable tablet 24 g, 24 g, Oral, PRN, Harry Dalal NP    HYDROcodone-acetaminophen  mg per tablet 1 tablet, 1 tablet, Oral, Q6H PRN, Binh Harris MD, 1 tablet at 10/08/23 0843    influenza 65up-adj (QUADRIVALENT ADJUVANTED PF) vaccine 0.5 mL, 0.5 mL, Intramuscular, vaccine x 1 dose, Shanon Bridges MD    insulin aspart U-100 pen 0-10 Units, 0-10 Units, Subcutaneous, QID (AC + HS) PRN, Harry Dalal NP, 1 Units at 10/05/23 2028    levoFLOXacin tablet 750 mg, 750 mg, Oral, Every other day, Binh Harris MD, 750 mg at 10/08/23 0843    melatonin tablet 6 mg, 6 mg, Oral, Nightly PRN, Bouchra Aragon ACNP-BC, 6 mg at 10/05/23 2029    memantine tablet 10 mg, 10 mg, Oral, Daily, Harry Dalal NP, 10 mg at 10/08/23 0843    oxybutynin 24 hr tablet 10 mg, 10 mg, Oral, Daily, Harry Dalal NP, 10 mg at 10/08/23 0844     pantoprazole EC tablet 40 mg, 40 mg, Oral, Daily, Harry Dalal NP, 40 mg at 10/08/23 0844    sodium chloride 0.9% flush 10 mL, 10 mL, Intravenous, PRN, Harry Dalal, NP    tamsulosin 24 hr capsule 0.4 mg, 0.4 mg, Oral, Daily, Binh Harris MD, 0.4 mg at 10/08/23 0843     Physical Exam:      Vitals:     10/08/23 0843   BP:     Pulse:     Resp: 18   Temp:        Body mass index is 20.37 kg/m².  General: awake, alert, cooperative, very uncomfortable appearing  Head: NC/AT  Ears: external ears normal  Eyes: sclera normal  Lungs: normal inspiration, NAD  Heart: well-perfused  Skin: The skin is warm and dry  Ext: No c/c/e.  Neuro: grossly intact, AOx3     RADIOLOGY:  US RETROPERITONEAL COMPLETE     CLINICAL HISTORY:  [N20.0]-Calculus of kidney.     COMPARISON: Retroperitoneal ultrasound 02/06/2024     TECHNIQUE: Sonographic evaluation of the kidneys and urinary bladder performed using grayscale and color Doppler in addition to spectral Doppler to evaluate renal resistive indices.     FINDINGS:  The right kidney measures 9.5 cm in long axis.  Resistive index measures 0.76.  No cortical thinning or loss in corticomedullary distinction.  No solid mass or hydronephrosis.  There are scattered echogenic foci suggesting nonobstructive stones measuring up to 9 mm.  There is a 2.0 cm cyst with septation no internal vascularity at superior pole.  Finding is similar in size the prior examination, however with possible septal thickening versus artifact on today's exam.     The left kidney measures 9.8 cm in long axis.  Resistive index measures 0.71.  No cortical thinning or loss in corticomedullary distinction.  No solid mass, nephrolithiasis, or hydronephrosis.  Of note, left renal visualization is somewhat limited due to bowel gas and rib shadowing.     The urinary bladder is partially distended at the time of examination and appears unremarkable.     Spine resistive index measures 0.67.        Impression:      Right nephrolithiasis.     2.0 cm mildly complex right renal cyst, with possible septal thickening versus artifact on today's exam.  Possible Bosniak category  IIF.     LABS:  I personally reviewed the following lab values:        Lab Results   Component Value Date     WBC 6.76 10/08/2023     HGB 8.2 (L) 10/08/2023     HCT 26.5 (L) 10/08/2023      10/08/2023      10/08/2023     K 3.9 10/08/2023      10/08/2023     CREATININE 2.7 (H) 10/08/2023     BUN 14 10/08/2023     CO2 21 (L) 10/08/2023     TSH 1.963 10/02/2023     PSA 1.0 01/03/2022     INR 1.2 10/02/2023     HGBA1C 6.2 (H) 08/04/2023     CHOL 135 08/04/2023     TRIG 172 (H) 08/04/2023     HDL 33 (L) 08/04/2023     ALT 12 10/02/2023     AST 22 10/02/2023         URINALYSIS:  Urinalysis negative for infection     Assessment/Plan:   Samy Alejandre Jr. is a 75 y.o.male with:     Left ureteral stone s/p left URS/LL, stones were CaOx, OMI shows a new left-sided stone, obtain CT and KUB to evaluate if patient's stones are amenable to ESWL, f/u after to review and schedule    Jamie Martinze MD  Urology

## 2025-07-01 ENCOUNTER — PATIENT OUTREACH (OUTPATIENT)
Dept: ADMINISTRATIVE | Facility: HOSPITAL | Age: 75
End: 2025-07-01
Payer: MEDICARE

## 2025-07-04 ENCOUNTER — CLINICAL SUPPORT (OUTPATIENT)
Dept: CARDIOLOGY | Facility: HOSPITAL | Age: 75
End: 2025-07-04
Payer: MEDICARE

## 2025-07-04 DIAGNOSIS — Z95.0 PRESENCE OF CARDIAC PACEMAKER: ICD-10-CM

## 2025-07-04 PROCEDURE — 93294 REM INTERROG EVL PM/LDLS PM: CPT | Mod: S$GLB,,, | Performed by: INTERNAL MEDICINE

## 2025-07-05 ENCOUNTER — CLINICAL SUPPORT (OUTPATIENT)
Dept: CARDIOLOGY | Facility: HOSPITAL | Age: 75
End: 2025-07-05
Attending: INTERNAL MEDICINE
Payer: MEDICARE

## 2025-07-05 DIAGNOSIS — Z95.0 PRESENCE OF CARDIAC PACEMAKER: ICD-10-CM

## 2025-07-05 PROCEDURE — 93296 REM INTERROG EVL PM/IDS: CPT | Performed by: INTERNAL MEDICINE

## 2025-07-14 ENCOUNTER — PATIENT OUTREACH (OUTPATIENT)
Dept: ADMINISTRATIVE | Facility: HOSPITAL | Age: 75
End: 2025-07-14
Payer: MEDICARE

## 2025-07-15 ENCOUNTER — PATIENT OUTREACH (OUTPATIENT)
Dept: ADMINISTRATIVE | Facility: HOSPITAL | Age: 75
End: 2025-07-15
Payer: MEDICARE

## 2025-07-15 NOTE — PROGRESS NOTES
Attempted to contact the patient to schedule overdue annual exam with PCP and overdue HM screenings, left voicemail.

## 2025-07-16 DIAGNOSIS — G30.9 MIXED ALZHEIMER'S AND VASCULAR DEMENTIA: ICD-10-CM

## 2025-07-16 DIAGNOSIS — F02.80 MIXED ALZHEIMER'S AND VASCULAR DEMENTIA: ICD-10-CM

## 2025-07-16 DIAGNOSIS — F01.50 MIXED ALZHEIMER'S AND VASCULAR DEMENTIA: ICD-10-CM

## 2025-07-17 RX ORDER — MEMANTINE HYDROCHLORIDE 10 MG/1
10 TABLET ORAL 2 TIMES DAILY
Qty: 180 TABLET | Refills: 0 | Status: SHIPPED | OUTPATIENT
Start: 2025-07-17

## 2025-07-21 ENCOUNTER — PATIENT OUTREACH (OUTPATIENT)
Dept: ADMINISTRATIVE | Facility: HOSPITAL | Age: 75
End: 2025-07-21
Payer: MEDICARE

## 2025-07-21 NOTE — PROGRESS NOTES
Working BR COLON REPORT: Chart searched, Pt is due for colorectal cancer screening, Attempted to contact patient, no answer, left voicemail.

## 2025-07-22 ENCOUNTER — TELEPHONE (OUTPATIENT)
Dept: FAMILY MEDICINE | Facility: CLINIC | Age: 75
End: 2025-07-22
Payer: MEDICARE

## 2025-07-22 DIAGNOSIS — Z12.5 SCREENING PSA (PROSTATE SPECIFIC ANTIGEN): ICD-10-CM

## 2025-07-22 DIAGNOSIS — Z13.220 LIPID SCREENING: Primary | ICD-10-CM

## 2025-07-22 DIAGNOSIS — H92.09 EAR ACHE: ICD-10-CM

## 2025-07-22 DIAGNOSIS — Z79.899 ENCOUNTER FOR LONG-TERM (CURRENT) USE OF MEDICATIONS: ICD-10-CM

## 2025-07-25 DIAGNOSIS — G62.9 NEUROPATHY: ICD-10-CM

## 2025-07-25 RX ORDER — GABAPENTIN 100 MG/1
100 CAPSULE ORAL
Qty: 30 CAPSULE | Refills: 0 | Status: SHIPPED | OUTPATIENT
Start: 2025-07-25

## 2025-07-25 NOTE — TELEPHONE ENCOUNTER
No care due was identified.  [facility] Embedded Care Due Messages. Reference number: [id number].   7/25/2025 1:25:24 AM CDT

## 2025-07-28 ENCOUNTER — PATIENT MESSAGE (OUTPATIENT)
Dept: OTOLARYNGOLOGY | Facility: CLINIC | Age: 75
End: 2025-07-28
Payer: MEDICARE

## 2025-07-28 LAB
OHS CV AF BURDEN PERCENT: < 1
OHS CV DC REMOTE DEVICE TYPE: NORMAL
OHS CV RV PACING PERCENT: 100 %

## 2025-07-31 ENCOUNTER — OFFICE VISIT (OUTPATIENT)
Dept: OTOLARYNGOLOGY | Facility: CLINIC | Age: 75
End: 2025-07-31
Payer: MEDICARE

## 2025-07-31 VITALS — HEIGHT: 67 IN | BODY MASS INDEX: 23.48 KG/M2

## 2025-07-31 DIAGNOSIS — H61.21 IMPACTED CERUMEN OF RIGHT EAR: ICD-10-CM

## 2025-07-31 DIAGNOSIS — H92.01 OTALGIA OF RIGHT EAR: ICD-10-CM

## 2025-07-31 DIAGNOSIS — H60.391 OTHER INFECTIVE ACUTE OTITIS EXTERNA OF RIGHT EAR: ICD-10-CM

## 2025-07-31 DIAGNOSIS — B36.9 OTOMYCOSIS OF RIGHT EAR: Primary | ICD-10-CM

## 2025-07-31 DIAGNOSIS — H62.41 OTOMYCOSIS OF RIGHT EAR: Primary | ICD-10-CM

## 2025-07-31 DIAGNOSIS — H92.11 DRAINAGE FROM RIGHT EAR: ICD-10-CM

## 2025-07-31 PROCEDURE — 99999 PR PBB SHADOW E&M-EST. PATIENT-LVL III: CPT | Mod: PBBFAC,HCNC,,

## 2025-07-31 PROCEDURE — 87070 CULTURE OTHR SPECIMN AEROBIC: CPT | Mod: HCNC

## 2025-07-31 RX ORDER — CLOTRIMAZOLE 1 G/ML
SOLUTION TOPICAL
Qty: 10 ML | Refills: 0 | Status: SHIPPED | OUTPATIENT
Start: 2025-07-31

## 2025-07-31 NOTE — PROGRESS NOTES
"Subjective:   Patient ID: Samy Alejandre Jr. is a 75 y.o. male.    Chief Complaint: Ear Fullness (Patient presents to the clinic for bilateral ear fullness, mostly R. He does state he has ear pain. Daughter states she has been using debrox.)    Patient is a pleasant 75-year-old male presenting for right ear pain.  Daughter is here and help acts as historian.  He has been having ear pain for about 3-4 weeks and ear fullness.  She tried to clean it out a couple weeks ago and got black debris out of his ear.  They have not tried any drops. He does not have history of recurrent ear infections.    Ear Fullness   Associated symptoms include ear discharge and hearing loss.     Review of patient's allergies indicates:   Allergen Reactions    Chantix [varenicline] Other (See Comments)     Felt bad           Review of Systems   HENT:  Positive for ear discharge, ear pain and hearing loss.          Objective:   Ht 5' 7" (1.702 m)   BMI 23.48 kg/m²     Physical Exam  Constitutional:       Appearance: Normal appearance.   HENT:      Head: Normocephalic and atraumatic.      Right Ear: External ear normal. Drainage (black and white cottage cheese like drainage suctioned with 7Fr. likely fungal OE.) present. There is impacted cerumen.      Left Ear: Tympanic membrane, ear canal and external ear normal. There is no impacted cerumen.      Nose: Nose normal.      Mouth/Throat:      Mouth: Mucous membranes are moist.   Pulmonary:      Effort: Pulmonary effort is normal. No respiratory distress.   Musculoskeletal:      Cervical back: Normal range of motion.   Neurological:      General: No focal deficit present.      Mental Status: He is alert.   Psychiatric:         Mood and Affect: Mood normal.         Behavior: Behavior normal. Behavior is cooperative.        Procedure Note    CHIEF COMPLAINT:  Cerumen Impaction/drainage    Description:  The patient was seated in an exam chair.  An ear speculum was placed in the right EAC and was " examined under the microscope.  Suction and/or loop curettes were used to remove a large cerumen mixed with fungal debris.  The tympanic membrane was not visualized due to debris.   The patient tolerated the procedure well.     Assessment:     1. Otomycosis of right ear    2. Other infective acute otitis externa of right ear    3. Drainage from right ear    4. Otalgia of right ear        Plan:     Otomycosis of right ear    Other infective acute otitis externa of right ear  -     Aerobic culture  -     clotrimazole (LOTRIMIN) 1 % Soln; Apply to affected ear twice daily  Dispense: 10 mL; Refill: 0    Drainage from right ear    Otalgia of right ear      - Suctioned a large amount of purulent drainage mixed with cerumen today. Patient had relief following removal.   - culture taken today. Will call with results.  - recommend we start clotrimazole drops  - 2 week FU or sooner if needed  - follow dry ear precautions

## 2025-08-01 ENCOUNTER — TELEPHONE (OUTPATIENT)
Dept: OTOLARYNGOLOGY | Facility: CLINIC | Age: 75
End: 2025-08-01
Payer: MEDICARE

## 2025-08-01 NOTE — TELEPHONE ENCOUNTER
Copied from CRM #[id number]. Topic: Medications - Medication Status Check   >> Aug 1, 2025 12:53 PM Rena wrote:  .Type:  Needs Medical Advice    Who Called: swapna (pt daughter)  Symptoms (please be specific):    How long has patient had these symptoms:    Pharmacy name and phone #:    Would the patient rather a call back or a response via [facility]? Call back  Best Call Back Number: .[phone]  Additional Information: pt daughter is calling to check the status of the pt is ear drop. The pharmacy states they have not received the script please resend script

## 2025-08-01 NOTE — TELEPHONE ENCOUNTER
Clotrimazole soln 1% apply to affected ear bid 0rf per JOSH Moran, PAC called to [facility] in [place]. lmom

## 2025-08-02 LAB — BACTERIA SPEC AEROBE CULT: ABNORMAL

## 2025-08-04 ENCOUNTER — TELEPHONE (OUTPATIENT)
Dept: OTOLARYNGOLOGY | Facility: CLINIC | Age: 75
End: 2025-08-04
Payer: MEDICARE

## 2025-08-04 ENCOUNTER — PATIENT MESSAGE (OUTPATIENT)
Dept: OTOLARYNGOLOGY | Facility: CLINIC | Age: 75
End: 2025-08-04
Payer: MEDICARE

## 2025-08-04 DIAGNOSIS — H60.391 OTHER INFECTIVE ACUTE OTITIS EXTERNA OF RIGHT EAR: Primary | ICD-10-CM

## 2025-08-04 DIAGNOSIS — H60.391 OTHER INFECTIVE ACUTE OTITIS EXTERNA OF RIGHT EAR: ICD-10-CM

## 2025-08-04 RX ORDER — CLOTRIMAZOLE 1 G/ML
SOLUTION TOPICAL
Qty: 10 ML | Refills: 0 | Status: SHIPPED | OUTPATIENT
Start: 2025-08-04

## 2025-08-04 RX ORDER — AMOXICILLIN AND CLAVULANATE POTASSIUM 875; 125 MG/1; MG/1
1 TABLET, FILM COATED ORAL 2 TIMES DAILY
Qty: 20 TABLET | Refills: 0 | Status: SHIPPED | OUTPATIENT
Start: 2025-08-04 | End: 2025-08-14

## 2025-08-04 NOTE — TELEPHONE ENCOUNTER
Called daughter & LVM to let her know his culture grew enterococcus. Recommend she not  previous drop sent to pharmacy that she has been unable to . I will start oral antibiotics & send culture results to [facility].

## 2025-08-05 ENCOUNTER — LAB VISIT (OUTPATIENT)
Dept: LAB | Facility: HOSPITAL | Age: 75
End: 2025-08-05
Attending: FAMILY MEDICINE
Payer: MEDICARE

## 2025-08-05 ENCOUNTER — TELEPHONE (OUTPATIENT)
Dept: OTOLARYNGOLOGY | Facility: CLINIC | Age: 75
End: 2025-08-05
Payer: MEDICARE

## 2025-08-05 DIAGNOSIS — Z13.220 LIPID SCREENING: ICD-10-CM

## 2025-08-05 DIAGNOSIS — Z12.5 SCREENING PSA (PROSTATE SPECIFIC ANTIGEN): ICD-10-CM

## 2025-08-05 DIAGNOSIS — Z79.899 ENCOUNTER FOR LONG-TERM (CURRENT) USE OF MEDICATIONS: ICD-10-CM

## 2025-08-05 LAB
ALBUMIN SERPL BCP-MCNC: 3.3 G/DL (ref 3.5–5.2)
ALP SERPL-CCNC: 137 UNIT/L (ref 40–150)
ALT SERPL W/O P-5'-P-CCNC: 16 UNIT/L (ref 0–55)
ANION GAP (OHS): 14 MMOL/L (ref 8–16)
AST SERPL-CCNC: 36 UNIT/L (ref 0–50)
BILIRUB SERPL-MCNC: 0.1 MG/DL (ref 0.1–1)
BUN SERPL-MCNC: 19 MG/DL (ref 8–23)
CALCIUM SERPL-MCNC: 8.7 MG/DL (ref 8.7–10.5)
CHLORIDE SERPL-SCNC: 111 MMOL/L (ref 95–110)
CHOLEST SERPL-MCNC: 117 MG/DL (ref 120–199)
CHOLEST/HDLC SERPL: 3.3 {RATIO} (ref 2–5)
CO2 SERPL-SCNC: 17 MMOL/L (ref 23–29)
CREAT SERPL-MCNC: 2.1 MG/DL (ref 0.5–1.4)
EAG (OHS): 131 MG/DL (ref 68–131)
ERYTHROCYTE [DISTWIDTH] IN BLOOD BY AUTOMATED COUNT: 15.9 % (ref 11.5–14.5)
GFR SERPLBLD CREATININE-BSD FMLA CKD-EPI: 33 ML/MIN/1.73/M2
GLUCOSE SERPL-MCNC: 173 MG/DL (ref 70–110)
HBA1C MFR BLD: 6.2 % (ref 4–5.6)
HCT VFR BLD AUTO: 34.7 % (ref 40–54)
HDLC SERPL-MCNC: 36 MG/DL (ref 40–75)
HDLC SERPL: 30.8 % (ref 20–50)
HGB BLD-MCNC: 10.6 GM/DL (ref 14–18)
LDLC SERPL CALC-MCNC: 48 MG/DL (ref 63–159)
MCH RBC QN AUTO: 29.7 PG (ref 27–31)
MCHC RBC AUTO-ENTMCNC: 30.5 G/DL (ref 32–36)
MCV RBC AUTO: 97 FL (ref 82–98)
NONHDLC SERPL-MCNC: 81 MG/DL
PLATELET # BLD AUTO: 254 K/UL (ref 150–450)
PMV BLD AUTO: 10.5 FL (ref 9.2–12.9)
POTASSIUM SERPL-SCNC: 4.2 MMOL/L (ref 3.5–5.1)
PROT SERPL-MCNC: 6.5 GM/DL (ref 6–8.4)
RBC # BLD AUTO: 3.57 M/UL (ref 4.6–6.2)
SODIUM SERPL-SCNC: 142 MMOL/L (ref 136–145)
TRIGL SERPL-MCNC: 165 MG/DL (ref 30–150)
WBC # BLD AUTO: 6.33 K/UL (ref 3.9–12.7)

## 2025-08-05 PROCEDURE — 80061 LIPID PANEL: CPT | Mod: HCNC

## 2025-08-05 PROCEDURE — 84443 ASSAY THYROID STIM HORMONE: CPT | Mod: HCNC

## 2025-08-05 PROCEDURE — 83036 HEMOGLOBIN GLYCOSYLATED A1C: CPT | Mod: HCNC

## 2025-08-05 PROCEDURE — 85027 COMPLETE CBC AUTOMATED: CPT | Mod: HCNC

## 2025-08-05 PROCEDURE — 36415 COLL VENOUS BLD VENIPUNCTURE: CPT | Mod: HCNC,PO

## 2025-08-05 PROCEDURE — 84153 ASSAY OF PSA TOTAL: CPT | Mod: HCNC

## 2025-08-05 PROCEDURE — 80053 COMPREHEN METABOLIC PANEL: CPT | Mod: HCNC

## 2025-08-05 NOTE — TELEPHONE ENCOUNTER
Received recommendations from [facility] and gave verbal consent for prescription.     Called daughter, no answer, LVM. Let her know that [facility] should be reaching out to her.

## 2025-08-06 ENCOUNTER — PATIENT MESSAGE (OUTPATIENT)
Dept: OTOLARYNGOLOGY | Facility: CLINIC | Age: 75
End: 2025-08-06
Payer: MEDICARE

## 2025-08-06 LAB
PSA SERPL-MCNC: 0.56 NG/ML
TSH SERPL-ACNC: 2.55 UIU/ML (ref 0.4–4)

## 2025-08-09 DIAGNOSIS — E78.2 MIXED HYPERLIPIDEMIA: ICD-10-CM

## 2025-08-11 ENCOUNTER — OFFICE VISIT (OUTPATIENT)
Dept: PODIATRY | Facility: CLINIC | Age: 75
End: 2025-08-11
Payer: MEDICARE

## 2025-08-11 DIAGNOSIS — N18.30 CONTROLLED TYPE 2 DIABETES MELLITUS WITH STAGE 3 CHRONIC KIDNEY DISEASE, WITHOUT LONG-TERM CURRENT USE OF INSULIN: Primary | ICD-10-CM

## 2025-08-11 DIAGNOSIS — L60.3 ONYCHODYSTROPHY: ICD-10-CM

## 2025-08-11 DIAGNOSIS — Z86.718 HISTORY OF DVT OF LOWER EXTREMITY: ICD-10-CM

## 2025-08-11 DIAGNOSIS — Z79.01 ANTICOAGULATED: ICD-10-CM

## 2025-08-11 DIAGNOSIS — I69.30 HISTORY OF CVA WITH RESIDUAL DEFICIT: ICD-10-CM

## 2025-08-11 DIAGNOSIS — E11.22 CONTROLLED TYPE 2 DIABETES MELLITUS WITH STAGE 3 CHRONIC KIDNEY DISEASE, WITHOUT LONG-TERM CURRENT USE OF INSULIN: Primary | ICD-10-CM

## 2025-08-11 PROCEDURE — 99999 PR PBB SHADOW E&M-EST. PATIENT-LVL III: CPT | Mod: PBBFAC,HCNC,, | Performed by: PODIATRIST

## 2025-08-11 RX ORDER — ROSUVASTATIN CALCIUM 20 MG/1
20 TABLET, COATED ORAL
Qty: 90 TABLET | Refills: 0 | Status: SHIPPED | OUTPATIENT
Start: 2025-08-11

## 2025-08-12 ENCOUNTER — HOSPITAL ENCOUNTER (OUTPATIENT)
Dept: RADIOLOGY | Facility: HOSPITAL | Age: 75
Discharge: HOME | End: 2025-08-12
Attending: UROLOGY
Payer: MEDICARE

## 2025-08-12 DIAGNOSIS — N20.0 KIDNEY STONE: ICD-10-CM

## 2025-08-12 PROCEDURE — 74176 CT ABD & PELVIS W/O CONTRAST: CPT | Mod: TC,HCNC

## 2025-08-12 PROCEDURE — 74176 CT ABD & PELVIS W/O CONTRAST: CPT | Mod: 26,HCNC,, | Performed by: RADIOLOGY

## 2025-08-20 ENCOUNTER — OFFICE VISIT (OUTPATIENT)
Dept: FAMILY MEDICINE | Facility: CLINIC | Age: 75
End: 2025-08-20
Payer: MEDICARE

## 2025-08-20 VITALS
HEART RATE: 80 BPM | HEIGHT: 66 IN | WEIGHT: 136.88 LBS | SYSTOLIC BLOOD PRESSURE: 110 MMHG | DIASTOLIC BLOOD PRESSURE: 72 MMHG | BODY MASS INDEX: 22 KG/M2 | OXYGEN SATURATION: 98 %

## 2025-08-20 DIAGNOSIS — F02.80 MIXED ALZHEIMER'S AND VASCULAR DEMENTIA: ICD-10-CM

## 2025-08-20 DIAGNOSIS — N18.30 CONTROLLED TYPE 2 DIABETES MELLITUS WITH STAGE 3 CHRONIC KIDNEY DISEASE, WITHOUT LONG-TERM CURRENT USE OF INSULIN: Primary | ICD-10-CM

## 2025-08-20 DIAGNOSIS — E11.22 CONTROLLED TYPE 2 DIABETES MELLITUS WITH STAGE 3 CHRONIC KIDNEY DISEASE, WITHOUT LONG-TERM CURRENT USE OF INSULIN: Primary | ICD-10-CM

## 2025-08-20 DIAGNOSIS — G30.9 MIXED ALZHEIMER'S AND VASCULAR DEMENTIA: ICD-10-CM

## 2025-08-20 DIAGNOSIS — D64.9 CHRONIC ANEMIA: ICD-10-CM

## 2025-08-20 DIAGNOSIS — Z12.11 COLON CANCER SCREENING: ICD-10-CM

## 2025-08-20 DIAGNOSIS — F01.50 MIXED ALZHEIMER'S AND VASCULAR DEMENTIA: ICD-10-CM

## 2025-08-20 DIAGNOSIS — I69.30 HISTORY OF CVA WITH RESIDUAL DEFICIT: ICD-10-CM

## 2025-08-20 DIAGNOSIS — Z12.2 ENCOUNTER FOR SCREENING FOR LUNG CANCER: ICD-10-CM

## 2025-08-20 PROCEDURE — 99999 PR PBB SHADOW E&M-EST. PATIENT-LVL IV: CPT | Mod: PBBFAC,HCNC,, | Performed by: FAMILY MEDICINE

## 2025-08-21 ENCOUNTER — OFFICE VISIT (OUTPATIENT)
Dept: UROLOGY | Facility: CLINIC | Age: 75
End: 2025-08-21
Payer: MEDICARE

## 2025-08-21 VITALS
DIASTOLIC BLOOD PRESSURE: 79 MMHG | BODY MASS INDEX: 22 KG/M2 | SYSTOLIC BLOOD PRESSURE: 147 MMHG | HEART RATE: 77 BPM | WEIGHT: 136.88 LBS | HEIGHT: 66 IN

## 2025-08-21 DIAGNOSIS — N20.0 KIDNEY STONE: Primary | ICD-10-CM

## 2025-08-21 LAB
BILIRUBIN, UA POC OHS: NEGATIVE
BLOOD, UA POC OHS: NEGATIVE
CLARITY, UA POC OHS: CLEAR
COLOR, UA POC OHS: YELLOW
GLUCOSE, UA POC OHS: NEGATIVE
KETONES, UA POC OHS: NEGATIVE
LEUKOCYTES, UA POC OHS: ABNORMAL
NITRITE, UA POC OHS: NEGATIVE
PH, UA POC OHS: 7
PROTEIN, UA POC OHS: NEGATIVE
SPECIFIC GRAVITY, UA POC OHS: 1.01
UROBILINOGEN, UA POC OHS: 0.2

## 2025-08-21 PROCEDURE — 99999 PR PBB SHADOW E&M-EST. PATIENT-LVL IV: CPT | Mod: PBBFAC,HCNC,, | Performed by: UROLOGY

## 2025-08-28 RX ORDER — FLUOXETINE 20 MG/1
20 CAPSULE ORAL DAILY
Qty: 90 CAPSULE | Refills: 3 | Status: SHIPPED | OUTPATIENT
Start: 2025-08-28

## (undated) DEVICE — UROVIEW 2600/2800

## (undated) DEVICE — GUIDEWIRE STF .035X260CM ANG

## (undated) DEVICE — DEVICE CLOSURE MYNX GRIP 5FR

## (undated) DEVICE — SUT 3-0 VICRYL / SH (J416)

## (undated) DEVICE — STAPLER SKIN REGULAR

## (undated) DEVICE — DRESSING AQUACEL AG ADV 3.5X6

## (undated) DEVICE — SHEATH FLEXOR URETERAL 45CM

## (undated) DEVICE — PACK CATH LAB CUSTOM BR

## (undated) DEVICE — WIRE GUIDE TEFLON 3CM .035 145

## (undated) DEVICE — CATH POLLACK OPEN-END FLEXI-TI

## (undated) DEVICE — PACK PACER PERMANENT OMC

## (undated) DEVICE — CATH MARINER OMNI FLSH 5F 65CM

## (undated) DEVICE — SOL IRRI STRL WATER 1000ML

## (undated) DEVICE — ADHESIVE MASTISOL VIAL 48/BX

## (undated) DEVICE — KIT INTRODUCER STIFFEN MICRO

## (undated) DEVICE — SOL NACL IRR 3000ML

## (undated) DEVICE — PACK HEART CATH BR

## (undated) DEVICE — GLOVE SURG BIOGEL LATEX SZ 7.5

## (undated) DEVICE — PAD GROUNDING DISPER ELECTRODE

## (undated) DEVICE — OMNIPAQUE 300MG 150ML VIAL

## (undated) DEVICE — COVER LIGHT HANDLE 80/CA

## (undated) DEVICE — TRAY SKIN SCRUB WET PREMIUM

## (undated) DEVICE — TRAY SKIN SCRUB WET 4 COMPART

## (undated) DEVICE — GUIDE WIRE MOTION .035 X 150CM

## (undated) DEVICE — ADHESIVE DERMABOND ADVANCED

## (undated) DEVICE — BOWL STERILE LARGE 32OZ

## (undated) DEVICE — DRAPE T CYSTOSCOPY STERILE

## (undated) DEVICE — FIBER QUANTA OPT SDT 272UM

## (undated) DEVICE — GOWN POLY REINF X-LONG XL

## (undated) DEVICE — SPONGE COTTON TRAY 4X4IN

## (undated) DEVICE — TOWEL OR DISP STRL BLUE 4/PK

## (undated) DEVICE — BLADE PLASMA WIDE SPATULA TIP

## (undated) DEVICE — EXTRACTOR STNE 1.7FRX115CM

## (undated) DEVICE — SET IRR URLGY 2LINE UNIV SPIKE

## (undated) DEVICE — GOWN POLY REINF BRTH SLV XL

## (undated) DEVICE — SCALPEL SZ 10 RETRACTABLE

## (undated) DEVICE — CONTAINER SPECIMEN OR STER 4OZ

## (undated) DEVICE — SYR BULB 60CC IRRIGATION

## (undated) DEVICE — GUIDEWIRE STD .035X180CM ANG

## (undated) DEVICE — CANISTER SUCTION JUMBO 12L

## (undated) DEVICE — PAD DEFIB CADENCE ADULT R2

## (undated) DEVICE — SHEATH INTRODUCER 5FR 10CM

## (undated) DEVICE — SOL IRR NACL .9% 3000ML

## (undated) DEVICE — SYR ONLY LUER LOCK 20CC

## (undated) DEVICE — URETEROSCOPE LITHOVUE REVERSE

## (undated) DEVICE — NDL PERCUTANEOUS 21G 7CM VASC

## (undated) DEVICE — CAUTERY BOVIE PENCIL

## (undated) DEVICE — IRRIGATION SET Y-TYPE TUR/BLAD